# Patient Record
Sex: FEMALE | Race: BLACK OR AFRICAN AMERICAN | NOT HISPANIC OR LATINO | URBAN - METROPOLITAN AREA
[De-identification: names, ages, dates, MRNs, and addresses within clinical notes are randomized per-mention and may not be internally consistent; named-entity substitution may affect disease eponyms.]

---

## 2018-07-10 ENCOUNTER — EMERGENCY (EMERGENCY)
Facility: HOSPITAL | Age: 73
LOS: 1 days | Discharge: HOME | End: 2018-07-10
Attending: EMERGENCY MEDICINE | Admitting: EMERGENCY MEDICINE
Payer: MEDICARE

## 2018-07-10 VITALS
TEMPERATURE: 98 F | OXYGEN SATURATION: 99 % | SYSTOLIC BLOOD PRESSURE: 183 MMHG | RESPIRATION RATE: 18 BRPM | HEART RATE: 61 BPM | DIASTOLIC BLOOD PRESSURE: 101 MMHG

## 2018-07-10 DIAGNOSIS — Z94.0 KIDNEY TRANSPLANT STATUS: Chronic | ICD-10-CM

## 2018-07-10 LAB
ALBUMIN SERPL ELPH-MCNC: 4.2 G/DL — SIGNIFICANT CHANGE UP (ref 3.3–5)
ALP SERPL-CCNC: 81 U/L — SIGNIFICANT CHANGE UP (ref 40–120)
ALT FLD-CCNC: 13 U/L — SIGNIFICANT CHANGE UP (ref 4–33)
APPEARANCE UR: CLEAR — SIGNIFICANT CHANGE UP
APTT BLD: 24.9 SEC — LOW (ref 27.5–37.4)
AST SERPL-CCNC: 26 U/L — SIGNIFICANT CHANGE UP (ref 4–32)
BASOPHILS # BLD AUTO: 0.03 K/UL — SIGNIFICANT CHANGE UP (ref 0–0.2)
BASOPHILS NFR BLD AUTO: 0.5 % — SIGNIFICANT CHANGE UP (ref 0–2)
BILIRUB SERPL-MCNC: 0.5 MG/DL — SIGNIFICANT CHANGE UP (ref 0.2–1.2)
BILIRUB UR-MCNC: NEGATIVE — SIGNIFICANT CHANGE UP
BLOOD UR QL VISUAL: NEGATIVE — SIGNIFICANT CHANGE UP
BUN SERPL-MCNC: 21 MG/DL — SIGNIFICANT CHANGE UP (ref 7–23)
CALCIUM SERPL-MCNC: 9.3 MG/DL — SIGNIFICANT CHANGE UP (ref 8.4–10.5)
CHLORIDE SERPL-SCNC: 100 MMOL/L — SIGNIFICANT CHANGE UP (ref 98–107)
CO2 SERPL-SCNC: 23 MMOL/L — SIGNIFICANT CHANGE UP (ref 22–31)
COLOR SPEC: SIGNIFICANT CHANGE UP
CREAT SERPL-MCNC: 1.42 MG/DL — HIGH (ref 0.5–1.3)
EOSINOPHIL # BLD AUTO: 0.2 K/UL — SIGNIFICANT CHANGE UP (ref 0–0.5)
EOSINOPHIL NFR BLD AUTO: 3.1 % — SIGNIFICANT CHANGE UP (ref 0–6)
GLUCOSE SERPL-MCNC: 99 MG/DL — SIGNIFICANT CHANGE UP (ref 70–99)
GLUCOSE UR-MCNC: NEGATIVE — SIGNIFICANT CHANGE UP
HCT VFR BLD CALC: 38.6 % — SIGNIFICANT CHANGE UP (ref 34.5–45)
HGB BLD-MCNC: 12 G/DL — SIGNIFICANT CHANGE UP (ref 11.5–15.5)
IMM GRANULOCYTES # BLD AUTO: 0.03 # — SIGNIFICANT CHANGE UP
IMM GRANULOCYTES NFR BLD AUTO: 0.5 % — SIGNIFICANT CHANGE UP (ref 0–1.5)
INR BLD: 0.97 — SIGNIFICANT CHANGE UP (ref 0.88–1.17)
KETONES UR-MCNC: NEGATIVE — SIGNIFICANT CHANGE UP
LEUKOCYTE ESTERASE UR-ACNC: NEGATIVE — SIGNIFICANT CHANGE UP
LYMPHOCYTES # BLD AUTO: 1.26 K/UL — SIGNIFICANT CHANGE UP (ref 1–3.3)
LYMPHOCYTES # BLD AUTO: 19.8 % — SIGNIFICANT CHANGE UP (ref 13–44)
MCHC RBC-ENTMCNC: 26.9 PG — LOW (ref 27–34)
MCHC RBC-ENTMCNC: 31.1 % — LOW (ref 32–36)
MCV RBC AUTO: 86.5 FL — SIGNIFICANT CHANGE UP (ref 80–100)
MONOCYTES # BLD AUTO: 0.9 K/UL — SIGNIFICANT CHANGE UP (ref 0–0.9)
MONOCYTES NFR BLD AUTO: 14.1 % — HIGH (ref 2–14)
MUCOUS THREADS # UR AUTO: SIGNIFICANT CHANGE UP
NEUTROPHILS # BLD AUTO: 3.95 K/UL — SIGNIFICANT CHANGE UP (ref 1.8–7.4)
NEUTROPHILS NFR BLD AUTO: 62 % — SIGNIFICANT CHANGE UP (ref 43–77)
NITRITE UR-MCNC: NEGATIVE — SIGNIFICANT CHANGE UP
NRBC # FLD: 0 — SIGNIFICANT CHANGE UP
PH UR: 6.5 — SIGNIFICANT CHANGE UP (ref 4.6–8)
PLATELET # BLD AUTO: 155 K/UL — SIGNIFICANT CHANGE UP (ref 150–400)
PMV BLD: 10.3 FL — SIGNIFICANT CHANGE UP (ref 7–13)
POTASSIUM SERPL-MCNC: 4.1 MMOL/L — SIGNIFICANT CHANGE UP (ref 3.5–5.3)
POTASSIUM SERPL-SCNC: 4.1 MMOL/L — SIGNIFICANT CHANGE UP (ref 3.5–5.3)
PROT SERPL-MCNC: 7.5 G/DL — SIGNIFICANT CHANGE UP (ref 6–8.3)
PROT UR-MCNC: 100 MG/DL — HIGH
PROTHROM AB SERPL-ACNC: 11.2 SEC — SIGNIFICANT CHANGE UP (ref 9.8–13.1)
RBC # BLD: 4.46 M/UL — SIGNIFICANT CHANGE UP (ref 3.8–5.2)
RBC # FLD: 14.8 % — HIGH (ref 10.3–14.5)
RBC CASTS # UR COMP ASSIST: SIGNIFICANT CHANGE UP (ref 0–?)
SODIUM SERPL-SCNC: 139 MMOL/L — SIGNIFICANT CHANGE UP (ref 135–145)
SP GR SPEC: 1.01 — SIGNIFICANT CHANGE UP (ref 1–1.04)
SQUAMOUS # UR AUTO: SIGNIFICANT CHANGE UP
TROPONIN T, HIGH SENSITIVITY: 10 NG/L — SIGNIFICANT CHANGE UP (ref ?–14)
TROPONIN T, HIGH SENSITIVITY: 12 NG/L — SIGNIFICANT CHANGE UP (ref ?–14)
UROBILINOGEN FLD QL: NORMAL MG/DL — SIGNIFICANT CHANGE UP
WBC # BLD: 6.37 K/UL — SIGNIFICANT CHANGE UP (ref 3.8–10.5)
WBC # FLD AUTO: 6.37 K/UL — SIGNIFICANT CHANGE UP (ref 3.8–10.5)
WBC UR QL: SIGNIFICANT CHANGE UP (ref 0–?)

## 2018-07-10 PROCEDURE — 72170 X-RAY EXAM OF PELVIS: CPT | Mod: 26

## 2018-07-10 PROCEDURE — 70450 CT HEAD/BRAIN W/O DYE: CPT | Mod: 26

## 2018-07-10 PROCEDURE — 99220: CPT

## 2018-07-10 PROCEDURE — 72125 CT NECK SPINE W/O DYE: CPT | Mod: 26

## 2018-07-10 PROCEDURE — 72220 X-RAY EXAM SACRUM TAILBONE: CPT | Mod: 26

## 2018-07-10 PROCEDURE — 72100 X-RAY EXAM L-S SPINE 2/3 VWS: CPT | Mod: 26

## 2018-07-10 PROCEDURE — 71046 X-RAY EXAM CHEST 2 VIEWS: CPT | Mod: 26

## 2018-07-10 PROCEDURE — 93010 ELECTROCARDIOGRAM REPORT: CPT | Mod: 59

## 2018-07-10 RX ORDER — FOLIC ACID 0.8 MG
1 TABLET ORAL ONCE
Qty: 0 | Refills: 0 | Status: COMPLETED | OUTPATIENT
Start: 2018-07-10 | End: 2018-07-10

## 2018-07-10 RX ORDER — PANTOPRAZOLE SODIUM 20 MG/1
40 TABLET, DELAYED RELEASE ORAL
Qty: 0 | Refills: 0 | Status: DISCONTINUED | OUTPATIENT
Start: 2018-07-10 | End: 2018-07-14

## 2018-07-10 RX ORDER — MYCOPHENOLATE MOFETIL 250 MG/1
750 CAPSULE ORAL ONCE
Qty: 0 | Refills: 0 | Status: COMPLETED | OUTPATIENT
Start: 2018-07-10 | End: 2018-07-10

## 2018-07-10 RX ORDER — SIMVASTATIN 20 MG/1
20 TABLET, FILM COATED ORAL ONCE
Qty: 0 | Refills: 0 | Status: COMPLETED | OUTPATIENT
Start: 2018-07-10 | End: 2018-07-10

## 2018-07-10 RX ORDER — ACETAMINOPHEN 500 MG
650 TABLET ORAL ONCE
Qty: 0 | Refills: 0 | Status: COMPLETED | OUTPATIENT
Start: 2018-07-10 | End: 2018-07-10

## 2018-07-10 RX ORDER — MAGNESIUM OXIDE 400 MG ORAL TABLET 241.3 MG
400 TABLET ORAL AT BEDTIME
Qty: 0 | Refills: 0 | Status: DISCONTINUED | OUTPATIENT
Start: 2018-07-10 | End: 2018-07-14

## 2018-07-10 RX ORDER — LATANOPROST 0.05 MG/ML
1 SOLUTION/ DROPS OPHTHALMIC; TOPICAL AT BEDTIME
Qty: 0 | Refills: 0 | Status: DISCONTINUED | OUTPATIENT
Start: 2018-07-10 | End: 2018-07-14

## 2018-07-10 RX ORDER — LOSARTAN POTASSIUM 100 MG/1
100 TABLET, FILM COATED ORAL DAILY
Qty: 0 | Refills: 0 | Status: DISCONTINUED | OUTPATIENT
Start: 2018-07-10 | End: 2018-07-14

## 2018-07-10 RX ORDER — MYCOPHENOLATE MOFETIL 250 MG/1
250 CAPSULE ORAL ONCE
Qty: 0 | Refills: 0 | Status: DISCONTINUED | OUTPATIENT
Start: 2018-07-10 | End: 2018-07-10

## 2018-07-10 RX ORDER — PANTOPRAZOLE SODIUM 20 MG/1
40 TABLET, DELAYED RELEASE ORAL
Qty: 0 | Refills: 0 | Status: DISCONTINUED | OUTPATIENT
Start: 2018-07-10 | End: 2018-07-11

## 2018-07-10 RX ORDER — SIMVASTATIN 20 MG/1
20 TABLET, FILM COATED ORAL DAILY
Qty: 0 | Refills: 0 | Status: DISCONTINUED | OUTPATIENT
Start: 2018-07-10 | End: 2018-07-14

## 2018-07-10 RX ORDER — AMLODIPINE BESYLATE 2.5 MG/1
5 TABLET ORAL DAILY
Qty: 0 | Refills: 0 | Status: DISCONTINUED | OUTPATIENT
Start: 2018-07-10 | End: 2018-07-14

## 2018-07-10 RX ORDER — MYCOPHENOLATE MOFETIL 250 MG/1
750 CAPSULE ORAL
Qty: 0 | Refills: 0 | Status: DISCONTINUED | OUTPATIENT
Start: 2018-07-10 | End: 2018-07-14

## 2018-07-10 RX ORDER — LOSARTAN POTASSIUM 100 MG/1
100 TABLET, FILM COATED ORAL ONCE
Qty: 0 | Refills: 0 | Status: COMPLETED | OUTPATIENT
Start: 2018-07-10 | End: 2018-07-10

## 2018-07-10 RX ADMIN — SIMVASTATIN 20 MILLIGRAM(S): 20 TABLET, FILM COATED ORAL at 16:27

## 2018-07-10 RX ADMIN — PANTOPRAZOLE SODIUM 40 MILLIGRAM(S): 20 TABLET, DELAYED RELEASE ORAL at 16:26

## 2018-07-10 RX ADMIN — MYCOPHENOLATE MOFETIL 750 MILLIGRAM(S): 250 CAPSULE ORAL at 17:52

## 2018-07-10 RX ADMIN — Medication 650 MILLIGRAM(S): at 02:00

## 2018-07-10 RX ADMIN — Medication 650 MILLIGRAM(S): at 21:28

## 2018-07-10 RX ADMIN — MAGNESIUM OXIDE 400 MG ORAL TABLET 400 MILLIGRAM(S): 241.3 TABLET ORAL at 22:57

## 2018-07-10 RX ADMIN — Medication 5 MILLIGRAM(S): at 16:26

## 2018-07-10 RX ADMIN — Medication 1 MILLIGRAM(S): at 16:26

## 2018-07-10 RX ADMIN — LATANOPROST 1 DROP(S): 0.05 SOLUTION/ DROPS OPHTHALMIC; TOPICAL at 23:18

## 2018-07-10 RX ADMIN — Medication 650 MILLIGRAM(S): at 16:44

## 2018-07-10 RX ADMIN — Medication 650 MILLIGRAM(S): at 16:26

## 2018-07-10 NOTE — ED PROVIDER NOTE - PROGRESS NOTE DETAILS
RONN Childers: Spoke with Patient PCP [facility] cell: [phone] or pager at [phone]. Discussed patient results and our concern. PMD reports no underlying arrythmia, carotid dopplers and echo cardiograms have been done with normal studies. PCP agrees with obs for tele, neuro checks, echo and to touch base with him tomorrow.

## 2018-07-10 NOTE — ED PROVIDER NOTE - MEDICAL DECISION MAKING DETAILS
Syncope  1) IV access/LABS/CE/EKG/monitor  2) CXR  3) CT Head/CT c-spine; xray lumbar spine/sacrum, chest  4) admit to tele

## 2018-07-10 NOTE — ED CDU PROVIDER INITIAL DAY NOTE - MEDICAL DECISION MAKING DETAILS
Patient s/p syncopal episode vs mechanical fall (patient states it was a mechanical fall daughter states it was a syncopal episode). Patient to cdu for overnight telemetry and an echo, no other changes.

## 2018-07-10 NOTE — ED CDU PROVIDER INITIAL DAY NOTE - OBJECTIVE STATEMENT
ATTG NOTE DR. HUFF 73F presents to the ED after while going downstairs +LOC, fell down 6 stairs, heard by daughter in adjacent room.  As per daughter patient +LOC, and confused after awakening for <1minute +diaphoresis.  No urinary or bowel incontinence, no tongue biting, no preceding symptoms.  Pt was in [facility] prior to episode.  No previous similar episodes.    RONN Childers: 74 yo F with PMHx of PCKD s/p kidney transplant in 2003, HTN, glaucoma, steroid induced DM, OA/ OP, right ICA aneurysm (stable last imaging was 2017) presents to the ED with syncope/ fall this am. Pt reports that she was on the stairs and she fell. She lives at home with daughter whom heard mother coming down the stairs and believes mother fell down 6-7 steps. When daughter got over to her she was diaphoretic and synopsized 2secs and then seemed confused for approx one minutes and returned to baseline. Now c/o dizziness and occipital HA 5/10. Denies pre fall cp ,sob, dizziness, headache, nausea, vomiting, fevers, chills, abdominal pain, weakness, numbness/ tingling    RONN Zayas: Agree with above 74 yo F with PMHx of PCKD s/p kidney transplant in 2003, HTN, glaucoma, steroid induced DM, OA/ OP, right ICA aneurysm (stable last imaging was 2017) presents to the ED with syncope/ fall this am. Pt reports that she was on the stairs and she fell. She lives at home with daughter whom heard mother coming down the stairs and believes mother fell down 6-7 steps. Patient was found to be diaphoretic and was not fully responsive although no seizure like activity was witnessed. Patient's PMD was contacted and joint plan for CDU placement with and an AM echo was jointly planned. Patient is stable and back to baseline, no other acute changes. Patient states she remembers the entire event however daughter states patient had synopsized.

## 2018-07-10 NOTE — ED ADULT NURSE NOTE - OBJECTIVE STATEMENT
Toyin RN: received pt to room 22 for evaluation of headache, neck pain and coccyx pain s/p unwitnessed fall down 6 steps this morning with +loc x 2 minutes per family who states pt was found diaphoretic on the floor. pt endorses not remembering what happened. denies use of anticoagulants. family states pt has had recent falls. pt presents awake a&ox4, denies dizziness, endorses headache and neck pain. denies visual disturbances. skin warm, dry, appropriate for race, intact. respirations even, unlabored. lungs cta. denies cp or sob. abdomen soft nontender nondistended. pt states abdomen size is baseline secondary to renal transplant. denies n/v/d. denies fevers or chills. left av fistula in place, non-functional per family since pt's renal transplant. ivl placed, bloods drawn and sent. vs as noted. safety maintained. family at bedside. report to primary RN Roseann SERRATO

## 2018-07-10 NOTE — ED PROVIDER NOTE - OBJECTIVE STATEMENT
ATTG NOTE DR. HUFF 73F presents to the ED after while going downstairs +LOC, fell down 6 stairs, heard by daughter in adjacent room.  As per daughter patient +LOC, and confused after awakening for <1minute +diaphoresis.  No urinary or bowel incontinence, no tongue biting, no preceding symptoms.  Pt was in [facility] prior to episode.  No previous similar episodes. ATTG NOTE DR. HUFF 73F presents to the ED after while going downstairs +LOC, fell down 6 stairs, heard by daughter in adjacent room.  As per daughter patient +LOC, and confused after awakening for <1minute +diaphoresis.  No urinary or bowel incontinence, no tongue biting, no preceding symptoms.  Pt was in [facility] prior to episode.  No previous similar episodes.    RONN Childers: 74 yo F with PMHx of PCKD s/p kidney transplant in 2003, HTN, glaucoma, steroid induced DM, OA/ OP, right ICA aneurysm (stable last imaging was 2017) presents to the ED with syncope/ fall this am. Pt reports that she was on the stairs and she fell. She lives at home with daughter whom heard mother coming down the stairs and believes mother fell down 6-7 steps. When daughter got over to her she was diaphoretic and synopsized 2secs and then seemed confused for approx one minutes and returned to baseline. Now c/o dizziness and occipital HA 5/10. Denies pre fall cp ,sob, dizziness, headache, nausea, vomiting, fevers, chills, abdominal pain, weakness, numbness/ tingling

## 2018-07-10 NOTE — ED ADULT TRIAGE NOTE - CHIEF COMPLAINT QUOTE
Pt brought in by EMS from home for unwitnessed syncopal episode. As per family they heard a noise and they found the patient on the floor. Pt complaining of lower back and head pain.  by EMS. Pt denies chest pain.

## 2018-07-11 VITALS
RESPIRATION RATE: 16 BRPM | DIASTOLIC BLOOD PRESSURE: 76 MMHG | SYSTOLIC BLOOD PRESSURE: 151 MMHG | TEMPERATURE: 97 F | HEART RATE: 68 BPM | OXYGEN SATURATION: 100 %

## 2018-07-11 PROCEDURE — 99217: CPT

## 2018-07-11 PROCEDURE — 93306 TTE W/DOPPLER COMPLETE: CPT | Mod: 26

## 2018-07-11 RX ORDER — ACETAMINOPHEN 500 MG
650 TABLET ORAL ONCE
Qty: 0 | Refills: 0 | Status: COMPLETED | OUTPATIENT
Start: 2018-07-11 | End: 2018-07-11

## 2018-07-11 RX ADMIN — Medication 650 MILLIGRAM(S): at 08:11

## 2018-07-11 RX ADMIN — MYCOPHENOLATE MOFETIL 750 MILLIGRAM(S): 250 CAPSULE ORAL at 10:00

## 2018-07-11 RX ADMIN — AMLODIPINE BESYLATE 5 MILLIGRAM(S): 2.5 TABLET ORAL at 10:01

## 2018-07-11 RX ADMIN — Medication 650 MILLIGRAM(S): at 06:56

## 2018-07-11 RX ADMIN — LOSARTAN POTASSIUM 100 MILLIGRAM(S): 100 TABLET, FILM COATED ORAL at 10:02

## 2018-07-11 NOTE — ED CDU PROVIDER SUBSEQUENT DAY NOTE - ATTENDING CONTRIBUTION TO CARE
CDU MD ALLEN:  I performed a face to face bedside interview with patient regarding history of present illness, review of symptoms and past medical history. I completed an independent physical exam.  I have discussed patient's plan of care with PA.   I agree with note as stated above, having amended the EMR as needed to reflect my findings. I have discussed the assessment and plan of care.  This includes during the time I functioned as the attending physician for this patient.

## 2018-07-11 NOTE — ED CDU PROVIDER DISPOSITION NOTE - CLINICAL COURSE
72 y/o female h/o pkcd, kidney xplant, htn, dm here after presumed syncopal episode preceding fall down stairs.  Neg head ct in main ED, sent to CDU for echo.  Echo without wma.  Discharged home with pcp and cards f/u.

## 2018-07-11 NOTE — ED CDU PROVIDER SUBSEQUENT DAY NOTE - MEDICAL DECISION MAKING DETAILS
74 yo F with PMHx of PCKD s/p kidney transplant in 2003, HTN, glaucoma, steroid induced DM, OA/ OP, right ICA aneurysm (stable last imaging was 2017) pw unwitnessed fall vs syncope today which was heard by her daughter  Plan: echo

## 2018-07-11 NOTE — ED CDU PROVIDER SUBSEQUENT DAY NOTE - PROGRESS NOTE DETAILS
MD ALLEN:  Pt has no compliants.  Denies cp, sob, lightheadedness.  Mild frontal ha and low back pain.  Improved from yesterday.  Non ttp spinous midline, ctabil.  Pending echo. Stress and echo WNL, patient has been stable, monitor shows NSR in the 70s Stress and echo WNL, patient has been stable, monitor shows NSR in the 70s patient safe for D/C with PMD and cardiologist F/U. Follow up with your primary care doctor and a cardiologist.  Bring copies of your test results. Advance activity as tolerated.  Continue all previously prescribed medications as directed.  Follow up with your primary care physician in 48-72 hours- bring copies of your results.  Return to the ER for worsening or persistent symptoms, and/or ANY NEW OR CONCERNING SYMPTOMS. If you have issues obtaining follow up, please call: [phone] to obtain a doctor or specialist who takes your insurance in your area.  You may call [phone] to make an appointment with the internal medicine clinic.

## 2018-07-11 NOTE — ED CDU PROVIDER SUBSEQUENT DAY NOTE - HISTORY
74 yo F with PMHx of PCKD s/p kidney transplant in 2003, HTN, glaucoma, steroid induced DM, OA/ OP, right ICA aneurysm (stable last imaging was 2017) pw unwitnessed fall vs syncope today which was heard by her daughter. Lives with her daughter who believes she may have fallen down 6-7 steps. Followed by diaphoresis and was confused after incident then returned to baseline. Pt NAD, VSS. Will continue to monitor. Pending echo.

## 2018-07-11 NOTE — ED CDU PROVIDER DISPOSITION NOTE - PLAN OF CARE
Follow up with your primary care doctor and a cardiologist.  Bring copies of your test results. Advance activity as tolerated.  Continue all previously prescribed medications as directed.  Follow up with your primary care physician in 48-72 hours- bring copies of your results.  Return to the ER for worsening or persistent symptoms, and/or ANY NEW OR CONCERNING SYMPTOMS. If you have issues obtaining follow up, please call: [phone] to obtain a doctor or specialist who takes your insurance in your area.  You may call [phone] to make an appointment with the internal medicine clinic.

## 2019-05-04 ENCOUNTER — EMERGENCY (EMERGENCY)
Facility: HOSPITAL | Age: 74
LOS: 1 days | Discharge: HOME | End: 2019-05-04
Attending: EMERGENCY MEDICINE | Admitting: EMERGENCY MEDICINE
Payer: MEDICARE

## 2019-05-04 VITALS
TEMPERATURE: 97 F | SYSTOLIC BLOOD PRESSURE: 164 MMHG | RESPIRATION RATE: 16 BRPM | HEART RATE: 79 BPM | OXYGEN SATURATION: 100 % | DIASTOLIC BLOOD PRESSURE: 81 MMHG

## 2019-05-04 VITALS
DIASTOLIC BLOOD PRESSURE: 76 MMHG | OXYGEN SATURATION: 100 % | SYSTOLIC BLOOD PRESSURE: 154 MMHG | RESPIRATION RATE: 18 BRPM | HEART RATE: 78 BPM

## 2019-05-04 DIAGNOSIS — Z94.0 KIDNEY TRANSPLANT STATUS: Chronic | ICD-10-CM

## 2019-05-04 LAB
ALBUMIN SERPL ELPH-MCNC: 4.2 G/DL — SIGNIFICANT CHANGE UP (ref 3.3–5)
ALP SERPL-CCNC: 66 U/L — SIGNIFICANT CHANGE UP (ref 40–120)
ALT FLD-CCNC: 15 U/L — SIGNIFICANT CHANGE UP (ref 4–33)
ANION GAP SERPL CALC-SCNC: 14 MMO/L — SIGNIFICANT CHANGE UP (ref 7–14)
AST SERPL-CCNC: 28 U/L — SIGNIFICANT CHANGE UP (ref 4–32)
BASE EXCESS BLDV CALC-SCNC: -0.4 MMOL/L — SIGNIFICANT CHANGE UP
BASOPHILS # BLD AUTO: 0.02 K/UL — SIGNIFICANT CHANGE UP (ref 0–0.2)
BASOPHILS NFR BLD AUTO: 0.3 % — SIGNIFICANT CHANGE UP (ref 0–2)
BILIRUB SERPL-MCNC: 0.4 MG/DL — SIGNIFICANT CHANGE UP (ref 0.2–1.2)
BLOOD GAS VENOUS - CREATININE: 1.65 MG/DL — HIGH (ref 0.5–1.3)
BUN SERPL-MCNC: 39 MG/DL — HIGH (ref 7–23)
CALCIUM SERPL-MCNC: 9.2 MG/DL — SIGNIFICANT CHANGE UP (ref 8.4–10.5)
CHLORIDE BLDV-SCNC: 104 MMOL/L — SIGNIFICANT CHANGE UP (ref 96–108)
CHLORIDE SERPL-SCNC: 101 MMOL/L — SIGNIFICANT CHANGE UP (ref 98–107)
CO2 SERPL-SCNC: 21 MMOL/L — LOW (ref 22–31)
CREAT SERPL-MCNC: 1.59 MG/DL — HIGH (ref 0.5–1.3)
EOSINOPHIL # BLD AUTO: 0.06 K/UL — SIGNIFICANT CHANGE UP (ref 0–0.5)
EOSINOPHIL NFR BLD AUTO: 0.9 % — SIGNIFICANT CHANGE UP (ref 0–6)
GAS PNL BLDV: 133 MMOL/L — LOW (ref 136–146)
GLUCOSE BLDV-MCNC: 158 MG/DL — HIGH (ref 70–99)
GLUCOSE SERPL-MCNC: 162 MG/DL — HIGH (ref 70–99)
HCO3 BLDV-SCNC: 23 MMOL/L — SIGNIFICANT CHANGE UP (ref 20–27)
HCT VFR BLD CALC: 35.1 % — SIGNIFICANT CHANGE UP (ref 34.5–45)
HCT VFR BLDV CALC: 34.2 % — LOW (ref 34.5–45)
HGB BLD-MCNC: 10.9 G/DL — LOW (ref 11.5–15.5)
HGB BLDV-MCNC: 11.1 G/DL — LOW (ref 11.5–15.5)
IMM GRANULOCYTES NFR BLD AUTO: 0.3 % — SIGNIFICANT CHANGE UP (ref 0–1.5)
LACTATE BLDV-MCNC: 2.2 MMOL/L — HIGH (ref 0.5–2)
LYMPHOCYTES # BLD AUTO: 0.62 K/UL — LOW (ref 1–3.3)
LYMPHOCYTES # BLD AUTO: 9.2 % — LOW (ref 13–44)
MAGNESIUM SERPL-MCNC: 1.5 MG/DL — LOW (ref 1.6–2.6)
MCHC RBC-ENTMCNC: 27.3 PG — SIGNIFICANT CHANGE UP (ref 27–34)
MCHC RBC-ENTMCNC: 31.1 % — LOW (ref 32–36)
MCV RBC AUTO: 88 FL — SIGNIFICANT CHANGE UP (ref 80–100)
MONOCYTES # BLD AUTO: 0.66 K/UL — SIGNIFICANT CHANGE UP (ref 0–0.9)
MONOCYTES NFR BLD AUTO: 9.8 % — SIGNIFICANT CHANGE UP (ref 2–14)
NEUTROPHILS # BLD AUTO: 5.36 K/UL — SIGNIFICANT CHANGE UP (ref 1.8–7.4)
NEUTROPHILS NFR BLD AUTO: 79.5 % — HIGH (ref 43–77)
NRBC # FLD: 0 K/UL — SIGNIFICANT CHANGE UP (ref 0–0)
PCO2 BLDV: 44 MMHG — SIGNIFICANT CHANGE UP (ref 41–51)
PH BLDV: 7.36 PH — SIGNIFICANT CHANGE UP (ref 7.32–7.43)
PHOSPHATE SERPL-MCNC: 3.2 MG/DL — SIGNIFICANT CHANGE UP (ref 2.5–4.5)
PLATELET # BLD AUTO: 173 K/UL — SIGNIFICANT CHANGE UP (ref 150–400)
PMV BLD: 11.4 FL — SIGNIFICANT CHANGE UP (ref 7–13)
PO2 BLDV: 28 MMHG — LOW (ref 35–40)
POTASSIUM BLDV-SCNC: 3.9 MMOL/L — SIGNIFICANT CHANGE UP (ref 3.4–4.5)
POTASSIUM SERPL-MCNC: 4.8 MMOL/L — SIGNIFICANT CHANGE UP (ref 3.5–5.3)
POTASSIUM SERPL-SCNC: 4.8 MMOL/L — SIGNIFICANT CHANGE UP (ref 3.5–5.3)
PROT SERPL-MCNC: 6.9 G/DL — SIGNIFICANT CHANGE UP (ref 6–8.3)
RBC # BLD: 3.99 M/UL — SIGNIFICANT CHANGE UP (ref 3.8–5.2)
RBC # FLD: 15.3 % — HIGH (ref 10.3–14.5)
SAO2 % BLDV: 44.7 % — LOW (ref 60–85)
SODIUM SERPL-SCNC: 136 MMOL/L — SIGNIFICANT CHANGE UP (ref 135–145)
WBC # BLD: 6.74 K/UL — SIGNIFICANT CHANGE UP (ref 3.8–10.5)
WBC # FLD AUTO: 6.74 K/UL — SIGNIFICANT CHANGE UP (ref 3.8–10.5)

## 2019-05-04 PROCEDURE — 93010 ELECTROCARDIOGRAM REPORT: CPT

## 2019-05-04 PROCEDURE — 99284 EMERGENCY DEPT VISIT MOD MDM: CPT | Mod: 25

## 2019-05-04 RX ORDER — ACETAMINOPHEN 500 MG
650 TABLET ORAL ONCE
Qty: 0 | Refills: 0 | Status: DISCONTINUED | OUTPATIENT
Start: 2019-05-04 | End: 2019-05-04

## 2019-05-04 RX ORDER — MAGNESIUM SULFATE 500 MG/ML
1 VIAL (ML) INJECTION ONCE
Qty: 0 | Refills: 0 | Status: COMPLETED | OUTPATIENT
Start: 2019-05-04 | End: 2019-05-04

## 2019-05-04 RX ORDER — DIAZEPAM 5 MG
2 TABLET ORAL ONCE
Qty: 0 | Refills: 0 | Status: DISCONTINUED | OUTPATIENT
Start: 2019-05-04 | End: 2019-05-04

## 2019-05-04 RX ORDER — SODIUM CHLORIDE 9 MG/ML
1000 INJECTION, SOLUTION INTRAVENOUS ONCE
Qty: 0 | Refills: 0 | Status: COMPLETED | OUTPATIENT
Start: 2019-05-04 | End: 2019-05-04

## 2019-05-04 RX ADMIN — SODIUM CHLORIDE 1000 MILLILITER(S): 9 INJECTION, SOLUTION INTRAVENOUS at 21:09

## 2019-05-04 RX ADMIN — Medication 100 GRAM(S): at 21:09

## 2019-05-04 NOTE — ED PROVIDER NOTE - NSFOLLOWUPINSTRUCTIONS_ED_ALL_ED_FT
You were seen in the ER for leg cramps.  Magnesium was low.  Fluids were given.  Return to ER for life threatening illnesses.  Follow up with your doctor.

## 2019-05-04 NOTE — ED PROVIDER NOTE - PHYSICAL EXAMINATION
Gen: In intermittent distress due to leg cramping  Head: NCAT  HEENT: PERRL, MMM, normal conjunctiva, anicteric, neck supple  Lung: CTAB, no adventitious sounds  CV: RRR, no murmurs, rubs or gallops  Abd: soft, NTND, no rebound or guarding, no CVAT  MSK: No edema, no visible deformities  Neuro: No focal neurologic deficits. CN II-XII grossly intact. 5/5 strength and normal sensation in all extremities.  Skin: Warm and dry, no evidence of rash  Psych: anxious mood and affect

## 2019-05-04 NOTE — ED PROVIDER NOTE - NS ED ROS FT
ROS: denies HA, weakness, dizziness, fevers/chills, nausea/vomiting, chest pain, SOB, diaphoresis, abdominal pain, diarrhea, joint pain, neuro deficits, dysuria/hematuria, rash    +leg cramps

## 2019-05-04 NOTE — ED PROVIDER NOTE - OBJECTIVE STATEMENT
73yo F with hx of PCOS s/p kidney transplant (immunosuppression) presents today with leg cramps for 1 hour. Has had leg cramps in the past, and is treated with hot packs. But this time the cramping is more severe than usual along the thighs and calves bilaterally. Has been not eating or drinking much for the last 2 days preparing for grandson's communion. no f/c. No hx of recent surg or immobilizations.

## 2019-05-04 NOTE — ED PROVIDER NOTE - CLINICAL SUMMARY MEDICAL DECISION MAKING FREE TEXT BOX
leg cramps. DVT unlikely given same pain bilaterally, acute onset 1 hour prior to presentation. Pain resolved without medications at 8pm. Will reassess

## 2019-05-04 NOTE — ED ADULT TRIAGE NOTE - CHIEF COMPLAINT QUOTE
Pt. c/o b/l foot edema and thigh cramping x 3 days Denies cp/sob. Pmxh: HTN, polycystic kidney disease, kidney transplant

## 2019-05-04 NOTE — ED PROVIDER NOTE - ATTENDING CONTRIBUTION TO CARE
DR. BLOCH, ATTENDING MD-  I performed a face to face bedside interview with patient regarding history of present illness, review of symptoms and past medical history. I completed an independent physical exam.  I have discussed patient's plan of care with the resident.  Patient examined, well appearing mild distress, intermittent contraction of LE muscles, mostly anterior tibialis, pulses present no calf tenderness or edema. heart sounds nml lungs clear. abd soft nontender.neuro nonfocal. DR. BLOCH, ATTENDING MD-  I performed a face to face bedside interview with patient regarding history of present illness, review of symptoms and past medical history. I completed an independent physical exam.  I have discussed patient's plan of care with the resident.  Patient examined, well appearing mild distress, intermittent contraction of LE muscles, mostly anterior tibialis, pulses present no calf tenderness or edema. heart sounds nml lungs clear. abd soft nontender.neuro nonfocal

## 2019-05-04 NOTE — ED PROVIDER NOTE - PROGRESS NOTE DETAILS
Plan explained, will give fluids, reassess Pt painfree for the last 1 hour. Mg repleted, fluids given. Will dc with f/u PMD

## 2019-05-04 NOTE — ED ADULT NURSE NOTE - OBJECTIVE STATEMENT
pt alert,oriented x3. reports leg cramping with pain,states not upon climbing stairs .  iv access,labs sent,float rn

## 2019-05-04 NOTE — ED ADULT NURSE REASSESSMENT NOTE - NS ED NURSE REASSESS COMMENT FT1
report received from AMELIA MARRERO, pt A&Ox4, states that cramping in BLE has subsided with rest and hot packs. denies pain at this time. medicated per MD order. comfort measures provided, family members at bedside, will ctm.

## 2019-05-05 PROBLEM — I72.9 ANEURYSM OF UNSPECIFIED SITE: Chronic | Status: ACTIVE | Noted: 2018-07-10

## 2019-05-05 PROBLEM — Q61.3 POLYCYSTIC KIDNEY, UNSPECIFIED: Chronic | Status: ACTIVE | Noted: 2018-07-10

## 2019-05-05 PROBLEM — H40.9 UNSPECIFIED GLAUCOMA: Chronic | Status: ACTIVE | Noted: 2018-07-10

## 2020-09-22 NOTE — ED ADULT NURSE NOTE - BP NONINVASIVE DIASTOLIC (MM HG)
Progress Note    Patient: Damian Garcia MRN: [id number]  SSN: [id number]    YOB: 1963  Age: 49 y.o. Sex: female      Admit Date: 9/21/2020    LOS: 1 day     Subjective:     CC: shortness of breath      56F, h/o COPD with acute on chronic respiratory failure on 2L NC, with shortness of breath s/t AECHFpEF was intubated s/t acute hypoxic respiratory failure s.t flash pulmonary edema s.t AECHFpEF.     Extubated on 9/22 remained on bipap and now weaned to 2L NC  , continue aggressive IV diuresis with IV bumex. On venti mask. Aspiration precautions.   Transfer to floor today, off precedex.      Review of Systems:  Constitutional:  denies weight loss, no fever, no chills, no night sweats  Eye: No recent visual disturbances, no discharge, no double vision  Ear/nose/mouth/throat : No hearing disturbance, no ear pain, no nasal congestion, no sore throat, no trouble swallowing. Respiratory : No trouble breathing, no cough, no shortness of breath, no hemoptysis, no wheezing  Cardiovascular : No chest pain, no palpitation, no racing of heart, no orthopnea, no paroxysmal nocturnal dyspnea, no peripheral edema  Gastrointestinal : No nausea, no vomiting, no diarrhea, constipation, heartburn, abdominal pain  Genitourinary : No dysuria, no hematuria, no increased frequency, incontinence,  Lymphatics : No swollen glands -Neck, axillary, inguinal  Endocrine : No excessive thirst, no polyuria no cold intolerance, no heat intolerance.   Immunologic : No hives, urticaria, no seasonal allergies,   Musculoskeletal : Left upper back pain.  No joint swelling, pain, effusion,  no neck pain,   Integumentary : No rash, no pruritus, no ecchymosis  Hematology : No petechiae, No easy bruising,  No tendency to bleed easy  Neurology : Denies change in mental status, no abnormal balance, no headache, no confusion, numbness, tingling,  Psychiatric : No mood swings, no anxiety, depression    Objective:     Vitals:    09/22/20 1500 09/22/20 1600 09/22/20 1627 09/22/20 1700   BP: 130/74 128/72  125/75   Pulse: 85 84 88 85   Resp: 18 19 14 14   Temp: 98.4 °F (36.9 °C)      SpO2: 97% 97% 97% 99%   Weight:       Height:            Intake and Output:  Current Shift: 09/22 0701 - 09/22 1900  In: -   Out: 45 [Urine:45]  Last three shifts: No intake/output data recorded. Physical Exam:   Physical Exam:   General appearance: A+Ox3, tired, chronically ill-appearing, 5L NC satt'ing 100%  Head: Normocephalic, without obvious abnormality, atraumatic  Eyes: conjunctivae/corneas clear. PERRL, EOM's intact. Throat: Lips, mucosa, and tongue normal. Poor dentition. Neck: supple, symmetrical, trachea midline, no adenopathy, thyroid: not enlarged, symmetric, no tenderness/mass/nodules and no carotid bruit  Lungs: Relatively clear to auscultation with some rhonchi in the bases bilaterally, No accessory muscle use, 5L NC  Heart: regular rate and rhythm, S1, S2 normal, no murmur, click, rub or gallop  Abdomen: soft, non-tender. Bowel sounds normal. No masses,  no organomegaly  Extremities: edema +2 bilaterally, otherwise atraumatic  Skin: Skin color, texture, turgor normal. No rashes or lesions  Lymph nodes: Cervical, supraclavicular, and axillary nodes normal.  Neurologic: Grossly normal, following commands, calm    Lab/Data Review:        Assessment:     1. Acute on chronic respiratory failure: extubated today.  On 2L NC     2.  Congestive heart failure with preserved exacerbation: Patient is allergic to sulfa drugs, on IV bumen Monitor patient's intake and output.     3.  Hypertension: Continue patient's current antihypertensive medications.     4.  Anxiety disorder:  Xanax 0.25 PRN.(seroquel added) Weaned off precedex     5.  Seizure disorder:  on Keppra and carbamazepine.     6.  Iron deficiency anemia: Continue patient on iron supplement     7.  Renal artery stenosis: Cardiology closely following the patient will follow up with them for further recommendations.     DISPO: she is off bipap and precedex, PT for disposition.  She may be discharged tomorrow.        Signed By: Maurice Traore MD     September 22, 2020 75

## 2020-11-21 NOTE — ED CDU PROVIDER SUBSEQUENT DAY NOTE - CROS ED ROS STATEMENT
all other ROS negative except as per HPI Pt p/w food bolus impaction, will try medical therapy and if unsuccessful will consult GI.

## 2021-05-13 ENCOUNTER — APPOINTMENT (OUTPATIENT)
Dept: GASTROENTEROLOGY | Facility: CLINIC | Age: 76
End: 2021-05-13

## 2022-01-01 ENCOUNTER — RX RENEWAL (OUTPATIENT)
Age: 77
End: 2022-01-01

## 2022-01-01 ENCOUNTER — APPOINTMENT (OUTPATIENT)
Dept: GASTROENTEROLOGY | Facility: CLINIC | Age: 77
End: 2022-01-01

## 2022-01-01 ENCOUNTER — APPOINTMENT (OUTPATIENT)
Dept: COLORECTAL SURGERY | Facility: CLINIC | Age: 77
End: 2022-01-01

## 2022-01-01 ENCOUNTER — NON-APPOINTMENT (OUTPATIENT)
Age: 77
End: 2022-01-01

## 2022-01-01 VITALS
TEMPERATURE: 97.7 F | BODY MASS INDEX: 22.98 KG/M2 | HEIGHT: 67 IN | WEIGHT: 146.4 LBS | DIASTOLIC BLOOD PRESSURE: 85 MMHG | OXYGEN SATURATION: 99 % | HEART RATE: 76 BPM | SYSTOLIC BLOOD PRESSURE: 160 MMHG

## 2022-01-01 DIAGNOSIS — K64.8 OTHER HEMORRHOIDS: ICD-10-CM

## 2022-01-01 DIAGNOSIS — Z86.010 PERSONAL HISTORY OF COLONIC POLYPS: ICD-10-CM

## 2022-01-01 DIAGNOSIS — E11.9 TYPE 2 DIABETES MELLITUS W/OUT COMPLICATIONS: ICD-10-CM

## 2022-01-01 DIAGNOSIS — K21.9 GASTRO-ESOPHAGEAL REFLUX DISEASE W/OUT ESOPHAGITIS: ICD-10-CM

## 2022-01-01 DIAGNOSIS — I10 ESSENTIAL (PRIMARY) HYPERTENSION: ICD-10-CM

## 2022-01-01 DIAGNOSIS — R19.4 CHANGE IN BOWEL HABIT: ICD-10-CM

## 2022-01-01 DIAGNOSIS — H40.9 UNSPECIFIED GLAUCOMA: ICD-10-CM

## 2022-01-01 DIAGNOSIS — E03.9 HYPOTHYROIDISM, UNSPECIFIED: ICD-10-CM

## 2022-01-01 PROCEDURE — 99214 OFFICE O/P EST MOD 30 MIN: CPT

## 2022-01-01 RX ORDER — HYDROCORTISONE 2.5% 25 MG/G
2.5 CREAM TOPICAL TWICE DAILY
Qty: 1 | Refills: 5 | Status: ACTIVE | COMMUNITY
Start: 2022-01-01 | End: 1900-01-01

## 2022-01-03 PROBLEM — Z00.00 ENCOUNTER FOR PREVENTIVE HEALTH EXAMINATION: Status: ACTIVE | Noted: 2022-01-03

## 2022-01-04 ENCOUNTER — TRANSCRIPTION ENCOUNTER (OUTPATIENT)
Age: 77
End: 2022-01-04

## 2022-01-04 ENCOUNTER — APPOINTMENT (OUTPATIENT)
Dept: GASTROENTEROLOGY | Facility: CLINIC | Age: 77
End: 2022-01-04
Payer: MEDICARE

## 2022-01-04 VITALS
DIASTOLIC BLOOD PRESSURE: 75 MMHG | WEIGHT: 133 LBS | TEMPERATURE: 96.9 F | SYSTOLIC BLOOD PRESSURE: 130 MMHG | BODY MASS INDEX: 20.88 KG/M2 | HEIGHT: 67 IN

## 2022-01-04 DIAGNOSIS — H25.89 OTHER AGE-RELATED CATARACT: ICD-10-CM

## 2022-01-04 DIAGNOSIS — Z83.79 FAMILY HISTORY OF OTHER DISEASES OF THE DIGESTIVE SYSTEM: ICD-10-CM

## 2022-01-04 DIAGNOSIS — Z86.79 PERSONAL HISTORY OF OTHER DISEASES OF THE CIRCULATORY SYSTEM: ICD-10-CM

## 2022-01-04 DIAGNOSIS — Z78.9 OTHER SPECIFIED HEALTH STATUS: ICD-10-CM

## 2022-01-04 DIAGNOSIS — Z86.718 PERSONAL HISTORY OF OTHER VENOUS THROMBOSIS AND EMBOLISM: ICD-10-CM

## 2022-01-04 DIAGNOSIS — Z87.01 PERSONAL HISTORY OF PNEUMONIA (RECURRENT): ICD-10-CM

## 2022-01-04 DIAGNOSIS — Z82.49 FAMILY HISTORY OF ISCHEMIC HEART DISEASE AND OTHER DISEASES OF THE CIRCULATORY SYSTEM: ICD-10-CM

## 2022-01-04 DIAGNOSIS — Z87.39 PERSONAL HISTORY OF OTHER DISEASES OF THE MUSCULOSKELETAL SYSTEM AND CONNECTIVE TISSUE: ICD-10-CM

## 2022-01-04 PROCEDURE — 99214 OFFICE O/P EST MOD 30 MIN: CPT

## 2022-01-04 RX ORDER — ASPIRIN 81 MG
81 TABLET,CHEWABLE ORAL
Refills: 0 | Status: ACTIVE | COMMUNITY

## 2022-01-04 RX ORDER — CYCLOSPORINE 100 MG/1
CAPSULE ORAL
Refills: 0 | Status: ACTIVE | COMMUNITY

## 2022-01-04 RX ORDER — CLONIDINE 0.2 MG/D
0.2 PATCH TRANSDERMAL
Refills: 0 | Status: ACTIVE | COMMUNITY

## 2022-01-04 RX ORDER — AMLODIPINE BESYLATE 2.5 MG/1
2.5 TABLET ORAL
Refills: 0 | Status: ACTIVE | COMMUNITY

## 2022-01-04 RX ORDER — LOSARTAN POTASSIUM 100 MG/1
100 TABLET, FILM COATED ORAL
Refills: 0 | Status: ACTIVE | COMMUNITY

## 2022-01-04 RX ORDER — CALCIUM CARBONATE/VITAMIN D3 500-10/5ML
400 LIQUID (ML) ORAL
Refills: 0 | Status: ACTIVE | COMMUNITY

## 2022-01-04 RX ORDER — PREDNISONE 5 MG/1
5 TABLET ORAL
Refills: 0 | Status: ACTIVE | COMMUNITY

## 2022-01-04 RX ORDER — MYCOPHENOLATE MOFETIL 250 MG/1
250 CAPSULE ORAL TWICE DAILY
Refills: 0 | Status: ACTIVE | COMMUNITY
Start: 2022-01-04

## 2022-01-04 RX ORDER — ERGOCALCIFEROL (VITAMIN D2) 1250 MCG
50000 CAPSULE ORAL
Refills: 0 | Status: ACTIVE | COMMUNITY

## 2022-01-04 RX ORDER — MYCOPHENOLATE MOFETIL 500 MG/1
500 TABLET, FILM COATED ORAL TWICE DAILY
Refills: 0 | Status: ACTIVE | COMMUNITY
Start: 2022-01-04

## 2022-01-04 RX ORDER — FOLIC ACID 1 MG/1
1 TABLET ORAL
Refills: 0 | Status: ACTIVE | COMMUNITY

## 2022-01-04 RX ORDER — LANSOPRAZOLE 30 MG
SUSPENSION,DELAYED RELEASE,RECONST. ORAL
Refills: 0 | Status: ACTIVE | COMMUNITY

## 2022-01-04 RX ORDER — ATOVAQUONE 750 MG/5ML
750 SUSPENSION ORAL
Refills: 0 | Status: ACTIVE | COMMUNITY

## 2022-01-04 NOTE — REVIEW OF SYSTEMS
[Fever] : fever [Chills] : chills [Feeling Poorly] : feeling poorly [Feeling Tired] : feeling tired [As Noted in HPI] : as noted in HPI [Negative] : Genitourinary

## 2022-01-04 NOTE — ASSESSMENT
[FreeTextEntry1] : Impression: Acute infectious diarrhea possibly C. difficile.  Metronidazole is causing GI side effects including exacerbation of GERD nausea.  Has completed 5 days can discontinue and finish the 10-day C. difficile treatment with vancomycin alone.  No point sending stool studies at this point as she is MCFP through with therapy and improving.  Expect dyspeptic symptoms to improve off of Flagyl.  Can use famotidine 20 mg as needed for the time being (prescription sent)\par \par Patient was given a 5-year colonoscopy follow-up in 2015, but we will defer routine screening for the time being given her current condition and as a precaution giving the current Covid wave

## 2022-01-04 NOTE — HISTORY OF PRESENT ILLNESS
[Vomiting] : denies vomiting [Diarrhea] : improved diarrhea [Constipation] : denies constipation [Yellow Skin Or Eyes (Jaundice)] : denies jaundice [Abdominal Swelling] : denies abdominal swelling [Rectal Pain] : denies rectal pain [Heartburn] : heartburn [Nausea] : nausea [Abdominal Pain] : abdominal pain [GERD] : gastroesophageal reflux disease [Hiatus Hernia] : hiatus hernia [Peptic Ulcer Disease] : no peptic ulcer disease [Pancreatitis] : no pancreatitis [Inflammatory Bowel Disease] : no inflammatory bowel disease [Diverticulitis] : diverticulitis [Malignancy] : no malignancy [Abdominal Surgery] : abdominal surgery [_________] : Performed [unfilled] [Good Compliance] : good compliance with treatment [de-identified] : 76-year-old female status post renal transplant for polycystic kidney disease here for evaluation of diarrhea and epigastric burning pain.  Patient has a history of colon polyps and chronic GERD.  Last colonoscopy in May 2015 was normal except for pan colonic diverticulosis.  EGD in 2006 was normal except for small hiatus hernia.  History of C. difficile in June 2020 which responded to a course of vancomycin.\par \par Patient was hospitalized in May 2021 with PCP.  Was doing well since then but 6 days ago developed severe diarrhea with abdominal cramping and fever.  She had not been on any antibiotics antecedent to that.  She was started on vancomycin and metronidazole by PMD on December 31, and her diarrhea is improving.  However, she has been experiencing severe epigastric burning pain for the past 3 days in spite of taking omeprazole daily.  Mylanta helps temporarily.  Some nausea.  No vomiting.  Stool studies were not sent. [de-identified] : History of diverticulitis in the past. [de-identified] : Numerous liver cysts.  Massively enlarged kidneys with cysts and calcification.  Transplant kidney in the left iliac fossa.  Acute sigmoid diverticulitis

## 2022-01-04 NOTE — PHYSICAL EXAM
[General Appearance - Alert] : alert [General Appearance - In No Acute Distress] : in no acute distress [] : no respiratory distress [Auscultation Breath Sounds / Voice Sounds] : lungs were clear to auscultation bilaterally [Heart Rate And Rhythm] : heart rate was normal and rhythm regular [Heart Sounds] : normal S1 and S2 [Heart Sounds Gallop] : no gallops [Murmurs] : no murmurs [Heart Sounds Pericardial Friction Rub] : no pericardial rub [Bowel Sounds] : normal bowel sounds [Abdomen Soft] : soft [Abdomen Tenderness] : non-tender [FreeTextEntry1] : Palpable renal and liver cysts producing mass-effect

## 2022-02-01 RX ORDER — VANCOMYCIN HYDROCHLORIDE 125 MG/1
125 CAPSULE ORAL
Qty: 40 | Refills: 0 | Status: COMPLETED | COMMUNITY
Start: 2022-02-01 | End: 2022-02-11

## 2022-02-03 ENCOUNTER — APPOINTMENT (OUTPATIENT)
Dept: GASTROENTEROLOGY | Facility: CLINIC | Age: 77
End: 2022-02-03

## 2022-02-07 ENCOUNTER — NON-APPOINTMENT (OUTPATIENT)
Age: 77
End: 2022-02-07

## 2022-02-10 ENCOUNTER — APPOINTMENT (OUTPATIENT)
Dept: GASTROENTEROLOGY | Facility: CLINIC | Age: 77
End: 2022-02-10
Payer: MEDICARE

## 2022-02-10 VITALS
HEIGHT: 67 IN | TEMPERATURE: 97.5 F | BODY MASS INDEX: 20.4 KG/M2 | SYSTOLIC BLOOD PRESSURE: 145 MMHG | DIASTOLIC BLOOD PRESSURE: 72 MMHG | WEIGHT: 130 LBS

## 2022-02-10 DIAGNOSIS — R10.13 EPIGASTRIC PAIN: ICD-10-CM

## 2022-02-10 DIAGNOSIS — R19.7 DIARRHEA, UNSPECIFIED: ICD-10-CM

## 2022-02-10 PROCEDURE — 99214 OFFICE O/P EST MOD 30 MIN: CPT

## 2022-02-10 NOTE — PHYSICAL EXAM

## 2022-02-10 NOTE — ASSESSMENT
[FreeTextEntry1] : Impression: Patient treated for C. difficile with resolution of symptoms.  Subsequently developed an acute infectious gastroenteritis found to be secondary to Campylobacter and enteropathogenic E. coli in the hospital.  Treated appropriately with antibiotics and is now improving.  Mildly constipated.  History of colon polyps due for follow-up colonoscopy.\par \par Plan: Recommend daily fiber supplement.  When fully recovered and stable we will schedule follow-up surveillance colonoscopy

## 2022-02-10 NOTE — HISTORY OF PRESENT ILLNESS
[Heartburn] : denies heartburn [Nausea] : denies nausea [Vomiting] : denies vomiting [Diarrhea] : resolved diarrhea [Constipation] : stable constipation [Yellow Skin Or Eyes (Jaundice)] : denies jaundice [Abdominal Pain] : improved abdominal pain [Abdominal Swelling] : denies abdominal swelling [Rectal Pain] : denies rectal pain [de-identified] : Patient went to emergency room after last office visit based on severity of diarrhea and abdominal discomfort.  Was found to be anemic with hemoglobin less than 7 given 1 unit PRBC.  Was 7.8 at discharge.  No melena or hematochezia reported.  Patient has anemia of chronic disease with a baseline hemoglobin around 9.  CAT scan showed no significant pathology.  Stool culture was positive for Campylobacter and enteropathogenic E. coli.  She was treated with 5 days of azithromycin along with p.o. vancomycin for C. difficile prophylaxis.  Now patient complains of mild constipation.  Past bloody mucus on one occasion since discharge.  Abdominal pain much improved.  On famotidine for GERD no longer on PPI

## 2022-12-01 PROBLEM — H40.9 GLAUCOMA, UNSPECIFIED GLAUCOMA TYPE, UNSPECIFIED LATERALITY: Status: ACTIVE | Noted: 2022-01-04

## 2022-12-01 PROBLEM — Z86.010 PERSONAL HISTORY OF COLONIC POLYPS: Status: ACTIVE | Noted: 2022-01-04

## 2022-12-01 PROBLEM — K21.9 GASTROESOPHAGEAL REFLUX DISEASE WITHOUT ESOPHAGITIS: Status: ACTIVE | Noted: 2022-01-04

## 2022-12-01 PROBLEM — E03.9 HYPOTHYROIDISM: Status: ACTIVE | Noted: 2022-01-01

## 2022-12-01 PROBLEM — E11.9 DIABETES MELLITUS: Status: ACTIVE | Noted: 2022-01-04

## 2022-12-01 PROBLEM — I10 HYPERTENSION, UNSPECIFIED TYPE: Status: ACTIVE | Noted: 2022-01-04

## 2022-12-01 PROBLEM — R19.4 ALTERED BOWEL HABITS: Status: ACTIVE | Noted: 2022-01-01

## 2022-12-01 PROBLEM — K64.8 HEMORRHOIDS, INTERNAL: Status: ACTIVE | Noted: 2022-01-01

## 2022-12-01 NOTE — ASSESSMENT
[FreeTextEntry1] : Change in bowel habits with recent constipation.  Most likely prolapsed internal hemorrhoid.  Rectal exam negative.  No rectal mass.  Patient does not like to take a fiber supplement due to its consistency.  No evidence of fecal impaction.  Patient is overdue for follow-up surveillance colonoscopy; however, given her age and frail state I do not believe it would be in her best interest to perform a routine screening/surveillance colonoscopy.\par \par Plan: We will begin Colace 100 mg daily.  Proctosol HC 2.5% twice daily for internal hemorrhoids for 7 to 14 days, then as needed.

## 2022-12-01 NOTE — HISTORY OF PRESENT ILLNESS
[FreeTextEntry1] : 77-year-old female status post kidney transplant for polycystic kidney disease, status post C. difficile colitis followed by infectious gastroenteritis last year, history of colon polyps last colonoscopy 2015 here with complaint of altered bowel habits for 2 weeks.  2 weeks ago developed abdominal crampy pain and distention, urge to defecate but unable to move bowels.  With straining noted something "popped out" of anus.  Not tender, not bleeding.  Ultimately moved bowels and abdominal pain resolved.  Has not had any further episodes of prolapsing anal rectal lesion.  Now moving bowels daily but small amounts.  Stool is soft/pasty.  Occasionally has incontinence and/or seepage of stool wears a diaper currently.\par \par Recently chronic pedal edema attributed to venous insufficiency had gotten worse.  Patient held her Synthroid for 2 days and the edema improved.  No history of CHF, cardiac evaluation recently unremarkable as per patient.

## 2022-12-01 NOTE — REVIEW OF SYSTEMS
[As Noted in HPI] : as noted in HPI [Joint Stiffness] : joint stiffness [Limb Swelling] : limb swelling [Negative] : Neurological

## 2022-12-01 NOTE — PHYSICAL EXAM
[Alert] : alert [Normal Voice/Communication] : normal voice/communication [Normal] : normal bowel sounds, non-tender, no masses, soft, no no hepato-splenomegaly [Bowel Sounds] : normal bowel sounds [Abdomen Tenderness] : non-tender [Abdomen Soft] : soft [No External Hemorrhoid] : no external hemorrhoids [de-identified] : Elderly frail-appearing female in no distress [de-identified] : Moderate distention secondary to palpable polycystic kidneys [de-identified] : Decreased sphincter tone.  No mass or tenderness.  Scant pasty stool in vault.

## 2023-01-01 ENCOUNTER — INPATIENT (INPATIENT)
Facility: HOSPITAL | Age: 78
LOS: 33 days | Discharge: EXPIRED | End: 2023-04-17
Attending: INTERNAL MEDICINE | Admitting: INTERNAL MEDICINE
Payer: MEDICARE

## 2023-01-01 ENCOUNTER — RX RENEWAL (OUTPATIENT)
Age: 78
End: 2023-01-01

## 2023-01-01 ENCOUNTER — APPOINTMENT (OUTPATIENT)
Dept: GASTROENTEROLOGY | Facility: CLINIC | Age: 78
End: 2023-01-01

## 2023-01-01 VITALS
HEART RATE: 65 BPM | DIASTOLIC BLOOD PRESSURE: 52 MMHG | TEMPERATURE: 97 F | SYSTOLIC BLOOD PRESSURE: 162 MMHG | OXYGEN SATURATION: 96 % | RESPIRATION RATE: 16 BRPM

## 2023-01-01 VITALS
SYSTOLIC BLOOD PRESSURE: 111 MMHG | RESPIRATION RATE: 19 BRPM | HEART RATE: 74 BPM | OXYGEN SATURATION: 96 % | DIASTOLIC BLOOD PRESSURE: 45 MMHG | TEMPERATURE: 97 F

## 2023-01-01 DIAGNOSIS — N17.9 ACUTE KIDNEY FAILURE, UNSPECIFIED: ICD-10-CM

## 2023-01-01 DIAGNOSIS — H72.91 UNSPECIFIED PERFORATION OF TYMPANIC MEMBRANE, RIGHT EAR: ICD-10-CM

## 2023-01-01 DIAGNOSIS — N18.4 CHRONIC KIDNEY DISEASE, STAGE 4 (SEVERE): ICD-10-CM

## 2023-01-01 DIAGNOSIS — R56.9 UNSPECIFIED CONVULSIONS: ICD-10-CM

## 2023-01-01 DIAGNOSIS — Z94.0 KIDNEY TRANSPLANT STATUS: Chronic | ICD-10-CM

## 2023-01-01 DIAGNOSIS — I46.9 CARDIAC ARREST, CAUSE UNSPECIFIED: ICD-10-CM

## 2023-01-01 DIAGNOSIS — Z98.891 HISTORY OF UTERINE SCAR FROM PREVIOUS SURGERY: Chronic | ICD-10-CM

## 2023-01-01 DIAGNOSIS — N18.30 CHRONIC KIDNEY DISEASE, STAGE 3 UNSPECIFIED: ICD-10-CM

## 2023-01-01 DIAGNOSIS — E11.9 TYPE 2 DIABETES MELLITUS WITHOUT COMPLICATIONS: ICD-10-CM

## 2023-01-01 DIAGNOSIS — R77.8 OTHER SPECIFIED ABNORMALITIES OF PLASMA PROTEINS: ICD-10-CM

## 2023-01-01 DIAGNOSIS — Z29.9 ENCOUNTER FOR PROPHYLACTIC MEASURES, UNSPECIFIED: ICD-10-CM

## 2023-01-01 DIAGNOSIS — E87.29 OTHER ACIDOSIS: ICD-10-CM

## 2023-01-01 DIAGNOSIS — Z98.890 OTHER SPECIFIED POSTPROCEDURAL STATES: Chronic | ICD-10-CM

## 2023-01-01 DIAGNOSIS — R53.81 OTHER MALAISE: ICD-10-CM

## 2023-01-01 DIAGNOSIS — R41.82 ALTERED MENTAL STATUS, UNSPECIFIED: ICD-10-CM

## 2023-01-01 DIAGNOSIS — Z90.710 ACQUIRED ABSENCE OF BOTH CERVIX AND UTERUS: Chronic | ICD-10-CM

## 2023-01-01 DIAGNOSIS — Z51.5 ENCOUNTER FOR PALLIATIVE CARE: ICD-10-CM

## 2023-01-01 DIAGNOSIS — Z71.89 OTHER SPECIFIED COUNSELING: ICD-10-CM

## 2023-01-01 DIAGNOSIS — H66.90 OTITIS MEDIA, UNSPECIFIED, UNSPECIFIED EAR: ICD-10-CM

## 2023-01-01 DIAGNOSIS — G00.1 PNEUMOCOCCAL MENINGITIS: ICD-10-CM

## 2023-01-01 DIAGNOSIS — I10 ESSENTIAL (PRIMARY) HYPERTENSION: ICD-10-CM

## 2023-01-01 DIAGNOSIS — E03.9 HYPOTHYROIDISM, UNSPECIFIED: ICD-10-CM

## 2023-01-01 DIAGNOSIS — Z94.0 KIDNEY TRANSPLANT STATUS: ICD-10-CM

## 2023-01-01 DIAGNOSIS — A41.9 SEPSIS, UNSPECIFIED ORGANISM: ICD-10-CM

## 2023-01-01 DIAGNOSIS — G93.40 ENCEPHALOPATHY, UNSPECIFIED: ICD-10-CM

## 2023-01-01 DIAGNOSIS — J96.01 ACUTE RESPIRATORY FAILURE WITH HYPOXIA: ICD-10-CM

## 2023-01-01 LAB
-  AMIKACIN: SIGNIFICANT CHANGE UP
-  AMIKACIN: SIGNIFICANT CHANGE UP
-  AMOXICILLIN/CLAVULANIC ACID: SIGNIFICANT CHANGE UP
-  AMPICILLIN/SULBACTAM: SIGNIFICANT CHANGE UP
-  AMPICILLIN: SIGNIFICANT CHANGE UP
-  AZTREONAM: SIGNIFICANT CHANGE UP
-  AZTREONAM: SIGNIFICANT CHANGE UP
-  CEFAZOLIN: SIGNIFICANT CHANGE UP
-  CEFEPIME: SIGNIFICANT CHANGE UP
-  CEFEPIME: SIGNIFICANT CHANGE UP
-  CEFOXITIN: SIGNIFICANT CHANGE UP
-  CEFTAZIDIME: SIGNIFICANT CHANGE UP
-  CEFTRIAXONE (MENINGITIDIS): SIGNIFICANT CHANGE UP
-  CEFTRIAXONE (NON-MENINGITIDIS): SIGNIFICANT CHANGE UP
-  CEFTRIAXONE: SIGNIFICANT CHANGE UP
-  CIPROFLOXACIN: SIGNIFICANT CHANGE UP
-  CIPROFLOXACIN: SIGNIFICANT CHANGE UP
-  ERTAPENEM: SIGNIFICANT CHANGE UP
-  ERYTHROMYCIN: SIGNIFICANT CHANGE UP
-  GENTAMICIN: SIGNIFICANT CHANGE UP
-  GENTAMICIN: SIGNIFICANT CHANGE UP
-  IMIPENEM: SIGNIFICANT CHANGE UP
-  IMIPENEM: SIGNIFICANT CHANGE UP
-  LEVOFLOXACIN: SIGNIFICANT CHANGE UP
-  MEROPENEM: SIGNIFICANT CHANGE UP
-  MEROPENEM: SIGNIFICANT CHANGE UP
-  PENICILLIN (MENINGITIDIS): SIGNIFICANT CHANGE UP
-  PENICILLIN (NON-MENINGITIDIS): SIGNIFICANT CHANGE UP
-  PENICILLIN (ORAL PENICILLIN V): SIGNIFICANT CHANGE UP
-  PIPERACILLIN/TAZOBACTAM: SIGNIFICANT CHANGE UP
-  PIPERACILLIN/TAZOBACTAM: SIGNIFICANT CHANGE UP
-  TOBRAMYCIN: SIGNIFICANT CHANGE UP
-  TOBRAMYCIN: SIGNIFICANT CHANGE UP
-  TRIMETHOPRIM/SULFAMETHOXAZOLE: SIGNIFICANT CHANGE UP
-  TRIMETHOPRIM/SULFAMETHOXAZOLE: SIGNIFICANT CHANGE UP
-  VANCOMYCIN: SIGNIFICANT CHANGE UP
A1C WITH ESTIMATED AVERAGE GLUCOSE RESULT: 5.6 % — SIGNIFICANT CHANGE UP (ref 4–5.6)
ALBUMIN SERPL ELPH-MCNC: 1.6 G/DL — LOW (ref 3.3–5)
ALBUMIN SERPL ELPH-MCNC: 1.7 G/DL — LOW (ref 3.3–5)
ALBUMIN SERPL ELPH-MCNC: 1.8 G/DL — LOW (ref 3.3–5)
ALBUMIN SERPL ELPH-MCNC: 1.8 G/DL — LOW (ref 3.3–5)
ALBUMIN SERPL ELPH-MCNC: 1.9 G/DL — LOW (ref 3.3–5)
ALBUMIN SERPL ELPH-MCNC: 2 G/DL — LOW (ref 3.3–5)
ALBUMIN SERPL ELPH-MCNC: 2 G/DL — LOW (ref 3.3–5)
ALBUMIN SERPL ELPH-MCNC: 2.1 G/DL — LOW (ref 3.3–5)
ALBUMIN SERPL ELPH-MCNC: 2.2 G/DL — LOW (ref 3.3–5)
ALBUMIN SERPL ELPH-MCNC: 2.2 G/DL — LOW (ref 3.3–5)
ALBUMIN SERPL ELPH-MCNC: 2.3 G/DL — LOW (ref 3.3–5)
ALBUMIN SERPL ELPH-MCNC: 2.4 G/DL — LOW (ref 3.3–5)
ALBUMIN SERPL ELPH-MCNC: 2.5 G/DL — LOW (ref 3.3–5)
ALBUMIN SERPL ELPH-MCNC: 2.6 G/DL — LOW (ref 3.3–5)
ALBUMIN SERPL ELPH-MCNC: 2.7 G/DL — LOW (ref 3.3–5)
ALBUMIN SERPL ELPH-MCNC: 3.3 G/DL — SIGNIFICANT CHANGE UP (ref 3.3–5)
ALBUMIN SERPL ELPH-MCNC: 3.4 G/DL — SIGNIFICANT CHANGE UP (ref 3.3–5)
ALP SERPL-CCNC: 101 U/L — SIGNIFICANT CHANGE UP (ref 40–120)
ALP SERPL-CCNC: 117 U/L — SIGNIFICANT CHANGE UP (ref 40–120)
ALP SERPL-CCNC: 121 U/L — HIGH (ref 40–120)
ALP SERPL-CCNC: 123 U/L — HIGH (ref 40–120)
ALP SERPL-CCNC: 123 U/L — HIGH (ref 40–120)
ALP SERPL-CCNC: 124 U/L — HIGH (ref 40–120)
ALP SERPL-CCNC: 124 U/L — HIGH (ref 40–120)
ALP SERPL-CCNC: 126 U/L — HIGH (ref 40–120)
ALP SERPL-CCNC: 131 U/L — HIGH (ref 40–120)
ALP SERPL-CCNC: 134 U/L — HIGH (ref 40–120)
ALP SERPL-CCNC: 135 U/L — HIGH (ref 40–120)
ALP SERPL-CCNC: 136 U/L — HIGH (ref 40–120)
ALP SERPL-CCNC: 141 U/L — HIGH (ref 40–120)
ALP SERPL-CCNC: 142 U/L — HIGH (ref 40–120)
ALP SERPL-CCNC: 144 U/L — HIGH (ref 40–120)
ALP SERPL-CCNC: 146 U/L — HIGH (ref 40–120)
ALP SERPL-CCNC: 149 U/L — HIGH (ref 40–120)
ALP SERPL-CCNC: 157 U/L — HIGH (ref 40–120)
ALP SERPL-CCNC: 160 U/L — HIGH (ref 40–120)
ALP SERPL-CCNC: 163 U/L — HIGH (ref 40–120)
ALP SERPL-CCNC: 66 U/L — SIGNIFICANT CHANGE UP (ref 40–120)
ALP SERPL-CCNC: 67 U/L — SIGNIFICANT CHANGE UP (ref 40–120)
ALP SERPL-CCNC: 68 U/L — SIGNIFICANT CHANGE UP (ref 40–120)
ALP SERPL-CCNC: 70 U/L — SIGNIFICANT CHANGE UP (ref 40–120)
ALP SERPL-CCNC: 75 U/L — SIGNIFICANT CHANGE UP (ref 40–120)
ALP SERPL-CCNC: 75 U/L — SIGNIFICANT CHANGE UP (ref 40–120)
ALP SERPL-CCNC: 76 U/L — SIGNIFICANT CHANGE UP (ref 40–120)
ALP SERPL-CCNC: 77 U/L — SIGNIFICANT CHANGE UP (ref 40–120)
ALP SERPL-CCNC: 77 U/L — SIGNIFICANT CHANGE UP (ref 40–120)
ALP SERPL-CCNC: 78 U/L — SIGNIFICANT CHANGE UP (ref 40–120)
ALP SERPL-CCNC: 80 U/L — SIGNIFICANT CHANGE UP (ref 40–120)
ALP SERPL-CCNC: 80 U/L — SIGNIFICANT CHANGE UP (ref 40–120)
ALP SERPL-CCNC: 81 U/L — SIGNIFICANT CHANGE UP (ref 40–120)
ALP SERPL-CCNC: 82 U/L — SIGNIFICANT CHANGE UP (ref 40–120)
ALP SERPL-CCNC: 85 U/L — SIGNIFICANT CHANGE UP (ref 40–120)
ALP SERPL-CCNC: 88 U/L — SIGNIFICANT CHANGE UP (ref 40–120)
ALP SERPL-CCNC: 90 U/L — SIGNIFICANT CHANGE UP (ref 40–120)
ALP SERPL-CCNC: 91 U/L — SIGNIFICANT CHANGE UP (ref 40–120)
ALP SERPL-CCNC: 93 U/L — SIGNIFICANT CHANGE UP (ref 40–120)
ALP SERPL-CCNC: 94 U/L — SIGNIFICANT CHANGE UP (ref 40–120)
ALP SERPL-CCNC: 97 U/L — SIGNIFICANT CHANGE UP (ref 40–120)
ALP SERPL-CCNC: 99 U/L — SIGNIFICANT CHANGE UP (ref 40–120)
ALT FLD-CCNC: 14 U/L — SIGNIFICANT CHANGE UP (ref 4–33)
ALT FLD-CCNC: 14 U/L — SIGNIFICANT CHANGE UP (ref 4–33)
ALT FLD-CCNC: 16 U/L — SIGNIFICANT CHANGE UP (ref 4–33)
ALT FLD-CCNC: 17 U/L — SIGNIFICANT CHANGE UP (ref 4–33)
ALT FLD-CCNC: 17 U/L — SIGNIFICANT CHANGE UP (ref 4–33)
ALT FLD-CCNC: 18 U/L — SIGNIFICANT CHANGE UP (ref 4–33)
ALT FLD-CCNC: 19 U/L — SIGNIFICANT CHANGE UP (ref 4–33)
ALT FLD-CCNC: 20 U/L — SIGNIFICANT CHANGE UP (ref 4–33)
ALT FLD-CCNC: 21 U/L — SIGNIFICANT CHANGE UP (ref 4–33)
ALT FLD-CCNC: 22 U/L — SIGNIFICANT CHANGE UP (ref 4–33)
ALT FLD-CCNC: 23 U/L — SIGNIFICANT CHANGE UP (ref 4–33)
ALT FLD-CCNC: 24 U/L — SIGNIFICANT CHANGE UP (ref 4–33)
ALT FLD-CCNC: 26 U/L — SIGNIFICANT CHANGE UP (ref 4–33)
ALT FLD-CCNC: 26 U/L — SIGNIFICANT CHANGE UP (ref 4–33)
ALT FLD-CCNC: 28 U/L — SIGNIFICANT CHANGE UP (ref 4–33)
ALT FLD-CCNC: 28 U/L — SIGNIFICANT CHANGE UP (ref 4–33)
ALT FLD-CCNC: 31 U/L — SIGNIFICANT CHANGE UP (ref 4–33)
ALT FLD-CCNC: 34 U/L — HIGH (ref 4–33)
ALT FLD-CCNC: 36 U/L — HIGH (ref 4–33)
ALT FLD-CCNC: 41 U/L — HIGH (ref 4–33)
ALT FLD-CCNC: 43 U/L — HIGH (ref 4–33)
ALT FLD-CCNC: 49 U/L — HIGH (ref 4–33)
ALT FLD-CCNC: 50 U/L — HIGH (ref 4–33)
ALT FLD-CCNC: 50 U/L — HIGH (ref 4–33)
ALT FLD-CCNC: 51 U/L — HIGH (ref 4–33)
ALT FLD-CCNC: 54 U/L — HIGH (ref 4–33)
ALT FLD-CCNC: 55 U/L — HIGH (ref 4–33)
ALT FLD-CCNC: 57 U/L — HIGH (ref 4–33)
ALT FLD-CCNC: 60 U/L — HIGH (ref 4–33)
ALT FLD-CCNC: 60 U/L — HIGH (ref 4–33)
ALT FLD-CCNC: 64 U/L — HIGH (ref 4–33)
ALT FLD-CCNC: 66 U/L — HIGH (ref 4–33)
ALT FLD-CCNC: 78 U/L — HIGH (ref 4–33)
ALT FLD-CCNC: 79 U/L — HIGH (ref 4–33)
ALT FLD-CCNC: 90 U/L — HIGH (ref 4–33)
ALT FLD-CCNC: <5 U/L — LOW (ref 4–33)
AMMONIA BLD-MCNC: 19 UMOL/L — SIGNIFICANT CHANGE UP (ref 11–55)
AMMONIA BLD-MCNC: 21 UMOL/L — SIGNIFICANT CHANGE UP (ref 11–55)
AMMONIA BLD-MCNC: 28 UMOL/L — SIGNIFICANT CHANGE UP (ref 11–55)
ANION GAP SERPL CALC-SCNC: 10 MMOL/L — SIGNIFICANT CHANGE UP (ref 7–14)
ANION GAP SERPL CALC-SCNC: 11 MMOL/L — SIGNIFICANT CHANGE UP (ref 7–14)
ANION GAP SERPL CALC-SCNC: 12 MMOL/L — SIGNIFICANT CHANGE UP (ref 7–14)
ANION GAP SERPL CALC-SCNC: 13 MMOL/L — SIGNIFICANT CHANGE UP (ref 7–14)
ANION GAP SERPL CALC-SCNC: 13 MMOL/L — SIGNIFICANT CHANGE UP (ref 7–14)
ANION GAP SERPL CALC-SCNC: 14 MMOL/L — SIGNIFICANT CHANGE UP (ref 7–14)
ANION GAP SERPL CALC-SCNC: 14 MMOL/L — SIGNIFICANT CHANGE UP (ref 7–14)
ANION GAP SERPL CALC-SCNC: 15 MMOL/L — HIGH (ref 7–14)
ANION GAP SERPL CALC-SCNC: 16 MMOL/L — HIGH (ref 7–14)
ANION GAP SERPL CALC-SCNC: 17 MMOL/L — HIGH (ref 7–14)
ANION GAP SERPL CALC-SCNC: 18 MMOL/L — HIGH (ref 7–14)
ANION GAP SERPL CALC-SCNC: 19 MMOL/L — HIGH (ref 7–14)
ANION GAP SERPL CALC-SCNC: 20 MMOL/L — HIGH (ref 7–14)
ANION GAP SERPL CALC-SCNC: 21 MMOL/L — HIGH (ref 7–14)
ANION GAP SERPL CALC-SCNC: 22 MMOL/L — HIGH (ref 7–14)
ANION GAP SERPL CALC-SCNC: 23 MMOL/L — HIGH (ref 7–14)
ANISOCYTOSIS BLD QL: SIGNIFICANT CHANGE UP
APPEARANCE CSF: ABNORMAL
APPEARANCE SPUN FLD: ABNORMAL
APPEARANCE UR: ABNORMAL
APPEARANCE UR: CLEAR — SIGNIFICANT CHANGE UP
APPEARANCE UR: CLEAR — SIGNIFICANT CHANGE UP
APTT BLD: 21.2 SEC — LOW (ref 27–36.3)
APTT BLD: 22 SEC — LOW (ref 27–36.3)
APTT BLD: 22.3 SEC — LOW (ref 27–36.3)
APTT BLD: 22.5 SEC — LOW (ref 27–36.3)
APTT BLD: 22.6 SEC — LOW (ref 27–36.3)
APTT BLD: 23.5 SEC — LOW (ref 27–36.3)
APTT BLD: 23.6 SEC — LOW (ref 27–36.3)
APTT BLD: 23.7 SEC — LOW (ref 27–36.3)
APTT BLD: 23.8 SEC — LOW (ref 27–36.3)
APTT BLD: 24 SEC — LOW (ref 27–36.3)
APTT BLD: 24.6 SEC — LOW (ref 27–36.3)
APTT BLD: 24.6 SEC — LOW (ref 27–36.3)
APTT BLD: 25 SEC — LOW (ref 27–36.3)
APTT BLD: 25 SEC — LOW (ref 27–36.3)
APTT BLD: 25.2 SEC — LOW (ref 27–36.3)
APTT BLD: 25.3 SEC — LOW (ref 27–36.3)
APTT BLD: 25.5 SEC — LOW (ref 27–36.3)
APTT BLD: 25.9 SEC — LOW (ref 27–36.3)
APTT BLD: 28.3 SEC — SIGNIFICANT CHANGE UP (ref 27–36.3)
APTT BLD: 45.6 SEC — HIGH (ref 27–36.3)
AST SERPL-CCNC: 102 U/L — HIGH (ref 4–32)
AST SERPL-CCNC: 145 U/L — HIGH (ref 4–32)
AST SERPL-CCNC: 147 U/L — HIGH (ref 4–32)
AST SERPL-CCNC: 20 U/L — SIGNIFICANT CHANGE UP (ref 4–32)
AST SERPL-CCNC: 23 U/L — SIGNIFICANT CHANGE UP (ref 4–32)
AST SERPL-CCNC: 27 U/L — SIGNIFICANT CHANGE UP (ref 4–32)
AST SERPL-CCNC: 27 U/L — SIGNIFICANT CHANGE UP (ref 4–32)
AST SERPL-CCNC: 29 U/L — SIGNIFICANT CHANGE UP (ref 4–32)
AST SERPL-CCNC: 31 U/L — SIGNIFICANT CHANGE UP (ref 4–32)
AST SERPL-CCNC: 32 U/L — SIGNIFICANT CHANGE UP (ref 4–32)
AST SERPL-CCNC: 34 U/L — HIGH (ref 4–32)
AST SERPL-CCNC: 35 U/L — HIGH (ref 4–32)
AST SERPL-CCNC: 36 U/L — HIGH (ref 4–32)
AST SERPL-CCNC: 38 U/L — HIGH (ref 4–32)
AST SERPL-CCNC: 39 U/L — HIGH (ref 4–32)
AST SERPL-CCNC: 39 U/L — HIGH (ref 4–32)
AST SERPL-CCNC: 41 U/L — HIGH (ref 4–32)
AST SERPL-CCNC: 44 U/L — HIGH (ref 4–32)
AST SERPL-CCNC: 45 U/L — HIGH (ref 4–32)
AST SERPL-CCNC: 46 U/L — HIGH (ref 4–32)
AST SERPL-CCNC: 46 U/L — HIGH (ref 4–32)
AST SERPL-CCNC: 51 U/L — HIGH (ref 4–32)
AST SERPL-CCNC: 56 U/L — HIGH (ref 4–32)
AST SERPL-CCNC: 56 U/L — HIGH (ref 4–32)
AST SERPL-CCNC: 57 U/L — HIGH (ref 4–32)
AST SERPL-CCNC: 59 U/L — HIGH (ref 4–32)
AST SERPL-CCNC: 60 U/L — HIGH (ref 4–32)
AST SERPL-CCNC: 64 U/L — HIGH (ref 4–32)
AST SERPL-CCNC: 67 U/L — HIGH (ref 4–32)
AST SERPL-CCNC: 75 U/L — HIGH (ref 4–32)
AST SERPL-CCNC: 78 U/L — HIGH (ref 4–32)
AST SERPL-CCNC: 83 U/L — HIGH (ref 4–32)
AST SERPL-CCNC: 85 U/L — HIGH (ref 4–32)
AST SERPL-CCNC: <5 U/L — LOW (ref 4–32)
AST SERPL-CCNC: SIGNIFICANT CHANGE UP U/L (ref 4–32)
B PERT IGG+IGM PNL SER: ABNORMAL
B-OH-BUTYR SERPL-SCNC: 2.9 MMOL/L — HIGH (ref 0–0.4)
B-OH-BUTYR SERPL-SCNC: 3.7 MMOL/L — HIGH (ref 0–0.4)
BACTERIA # UR AUTO: ABNORMAL
BASE EXCESS BLDV CALC-SCNC: -0.2 MMOL/L — SIGNIFICANT CHANGE UP (ref -2–3)
BASE EXCESS BLDV CALC-SCNC: -0.3 MMOL/L — SIGNIFICANT CHANGE UP (ref -2–3)
BASE EXCESS BLDV CALC-SCNC: -0.4 MMOL/L — SIGNIFICANT CHANGE UP (ref -2–3)
BASE EXCESS BLDV CALC-SCNC: -0.8 MMOL/L — SIGNIFICANT CHANGE UP (ref -2–3)
BASE EXCESS BLDV CALC-SCNC: -1.5 MMOL/L — SIGNIFICANT CHANGE UP (ref -2–3)
BASE EXCESS BLDV CALC-SCNC: -1.8 MMOL/L — SIGNIFICANT CHANGE UP (ref -2–3)
BASE EXCESS BLDV CALC-SCNC: -1.8 MMOL/L — SIGNIFICANT CHANGE UP (ref -2–3)
BASE EXCESS BLDV CALC-SCNC: -3.5 MMOL/L — LOW (ref -2–3)
BASE EXCESS BLDV CALC-SCNC: -3.8 MMOL/L — LOW (ref -2–3)
BASE EXCESS BLDV CALC-SCNC: -5 MMOL/L — LOW (ref -2–3)
BASE EXCESS BLDV CALC-SCNC: 0 MMOL/L — SIGNIFICANT CHANGE UP (ref -2–3)
BASE EXCESS BLDV CALC-SCNC: 0.1 MMOL/L — SIGNIFICANT CHANGE UP (ref -2–3)
BASE EXCESS BLDV CALC-SCNC: 1.2 MMOL/L — SIGNIFICANT CHANGE UP (ref -2–3)
BASE EXCESS BLDV CALC-SCNC: 3.1 MMOL/L — HIGH (ref -2–3)
BASOPHILS # BLD AUTO: 0.02 K/UL — SIGNIFICANT CHANGE UP (ref 0–0.2)
BASOPHILS # BLD AUTO: 0.03 K/UL — SIGNIFICANT CHANGE UP (ref 0–0.2)
BASOPHILS # BLD AUTO: 0.1 K/UL — SIGNIFICANT CHANGE UP (ref 0–0.2)
BASOPHILS NFR BLD AUTO: 0.2 % — SIGNIFICANT CHANGE UP (ref 0–2)
BASOPHILS NFR BLD AUTO: 0.2 % — SIGNIFICANT CHANGE UP (ref 0–2)
BASOPHILS NFR BLD AUTO: 0.5 % — SIGNIFICANT CHANGE UP (ref 0–2)
BILIRUB SERPL-MCNC: 0.2 MG/DL — SIGNIFICANT CHANGE UP (ref 0.2–1.2)
BILIRUB SERPL-MCNC: 0.3 MG/DL — SIGNIFICANT CHANGE UP (ref 0.2–1.2)
BILIRUB SERPL-MCNC: 0.4 MG/DL — SIGNIFICANT CHANGE UP (ref 0.2–1.2)
BILIRUB SERPL-MCNC: 0.5 MG/DL — SIGNIFICANT CHANGE UP (ref 0.2–1.2)
BILIRUB SERPL-MCNC: 0.6 MG/DL — SIGNIFICANT CHANGE UP (ref 0.2–1.2)
BILIRUB SERPL-MCNC: 0.7 MG/DL — SIGNIFICANT CHANGE UP (ref 0.2–1.2)
BILIRUB SERPL-MCNC: 0.8 MG/DL — SIGNIFICANT CHANGE UP (ref 0.2–1.2)
BILIRUB SERPL-MCNC: 1 MG/DL — SIGNIFICANT CHANGE UP (ref 0.2–1.2)
BILIRUB SERPL-MCNC: <0.2 MG/DL — SIGNIFICANT CHANGE UP (ref 0.2–1.2)
BILIRUB UR-MCNC: NEGATIVE — SIGNIFICANT CHANGE UP
BLD GP AB SCN SERPL QL: NEGATIVE — SIGNIFICANT CHANGE UP
BLOOD GAS ARTERIAL - LYTES,HGB,ICA,LACT RESULT: SIGNIFICANT CHANGE UP
BLOOD GAS ARTERIAL - LYTES,HGB,ICA,LACT RESULT: SIGNIFICANT CHANGE UP
BLOOD GAS ARTERIAL COMPREHENSIVE RESULT: SIGNIFICANT CHANGE UP
BLOOD GAS VENOUS COMPREHENSIVE RESULT: SIGNIFICANT CHANGE UP
BUN SERPL-MCNC: 24 MG/DL — HIGH (ref 7–23)
BUN SERPL-MCNC: 25 MG/DL — HIGH (ref 7–23)
BUN SERPL-MCNC: 25 MG/DL — HIGH (ref 7–23)
BUN SERPL-MCNC: 26 MG/DL — HIGH (ref 7–23)
BUN SERPL-MCNC: 27 MG/DL — HIGH (ref 7–23)
BUN SERPL-MCNC: 28 MG/DL — HIGH (ref 7–23)
BUN SERPL-MCNC: 29 MG/DL — HIGH (ref 7–23)
BUN SERPL-MCNC: 31 MG/DL — HIGH (ref 7–23)
BUN SERPL-MCNC: 33 MG/DL — HIGH (ref 7–23)
BUN SERPL-MCNC: 34 MG/DL — HIGH (ref 7–23)
BUN SERPL-MCNC: 38 MG/DL — HIGH (ref 7–23)
BUN SERPL-MCNC: 39 MG/DL — HIGH (ref 7–23)
BUN SERPL-MCNC: 39 MG/DL — HIGH (ref 7–23)
BUN SERPL-MCNC: 40 MG/DL — HIGH (ref 7–23)
BUN SERPL-MCNC: 46 MG/DL — HIGH (ref 7–23)
BUN SERPL-MCNC: 47 MG/DL — HIGH (ref 7–23)
BUN SERPL-MCNC: 48 MG/DL — HIGH (ref 7–23)
BUN SERPL-MCNC: 49 MG/DL — HIGH (ref 7–23)
BUN SERPL-MCNC: 50 MG/DL — HIGH (ref 7–23)
BUN SERPL-MCNC: 54 MG/DL — HIGH (ref 7–23)
BUN SERPL-MCNC: 55 MG/DL — HIGH (ref 7–23)
BUN SERPL-MCNC: 55 MG/DL — HIGH (ref 7–23)
BUN SERPL-MCNC: 58 MG/DL — HIGH (ref 7–23)
BUN SERPL-MCNC: 59 MG/DL — HIGH (ref 7–23)
BUN SERPL-MCNC: 62 MG/DL — HIGH (ref 7–23)
BUN SERPL-MCNC: 63 MG/DL — HIGH (ref 7–23)
BUN SERPL-MCNC: 64 MG/DL — HIGH (ref 7–23)
BUN SERPL-MCNC: 67 MG/DL — HIGH (ref 7–23)
BUN SERPL-MCNC: 69 MG/DL — HIGH (ref 7–23)
BUN SERPL-MCNC: 71 MG/DL — HIGH (ref 7–23)
BUN SERPL-MCNC: 72 MG/DL — HIGH (ref 7–23)
BUN SERPL-MCNC: 72 MG/DL — HIGH (ref 7–23)
BUN SERPL-MCNC: 74 MG/DL — HIGH (ref 7–23)
BUN SERPL-MCNC: 76 MG/DL — HIGH (ref 7–23)
BUN SERPL-MCNC: 78 MG/DL — HIGH (ref 7–23)
BUN SERPL-MCNC: 79 MG/DL — HIGH (ref 7–23)
BUN SERPL-MCNC: 81 MG/DL — HIGH (ref 7–23)
BUN SERPL-MCNC: 81 MG/DL — HIGH (ref 7–23)
BUN SERPL-MCNC: 82 MG/DL — HIGH (ref 7–23)
BUN SERPL-MCNC: 84 MG/DL — HIGH (ref 7–23)
BUN SERPL-MCNC: 85 MG/DL — HIGH (ref 7–23)
BUN SERPL-MCNC: 86 MG/DL — HIGH (ref 7–23)
BUN SERPL-MCNC: 87 MG/DL — HIGH (ref 7–23)
BUN SERPL-MCNC: 90 MG/DL — HIGH (ref 7–23)
BUN SERPL-MCNC: 97 MG/DL — HIGH (ref 7–23)
BURR CELLS BLD QL SMEAR: PRESENT — SIGNIFICANT CHANGE UP
C DIFF BY PCR RESULT: DETECTED
CALCIUM SERPL-MCNC: 7.8 MG/DL — LOW (ref 8.4–10.5)
CALCIUM SERPL-MCNC: 7.9 MG/DL — LOW (ref 8.4–10.5)
CALCIUM SERPL-MCNC: 8.1 MG/DL — LOW (ref 8.4–10.5)
CALCIUM SERPL-MCNC: 8.1 MG/DL — LOW (ref 8.4–10.5)
CALCIUM SERPL-MCNC: 8.2 MG/DL — LOW (ref 8.4–10.5)
CALCIUM SERPL-MCNC: 8.3 MG/DL — LOW (ref 8.4–10.5)
CALCIUM SERPL-MCNC: 8.3 MG/DL — LOW (ref 8.4–10.5)
CALCIUM SERPL-MCNC: 8.4 MG/DL — SIGNIFICANT CHANGE UP (ref 8.4–10.5)
CALCIUM SERPL-MCNC: 8.5 MG/DL — SIGNIFICANT CHANGE UP (ref 8.4–10.5)
CALCIUM SERPL-MCNC: 8.6 MG/DL — SIGNIFICANT CHANGE UP (ref 8.4–10.5)
CALCIUM SERPL-MCNC: 8.7 MG/DL — SIGNIFICANT CHANGE UP (ref 8.4–10.5)
CALCIUM SERPL-MCNC: 8.8 MG/DL — SIGNIFICANT CHANGE UP (ref 8.4–10.5)
CALCIUM SERPL-MCNC: 8.9 MG/DL — SIGNIFICANT CHANGE UP (ref 8.4–10.5)
CALCIUM SERPL-MCNC: 9 MG/DL — SIGNIFICANT CHANGE UP (ref 8.4–10.5)
CALCIUM SERPL-MCNC: 9.4 MG/DL — SIGNIFICANT CHANGE UP (ref 8.4–10.5)
CALCIUM SERPL-MCNC: 9.5 MG/DL — SIGNIFICANT CHANGE UP (ref 8.4–10.5)
CHLORIDE BLDV-SCNC: 100 MMOL/L — SIGNIFICANT CHANGE UP (ref 96–108)
CHLORIDE BLDV-SCNC: 100 MMOL/L — SIGNIFICANT CHANGE UP (ref 96–108)
CHLORIDE BLDV-SCNC: 101 MMOL/L — SIGNIFICANT CHANGE UP (ref 96–108)
CHLORIDE BLDV-SCNC: 102 MMOL/L — SIGNIFICANT CHANGE UP (ref 96–108)
CHLORIDE BLDV-SCNC: 98 MMOL/L — SIGNIFICANT CHANGE UP (ref 96–108)
CHLORIDE BLDV-SCNC: 99 MMOL/L — SIGNIFICANT CHANGE UP (ref 96–108)
CHLORIDE BLDV-SCNC: SIGNIFICANT CHANGE UP MMOL/L (ref 96–108)
CHLORIDE SERPL-SCNC: 100 MMOL/L — SIGNIFICANT CHANGE UP (ref 98–107)
CHLORIDE SERPL-SCNC: 101 MMOL/L — SIGNIFICANT CHANGE UP (ref 98–107)
CHLORIDE SERPL-SCNC: 102 MMOL/L — SIGNIFICANT CHANGE UP (ref 98–107)
CHLORIDE SERPL-SCNC: 103 MMOL/L — SIGNIFICANT CHANGE UP (ref 98–107)
CHLORIDE SERPL-SCNC: 104 MMOL/L — SIGNIFICANT CHANGE UP (ref 98–107)
CHLORIDE SERPL-SCNC: 107 MMOL/L — SIGNIFICANT CHANGE UP (ref 98–107)
CHLORIDE SERPL-SCNC: 93 MMOL/L — LOW (ref 98–107)
CHLORIDE SERPL-SCNC: 93 MMOL/L — LOW (ref 98–107)
CHLORIDE SERPL-SCNC: 95 MMOL/L — LOW (ref 98–107)
CHLORIDE SERPL-SCNC: 96 MMOL/L — LOW (ref 98–107)
CHLORIDE SERPL-SCNC: 97 MMOL/L — LOW (ref 98–107)
CHLORIDE SERPL-SCNC: 98 MMOL/L — SIGNIFICANT CHANGE UP (ref 98–107)
CHLORIDE SERPL-SCNC: 99 MMOL/L — SIGNIFICANT CHANGE UP (ref 98–107)
CHOLEST SERPL-MCNC: 153 MG/DL — SIGNIFICANT CHANGE UP
CK SERPL-CCNC: 1074 U/L — HIGH (ref 25–170)
CO2 BLDV-SCNC: 22.5 MMOL/L — SIGNIFICANT CHANGE UP (ref 22–26)
CO2 BLDV-SCNC: 23.5 MMOL/L — SIGNIFICANT CHANGE UP (ref 22–26)
CO2 BLDV-SCNC: 23.8 MMOL/L — SIGNIFICANT CHANGE UP (ref 22–26)
CO2 BLDV-SCNC: 24.1 MMOL/L — SIGNIFICANT CHANGE UP (ref 22–26)
CO2 BLDV-SCNC: 24.2 MMOL/L — SIGNIFICANT CHANGE UP (ref 22–26)
CO2 BLDV-SCNC: 24.3 MMOL/L — SIGNIFICANT CHANGE UP (ref 22–26)
CO2 BLDV-SCNC: 24.9 MMOL/L — SIGNIFICANT CHANGE UP (ref 22–26)
CO2 BLDV-SCNC: 25.6 MMOL/L — SIGNIFICANT CHANGE UP (ref 22–26)
CO2 BLDV-SCNC: 25.9 MMOL/L — SIGNIFICANT CHANGE UP (ref 22–26)
CO2 BLDV-SCNC: 26.1 MMOL/L — HIGH (ref 22–26)
CO2 BLDV-SCNC: 26.2 MMOL/L — HIGH (ref 22–26)
CO2 BLDV-SCNC: 26.8 MMOL/L — HIGH (ref 22–26)
CO2 BLDV-SCNC: 28 MMOL/L — HIGH (ref 22–26)
CO2 BLDV-SCNC: 29.2 MMOL/L — HIGH (ref 22–26)
CO2 SERPL-SCNC: 12 MMOL/L — LOW (ref 22–31)
CO2 SERPL-SCNC: 12 MMOL/L — LOW (ref 22–31)
CO2 SERPL-SCNC: 15 MMOL/L — LOW (ref 22–31)
CO2 SERPL-SCNC: 16 MMOL/L — LOW (ref 22–31)
CO2 SERPL-SCNC: 16 MMOL/L — LOW (ref 22–31)
CO2 SERPL-SCNC: 17 MMOL/L — LOW (ref 22–31)
CO2 SERPL-SCNC: 19 MMOL/L — LOW (ref 22–31)
CO2 SERPL-SCNC: 20 MMOL/L — LOW (ref 22–31)
CO2 SERPL-SCNC: 21 MMOL/L — LOW (ref 22–31)
CO2 SERPL-SCNC: 22 MMOL/L — SIGNIFICANT CHANGE UP (ref 22–31)
CO2 SERPL-SCNC: 23 MMOL/L — SIGNIFICANT CHANGE UP (ref 22–31)
CO2 SERPL-SCNC: 24 MMOL/L — SIGNIFICANT CHANGE UP (ref 22–31)
CO2 SERPL-SCNC: 25 MMOL/L — SIGNIFICANT CHANGE UP (ref 22–31)
CO2 SERPL-SCNC: 25 MMOL/L — SIGNIFICANT CHANGE UP (ref 22–31)
CO2 SERPL-SCNC: 27 MMOL/L — SIGNIFICANT CHANGE UP (ref 22–31)
COLOR CSF: YELLOW — SIGNIFICANT CHANGE UP
COLOR FLD: ABNORMAL
COLOR SPEC: YELLOW — SIGNIFICANT CHANGE UP
COMMENT - FLUIDS: SIGNIFICANT CHANGE UP
COMMENT - URINE: SIGNIFICANT CHANGE UP
CREAT SERPL-MCNC: 1.16 MG/DL — SIGNIFICANT CHANGE UP (ref 0.5–1.3)
CREAT SERPL-MCNC: 1.24 MG/DL — SIGNIFICANT CHANGE UP (ref 0.5–1.3)
CREAT SERPL-MCNC: 1.26 MG/DL — SIGNIFICANT CHANGE UP (ref 0.5–1.3)
CREAT SERPL-MCNC: 1.31 MG/DL — HIGH (ref 0.5–1.3)
CREAT SERPL-MCNC: 1.31 MG/DL — HIGH (ref 0.5–1.3)
CREAT SERPL-MCNC: 1.38 MG/DL — HIGH (ref 0.5–1.3)
CREAT SERPL-MCNC: 1.45 MG/DL — HIGH (ref 0.5–1.3)
CREAT SERPL-MCNC: 1.49 MG/DL — HIGH (ref 0.5–1.3)
CREAT SERPL-MCNC: 1.61 MG/DL — HIGH (ref 0.5–1.3)
CREAT SERPL-MCNC: 1.72 MG/DL — HIGH (ref 0.5–1.3)
CREAT SERPL-MCNC: 1.83 MG/DL — HIGH (ref 0.5–1.3)
CREAT SERPL-MCNC: 1.95 MG/DL — HIGH (ref 0.5–1.3)
CREAT SERPL-MCNC: 2.04 MG/DL — HIGH (ref 0.5–1.3)
CREAT SERPL-MCNC: 2.1 MG/DL — HIGH (ref 0.5–1.3)
CREAT SERPL-MCNC: 2.28 MG/DL — HIGH (ref 0.5–1.3)
CREAT SERPL-MCNC: 2.35 MG/DL — HIGH (ref 0.5–1.3)
CREAT SERPL-MCNC: 2.36 MG/DL — HIGH (ref 0.5–1.3)
CREAT SERPL-MCNC: 2.39 MG/DL — HIGH (ref 0.5–1.3)
CREAT SERPL-MCNC: 2.41 MG/DL — HIGH (ref 0.5–1.3)
CREAT SERPL-MCNC: 2.42 MG/DL — HIGH (ref 0.5–1.3)
CREAT SERPL-MCNC: 2.6 MG/DL — HIGH (ref 0.5–1.3)
CREAT SERPL-MCNC: 2.73 MG/DL — HIGH (ref 0.5–1.3)
CREAT SERPL-MCNC: 2.9 MG/DL — HIGH (ref 0.5–1.3)
CREAT SERPL-MCNC: 2.9 MG/DL — HIGH (ref 0.5–1.3)
CREAT SERPL-MCNC: 2.94 MG/DL — HIGH (ref 0.5–1.3)
CREAT SERPL-MCNC: 2.95 MG/DL — HIGH (ref 0.5–1.3)
CREAT SERPL-MCNC: 2.98 MG/DL — HIGH (ref 0.5–1.3)
CREAT SERPL-MCNC: 3.03 MG/DL — HIGH (ref 0.5–1.3)
CREAT SERPL-MCNC: 3.1 MG/DL — HIGH (ref 0.5–1.3)
CREAT SERPL-MCNC: 3.17 MG/DL — HIGH (ref 0.5–1.3)
CREAT SERPL-MCNC: 3.19 MG/DL — HIGH (ref 0.5–1.3)
CREAT SERPL-MCNC: 3.31 MG/DL — HIGH (ref 0.5–1.3)
CREAT SERPL-MCNC: 3.38 MG/DL — HIGH (ref 0.5–1.3)
CREAT SERPL-MCNC: 3.62 MG/DL — HIGH (ref 0.5–1.3)
CREAT SERPL-MCNC: 3.66 MG/DL — HIGH (ref 0.5–1.3)
CREAT SERPL-MCNC: 3.73 MG/DL — HIGH (ref 0.5–1.3)
CREAT SERPL-MCNC: 3.74 MG/DL — HIGH (ref 0.5–1.3)
CREAT SERPL-MCNC: 3.74 MG/DL — HIGH (ref 0.5–1.3)
CREAT SERPL-MCNC: 3.83 MG/DL — HIGH (ref 0.5–1.3)
CREAT SERPL-MCNC: 3.86 MG/DL — HIGH (ref 0.5–1.3)
CREAT SERPL-MCNC: 4 MG/DL — HIGH (ref 0.5–1.3)
CREAT SERPL-MCNC: 4.07 MG/DL — HIGH (ref 0.5–1.3)
CREAT SERPL-MCNC: 4.23 MG/DL — HIGH (ref 0.5–1.3)
CREAT SERPL-MCNC: 4.44 MG/DL — HIGH (ref 0.5–1.3)
CREAT SERPL-MCNC: 4.97 MG/DL — HIGH (ref 0.5–1.3)
CRP SERPL-MCNC: 162.2 MG/L — HIGH
CRYPTOC AG CSF-ACNC: NEGATIVE — SIGNIFICANT CHANGE UP
CSF PCR RESULT: DETECTED
CULTURE RESULTS: NO GROWTH — SIGNIFICANT CHANGE UP
CULTURE RESULTS: SIGNIFICANT CHANGE UP
CYCLOSPORINE SER-MCNC: 138 NG/ML — LOW (ref 150–400)
CYCLOSPORINE SER-MCNC: <30 NG/ML — LOW (ref 150–400)
DIALYSIS INSTRUMENT RESULT - HEPATITIS B SURFACE ANTIGEN: NEGATIVE — SIGNIFICANT CHANGE UP
DIFF PNL FLD: ABNORMAL
EBV PCR: SIGNIFICANT CHANGE UP IU/ML
EGFR: 10 ML/MIN/1.73M2 — LOW
EGFR: 10 ML/MIN/1.73M2 — LOW
EGFR: 11 ML/MIN/1.73M2 — LOW
EGFR: 11 ML/MIN/1.73M2 — LOW
EGFR: 12 ML/MIN/1.73M2 — LOW
EGFR: 14 ML/MIN/1.73M2 — LOW
EGFR: 14 ML/MIN/1.73M2 — LOW
EGFR: 15 ML/MIN/1.73M2 — LOW
EGFR: 16 ML/MIN/1.73M2 — LOW
EGFR: 18 ML/MIN/1.73M2 — LOW
EGFR: 19 ML/MIN/1.73M2 — LOW
EGFR: 20 ML/MIN/1.73M2 — LOW
EGFR: 20 ML/MIN/1.73M2 — LOW
EGFR: 21 ML/MIN/1.73M2 — LOW
EGFR: 22 ML/MIN/1.73M2 — LOW
EGFR: 24 ML/MIN/1.73M2 — LOW
EGFR: 25 ML/MIN/1.73M2 — LOW
EGFR: 26 ML/MIN/1.73M2 — LOW
EGFR: 28 ML/MIN/1.73M2 — LOW
EGFR: 30 ML/MIN/1.73M2 — LOW
EGFR: 33 ML/MIN/1.73M2 — LOW
EGFR: 36 ML/MIN/1.73M2 — LOW
EGFR: 37 ML/MIN/1.73M2 — LOW
EGFR: 40 ML/MIN/1.73M2 — LOW
EGFR: 42 ML/MIN/1.73M2 — LOW
EGFR: 42 ML/MIN/1.73M2 — LOW
EGFR: 44 ML/MIN/1.73M2 — LOW
EGFR: 45 ML/MIN/1.73M2 — LOW
EGFR: 49 ML/MIN/1.73M2 — LOW
EGFR: 9 ML/MIN/1.73M2 — LOW
ELLIPTOCYTES BLD QL SMEAR: SLIGHT — SIGNIFICANT CHANGE UP
EOSINOPHIL # BLD AUTO: 0 K/UL — SIGNIFICANT CHANGE UP (ref 0–0.5)
EOSINOPHIL # BLD AUTO: 0.06 K/UL — SIGNIFICANT CHANGE UP (ref 0–0.5)
EOSINOPHIL # BLD AUTO: 0.23 K/UL — SIGNIFICANT CHANGE UP (ref 0–0.5)
EOSINOPHIL # FLD: 0 % — SIGNIFICANT CHANGE UP
EOSINOPHIL NFR BLD AUTO: 0 % — SIGNIFICANT CHANGE UP (ref 0–6)
EOSINOPHIL NFR BLD AUTO: 0.5 % — SIGNIFICANT CHANGE UP (ref 0–6)
EOSINOPHIL NFR BLD AUTO: 1 % — SIGNIFICANT CHANGE UP (ref 0–6)
EPI CELLS # UR: SIGNIFICANT CHANGE UP
ESTIMATED AVERAGE GLUCOSE: 114 — SIGNIFICANT CHANGE UP
FLUAV AG NPH QL: SIGNIFICANT CHANGE UP
FLUBV AG NPH QL: SIGNIFICANT CHANGE UP
FLUID INTAKE SUBSTANCE CLASS: SIGNIFICANT CHANGE UP
FOLATE+VIT B12 SERBLD-IMP: 0 % — SIGNIFICANT CHANGE UP
GAS PNL BLDV: 128 MMOL/L — LOW (ref 136–145)
GAS PNL BLDV: 128 MMOL/L — LOW (ref 136–145)
GAS PNL BLDV: 129 MMOL/L — LOW (ref 136–145)
GAS PNL BLDV: 131 MMOL/L — LOW (ref 136–145)
GAS PNL BLDV: 132 MMOL/L — LOW (ref 136–145)
GAS PNL BLDV: 132 MMOL/L — LOW (ref 136–145)
GAS PNL BLDV: 133 MMOL/L — LOW (ref 136–145)
GAS PNL BLDV: 133 MMOL/L — LOW (ref 136–145)
GAS PNL BLDV: 134 MMOL/L — LOW (ref 136–145)
GAS PNL BLDV: 135 MMOL/L — LOW (ref 136–145)
GAS PNL BLDV: SIGNIFICANT CHANGE UP
GAS PNL BLDV: SIGNIFICANT CHANGE UP
GI PCR PANEL: SIGNIFICANT CHANGE UP
GIANT PLATELETS BLD QL SMEAR: PRESENT — SIGNIFICANT CHANGE UP
GLUCOSE BLDC GLUCOMTR-MCNC: 108 MG/DL — HIGH (ref 70–99)
GLUCOSE BLDC GLUCOMTR-MCNC: 111 MG/DL — HIGH (ref 70–99)
GLUCOSE BLDC GLUCOMTR-MCNC: 112 MG/DL — HIGH (ref 70–99)
GLUCOSE BLDC GLUCOMTR-MCNC: 113 MG/DL — HIGH (ref 70–99)
GLUCOSE BLDC GLUCOMTR-MCNC: 113 MG/DL — HIGH (ref 70–99)
GLUCOSE BLDC GLUCOMTR-MCNC: 114 MG/DL — HIGH (ref 70–99)
GLUCOSE BLDC GLUCOMTR-MCNC: 114 MG/DL — HIGH (ref 70–99)
GLUCOSE BLDC GLUCOMTR-MCNC: 117 MG/DL — HIGH (ref 70–99)
GLUCOSE BLDC GLUCOMTR-MCNC: 117 MG/DL — HIGH (ref 70–99)
GLUCOSE BLDC GLUCOMTR-MCNC: 120 MG/DL — HIGH (ref 70–99)
GLUCOSE BLDC GLUCOMTR-MCNC: 123 MG/DL — HIGH (ref 70–99)
GLUCOSE BLDC GLUCOMTR-MCNC: 123 MG/DL — HIGH (ref 70–99)
GLUCOSE BLDC GLUCOMTR-MCNC: 124 MG/DL — HIGH (ref 70–99)
GLUCOSE BLDC GLUCOMTR-MCNC: 125 MG/DL — HIGH (ref 70–99)
GLUCOSE BLDC GLUCOMTR-MCNC: 126 MG/DL — HIGH (ref 70–99)
GLUCOSE BLDC GLUCOMTR-MCNC: 126 MG/DL — HIGH (ref 70–99)
GLUCOSE BLDC GLUCOMTR-MCNC: 128 MG/DL — HIGH (ref 70–99)
GLUCOSE BLDC GLUCOMTR-MCNC: 128 MG/DL — HIGH (ref 70–99)
GLUCOSE BLDC GLUCOMTR-MCNC: 129 MG/DL — HIGH (ref 70–99)
GLUCOSE BLDC GLUCOMTR-MCNC: 130 MG/DL — HIGH (ref 70–99)
GLUCOSE BLDC GLUCOMTR-MCNC: 131 MG/DL — HIGH (ref 70–99)
GLUCOSE BLDC GLUCOMTR-MCNC: 133 MG/DL — HIGH (ref 70–99)
GLUCOSE BLDC GLUCOMTR-MCNC: 135 MG/DL — HIGH (ref 70–99)
GLUCOSE BLDC GLUCOMTR-MCNC: 136 MG/DL — HIGH (ref 70–99)
GLUCOSE BLDC GLUCOMTR-MCNC: 139 MG/DL — HIGH (ref 70–99)
GLUCOSE BLDC GLUCOMTR-MCNC: 140 MG/DL — HIGH (ref 70–99)
GLUCOSE BLDC GLUCOMTR-MCNC: 140 MG/DL — HIGH (ref 70–99)
GLUCOSE BLDC GLUCOMTR-MCNC: 141 MG/DL — HIGH (ref 70–99)
GLUCOSE BLDC GLUCOMTR-MCNC: 141 MG/DL — HIGH (ref 70–99)
GLUCOSE BLDC GLUCOMTR-MCNC: 142 MG/DL — HIGH (ref 70–99)
GLUCOSE BLDC GLUCOMTR-MCNC: 144 MG/DL — HIGH (ref 70–99)
GLUCOSE BLDC GLUCOMTR-MCNC: 144 MG/DL — HIGH (ref 70–99)
GLUCOSE BLDC GLUCOMTR-MCNC: 146 MG/DL — HIGH (ref 70–99)
GLUCOSE BLDC GLUCOMTR-MCNC: 147 MG/DL — HIGH (ref 70–99)
GLUCOSE BLDC GLUCOMTR-MCNC: 147 MG/DL — HIGH (ref 70–99)
GLUCOSE BLDC GLUCOMTR-MCNC: 148 MG/DL — HIGH (ref 70–99)
GLUCOSE BLDC GLUCOMTR-MCNC: 149 MG/DL — HIGH (ref 70–99)
GLUCOSE BLDC GLUCOMTR-MCNC: 150 MG/DL — HIGH (ref 70–99)
GLUCOSE BLDC GLUCOMTR-MCNC: 151 MG/DL — HIGH (ref 70–99)
GLUCOSE BLDC GLUCOMTR-MCNC: 151 MG/DL — HIGH (ref 70–99)
GLUCOSE BLDC GLUCOMTR-MCNC: 152 MG/DL — HIGH (ref 70–99)
GLUCOSE BLDC GLUCOMTR-MCNC: 153 MG/DL — HIGH (ref 70–99)
GLUCOSE BLDC GLUCOMTR-MCNC: 153 MG/DL — HIGH (ref 70–99)
GLUCOSE BLDC GLUCOMTR-MCNC: 154 MG/DL — HIGH (ref 70–99)
GLUCOSE BLDC GLUCOMTR-MCNC: 155 MG/DL — HIGH (ref 70–99)
GLUCOSE BLDC GLUCOMTR-MCNC: 156 MG/DL — HIGH (ref 70–99)
GLUCOSE BLDC GLUCOMTR-MCNC: 156 MG/DL — HIGH (ref 70–99)
GLUCOSE BLDC GLUCOMTR-MCNC: 157 MG/DL — HIGH (ref 70–99)
GLUCOSE BLDC GLUCOMTR-MCNC: 157 MG/DL — HIGH (ref 70–99)
GLUCOSE BLDC GLUCOMTR-MCNC: 158 MG/DL — HIGH (ref 70–99)
GLUCOSE BLDC GLUCOMTR-MCNC: 158 MG/DL — HIGH (ref 70–99)
GLUCOSE BLDC GLUCOMTR-MCNC: 159 MG/DL — HIGH (ref 70–99)
GLUCOSE BLDC GLUCOMTR-MCNC: 160 MG/DL — HIGH (ref 70–99)
GLUCOSE BLDC GLUCOMTR-MCNC: 161 MG/DL — HIGH (ref 70–99)
GLUCOSE BLDC GLUCOMTR-MCNC: 162 MG/DL — HIGH (ref 70–99)
GLUCOSE BLDC GLUCOMTR-MCNC: 164 MG/DL — HIGH (ref 70–99)
GLUCOSE BLDC GLUCOMTR-MCNC: 165 MG/DL — HIGH (ref 70–99)
GLUCOSE BLDC GLUCOMTR-MCNC: 166 MG/DL — HIGH (ref 70–99)
GLUCOSE BLDC GLUCOMTR-MCNC: 166 MG/DL — HIGH (ref 70–99)
GLUCOSE BLDC GLUCOMTR-MCNC: 167 MG/DL — HIGH (ref 70–99)
GLUCOSE BLDC GLUCOMTR-MCNC: 170 MG/DL — HIGH (ref 70–99)
GLUCOSE BLDC GLUCOMTR-MCNC: 171 MG/DL — HIGH (ref 70–99)
GLUCOSE BLDC GLUCOMTR-MCNC: 171 MG/DL — HIGH (ref 70–99)
GLUCOSE BLDC GLUCOMTR-MCNC: 172 MG/DL — HIGH (ref 70–99)
GLUCOSE BLDC GLUCOMTR-MCNC: 174 MG/DL — HIGH (ref 70–99)
GLUCOSE BLDC GLUCOMTR-MCNC: 175 MG/DL — HIGH (ref 70–99)
GLUCOSE BLDC GLUCOMTR-MCNC: 175 MG/DL — HIGH (ref 70–99)
GLUCOSE BLDC GLUCOMTR-MCNC: 176 MG/DL — HIGH (ref 70–99)
GLUCOSE BLDC GLUCOMTR-MCNC: 176 MG/DL — HIGH (ref 70–99)
GLUCOSE BLDC GLUCOMTR-MCNC: 177 MG/DL — HIGH (ref 70–99)
GLUCOSE BLDC GLUCOMTR-MCNC: 178 MG/DL — HIGH (ref 70–99)
GLUCOSE BLDC GLUCOMTR-MCNC: 179 MG/DL — HIGH (ref 70–99)
GLUCOSE BLDC GLUCOMTR-MCNC: 180 MG/DL — HIGH (ref 70–99)
GLUCOSE BLDC GLUCOMTR-MCNC: 182 MG/DL — HIGH (ref 70–99)
GLUCOSE BLDC GLUCOMTR-MCNC: 182 MG/DL — HIGH (ref 70–99)
GLUCOSE BLDC GLUCOMTR-MCNC: 185 MG/DL — HIGH (ref 70–99)
GLUCOSE BLDC GLUCOMTR-MCNC: 188 MG/DL — HIGH (ref 70–99)
GLUCOSE BLDC GLUCOMTR-MCNC: 188 MG/DL — HIGH (ref 70–99)
GLUCOSE BLDC GLUCOMTR-MCNC: 189 MG/DL — HIGH (ref 70–99)
GLUCOSE BLDC GLUCOMTR-MCNC: 190 MG/DL — HIGH (ref 70–99)
GLUCOSE BLDC GLUCOMTR-MCNC: 190 MG/DL — HIGH (ref 70–99)
GLUCOSE BLDC GLUCOMTR-MCNC: 191 MG/DL — HIGH (ref 70–99)
GLUCOSE BLDC GLUCOMTR-MCNC: 191 MG/DL — HIGH (ref 70–99)
GLUCOSE BLDC GLUCOMTR-MCNC: 193 MG/DL — HIGH (ref 70–99)
GLUCOSE BLDC GLUCOMTR-MCNC: 195 MG/DL — HIGH (ref 70–99)
GLUCOSE BLDC GLUCOMTR-MCNC: 196 MG/DL — HIGH (ref 70–99)
GLUCOSE BLDC GLUCOMTR-MCNC: 198 MG/DL — HIGH (ref 70–99)
GLUCOSE BLDC GLUCOMTR-MCNC: 199 MG/DL — HIGH (ref 70–99)
GLUCOSE BLDC GLUCOMTR-MCNC: 199 MG/DL — HIGH (ref 70–99)
GLUCOSE BLDC GLUCOMTR-MCNC: 202 MG/DL — HIGH (ref 70–99)
GLUCOSE BLDC GLUCOMTR-MCNC: 203 MG/DL — HIGH (ref 70–99)
GLUCOSE BLDC GLUCOMTR-MCNC: 206 MG/DL — HIGH (ref 70–99)
GLUCOSE BLDC GLUCOMTR-MCNC: 206 MG/DL — HIGH (ref 70–99)
GLUCOSE BLDC GLUCOMTR-MCNC: 207 MG/DL — HIGH (ref 70–99)
GLUCOSE BLDC GLUCOMTR-MCNC: 210 MG/DL — HIGH (ref 70–99)
GLUCOSE BLDC GLUCOMTR-MCNC: 210 MG/DL — HIGH (ref 70–99)
GLUCOSE BLDC GLUCOMTR-MCNC: 215 MG/DL — HIGH (ref 70–99)
GLUCOSE BLDC GLUCOMTR-MCNC: 220 MG/DL — HIGH (ref 70–99)
GLUCOSE BLDC GLUCOMTR-MCNC: 262 MG/DL — HIGH (ref 70–99)
GLUCOSE BLDC GLUCOMTR-MCNC: 307 MG/DL — HIGH (ref 70–99)
GLUCOSE BLDC GLUCOMTR-MCNC: 69 MG/DL — LOW (ref 70–99)
GLUCOSE BLDC GLUCOMTR-MCNC: 73 MG/DL — SIGNIFICANT CHANGE UP (ref 70–99)
GLUCOSE BLDC GLUCOMTR-MCNC: 79 MG/DL — SIGNIFICANT CHANGE UP (ref 70–99)
GLUCOSE BLDC GLUCOMTR-MCNC: 79 MG/DL — SIGNIFICANT CHANGE UP (ref 70–99)
GLUCOSE BLDC GLUCOMTR-MCNC: 82 MG/DL — SIGNIFICANT CHANGE UP (ref 70–99)
GLUCOSE BLDC GLUCOMTR-MCNC: 83 MG/DL — SIGNIFICANT CHANGE UP (ref 70–99)
GLUCOSE BLDC GLUCOMTR-MCNC: 86 MG/DL — SIGNIFICANT CHANGE UP (ref 70–99)
GLUCOSE BLDC GLUCOMTR-MCNC: 90 MG/DL — SIGNIFICANT CHANGE UP (ref 70–99)
GLUCOSE BLDC GLUCOMTR-MCNC: 90 MG/DL — SIGNIFICANT CHANGE UP (ref 70–99)
GLUCOSE BLDC GLUCOMTR-MCNC: 91 MG/DL — SIGNIFICANT CHANGE UP (ref 70–99)
GLUCOSE BLDV-MCNC: 109 MG/DL — HIGH (ref 70–99)
GLUCOSE BLDV-MCNC: 121 MG/DL — HIGH (ref 70–99)
GLUCOSE BLDV-MCNC: 127 MG/DL — HIGH (ref 70–99)
GLUCOSE BLDV-MCNC: 134 MG/DL — HIGH (ref 70–99)
GLUCOSE BLDV-MCNC: 140 MG/DL — HIGH (ref 70–99)
GLUCOSE BLDV-MCNC: 143 MG/DL — HIGH (ref 70–99)
GLUCOSE BLDV-MCNC: 144 MG/DL — HIGH (ref 70–99)
GLUCOSE BLDV-MCNC: 160 MG/DL — HIGH (ref 70–99)
GLUCOSE BLDV-MCNC: 161 MG/DL — HIGH (ref 70–99)
GLUCOSE BLDV-MCNC: 165 MG/DL — HIGH (ref 70–99)
GLUCOSE BLDV-MCNC: 165 MG/DL — HIGH (ref 70–99)
GLUCOSE BLDV-MCNC: 167 MG/DL — HIGH (ref 70–99)
GLUCOSE BLDV-MCNC: 178 MG/DL — HIGH (ref 70–99)
GLUCOSE BLDV-MCNC: 73 MG/DL — SIGNIFICANT CHANGE UP (ref 70–99)
GLUCOSE CSF-MCNC: <5 MG/DL — LOW (ref 40–70)
GLUCOSE SERPL-MCNC: 107 MG/DL — HIGH (ref 70–99)
GLUCOSE SERPL-MCNC: 109 MG/DL — HIGH (ref 70–99)
GLUCOSE SERPL-MCNC: 110 MG/DL — HIGH (ref 70–99)
GLUCOSE SERPL-MCNC: 117 MG/DL — HIGH (ref 70–99)
GLUCOSE SERPL-MCNC: 122 MG/DL — HIGH (ref 70–99)
GLUCOSE SERPL-MCNC: 127 MG/DL — HIGH (ref 70–99)
GLUCOSE SERPL-MCNC: 136 MG/DL — HIGH (ref 70–99)
GLUCOSE SERPL-MCNC: 139 MG/DL — HIGH (ref 70–99)
GLUCOSE SERPL-MCNC: 145 MG/DL — HIGH (ref 70–99)
GLUCOSE SERPL-MCNC: 147 MG/DL — HIGH (ref 70–99)
GLUCOSE SERPL-MCNC: 147 MG/DL — HIGH (ref 70–99)
GLUCOSE SERPL-MCNC: 148 MG/DL — HIGH (ref 70–99)
GLUCOSE SERPL-MCNC: 149 MG/DL — HIGH (ref 70–99)
GLUCOSE SERPL-MCNC: 149 MG/DL — HIGH (ref 70–99)
GLUCOSE SERPL-MCNC: 151 MG/DL — HIGH (ref 70–99)
GLUCOSE SERPL-MCNC: 151 MG/DL — HIGH (ref 70–99)
GLUCOSE SERPL-MCNC: 157 MG/DL — HIGH (ref 70–99)
GLUCOSE SERPL-MCNC: 157 MG/DL — HIGH (ref 70–99)
GLUCOSE SERPL-MCNC: 159 MG/DL — HIGH (ref 70–99)
GLUCOSE SERPL-MCNC: 159 MG/DL — HIGH (ref 70–99)
GLUCOSE SERPL-MCNC: 162 MG/DL — HIGH (ref 70–99)
GLUCOSE SERPL-MCNC: 162 MG/DL — HIGH (ref 70–99)
GLUCOSE SERPL-MCNC: 164 MG/DL — HIGH (ref 70–99)
GLUCOSE SERPL-MCNC: 165 MG/DL — HIGH (ref 70–99)
GLUCOSE SERPL-MCNC: 166 MG/DL — HIGH (ref 70–99)
GLUCOSE SERPL-MCNC: 166 MG/DL — HIGH (ref 70–99)
GLUCOSE SERPL-MCNC: 167 MG/DL — HIGH (ref 70–99)
GLUCOSE SERPL-MCNC: 168 MG/DL — HIGH (ref 70–99)
GLUCOSE SERPL-MCNC: 178 MG/DL — HIGH (ref 70–99)
GLUCOSE SERPL-MCNC: 179 MG/DL — HIGH (ref 70–99)
GLUCOSE SERPL-MCNC: 180 MG/DL — HIGH (ref 70–99)
GLUCOSE SERPL-MCNC: 184 MG/DL — HIGH (ref 70–99)
GLUCOSE SERPL-MCNC: 185 MG/DL — HIGH (ref 70–99)
GLUCOSE SERPL-MCNC: 191 MG/DL — HIGH (ref 70–99)
GLUCOSE SERPL-MCNC: 193 MG/DL — HIGH (ref 70–99)
GLUCOSE SERPL-MCNC: 193 MG/DL — HIGH (ref 70–99)
GLUCOSE SERPL-MCNC: 196 MG/DL — HIGH (ref 70–99)
GLUCOSE SERPL-MCNC: 197 MG/DL — HIGH (ref 70–99)
GLUCOSE SERPL-MCNC: 197 MG/DL — HIGH (ref 70–99)
GLUCOSE SERPL-MCNC: 200 MG/DL — HIGH (ref 70–99)
GLUCOSE SERPL-MCNC: 201 MG/DL — HIGH (ref 70–99)
GLUCOSE SERPL-MCNC: 211 MG/DL — HIGH (ref 70–99)
GLUCOSE SERPL-MCNC: 218 MG/DL — HIGH (ref 70–99)
GLUCOSE SERPL-MCNC: 245 MG/DL — HIGH (ref 70–99)
GLUCOSE SERPL-MCNC: 68 MG/DL — LOW (ref 70–99)
GLUCOSE UR QL: ABNORMAL
GRAM STN FLD: SIGNIFICANT CHANGE UP
GRAN CASTS # UR COMP ASSIST: SIGNIFICANT CHANGE UP /LPF
HAPTOGLOB SERPL-MCNC: 283 MG/DL — HIGH (ref 34–200)
HBV SURFACE AB SER-ACNC: 4.3 MIU/ML — LOW
HBV SURFACE AG SER-ACNC: SIGNIFICANT CHANGE UP
HCO3 BLDV-SCNC: 21 MMOL/L — LOW (ref 22–29)
HCO3 BLDV-SCNC: 23 MMOL/L — SIGNIFICANT CHANGE UP (ref 22–29)
HCO3 BLDV-SCNC: 24 MMOL/L — SIGNIFICANT CHANGE UP (ref 22–29)
HCO3 BLDV-SCNC: 25 MMOL/L — SIGNIFICANT CHANGE UP (ref 22–29)
HCO3 BLDV-SCNC: 27 MMOL/L — SIGNIFICANT CHANGE UP (ref 22–29)
HCO3 BLDV-SCNC: 28 MMOL/L — SIGNIFICANT CHANGE UP (ref 22–29)
HCT VFR BLD CALC: 21 % — CRITICAL LOW (ref 34.5–45)
HCT VFR BLD CALC: 21.2 % — LOW (ref 34.5–45)
HCT VFR BLD CALC: 21.6 % — LOW (ref 34.5–45)
HCT VFR BLD CALC: 21.8 % — LOW (ref 34.5–45)
HCT VFR BLD CALC: 21.9 % — LOW (ref 34.5–45)
HCT VFR BLD CALC: 21.9 % — LOW (ref 34.5–45)
HCT VFR BLD CALC: 22.1 % — LOW (ref 34.5–45)
HCT VFR BLD CALC: 22.7 % — LOW (ref 34.5–45)
HCT VFR BLD CALC: 23 % — LOW (ref 34.5–45)
HCT VFR BLD CALC: 23.1 % — LOW (ref 34.5–45)
HCT VFR BLD CALC: 23.1 % — LOW (ref 34.5–45)
HCT VFR BLD CALC: 23.2 % — LOW (ref 34.5–45)
HCT VFR BLD CALC: 23.3 % — LOW (ref 34.5–45)
HCT VFR BLD CALC: 23.6 % — LOW (ref 34.5–45)
HCT VFR BLD CALC: 23.9 % — LOW (ref 34.5–45)
HCT VFR BLD CALC: 24 % — LOW (ref 34.5–45)
HCT VFR BLD CALC: 24.1 % — LOW (ref 34.5–45)
HCT VFR BLD CALC: 24.2 % — LOW (ref 34.5–45)
HCT VFR BLD CALC: 24.2 % — LOW (ref 34.5–45)
HCT VFR BLD CALC: 24.3 % — LOW (ref 34.5–45)
HCT VFR BLD CALC: 24.5 % — LOW (ref 34.5–45)
HCT VFR BLD CALC: 24.5 % — LOW (ref 34.5–45)
HCT VFR BLD CALC: 24.6 % — LOW (ref 34.5–45)
HCT VFR BLD CALC: 24.9 % — LOW (ref 34.5–45)
HCT VFR BLD CALC: 24.9 % — LOW (ref 34.5–45)
HCT VFR BLD CALC: 25 % — LOW (ref 34.5–45)
HCT VFR BLD CALC: 25.1 % — LOW (ref 34.5–45)
HCT VFR BLD CALC: 25.2 % — LOW (ref 34.5–45)
HCT VFR BLD CALC: 25.2 % — LOW (ref 34.5–45)
HCT VFR BLD CALC: 25.6 % — LOW (ref 34.5–45)
HCT VFR BLD CALC: 26.3 % — LOW (ref 34.5–45)
HCT VFR BLD CALC: 26.4 % — LOW (ref 34.5–45)
HCT VFR BLD CALC: 26.5 % — LOW (ref 34.5–45)
HCT VFR BLD CALC: 26.5 % — LOW (ref 34.5–45)
HCT VFR BLD CALC: 26.6 % — LOW (ref 34.5–45)
HCT VFR BLD CALC: 26.7 % — LOW (ref 34.5–45)
HCT VFR BLD CALC: 26.8 % — LOW (ref 34.5–45)
HCT VFR BLD CALC: 27 % — LOW (ref 34.5–45)
HCT VFR BLD CALC: 27.8 % — LOW (ref 34.5–45)
HCT VFR BLD CALC: 27.9 % — LOW (ref 34.5–45)
HCT VFR BLD CALC: 28.9 % — LOW (ref 34.5–45)
HCT VFR BLD CALC: 28.9 % — LOW (ref 34.5–45)
HCT VFR BLD CALC: 29.5 % — LOW (ref 34.5–45)
HCT VFR BLD CALC: 30.4 % — LOW (ref 34.5–45)
HCT VFR BLD CALC: 30.4 % — LOW (ref 34.5–45)
HCT VFR BLD CALC: 32.1 % — LOW (ref 34.5–45)
HCT VFR BLD CALC: 32.6 % — LOW (ref 34.5–45)
HCT VFR BLD CALC: 33.7 % — LOW (ref 34.5–45)
HCT VFR BLD CALC: 34.3 % — LOW (ref 34.5–45)
HCT VFR BLDA CALC: 20 % — CRITICAL LOW (ref 34.5–46.5)
HCT VFR BLDA CALC: 22 % — LOW (ref 34.5–46.5)
HCT VFR BLDA CALC: 23 % — LOW (ref 34.5–46.5)
HCT VFR BLDA CALC: 24 % — LOW (ref 34.5–46.5)
HCT VFR BLDA CALC: 25 % — LOW (ref 34.5–46.5)
HCT VFR BLDA CALC: 26 % — LOW (ref 34.5–46.5)
HCT VFR BLDA CALC: 27 % — LOW (ref 34.5–46.5)
HCV AB S/CO SERPL IA: 0.2 S/CO — SIGNIFICANT CHANGE UP (ref 0–0.99)
HCV AB SERPL-IMP: SIGNIFICANT CHANGE UP
HDLC SERPL-MCNC: 45 MG/DL — LOW
HEPARIN-PF4 AB RESULT: <0.6 U/ML — SIGNIFICANT CHANGE UP (ref 0–0.9)
HGB BLD CALC-MCNC: 6.7 G/DL — CRITICAL LOW (ref 11.7–16.1)
HGB BLD CALC-MCNC: 7.4 G/DL — LOW (ref 11.7–16.1)
HGB BLD CALC-MCNC: 7.5 G/DL — LOW (ref 11.7–16.1)
HGB BLD CALC-MCNC: 7.8 G/DL — LOW (ref 11.7–16.1)
HGB BLD CALC-MCNC: 7.9 G/DL — LOW (ref 11.7–16.1)
HGB BLD CALC-MCNC: 8.3 G/DL — LOW (ref 11.7–16.1)
HGB BLD CALC-MCNC: 8.6 G/DL — LOW (ref 11.7–16.1)
HGB BLD CALC-MCNC: 8.7 G/DL — LOW (ref 11.7–16.1)
HGB BLD CALC-MCNC: 8.8 G/DL — LOW (ref 11.7–16.1)
HGB BLD CALC-MCNC: 8.9 G/DL — LOW (ref 11.7–16.1)
HGB BLD CALC-MCNC: 9 G/DL — LOW (ref 11.7–16.1)
HGB BLD CALC-MCNC: 9 G/DL — LOW (ref 11.7–16.1)
HGB BLD-MCNC: 10.1 G/DL — LOW (ref 11.5–15.5)
HGB BLD-MCNC: 10.1 G/DL — LOW (ref 11.5–15.5)
HGB BLD-MCNC: 10.3 G/DL — LOW (ref 11.5–15.5)
HGB BLD-MCNC: 10.6 G/DL — LOW (ref 11.5–15.5)
HGB BLD-MCNC: 6.6 G/DL — CRITICAL LOW (ref 11.5–15.5)
HGB BLD-MCNC: 6.6 G/DL — CRITICAL LOW (ref 11.5–15.5)
HGB BLD-MCNC: 6.8 G/DL — CRITICAL LOW (ref 11.5–15.5)
HGB BLD-MCNC: 6.8 G/DL — CRITICAL LOW (ref 11.5–15.5)
HGB BLD-MCNC: 6.9 G/DL — CRITICAL LOW (ref 11.5–15.5)
HGB BLD-MCNC: 6.9 G/DL — CRITICAL LOW (ref 11.5–15.5)
HGB BLD-MCNC: 7 G/DL — CRITICAL LOW (ref 11.5–15.5)
HGB BLD-MCNC: 7.1 G/DL — LOW (ref 11.5–15.5)
HGB BLD-MCNC: 7.1 G/DL — LOW (ref 11.5–15.5)
HGB BLD-MCNC: 7.2 G/DL — LOW (ref 11.5–15.5)
HGB BLD-MCNC: 7.3 G/DL — LOW (ref 11.5–15.5)
HGB BLD-MCNC: 7.3 G/DL — LOW (ref 11.5–15.5)
HGB BLD-MCNC: 7.4 G/DL — LOW (ref 11.5–15.5)
HGB BLD-MCNC: 7.5 G/DL — LOW (ref 11.5–15.5)
HGB BLD-MCNC: 7.5 G/DL — LOW (ref 11.5–15.5)
HGB BLD-MCNC: 7.6 G/DL — LOW (ref 11.5–15.5)
HGB BLD-MCNC: 7.6 G/DL — LOW (ref 11.5–15.5)
HGB BLD-MCNC: 7.7 G/DL — LOW (ref 11.5–15.5)
HGB BLD-MCNC: 7.7 G/DL — LOW (ref 11.5–15.5)
HGB BLD-MCNC: 7.8 G/DL — LOW (ref 11.5–15.5)
HGB BLD-MCNC: 7.9 G/DL — LOW (ref 11.5–15.5)
HGB BLD-MCNC: 8 G/DL — LOW (ref 11.5–15.5)
HGB BLD-MCNC: 8 G/DL — LOW (ref 11.5–15.5)
HGB BLD-MCNC: 8.2 G/DL — LOW (ref 11.5–15.5)
HGB BLD-MCNC: 8.2 G/DL — LOW (ref 11.5–15.5)
HGB BLD-MCNC: 8.3 G/DL — LOW (ref 11.5–15.5)
HGB BLD-MCNC: 8.4 G/DL — LOW (ref 11.5–15.5)
HGB BLD-MCNC: 8.5 G/DL — LOW (ref 11.5–15.5)
HGB BLD-MCNC: 8.7 G/DL — LOW (ref 11.5–15.5)
HGB BLD-MCNC: 8.8 G/DL — LOW (ref 11.5–15.5)
HGB BLD-MCNC: 8.8 G/DL — LOW (ref 11.5–15.5)
HGB BLD-MCNC: 8.9 G/DL — LOW (ref 11.5–15.5)
HGB BLD-MCNC: 9 G/DL — LOW (ref 11.5–15.5)
HGB BLD-MCNC: 9 G/DL — LOW (ref 11.5–15.5)
HGB BLD-MCNC: 9.3 G/DL — LOW (ref 11.5–15.5)
HGB BLD-MCNC: 9.6 G/DL — LOW (ref 11.5–15.5)
HGB BLD-MCNC: 9.6 G/DL — LOW (ref 11.5–15.5)
HYALINE CASTS # UR AUTO: SIGNIFICANT CHANGE UP (ref 0–7)
HYPOCHROMIA BLD QL: SLIGHT — SIGNIFICANT CHANGE UP
IANC: 10.12 K/UL — HIGH (ref 1.8–7.4)
IANC: 14.07 K/UL — HIGH (ref 1.8–7.4)
IANC: 19.51 K/UL — HIGH (ref 1.8–7.4)
IMM GRANULOCYTES NFR BLD AUTO: 2.1 % — HIGH (ref 0–0.9)
IMM GRANULOCYTES NFR BLD AUTO: 2.2 % — HIGH (ref 0–0.9)
IMM GRANULOCYTES NFR BLD AUTO: 3 % — HIGH (ref 0–0.9)
INR BLD: 1 RATIO — SIGNIFICANT CHANGE UP (ref 0.88–1.16)
INR BLD: 1.02 RATIO — SIGNIFICANT CHANGE UP (ref 0.88–1.16)
INR BLD: 1.03 RATIO — SIGNIFICANT CHANGE UP (ref 0.88–1.16)
INR BLD: 1.03 RATIO — SIGNIFICANT CHANGE UP (ref 0.88–1.16)
INR BLD: 1.04 RATIO — SIGNIFICANT CHANGE UP (ref 0.88–1.16)
INR BLD: 1.06 RATIO — SIGNIFICANT CHANGE UP (ref 0.88–1.16)
INR BLD: 1.1 RATIO — SIGNIFICANT CHANGE UP (ref 0.88–1.16)
INR BLD: 1.14 RATIO — SIGNIFICANT CHANGE UP (ref 0.88–1.16)
INR BLD: 1.14 RATIO — SIGNIFICANT CHANGE UP (ref 0.88–1.16)
INR BLD: 1.15 RATIO — SIGNIFICANT CHANGE UP (ref 0.88–1.16)
INR BLD: 1.16 RATIO — SIGNIFICANT CHANGE UP (ref 0.88–1.16)
INR BLD: 1.17 RATIO — HIGH (ref 0.88–1.16)
INR BLD: 1.18 RATIO — HIGH (ref 0.88–1.16)
INR BLD: 1.18 RATIO — HIGH (ref 0.88–1.16)
INR BLD: 1.23 RATIO — HIGH (ref 0.88–1.16)
INR BLD: 1.26 RATIO — HIGH (ref 0.88–1.16)
INR BLD: 1.31 RATIO — HIGH (ref 0.88–1.16)
INR BLD: 1.39 RATIO — HIGH (ref 0.88–1.16)
KETONES UR-MCNC: ABNORMAL
KETONES UR-MCNC: ABNORMAL
KETONES UR-MCNC: NEGATIVE — SIGNIFICANT CHANGE UP
LABORATORY COMMENT REPORT: SIGNIFICANT CHANGE UP
LACTATE BLDV-MCNC: 0.9 MMOL/L — SIGNIFICANT CHANGE UP (ref 0.5–2)
LACTATE BLDV-MCNC: 1.1 MMOL/L — SIGNIFICANT CHANGE UP (ref 0.5–2)
LACTATE BLDV-MCNC: 1.2 MMOL/L — SIGNIFICANT CHANGE UP (ref 0.5–2)
LACTATE BLDV-MCNC: 1.2 MMOL/L — SIGNIFICANT CHANGE UP (ref 0.5–2)
LACTATE BLDV-MCNC: 1.3 MMOL/L — SIGNIFICANT CHANGE UP (ref 0.5–2)
LACTATE BLDV-MCNC: 1.4 MMOL/L — SIGNIFICANT CHANGE UP (ref 0.5–2)
LACTATE BLDV-MCNC: 1.6 MMOL/L — SIGNIFICANT CHANGE UP (ref 0.5–2)
LACTATE BLDV-MCNC: 1.6 MMOL/L — SIGNIFICANT CHANGE UP (ref 0.5–2)
LACTATE BLDV-MCNC: 1.7 MMOL/L — SIGNIFICANT CHANGE UP (ref 0.5–2)
LACTATE BLDV-MCNC: 2.3 MMOL/L — HIGH (ref 0.5–2)
LACTATE BLDV-MCNC: 2.5 MMOL/L — HIGH (ref 0.5–2)
LACTATE BLDV-MCNC: 4.9 MMOL/L — CRITICAL HIGH (ref 0.5–2)
LACTATE SERPL-SCNC: 1.4 MMOL/L — SIGNIFICANT CHANGE UP (ref 0.5–2)
LDH CSF L TO P-CCNC: 620 U/L — SIGNIFICANT CHANGE UP
LDH FLD-CCNC: 620 U/L — SIGNIFICANT CHANGE UP
LDH PNL SERPL: 285 IU/L — HIGH (ref 119–226)
LDH PNL SERPL: 294 IU/L — HIGH (ref 119–226)
LDH1 SERPL-CCNC: 24 % — SIGNIFICANT CHANGE UP (ref 17–32)
LDH1 SERPL-CCNC: 26 % — SIGNIFICANT CHANGE UP (ref 17–32)
LDH3 SERPL-CCNC: 20 % — SIGNIFICANT CHANGE UP (ref 17–27)
LDH3 SERPL-CCNC: 22 % — SIGNIFICANT CHANGE UP (ref 17–27)
LDH3 SERPL-CCNC: 33 % — SIGNIFICANT CHANGE UP (ref 25–40)
LDH3 SERPL-CCNC: 33 % — SIGNIFICANT CHANGE UP (ref 25–40)
LDH4 SERPL-CCNC: 11 % — SIGNIFICANT CHANGE UP (ref 5–13)
LDH4 SERPL-CCNC: 9 % — SIGNIFICANT CHANGE UP (ref 5–13)
LDH5 SERPL-CCNC: 10 % — SIGNIFICANT CHANGE UP (ref 4–20)
LDH5 SERPL-CCNC: 12 % — SIGNIFICANT CHANGE UP (ref 4–20)
LEUKOCYTE ESTERASE UR-ACNC: NEGATIVE — SIGNIFICANT CHANGE UP
LEVETIRACETAM SERPL-MCNC: 10.6 UG/ML — SIGNIFICANT CHANGE UP (ref 10–40)
LEVETIRACETAM SERPL-MCNC: 48.4 UG/ML — HIGH (ref 10–40)
LEVETIRACETAM SERPL-MCNC: 51.8 UG/ML — HIGH (ref 10–40)
LEVETIRACETAM SERPL-MCNC: 87.7 UG/ML — HIGH (ref 10–40)
LEVETIRACETAM SERPL-MCNC: 9.2 UG/ML — LOW (ref 10–40)
LIPID PNL WITH DIRECT LDL SERPL: 79 MG/DL — SIGNIFICANT CHANGE UP
LYMPHOCYTES # BLD AUTO: 0.13 K/UL — LOW (ref 1–3.3)
LYMPHOCYTES # BLD AUTO: 0.2 K/UL — LOW (ref 1–3.3)
LYMPHOCYTES # BLD AUTO: 0.41 K/UL — LOW (ref 1–3.3)
LYMPHOCYTES # BLD AUTO: 0.9 % — LOW (ref 13–44)
LYMPHOCYTES # BLD AUTO: 0.9 % — LOW (ref 13–44)
LYMPHOCYTES # BLD AUTO: 3.4 % — LOW (ref 13–44)
LYMPHOCYTES # CSF: 1 % — SIGNIFICANT CHANGE UP
LYMPHOCYTES # FLD: 5 % — SIGNIFICANT CHANGE UP
MACROCYTES BLD QL: SLIGHT — SIGNIFICANT CHANGE UP
MAGNESIUM SERPL-MCNC: 1.2 MG/DL — LOW (ref 1.6–2.6)
MAGNESIUM SERPL-MCNC: 2.1 MG/DL — SIGNIFICANT CHANGE UP (ref 1.6–2.6)
MAGNESIUM SERPL-MCNC: 2.2 MG/DL — SIGNIFICANT CHANGE UP (ref 1.6–2.6)
MAGNESIUM SERPL-MCNC: 2.3 MG/DL — SIGNIFICANT CHANGE UP (ref 1.6–2.6)
MAGNESIUM SERPL-MCNC: 2.4 MG/DL — SIGNIFICANT CHANGE UP (ref 1.6–2.6)
MAGNESIUM SERPL-MCNC: 2.5 MG/DL — SIGNIFICANT CHANGE UP (ref 1.6–2.6)
MAGNESIUM SERPL-MCNC: 2.6 MG/DL — SIGNIFICANT CHANGE UP (ref 1.6–2.6)
MCHC RBC-ENTMCNC: 25.7 PG — LOW (ref 27–34)
MCHC RBC-ENTMCNC: 25.9 PG — LOW (ref 27–34)
MCHC RBC-ENTMCNC: 26 PG — LOW (ref 27–34)
MCHC RBC-ENTMCNC: 26.1 PG — LOW (ref 27–34)
MCHC RBC-ENTMCNC: 26.2 PG — LOW (ref 27–34)
MCHC RBC-ENTMCNC: 26.3 PG — LOW (ref 27–34)
MCHC RBC-ENTMCNC: 26.4 PG — LOW (ref 27–34)
MCHC RBC-ENTMCNC: 26.5 PG — LOW (ref 27–34)
MCHC RBC-ENTMCNC: 26.6 PG — LOW (ref 27–34)
MCHC RBC-ENTMCNC: 26.7 PG — LOW (ref 27–34)
MCHC RBC-ENTMCNC: 26.8 PG — LOW (ref 27–34)
MCHC RBC-ENTMCNC: 26.8 PG — LOW (ref 27–34)
MCHC RBC-ENTMCNC: 27 PG — SIGNIFICANT CHANGE UP (ref 27–34)
MCHC RBC-ENTMCNC: 27.1 PG — SIGNIFICANT CHANGE UP (ref 27–34)
MCHC RBC-ENTMCNC: 27.2 PG — SIGNIFICANT CHANGE UP (ref 27–34)
MCHC RBC-ENTMCNC: 27.2 PG — SIGNIFICANT CHANGE UP (ref 27–34)
MCHC RBC-ENTMCNC: 27.3 PG — SIGNIFICANT CHANGE UP (ref 27–34)
MCHC RBC-ENTMCNC: 27.3 PG — SIGNIFICANT CHANGE UP (ref 27–34)
MCHC RBC-ENTMCNC: 27.4 PG — SIGNIFICANT CHANGE UP (ref 27–34)
MCHC RBC-ENTMCNC: 27.4 PG — SIGNIFICANT CHANGE UP (ref 27–34)
MCHC RBC-ENTMCNC: 27.5 PG — SIGNIFICANT CHANGE UP (ref 27–34)
MCHC RBC-ENTMCNC: 27.6 PG — SIGNIFICANT CHANGE UP (ref 27–34)
MCHC RBC-ENTMCNC: 27.6 PG — SIGNIFICANT CHANGE UP (ref 27–34)
MCHC RBC-ENTMCNC: 27.7 PG — SIGNIFICANT CHANGE UP (ref 27–34)
MCHC RBC-ENTMCNC: 27.9 PG — SIGNIFICANT CHANGE UP (ref 27–34)
MCHC RBC-ENTMCNC: 28 PG — SIGNIFICANT CHANGE UP (ref 27–34)
MCHC RBC-ENTMCNC: 28.1 PG — SIGNIFICANT CHANGE UP (ref 27–34)
MCHC RBC-ENTMCNC: 28.3 PG — SIGNIFICANT CHANGE UP (ref 27–34)
MCHC RBC-ENTMCNC: 28.4 PG — SIGNIFICANT CHANGE UP (ref 27–34)
MCHC RBC-ENTMCNC: 28.9 PG — SIGNIFICANT CHANGE UP (ref 27–34)
MCHC RBC-ENTMCNC: 29.6 PG — SIGNIFICANT CHANGE UP (ref 27–34)
MCHC RBC-ENTMCNC: 30 GM/DL — LOW (ref 32–36)
MCHC RBC-ENTMCNC: 30.3 GM/DL — LOW (ref 32–36)
MCHC RBC-ENTMCNC: 30.3 GM/DL — LOW (ref 32–36)
MCHC RBC-ENTMCNC: 30.4 GM/DL — LOW (ref 32–36)
MCHC RBC-ENTMCNC: 30.4 GM/DL — LOW (ref 32–36)
MCHC RBC-ENTMCNC: 30.5 GM/DL — LOW (ref 32–36)
MCHC RBC-ENTMCNC: 30.6 GM/DL — LOW (ref 32–36)
MCHC RBC-ENTMCNC: 30.6 GM/DL — LOW (ref 32–36)
MCHC RBC-ENTMCNC: 30.7 GM/DL — LOW (ref 32–36)
MCHC RBC-ENTMCNC: 30.8 GM/DL — LOW (ref 32–36)
MCHC RBC-ENTMCNC: 30.8 GM/DL — LOW (ref 32–36)
MCHC RBC-ENTMCNC: 30.9 GM/DL — LOW (ref 32–36)
MCHC RBC-ENTMCNC: 31 GM/DL — LOW (ref 32–36)
MCHC RBC-ENTMCNC: 31 GM/DL — LOW (ref 32–36)
MCHC RBC-ENTMCNC: 31.1 GM/DL — LOW (ref 32–36)
MCHC RBC-ENTMCNC: 31.1 GM/DL — LOW (ref 32–36)
MCHC RBC-ENTMCNC: 31.2 GM/DL — LOW (ref 32–36)
MCHC RBC-ENTMCNC: 31.3 GM/DL — LOW (ref 32–36)
MCHC RBC-ENTMCNC: 31.4 GM/DL — LOW (ref 32–36)
MCHC RBC-ENTMCNC: 31.4 GM/DL — LOW (ref 32–36)
MCHC RBC-ENTMCNC: 31.5 GM/DL — LOW (ref 32–36)
MCHC RBC-ENTMCNC: 31.6 GM/DL — LOW (ref 32–36)
MCHC RBC-ENTMCNC: 31.7 GM/DL — LOW (ref 32–36)
MCHC RBC-ENTMCNC: 31.8 GM/DL — LOW (ref 32–36)
MCHC RBC-ENTMCNC: 31.8 GM/DL — LOW (ref 32–36)
MCHC RBC-ENTMCNC: 31.9 GM/DL — LOW (ref 32–36)
MCHC RBC-ENTMCNC: 32 GM/DL — SIGNIFICANT CHANGE UP (ref 32–36)
MCHC RBC-ENTMCNC: 32.2 GM/DL — SIGNIFICANT CHANGE UP (ref 32–36)
MCHC RBC-ENTMCNC: 32.4 GM/DL — SIGNIFICANT CHANGE UP (ref 32–36)
MCHC RBC-ENTMCNC: 32.6 GM/DL — SIGNIFICANT CHANGE UP (ref 32–36)
MCHC RBC-ENTMCNC: 32.7 GM/DL — SIGNIFICANT CHANGE UP (ref 32–36)
MCHC RBC-ENTMCNC: 33.1 GM/DL — SIGNIFICANT CHANGE UP (ref 32–36)
MCHC RBC-ENTMCNC: 33.6 GM/DL — SIGNIFICANT CHANGE UP (ref 32–36)
MCHC RBC-ENTMCNC: 33.7 GM/DL — SIGNIFICANT CHANGE UP (ref 32–36)
MCHC RBC-ENTMCNC: 33.7 GM/DL — SIGNIFICANT CHANGE UP (ref 32–36)
MCHC RBC-ENTMCNC: 33.8 GM/DL — SIGNIFICANT CHANGE UP (ref 32–36)
MCHC RBC-ENTMCNC: 34.2 GM/DL — SIGNIFICANT CHANGE UP (ref 32–36)
MCV RBC AUTO: 80.5 FL — SIGNIFICANT CHANGE UP (ref 80–100)
MCV RBC AUTO: 80.9 FL — SIGNIFICANT CHANGE UP (ref 80–100)
MCV RBC AUTO: 83 FL — SIGNIFICANT CHANGE UP (ref 80–100)
MCV RBC AUTO: 83.1 FL — SIGNIFICANT CHANGE UP (ref 80–100)
MCV RBC AUTO: 83.4 FL — SIGNIFICANT CHANGE UP (ref 80–100)
MCV RBC AUTO: 83.7 FL — SIGNIFICANT CHANGE UP (ref 80–100)
MCV RBC AUTO: 83.8 FL — SIGNIFICANT CHANGE UP (ref 80–100)
MCV RBC AUTO: 83.8 FL — SIGNIFICANT CHANGE UP (ref 80–100)
MCV RBC AUTO: 84 FL — SIGNIFICANT CHANGE UP (ref 80–100)
MCV RBC AUTO: 84.1 FL — SIGNIFICANT CHANGE UP (ref 80–100)
MCV RBC AUTO: 84.3 FL — SIGNIFICANT CHANGE UP (ref 80–100)
MCV RBC AUTO: 84.4 FL — SIGNIFICANT CHANGE UP (ref 80–100)
MCV RBC AUTO: 84.4 FL — SIGNIFICANT CHANGE UP (ref 80–100)
MCV RBC AUTO: 84.6 FL — SIGNIFICANT CHANGE UP (ref 80–100)
MCV RBC AUTO: 84.7 FL — SIGNIFICANT CHANGE UP (ref 80–100)
MCV RBC AUTO: 85 FL — SIGNIFICANT CHANGE UP (ref 80–100)
MCV RBC AUTO: 85.1 FL — SIGNIFICANT CHANGE UP (ref 80–100)
MCV RBC AUTO: 85.4 FL — SIGNIFICANT CHANGE UP (ref 80–100)
MCV RBC AUTO: 85.5 FL — SIGNIFICANT CHANGE UP (ref 80–100)
MCV RBC AUTO: 85.5 FL — SIGNIFICANT CHANGE UP (ref 80–100)
MCV RBC AUTO: 85.6 FL — SIGNIFICANT CHANGE UP (ref 80–100)
MCV RBC AUTO: 85.7 FL — SIGNIFICANT CHANGE UP (ref 80–100)
MCV RBC AUTO: 85.9 FL — SIGNIFICANT CHANGE UP (ref 80–100)
MCV RBC AUTO: 86 FL — SIGNIFICANT CHANGE UP (ref 80–100)
MCV RBC AUTO: 86.3 FL — SIGNIFICANT CHANGE UP (ref 80–100)
MCV RBC AUTO: 86.5 FL — SIGNIFICANT CHANGE UP (ref 80–100)
MCV RBC AUTO: 86.5 FL — SIGNIFICANT CHANGE UP (ref 80–100)
MCV RBC AUTO: 86.6 FL — SIGNIFICANT CHANGE UP (ref 80–100)
MCV RBC AUTO: 86.8 FL — SIGNIFICANT CHANGE UP (ref 80–100)
MCV RBC AUTO: 86.9 FL — SIGNIFICANT CHANGE UP (ref 80–100)
MCV RBC AUTO: 86.9 FL — SIGNIFICANT CHANGE UP (ref 80–100)
MCV RBC AUTO: 87.1 FL — SIGNIFICANT CHANGE UP (ref 80–100)
MCV RBC AUTO: 87.2 FL — SIGNIFICANT CHANGE UP (ref 80–100)
MCV RBC AUTO: 87.4 FL — SIGNIFICANT CHANGE UP (ref 80–100)
MCV RBC AUTO: 87.6 FL — SIGNIFICANT CHANGE UP (ref 80–100)
MCV RBC AUTO: 87.6 FL — SIGNIFICANT CHANGE UP (ref 80–100)
MCV RBC AUTO: 87.8 FL — SIGNIFICANT CHANGE UP (ref 80–100)
MCV RBC AUTO: 88 FL — SIGNIFICANT CHANGE UP (ref 80–100)
MCV RBC AUTO: 88 FL — SIGNIFICANT CHANGE UP (ref 80–100)
MCV RBC AUTO: 88.2 FL — SIGNIFICANT CHANGE UP (ref 80–100)
MCV RBC AUTO: 88.4 FL — SIGNIFICANT CHANGE UP (ref 80–100)
MCV RBC AUTO: 88.5 FL — SIGNIFICANT CHANGE UP (ref 80–100)
MCV RBC AUTO: 88.6 FL — SIGNIFICANT CHANGE UP (ref 80–100)
MCV RBC AUTO: 89.1 FL — SIGNIFICANT CHANGE UP (ref 80–100)
MCV RBC AUTO: 89.9 FL — SIGNIFICANT CHANGE UP (ref 80–100)
MESOTHL CELL # FLD: 0 % — SIGNIFICANT CHANGE UP
METAMYELOCYTES # FLD: 0.9 % — SIGNIFICANT CHANGE UP (ref 0–1)
METHOD TYPE: SIGNIFICANT CHANGE UP
MICROCYTES BLD QL: SLIGHT — SIGNIFICANT CHANGE UP
MONOCYTES # BLD AUTO: 0.38 K/UL — SIGNIFICANT CHANGE UP (ref 0–0.9)
MONOCYTES # BLD AUTO: 1.28 K/UL — HIGH (ref 0–0.9)
MONOCYTES # BLD AUTO: 1.48 K/UL — HIGH (ref 0–0.9)
MONOCYTES NFR BLD AUTO: 10.5 % — SIGNIFICANT CHANGE UP (ref 2–14)
MONOCYTES NFR BLD AUTO: 2.5 % — SIGNIFICANT CHANGE UP (ref 2–14)
MONOCYTES NFR BLD AUTO: 6.7 % — SIGNIFICANT CHANGE UP (ref 2–14)
MONOS+MACROS # FLD: 6 % — SIGNIFICANT CHANGE UP
MONOS+MACROS NFR CSF: 11 % — SIGNIFICANT CHANGE UP
MRSA PCR RESULT.: SIGNIFICANT CHANGE UP
MYCOPHENOLATE SERPL-MCNC: 0.5 UG/ML — LOW (ref 1–3.5)
MYCOPHENOLIC ACID GLUCURONIDE: 257 UG/ML — HIGH (ref 15–125)
NEUTROPHILS # BLD AUTO: 10.12 K/UL — HIGH (ref 1.8–7.4)
NEUTROPHILS # BLD AUTO: 14.07 K/UL — HIGH (ref 1.8–7.4)
NEUTROPHILS # BLD AUTO: 19.51 K/UL — HIGH (ref 1.8–7.4)
NEUTROPHILS # CSF: 88 % — SIGNIFICANT CHANGE UP
NEUTROPHILS NFR BLD AUTO: 83.3 % — HIGH (ref 43–77)
NEUTROPHILS NFR BLD AUTO: 87.9 % — HIGH (ref 43–77)
NEUTROPHILS NFR BLD AUTO: 94.2 % — HIGH (ref 43–77)
NEUTROPHILS-BODY FLUID: 79 % — SIGNIFICANT CHANGE UP
NIGHT BLUE STAIN TISS: SIGNIFICANT CHANGE UP
NITRITE UR-MCNC: NEGATIVE — SIGNIFICANT CHANGE UP
NON HDL CHOLESTEROL: 108 MG/DL — SIGNIFICANT CHANGE UP
NRBC # BLD: 0 /100 WBCS — SIGNIFICANT CHANGE UP (ref 0–0)
NRBC # BLD: 1 /100 WBCS — HIGH (ref 0–0)
NRBC # BLD: 2 /100 WBCS — HIGH (ref 0–0)
NRBC # BLD: 2 /100 WBCS — HIGH (ref 0–0)
NRBC # FLD: 0 K/UL — SIGNIFICANT CHANGE UP (ref 0–0)
NRBC # FLD: 0.02 K/UL — HIGH (ref 0–0)
NRBC # FLD: 0.03 K/UL — HIGH (ref 0–0)
NRBC # FLD: 0.03 K/UL — HIGH (ref 0–0)
NRBC # FLD: 0.04 K/UL — HIGH (ref 0–0)
NRBC # FLD: 0.06 K/UL — HIGH (ref 0–0)
NRBC # FLD: 0.07 K/UL — HIGH (ref 0–0)
NRBC # FLD: 0.09 K/UL — HIGH (ref 0–0)
NRBC # FLD: 0.1 K/UL — HIGH (ref 0–0)
NRBC # FLD: 0.11 K/UL — HIGH (ref 0–0)
NRBC # FLD: 0.11 K/UL — HIGH (ref 0–0)
NRBC # FLD: 0.17 K/UL — HIGH (ref 0–0)
NRBC # FLD: 0.18 K/UL — HIGH (ref 0–0)
NRBC # FLD: 0.2 K/UL — HIGH (ref 0–0)
NRBC # FLD: SIGNIFICANT CHANGE UP K/UL (ref 0–0)
NRBC NFR CSF: CRITICAL HIGH CELLS/UL (ref 0–5)
NSE FLD-MCNC: 15.9 NG/ML — SIGNIFICANT CHANGE UP (ref 0–17.6)
OB PNL STL: POSITIVE
ORGANISM # SPEC MICROSCOPIC CNT: SIGNIFICANT CHANGE UP
OTHER CELLS FLD MANUAL: 10 % — SIGNIFICANT CHANGE UP
PCO2 BLDV: 29 MMHG — LOW (ref 39–52)
PCO2 BLDV: 34 MMHG — LOW (ref 39–52)
PCO2 BLDV: 35 MMHG — LOW (ref 39–52)
PCO2 BLDV: 37 MMHG — LOW (ref 39–52)
PCO2 BLDV: 38 MMHG — LOW (ref 39–52)
PCO2 BLDV: 39 MMHG — SIGNIFICANT CHANGE UP (ref 39–52)
PCO2 BLDV: 39 MMHG — SIGNIFICANT CHANGE UP (ref 39–52)
PCO2 BLDV: 41 MMHG — SIGNIFICANT CHANGE UP (ref 39–52)
PCO2 BLDV: 43 MMHG — SIGNIFICANT CHANGE UP (ref 39–52)
PCO2 BLDV: 43 MMHG — SIGNIFICANT CHANGE UP (ref 39–52)
PCO2 BLDV: 44 MMHG — SIGNIFICANT CHANGE UP (ref 39–52)
PCO2 BLDV: 45 MMHG — SIGNIFICANT CHANGE UP (ref 39–52)
PCO2 BLDV: 57 MMHG — HIGH (ref 39–52)
PCO2 BLDV: 59 MMHG — HIGH (ref 39–52)
PF4 HEPARIN CMPLX AB SER-ACNC: NEGATIVE — SIGNIFICANT CHANGE UP
PH BLDV: 7.2 — LOW (ref 7.32–7.43)
PH BLDV: 7.23 — LOW (ref 7.32–7.43)
PH BLDV: 7.37 — SIGNIFICANT CHANGE UP (ref 7.32–7.43)
PH BLDV: 7.38 — SIGNIFICANT CHANGE UP (ref 7.32–7.43)
PH BLDV: 7.38 — SIGNIFICANT CHANGE UP (ref 7.32–7.43)
PH BLDV: 7.39 — SIGNIFICANT CHANGE UP (ref 7.32–7.43)
PH BLDV: 7.39 — SIGNIFICANT CHANGE UP (ref 7.32–7.43)
PH BLDV: 7.41 — SIGNIFICANT CHANGE UP (ref 7.32–7.43)
PH BLDV: 7.42 — SIGNIFICANT CHANGE UP (ref 7.32–7.43)
PH BLDV: 7.42 — SIGNIFICANT CHANGE UP (ref 7.32–7.43)
PH BLDV: 7.44 — HIGH (ref 7.32–7.43)
PH BLDV: 7.5 — HIGH (ref 7.32–7.43)
PH UR: 6 — SIGNIFICANT CHANGE UP (ref 5–8)
PH UR: 6 — SIGNIFICANT CHANGE UP (ref 5–8)
PH UR: 6.5 — SIGNIFICANT CHANGE UP (ref 5–8)
PHOSPHATE SERPL-MCNC: 2.8 MG/DL — SIGNIFICANT CHANGE UP (ref 2.5–4.5)
PHOSPHATE SERPL-MCNC: 2.8 MG/DL — SIGNIFICANT CHANGE UP (ref 2.5–4.5)
PHOSPHATE SERPL-MCNC: 2.9 MG/DL — SIGNIFICANT CHANGE UP (ref 2.5–4.5)
PHOSPHATE SERPL-MCNC: 2.9 MG/DL — SIGNIFICANT CHANGE UP (ref 2.5–4.5)
PHOSPHATE SERPL-MCNC: 3 MG/DL — SIGNIFICANT CHANGE UP (ref 2.5–4.5)
PHOSPHATE SERPL-MCNC: 3 MG/DL — SIGNIFICANT CHANGE UP (ref 2.5–4.5)
PHOSPHATE SERPL-MCNC: 3.2 MG/DL — SIGNIFICANT CHANGE UP (ref 2.5–4.5)
PHOSPHATE SERPL-MCNC: 3.3 MG/DL — SIGNIFICANT CHANGE UP (ref 2.5–4.5)
PHOSPHATE SERPL-MCNC: 3.6 MG/DL — SIGNIFICANT CHANGE UP (ref 2.5–4.5)
PHOSPHATE SERPL-MCNC: 3.7 MG/DL — SIGNIFICANT CHANGE UP (ref 2.5–4.5)
PHOSPHATE SERPL-MCNC: 3.7 MG/DL — SIGNIFICANT CHANGE UP (ref 2.5–4.5)
PHOSPHATE SERPL-MCNC: 3.8 MG/DL — SIGNIFICANT CHANGE UP (ref 2.5–4.5)
PHOSPHATE SERPL-MCNC: 3.8 MG/DL — SIGNIFICANT CHANGE UP (ref 2.5–4.5)
PHOSPHATE SERPL-MCNC: 4 MG/DL — SIGNIFICANT CHANGE UP (ref 2.5–4.5)
PHOSPHATE SERPL-MCNC: 4.1 MG/DL — SIGNIFICANT CHANGE UP (ref 2.5–4.5)
PHOSPHATE SERPL-MCNC: 4.1 MG/DL — SIGNIFICANT CHANGE UP (ref 2.5–4.5)
PHOSPHATE SERPL-MCNC: 4.2 MG/DL — SIGNIFICANT CHANGE UP (ref 2.5–4.5)
PHOSPHATE SERPL-MCNC: 4.3 MG/DL — SIGNIFICANT CHANGE UP (ref 2.5–4.5)
PHOSPHATE SERPL-MCNC: 4.5 MG/DL — SIGNIFICANT CHANGE UP (ref 2.5–4.5)
PHOSPHATE SERPL-MCNC: 4.6 MG/DL — HIGH (ref 2.5–4.5)
PHOSPHATE SERPL-MCNC: 4.7 MG/DL — HIGH (ref 2.5–4.5)
PHOSPHATE SERPL-MCNC: 4.8 MG/DL — HIGH (ref 2.5–4.5)
PHOSPHATE SERPL-MCNC: 4.8 MG/DL — HIGH (ref 2.5–4.5)
PHOSPHATE SERPL-MCNC: 4.9 MG/DL — HIGH (ref 2.5–4.5)
PHOSPHATE SERPL-MCNC: 5.2 MG/DL — HIGH (ref 2.5–4.5)
PHOSPHATE SERPL-MCNC: 5.4 MG/DL — HIGH (ref 2.5–4.5)
PHOSPHATE SERPL-MCNC: 5.7 MG/DL — HIGH (ref 2.5–4.5)
PHOSPHATE SERPL-MCNC: 5.7 MG/DL — HIGH (ref 2.5–4.5)
PHOSPHATE SERPL-MCNC: 6.2 MG/DL — HIGH (ref 2.5–4.5)
PHOSPHATE SERPL-MCNC: 6.2 MG/DL — HIGH (ref 2.5–4.5)
PHOSPHATE SERPL-MCNC: 6.3 MG/DL — HIGH (ref 2.5–4.5)
PHOSPHATE SERPL-MCNC: 6.4 MG/DL — HIGH (ref 2.5–4.5)
PHOSPHATE SERPL-MCNC: 6.8 MG/DL — HIGH (ref 2.5–4.5)
PHOSPHATE SERPL-MCNC: 7.6 MG/DL — HIGH (ref 2.5–4.5)
PHOSPHATE SERPL-MCNC: 7.8 MG/DL — HIGH (ref 2.5–4.5)
PHOSPHATE SERPL-MCNC: 8 MG/DL — HIGH (ref 2.5–4.5)
PLAT MORPH BLD: ABNORMAL
PLATELET # BLD AUTO: 103 K/UL — LOW (ref 150–400)
PLATELET # BLD AUTO: 103 K/UL — LOW (ref 150–400)
PLATELET # BLD AUTO: 105 K/UL — LOW (ref 150–400)
PLATELET # BLD AUTO: 116 K/UL — LOW (ref 150–400)
PLATELET # BLD AUTO: 117 K/UL — LOW (ref 150–400)
PLATELET # BLD AUTO: 120 K/UL — LOW (ref 150–400)
PLATELET # BLD AUTO: 124 K/UL — LOW (ref 150–400)
PLATELET # BLD AUTO: 139 K/UL — LOW (ref 150–400)
PLATELET # BLD AUTO: 147 K/UL — LOW (ref 150–400)
PLATELET # BLD AUTO: 149 K/UL — LOW (ref 150–400)
PLATELET # BLD AUTO: 150 K/UL — SIGNIFICANT CHANGE UP (ref 150–400)
PLATELET # BLD AUTO: 150 K/UL — SIGNIFICANT CHANGE UP (ref 150–400)
PLATELET # BLD AUTO: 156 K/UL — SIGNIFICANT CHANGE UP (ref 150–400)
PLATELET # BLD AUTO: 158 K/UL — SIGNIFICANT CHANGE UP (ref 150–400)
PLATELET # BLD AUTO: 159 K/UL — SIGNIFICANT CHANGE UP (ref 150–400)
PLATELET # BLD AUTO: 162 K/UL — SIGNIFICANT CHANGE UP (ref 150–400)
PLATELET # BLD AUTO: 163 K/UL — SIGNIFICANT CHANGE UP (ref 150–400)
PLATELET # BLD AUTO: 173 K/UL — SIGNIFICANT CHANGE UP (ref 150–400)
PLATELET # BLD AUTO: 178 K/UL — SIGNIFICANT CHANGE UP (ref 150–400)
PLATELET # BLD AUTO: 198 K/UL — SIGNIFICANT CHANGE UP (ref 150–400)
PLATELET # BLD AUTO: 200 K/UL — SIGNIFICANT CHANGE UP (ref 150–400)
PLATELET # BLD AUTO: 208 K/UL — SIGNIFICANT CHANGE UP (ref 150–400)
PLATELET # BLD AUTO: 209 K/UL — SIGNIFICANT CHANGE UP (ref 150–400)
PLATELET # BLD AUTO: 226 K/UL — SIGNIFICANT CHANGE UP (ref 150–400)
PLATELET # BLD AUTO: 233 K/UL — SIGNIFICANT CHANGE UP (ref 150–400)
PLATELET # BLD AUTO: 237 K/UL — SIGNIFICANT CHANGE UP (ref 150–400)
PLATELET # BLD AUTO: 238 K/UL — SIGNIFICANT CHANGE UP (ref 150–400)
PLATELET # BLD AUTO: 239 K/UL — SIGNIFICANT CHANGE UP (ref 150–400)
PLATELET # BLD AUTO: 253 K/UL — SIGNIFICANT CHANGE UP (ref 150–400)
PLATELET # BLD AUTO: 271 K/UL — SIGNIFICANT CHANGE UP (ref 150–400)
PLATELET # BLD AUTO: 274 K/UL — SIGNIFICANT CHANGE UP (ref 150–400)
PLATELET # BLD AUTO: 278 K/UL — SIGNIFICANT CHANGE UP (ref 150–400)
PLATELET # BLD AUTO: 280 K/UL — SIGNIFICANT CHANGE UP (ref 150–400)
PLATELET # BLD AUTO: 281 K/UL — SIGNIFICANT CHANGE UP (ref 150–400)
PLATELET # BLD AUTO: 283 K/UL — SIGNIFICANT CHANGE UP (ref 150–400)
PLATELET # BLD AUTO: 289 K/UL — SIGNIFICANT CHANGE UP (ref 150–400)
PLATELET # BLD AUTO: 309 K/UL — SIGNIFICANT CHANGE UP (ref 150–400)
PLATELET # BLD AUTO: 59 K/UL — LOW (ref 150–400)
PLATELET # BLD AUTO: 63 K/UL — LOW (ref 150–400)
PLATELET # BLD AUTO: 66 K/UL — LOW (ref 150–400)
PLATELET # BLD AUTO: 66 K/UL — LOW (ref 150–400)
PLATELET # BLD AUTO: 71 K/UL — LOW (ref 150–400)
PLATELET # BLD AUTO: 72 K/UL — LOW (ref 150–400)
PLATELET # BLD AUTO: 77 K/UL — LOW (ref 150–400)
PLATELET # BLD AUTO: 80 K/UL — LOW (ref 150–400)
PLATELET # BLD AUTO: 84 K/UL — LOW (ref 150–400)
PLATELET # BLD AUTO: 88 K/UL — LOW (ref 150–400)
PLATELET # BLD AUTO: 98 K/UL — LOW (ref 150–400)
PLATELET COUNT - ESTIMATE: NORMAL — SIGNIFICANT CHANGE UP
PO2 BLDV: 173 MMHG — HIGH (ref 25–45)
PO2 BLDV: 33 MMHG — SIGNIFICANT CHANGE UP (ref 25–45)
PO2 BLDV: 39 MMHG — SIGNIFICANT CHANGE UP (ref 25–45)
PO2 BLDV: 48 MMHG — HIGH (ref 25–45)
PO2 BLDV: 48 MMHG — HIGH (ref 25–45)
PO2 BLDV: 50 MMHG — HIGH (ref 25–45)
PO2 BLDV: 51 MMHG — HIGH (ref 25–45)
PO2 BLDV: 51 MMHG — HIGH (ref 25–45)
PO2 BLDV: 53 MMHG — HIGH (ref 25–45)
PO2 BLDV: 54 MMHG — HIGH (ref 25–45)
PO2 BLDV: 59 MMHG — HIGH (ref 25–45)
PO2 BLDV: 66 MMHG — HIGH (ref 25–45)
PO2 BLDV: 76 MMHG — HIGH (ref 25–45)
PO2 BLDV: 99 MMHG — HIGH (ref 25–45)
POIKILOCYTOSIS BLD QL AUTO: SIGNIFICANT CHANGE UP
POLYCHROMASIA BLD QL SMEAR: SIGNIFICANT CHANGE UP
POTASSIUM BLDV-SCNC: 3.3 MMOL/L — LOW (ref 3.5–5.1)
POTASSIUM BLDV-SCNC: 3.4 MMOL/L — LOW (ref 3.5–5.1)
POTASSIUM BLDV-SCNC: 3.4 MMOL/L — LOW (ref 3.5–5.1)
POTASSIUM BLDV-SCNC: 3.5 MMOL/L — SIGNIFICANT CHANGE UP (ref 3.5–5.1)
POTASSIUM BLDV-SCNC: 3.6 MMOL/L — SIGNIFICANT CHANGE UP (ref 3.5–5.1)
POTASSIUM BLDV-SCNC: 3.7 MMOL/L — SIGNIFICANT CHANGE UP (ref 3.5–5.1)
POTASSIUM BLDV-SCNC: 3.9 MMOL/L — SIGNIFICANT CHANGE UP (ref 3.5–5.1)
POTASSIUM BLDV-SCNC: 4 MMOL/L — SIGNIFICANT CHANGE UP (ref 3.5–5.1)
POTASSIUM BLDV-SCNC: 4.2 MMOL/L — SIGNIFICANT CHANGE UP (ref 3.5–5.1)
POTASSIUM BLDV-SCNC: >11 MMOL/L — CRITICAL HIGH (ref 3.5–5.1)
POTASSIUM SERPL-MCNC: 3.4 MMOL/L — LOW (ref 3.5–5.3)
POTASSIUM SERPL-MCNC: 3.5 MMOL/L — SIGNIFICANT CHANGE UP (ref 3.5–5.3)
POTASSIUM SERPL-MCNC: 3.5 MMOL/L — SIGNIFICANT CHANGE UP (ref 3.5–5.3)
POTASSIUM SERPL-MCNC: 3.6 MMOL/L — SIGNIFICANT CHANGE UP (ref 3.5–5.3)
POTASSIUM SERPL-MCNC: 3.7 MMOL/L — SIGNIFICANT CHANGE UP (ref 3.5–5.3)
POTASSIUM SERPL-MCNC: 3.8 MMOL/L — SIGNIFICANT CHANGE UP (ref 3.5–5.3)
POTASSIUM SERPL-MCNC: 3.9 MMOL/L — SIGNIFICANT CHANGE UP (ref 3.5–5.3)
POTASSIUM SERPL-MCNC: 4 MMOL/L — SIGNIFICANT CHANGE UP (ref 3.5–5.3)
POTASSIUM SERPL-MCNC: 4.1 MMOL/L — SIGNIFICANT CHANGE UP (ref 3.5–5.3)
POTASSIUM SERPL-MCNC: 4.2 MMOL/L — SIGNIFICANT CHANGE UP (ref 3.5–5.3)
POTASSIUM SERPL-MCNC: 4.3 MMOL/L — SIGNIFICANT CHANGE UP (ref 3.5–5.3)
POTASSIUM SERPL-MCNC: 4.3 MMOL/L — SIGNIFICANT CHANGE UP (ref 3.5–5.3)
POTASSIUM SERPL-MCNC: 4.4 MMOL/L — SIGNIFICANT CHANGE UP (ref 3.5–5.3)
POTASSIUM SERPL-MCNC: 4.5 MMOL/L — SIGNIFICANT CHANGE UP (ref 3.5–5.3)
POTASSIUM SERPL-MCNC: 4.5 MMOL/L — SIGNIFICANT CHANGE UP (ref 3.5–5.3)
POTASSIUM SERPL-MCNC: 4.6 MMOL/L — SIGNIFICANT CHANGE UP (ref 3.5–5.3)
POTASSIUM SERPL-MCNC: 4.7 MMOL/L — SIGNIFICANT CHANGE UP (ref 3.5–5.3)
POTASSIUM SERPL-MCNC: 4.7 MMOL/L — SIGNIFICANT CHANGE UP (ref 3.5–5.3)
POTASSIUM SERPL-MCNC: 4.8 MMOL/L — SIGNIFICANT CHANGE UP (ref 3.5–5.3)
POTASSIUM SERPL-MCNC: 4.8 MMOL/L — SIGNIFICANT CHANGE UP (ref 3.5–5.3)
POTASSIUM SERPL-MCNC: 4.9 MMOL/L — SIGNIFICANT CHANGE UP (ref 3.5–5.3)
POTASSIUM SERPL-MCNC: 5 MMOL/L — SIGNIFICANT CHANGE UP (ref 3.5–5.3)
POTASSIUM SERPL-MCNC: 5.1 MMOL/L — SIGNIFICANT CHANGE UP (ref 3.5–5.3)
POTASSIUM SERPL-MCNC: 5.3 MMOL/L — SIGNIFICANT CHANGE UP (ref 3.5–5.3)
POTASSIUM SERPL-MCNC: 5.3 MMOL/L — SIGNIFICANT CHANGE UP (ref 3.5–5.3)
POTASSIUM SERPL-MCNC: 5.5 MMOL/L — HIGH (ref 3.5–5.3)
POTASSIUM SERPL-SCNC: 3.4 MMOL/L — LOW (ref 3.5–5.3)
POTASSIUM SERPL-SCNC: 3.5 MMOL/L — SIGNIFICANT CHANGE UP (ref 3.5–5.3)
POTASSIUM SERPL-SCNC: 3.5 MMOL/L — SIGNIFICANT CHANGE UP (ref 3.5–5.3)
POTASSIUM SERPL-SCNC: 3.6 MMOL/L — SIGNIFICANT CHANGE UP (ref 3.5–5.3)
POTASSIUM SERPL-SCNC: 3.7 MMOL/L — SIGNIFICANT CHANGE UP (ref 3.5–5.3)
POTASSIUM SERPL-SCNC: 3.8 MMOL/L — SIGNIFICANT CHANGE UP (ref 3.5–5.3)
POTASSIUM SERPL-SCNC: 3.9 MMOL/L — SIGNIFICANT CHANGE UP (ref 3.5–5.3)
POTASSIUM SERPL-SCNC: 4 MMOL/L — SIGNIFICANT CHANGE UP (ref 3.5–5.3)
POTASSIUM SERPL-SCNC: 4.1 MMOL/L — SIGNIFICANT CHANGE UP (ref 3.5–5.3)
POTASSIUM SERPL-SCNC: 4.2 MMOL/L — SIGNIFICANT CHANGE UP (ref 3.5–5.3)
POTASSIUM SERPL-SCNC: 4.3 MMOL/L — SIGNIFICANT CHANGE UP (ref 3.5–5.3)
POTASSIUM SERPL-SCNC: 4.3 MMOL/L — SIGNIFICANT CHANGE UP (ref 3.5–5.3)
POTASSIUM SERPL-SCNC: 4.4 MMOL/L — SIGNIFICANT CHANGE UP (ref 3.5–5.3)
POTASSIUM SERPL-SCNC: 4.5 MMOL/L — SIGNIFICANT CHANGE UP (ref 3.5–5.3)
POTASSIUM SERPL-SCNC: 4.5 MMOL/L — SIGNIFICANT CHANGE UP (ref 3.5–5.3)
POTASSIUM SERPL-SCNC: 4.6 MMOL/L — SIGNIFICANT CHANGE UP (ref 3.5–5.3)
POTASSIUM SERPL-SCNC: 4.7 MMOL/L — SIGNIFICANT CHANGE UP (ref 3.5–5.3)
POTASSIUM SERPL-SCNC: 4.7 MMOL/L — SIGNIFICANT CHANGE UP (ref 3.5–5.3)
POTASSIUM SERPL-SCNC: 4.8 MMOL/L — SIGNIFICANT CHANGE UP (ref 3.5–5.3)
POTASSIUM SERPL-SCNC: 4.8 MMOL/L — SIGNIFICANT CHANGE UP (ref 3.5–5.3)
POTASSIUM SERPL-SCNC: 4.9 MMOL/L — SIGNIFICANT CHANGE UP (ref 3.5–5.3)
POTASSIUM SERPL-SCNC: 5 MMOL/L — SIGNIFICANT CHANGE UP (ref 3.5–5.3)
POTASSIUM SERPL-SCNC: 5.1 MMOL/L — SIGNIFICANT CHANGE UP (ref 3.5–5.3)
POTASSIUM SERPL-SCNC: 5.3 MMOL/L — SIGNIFICANT CHANGE UP (ref 3.5–5.3)
POTASSIUM SERPL-SCNC: 5.3 MMOL/L — SIGNIFICANT CHANGE UP (ref 3.5–5.3)
POTASSIUM SERPL-SCNC: 5.5 MMOL/L — HIGH (ref 3.5–5.3)
PROCALCITONIN SERPL-MCNC: 3.45 NG/ML — HIGH (ref 0.02–0.1)
PROCALCITONIN SERPL-MCNC: 45.63 NG/ML — HIGH (ref 0.02–0.1)
PROLACTIN SERPL-MCNC: 20 NG/ML — SIGNIFICANT CHANGE UP (ref 3.4–24.1)
PROT CSF-MCNC: 1181 MG/DL — HIGH (ref 15–45)
PROT SERPL-MCNC: 4.2 G/DL — LOW (ref 6–8.3)
PROT SERPL-MCNC: 4.3 G/DL — LOW (ref 6–8.3)
PROT SERPL-MCNC: 4.4 G/DL — LOW (ref 6–8.3)
PROT SERPL-MCNC: 4.5 G/DL — LOW (ref 6–8.3)
PROT SERPL-MCNC: 4.5 G/DL — LOW (ref 6–8.3)
PROT SERPL-MCNC: 4.6 G/DL — LOW (ref 6–8.3)
PROT SERPL-MCNC: 4.7 G/DL — LOW (ref 6–8.3)
PROT SERPL-MCNC: 4.7 G/DL — LOW (ref 6–8.3)
PROT SERPL-MCNC: 4.8 G/DL — LOW (ref 6–8.3)
PROT SERPL-MCNC: 4.9 G/DL — LOW (ref 6–8.3)
PROT SERPL-MCNC: 5 G/DL — LOW (ref 6–8.3)
PROT SERPL-MCNC: 5.1 G/DL — LOW (ref 6–8.3)
PROT SERPL-MCNC: 5.1 G/DL — LOW (ref 6–8.3)
PROT SERPL-MCNC: 5.2 G/DL — LOW (ref 6–8.3)
PROT SERPL-MCNC: 5.2 G/DL — LOW (ref 6–8.3)
PROT SERPL-MCNC: 5.3 G/DL — LOW (ref 6–8.3)
PROT SERPL-MCNC: 5.4 G/DL — LOW (ref 6–8.3)
PROT SERPL-MCNC: 5.6 G/DL — LOW (ref 6–8.3)
PROT SERPL-MCNC: 5.8 G/DL — LOW (ref 6–8.3)
PROT SERPL-MCNC: 6.2 G/DL — SIGNIFICANT CHANGE UP (ref 6–8.3)
PROT SERPL-MCNC: 6.5 G/DL — SIGNIFICANT CHANGE UP (ref 6–8.3)
PROT UR-MCNC: >300 — SIGNIFICANT CHANGE UP
PROT UR-MCNC: ABNORMAL
PROT UR-MCNC: ABNORMAL
PROTHROM AB SERPL-ACNC: 11.6 SEC — SIGNIFICANT CHANGE UP (ref 10.5–13.4)
PROTHROM AB SERPL-ACNC: 11.8 SEC — SIGNIFICANT CHANGE UP (ref 10.5–13.4)
PROTHROM AB SERPL-ACNC: 12 SEC — SIGNIFICANT CHANGE UP (ref 10.5–13.4)
PROTHROM AB SERPL-ACNC: 12 SEC — SIGNIFICANT CHANGE UP (ref 10.5–13.4)
PROTHROM AB SERPL-ACNC: 12.1 SEC — SIGNIFICANT CHANGE UP (ref 10.5–13.4)
PROTHROM AB SERPL-ACNC: 12.3 SEC — SIGNIFICANT CHANGE UP (ref 10.5–13.4)
PROTHROM AB SERPL-ACNC: 12.8 SEC — SIGNIFICANT CHANGE UP (ref 10.5–13.4)
PROTHROM AB SERPL-ACNC: 13.2 SEC — SIGNIFICANT CHANGE UP (ref 10.5–13.4)
PROTHROM AB SERPL-ACNC: 13.3 SEC — SIGNIFICANT CHANGE UP (ref 10.5–13.4)
PROTHROM AB SERPL-ACNC: 13.4 SEC — SIGNIFICANT CHANGE UP (ref 10.5–13.4)
PROTHROM AB SERPL-ACNC: 13.5 SEC — HIGH (ref 10.5–13.4)
PROTHROM AB SERPL-ACNC: 13.6 SEC — HIGH (ref 10.5–13.4)
PROTHROM AB SERPL-ACNC: 13.7 SEC — HIGH (ref 10.5–13.4)
PROTHROM AB SERPL-ACNC: 13.7 SEC — HIGH (ref 10.5–13.4)
PROTHROM AB SERPL-ACNC: 14.3 SEC — HIGH (ref 10.5–13.4)
PROTHROM AB SERPL-ACNC: 14.6 SEC — HIGH (ref 10.5–13.4)
PROTHROM AB SERPL-ACNC: 15.2 SEC — HIGH (ref 10.5–13.4)
PROTHROM AB SERPL-ACNC: 16.2 SEC — HIGH (ref 10.5–13.4)
RBC # BLD: 2.44 M/UL — LOW (ref 3.8–5.2)
RBC # BLD: 2.44 M/UL — LOW (ref 3.8–5.2)
RBC # BLD: 2.5 M/UL — LOW (ref 3.8–5.2)
RBC # BLD: 2.51 M/UL — LOW (ref 3.8–5.2)
RBC # BLD: 2.51 M/UL — LOW (ref 3.8–5.2)
RBC # BLD: 2.55 M/UL — LOW (ref 3.8–5.2)
RBC # BLD: 2.55 M/UL — LOW (ref 3.8–5.2)
RBC # BLD: 2.58 M/UL — LOW (ref 3.8–5.2)
RBC # BLD: 2.62 M/UL — LOW (ref 3.8–5.2)
RBC # BLD: 2.63 M/UL — LOW (ref 3.8–5.2)
RBC # BLD: 2.67 M/UL — LOW (ref 3.8–5.2)
RBC # BLD: 2.67 M/UL — LOW (ref 3.8–5.2)
RBC # BLD: 2.74 M/UL — LOW (ref 3.8–5.2)
RBC # BLD: 2.75 M/UL — LOW (ref 3.8–5.2)
RBC # BLD: 2.75 M/UL — LOW (ref 3.8–5.2)
RBC # BLD: 2.79 M/UL — LOW (ref 3.8–5.2)
RBC # BLD: 2.79 M/UL — LOW (ref 3.8–5.2)
RBC # BLD: 2.82 M/UL — LOW (ref 3.8–5.2)
RBC # BLD: 2.83 M/UL — LOW (ref 3.8–5.2)
RBC # BLD: 2.85 M/UL — LOW (ref 3.8–5.2)
RBC # BLD: 2.86 M/UL — LOW (ref 3.8–5.2)
RBC # BLD: 2.87 M/UL — LOW (ref 3.8–5.2)
RBC # BLD: 2.87 M/UL — LOW (ref 3.8–5.2)
RBC # BLD: 2.88 M/UL — LOW (ref 3.8–5.2)
RBC # BLD: 2.89 M/UL — LOW (ref 3.8–5.2)
RBC # BLD: 2.92 M/UL — LOW (ref 3.8–5.2)
RBC # BLD: 2.94 M/UL — LOW (ref 3.8–5.2)
RBC # BLD: 2.99 M/UL — LOW (ref 3.8–5.2)
RBC # BLD: 2.99 M/UL — LOW (ref 3.8–5.2)
RBC # BLD: 3.02 M/UL — LOW (ref 3.8–5.2)
RBC # BLD: 3.08 M/UL — LOW (ref 3.8–5.2)
RBC # BLD: 3.1 M/UL — LOW (ref 3.8–5.2)
RBC # BLD: 3.13 M/UL — LOW (ref 3.8–5.2)
RBC # BLD: 3.14 M/UL — LOW (ref 3.8–5.2)
RBC # BLD: 3.15 M/UL — LOW (ref 3.8–5.2)
RBC # BLD: 3.19 M/UL — LOW (ref 3.8–5.2)
RBC # BLD: 3.2 M/UL — LOW (ref 3.8–5.2)
RBC # BLD: 3.2 M/UL — LOW (ref 3.8–5.2)
RBC # BLD: 3.31 M/UL — LOW (ref 3.8–5.2)
RBC # BLD: 3.35 M/UL — LOW (ref 3.8–5.2)
RBC # BLD: 3.36 M/UL — LOW (ref 3.8–5.2)
RBC # BLD: 3.45 M/UL — LOW (ref 3.8–5.2)
RBC # BLD: 3.48 M/UL — LOW (ref 3.8–5.2)
RBC # BLD: 3.55 M/UL — LOW (ref 3.8–5.2)
RBC # BLD: 3.63 M/UL — LOW (ref 3.8–5.2)
RBC # BLD: 3.71 M/UL — LOW (ref 3.8–5.2)
RBC # BLD: 3.74 M/UL — LOW (ref 3.8–5.2)
RBC # BLD: 3.87 M/UL — SIGNIFICANT CHANGE UP (ref 3.8–5.2)
RBC # BLD: 3.95 M/UL — SIGNIFICANT CHANGE UP (ref 3.8–5.2)
RBC # CSF: 1267 CELLS/UL — HIGH (ref 0–0)
RBC # FLD: 14.7 % — HIGH (ref 10.3–14.5)
RBC # FLD: 14.7 % — HIGH (ref 10.3–14.5)
RBC # FLD: 14.9 % — HIGH (ref 10.3–14.5)
RBC # FLD: 15.1 % — HIGH (ref 10.3–14.5)
RBC # FLD: 15.2 % — HIGH (ref 10.3–14.5)
RBC # FLD: 15.3 % — HIGH (ref 10.3–14.5)
RBC # FLD: 15.3 % — HIGH (ref 10.3–14.5)
RBC # FLD: 15.4 % — HIGH (ref 10.3–14.5)
RBC # FLD: 15.5 % — HIGH (ref 10.3–14.5)
RBC # FLD: 15.5 % — HIGH (ref 10.3–14.5)
RBC # FLD: 15.6 % — HIGH (ref 10.3–14.5)
RBC # FLD: 15.7 % — HIGH (ref 10.3–14.5)
RBC # FLD: 15.8 % — HIGH (ref 10.3–14.5)
RBC # FLD: 15.9 % — HIGH (ref 10.3–14.5)
RBC # FLD: 16 % — HIGH (ref 10.3–14.5)
RBC # FLD: 16 % — HIGH (ref 10.3–14.5)
RBC # FLD: 16.1 % — HIGH (ref 10.3–14.5)
RBC # FLD: 16.1 % — HIGH (ref 10.3–14.5)
RBC # FLD: 16.2 % — HIGH (ref 10.3–14.5)
RBC # FLD: 16.3 % — HIGH (ref 10.3–14.5)
RBC # FLD: 16.4 % — HIGH (ref 10.3–14.5)
RBC # FLD: 16.5 % — HIGH (ref 10.3–14.5)
RBC # FLD: 16.5 % — HIGH (ref 10.3–14.5)
RBC # FLD: 16.6 % — HIGH (ref 10.3–14.5)
RBC # FLD: 16.6 % — HIGH (ref 10.3–14.5)
RBC # FLD: 16.7 % — HIGH (ref 10.3–14.5)
RBC # FLD: 16.8 % — HIGH (ref 10.3–14.5)
RBC # FLD: 16.8 % — HIGH (ref 10.3–14.5)
RBC # FLD: 16.9 % — HIGH (ref 10.3–14.5)
RBC # FLD: 16.9 % — HIGH (ref 10.3–14.5)
RBC # FLD: 17 % — HIGH (ref 10.3–14.5)
RBC # FLD: 17.3 % — HIGH (ref 10.3–14.5)
RBC # FLD: 17.5 % — HIGH (ref 10.3–14.5)
RBC # FLD: 17.6 % — HIGH (ref 10.3–14.5)
RBC # FLD: 17.8 % — HIGH (ref 10.3–14.5)
RBC # FLD: 17.9 % — HIGH (ref 10.3–14.5)
RBC # FLD: 17.9 % — HIGH (ref 10.3–14.5)
RBC # FLD: 18.4 % — HIGH (ref 10.3–14.5)
RBC BLD AUTO: ABNORMAL
RBC CASTS # UR COMP ASSIST: SIGNIFICANT CHANGE UP /HPF (ref 0–4)
RCV VOL RI: SIGNIFICANT CHANGE UP CELLS/UL (ref 0–5)
RETICS #: 47 K/UL — SIGNIFICANT CHANGE UP (ref 25–125)
RETICS/RBC NFR: 1.6 % — SIGNIFICANT CHANGE UP (ref 0.5–2.5)
RH IG SCN BLD-IMP: POSITIVE — SIGNIFICANT CHANGE UP
RSV RNA NPH QL NAA+NON-PROBE: SIGNIFICANT CHANGE UP
S AUREUS DNA NOSE QL NAA+PROBE: SIGNIFICANT CHANGE UP
S PNEUM DNA BLD POS QL NAA+NON-PROBE: SIGNIFICANT CHANGE UP
S PNEUM DNA SPEC QL NAA+PROBE: DETECTED
SAO2 % BLDV: 58.4 % — LOW (ref 67–88)
SAO2 % BLDV: 60.3 % — LOW (ref 67–88)
SAO2 % BLDV: 78 % — SIGNIFICANT CHANGE UP (ref 67–88)
SAO2 % BLDV: 80.6 % — SIGNIFICANT CHANGE UP (ref 67–88)
SAO2 % BLDV: 83 % — SIGNIFICANT CHANGE UP (ref 67–88)
SAO2 % BLDV: 83.6 % — SIGNIFICANT CHANGE UP (ref 67–88)
SAO2 % BLDV: 84.2 % — SIGNIFICANT CHANGE UP (ref 67–88)
SAO2 % BLDV: 87.4 % — SIGNIFICANT CHANGE UP (ref 67–88)
SAO2 % BLDV: 89.9 % — HIGH (ref 67–88)
SAO2 % BLDV: 90.4 % — HIGH (ref 67–88)
SAO2 % BLDV: 92 % — HIGH (ref 67–88)
SAO2 % BLDV: 98.4 % — HIGH (ref 67–88)
SAO2 % BLDV: 99.2 % — HIGH (ref 67–88)
SAO2 % BLDV: 99.4 % — HIGH (ref 67–88)
SARS-COV-2 RNA SPEC QL NAA+PROBE: SIGNIFICANT CHANGE UP
SCHISTOCYTES BLD QL AUTO: SLIGHT — SIGNIFICANT CHANGE UP
SODIUM SERPL-SCNC: 128 MMOL/L — LOW (ref 135–145)
SODIUM SERPL-SCNC: 130 MMOL/L — LOW (ref 135–145)
SODIUM SERPL-SCNC: 131 MMOL/L — LOW (ref 135–145)
SODIUM SERPL-SCNC: 132 MMOL/L — LOW (ref 135–145)
SODIUM SERPL-SCNC: 133 MMOL/L — LOW (ref 135–145)
SODIUM SERPL-SCNC: 134 MMOL/L — LOW (ref 135–145)
SODIUM SERPL-SCNC: 135 MMOL/L — SIGNIFICANT CHANGE UP (ref 135–145)
SODIUM SERPL-SCNC: 136 MMOL/L — SIGNIFICANT CHANGE UP (ref 135–145)
SODIUM SERPL-SCNC: 137 MMOL/L — SIGNIFICANT CHANGE UP (ref 135–145)
SODIUM SERPL-SCNC: 138 MMOL/L — SIGNIFICANT CHANGE UP (ref 135–145)
SODIUM SERPL-SCNC: 138 MMOL/L — SIGNIFICANT CHANGE UP (ref 135–145)
SODIUM SERPL-SCNC: 139 MMOL/L — SIGNIFICANT CHANGE UP (ref 135–145)
SODIUM SERPL-SCNC: 139 MMOL/L — SIGNIFICANT CHANGE UP (ref 135–145)
SODIUM SERPL-SCNC: 141 MMOL/L — SIGNIFICANT CHANGE UP (ref 135–145)
SOURCE HSV 1/2: SIGNIFICANT CHANGE UP
SP GR SPEC: 1.02 — SIGNIFICANT CHANGE UP (ref 1.01–1.05)
SP GR SPEC: 1.02 — SIGNIFICANT CHANGE UP (ref 1.01–1.05)
SP GR SPEC: >1.03 — SIGNIFICANT CHANGE UP (ref 1.01–1.05)
SPECIMEN SOURCE: SIGNIFICANT CHANGE UP
T3FREE SERPL-MCNC: 1.3 PG/ML — LOW (ref 2–4.4)
T4 FREE SERPL-MCNC: 1.2 NG/DL — SIGNIFICANT CHANGE UP (ref 0.9–1.8)
TOTAL CELLS COUNTED, SPINAL FLUID: 100 CELLS — SIGNIFICANT CHANGE UP
TOTAL NUCLEATED CELL COUNT, BODY FLUID: SIGNIFICANT CHANGE UP CELLS/UL (ref 0–5)
TRIGL SERPL-MCNC: 146 MG/DL — SIGNIFICANT CHANGE UP
TROPONIN T, HIGH SENSITIVITY RESULT: 160 NG/L — CRITICAL HIGH
TROPONIN T, HIGH SENSITIVITY RESULT: 170 NG/L — CRITICAL HIGH
TROPONIN T, HIGH SENSITIVITY RESULT: 182 NG/L — CRITICAL HIGH
TROPONIN T, HIGH SENSITIVITY RESULT: 606 NG/L — CRITICAL HIGH
TROPONIN T, HIGH SENSITIVITY RESULT: 717 NG/L — CRITICAL HIGH
TSH SERPL-MCNC: 10.43 UIU/ML — HIGH (ref 0.27–4.2)
TSH SERPL-MCNC: 4.73 UIU/ML — HIGH (ref 0.27–4.2)
TUBE TYPE: SIGNIFICANT CHANGE UP
TUBE TYPE: SIGNIFICANT CHANGE UP
UNFRACTIONATED HEPARIN INTERPRETATION: SIGNIFICANT CHANGE UP
UNFRACTIONATED HEPARIN RESULT: NEGATIVE — SIGNIFICANT CHANGE UP
UNFRACTIONATED HEPARIN-HIGH DOSE: 0 % — SIGNIFICANT CHANGE UP
UNFRACTIONATED HEPARIN-LOW DOSE: 1 % — SIGNIFICANT CHANGE UP
UROBILINOGEN FLD QL: SIGNIFICANT CHANGE UP
VANCOMYCIN FLD-MCNC: 14.1 UG/ML — SIGNIFICANT CHANGE UP
VANCOMYCIN TROUGH SERPL-MCNC: 6.9 UG/ML — LOW (ref 10–20)
VARIANT LYMPHS # BLD: 2.6 % — SIGNIFICANT CHANGE UP (ref 0–6)
WBC # BLD: 10.06 K/UL — SIGNIFICANT CHANGE UP (ref 3.8–10.5)
WBC # BLD: 10.1 K/UL — SIGNIFICANT CHANGE UP (ref 3.8–10.5)
WBC # BLD: 10.42 K/UL — SIGNIFICANT CHANGE UP (ref 3.8–10.5)
WBC # BLD: 10.74 K/UL — HIGH (ref 3.8–10.5)
WBC # BLD: 10.8 K/UL — HIGH (ref 3.8–10.5)
WBC # BLD: 10.85 K/UL — HIGH (ref 3.8–10.5)
WBC # BLD: 11.13 K/UL — HIGH (ref 3.8–10.5)
WBC # BLD: 11.13 K/UL — HIGH (ref 3.8–10.5)
WBC # BLD: 11.15 K/UL — HIGH (ref 3.8–10.5)
WBC # BLD: 11.51 K/UL — HIGH (ref 3.8–10.5)
WBC # BLD: 11.64 K/UL — HIGH (ref 3.8–10.5)
WBC # BLD: 11.8 K/UL — HIGH (ref 3.8–10.5)
WBC # BLD: 11.89 K/UL — HIGH (ref 3.8–10.5)
WBC # BLD: 12.14 K/UL — HIGH (ref 3.8–10.5)
WBC # BLD: 12.72 K/UL — HIGH (ref 3.8–10.5)
WBC # BLD: 12.79 K/UL — HIGH (ref 3.8–10.5)
WBC # BLD: 12.82 K/UL — HIGH (ref 3.8–10.5)
WBC # BLD: 13 K/UL — HIGH (ref 3.8–10.5)
WBC # BLD: 13.01 K/UL — HIGH (ref 3.8–10.5)
WBC # BLD: 13.11 K/UL — HIGH (ref 3.8–10.5)
WBC # BLD: 13.18 K/UL — HIGH (ref 3.8–10.5)
WBC # BLD: 13.44 K/UL — HIGH (ref 3.8–10.5)
WBC # BLD: 13.46 K/UL — HIGH (ref 3.8–10.5)
WBC # BLD: 13.71 K/UL — HIGH (ref 3.8–10.5)
WBC # BLD: 13.72 K/UL — HIGH (ref 3.8–10.5)
WBC # BLD: 13.88 K/UL — HIGH (ref 3.8–10.5)
WBC # BLD: 14.3 K/UL — HIGH (ref 3.8–10.5)
WBC # BLD: 14.94 K/UL — HIGH (ref 3.8–10.5)
WBC # BLD: 14.94 K/UL — HIGH (ref 3.8–10.5)
WBC # BLD: 15.61 K/UL — HIGH (ref 3.8–10.5)
WBC # BLD: 16.03 K/UL — HIGH (ref 3.8–10.5)
WBC # BLD: 22.19 K/UL — HIGH (ref 3.8–10.5)
WBC # BLD: 6.24 K/UL — SIGNIFICANT CHANGE UP (ref 3.8–10.5)
WBC # BLD: 6.44 K/UL — SIGNIFICANT CHANGE UP (ref 3.8–10.5)
WBC # BLD: 6.5 K/UL — SIGNIFICANT CHANGE UP (ref 3.8–10.5)
WBC # BLD: 6.74 K/UL — SIGNIFICANT CHANGE UP (ref 3.8–10.5)
WBC # BLD: 6.91 K/UL — SIGNIFICANT CHANGE UP (ref 3.8–10.5)
WBC # BLD: 7.03 K/UL — SIGNIFICANT CHANGE UP (ref 3.8–10.5)
WBC # BLD: 7.09 K/UL — SIGNIFICANT CHANGE UP (ref 3.8–10.5)
WBC # BLD: 7.1 K/UL — SIGNIFICANT CHANGE UP (ref 3.8–10.5)
WBC # BLD: 7.45 K/UL — SIGNIFICANT CHANGE UP (ref 3.8–10.5)
WBC # BLD: 7.48 K/UL — SIGNIFICANT CHANGE UP (ref 3.8–10.5)
WBC # BLD: 7.62 K/UL — SIGNIFICANT CHANGE UP (ref 3.8–10.5)
WBC # BLD: 8.79 K/UL — SIGNIFICANT CHANGE UP (ref 3.8–10.5)
WBC # BLD: 9.01 K/UL — SIGNIFICANT CHANGE UP (ref 3.8–10.5)
WBC # BLD: 9.13 K/UL — SIGNIFICANT CHANGE UP (ref 3.8–10.5)
WBC # BLD: 9.28 K/UL — SIGNIFICANT CHANGE UP (ref 3.8–10.5)
WBC # BLD: 9.29 K/UL — SIGNIFICANT CHANGE UP (ref 3.8–10.5)
WBC # BLD: 9.41 K/UL — SIGNIFICANT CHANGE UP (ref 3.8–10.5)
WBC # BLD: 9.44 K/UL — SIGNIFICANT CHANGE UP (ref 3.8–10.5)
WBC # BLD: 9.75 K/UL — SIGNIFICANT CHANGE UP (ref 3.8–10.5)
WBC # FLD AUTO: 10.06 K/UL — SIGNIFICANT CHANGE UP (ref 3.8–10.5)
WBC # FLD AUTO: 10.1 K/UL — SIGNIFICANT CHANGE UP (ref 3.8–10.5)
WBC # FLD AUTO: 10.42 K/UL — SIGNIFICANT CHANGE UP (ref 3.8–10.5)
WBC # FLD AUTO: 10.74 K/UL — HIGH (ref 3.8–10.5)
WBC # FLD AUTO: 10.8 K/UL — HIGH (ref 3.8–10.5)
WBC # FLD AUTO: 10.85 K/UL — HIGH (ref 3.8–10.5)
WBC # FLD AUTO: 11.13 K/UL — HIGH (ref 3.8–10.5)
WBC # FLD AUTO: 11.13 K/UL — HIGH (ref 3.8–10.5)
WBC # FLD AUTO: 11.15 K/UL — HIGH (ref 3.8–10.5)
WBC # FLD AUTO: 11.51 K/UL — HIGH (ref 3.8–10.5)
WBC # FLD AUTO: 11.64 K/UL — HIGH (ref 3.8–10.5)
WBC # FLD AUTO: 11.8 K/UL — HIGH (ref 3.8–10.5)
WBC # FLD AUTO: 11.89 K/UL — HIGH (ref 3.8–10.5)
WBC # FLD AUTO: 12.14 K/UL — HIGH (ref 3.8–10.5)
WBC # FLD AUTO: 12.72 K/UL — HIGH (ref 3.8–10.5)
WBC # FLD AUTO: 12.79 K/UL — HIGH (ref 3.8–10.5)
WBC # FLD AUTO: 12.82 K/UL — HIGH (ref 3.8–10.5)
WBC # FLD AUTO: 13 K/UL — HIGH (ref 3.8–10.5)
WBC # FLD AUTO: 13.01 K/UL — HIGH (ref 3.8–10.5)
WBC # FLD AUTO: 13.11 K/UL — HIGH (ref 3.8–10.5)
WBC # FLD AUTO: 13.18 K/UL — HIGH (ref 3.8–10.5)
WBC # FLD AUTO: 13.44 K/UL — HIGH (ref 3.8–10.5)
WBC # FLD AUTO: 13.46 K/UL — HIGH (ref 3.8–10.5)
WBC # FLD AUTO: 13.71 K/UL — HIGH (ref 3.8–10.5)
WBC # FLD AUTO: 13.72 K/UL — HIGH (ref 3.8–10.5)
WBC # FLD AUTO: 13.88 K/UL — HIGH (ref 3.8–10.5)
WBC # FLD AUTO: 14.3 K/UL — HIGH (ref 3.8–10.5)
WBC # FLD AUTO: 14.94 K/UL — HIGH (ref 3.8–10.5)
WBC # FLD AUTO: 14.94 K/UL — HIGH (ref 3.8–10.5)
WBC # FLD AUTO: 15.61 K/UL — HIGH (ref 3.8–10.5)
WBC # FLD AUTO: 16.03 K/UL — HIGH (ref 3.8–10.5)
WBC # FLD AUTO: 22.19 K/UL — HIGH (ref 3.8–10.5)
WBC # FLD AUTO: 6.24 K/UL — SIGNIFICANT CHANGE UP (ref 3.8–10.5)
WBC # FLD AUTO: 6.44 K/UL — SIGNIFICANT CHANGE UP (ref 3.8–10.5)
WBC # FLD AUTO: 6.5 K/UL — SIGNIFICANT CHANGE UP (ref 3.8–10.5)
WBC # FLD AUTO: 6.74 K/UL — SIGNIFICANT CHANGE UP (ref 3.8–10.5)
WBC # FLD AUTO: 6.91 K/UL — SIGNIFICANT CHANGE UP (ref 3.8–10.5)
WBC # FLD AUTO: 7.03 K/UL — SIGNIFICANT CHANGE UP (ref 3.8–10.5)
WBC # FLD AUTO: 7.09 K/UL — SIGNIFICANT CHANGE UP (ref 3.8–10.5)
WBC # FLD AUTO: 7.1 K/UL — SIGNIFICANT CHANGE UP (ref 3.8–10.5)
WBC # FLD AUTO: 7.45 K/UL — SIGNIFICANT CHANGE UP (ref 3.8–10.5)
WBC # FLD AUTO: 7.48 K/UL — SIGNIFICANT CHANGE UP (ref 3.8–10.5)
WBC # FLD AUTO: 7.62 K/UL — SIGNIFICANT CHANGE UP (ref 3.8–10.5)
WBC # FLD AUTO: 8.79 K/UL — SIGNIFICANT CHANGE UP (ref 3.8–10.5)
WBC # FLD AUTO: 9.01 K/UL — SIGNIFICANT CHANGE UP (ref 3.8–10.5)
WBC # FLD AUTO: 9.13 K/UL — SIGNIFICANT CHANGE UP (ref 3.8–10.5)
WBC # FLD AUTO: 9.28 K/UL — SIGNIFICANT CHANGE UP (ref 3.8–10.5)
WBC # FLD AUTO: 9.29 K/UL — SIGNIFICANT CHANGE UP (ref 3.8–10.5)
WBC # FLD AUTO: 9.41 K/UL — SIGNIFICANT CHANGE UP (ref 3.8–10.5)
WBC # FLD AUTO: 9.44 K/UL — SIGNIFICANT CHANGE UP (ref 3.8–10.5)
WBC # FLD AUTO: 9.75 K/UL — SIGNIFICANT CHANGE UP (ref 3.8–10.5)
WBC UR QL: SIGNIFICANT CHANGE UP /HPF (ref 0–5)

## 2023-01-01 PROCEDURE — 99291 CRITICAL CARE FIRST HOUR: CPT

## 2023-01-01 PROCEDURE — 99291 CRITICAL CARE FIRST HOUR: CPT | Mod: GC

## 2023-01-01 PROCEDURE — 99223 1ST HOSP IP/OBS HIGH 75: CPT

## 2023-01-01 PROCEDURE — 31645 BRNCHSC W/THER ASPIR 1ST: CPT

## 2023-01-01 PROCEDURE — 95720 EEG PHY/QHP EA INCR W/VEEG: CPT

## 2023-01-01 PROCEDURE — 99232 SBSQ HOSP IP/OBS MODERATE 35: CPT

## 2023-01-01 PROCEDURE — 74018 RADEX ABDOMEN 1 VIEW: CPT | Mod: 26,77

## 2023-01-01 PROCEDURE — 62328 DX LMBR SPI PNXR W/FLUOR/CT: CPT

## 2023-01-01 PROCEDURE — 99291 CRITICAL CARE FIRST HOUR: CPT | Mod: GC,25

## 2023-01-01 PROCEDURE — 99233 SBSQ HOSP IP/OBS HIGH 50: CPT

## 2023-01-01 PROCEDURE — 74018 RADEX ABDOMEN 1 VIEW: CPT | Mod: 26

## 2023-01-01 PROCEDURE — 86078 PHYS BLOOD BANK SERV REACTJ: CPT

## 2023-01-01 PROCEDURE — 99233 SBSQ HOSP IP/OBS HIGH 50: CPT | Mod: GC

## 2023-01-01 PROCEDURE — 76770 US EXAM ABDO BACK WALL COMP: CPT | Mod: 26

## 2023-01-01 PROCEDURE — 70498 CT ANGIOGRAPHY NECK: CPT | Mod: 26,MA

## 2023-01-01 PROCEDURE — 31624 DX BRONCHOSCOPE/LAVAGE: CPT

## 2023-01-01 PROCEDURE — 88108 CYTOPATH CONCENTRATE TECH: CPT | Mod: 26

## 2023-01-01 PROCEDURE — 99284 EMERGENCY DEPT VISIT MOD MDM: CPT

## 2023-01-01 PROCEDURE — 71045 X-RAY EXAM CHEST 1 VIEW: CPT | Mod: 26

## 2023-01-01 PROCEDURE — 36800 INSERTION OF CANNULA: CPT

## 2023-01-01 PROCEDURE — 93970 EXTREMITY STUDY: CPT | Mod: 26

## 2023-01-01 PROCEDURE — 71045 X-RAY EXAM CHEST 1 VIEW: CPT | Mod: 26,76

## 2023-01-01 PROCEDURE — 36000 PLACE NEEDLE IN VEIN: CPT | Mod: RT

## 2023-01-01 PROCEDURE — 99222 1ST HOSP IP/OBS MODERATE 55: CPT

## 2023-01-01 PROCEDURE — 93306 TTE W/DOPPLER COMPLETE: CPT | Mod: 26

## 2023-01-01 PROCEDURE — 93308 TTE F-UP OR LMTD: CPT | Mod: 26,GC

## 2023-01-01 PROCEDURE — 99497 ADVNCD CARE PLAN 30 MIN: CPT | Mod: 25

## 2023-01-01 PROCEDURE — 70553 MRI BRAIN STEM W/O & W/DYE: CPT | Mod: 26

## 2023-01-01 PROCEDURE — 62270 DX LMBR SPI PNXR: CPT | Mod: 53

## 2023-01-01 PROCEDURE — 74176 CT ABD & PELVIS W/O CONTRAST: CPT | Mod: 26

## 2023-01-01 PROCEDURE — 43753 TX GASTRO INTUB W/ASP: CPT

## 2023-01-01 PROCEDURE — 99291 CRITICAL CARE FIRST HOUR: CPT | Mod: 25

## 2023-01-01 PROCEDURE — 93010 ELECTROCARDIOGRAM REPORT: CPT

## 2023-01-01 PROCEDURE — 99497 ADVNCD CARE PLAN 30 MIN: CPT

## 2023-01-01 PROCEDURE — 0042T: CPT | Mod: MA

## 2023-01-01 PROCEDURE — 99232 SBSQ HOSP IP/OBS MODERATE 35: CPT | Mod: GC

## 2023-01-01 PROCEDURE — 95718 EEG PHYS/QHP 2-12 HR W/VEEG: CPT

## 2023-01-01 PROCEDURE — 70496 CT ANGIOGRAPHY HEAD: CPT | Mod: 26,MA

## 2023-01-01 PROCEDURE — 71045 X-RAY EXAM CHEST 1 VIEW: CPT | Mod: 26,77,76

## 2023-01-01 PROCEDURE — 71045 X-RAY EXAM CHEST 1 VIEW: CPT | Mod: 26,77

## 2023-01-01 RX ORDER — LINAGLIPTIN 5 MG/1
1 TABLET, FILM COATED ORAL
Qty: 0 | Refills: 0 | DISCHARGE

## 2023-01-01 RX ORDER — LEVOTHYROXINE SODIUM 125 MCG
18 TABLET ORAL AT BEDTIME
Refills: 0 | Status: DISCONTINUED | OUTPATIENT
Start: 2023-01-01 | End: 2023-01-01

## 2023-01-01 RX ORDER — FAMOTIDINE 10 MG/ML
1 INJECTION INTRAVENOUS
Qty: 0 | Refills: 0 | DISCHARGE

## 2023-01-01 RX ORDER — LEVETIRACETAM 250 MG/1
500 TABLET, FILM COATED ORAL
Refills: 0 | Status: DISCONTINUED | OUTPATIENT
Start: 2023-01-01 | End: 2023-01-01

## 2023-01-01 RX ORDER — MIDODRINE HYDROCHLORIDE 2.5 MG/1
10 TABLET ORAL EVERY 8 HOURS
Refills: 0 | Status: DISCONTINUED | OUTPATIENT
Start: 2023-01-01 | End: 2023-01-01

## 2023-01-01 RX ORDER — LEVETIRACETAM 250 MG/1
500 TABLET, FILM COATED ORAL EVERY 12 HOURS
Refills: 0 | Status: DISCONTINUED | OUTPATIENT
Start: 2023-01-01 | End: 2023-01-01

## 2023-01-01 RX ORDER — DEXTROSE 50 % IN WATER 50 %
25 SYRINGE (ML) INTRAVENOUS ONCE
Refills: 0 | Status: DISCONTINUED | OUTPATIENT
Start: 2023-01-01 | End: 2023-01-01

## 2023-01-01 RX ORDER — PREGABALIN 225 MG/1
1 CAPSULE ORAL
Qty: 0 | Refills: 0 | DISCHARGE

## 2023-01-01 RX ORDER — HYDROMORPHONE HYDROCHLORIDE 2 MG/ML
1 INJECTION INTRAMUSCULAR; INTRAVENOUS; SUBCUTANEOUS ONCE
Refills: 0 | Status: DISCONTINUED | OUTPATIENT
Start: 2023-01-01 | End: 2023-01-01

## 2023-01-01 RX ORDER — CYCLOSPORINE 100 MG/1
75 CAPSULE ORAL EVERY 12 HOURS
Refills: 0 | Status: DISCONTINUED | OUTPATIENT
Start: 2023-01-01 | End: 2023-01-01

## 2023-01-01 RX ORDER — LEVETIRACETAM 250 MG/1
500 TABLET, FILM COATED ORAL DAILY
Refills: 0 | Status: DISCONTINUED | OUTPATIENT
Start: 2023-01-01 | End: 2023-01-01

## 2023-01-01 RX ORDER — CIPROFLOXACIN HCL 0.3 %
4 DROPS OPHTHALMIC (EYE)
Refills: 0 | Status: DISCONTINUED | OUTPATIENT
Start: 2023-01-01 | End: 2023-01-01

## 2023-01-01 RX ORDER — CEFTRIAXONE 500 MG/1
2000 INJECTION, POWDER, FOR SOLUTION INTRAMUSCULAR; INTRAVENOUS EVERY 12 HOURS
Refills: 0 | Status: DISCONTINUED | OUTPATIENT
Start: 2023-01-01 | End: 2023-01-01

## 2023-01-01 RX ORDER — LEVOTHYROXINE SODIUM 125 MCG
18.75 TABLET ORAL
Refills: 0 | Status: DISCONTINUED | OUTPATIENT
Start: 2023-01-01 | End: 2023-01-01

## 2023-01-01 RX ORDER — ASPIRIN/CALCIUM CARB/MAGNESIUM 324 MG
1 TABLET ORAL
Qty: 0 | Refills: 0 | DISCHARGE

## 2023-01-01 RX ORDER — FERROUS SULFATE 325(65) MG
1 TABLET ORAL
Qty: 0 | Refills: 0 | DISCHARGE

## 2023-01-01 RX ORDER — LEVETIRACETAM 250 MG/1
750 TABLET, FILM COATED ORAL EVERY 12 HOURS
Refills: 0 | Status: DISCONTINUED | OUTPATIENT
Start: 2023-01-01 | End: 2023-01-01

## 2023-01-01 RX ORDER — ALBUMIN HUMAN 25 %
250 VIAL (ML) INTRAVENOUS ONCE
Refills: 0 | Status: COMPLETED | OUTPATIENT
Start: 2023-01-01 | End: 2023-01-01

## 2023-01-01 RX ORDER — VANCOMYCIN HCL 1 G
1000 VIAL (EA) INTRAVENOUS ONCE
Refills: 0 | Status: COMPLETED | OUTPATIENT
Start: 2023-01-01 | End: 2023-01-01

## 2023-01-01 RX ORDER — DEXTROSE 50 % IN WATER 50 %
12.5 SYRINGE (ML) INTRAVENOUS ONCE
Refills: 0 | Status: DISCONTINUED | OUTPATIENT
Start: 2023-01-01 | End: 2023-01-01

## 2023-01-01 RX ORDER — LACOSAMIDE 50 MG/1
200 TABLET ORAL ONCE
Refills: 0 | Status: DISCONTINUED | OUTPATIENT
Start: 2023-01-01 | End: 2023-01-01

## 2023-01-01 RX ORDER — MYCOPHENOLATE MOFETIL 250 MG/1
750 CAPSULE ORAL
Refills: 0 | Status: DISCONTINUED | OUTPATIENT
Start: 2023-01-01 | End: 2023-01-01

## 2023-01-01 RX ORDER — BUMETANIDE 0.25 MG/ML
2 INJECTION INTRAMUSCULAR; INTRAVENOUS ONCE
Refills: 0 | Status: COMPLETED | OUTPATIENT
Start: 2023-01-01 | End: 2023-01-01

## 2023-01-01 RX ORDER — ACETAMINOPHEN 500 MG
650 TABLET ORAL ONCE
Refills: 0 | Status: COMPLETED | OUTPATIENT
Start: 2023-01-01 | End: 2023-01-01

## 2023-01-01 RX ORDER — FLUDROCORTISONE ACETATE 0.1 MG/1
0.05 TABLET ORAL DAILY
Refills: 0 | Status: DISCONTINUED | OUTPATIENT
Start: 2023-01-01 | End: 2023-01-01

## 2023-01-01 RX ORDER — ATORVASTATIN CALCIUM 80 MG/1
1 TABLET, FILM COATED ORAL
Qty: 0 | Refills: 0 | DISCHARGE

## 2023-01-01 RX ORDER — ONDANSETRON 8 MG/1
4 TABLET, FILM COATED ORAL ONCE
Refills: 0 | Status: DISCONTINUED | OUTPATIENT
Start: 2023-01-01 | End: 2023-01-01

## 2023-01-01 RX ORDER — ATOVAQUONE 750 MG/5ML
1500 SUSPENSION ORAL DAILY
Refills: 0 | Status: DISCONTINUED | OUTPATIENT
Start: 2023-01-01 | End: 2023-01-01

## 2023-01-01 RX ORDER — MEROPENEM 1 G/30ML
1000 INJECTION INTRAVENOUS EVERY 12 HOURS
Refills: 0 | Status: DISCONTINUED | OUTPATIENT
Start: 2023-01-01 | End: 2023-01-01

## 2023-01-01 RX ORDER — AMLODIPINE BESYLATE 2.5 MG/1
1 TABLET ORAL
Qty: 0 | Refills: 0 | DISCHARGE

## 2023-01-01 RX ORDER — LACOSAMIDE 50 MG/1
100 TABLET ORAL EVERY 12 HOURS
Refills: 0 | Status: DISCONTINUED | OUTPATIENT
Start: 2023-01-01 | End: 2023-01-01

## 2023-01-01 RX ORDER — SODIUM CHLORIDE 9 MG/ML
1000 INJECTION INTRAMUSCULAR; INTRAVENOUS; SUBCUTANEOUS
Refills: 0 | Status: DISCONTINUED | OUTPATIENT
Start: 2023-01-01 | End: 2023-01-01

## 2023-01-01 RX ORDER — FENTANYL CITRATE 50 UG/ML
100 INJECTION INTRAVENOUS ONCE
Refills: 0 | Status: DISCONTINUED | OUTPATIENT
Start: 2023-01-01 | End: 2023-01-01

## 2023-01-01 RX ORDER — VANCOMYCIN HCL 1 G
1000 VIAL (EA) INTRAVENOUS ONCE
Refills: 0 | Status: DISCONTINUED | OUTPATIENT
Start: 2023-01-01 | End: 2023-01-01

## 2023-01-01 RX ORDER — METRONIDAZOLE 500 MG
500 TABLET ORAL ONCE
Refills: 0 | Status: COMPLETED | OUTPATIENT
Start: 2023-01-01 | End: 2023-01-01

## 2023-01-01 RX ORDER — HEPARIN SODIUM 5000 [USP'U]/ML
5000 INJECTION INTRAVENOUS; SUBCUTANEOUS EVERY 8 HOURS
Refills: 0 | Status: DISCONTINUED | OUTPATIENT
Start: 2023-01-01 | End: 2023-01-01

## 2023-01-01 RX ORDER — LABETALOL HCL 100 MG
5 TABLET ORAL ONCE
Refills: 0 | Status: COMPLETED | OUTPATIENT
Start: 2023-01-01 | End: 2023-01-01

## 2023-01-01 RX ORDER — MORPHINE SULFATE 50 MG/1
4 CAPSULE, EXTENDED RELEASE ORAL ONCE
Refills: 0 | Status: DISCONTINUED | OUTPATIENT
Start: 2023-01-01 | End: 2023-01-01

## 2023-01-01 RX ORDER — MIDODRINE HYDROCHLORIDE 2.5 MG/1
10 TABLET ORAL ONCE
Refills: 0 | Status: COMPLETED | OUTPATIENT
Start: 2023-01-01 | End: 2023-01-01

## 2023-01-01 RX ORDER — POTASSIUM CHLORIDE 20 MEQ
10 PACKET (EA) ORAL
Refills: 0 | Status: DISCONTINUED | OUTPATIENT
Start: 2023-01-01 | End: 2023-01-01

## 2023-01-01 RX ORDER — SODIUM CHLORIDE 9 MG/ML
500 INJECTION INTRAMUSCULAR; INTRAVENOUS; SUBCUTANEOUS ONCE
Refills: 0 | Status: COMPLETED | OUTPATIENT
Start: 2023-01-01 | End: 2023-01-01

## 2023-01-01 RX ORDER — NOREPINEPHRINE BITARTRATE/D5W 8 MG/250ML
0.05 PLASTIC BAG, INJECTION (ML) INTRAVENOUS
Qty: 8 | Refills: 0 | Status: DISCONTINUED | OUTPATIENT
Start: 2023-01-01 | End: 2023-01-01

## 2023-01-01 RX ORDER — AMLODIPINE BESYLATE 2.5 MG/1
10 TABLET ORAL EVERY 24 HOURS
Refills: 0 | Status: DISCONTINUED | OUTPATIENT
Start: 2023-01-01 | End: 2023-01-01

## 2023-01-01 RX ORDER — ROBINUL 0.2 MG/ML
0.4 INJECTION INTRAMUSCULAR; INTRAVENOUS EVERY 4 HOURS
Refills: 0 | Status: DISCONTINUED | OUTPATIENT
Start: 2023-01-01 | End: 2023-01-01

## 2023-01-01 RX ORDER — INSULIN LISPRO 100/ML
VIAL (ML) SUBCUTANEOUS EVERY 6 HOURS
Refills: 0 | Status: DISCONTINUED | OUTPATIENT
Start: 2023-01-01 | End: 2023-01-01

## 2023-01-01 RX ORDER — SODIUM CHLORIDE 9 MG/ML
1000 INJECTION, SOLUTION INTRAVENOUS
Refills: 0 | Status: DISCONTINUED | OUTPATIENT
Start: 2023-01-01 | End: 2023-01-01

## 2023-01-01 RX ORDER — SODIUM CHLORIDE 9 MG/ML
500 INJECTION, SOLUTION INTRAVENOUS ONCE
Refills: 0 | Status: COMPLETED | OUTPATIENT
Start: 2023-01-01 | End: 2023-01-01

## 2023-01-01 RX ORDER — ASPIRIN/CALCIUM CARB/MAGNESIUM 324 MG
81 TABLET ORAL DAILY
Refills: 0 | Status: DISCONTINUED | OUTPATIENT
Start: 2023-01-01 | End: 2023-01-01

## 2023-01-01 RX ORDER — POTASSIUM CHLORIDE 20 MEQ
20 PACKET (EA) ORAL ONCE
Refills: 0 | Status: DISCONTINUED | OUTPATIENT
Start: 2023-01-01 | End: 2023-01-01

## 2023-01-01 RX ORDER — FUROSEMIDE 40 MG
80 TABLET ORAL ONCE
Refills: 0 | Status: COMPLETED | OUTPATIENT
Start: 2023-01-01 | End: 2023-01-01

## 2023-01-01 RX ORDER — ATROPINE SULFATE 0.1 MG/ML
1 SYRINGE (ML) INJECTION ONCE
Refills: 0 | Status: COMPLETED | OUTPATIENT
Start: 2023-01-01 | End: 2023-01-01

## 2023-01-01 RX ORDER — MYCOPHENOLATE MOFETIL 250 MG/1
750 CAPSULE ORAL
Qty: 0 | Refills: 0 | DISCHARGE

## 2023-01-01 RX ORDER — GLUCAGON INJECTION, SOLUTION 0.5 MG/.1ML
1 INJECTION, SOLUTION SUBCUTANEOUS ONCE
Refills: 0 | Status: DISCONTINUED | OUTPATIENT
Start: 2023-01-01 | End: 2023-01-01

## 2023-01-01 RX ORDER — LACOSAMIDE 50 MG/1
200 TABLET ORAL EVERY 12 HOURS
Refills: 0 | Status: DISCONTINUED | OUTPATIENT
Start: 2023-01-01 | End: 2023-01-01

## 2023-01-01 RX ORDER — LEVETIRACETAM 250 MG/1
250 TABLET, FILM COATED ORAL EVERY 12 HOURS
Refills: 0 | Status: DISCONTINUED | OUTPATIENT
Start: 2023-01-01 | End: 2023-01-01

## 2023-01-01 RX ORDER — MIDAZOLAM HYDROCHLORIDE 1 MG/ML
0.05 INJECTION, SOLUTION INTRAMUSCULAR; INTRAVENOUS
Qty: 100 | Refills: 0 | Status: DISCONTINUED | OUTPATIENT
Start: 2023-01-01 | End: 2023-01-01

## 2023-01-01 RX ORDER — NALOXEGOL OXALATE 12.5 MG/1
12.5 TABLET, FILM COATED ORAL DAILY
Refills: 0 | Status: DISCONTINUED | OUTPATIENT
Start: 2023-01-01 | End: 2023-01-01

## 2023-01-01 RX ORDER — PROPOFOL 10 MG/ML
10 INJECTION, EMULSION INTRAVENOUS
Qty: 1000 | Refills: 0 | Status: DISCONTINUED | OUTPATIENT
Start: 2023-01-01 | End: 2023-01-01

## 2023-01-01 RX ORDER — POLYETHYLENE GLYCOL 3350 17 G/17G
17 POWDER, FOR SOLUTION ORAL
Refills: 0 | Status: DISCONTINUED | OUTPATIENT
Start: 2023-01-01 | End: 2023-01-01

## 2023-01-01 RX ORDER — POTASSIUM PHOSPHATE, MONOBASIC POTASSIUM PHOSPHATE, DIBASIC 236; 224 MG/ML; MG/ML
15 INJECTION, SOLUTION INTRAVENOUS ONCE
Refills: 0 | Status: DISCONTINUED | OUTPATIENT
Start: 2023-01-01 | End: 2023-01-01

## 2023-01-01 RX ORDER — VANCOMYCIN HCL 1 G
125 VIAL (EA) INTRAVENOUS EVERY 6 HOURS
Refills: 0 | Status: DISCONTINUED | OUTPATIENT
Start: 2023-01-01 | End: 2023-01-01

## 2023-01-01 RX ORDER — SODIUM CHLORIDE 9 MG/ML
250 INJECTION, SOLUTION INTRAVENOUS ONCE
Refills: 0 | Status: COMPLETED | OUTPATIENT
Start: 2023-01-01 | End: 2023-01-01

## 2023-01-01 RX ORDER — LEVETIRACETAM 250 MG/1
500 TABLET, FILM COATED ORAL ONCE
Refills: 0 | Status: COMPLETED | OUTPATIENT
Start: 2023-01-01 | End: 2023-01-01

## 2023-01-01 RX ORDER — FAMOTIDINE 20 MG/1
20 TABLET, FILM COATED ORAL
Qty: 30 | Refills: 0 | Status: ACTIVE | COMMUNITY
Start: 2022-01-04 | End: 1900-01-01

## 2023-01-01 RX ORDER — LACOSAMIDE 50 MG/1
200 TABLET ORAL
Refills: 0 | Status: DISCONTINUED | OUTPATIENT
Start: 2023-01-01 | End: 2023-01-01

## 2023-01-01 RX ORDER — FENTANYL CITRATE 50 UG/ML
0.5 INJECTION INTRAVENOUS
Qty: 2500 | Refills: 0 | Status: DISCONTINUED | OUTPATIENT
Start: 2023-01-01 | End: 2023-01-01

## 2023-01-01 RX ORDER — LEVETIRACETAM 250 MG/1
1000 TABLET, FILM COATED ORAL EVERY 12 HOURS
Refills: 0 | Status: DISCONTINUED | OUTPATIENT
Start: 2023-01-01 | End: 2023-01-01

## 2023-01-01 RX ORDER — AMLODIPINE BESYLATE 2.5 MG/1
5 TABLET ORAL EVERY 24 HOURS
Refills: 0 | Status: DISCONTINUED | OUTPATIENT
Start: 2023-01-01 | End: 2023-01-01

## 2023-01-01 RX ORDER — POLYETHYLENE GLYCOL 3350 17 G/17G
17 POWDER, FOR SOLUTION ORAL DAILY
Refills: 0 | Status: DISCONTINUED | OUTPATIENT
Start: 2023-01-01 | End: 2023-01-01

## 2023-01-01 RX ORDER — BUMETANIDE 0.25 MG/ML
4 INJECTION INTRAMUSCULAR; INTRAVENOUS ONCE
Refills: 0 | Status: COMPLETED | OUTPATIENT
Start: 2023-01-01 | End: 2023-01-01

## 2023-01-01 RX ORDER — MEROPENEM 1 G/30ML
500 INJECTION INTRAVENOUS EVERY 12 HOURS
Refills: 0 | Status: DISCONTINUED | OUTPATIENT
Start: 2023-01-01 | End: 2023-01-01

## 2023-01-01 RX ORDER — CEFTRIAXONE 500 MG/1
1000 INJECTION, POWDER, FOR SOLUTION INTRAMUSCULAR; INTRAVENOUS EVERY 24 HOURS
Refills: 0 | Status: DISCONTINUED | OUTPATIENT
Start: 2023-01-01 | End: 2023-01-01

## 2023-01-01 RX ORDER — MAGNESIUM OXIDE 400 MG ORAL TABLET 241.3 MG
1 TABLET ORAL
Qty: 0 | Refills: 0 | DISCHARGE

## 2023-01-01 RX ORDER — FOLIC ACID 0.8 MG
1 TABLET ORAL
Qty: 0 | Refills: 0 | DISCHARGE

## 2023-01-01 RX ORDER — SODIUM CHLORIDE 9 MG/ML
1000 INJECTION, SOLUTION INTRAVENOUS ONCE
Refills: 0 | Status: COMPLETED | OUTPATIENT
Start: 2023-01-01 | End: 2023-01-01

## 2023-01-01 RX ORDER — LEVOTHYROXINE SODIUM 125 MCG
25 TABLET ORAL DAILY
Refills: 0 | Status: DISCONTINUED | OUTPATIENT
Start: 2023-01-01 | End: 2023-01-01

## 2023-01-01 RX ORDER — SODIUM BICARBONATE 1 MEQ/ML
50 SYRINGE (ML) INTRAVENOUS
Refills: 0 | Status: COMPLETED | OUTPATIENT
Start: 2023-01-01 | End: 2023-01-01

## 2023-01-01 RX ORDER — SODIUM CHLORIDE 9 MG/ML
100 INJECTION INTRAMUSCULAR; INTRAVENOUS; SUBCUTANEOUS
Refills: 0 | Status: DISCONTINUED | OUTPATIENT
Start: 2023-01-01 | End: 2023-01-01

## 2023-01-01 RX ORDER — METRONIDAZOLE 500 MG
TABLET ORAL
Refills: 0 | Status: DISCONTINUED | OUTPATIENT
Start: 2023-01-01 | End: 2023-01-01

## 2023-01-01 RX ORDER — MIDAZOLAM HYDROCHLORIDE 1 MG/ML
2 INJECTION, SOLUTION INTRAMUSCULAR; INTRAVENOUS ONCE
Refills: 0 | Status: DISCONTINUED | OUTPATIENT
Start: 2023-01-01 | End: 2023-01-01

## 2023-01-01 RX ORDER — ERYTHROPOIETIN 10000 [IU]/ML
10000 INJECTION, SOLUTION INTRAVENOUS; SUBCUTANEOUS
Refills: 0 | Status: DISCONTINUED | OUTPATIENT
Start: 2023-01-01 | End: 2023-01-01

## 2023-01-01 RX ORDER — DEXTROSE 50 % IN WATER 50 %
15 SYRINGE (ML) INTRAVENOUS ONCE
Refills: 0 | Status: DISCONTINUED | OUTPATIENT
Start: 2023-01-01 | End: 2023-01-01

## 2023-01-01 RX ORDER — LACOSAMIDE 50 MG/1
300 TABLET ORAL ONCE
Refills: 0 | Status: DISCONTINUED | OUTPATIENT
Start: 2023-01-01 | End: 2023-01-01

## 2023-01-01 RX ORDER — LABETALOL HCL 100 MG
10 TABLET ORAL ONCE
Refills: 0 | Status: DISCONTINUED | OUTPATIENT
Start: 2023-01-01 | End: 2023-01-01

## 2023-01-01 RX ORDER — DEXAMETHASONE 0.5 MG/5ML
6 ELIXIR ORAL EVERY 6 HOURS
Refills: 0 | Status: DISCONTINUED | OUTPATIENT
Start: 2023-01-01 | End: 2023-01-01

## 2023-01-01 RX ORDER — ALTEPLASE 100 MG
2 KIT INTRAVENOUS ONCE
Refills: 0 | Status: COMPLETED | OUTPATIENT
Start: 2023-01-01 | End: 2023-01-01

## 2023-01-01 RX ORDER — ERGOCALCIFEROL 1.25 MG/1
1 CAPSULE ORAL
Qty: 0 | Refills: 0 | DISCHARGE

## 2023-01-01 RX ORDER — DIPHENHYDRAMINE HCL 50 MG
50 CAPSULE ORAL ONCE
Refills: 0 | Status: COMPLETED | OUTPATIENT
Start: 2023-01-01 | End: 2023-01-01

## 2023-01-01 RX ORDER — ACETAMINOPHEN 500 MG
650 TABLET ORAL EVERY 6 HOURS
Refills: 0 | Status: DISCONTINUED | OUTPATIENT
Start: 2023-01-01 | End: 2023-01-01

## 2023-01-01 RX ORDER — LEVETIRACETAM 250 MG/1
750 TABLET, FILM COATED ORAL
Refills: 0 | Status: DISCONTINUED | OUTPATIENT
Start: 2023-01-01 | End: 2023-01-01

## 2023-01-01 RX ORDER — POLYETHYLENE GLYCOL 3350 17 G/17G
1 POWDER, FOR SOLUTION ORAL
Qty: 0 | Refills: 0 | DISCHARGE

## 2023-01-01 RX ORDER — LIDOCAINE HCL 20 MG/ML
10 VIAL (ML) INJECTION ONCE
Refills: 0 | Status: COMPLETED | OUTPATIENT
Start: 2023-01-01 | End: 2023-01-01

## 2023-01-01 RX ORDER — ALBUTEROL 90 UG/1
2.5 AEROSOL, METERED ORAL ONCE
Refills: 0 | Status: DISCONTINUED | OUTPATIENT
Start: 2023-01-01 | End: 2023-01-01

## 2023-01-01 RX ORDER — NOREPINEPHRINE BITARTRATE/D5W 8 MG/250ML
0.05 PLASTIC BAG, INJECTION (ML) INTRAVENOUS
Qty: 16 | Refills: 0 | Status: DISCONTINUED | OUTPATIENT
Start: 2023-01-01 | End: 2023-01-01

## 2023-01-01 RX ORDER — LOSARTAN POTASSIUM 100 MG/1
1 TABLET, FILM COATED ORAL
Qty: 0 | Refills: 0 | DISCHARGE

## 2023-01-01 RX ORDER — LACTOBACILLUS ACIDOPHILUS 100MM CELL
1 CAPSULE ORAL
Qty: 0 | Refills: 0 | DISCHARGE

## 2023-01-01 RX ORDER — MIDODRINE HYDROCHLORIDE 2.5 MG/1
20 TABLET ORAL ONCE
Refills: 0 | Status: DISCONTINUED | OUTPATIENT
Start: 2023-01-01 | End: 2023-01-01

## 2023-01-01 RX ORDER — SENNA PLUS 8.6 MG/1
2 TABLET ORAL AT BEDTIME
Refills: 0 | Status: DISCONTINUED | OUTPATIENT
Start: 2023-01-01 | End: 2023-01-01

## 2023-01-01 RX ORDER — LACTOBACILLUS ACIDOPHILUS 100MM CELL
1 CAPSULE ORAL DAILY
Refills: 0 | Status: DISCONTINUED | OUTPATIENT
Start: 2023-01-01 | End: 2023-01-01

## 2023-01-01 RX ORDER — CYCLOSPORINE 100 MG/1
3 CAPSULE ORAL
Qty: 0 | Refills: 0 | DISCHARGE

## 2023-01-01 RX ORDER — VANCOMYCIN HCL 1 G
125 VIAL (EA) INTRAVENOUS EVERY 6 HOURS
Refills: 0 | Status: COMPLETED | OUTPATIENT
Start: 2023-01-01 | End: 2023-01-01

## 2023-01-01 RX ORDER — METRONIDAZOLE 500 MG
TABLET ORAL
Refills: 0 | Status: COMPLETED | OUTPATIENT
Start: 2023-01-01 | End: 2023-01-01

## 2023-01-01 RX ORDER — SENNA PLUS 8.6 MG/1
2 TABLET ORAL
Refills: 0 | Status: DISCONTINUED | OUTPATIENT
Start: 2023-01-01 | End: 2023-01-01

## 2023-01-01 RX ORDER — FLUDROCORTISONE ACETATE 0.1 MG/1
0.1 TABLET ORAL DAILY
Refills: 0 | Status: DISCONTINUED | OUTPATIENT
Start: 2023-01-01 | End: 2023-01-01

## 2023-01-01 RX ORDER — BUMETANIDE 0.25 MG/ML
2 INJECTION INTRAMUSCULAR; INTRAVENOUS ONCE
Refills: 0 | Status: DISCONTINUED | OUTPATIENT
Start: 2023-01-01 | End: 2023-01-01

## 2023-01-01 RX ORDER — CEFTRIAXONE 500 MG/1
2000 INJECTION, POWDER, FOR SOLUTION INTRAMUSCULAR; INTRAVENOUS EVERY 12 HOURS
Refills: 0 | Status: COMPLETED | OUTPATIENT
Start: 2023-01-01 | End: 2023-01-01

## 2023-01-01 RX ORDER — SENNA PLUS 8.6 MG/1
10 TABLET ORAL
Refills: 0 | Status: DISCONTINUED | OUTPATIENT
Start: 2023-01-01 | End: 2023-01-01

## 2023-01-01 RX ORDER — OXYMETAZOLINE HYDROCHLORIDE 0.5 MG/ML
2 SPRAY NASAL ONCE
Refills: 0 | Status: COMPLETED | OUTPATIENT
Start: 2023-01-01 | End: 2023-01-01

## 2023-01-01 RX ORDER — AMLODIPINE BESYLATE 2.5 MG/1
5 TABLET ORAL DAILY
Refills: 0 | Status: DISCONTINUED | OUTPATIENT
Start: 2023-01-01 | End: 2023-01-01

## 2023-01-01 RX ORDER — ATORVASTATIN CALCIUM 80 MG/1
20 TABLET, FILM COATED ORAL AT BEDTIME
Refills: 0 | Status: DISCONTINUED | OUTPATIENT
Start: 2023-01-01 | End: 2023-01-01

## 2023-01-01 RX ORDER — VANCOMYCIN HCL 1 G
750 VIAL (EA) INTRAVENOUS ONCE
Refills: 0 | Status: COMPLETED | OUTPATIENT
Start: 2023-01-01 | End: 2023-01-01

## 2023-01-01 RX ORDER — POTASSIUM CHLORIDE 20 MEQ
20 PACKET (EA) ORAL ONCE
Refills: 0 | Status: COMPLETED | OUTPATIENT
Start: 2023-01-01 | End: 2023-01-01

## 2023-01-01 RX ORDER — ACETAMINOPHEN 500 MG
1000 TABLET ORAL ONCE
Refills: 0 | Status: COMPLETED | OUTPATIENT
Start: 2023-01-01 | End: 2023-01-01

## 2023-01-01 RX ORDER — CHLORHEXIDINE GLUCONATE 213 G/1000ML
15 SOLUTION TOPICAL
Refills: 0 | Status: DISCONTINUED | OUTPATIENT
Start: 2023-01-01 | End: 2023-01-01

## 2023-01-01 RX ORDER — ATOVAQUONE 750 MG/5ML
10 SUSPENSION ORAL
Qty: 0 | Refills: 0 | DISCHARGE

## 2023-01-01 RX ORDER — HYDROMORPHONE HYDROCHLORIDE 2 MG/ML
1 INJECTION INTRAMUSCULAR; INTRAVENOUS; SUBCUTANEOUS
Refills: 0 | Status: DISCONTINUED | OUTPATIENT
Start: 2023-01-01 | End: 2023-01-01

## 2023-01-01 RX ORDER — CHLORHEXIDINE GLUCONATE 213 G/1000ML
1 SOLUTION TOPICAL
Refills: 0 | Status: DISCONTINUED | OUTPATIENT
Start: 2023-01-01 | End: 2023-01-01

## 2023-01-01 RX ORDER — METRONIDAZOLE 500 MG
500 TABLET ORAL EVERY 8 HOURS
Refills: 0 | Status: COMPLETED | OUTPATIENT
Start: 2023-01-01 | End: 2023-01-01

## 2023-01-01 RX ADMIN — Medication 10 MILLIGRAM(S): at 01:53

## 2023-01-01 RX ADMIN — ATOVAQUONE 1500 MILLIGRAM(S): 750 SUSPENSION ORAL at 11:21

## 2023-01-01 RX ADMIN — Medication 4 DROP(S): at 06:33

## 2023-01-01 RX ADMIN — LACOSAMIDE 200 MILLIGRAM(S): 50 TABLET ORAL at 05:29

## 2023-01-01 RX ADMIN — HEPARIN SODIUM 5000 UNIT(S): 5000 INJECTION INTRAVENOUS; SUBCUTANEOUS at 21:53

## 2023-01-01 RX ADMIN — LEVETIRACETAM 400 MILLIGRAM(S): 250 TABLET, FILM COATED ORAL at 18:27

## 2023-01-01 RX ADMIN — Medication 650 MILLIGRAM(S): at 21:06

## 2023-01-01 RX ADMIN — LACOSAMIDE 200 MILLIGRAM(S): 50 TABLET ORAL at 17:30

## 2023-01-01 RX ADMIN — Medication 1 APPLICATION(S): at 06:10

## 2023-01-01 RX ADMIN — CHLORHEXIDINE GLUCONATE 15 MILLILITER(S): 213 SOLUTION TOPICAL at 17:20

## 2023-01-01 RX ADMIN — Medication 2: at 11:41

## 2023-01-01 RX ADMIN — Medication 1 APPLICATION(S): at 17:50

## 2023-01-01 RX ADMIN — Medication 100 MILLIGRAM(S): at 06:49

## 2023-01-01 RX ADMIN — HEPARIN SODIUM 5000 UNIT(S): 5000 INJECTION INTRAVENOUS; SUBCUTANEOUS at 13:44

## 2023-01-01 RX ADMIN — LACOSAMIDE 140 MILLIGRAM(S): 50 TABLET ORAL at 05:44

## 2023-01-01 RX ADMIN — Medication 125 MILLIGRAM(S): at 11:37

## 2023-01-01 RX ADMIN — Medication 1 APPLICATION(S): at 06:33

## 2023-01-01 RX ADMIN — HEPARIN SODIUM 5000 UNIT(S): 5000 INJECTION INTRAVENOUS; SUBCUTANEOUS at 14:39

## 2023-01-01 RX ADMIN — LEVETIRACETAM 400 MILLIGRAM(S): 250 TABLET, FILM COATED ORAL at 05:07

## 2023-01-01 RX ADMIN — Medication 1 APPLICATION(S): at 05:34

## 2023-01-01 RX ADMIN — Medication 1 APPLICATION(S): at 05:15

## 2023-01-01 RX ADMIN — Medication 2: at 13:00

## 2023-01-01 RX ADMIN — LACOSAMIDE 200 MILLIGRAM(S): 50 TABLET ORAL at 17:34

## 2023-01-01 RX ADMIN — Medication 1 APPLICATION(S): at 17:36

## 2023-01-01 RX ADMIN — Medication 1 APPLICATION(S): at 05:31

## 2023-01-01 RX ADMIN — Medication 2: at 18:26

## 2023-01-01 RX ADMIN — Medication 650 MILLIGRAM(S): at 20:15

## 2023-01-01 RX ADMIN — Medication 650 MILLIGRAM(S): at 20:51

## 2023-01-01 RX ADMIN — HYDROMORPHONE HYDROCHLORIDE 1 MILLIGRAM(S): 2 INJECTION INTRAMUSCULAR; INTRAVENOUS; SUBCUTANEOUS at 06:35

## 2023-01-01 RX ADMIN — LACOSAMIDE 140 MILLIGRAM(S): 50 TABLET ORAL at 06:00

## 2023-01-01 RX ADMIN — CHLORHEXIDINE GLUCONATE 1 APPLICATION(S): 213 SOLUTION TOPICAL at 05:29

## 2023-01-01 RX ADMIN — Medication 18 MICROGRAM(S): at 23:08

## 2023-01-01 RX ADMIN — LEVETIRACETAM 400 MILLIGRAM(S): 250 TABLET, FILM COATED ORAL at 18:13

## 2023-01-01 RX ADMIN — HEPARIN SODIUM 5000 UNIT(S): 5000 INJECTION INTRAVENOUS; SUBCUTANEOUS at 05:13

## 2023-01-01 RX ADMIN — CHLORHEXIDINE GLUCONATE 15 MILLILITER(S): 213 SOLUTION TOPICAL at 17:13

## 2023-01-01 RX ADMIN — LACOSAMIDE 140 MILLIGRAM(S): 50 TABLET ORAL at 19:40

## 2023-01-01 RX ADMIN — HEPARIN SODIUM 5000 UNIT(S): 5000 INJECTION INTRAVENOUS; SUBCUTANEOUS at 06:42

## 2023-01-01 RX ADMIN — POLYETHYLENE GLYCOL 3350 17 GRAM(S): 17 POWDER, FOR SOLUTION ORAL at 17:31

## 2023-01-01 RX ADMIN — Medication 125 MILLIGRAM(S): at 12:00

## 2023-01-01 RX ADMIN — LACOSAMIDE 140 MILLIGRAM(S): 50 TABLET ORAL at 05:14

## 2023-01-01 RX ADMIN — Medication 18 MICROGRAM(S): at 23:23

## 2023-01-01 RX ADMIN — CEFTRIAXONE 100 MILLIGRAM(S): 500 INJECTION, POWDER, FOR SOLUTION INTRAMUSCULAR; INTRAVENOUS at 05:54

## 2023-01-01 RX ADMIN — Medication 2: at 06:30

## 2023-01-01 RX ADMIN — CHLORHEXIDINE GLUCONATE 1 APPLICATION(S): 213 SOLUTION TOPICAL at 05:05

## 2023-01-01 RX ADMIN — CHLORHEXIDINE GLUCONATE 1 APPLICATION(S): 213 SOLUTION TOPICAL at 05:43

## 2023-01-01 RX ADMIN — Medication 1 APPLICATION(S): at 17:45

## 2023-01-01 RX ADMIN — Medication 650 MILLIGRAM(S): at 06:22

## 2023-01-01 RX ADMIN — Medication 18 MICROGRAM(S): at 21:40

## 2023-01-01 RX ADMIN — Medication 125 MILLIGRAM(S): at 05:28

## 2023-01-01 RX ADMIN — CEFTRIAXONE 100 MILLIGRAM(S): 500 INJECTION, POWDER, FOR SOLUTION INTRAMUSCULAR; INTRAVENOUS at 17:43

## 2023-01-01 RX ADMIN — Medication 2: at 17:32

## 2023-01-01 RX ADMIN — Medication 25 MICROGRAM(S): at 05:19

## 2023-01-01 RX ADMIN — SENNA PLUS 2 TABLET(S): 8.6 TABLET ORAL at 23:05

## 2023-01-01 RX ADMIN — Medication 260 MILLIGRAM(S): at 06:38

## 2023-01-01 RX ADMIN — Medication 2: at 17:17

## 2023-01-01 RX ADMIN — CEFTRIAXONE 100 MILLIGRAM(S): 500 INJECTION, POWDER, FOR SOLUTION INTRAMUSCULAR; INTRAVENOUS at 17:29

## 2023-01-01 RX ADMIN — ERYTHROPOIETIN 10000 UNIT(S): 10000 INJECTION, SOLUTION INTRAVENOUS; SUBCUTANEOUS at 12:56

## 2023-01-01 RX ADMIN — LACOSAMIDE 200 MILLIGRAM(S): 50 TABLET ORAL at 18:44

## 2023-01-01 RX ADMIN — CHLORHEXIDINE GLUCONATE 15 MILLILITER(S): 213 SOLUTION TOPICAL at 05:57

## 2023-01-01 RX ADMIN — MIDODRINE HYDROCHLORIDE 10 MILLIGRAM(S): 2.5 TABLET ORAL at 13:59

## 2023-01-01 RX ADMIN — Medication 125 MILLIGRAM(S): at 17:52

## 2023-01-01 RX ADMIN — Medication 4: at 06:17

## 2023-01-01 RX ADMIN — Medication 2.91 MICROGRAM(S)/KG/MIN: at 19:15

## 2023-01-01 RX ADMIN — POLYETHYLENE GLYCOL 3350 17 GRAM(S): 17 POWDER, FOR SOLUTION ORAL at 05:12

## 2023-01-01 RX ADMIN — Medication 2: at 17:12

## 2023-01-01 RX ADMIN — ERYTHROPOIETIN 10000 UNIT(S): 10000 INJECTION, SOLUTION INTRAVENOUS; SUBCUTANEOUS at 12:20

## 2023-01-01 RX ADMIN — AMLODIPINE BESYLATE 10 MILLIGRAM(S): 2.5 TABLET ORAL at 05:07

## 2023-01-01 RX ADMIN — LEVETIRACETAM 500 MILLIGRAM(S): 250 TABLET, FILM COATED ORAL at 17:18

## 2023-01-01 RX ADMIN — Medication 4 DROP(S): at 05:45

## 2023-01-01 RX ADMIN — SENNA PLUS 10 MILLILITER(S): 8.6 TABLET ORAL at 06:10

## 2023-01-01 RX ADMIN — HEPARIN SODIUM 5000 UNIT(S): 5000 INJECTION INTRAVENOUS; SUBCUTANEOUS at 05:49

## 2023-01-01 RX ADMIN — CEFTRIAXONE 100 MILLIGRAM(S): 500 INJECTION, POWDER, FOR SOLUTION INTRAMUSCULAR; INTRAVENOUS at 05:33

## 2023-01-01 RX ADMIN — LACOSAMIDE 200 MILLIGRAM(S): 50 TABLET ORAL at 06:01

## 2023-01-01 RX ADMIN — Medication 100 MILLIGRAM(S): at 23:06

## 2023-01-01 RX ADMIN — Medication 650 MILLIGRAM(S): at 07:04

## 2023-01-01 RX ADMIN — CEFTRIAXONE 100 MILLIGRAM(S): 500 INJECTION, POWDER, FOR SOLUTION INTRAMUSCULAR; INTRAVENOUS at 17:23

## 2023-01-01 RX ADMIN — Medication 4 DROP(S): at 17:52

## 2023-01-01 RX ADMIN — CHLORHEXIDINE GLUCONATE 15 MILLILITER(S): 213 SOLUTION TOPICAL at 05:41

## 2023-01-01 RX ADMIN — Medication 5 MILLIGRAM(S): at 05:42

## 2023-01-01 RX ADMIN — MORPHINE SULFATE 4 MILLIGRAM(S): 50 CAPSULE, EXTENDED RELEASE ORAL at 19:26

## 2023-01-01 RX ADMIN — FLUDROCORTISONE ACETATE 0.05 MILLIGRAM(S): 0.1 TABLET ORAL at 05:13

## 2023-01-01 RX ADMIN — LACOSAMIDE 140 MILLIGRAM(S): 50 TABLET ORAL at 18:14

## 2023-01-01 RX ADMIN — CHLORHEXIDINE GLUCONATE 15 MILLILITER(S): 213 SOLUTION TOPICAL at 17:36

## 2023-01-01 RX ADMIN — ATOVAQUONE 1500 MILLIGRAM(S): 750 SUSPENSION ORAL at 12:05

## 2023-01-01 RX ADMIN — HYDROMORPHONE HYDROCHLORIDE 1 MILLIGRAM(S): 2 INJECTION INTRAMUSCULAR; INTRAVENOUS; SUBCUTANEOUS at 06:37

## 2023-01-01 RX ADMIN — Medication 1 APPLICATION(S): at 17:21

## 2023-01-01 RX ADMIN — HEPARIN SODIUM 5000 UNIT(S): 5000 INJECTION INTRAVENOUS; SUBCUTANEOUS at 15:31

## 2023-01-01 RX ADMIN — CEFTRIAXONE 100 MILLIGRAM(S): 500 INJECTION, POWDER, FOR SOLUTION INTRAMUSCULAR; INTRAVENOUS at 01:24

## 2023-01-01 RX ADMIN — Medication 10 MILLIGRAM(S): at 07:55

## 2023-01-01 RX ADMIN — MIDODRINE HYDROCHLORIDE 10 MILLIGRAM(S): 2.5 TABLET ORAL at 14:59

## 2023-01-01 RX ADMIN — Medication 250 MILLIGRAM(S): at 09:58

## 2023-01-01 RX ADMIN — CEFTRIAXONE 100 MILLIGRAM(S): 500 INJECTION, POWDER, FOR SOLUTION INTRAMUSCULAR; INTRAVENOUS at 23:14

## 2023-01-01 RX ADMIN — Medication 1 APPLICATION(S): at 06:51

## 2023-01-01 RX ADMIN — Medication 4 DROP(S): at 05:52

## 2023-01-01 RX ADMIN — Medication 2: at 11:20

## 2023-01-01 RX ADMIN — MIDODRINE HYDROCHLORIDE 10 MILLIGRAM(S): 2.5 TABLET ORAL at 06:01

## 2023-01-01 RX ADMIN — Medication 25 MICROGRAM(S): at 06:00

## 2023-01-01 RX ADMIN — HEPARIN SODIUM 5000 UNIT(S): 5000 INJECTION INTRAVENOUS; SUBCUTANEOUS at 14:06

## 2023-01-01 RX ADMIN — LACOSAMIDE 200 MILLIGRAM(S): 50 TABLET ORAL at 06:50

## 2023-01-01 RX ADMIN — Medication 80 MILLIGRAM(S): at 13:55

## 2023-01-01 RX ADMIN — Medication 2.91 MICROGRAM(S)/KG/MIN: at 20:42

## 2023-01-01 RX ADMIN — Medication 1 APPLICATION(S): at 05:11

## 2023-01-01 RX ADMIN — Medication 650 MILLIGRAM(S): at 06:55

## 2023-01-01 RX ADMIN — Medication 125 MILLIGRAM(S): at 17:08

## 2023-01-01 RX ADMIN — HEPARIN SODIUM 5000 UNIT(S): 5000 INJECTION INTRAVENOUS; SUBCUTANEOUS at 01:11

## 2023-01-01 RX ADMIN — LACOSAMIDE 140 MILLIGRAM(S): 50 TABLET ORAL at 05:06

## 2023-01-01 RX ADMIN — ATOVAQUONE 1500 MILLIGRAM(S): 750 SUSPENSION ORAL at 11:08

## 2023-01-01 RX ADMIN — LEVETIRACETAM 400 MILLIGRAM(S): 250 TABLET, FILM COATED ORAL at 17:45

## 2023-01-01 RX ADMIN — Medication 81 MILLIGRAM(S): at 11:21

## 2023-01-01 RX ADMIN — ATOVAQUONE 1500 MILLIGRAM(S): 750 SUSPENSION ORAL at 15:32

## 2023-01-01 RX ADMIN — NALOXEGOL OXALATE 12.5 MILLIGRAM(S): 12.5 TABLET, FILM COATED ORAL at 11:44

## 2023-01-01 RX ADMIN — PROPOFOL 2.27 MICROGRAM(S)/KG/MIN: 10 INJECTION, EMULSION INTRAVENOUS at 19:14

## 2023-01-01 RX ADMIN — CHLORHEXIDINE GLUCONATE 15 MILLILITER(S): 213 SOLUTION TOPICAL at 06:04

## 2023-01-01 RX ADMIN — HEPARIN SODIUM 5000 UNIT(S): 5000 INJECTION INTRAVENOUS; SUBCUTANEOUS at 21:06

## 2023-01-01 RX ADMIN — Medication 100 MILLIGRAM(S): at 06:32

## 2023-01-01 RX ADMIN — Medication 4 DROP(S): at 01:31

## 2023-01-01 RX ADMIN — Medication 650 MILLIGRAM(S): at 01:51

## 2023-01-01 RX ADMIN — LEVETIRACETAM 500 MILLIGRAM(S): 250 TABLET, FILM COATED ORAL at 17:30

## 2023-01-01 RX ADMIN — CHLORHEXIDINE GLUCONATE 15 MILLILITER(S): 213 SOLUTION TOPICAL at 17:56

## 2023-01-01 RX ADMIN — Medication 650 MILLIGRAM(S): at 21:08

## 2023-01-01 RX ADMIN — LACOSAMIDE 200 MILLIGRAM(S): 50 TABLET ORAL at 17:23

## 2023-01-01 RX ADMIN — ATOVAQUONE 1500 MILLIGRAM(S): 750 SUSPENSION ORAL at 12:22

## 2023-01-01 RX ADMIN — HEPARIN SODIUM 5000 UNIT(S): 5000 INJECTION INTRAVENOUS; SUBCUTANEOUS at 22:37

## 2023-01-01 RX ADMIN — HYDROMORPHONE HYDROCHLORIDE 1 MILLIGRAM(S): 2 INJECTION INTRAMUSCULAR; INTRAVENOUS; SUBCUTANEOUS at 23:30

## 2023-01-01 RX ADMIN — MIDODRINE HYDROCHLORIDE 10 MILLIGRAM(S): 2.5 TABLET ORAL at 14:34

## 2023-01-01 RX ADMIN — Medication 2: at 12:48

## 2023-01-01 RX ADMIN — ALTEPLASE 2 MILLIGRAM(S): KIT at 12:03

## 2023-01-01 RX ADMIN — Medication 10 MILLIGRAM(S): at 15:38

## 2023-01-01 RX ADMIN — Medication 6 MILLIGRAM(S): at 12:07

## 2023-01-01 RX ADMIN — HEPARIN SODIUM 5000 UNIT(S): 5000 INJECTION INTRAVENOUS; SUBCUTANEOUS at 13:55

## 2023-01-01 RX ADMIN — ERYTHROPOIETIN 10000 UNIT(S): 10000 INJECTION, SOLUTION INTRAVENOUS; SUBCUTANEOUS at 16:37

## 2023-01-01 RX ADMIN — HEPARIN SODIUM 5000 UNIT(S): 5000 INJECTION INTRAVENOUS; SUBCUTANEOUS at 21:55

## 2023-01-01 RX ADMIN — POLYETHYLENE GLYCOL 3350 17 GRAM(S): 17 POWDER, FOR SOLUTION ORAL at 18:35

## 2023-01-01 RX ADMIN — LEVETIRACETAM 500 MILLIGRAM(S): 250 TABLET, FILM COATED ORAL at 18:12

## 2023-01-01 RX ADMIN — HEPARIN SODIUM 5000 UNIT(S): 5000 INJECTION INTRAVENOUS; SUBCUTANEOUS at 05:51

## 2023-01-01 RX ADMIN — HYDROMORPHONE HYDROCHLORIDE 1 MILLIGRAM(S): 2 INJECTION INTRAMUSCULAR; INTRAVENOUS; SUBCUTANEOUS at 15:26

## 2023-01-01 RX ADMIN — Medication 1 APPLICATION(S): at 17:29

## 2023-01-01 RX ADMIN — Medication 5 MILLIGRAM(S): at 05:26

## 2023-01-01 RX ADMIN — LEVETIRACETAM 500 MILLIGRAM(S): 250 TABLET, FILM COATED ORAL at 06:50

## 2023-01-01 RX ADMIN — Medication 125 MILLIGRAM(S): at 05:20

## 2023-01-01 RX ADMIN — CHLORHEXIDINE GLUCONATE 15 MILLILITER(S): 213 SOLUTION TOPICAL at 05:10

## 2023-01-01 RX ADMIN — Medication 2: at 12:41

## 2023-01-01 RX ADMIN — Medication 2: at 06:17

## 2023-01-01 RX ADMIN — CHLORHEXIDINE GLUCONATE 1 APPLICATION(S): 213 SOLUTION TOPICAL at 06:33

## 2023-01-01 RX ADMIN — Medication 50 MILLIEQUIVALENT(S): at 10:07

## 2023-01-01 RX ADMIN — CEFTRIAXONE 100 MILLIGRAM(S): 500 INJECTION, POWDER, FOR SOLUTION INTRAMUSCULAR; INTRAVENOUS at 23:41

## 2023-01-01 RX ADMIN — ATOVAQUONE 1500 MILLIGRAM(S): 750 SUSPENSION ORAL at 11:58

## 2023-01-01 RX ADMIN — Medication 125 MILLIGRAM(S): at 06:02

## 2023-01-01 RX ADMIN — Medication 2: at 13:59

## 2023-01-01 RX ADMIN — Medication 1 APPLICATION(S): at 17:18

## 2023-01-01 RX ADMIN — Medication 1 APPLICATION(S): at 18:35

## 2023-01-01 RX ADMIN — Medication 18.75 MICROGRAM(S): at 05:48

## 2023-01-01 RX ADMIN — Medication 1 APPLICATION(S): at 23:27

## 2023-01-01 RX ADMIN — Medication 2.91 MICROGRAM(S)/KG/MIN: at 08:18

## 2023-01-01 RX ADMIN — CHLORHEXIDINE GLUCONATE 1 APPLICATION(S): 213 SOLUTION TOPICAL at 23:10

## 2023-01-01 RX ADMIN — Medication 1 APPLICATION(S): at 17:52

## 2023-01-01 RX ADMIN — Medication 650 MILLIGRAM(S): at 21:00

## 2023-01-01 RX ADMIN — CHLORHEXIDINE GLUCONATE 15 MILLILITER(S): 213 SOLUTION TOPICAL at 18:43

## 2023-01-01 RX ADMIN — NALOXEGOL OXALATE 12.5 MILLIGRAM(S): 12.5 TABLET, FILM COATED ORAL at 12:00

## 2023-01-01 RX ADMIN — LEVETIRACETAM 500 MILLIGRAM(S): 250 TABLET, FILM COATED ORAL at 17:35

## 2023-01-01 RX ADMIN — LEVETIRACETAM 500 MILLIGRAM(S): 250 TABLET, FILM COATED ORAL at 06:32

## 2023-01-01 RX ADMIN — FLUDROCORTISONE ACETATE 0.05 MILLIGRAM(S): 0.1 TABLET ORAL at 05:10

## 2023-01-01 RX ADMIN — HEPARIN SODIUM 5000 UNIT(S): 5000 INJECTION INTRAVENOUS; SUBCUTANEOUS at 22:14

## 2023-01-01 RX ADMIN — FENTANYL CITRATE 1.89 MICROGRAM(S)/KG/HR: 50 INJECTION INTRAVENOUS at 11:23

## 2023-01-01 RX ADMIN — Medication 1 APPLICATION(S): at 18:39

## 2023-01-01 RX ADMIN — HEPARIN SODIUM 5000 UNIT(S): 5000 INJECTION INTRAVENOUS; SUBCUTANEOUS at 14:23

## 2023-01-01 RX ADMIN — Medication 18 MICROGRAM(S): at 21:30

## 2023-01-01 RX ADMIN — Medication 125 MILLIGRAM(S): at 11:22

## 2023-01-01 RX ADMIN — ATOVAQUONE 1500 MILLIGRAM(S): 750 SUSPENSION ORAL at 14:58

## 2023-01-01 RX ADMIN — LEVETIRACETAM 400 MILLIGRAM(S): 250 TABLET, FILM COATED ORAL at 12:57

## 2023-01-01 RX ADMIN — HEPARIN SODIUM 5000 UNIT(S): 5000 INJECTION INTRAVENOUS; SUBCUTANEOUS at 14:31

## 2023-01-01 RX ADMIN — CEFTRIAXONE 100 MILLIGRAM(S): 500 INJECTION, POWDER, FOR SOLUTION INTRAMUSCULAR; INTRAVENOUS at 23:00

## 2023-01-01 RX ADMIN — Medication 125 MILLIGRAM(S): at 17:47

## 2023-01-01 RX ADMIN — Medication 2: at 12:35

## 2023-01-01 RX ADMIN — Medication 4 DROP(S): at 17:47

## 2023-01-01 RX ADMIN — ATOVAQUONE 1500 MILLIGRAM(S): 750 SUSPENSION ORAL at 11:55

## 2023-01-01 RX ADMIN — CHLORHEXIDINE GLUCONATE 1 APPLICATION(S): 213 SOLUTION TOPICAL at 05:42

## 2023-01-01 RX ADMIN — SENNA PLUS 2 TABLET(S): 8.6 TABLET ORAL at 05:13

## 2023-01-01 RX ADMIN — Medication 2: at 23:40

## 2023-01-01 RX ADMIN — AMLODIPINE BESYLATE 10 MILLIGRAM(S): 2.5 TABLET ORAL at 17:49

## 2023-01-01 RX ADMIN — Medication 650 MILLIGRAM(S): at 10:15

## 2023-01-01 RX ADMIN — Medication 100 MILLIGRAM(S): at 14:23

## 2023-01-01 RX ADMIN — ATOVAQUONE 1500 MILLIGRAM(S): 750 SUSPENSION ORAL at 13:18

## 2023-01-01 RX ADMIN — HYDROMORPHONE HYDROCHLORIDE 1 MILLIGRAM(S): 2 INJECTION INTRAMUSCULAR; INTRAVENOUS; SUBCUTANEOUS at 21:53

## 2023-01-01 RX ADMIN — Medication 2: at 11:35

## 2023-01-01 RX ADMIN — LEVETIRACETAM 400 MILLIGRAM(S): 250 TABLET, FILM COATED ORAL at 05:31

## 2023-01-01 RX ADMIN — Medication 25 MICROGRAM(S): at 06:01

## 2023-01-01 RX ADMIN — Medication 1 APPLICATION(S): at 17:58

## 2023-01-01 RX ADMIN — Medication 25 MICROGRAM(S): at 06:58

## 2023-01-01 RX ADMIN — HEPARIN SODIUM 5000 UNIT(S): 5000 INJECTION INTRAVENOUS; SUBCUTANEOUS at 22:52

## 2023-01-01 RX ADMIN — Medication 4 DROP(S): at 17:31

## 2023-01-01 RX ADMIN — Medication 1 APPLICATION(S): at 05:41

## 2023-01-01 RX ADMIN — LEVETIRACETAM 500 MILLIGRAM(S): 250 TABLET, FILM COATED ORAL at 05:34

## 2023-01-01 RX ADMIN — LACOSAMIDE 200 MILLIGRAM(S): 50 TABLET ORAL at 05:58

## 2023-01-01 RX ADMIN — Medication 2: at 06:02

## 2023-01-01 RX ADMIN — LEVETIRACETAM 500 MILLIGRAM(S): 250 TABLET, FILM COATED ORAL at 05:23

## 2023-01-01 RX ADMIN — LACOSAMIDE 140 MILLIGRAM(S): 50 TABLET ORAL at 06:03

## 2023-01-01 RX ADMIN — LEVETIRACETAM 500 MILLIGRAM(S): 250 TABLET, FILM COATED ORAL at 05:19

## 2023-01-01 RX ADMIN — Medication 2: at 18:18

## 2023-01-01 RX ADMIN — POLYETHYLENE GLYCOL 3350 17 GRAM(S): 17 POWDER, FOR SOLUTION ORAL at 06:54

## 2023-01-01 RX ADMIN — CEFTRIAXONE 100 MILLIGRAM(S): 500 INJECTION, POWDER, FOR SOLUTION INTRAMUSCULAR; INTRAVENOUS at 05:39

## 2023-01-01 RX ADMIN — Medication 125 MILLIGRAM(S): at 12:15

## 2023-01-01 RX ADMIN — CEFTRIAXONE 100 MILLIGRAM(S): 500 INJECTION, POWDER, FOR SOLUTION INTRAMUSCULAR; INTRAVENOUS at 12:07

## 2023-01-01 RX ADMIN — Medication 650 MILLIGRAM(S): at 22:47

## 2023-01-01 RX ADMIN — LACOSAMIDE 140 MILLIGRAM(S): 50 TABLET ORAL at 12:49

## 2023-01-01 RX ADMIN — Medication 125 MILLIGRAM(S): at 17:23

## 2023-01-01 RX ADMIN — CEFTRIAXONE 100 MILLIGRAM(S): 500 INJECTION, POWDER, FOR SOLUTION INTRAMUSCULAR; INTRAVENOUS at 05:10

## 2023-01-01 RX ADMIN — Medication 4 DROP(S): at 20:43

## 2023-01-01 RX ADMIN — LACOSAMIDE 140 MILLIGRAM(S): 50 TABLET ORAL at 17:58

## 2023-01-01 RX ADMIN — HEPARIN SODIUM 5000 UNIT(S): 5000 INJECTION INTRAVENOUS; SUBCUTANEOUS at 05:05

## 2023-01-01 RX ADMIN — Medication 25 MICROGRAM(S): at 05:41

## 2023-01-01 RX ADMIN — Medication 5 MILLIGRAM(S): at 06:00

## 2023-01-01 RX ADMIN — Medication 18 MICROGRAM(S): at 23:36

## 2023-01-01 RX ADMIN — ATOVAQUONE 1500 MILLIGRAM(S): 750 SUSPENSION ORAL at 13:04

## 2023-01-01 RX ADMIN — LEVETIRACETAM 750 MILLIGRAM(S): 250 TABLET, FILM COATED ORAL at 18:34

## 2023-01-01 RX ADMIN — Medication 650 MILLIGRAM(S): at 17:41

## 2023-01-01 RX ADMIN — Medication 2: at 12:23

## 2023-01-01 RX ADMIN — LACOSAMIDE 200 MILLIGRAM(S): 50 TABLET ORAL at 05:41

## 2023-01-01 RX ADMIN — Medication 5.82 MICROGRAM(S)/KG/MIN: at 13:03

## 2023-01-01 RX ADMIN — Medication 10 MILLIGRAM(S): at 13:24

## 2023-01-01 RX ADMIN — LEVETIRACETAM 500 MILLIGRAM(S): 250 TABLET, FILM COATED ORAL at 05:41

## 2023-01-01 RX ADMIN — Medication 125 MILLIGRAM(S): at 11:58

## 2023-01-01 RX ADMIN — Medication 1 PATCH: at 22:42

## 2023-01-01 RX ADMIN — Medication 0: at 23:57

## 2023-01-01 RX ADMIN — HEPARIN SODIUM 5000 UNIT(S): 5000 INJECTION INTRAVENOUS; SUBCUTANEOUS at 22:13

## 2023-01-01 RX ADMIN — BUMETANIDE 132 MILLIGRAM(S): 0.25 INJECTION INTRAMUSCULAR; INTRAVENOUS at 09:58

## 2023-01-01 RX ADMIN — ATOVAQUONE 1500 MILLIGRAM(S): 750 SUSPENSION ORAL at 11:19

## 2023-01-01 RX ADMIN — Medication 0.5 MILLIGRAM(S): at 15:25

## 2023-01-01 RX ADMIN — Medication 650 MILLIGRAM(S): at 01:00

## 2023-01-01 RX ADMIN — MIDAZOLAM HYDROCHLORIDE 3.11 MG/KG/HR: 1 INJECTION, SOLUTION INTRAMUSCULAR; INTRAVENOUS at 07:33

## 2023-01-01 RX ADMIN — Medication 4: at 18:51

## 2023-01-01 RX ADMIN — AMLODIPINE BESYLATE 10 MILLIGRAM(S): 2.5 TABLET ORAL at 06:00

## 2023-01-01 RX ADMIN — CEFTRIAXONE 100 MILLIGRAM(S): 500 INJECTION, POWDER, FOR SOLUTION INTRAMUSCULAR; INTRAVENOUS at 12:12

## 2023-01-01 RX ADMIN — Medication 125 MILLIGRAM(S): at 12:28

## 2023-01-01 RX ADMIN — CHLORHEXIDINE GLUCONATE 15 MILLILITER(S): 213 SOLUTION TOPICAL at 05:50

## 2023-01-01 RX ADMIN — Medication 5 MILLIGRAM(S): at 05:07

## 2023-01-01 RX ADMIN — CEFTRIAXONE 100 MILLIGRAM(S): 500 INJECTION, POWDER, FOR SOLUTION INTRAMUSCULAR; INTRAVENOUS at 03:48

## 2023-01-01 RX ADMIN — CHLORHEXIDINE GLUCONATE 15 MILLILITER(S): 213 SOLUTION TOPICAL at 06:51

## 2023-01-01 RX ADMIN — Medication 1 APPLICATION(S): at 18:15

## 2023-01-01 RX ADMIN — CHLORHEXIDINE GLUCONATE 1 APPLICATION(S): 213 SOLUTION TOPICAL at 05:56

## 2023-01-01 RX ADMIN — CHLORHEXIDINE GLUCONATE 1 APPLICATION(S): 213 SOLUTION TOPICAL at 05:57

## 2023-01-01 RX ADMIN — HEPARIN SODIUM 5000 UNIT(S): 5000 INJECTION INTRAVENOUS; SUBCUTANEOUS at 14:33

## 2023-01-01 RX ADMIN — Medication 100 MILLIEQUIVALENT(S): at 05:27

## 2023-01-01 RX ADMIN — ATOVAQUONE 1500 MILLIGRAM(S): 750 SUSPENSION ORAL at 12:15

## 2023-01-01 RX ADMIN — Medication 2: at 05:30

## 2023-01-01 RX ADMIN — Medication 4 DROP(S): at 17:28

## 2023-01-01 RX ADMIN — HEPARIN SODIUM 5000 UNIT(S): 5000 INJECTION INTRAVENOUS; SUBCUTANEOUS at 14:38

## 2023-01-01 RX ADMIN — POLYETHYLENE GLYCOL 3350 17 GRAM(S): 17 POWDER, FOR SOLUTION ORAL at 18:40

## 2023-01-01 RX ADMIN — MIDODRINE HYDROCHLORIDE 10 MILLIGRAM(S): 2.5 TABLET ORAL at 21:42

## 2023-01-01 RX ADMIN — Medication 4 DROP(S): at 05:27

## 2023-01-01 RX ADMIN — Medication 4 DROP(S): at 18:23

## 2023-01-01 RX ADMIN — CHLORHEXIDINE GLUCONATE 15 MILLILITER(S): 213 SOLUTION TOPICAL at 05:49

## 2023-01-01 RX ADMIN — CEFTRIAXONE 100 MILLIGRAM(S): 500 INJECTION, POWDER, FOR SOLUTION INTRAMUSCULAR; INTRAVENOUS at 18:11

## 2023-01-01 RX ADMIN — Medication 2: at 01:04

## 2023-01-01 RX ADMIN — Medication 4: at 11:38

## 2023-01-01 RX ADMIN — Medication 125 MILLIGRAM(S): at 23:47

## 2023-01-01 RX ADMIN — SENNA PLUS 10 MILLILITER(S): 8.6 TABLET ORAL at 05:26

## 2023-01-01 RX ADMIN — HEPARIN SODIUM 5000 UNIT(S): 5000 INJECTION INTRAVENOUS; SUBCUTANEOUS at 05:20

## 2023-01-01 RX ADMIN — SENNA PLUS 10 MILLILITER(S): 8.6 TABLET ORAL at 05:54

## 2023-01-01 RX ADMIN — Medication 5 MILLIGRAM(S): at 06:01

## 2023-01-01 RX ADMIN — MIDAZOLAM HYDROCHLORIDE 3.11 MG/KG/HR: 1 INJECTION, SOLUTION INTRAMUSCULAR; INTRAVENOUS at 17:48

## 2023-01-01 RX ADMIN — LEVETIRACETAM 500 MILLIGRAM(S): 250 TABLET, FILM COATED ORAL at 05:58

## 2023-01-01 RX ADMIN — AMLODIPINE BESYLATE 10 MILLIGRAM(S): 2.5 TABLET ORAL at 06:11

## 2023-01-01 RX ADMIN — Medication 18 MICROGRAM(S): at 22:12

## 2023-01-01 RX ADMIN — LACOSAMIDE 160 MILLIGRAM(S): 50 TABLET ORAL at 13:36

## 2023-01-01 RX ADMIN — Medication 650 MILLIGRAM(S): at 22:33

## 2023-01-01 RX ADMIN — Medication 2: at 05:28

## 2023-01-01 RX ADMIN — LACOSAMIDE 140 MILLIGRAM(S): 50 TABLET ORAL at 17:48

## 2023-01-01 RX ADMIN — CEFTRIAXONE 100 MILLIGRAM(S): 500 INJECTION, POWDER, FOR SOLUTION INTRAMUSCULAR; INTRAVENOUS at 23:40

## 2023-01-01 RX ADMIN — CHLORHEXIDINE GLUCONATE 1 APPLICATION(S): 213 SOLUTION TOPICAL at 06:52

## 2023-01-01 RX ADMIN — Medication 2: at 06:53

## 2023-01-01 RX ADMIN — LEVETIRACETAM 400 MILLIGRAM(S): 250 TABLET, FILM COATED ORAL at 17:49

## 2023-01-01 RX ADMIN — CEFTRIAXONE 100 MILLIGRAM(S): 500 INJECTION, POWDER, FOR SOLUTION INTRAMUSCULAR; INTRAVENOUS at 11:53

## 2023-01-01 RX ADMIN — CHLORHEXIDINE GLUCONATE 15 MILLILITER(S): 213 SOLUTION TOPICAL at 05:13

## 2023-01-01 RX ADMIN — SENNA PLUS 10 MILLILITER(S): 8.6 TABLET ORAL at 17:20

## 2023-01-01 RX ADMIN — CHLORHEXIDINE GLUCONATE 1 APPLICATION(S): 213 SOLUTION TOPICAL at 05:31

## 2023-01-01 RX ADMIN — MIDODRINE HYDROCHLORIDE 10 MILLIGRAM(S): 2.5 TABLET ORAL at 23:12

## 2023-01-01 RX ADMIN — Medication 18 MICROGRAM(S): at 23:18

## 2023-01-01 RX ADMIN — Medication 4 DROP(S): at 05:30

## 2023-01-01 RX ADMIN — MIDODRINE HYDROCHLORIDE 10 MILLIGRAM(S): 2.5 TABLET ORAL at 05:33

## 2023-01-01 RX ADMIN — Medication 650 MILLIGRAM(S): at 16:25

## 2023-01-01 RX ADMIN — LEVETIRACETAM 500 MILLIGRAM(S): 250 TABLET, FILM COATED ORAL at 18:06

## 2023-01-01 RX ADMIN — Medication 4 DROP(S): at 17:59

## 2023-01-01 RX ADMIN — LEVETIRACETAM 400 MILLIGRAM(S): 250 TABLET, FILM COATED ORAL at 20:35

## 2023-01-01 RX ADMIN — Medication 4: at 12:15

## 2023-01-01 RX ADMIN — Medication 4: at 00:27

## 2023-01-01 RX ADMIN — LEVETIRACETAM 500 MILLIGRAM(S): 250 TABLET, FILM COATED ORAL at 05:51

## 2023-01-01 RX ADMIN — CEFTRIAXONE 100 MILLIGRAM(S): 500 INJECTION, POWDER, FOR SOLUTION INTRAMUSCULAR; INTRAVENOUS at 23:18

## 2023-01-01 RX ADMIN — Medication 1 APPLICATION(S): at 05:54

## 2023-01-01 RX ADMIN — LACOSAMIDE 200 MILLIGRAM(S): 50 TABLET ORAL at 18:06

## 2023-01-01 RX ADMIN — Medication 650 MILLIGRAM(S): at 21:54

## 2023-01-01 RX ADMIN — CEFTRIAXONE 100 MILLIGRAM(S): 500 INJECTION, POWDER, FOR SOLUTION INTRAMUSCULAR; INTRAVENOUS at 11:56

## 2023-01-01 RX ADMIN — MIDODRINE HYDROCHLORIDE 10 MILLIGRAM(S): 2.5 TABLET ORAL at 23:10

## 2023-01-01 RX ADMIN — MIDODRINE HYDROCHLORIDE 10 MILLIGRAM(S): 2.5 TABLET ORAL at 05:58

## 2023-01-01 RX ADMIN — CEFTRIAXONE 100 MILLIGRAM(S): 500 INJECTION, POWDER, FOR SOLUTION INTRAMUSCULAR; INTRAVENOUS at 12:28

## 2023-01-01 RX ADMIN — SODIUM CHLORIDE 50 MILLILITER(S): 9 INJECTION INTRAMUSCULAR; INTRAVENOUS; SUBCUTANEOUS at 03:50

## 2023-01-01 RX ADMIN — Medication 125 MILLIGRAM(S): at 12:05

## 2023-01-01 RX ADMIN — HEPARIN SODIUM 5000 UNIT(S): 5000 INJECTION INTRAVENOUS; SUBCUTANEOUS at 06:01

## 2023-01-01 RX ADMIN — Medication 2 MILLIGRAM(S): at 18:40

## 2023-01-01 RX ADMIN — CHLORHEXIDINE GLUCONATE 15 MILLILITER(S): 213 SOLUTION TOPICAL at 06:10

## 2023-01-01 RX ADMIN — Medication 2: at 17:33

## 2023-01-01 RX ADMIN — Medication 100 MILLIGRAM(S): at 05:10

## 2023-01-01 RX ADMIN — CHLORHEXIDINE GLUCONATE 15 MILLILITER(S): 213 SOLUTION TOPICAL at 17:16

## 2023-01-01 RX ADMIN — MIDAZOLAM HYDROCHLORIDE 3.11 MG/KG/HR: 1 INJECTION, SOLUTION INTRAMUSCULAR; INTRAVENOUS at 12:47

## 2023-01-01 RX ADMIN — Medication 8: at 23:13

## 2023-01-01 RX ADMIN — NALOXEGOL OXALATE 12.5 MILLIGRAM(S): 12.5 TABLET, FILM COATED ORAL at 11:41

## 2023-01-01 RX ADMIN — HEPARIN SODIUM 5000 UNIT(S): 5000 INJECTION INTRAVENOUS; SUBCUTANEOUS at 23:31

## 2023-01-01 RX ADMIN — Medication 1 PATCH: at 09:49

## 2023-01-01 RX ADMIN — CEFTRIAXONE 100 MILLIGRAM(S): 500 INJECTION, POWDER, FOR SOLUTION INTRAMUSCULAR; INTRAVENOUS at 11:22

## 2023-01-01 RX ADMIN — Medication 650 MILLIGRAM(S): at 06:38

## 2023-01-01 RX ADMIN — CEFTRIAXONE 100 MILLIGRAM(S): 500 INJECTION, POWDER, FOR SOLUTION INTRAMUSCULAR; INTRAVENOUS at 00:45

## 2023-01-01 RX ADMIN — HEPARIN SODIUM 5000 UNIT(S): 5000 INJECTION INTRAVENOUS; SUBCUTANEOUS at 05:28

## 2023-01-01 RX ADMIN — Medication 250 MILLILITER(S): at 23:39

## 2023-01-01 RX ADMIN — HYDROMORPHONE HYDROCHLORIDE 1 MILLIGRAM(S): 2 INJECTION INTRAMUSCULAR; INTRAVENOUS; SUBCUTANEOUS at 11:23

## 2023-01-01 RX ADMIN — Medication 4 DROP(S): at 17:36

## 2023-01-01 RX ADMIN — Medication 4 DROP(S): at 05:20

## 2023-01-01 RX ADMIN — CHLORHEXIDINE GLUCONATE 15 MILLILITER(S): 213 SOLUTION TOPICAL at 05:31

## 2023-01-01 RX ADMIN — CHLORHEXIDINE GLUCONATE 15 MILLILITER(S): 213 SOLUTION TOPICAL at 05:20

## 2023-01-01 RX ADMIN — ATOVAQUONE 1500 MILLIGRAM(S): 750 SUSPENSION ORAL at 12:28

## 2023-01-01 RX ADMIN — Medication 650 MILLIGRAM(S): at 06:16

## 2023-01-01 RX ADMIN — Medication 4 DROP(S): at 18:07

## 2023-01-01 RX ADMIN — CHLORHEXIDINE GLUCONATE 15 MILLILITER(S): 213 SOLUTION TOPICAL at 05:11

## 2023-01-01 RX ADMIN — CHLORHEXIDINE GLUCONATE 15 MILLILITER(S): 213 SOLUTION TOPICAL at 17:52

## 2023-01-01 RX ADMIN — Medication 5 MILLIGRAM(S): at 05:49

## 2023-01-01 RX ADMIN — Medication 1 APPLICATION(S): at 05:51

## 2023-01-01 RX ADMIN — Medication 2: at 01:00

## 2023-01-01 RX ADMIN — CHLORHEXIDINE GLUCONATE 15 MILLILITER(S): 213 SOLUTION TOPICAL at 05:28

## 2023-01-01 RX ADMIN — Medication 18 MICROGRAM(S): at 22:33

## 2023-01-01 RX ADMIN — Medication 1 APPLICATION(S): at 17:11

## 2023-01-01 RX ADMIN — Medication 100 MILLIGRAM(S): at 21:53

## 2023-01-01 RX ADMIN — HEPARIN SODIUM 5000 UNIT(S): 5000 INJECTION INTRAVENOUS; SUBCUTANEOUS at 22:02

## 2023-01-01 RX ADMIN — Medication 4 DROP(S): at 17:17

## 2023-01-01 RX ADMIN — Medication 2 MILLIGRAM(S): at 19:30

## 2023-01-01 RX ADMIN — LACOSAMIDE 200 MILLIGRAM(S): 50 TABLET ORAL at 05:21

## 2023-01-01 RX ADMIN — HEPARIN SODIUM 5000 UNIT(S): 5000 INJECTION INTRAVENOUS; SUBCUTANEOUS at 21:30

## 2023-01-01 RX ADMIN — MIDODRINE HYDROCHLORIDE 10 MILLIGRAM(S): 2.5 TABLET ORAL at 22:11

## 2023-01-01 RX ADMIN — SENNA PLUS 10 MILLILITER(S): 8.6 TABLET ORAL at 18:35

## 2023-01-01 RX ADMIN — AMLODIPINE BESYLATE 10 MILLIGRAM(S): 2.5 TABLET ORAL at 05:27

## 2023-01-01 RX ADMIN — CEFTRIAXONE 100 MILLIGRAM(S): 500 INJECTION, POWDER, FOR SOLUTION INTRAMUSCULAR; INTRAVENOUS at 17:11

## 2023-01-01 RX ADMIN — Medication 5 MILLIGRAM(S): at 05:13

## 2023-01-01 RX ADMIN — LEVETIRACETAM 400 MILLIGRAM(S): 250 TABLET, FILM COATED ORAL at 05:10

## 2023-01-01 RX ADMIN — CEFTRIAXONE 100 MILLIGRAM(S): 500 INJECTION, POWDER, FOR SOLUTION INTRAMUSCULAR; INTRAVENOUS at 23:25

## 2023-01-01 RX ADMIN — Medication 10 MILLIGRAM(S): at 08:54

## 2023-01-01 RX ADMIN — Medication 2: at 11:56

## 2023-01-01 RX ADMIN — HEPARIN SODIUM 5000 UNIT(S): 5000 INJECTION INTRAVENOUS; SUBCUTANEOUS at 23:12

## 2023-01-01 RX ADMIN — Medication 18 MICROGRAM(S): at 22:01

## 2023-01-01 RX ADMIN — CEFTRIAXONE 100 MILLIGRAM(S): 500 INJECTION, POWDER, FOR SOLUTION INTRAMUSCULAR; INTRAVENOUS at 11:40

## 2023-01-01 RX ADMIN — Medication 2: at 02:14

## 2023-01-01 RX ADMIN — LEVETIRACETAM 500 MILLIGRAM(S): 250 TABLET, FILM COATED ORAL at 06:00

## 2023-01-01 RX ADMIN — CEFTRIAXONE 100 MILLIGRAM(S): 500 INJECTION, POWDER, FOR SOLUTION INTRAMUSCULAR; INTRAVENOUS at 23:38

## 2023-01-01 RX ADMIN — ATOVAQUONE 1500 MILLIGRAM(S): 750 SUSPENSION ORAL at 02:06

## 2023-01-01 RX ADMIN — Medication 125 MILLIGRAM(S): at 00:42

## 2023-01-01 RX ADMIN — HEPARIN SODIUM 5000 UNIT(S): 5000 INJECTION INTRAVENOUS; SUBCUTANEOUS at 22:33

## 2023-01-01 RX ADMIN — Medication 2: at 17:45

## 2023-01-01 RX ADMIN — LEVETIRACETAM 400 MILLIGRAM(S): 250 TABLET, FILM COATED ORAL at 05:11

## 2023-01-01 RX ADMIN — HEPARIN SODIUM 5000 UNIT(S): 5000 INJECTION INTRAVENOUS; SUBCUTANEOUS at 23:28

## 2023-01-01 RX ADMIN — ATOVAQUONE 1500 MILLIGRAM(S): 750 SUSPENSION ORAL at 12:00

## 2023-01-01 RX ADMIN — Medication 2: at 17:23

## 2023-01-01 RX ADMIN — Medication 2: at 05:33

## 2023-01-01 RX ADMIN — Medication 650 MILLIGRAM(S): at 20:07

## 2023-01-01 RX ADMIN — HYDROMORPHONE HYDROCHLORIDE 1 MILLIGRAM(S): 2 INJECTION INTRAMUSCULAR; INTRAVENOUS; SUBCUTANEOUS at 13:28

## 2023-01-01 RX ADMIN — CHLORHEXIDINE GLUCONATE 1 APPLICATION(S): 213 SOLUTION TOPICAL at 07:09

## 2023-01-01 RX ADMIN — Medication 5 MILLIGRAM(S): at 06:11

## 2023-01-01 RX ADMIN — Medication 1 APPLICATION(S): at 17:31

## 2023-01-01 RX ADMIN — MIDODRINE HYDROCHLORIDE 10 MILLIGRAM(S): 2.5 TABLET ORAL at 14:01

## 2023-01-01 RX ADMIN — Medication 100 MILLIGRAM(S): at 22:37

## 2023-01-01 RX ADMIN — Medication 125 MILLIGRAM(S): at 06:32

## 2023-01-01 RX ADMIN — Medication 50 MILLIGRAM(S): at 01:51

## 2023-01-01 RX ADMIN — CEFTRIAXONE 100 MILLIGRAM(S): 500 INJECTION, POWDER, FOR SOLUTION INTRAMUSCULAR; INTRAVENOUS at 12:21

## 2023-01-01 RX ADMIN — LEVETIRACETAM 400 MILLIGRAM(S): 250 TABLET, FILM COATED ORAL at 06:00

## 2023-01-01 RX ADMIN — Medication 650 MILLIGRAM(S): at 21:10

## 2023-01-01 RX ADMIN — Medication 250 MILLIGRAM(S): at 06:12

## 2023-01-01 RX ADMIN — Medication 125 MILLIGRAM(S): at 17:45

## 2023-01-01 RX ADMIN — MEROPENEM 100 MILLIGRAM(S): 1 INJECTION INTRAVENOUS at 19:09

## 2023-01-01 RX ADMIN — SENNA PLUS 10 MILLILITER(S): 8.6 TABLET ORAL at 06:55

## 2023-01-01 RX ADMIN — CHLORHEXIDINE GLUCONATE 1 APPLICATION(S): 213 SOLUTION TOPICAL at 06:04

## 2023-01-01 RX ADMIN — HEPARIN SODIUM 5000 UNIT(S): 5000 INJECTION INTRAVENOUS; SUBCUTANEOUS at 13:54

## 2023-01-01 RX ADMIN — CEFTRIAXONE 100 MILLIGRAM(S): 500 INJECTION, POWDER, FOR SOLUTION INTRAMUSCULAR; INTRAVENOUS at 11:23

## 2023-01-01 RX ADMIN — CEFTRIAXONE 100 MILLIGRAM(S): 500 INJECTION, POWDER, FOR SOLUTION INTRAMUSCULAR; INTRAVENOUS at 17:18

## 2023-01-01 RX ADMIN — Medication 2: at 00:47

## 2023-01-01 RX ADMIN — HEPARIN SODIUM 5000 UNIT(S): 5000 INJECTION INTRAVENOUS; SUBCUTANEOUS at 06:52

## 2023-01-01 RX ADMIN — Medication 2: at 05:51

## 2023-01-01 RX ADMIN — Medication 2: at 05:56

## 2023-01-01 RX ADMIN — Medication 10 MILLIGRAM(S): at 01:12

## 2023-01-01 RX ADMIN — Medication 10 MILLIGRAM(S): at 20:16

## 2023-01-01 RX ADMIN — CHLORHEXIDINE GLUCONATE 1 APPLICATION(S): 213 SOLUTION TOPICAL at 06:09

## 2023-01-01 RX ADMIN — HEPARIN SODIUM 5000 UNIT(S): 5000 INJECTION INTRAVENOUS; SUBCUTANEOUS at 23:10

## 2023-01-01 RX ADMIN — LEVETIRACETAM 500 MILLIGRAM(S): 250 TABLET, FILM COATED ORAL at 05:40

## 2023-01-01 RX ADMIN — HYDROMORPHONE HYDROCHLORIDE 1 MILLIGRAM(S): 2 INJECTION INTRAMUSCULAR; INTRAVENOUS; SUBCUTANEOUS at 09:19

## 2023-01-01 RX ADMIN — Medication 5 MILLIGRAM(S): at 06:32

## 2023-01-01 RX ADMIN — HEPARIN SODIUM 5000 UNIT(S): 5000 INJECTION INTRAVENOUS; SUBCUTANEOUS at 21:42

## 2023-01-01 RX ADMIN — Medication 4 DROP(S): at 17:09

## 2023-01-01 RX ADMIN — CEFTRIAXONE 100 MILLIGRAM(S): 500 INJECTION, POWDER, FOR SOLUTION INTRAMUSCULAR; INTRAVENOUS at 23:06

## 2023-01-01 RX ADMIN — Medication 2: at 13:17

## 2023-01-01 RX ADMIN — Medication 2: at 00:00

## 2023-01-01 RX ADMIN — Medication 1 APPLICATION(S): at 17:35

## 2023-01-01 RX ADMIN — MIDODRINE HYDROCHLORIDE 10 MILLIGRAM(S): 2.5 TABLET ORAL at 05:19

## 2023-01-01 RX ADMIN — Medication 4 DROP(S): at 11:09

## 2023-01-01 RX ADMIN — Medication 650 MILLIGRAM(S): at 05:19

## 2023-01-01 RX ADMIN — Medication 4: at 23:58

## 2023-01-01 RX ADMIN — HEPARIN SODIUM 5000 UNIT(S): 5000 INJECTION INTRAVENOUS; SUBCUTANEOUS at 06:33

## 2023-01-01 RX ADMIN — Medication 1 APPLICATION(S): at 05:14

## 2023-01-01 RX ADMIN — Medication 650 MILLIGRAM(S): at 08:31

## 2023-01-01 RX ADMIN — ATOVAQUONE 1500 MILLIGRAM(S): 750 SUSPENSION ORAL at 11:45

## 2023-01-01 RX ADMIN — Medication 18 MICROGRAM(S): at 21:12

## 2023-01-01 RX ADMIN — LACOSAMIDE 200 MILLIGRAM(S): 50 TABLET ORAL at 17:52

## 2023-01-01 RX ADMIN — Medication 125 MILLIGRAM(S): at 18:13

## 2023-01-01 RX ADMIN — Medication 650 MILLIGRAM(S): at 06:03

## 2023-01-01 RX ADMIN — Medication 10 MILLIGRAM(S): at 20:06

## 2023-01-01 RX ADMIN — Medication 650 MILLIGRAM(S): at 22:45

## 2023-01-01 RX ADMIN — PROPOFOL 2.27 MICROGRAM(S)/KG/MIN: 10 INJECTION, EMULSION INTRAVENOUS at 13:37

## 2023-01-01 RX ADMIN — LACOSAMIDE 200 MILLIGRAM(S): 50 TABLET ORAL at 18:11

## 2023-01-01 RX ADMIN — CEFTRIAXONE 100 MILLIGRAM(S): 500 INJECTION, POWDER, FOR SOLUTION INTRAMUSCULAR; INTRAVENOUS at 23:24

## 2023-01-01 RX ADMIN — HEPARIN SODIUM 5000 UNIT(S): 5000 INJECTION INTRAVENOUS; SUBCUTANEOUS at 05:27

## 2023-01-01 RX ADMIN — Medication 2: at 06:56

## 2023-01-01 RX ADMIN — Medication 2.91 MICROGRAM(S)/KG/MIN: at 11:23

## 2023-01-01 RX ADMIN — SENNA PLUS 10 MILLILITER(S): 8.6 TABLET ORAL at 18:34

## 2023-01-01 RX ADMIN — CHLORHEXIDINE GLUCONATE 1 APPLICATION(S): 213 SOLUTION TOPICAL at 05:11

## 2023-01-01 RX ADMIN — HEPARIN SODIUM 5000 UNIT(S): 5000 INJECTION INTRAVENOUS; SUBCUTANEOUS at 15:30

## 2023-01-01 RX ADMIN — Medication 650 MILLIGRAM(S): at 06:19

## 2023-01-01 RX ADMIN — FENTANYL CITRATE 1.89 MICROGRAM(S)/KG/HR: 50 INJECTION INTRAVENOUS at 19:15

## 2023-01-01 RX ADMIN — CHLORHEXIDINE GLUCONATE 15 MILLILITER(S): 213 SOLUTION TOPICAL at 06:58

## 2023-01-01 RX ADMIN — Medication 2: at 00:38

## 2023-01-01 RX ADMIN — CHLORHEXIDINE GLUCONATE 15 MILLILITER(S): 213 SOLUTION TOPICAL at 17:28

## 2023-01-01 RX ADMIN — ATOVAQUONE 1500 MILLIGRAM(S): 750 SUSPENSION ORAL at 18:40

## 2023-01-01 RX ADMIN — SODIUM CHLORIDE 500 MILLILITER(S): 9 INJECTION INTRAMUSCULAR; INTRAVENOUS; SUBCUTANEOUS at 23:26

## 2023-01-01 RX ADMIN — HEPARIN SODIUM 5000 UNIT(S): 5000 INJECTION INTRAVENOUS; SUBCUTANEOUS at 05:33

## 2023-01-01 RX ADMIN — Medication 2: at 11:44

## 2023-01-01 RX ADMIN — CEFTRIAXONE 100 MILLIGRAM(S): 500 INJECTION, POWDER, FOR SOLUTION INTRAMUSCULAR; INTRAVENOUS at 11:44

## 2023-01-01 RX ADMIN — Medication 4 DROP(S): at 05:42

## 2023-01-01 RX ADMIN — LACOSAMIDE 200 MILLIGRAM(S): 50 TABLET ORAL at 05:20

## 2023-01-01 RX ADMIN — Medication 5 MILLIGRAM(S): at 05:34

## 2023-01-01 RX ADMIN — HYDROMORPHONE HYDROCHLORIDE 1 MILLIGRAM(S): 2 INJECTION INTRAMUSCULAR; INTRAVENOUS; SUBCUTANEOUS at 12:49

## 2023-01-01 RX ADMIN — ATOVAQUONE 1500 MILLIGRAM(S): 750 SUSPENSION ORAL at 11:44

## 2023-01-01 RX ADMIN — Medication 25 MICROGRAM(S): at 05:38

## 2023-01-01 RX ADMIN — HEPARIN SODIUM 5000 UNIT(S): 5000 INJECTION INTRAVENOUS; SUBCUTANEOUS at 23:23

## 2023-01-01 RX ADMIN — POLYETHYLENE GLYCOL 3350 17 GRAM(S): 17 POWDER, FOR SOLUTION ORAL at 17:20

## 2023-01-01 RX ADMIN — Medication 25 MICROGRAM(S): at 05:58

## 2023-01-01 RX ADMIN — HYDROMORPHONE HYDROCHLORIDE 1 MILLIGRAM(S): 2 INJECTION INTRAMUSCULAR; INTRAVENOUS; SUBCUTANEOUS at 06:52

## 2023-01-01 RX ADMIN — Medication 4 DROP(S): at 17:38

## 2023-01-01 RX ADMIN — CEFTRIAXONE 100 MILLIGRAM(S): 500 INJECTION, POWDER, FOR SOLUTION INTRAMUSCULAR; INTRAVENOUS at 12:04

## 2023-01-01 RX ADMIN — Medication 125 MILLIGRAM(S): at 06:01

## 2023-01-01 RX ADMIN — Medication 4 DROP(S): at 17:54

## 2023-01-01 RX ADMIN — Medication 6: at 17:12

## 2023-01-01 RX ADMIN — Medication 1 APPLICATION(S): at 06:55

## 2023-01-01 RX ADMIN — Medication 1 APPLICATION(S): at 19:24

## 2023-01-01 RX ADMIN — Medication 125 MILLIGRAM(S): at 05:23

## 2023-01-01 RX ADMIN — MIDODRINE HYDROCHLORIDE 10 MILLIGRAM(S): 2.5 TABLET ORAL at 19:28

## 2023-01-01 RX ADMIN — Medication 1 APPLICATION(S): at 18:17

## 2023-01-01 RX ADMIN — HEPARIN SODIUM 5000 UNIT(S): 5000 INJECTION INTRAVENOUS; SUBCUTANEOUS at 22:11

## 2023-01-01 RX ADMIN — SODIUM CHLORIDE 500 MILLILITER(S): 9 INJECTION, SOLUTION INTRAVENOUS at 19:28

## 2023-01-01 RX ADMIN — NALOXEGOL OXALATE 12.5 MILLIGRAM(S): 12.5 TABLET, FILM COATED ORAL at 12:14

## 2023-01-01 RX ADMIN — HEPARIN SODIUM 5000 UNIT(S): 5000 INJECTION INTRAVENOUS; SUBCUTANEOUS at 13:02

## 2023-01-01 RX ADMIN — CHLORHEXIDINE GLUCONATE 15 MILLILITER(S): 213 SOLUTION TOPICAL at 17:37

## 2023-01-01 RX ADMIN — BUMETANIDE 132 MILLIGRAM(S): 0.25 INJECTION INTRAMUSCULAR; INTRAVENOUS at 12:12

## 2023-01-01 RX ADMIN — HYDROMORPHONE HYDROCHLORIDE 1 MILLIGRAM(S): 2 INJECTION INTRAMUSCULAR; INTRAVENOUS; SUBCUTANEOUS at 14:16

## 2023-01-01 RX ADMIN — HYDROMORPHONE HYDROCHLORIDE 1 MILLIGRAM(S): 2 INJECTION INTRAMUSCULAR; INTRAVENOUS; SUBCUTANEOUS at 22:07

## 2023-01-01 RX ADMIN — Medication 650 MILLIGRAM(S): at 17:30

## 2023-01-01 RX ADMIN — CEFTRIAXONE 100 MILLIGRAM(S): 500 INJECTION, POWDER, FOR SOLUTION INTRAMUSCULAR; INTRAVENOUS at 11:41

## 2023-01-01 RX ADMIN — SODIUM CHLORIDE 250 MILLILITER(S): 9 INJECTION, SOLUTION INTRAVENOUS at 03:30

## 2023-01-01 RX ADMIN — MIDAZOLAM HYDROCHLORIDE 3.11 MG/KG/HR: 1 INJECTION, SOLUTION INTRAMUSCULAR; INTRAVENOUS at 19:22

## 2023-01-01 RX ADMIN — Medication 4 DROP(S): at 05:05

## 2023-01-01 RX ADMIN — Medication 25 MICROGRAM(S): at 06:32

## 2023-01-01 RX ADMIN — PROPOFOL 2.27 MICROGRAM(S)/KG/MIN: 10 INJECTION, EMULSION INTRAVENOUS at 11:53

## 2023-01-01 RX ADMIN — CHLORHEXIDINE GLUCONATE 15 MILLILITER(S): 213 SOLUTION TOPICAL at 17:43

## 2023-01-01 RX ADMIN — CHLORHEXIDINE GLUCONATE 15 MILLILITER(S): 213 SOLUTION TOPICAL at 17:34

## 2023-01-01 RX ADMIN — Medication 4: at 11:59

## 2023-01-01 RX ADMIN — MIDODRINE HYDROCHLORIDE 10 MILLIGRAM(S): 2.5 TABLET ORAL at 22:07

## 2023-01-01 RX ADMIN — LEVETIRACETAM 500 MILLIGRAM(S): 250 TABLET, FILM COATED ORAL at 18:15

## 2023-01-01 RX ADMIN — Medication 4 DROP(S): at 19:36

## 2023-01-01 RX ADMIN — Medication 10 MILLIGRAM(S): at 08:32

## 2023-01-01 RX ADMIN — Medication 4: at 11:26

## 2023-01-01 RX ADMIN — CHLORHEXIDINE GLUCONATE 15 MILLILITER(S): 213 SOLUTION TOPICAL at 06:01

## 2023-01-01 RX ADMIN — HEPARIN SODIUM 5000 UNIT(S): 5000 INJECTION INTRAVENOUS; SUBCUTANEOUS at 06:00

## 2023-01-01 RX ADMIN — CEFTRIAXONE 100 MILLIGRAM(S): 500 INJECTION, POWDER, FOR SOLUTION INTRAMUSCULAR; INTRAVENOUS at 11:54

## 2023-01-01 RX ADMIN — CHLORHEXIDINE GLUCONATE 15 MILLILITER(S): 213 SOLUTION TOPICAL at 17:29

## 2023-01-01 RX ADMIN — Medication 1 APPLICATION(S): at 05:27

## 2023-01-01 RX ADMIN — Medication 4 DROP(S): at 05:29

## 2023-01-01 RX ADMIN — Medication 2 MILLIGRAM(S): at 18:51

## 2023-01-01 RX ADMIN — HEPARIN SODIUM 5000 UNIT(S): 5000 INJECTION INTRAVENOUS; SUBCUTANEOUS at 13:57

## 2023-01-01 RX ADMIN — Medication 650 MILLIGRAM(S): at 01:02

## 2023-01-01 RX ADMIN — LACOSAMIDE 140 MILLIGRAM(S): 50 TABLET ORAL at 05:21

## 2023-01-01 RX ADMIN — POLYETHYLENE GLYCOL 3350 17 GRAM(S): 17 POWDER, FOR SOLUTION ORAL at 05:44

## 2023-01-01 RX ADMIN — LACOSAMIDE 140 MILLIGRAM(S): 50 TABLET ORAL at 17:39

## 2023-01-01 RX ADMIN — CHLORHEXIDINE GLUCONATE 15 MILLILITER(S): 213 SOLUTION TOPICAL at 17:38

## 2023-01-01 RX ADMIN — CHLORHEXIDINE GLUCONATE 15 MILLILITER(S): 213 SOLUTION TOPICAL at 19:41

## 2023-01-01 RX ADMIN — LEVETIRACETAM 500 MILLIGRAM(S): 250 TABLET, FILM COATED ORAL at 17:21

## 2023-01-01 RX ADMIN — CEFTRIAXONE 100 MILLIGRAM(S): 500 INJECTION, POWDER, FOR SOLUTION INTRAMUSCULAR; INTRAVENOUS at 00:26

## 2023-01-01 RX ADMIN — Medication 2: at 00:15

## 2023-01-01 RX ADMIN — ATOVAQUONE 1500 MILLIGRAM(S): 750 SUSPENSION ORAL at 11:37

## 2023-01-01 RX ADMIN — LACOSAMIDE 200 MILLIGRAM(S): 50 TABLET ORAL at 06:44

## 2023-01-01 RX ADMIN — HYDROMORPHONE HYDROCHLORIDE 1 MILLIGRAM(S): 2 INJECTION INTRAMUSCULAR; INTRAVENOUS; SUBCUTANEOUS at 23:45

## 2023-01-01 RX ADMIN — ATOVAQUONE 1500 MILLIGRAM(S): 750 SUSPENSION ORAL at 12:32

## 2023-01-01 RX ADMIN — LEVETIRACETAM 400 MILLIGRAM(S): 250 TABLET, FILM COATED ORAL at 18:57

## 2023-01-01 RX ADMIN — Medication 650 MILLIGRAM(S): at 21:47

## 2023-01-01 RX ADMIN — AMLODIPINE BESYLATE 10 MILLIGRAM(S): 2.5 TABLET ORAL at 05:29

## 2023-01-01 RX ADMIN — CHLORHEXIDINE GLUCONATE 15 MILLILITER(S): 213 SOLUTION TOPICAL at 17:05

## 2023-01-01 RX ADMIN — LEVETIRACETAM 500 MILLIGRAM(S): 250 TABLET, FILM COATED ORAL at 17:49

## 2023-01-01 RX ADMIN — Medication 2: at 18:10

## 2023-01-01 RX ADMIN — CEFTRIAXONE 100 MILLIGRAM(S): 500 INJECTION, POWDER, FOR SOLUTION INTRAMUSCULAR; INTRAVENOUS at 23:31

## 2023-01-01 RX ADMIN — LEVETIRACETAM 400 MILLIGRAM(S): 250 TABLET, FILM COATED ORAL at 18:10

## 2023-01-01 RX ADMIN — LACOSAMIDE 200 MILLIGRAM(S): 50 TABLET ORAL at 06:32

## 2023-01-01 RX ADMIN — LEVETIRACETAM 500 MILLIGRAM(S): 250 TABLET, FILM COATED ORAL at 06:42

## 2023-01-01 RX ADMIN — Medication 1 APPLICATION(S): at 18:12

## 2023-01-01 RX ADMIN — Medication 100 MILLIGRAM(S): at 14:40

## 2023-01-01 RX ADMIN — Medication 1 APPLICATION(S): at 05:08

## 2023-01-01 RX ADMIN — HEPARIN SODIUM 5000 UNIT(S): 5000 INJECTION INTRAVENOUS; SUBCUTANEOUS at 05:44

## 2023-01-01 RX ADMIN — HEPARIN SODIUM 5000 UNIT(S): 5000 INJECTION INTRAVENOUS; SUBCUTANEOUS at 22:09

## 2023-01-01 RX ADMIN — CEFTRIAXONE 100 MILLIGRAM(S): 500 INJECTION, POWDER, FOR SOLUTION INTRAMUSCULAR; INTRAVENOUS at 23:47

## 2023-01-01 RX ADMIN — Medication 2: at 05:27

## 2023-01-01 RX ADMIN — AMLODIPINE BESYLATE 10 MILLIGRAM(S): 2.5 TABLET ORAL at 05:49

## 2023-01-01 RX ADMIN — POLYETHYLENE GLYCOL 3350 17 GRAM(S): 17 POWDER, FOR SOLUTION ORAL at 05:26

## 2023-01-01 RX ADMIN — Medication 18 MICROGRAM(S): at 21:37

## 2023-01-01 RX ADMIN — LEVETIRACETAM 500 MILLIGRAM(S): 250 TABLET, FILM COATED ORAL at 18:11

## 2023-01-01 RX ADMIN — Medication 2.91 MICROGRAM(S)/KG/MIN: at 07:34

## 2023-01-01 RX ADMIN — LACOSAMIDE 140 MILLIGRAM(S): 50 TABLET ORAL at 05:55

## 2023-01-01 RX ADMIN — HYDROMORPHONE HYDROCHLORIDE 1 MILLIGRAM(S): 2 INJECTION INTRAMUSCULAR; INTRAVENOUS; SUBCUTANEOUS at 23:10

## 2023-01-01 RX ADMIN — Medication 2: at 12:32

## 2023-01-01 RX ADMIN — Medication 650 MILLIGRAM(S): at 08:56

## 2023-01-01 RX ADMIN — ATOVAQUONE 1500 MILLIGRAM(S): 750 SUSPENSION ORAL at 11:40

## 2023-01-01 RX ADMIN — Medication 125 MILLIGRAM(S): at 05:41

## 2023-01-01 RX ADMIN — CHLORHEXIDINE GLUCONATE 15 MILLILITER(S): 213 SOLUTION TOPICAL at 07:30

## 2023-01-01 RX ADMIN — CHLORHEXIDINE GLUCONATE 15 MILLILITER(S): 213 SOLUTION TOPICAL at 18:06

## 2023-01-01 RX ADMIN — Medication 4 DROP(S): at 05:13

## 2023-01-01 RX ADMIN — LACOSAMIDE 200 MILLIGRAM(S): 50 TABLET ORAL at 05:59

## 2023-01-01 RX ADMIN — CHLORHEXIDINE GLUCONATE 15 MILLILITER(S): 213 SOLUTION TOPICAL at 05:12

## 2023-01-01 RX ADMIN — Medication 5 MILLIGRAM(S): at 06:52

## 2023-01-01 RX ADMIN — MIDAZOLAM HYDROCHLORIDE 3.11 MG/KG/HR: 1 INJECTION, SOLUTION INTRAMUSCULAR; INTRAVENOUS at 20:41

## 2023-01-01 RX ADMIN — FENTANYL CITRATE 1.89 MICROGRAM(S)/KG/HR: 50 INJECTION INTRAVENOUS at 20:05

## 2023-01-01 RX ADMIN — Medication 5 MILLIGRAM(S): at 06:02

## 2023-01-01 RX ADMIN — CEFTRIAXONE 100 MILLIGRAM(S): 500 INJECTION, POWDER, FOR SOLUTION INTRAMUSCULAR; INTRAVENOUS at 00:41

## 2023-01-01 RX ADMIN — POLYETHYLENE GLYCOL 3350 17 GRAM(S): 17 POWDER, FOR SOLUTION ORAL at 11:21

## 2023-01-01 RX ADMIN — HEPARIN SODIUM 5000 UNIT(S): 5000 INJECTION INTRAVENOUS; SUBCUTANEOUS at 14:00

## 2023-01-01 RX ADMIN — Medication 100 MILLIGRAM(S): at 13:45

## 2023-01-01 RX ADMIN — Medication 18 MICROGRAM(S): at 22:14

## 2023-01-01 RX ADMIN — CEFTRIAXONE 100 MILLIGRAM(S): 500 INJECTION, POWDER, FOR SOLUTION INTRAMUSCULAR; INTRAVENOUS at 11:18

## 2023-01-01 RX ADMIN — Medication 1 APPLICATION(S): at 05:39

## 2023-01-01 RX ADMIN — CHLORHEXIDINE GLUCONATE 15 MILLILITER(S): 213 SOLUTION TOPICAL at 17:32

## 2023-01-01 RX ADMIN — LEVETIRACETAM 500 MILLIGRAM(S): 250 TABLET, FILM COATED ORAL at 17:24

## 2023-01-01 RX ADMIN — Medication 1 APPLICATION(S): at 05:28

## 2023-01-01 RX ADMIN — Medication 5 MILLIGRAM(S): at 05:19

## 2023-01-01 RX ADMIN — Medication 4 DROP(S): at 17:32

## 2023-01-01 RX ADMIN — POLYETHYLENE GLYCOL 3350 17 GRAM(S): 17 POWDER, FOR SOLUTION ORAL at 22:59

## 2023-01-01 RX ADMIN — HEPARIN SODIUM 5000 UNIT(S): 5000 INJECTION INTRAVENOUS; SUBCUTANEOUS at 13:58

## 2023-01-01 RX ADMIN — NALOXEGOL OXALATE 12.5 MILLIGRAM(S): 12.5 TABLET, FILM COATED ORAL at 11:07

## 2023-01-01 RX ADMIN — LACOSAMIDE 200 MILLIGRAM(S): 50 TABLET ORAL at 05:35

## 2023-01-01 RX ADMIN — NALOXEGOL OXALATE 12.5 MILLIGRAM(S): 12.5 TABLET, FILM COATED ORAL at 11:39

## 2023-01-01 RX ADMIN — CHLORHEXIDINE GLUCONATE 1 APPLICATION(S): 213 SOLUTION TOPICAL at 06:57

## 2023-01-01 RX ADMIN — SENNA PLUS 2 TABLET(S): 8.6 TABLET ORAL at 18:51

## 2023-01-01 RX ADMIN — CYCLOSPORINE 75 MILLIGRAM(S): 100 CAPSULE ORAL at 01:56

## 2023-01-01 RX ADMIN — CHLORHEXIDINE GLUCONATE 1 APPLICATION(S): 213 SOLUTION TOPICAL at 05:13

## 2023-01-01 RX ADMIN — Medication 650 MILLIGRAM(S): at 20:40

## 2023-01-01 RX ADMIN — CHLORHEXIDINE GLUCONATE 1 APPLICATION(S): 213 SOLUTION TOPICAL at 05:14

## 2023-01-01 RX ADMIN — Medication 2: at 00:26

## 2023-01-01 RX ADMIN — LACOSAMIDE 200 MILLIGRAM(S): 50 TABLET ORAL at 18:35

## 2023-01-01 RX ADMIN — FENTANYL CITRATE 1.89 MICROGRAM(S)/KG/HR: 50 INJECTION INTRAVENOUS at 20:42

## 2023-01-01 RX ADMIN — Medication 5 MILLIGRAM(S): at 05:44

## 2023-01-01 RX ADMIN — LACOSAMIDE 140 MILLIGRAM(S): 50 TABLET ORAL at 18:43

## 2023-01-01 RX ADMIN — LEVETIRACETAM 500 MILLIGRAM(S): 250 TABLET, FILM COATED ORAL at 17:43

## 2023-01-01 RX ADMIN — Medication 10 MILLILITER(S): at 06:15

## 2023-01-01 RX ADMIN — Medication 100 MILLIGRAM(S): at 14:38

## 2023-01-01 RX ADMIN — Medication 4 DROP(S): at 18:49

## 2023-01-01 RX ADMIN — CHLORHEXIDINE GLUCONATE 1 APPLICATION(S): 213 SOLUTION TOPICAL at 05:28

## 2023-01-01 RX ADMIN — HEPARIN SODIUM 5000 UNIT(S): 5000 INJECTION INTRAVENOUS; SUBCUTANEOUS at 15:38

## 2023-01-01 RX ADMIN — HYDROMORPHONE HYDROCHLORIDE 1 MILLIGRAM(S): 2 INJECTION INTRAMUSCULAR; INTRAVENOUS; SUBCUTANEOUS at 09:34

## 2023-01-01 RX ADMIN — CHLORHEXIDINE GLUCONATE 15 MILLILITER(S): 213 SOLUTION TOPICAL at 17:30

## 2023-01-01 RX ADMIN — Medication 25 MICROGRAM(S): at 06:44

## 2023-01-01 RX ADMIN — SENNA PLUS 10 MILLILITER(S): 8.6 TABLET ORAL at 05:49

## 2023-01-01 RX ADMIN — Medication 250 MILLIGRAM(S): at 11:53

## 2023-01-01 RX ADMIN — SODIUM CHLORIDE 500 MILLILITER(S): 9 INJECTION, SOLUTION INTRAVENOUS at 11:25

## 2023-01-01 RX ADMIN — Medication 650 MILLIGRAM(S): at 10:40

## 2023-01-01 RX ADMIN — LEVETIRACETAM 400 MILLIGRAM(S): 250 TABLET, FILM COATED ORAL at 17:58

## 2023-01-01 RX ADMIN — LACOSAMIDE 140 MILLIGRAM(S): 50 TABLET ORAL at 17:20

## 2023-01-01 RX ADMIN — HEPARIN SODIUM 5000 UNIT(S): 5000 INJECTION INTRAVENOUS; SUBCUTANEOUS at 13:42

## 2023-01-01 RX ADMIN — ROBINUL 0.4 MILLIGRAM(S): 0.2 INJECTION INTRAMUSCULAR; INTRAVENOUS at 15:25

## 2023-01-01 RX ADMIN — Medication 1 APPLICATION(S): at 06:00

## 2023-01-01 RX ADMIN — HEPARIN SODIUM 5000 UNIT(S): 5000 INJECTION INTRAVENOUS; SUBCUTANEOUS at 05:12

## 2023-01-01 RX ADMIN — Medication 4 DROP(S): at 05:56

## 2023-01-01 RX ADMIN — CEFTRIAXONE 100 MILLIGRAM(S): 500 INJECTION, POWDER, FOR SOLUTION INTRAMUSCULAR; INTRAVENOUS at 17:05

## 2023-01-01 RX ADMIN — HEPARIN SODIUM 5000 UNIT(S): 5000 INJECTION INTRAVENOUS; SUBCUTANEOUS at 05:58

## 2023-01-01 RX ADMIN — Medication 1 APPLICATION(S): at 17:06

## 2023-01-01 RX ADMIN — CEFTRIAXONE 100 MILLIGRAM(S): 500 INJECTION, POWDER, FOR SOLUTION INTRAMUSCULAR; INTRAVENOUS at 12:59

## 2023-01-01 RX ADMIN — Medication 2.91 MICROGRAM(S)/KG/MIN: at 20:05

## 2023-01-01 RX ADMIN — CHLORHEXIDINE GLUCONATE 15 MILLILITER(S): 213 SOLUTION TOPICAL at 17:24

## 2023-01-01 RX ADMIN — CEFTRIAXONE 100 MILLIGRAM(S): 500 INJECTION, POWDER, FOR SOLUTION INTRAMUSCULAR; INTRAVENOUS at 23:10

## 2023-01-01 RX ADMIN — HEPARIN SODIUM 5000 UNIT(S): 5000 INJECTION INTRAVENOUS; SUBCUTANEOUS at 05:31

## 2023-01-01 RX ADMIN — MEROPENEM 100 MILLIGRAM(S): 1 INJECTION INTRAVENOUS at 06:12

## 2023-01-01 RX ADMIN — Medication 2: at 23:13

## 2023-01-01 RX ADMIN — CHLORHEXIDINE GLUCONATE 15 MILLILITER(S): 213 SOLUTION TOPICAL at 17:54

## 2023-01-01 RX ADMIN — HEPARIN SODIUM 5000 UNIT(S): 5000 INJECTION INTRAVENOUS; SUBCUTANEOUS at 06:02

## 2023-01-01 RX ADMIN — Medication 4 DROP(S): at 05:57

## 2023-01-01 RX ADMIN — HYDROMORPHONE HYDROCHLORIDE 1 MILLIGRAM(S): 2 INJECTION INTRAMUSCULAR; INTRAVENOUS; SUBCUTANEOUS at 01:15

## 2023-01-01 RX ADMIN — Medication 2: at 17:35

## 2023-01-01 RX ADMIN — HEPARIN SODIUM 5000 UNIT(S): 5000 INJECTION INTRAVENOUS; SUBCUTANEOUS at 15:11

## 2023-01-01 RX ADMIN — Medication 5 MILLIGRAM(S): at 05:58

## 2023-01-01 RX ADMIN — Medication 1: at 05:56

## 2023-01-01 RX ADMIN — ATOVAQUONE 1500 MILLIGRAM(S): 750 SUSPENSION ORAL at 12:25

## 2023-01-01 RX ADMIN — Medication 1 APPLICATION(S): at 15:33

## 2023-01-01 RX ADMIN — Medication 5 MILLIGRAM(S): at 05:55

## 2023-01-01 RX ADMIN — HEPARIN SODIUM 5000 UNIT(S): 5000 INJECTION INTRAVENOUS; SUBCUTANEOUS at 21:33

## 2023-01-01 RX ADMIN — LACOSAMIDE 140 MILLIGRAM(S): 50 TABLET ORAL at 17:52

## 2023-01-01 RX ADMIN — Medication 2.91 MICROGRAM(S)/KG/MIN: at 17:29

## 2023-01-01 RX ADMIN — Medication 2: at 13:29

## 2023-01-01 RX ADMIN — POLYETHYLENE GLYCOL 3350 17 GRAM(S): 17 POWDER, FOR SOLUTION ORAL at 05:08

## 2023-01-01 RX ADMIN — LACOSAMIDE 140 MILLIGRAM(S): 50 TABLET ORAL at 05:10

## 2023-01-01 RX ADMIN — LEVETIRACETAM 400 MILLIGRAM(S): 250 TABLET, FILM COATED ORAL at 17:12

## 2023-01-01 RX ADMIN — LACOSAMIDE 140 MILLIGRAM(S): 50 TABLET ORAL at 17:34

## 2023-01-01 RX ADMIN — AMLODIPINE BESYLATE 10 MILLIGRAM(S): 2.5 TABLET ORAL at 05:51

## 2023-01-01 RX ADMIN — Medication 4 DROP(S): at 05:50

## 2023-01-01 RX ADMIN — Medication 2: at 06:01

## 2023-01-01 RX ADMIN — CEFTRIAXONE 100 MILLIGRAM(S): 500 INJECTION, POWDER, FOR SOLUTION INTRAMUSCULAR; INTRAVENOUS at 11:38

## 2023-01-01 RX ADMIN — LEVETIRACETAM 750 MILLIGRAM(S): 250 TABLET, FILM COATED ORAL at 05:29

## 2023-01-01 RX ADMIN — Medication 650 MILLIGRAM(S): at 02:00

## 2023-01-01 RX ADMIN — PROPOFOL 2.27 MICROGRAM(S)/KG/MIN: 10 INJECTION, EMULSION INTRAVENOUS at 17:29

## 2023-01-01 RX ADMIN — Medication 1 APPLICATION(S): at 17:19

## 2023-01-01 RX ADMIN — SODIUM CHLORIDE 75 MILLILITER(S): 9 INJECTION, SOLUTION INTRAVENOUS at 17:34

## 2023-01-01 RX ADMIN — SENNA PLUS 2 TABLET(S): 8.6 TABLET ORAL at 05:09

## 2023-01-01 RX ADMIN — Medication 125 MILLIGRAM(S): at 13:04

## 2023-01-01 RX ADMIN — MIDODRINE HYDROCHLORIDE 10 MILLIGRAM(S): 2.5 TABLET ORAL at 06:42

## 2023-01-01 RX ADMIN — CHLORHEXIDINE GLUCONATE 1 APPLICATION(S): 213 SOLUTION TOPICAL at 05:51

## 2023-01-01 RX ADMIN — CHLORHEXIDINE GLUCONATE 15 MILLILITER(S): 213 SOLUTION TOPICAL at 05:26

## 2023-01-01 RX ADMIN — CHLORHEXIDINE GLUCONATE 1 APPLICATION(S): 213 SOLUTION TOPICAL at 05:46

## 2023-01-01 RX ADMIN — LACOSAMIDE 140 MILLIGRAM(S): 50 TABLET ORAL at 17:29

## 2023-01-01 RX ADMIN — HYDROMORPHONE HYDROCHLORIDE 1 MILLIGRAM(S): 2 INJECTION INTRAMUSCULAR; INTRAVENOUS; SUBCUTANEOUS at 01:59

## 2023-01-01 RX ADMIN — Medication 63.75 MILLIMOLE(S): at 12:21

## 2023-01-01 RX ADMIN — HEPARIN SODIUM 5000 UNIT(S): 5000 INJECTION INTRAVENOUS; SUBCUTANEOUS at 05:38

## 2023-01-01 RX ADMIN — SODIUM CHLORIDE 1000 MILLILITER(S): 9 INJECTION, SOLUTION INTRAVENOUS at 09:39

## 2023-01-01 RX ADMIN — Medication 5 MILLIGRAM(S): at 05:29

## 2023-01-01 RX ADMIN — OXYMETAZOLINE HYDROCHLORIDE 2 SPRAY(S): 0.5 SPRAY NASAL at 12:08

## 2023-01-01 RX ADMIN — LEVETIRACETAM 500 MILLIGRAM(S): 250 TABLET, FILM COATED ORAL at 17:53

## 2023-01-01 RX ADMIN — HEPARIN SODIUM 5000 UNIT(S): 5000 INJECTION INTRAVENOUS; SUBCUTANEOUS at 22:00

## 2023-01-01 RX ADMIN — PROPOFOL 2.27 MICROGRAM(S)/KG/MIN: 10 INJECTION, EMULSION INTRAVENOUS at 11:22

## 2023-01-01 RX ADMIN — Medication 5 MILLIGRAM(S): at 17:41

## 2023-01-01 RX ADMIN — LEVETIRACETAM 400 MILLIGRAM(S): 250 TABLET, FILM COATED ORAL at 06:53

## 2023-01-01 RX ADMIN — CHLORHEXIDINE GLUCONATE 1 APPLICATION(S): 213 SOLUTION TOPICAL at 06:02

## 2023-01-01 RX ADMIN — LEVETIRACETAM 400 MILLIGRAM(S): 250 TABLET, FILM COATED ORAL at 05:12

## 2023-01-01 RX ADMIN — Medication 650 MILLIGRAM(S): at 08:30

## 2023-01-01 RX ADMIN — Medication 1 APPLICATION(S): at 17:39

## 2023-01-01 RX ADMIN — LACOSAMIDE 200 MILLIGRAM(S): 50 TABLET ORAL at 05:38

## 2023-01-01 RX ADMIN — HEPARIN SODIUM 5000 UNIT(S): 5000 INJECTION INTRAVENOUS; SUBCUTANEOUS at 22:18

## 2023-01-01 RX ADMIN — LEVETIRACETAM 400 MILLIGRAM(S): 250 TABLET, FILM COATED ORAL at 12:31

## 2023-01-01 RX ADMIN — CHLORHEXIDINE GLUCONATE 1 APPLICATION(S): 213 SOLUTION TOPICAL at 05:53

## 2023-01-01 RX ADMIN — LACOSAMIDE 200 MILLIGRAM(S): 50 TABLET ORAL at 17:47

## 2023-01-01 RX ADMIN — SENNA PLUS 2 TABLET(S): 8.6 TABLET ORAL at 22:59

## 2023-01-01 RX ADMIN — Medication 100 MILLIGRAM(S): at 05:34

## 2023-01-01 RX ADMIN — LEVETIRACETAM 400 MILLIGRAM(S): 250 TABLET, FILM COATED ORAL at 06:47

## 2023-01-01 RX ADMIN — POLYETHYLENE GLYCOL 3350 17 GRAM(S): 17 POWDER, FOR SOLUTION ORAL at 06:10

## 2023-01-01 RX ADMIN — LEVETIRACETAM 400 MILLIGRAM(S): 250 TABLET, FILM COATED ORAL at 05:05

## 2023-01-01 RX ADMIN — Medication 125 MILLIGRAM(S): at 01:18

## 2023-01-01 RX ADMIN — Medication 1 APPLICATION(S): at 06:03

## 2023-01-01 RX ADMIN — Medication 2: at 05:42

## 2023-01-01 RX ADMIN — CHLORHEXIDINE GLUCONATE 1 APPLICATION(S): 213 SOLUTION TOPICAL at 05:49

## 2023-01-01 RX ADMIN — FENTANYL CITRATE 1.89 MICROGRAM(S)/KG/HR: 50 INJECTION INTRAVENOUS at 08:18

## 2023-01-01 RX ADMIN — MIDODRINE HYDROCHLORIDE 10 MILLIGRAM(S): 2.5 TABLET ORAL at 22:52

## 2023-01-01 RX ADMIN — Medication 650 MILLIGRAM(S): at 00:28

## 2023-01-01 RX ADMIN — Medication 25 MICROGRAM(S): at 05:22

## 2023-01-01 RX ADMIN — HEPARIN SODIUM 5000 UNIT(S): 5000 INJECTION INTRAVENOUS; SUBCUTANEOUS at 13:59

## 2023-01-01 RX ADMIN — Medication 50 MILLIEQUIVALENT(S): at 10:06

## 2023-01-01 RX ADMIN — HYDROMORPHONE HYDROCHLORIDE 1 MILLIGRAM(S): 2 INJECTION INTRAMUSCULAR; INTRAVENOUS; SUBCUTANEOUS at 22:30

## 2023-01-01 RX ADMIN — Medication 10 MILLIGRAM(S): at 13:37

## 2023-01-01 RX ADMIN — SODIUM CHLORIDE 100 MILLILITER(S): 9 INJECTION, SOLUTION INTRAVENOUS at 13:39

## 2023-01-01 RX ADMIN — HYDROMORPHONE HYDROCHLORIDE 1 MILLIGRAM(S): 2 INJECTION INTRAMUSCULAR; INTRAVENOUS; SUBCUTANEOUS at 01:00

## 2023-01-01 RX ADMIN — LACOSAMIDE 200 MILLIGRAM(S): 50 TABLET ORAL at 17:43

## 2023-01-01 RX ADMIN — Medication 2: at 23:23

## 2023-01-01 RX ADMIN — LACOSAMIDE 200 MILLIGRAM(S): 50 TABLET ORAL at 06:43

## 2023-01-01 RX ADMIN — LACOSAMIDE 200 MILLIGRAM(S): 50 TABLET ORAL at 17:09

## 2023-01-01 RX ADMIN — Medication 25 MICROGRAM(S): at 06:42

## 2023-01-01 RX ADMIN — MIDAZOLAM HYDROCHLORIDE 3.11 MG/KG/HR: 1 INJECTION, SOLUTION INTRAMUSCULAR; INTRAVENOUS at 08:21

## 2023-01-01 RX ADMIN — CHLORHEXIDINE GLUCONATE 15 MILLILITER(S): 213 SOLUTION TOPICAL at 05:04

## 2023-01-01 RX ADMIN — LACOSAMIDE 200 MILLIGRAM(S): 50 TABLET ORAL at 06:02

## 2023-01-01 RX ADMIN — Medication 100 MILLIGRAM(S): at 13:03

## 2023-01-01 RX ADMIN — Medication 2: at 23:16

## 2023-01-01 RX ADMIN — Medication 650 MILLIGRAM(S): at 21:37

## 2023-01-01 RX ADMIN — HEPARIN SODIUM 5000 UNIT(S): 5000 INJECTION INTRAVENOUS; SUBCUTANEOUS at 13:18

## 2023-01-01 RX ADMIN — FENTANYL CITRATE 100 MICROGRAM(S): 50 INJECTION INTRAVENOUS at 21:30

## 2023-01-01 RX ADMIN — Medication 650 MILLIGRAM(S): at 23:48

## 2023-01-01 RX ADMIN — NALOXEGOL OXALATE 12.5 MILLIGRAM(S): 12.5 TABLET, FILM COATED ORAL at 12:22

## 2023-01-01 RX ADMIN — HEPARIN SODIUM 5000 UNIT(S): 5000 INJECTION INTRAVENOUS; SUBCUTANEOUS at 21:40

## 2023-01-01 RX ADMIN — Medication 4 DROP(S): at 06:01

## 2023-01-01 RX ADMIN — HEPARIN SODIUM 5000 UNIT(S): 5000 INJECTION INTRAVENOUS; SUBCUTANEOUS at 05:11

## 2023-01-01 RX ADMIN — LEVETIRACETAM 400 MILLIGRAM(S): 250 TABLET, FILM COATED ORAL at 17:18

## 2023-01-01 RX ADMIN — LEVETIRACETAM 500 MILLIGRAM(S): 250 TABLET, FILM COATED ORAL at 18:10

## 2023-01-01 RX ADMIN — HYDROMORPHONE HYDROCHLORIDE 1 MILLIGRAM(S): 2 INJECTION INTRAMUSCULAR; INTRAVENOUS; SUBCUTANEOUS at 02:14

## 2023-01-01 RX ADMIN — Medication 125 MILLIGRAM(S): at 23:12

## 2023-01-01 RX ADMIN — Medication 2: at 06:05

## 2023-01-01 RX ADMIN — MIDODRINE HYDROCHLORIDE 10 MILLIGRAM(S): 2.5 TABLET ORAL at 22:14

## 2023-01-01 RX ADMIN — Medication 2: at 05:25

## 2023-01-01 RX ADMIN — ATOVAQUONE 1500 MILLIGRAM(S): 750 SUSPENSION ORAL at 11:22

## 2023-01-01 RX ADMIN — Medication 650 MILLIGRAM(S): at 17:24

## 2023-01-01 RX ADMIN — ATOVAQUONE 1500 MILLIGRAM(S): 750 SUSPENSION ORAL at 12:47

## 2023-01-01 RX ADMIN — CHLORHEXIDINE GLUCONATE 1 APPLICATION(S): 213 SOLUTION TOPICAL at 05:20

## 2023-01-01 RX ADMIN — MIDODRINE HYDROCHLORIDE 10 MILLIGRAM(S): 2.5 TABLET ORAL at 05:41

## 2023-01-01 RX ADMIN — POLYETHYLENE GLYCOL 3350 17 GRAM(S): 17 POWDER, FOR SOLUTION ORAL at 06:00

## 2023-01-01 RX ADMIN — FLUDROCORTISONE ACETATE 0.05 MILLIGRAM(S): 0.1 TABLET ORAL at 10:06

## 2023-01-01 RX ADMIN — Medication 2: at 17:50

## 2023-01-01 RX ADMIN — Medication 1 APPLICATION(S): at 17:48

## 2023-01-01 RX ADMIN — Medication 2: at 05:57

## 2023-01-01 RX ADMIN — Medication 125 MILLIGRAM(S): at 17:30

## 2023-01-01 RX ADMIN — Medication 4 DROP(S): at 05:08

## 2023-01-01 RX ADMIN — Medication 18 MICROGRAM(S): at 22:13

## 2023-01-01 RX ADMIN — CEFTRIAXONE 100 MILLIGRAM(S): 500 INJECTION, POWDER, FOR SOLUTION INTRAMUSCULAR; INTRAVENOUS at 12:14

## 2023-01-01 RX ADMIN — Medication 10 MILLIGRAM(S): at 01:04

## 2023-01-01 RX ADMIN — Medication 1 APPLICATION(S): at 18:08

## 2023-01-01 RX ADMIN — Medication 1000 MILLIGRAM(S): at 02:25

## 2023-01-01 RX ADMIN — CEFTRIAXONE 100 MILLIGRAM(S): 500 INJECTION, POWDER, FOR SOLUTION INTRAMUSCULAR; INTRAVENOUS at 06:41

## 2023-01-01 RX ADMIN — Medication 4 DROP(S): at 06:03

## 2023-01-01 RX ADMIN — CEFTRIAXONE 100 MILLIGRAM(S): 500 INJECTION, POWDER, FOR SOLUTION INTRAMUSCULAR; INTRAVENOUS at 12:31

## 2023-01-01 RX ADMIN — LEVETIRACETAM 500 MILLIGRAM(S): 250 TABLET, FILM COATED ORAL at 17:08

## 2023-01-01 RX ADMIN — Medication 2: at 06:10

## 2023-01-01 RX ADMIN — SENNA PLUS 2 TABLET(S): 8.6 TABLET ORAL at 17:49

## 2023-01-01 RX ADMIN — ATOVAQUONE 1500 MILLIGRAM(S): 750 SUSPENSION ORAL at 12:27

## 2023-01-01 RX ADMIN — Medication 2: at 23:07

## 2023-01-01 RX ADMIN — LEVETIRACETAM 400 MILLIGRAM(S): 250 TABLET, FILM COATED ORAL at 06:09

## 2023-01-01 RX ADMIN — MIDAZOLAM HYDROCHLORIDE 2 MILLIGRAM(S): 1 INJECTION, SOLUTION INTRAMUSCULAR; INTRAVENOUS at 05:41

## 2023-01-01 RX ADMIN — SENNA PLUS 10 MILLILITER(S): 8.6 TABLET ORAL at 18:40

## 2023-01-01 RX ADMIN — SENNA PLUS 2 TABLET(S): 8.6 TABLET ORAL at 17:31

## 2023-01-01 RX ADMIN — Medication 4 DROP(S): at 21:11

## 2023-01-01 RX ADMIN — Medication 25 MICROGRAM(S): at 06:02

## 2023-01-01 RX ADMIN — HEPARIN SODIUM 5000 UNIT(S): 5000 INJECTION INTRAVENOUS; SUBCUTANEOUS at 13:45

## 2023-01-01 RX ADMIN — Medication 1 APPLICATION(S): at 17:24

## 2023-01-01 RX ADMIN — CEFTRIAXONE 100 MILLIGRAM(S): 500 INJECTION, POWDER, FOR SOLUTION INTRAMUSCULAR; INTRAVENOUS at 13:15

## 2023-01-01 RX ADMIN — HYDROMORPHONE HYDROCHLORIDE 1 MILLIGRAM(S): 2 INJECTION INTRAMUSCULAR; INTRAVENOUS; SUBCUTANEOUS at 06:20

## 2023-01-01 RX ADMIN — Medication 25 MICROGRAM(S): at 06:34

## 2023-01-01 RX ADMIN — CEFTRIAXONE 100 MILLIGRAM(S): 500 INJECTION, POWDER, FOR SOLUTION INTRAMUSCULAR; INTRAVENOUS at 00:20

## 2023-01-01 RX ADMIN — LACOSAMIDE 200 MILLIGRAM(S): 50 TABLET ORAL at 05:33

## 2023-01-01 RX ADMIN — Medication 5 MILLIGRAM(S): at 05:22

## 2023-01-01 RX ADMIN — HEPARIN SODIUM 5000 UNIT(S): 5000 INJECTION INTRAVENOUS; SUBCUTANEOUS at 13:05

## 2023-01-01 RX ADMIN — Medication 650 MILLIGRAM(S): at 09:36

## 2023-01-01 RX ADMIN — Medication 4 DROP(S): at 06:09

## 2023-01-01 RX ADMIN — FENTANYL CITRATE 1.89 MICROGRAM(S)/KG/HR: 50 INJECTION INTRAVENOUS at 07:33

## 2023-01-01 RX ADMIN — Medication 4 DROP(S): at 05:28

## 2023-01-01 RX ADMIN — Medication 18 MICROGRAM(S): at 21:55

## 2023-01-01 RX ADMIN — Medication 125 MILLIGRAM(S): at 00:26

## 2023-01-01 RX ADMIN — Medication 4 DROP(S): at 17:39

## 2023-01-01 RX ADMIN — AMLODIPINE BESYLATE 10 MILLIGRAM(S): 2.5 TABLET ORAL at 06:51

## 2023-01-01 RX ADMIN — Medication 125 MILLIGRAM(S): at 01:23

## 2023-01-01 RX ADMIN — CEFTRIAXONE 100 MILLIGRAM(S): 500 INJECTION, POWDER, FOR SOLUTION INTRAMUSCULAR; INTRAVENOUS at 16:27

## 2023-01-01 RX ADMIN — HEPARIN SODIUM 5000 UNIT(S): 5000 INJECTION INTRAVENOUS; SUBCUTANEOUS at 22:07

## 2023-01-01 RX ADMIN — Medication 125 MILLIGRAM(S): at 05:59

## 2023-01-01 RX ADMIN — POLYETHYLENE GLYCOL 3350 17 GRAM(S): 17 POWDER, FOR SOLUTION ORAL at 17:48

## 2023-01-01 RX ADMIN — LACOSAMIDE 120 MILLIGRAM(S): 50 TABLET ORAL at 01:53

## 2023-01-01 RX ADMIN — CHLORHEXIDINE GLUCONATE 15 MILLILITER(S): 213 SOLUTION TOPICAL at 17:41

## 2023-01-01 RX ADMIN — HEPARIN SODIUM 5000 UNIT(S): 5000 INJECTION INTRAVENOUS; SUBCUTANEOUS at 06:40

## 2023-01-01 RX ADMIN — Medication 100 MILLIGRAM(S): at 22:45

## 2023-01-01 RX ADMIN — Medication 0.5 MILLIGRAM(S): at 02:03

## 2023-01-01 RX ADMIN — ATOVAQUONE 1500 MILLIGRAM(S): 750 SUSPENSION ORAL at 17:31

## 2023-01-01 RX ADMIN — Medication 18 MICROGRAM(S): at 22:59

## 2023-01-01 RX ADMIN — CEFTRIAXONE 100 MILLIGRAM(S): 500 INJECTION, POWDER, FOR SOLUTION INTRAMUSCULAR; INTRAVENOUS at 17:58

## 2023-01-01 RX ADMIN — ATOVAQUONE 1500 MILLIGRAM(S): 750 SUSPENSION ORAL at 13:14

## 2023-01-01 RX ADMIN — Medication 1 APPLICATION(S): at 05:50

## 2023-01-01 RX ADMIN — Medication 2: at 06:19

## 2023-01-01 RX ADMIN — CHLORHEXIDINE GLUCONATE 15 MILLILITER(S): 213 SOLUTION TOPICAL at 18:14

## 2023-01-01 RX ADMIN — Medication 2: at 12:05

## 2023-01-01 RX ADMIN — LACOSAMIDE 200 MILLIGRAM(S): 50 TABLET ORAL at 18:16

## 2023-01-01 RX ADMIN — Medication 10 MILLIGRAM(S): at 13:53

## 2023-01-01 RX ADMIN — LACOSAMIDE 140 MILLIGRAM(S): 50 TABLET ORAL at 06:53

## 2023-01-01 RX ADMIN — CEFTRIAXONE 100 MILLIGRAM(S): 500 INJECTION, POWDER, FOR SOLUTION INTRAMUSCULAR; INTRAVENOUS at 11:35

## 2023-01-01 RX ADMIN — LEVETIRACETAM 500 MILLIGRAM(S): 250 TABLET, FILM COATED ORAL at 11:56

## 2023-01-01 RX ADMIN — HEPARIN SODIUM 5000 UNIT(S): 5000 INJECTION INTRAVENOUS; SUBCUTANEOUS at 23:06

## 2023-01-01 RX ADMIN — Medication 4 DROP(S): at 05:51

## 2023-01-01 RX ADMIN — LEVETIRACETAM 400 MILLIGRAM(S): 250 TABLET, FILM COATED ORAL at 17:20

## 2023-01-01 RX ADMIN — POLYETHYLENE GLYCOL 3350 17 GRAM(S): 17 POWDER, FOR SOLUTION ORAL at 18:51

## 2023-01-01 RX ADMIN — MIDODRINE HYDROCHLORIDE 10 MILLIGRAM(S): 2.5 TABLET ORAL at 13:43

## 2023-01-01 RX ADMIN — HEPARIN SODIUM 5000 UNIT(S): 5000 INJECTION INTRAVENOUS; SUBCUTANEOUS at 05:34

## 2023-01-01 RX ADMIN — CEFTRIAXONE 100 MILLIGRAM(S): 500 INJECTION, POWDER, FOR SOLUTION INTRAMUSCULAR; INTRAVENOUS at 23:58

## 2023-01-01 RX ADMIN — Medication 650 MILLIGRAM(S): at 05:57

## 2023-01-01 RX ADMIN — Medication 1 APPLICATION(S): at 05:26

## 2023-01-01 RX ADMIN — CEFTRIAXONE 100 MILLIGRAM(S): 500 INJECTION, POWDER, FOR SOLUTION INTRAMUSCULAR; INTRAVENOUS at 00:00

## 2023-01-01 RX ADMIN — Medication 125 MILLIGRAM(S): at 23:21

## 2023-01-01 RX ADMIN — AMLODIPINE BESYLATE 5 MILLIGRAM(S): 2.5 TABLET ORAL at 21:39

## 2023-01-01 RX ADMIN — LACOSAMIDE 200 MILLIGRAM(S): 50 TABLET ORAL at 17:18

## 2023-01-01 RX ADMIN — Medication 20 MILLIEQUIVALENT(S): at 05:56

## 2023-01-01 RX ADMIN — Medication 5 MILLIGRAM(S): at 05:56

## 2023-01-01 RX ADMIN — HEPARIN SODIUM 5000 UNIT(S): 5000 INJECTION INTRAVENOUS; SUBCUTANEOUS at 05:41

## 2023-01-01 RX ADMIN — Medication 18 MICROGRAM(S): at 22:08

## 2023-01-01 RX ADMIN — Medication 4 DROP(S): at 05:15

## 2023-01-01 RX ADMIN — CEFTRIAXONE 100 MILLIGRAM(S): 500 INJECTION, POWDER, FOR SOLUTION INTRAMUSCULAR; INTRAVENOUS at 06:32

## 2023-01-01 RX ADMIN — HYDROMORPHONE HYDROCHLORIDE 1 MILLIGRAM(S): 2 INJECTION INTRAMUSCULAR; INTRAVENOUS; SUBCUTANEOUS at 14:27

## 2023-01-01 RX ADMIN — LEVETIRACETAM 500 MILLIGRAM(S): 250 TABLET, FILM COATED ORAL at 06:02

## 2023-01-01 RX ADMIN — Medication 100 MILLIEQUIVALENT(S): at 06:27

## 2023-01-01 RX ADMIN — LEVETIRACETAM 400 MILLIGRAM(S): 250 TABLET, FILM COATED ORAL at 00:05

## 2023-01-01 RX ADMIN — POLYETHYLENE GLYCOL 3350 17 GRAM(S): 17 POWDER, FOR SOLUTION ORAL at 17:34

## 2023-01-01 RX ADMIN — Medication 125 MILLIGRAM(S): at 23:08

## 2023-01-01 RX ADMIN — Medication 4 DROP(S): at 17:44

## 2023-01-01 RX ADMIN — Medication 5 MILLIGRAM(S): at 06:54

## 2023-01-01 RX ADMIN — LACOSAMIDE 140 MILLIGRAM(S): 50 TABLET ORAL at 19:04

## 2023-01-01 RX ADMIN — Medication 1 APPLICATION(S): at 18:00

## 2023-01-01 RX ADMIN — ATOVAQUONE 1500 MILLIGRAM(S): 750 SUSPENSION ORAL at 12:59

## 2023-01-01 RX ADMIN — Medication 1 APPLICATION(S): at 06:43

## 2023-01-01 RX ADMIN — LEVETIRACETAM 400 MILLIGRAM(S): 250 TABLET, FILM COATED ORAL at 17:29

## 2023-01-01 RX ADMIN — CHLORHEXIDINE GLUCONATE 15 MILLILITER(S): 213 SOLUTION TOPICAL at 06:31

## 2023-01-01 RX ADMIN — MIDODRINE HYDROCHLORIDE 10 MILLIGRAM(S): 2.5 TABLET ORAL at 06:36

## 2023-01-01 RX ADMIN — CHLORHEXIDINE GLUCONATE 15 MILLILITER(S): 213 SOLUTION TOPICAL at 19:31

## 2023-01-01 RX ADMIN — Medication 1 APPLICATION(S): at 05:59

## 2023-01-01 RX ADMIN — Medication 4 DROP(S): at 17:35

## 2023-01-01 RX ADMIN — Medication 4 DROP(S): at 18:35

## 2023-01-01 RX ADMIN — Medication 650 MILLIGRAM(S): at 09:51

## 2023-01-01 RX ADMIN — Medication 4: at 05:13

## 2023-01-01 RX ADMIN — HYDROMORPHONE HYDROCHLORIDE 1 MILLIGRAM(S): 2 INJECTION INTRAMUSCULAR; INTRAVENOUS; SUBCUTANEOUS at 21:38

## 2023-01-01 RX ADMIN — ATOVAQUONE 1500 MILLIGRAM(S): 750 SUSPENSION ORAL at 12:52

## 2023-01-01 RX ADMIN — Medication 125 MILLIGRAM(S): at 06:50

## 2023-01-01 RX ADMIN — Medication 125 MILLIGRAM(S): at 23:57

## 2023-01-01 RX ADMIN — Medication 2: at 11:38

## 2023-01-01 RX ADMIN — LEVETIRACETAM 500 MILLIGRAM(S): 250 TABLET, FILM COATED ORAL at 06:35

## 2023-01-01 RX ADMIN — Medication 125 MILLIGRAM(S): at 17:20

## 2023-01-01 RX ADMIN — Medication 1 APPLICATION(S): at 18:24

## 2023-01-01 RX ADMIN — Medication 125 MILLIGRAM(S): at 23:22

## 2023-01-01 RX ADMIN — Medication 2: at 11:47

## 2023-01-01 RX ADMIN — LEVETIRACETAM 500 MILLIGRAM(S): 250 TABLET, FILM COATED ORAL at 05:38

## 2023-01-01 RX ADMIN — LEVETIRACETAM 400 MILLIGRAM(S): 250 TABLET, FILM COATED ORAL at 05:21

## 2023-01-01 RX ADMIN — Medication 10 MILLIGRAM(S): at 20:05

## 2023-01-01 RX ADMIN — POLYETHYLENE GLYCOL 3350 17 GRAM(S): 17 POWDER, FOR SOLUTION ORAL at 05:50

## 2023-01-01 RX ADMIN — Medication 400 MILLIGRAM(S): at 02:10

## 2023-01-01 RX ADMIN — Medication 650 MILLIGRAM(S): at 08:23

## 2023-01-01 RX ADMIN — LACOSAMIDE 140 MILLIGRAM(S): 50 TABLET ORAL at 05:56

## 2023-01-01 RX ADMIN — Medication 650 MILLIGRAM(S): at 12:58

## 2023-01-01 RX ADMIN — Medication 125 MILLIGRAM(S): at 06:16

## 2023-01-01 RX ADMIN — HEPARIN SODIUM 5000 UNIT(S): 5000 INJECTION INTRAVENOUS; SUBCUTANEOUS at 13:41

## 2023-01-01 RX ADMIN — Medication 125 MILLIGRAM(S): at 17:34

## 2023-01-01 RX ADMIN — CHLORHEXIDINE GLUCONATE 15 MILLILITER(S): 213 SOLUTION TOPICAL at 05:45

## 2023-01-01 RX ADMIN — HEPARIN SODIUM 5000 UNIT(S): 5000 INJECTION INTRAVENOUS; SUBCUTANEOUS at 06:50

## 2023-01-01 RX ADMIN — CHLORHEXIDINE GLUCONATE 1 APPLICATION(S): 213 SOLUTION TOPICAL at 05:10

## 2023-01-01 RX ADMIN — Medication 2.91 MICROGRAM(S)/KG/MIN: at 11:53

## 2023-01-01 RX ADMIN — Medication 1 APPLICATION(S): at 05:12

## 2023-01-01 RX ADMIN — LEVETIRACETAM 400 MILLIGRAM(S): 250 TABLET, FILM COATED ORAL at 05:19

## 2023-01-01 RX ADMIN — SENNA PLUS 10 MILLILITER(S): 8.6 TABLET ORAL at 06:00

## 2023-01-01 RX ADMIN — Medication 1 APPLICATION(S): at 05:05

## 2023-01-01 RX ADMIN — Medication 650 MILLIGRAM(S): at 05:46

## 2023-01-01 RX ADMIN — HEPARIN SODIUM 5000 UNIT(S): 5000 INJECTION INTRAVENOUS; SUBCUTANEOUS at 21:47

## 2023-01-01 RX ADMIN — CHLORHEXIDINE GLUCONATE 1 APPLICATION(S): 213 SOLUTION TOPICAL at 06:00

## 2023-01-01 RX ADMIN — FENTANYL CITRATE 100 MICROGRAM(S): 50 INJECTION INTRAVENOUS at 22:13

## 2023-01-01 RX ADMIN — Medication 5 MILLIGRAM(S): at 05:27

## 2023-01-01 RX ADMIN — Medication 2.91 MICROGRAM(S)/KG/MIN: at 13:38

## 2023-01-01 RX ADMIN — LACOSAMIDE 140 MILLIGRAM(S): 50 TABLET ORAL at 05:12

## 2023-01-01 RX ADMIN — MIDODRINE HYDROCHLORIDE 10 MILLIGRAM(S): 2.5 TABLET ORAL at 13:57

## 2023-01-01 RX ADMIN — BUMETANIDE 2 MILLIGRAM(S): 0.25 INJECTION INTRAMUSCULAR; INTRAVENOUS at 05:05

## 2023-01-01 RX ADMIN — PROPOFOL 2.27 MICROGRAM(S)/KG/MIN: 10 INJECTION, EMULSION INTRAVENOUS at 19:59

## 2023-01-01 RX ADMIN — CHLORHEXIDINE GLUCONATE 1 APPLICATION(S): 213 SOLUTION TOPICAL at 05:23

## 2023-01-01 RX ADMIN — HEPARIN SODIUM 5000 UNIT(S): 5000 INJECTION INTRAVENOUS; SUBCUTANEOUS at 13:43

## 2023-01-01 RX ADMIN — ATOVAQUONE 1500 MILLIGRAM(S): 750 SUSPENSION ORAL at 11:38

## 2023-01-01 RX ADMIN — Medication 25 MICROGRAM(S): at 07:49

## 2023-01-01 RX ADMIN — Medication 4 DROP(S): at 17:23

## 2023-01-01 RX ADMIN — HEPARIN SODIUM 5000 UNIT(S): 5000 INJECTION INTRAVENOUS; SUBCUTANEOUS at 06:10

## 2023-01-01 RX ADMIN — MIDODRINE HYDROCHLORIDE 10 MILLIGRAM(S): 2.5 TABLET ORAL at 06:44

## 2023-01-01 RX ADMIN — CEFTRIAXONE 100 MILLIGRAM(S): 500 INJECTION, POWDER, FOR SOLUTION INTRAMUSCULAR; INTRAVENOUS at 06:43

## 2023-01-01 RX ADMIN — MIDODRINE HYDROCHLORIDE 10 MILLIGRAM(S): 2.5 TABLET ORAL at 13:36

## 2023-01-01 RX ADMIN — Medication 650 MILLIGRAM(S): at 06:34

## 2023-01-01 RX ADMIN — CEFTRIAXONE 100 MILLIGRAM(S): 500 INJECTION, POWDER, FOR SOLUTION INTRAMUSCULAR; INTRAVENOUS at 06:49

## 2023-01-01 RX ADMIN — HEPARIN SODIUM 5000 UNIT(S): 5000 INJECTION INTRAVENOUS; SUBCUTANEOUS at 22:45

## 2023-01-01 RX ADMIN — Medication 2: at 05:13

## 2023-01-01 RX ADMIN — MIDODRINE HYDROCHLORIDE 10 MILLIGRAM(S): 2.5 TABLET ORAL at 14:00

## 2023-01-01 RX ADMIN — Medication 125 MILLIGRAM(S): at 13:15

## 2023-01-01 RX ADMIN — Medication 4 DROP(S): at 18:15

## 2023-01-01 RX ADMIN — LEVETIRACETAM 400 MILLIGRAM(S): 250 TABLET, FILM COATED ORAL at 18:49

## 2023-01-01 RX ADMIN — MIDODRINE HYDROCHLORIDE 10 MILLIGRAM(S): 2.5 TABLET ORAL at 21:38

## 2023-01-01 RX ADMIN — Medication 2: at 11:53

## 2023-01-01 RX ADMIN — CHLORHEXIDINE GLUCONATE 15 MILLILITER(S): 213 SOLUTION TOPICAL at 11:27

## 2023-01-01 RX ADMIN — Medication 10 MILLIGRAM(S): at 09:58

## 2023-01-01 RX ADMIN — Medication 5 MILLIGRAM(S): at 11:09

## 2023-01-01 RX ADMIN — Medication 18 MICROGRAM(S): at 23:05

## 2023-01-01 RX ADMIN — Medication 650 MILLIGRAM(S): at 11:58

## 2023-01-01 RX ADMIN — FENTANYL CITRATE 1.89 MICROGRAM(S)/KG/HR: 50 INJECTION INTRAVENOUS at 13:39

## 2023-01-01 RX ADMIN — CHLORHEXIDINE GLUCONATE 15 MILLILITER(S): 213 SOLUTION TOPICAL at 17:49

## 2023-01-01 RX ADMIN — CEFTRIAXONE 100 MILLIGRAM(S): 500 INJECTION, POWDER, FOR SOLUTION INTRAMUSCULAR; INTRAVENOUS at 05:18

## 2023-01-01 RX ADMIN — Medication 2: at 23:38

## 2023-01-01 RX ADMIN — HEPARIN SODIUM 5000 UNIT(S): 5000 INJECTION INTRAVENOUS; SUBCUTANEOUS at 21:12

## 2023-01-01 RX ADMIN — AMLODIPINE BESYLATE 10 MILLIGRAM(S): 2.5 TABLET ORAL at 05:33

## 2023-01-01 RX ADMIN — Medication 4 DROP(S): at 06:52

## 2023-01-01 RX ADMIN — HEPARIN SODIUM 5000 UNIT(S): 5000 INJECTION INTRAVENOUS; SUBCUTANEOUS at 13:52

## 2023-01-01 RX ADMIN — Medication 2: at 05:09

## 2023-01-01 RX ADMIN — Medication 1 APPLICATION(S): at 05:52

## 2023-01-01 RX ADMIN — Medication 4 DROP(S): at 17:21

## 2023-01-01 RX ADMIN — LEVETIRACETAM 400 MILLIGRAM(S): 250 TABLET, FILM COATED ORAL at 05:08

## 2023-01-01 RX ADMIN — MIDAZOLAM HYDROCHLORIDE 3.11 MG/KG/HR: 1 INJECTION, SOLUTION INTRAMUSCULAR; INTRAVENOUS at 08:57

## 2023-01-01 RX ADMIN — Medication 4 DROP(S): at 06:00

## 2023-01-01 RX ADMIN — CHLORHEXIDINE GLUCONATE 15 MILLILITER(S): 213 SOLUTION TOPICAL at 06:02

## 2023-01-01 RX ADMIN — HEPARIN SODIUM 5000 UNIT(S): 5000 INJECTION INTRAVENOUS; SUBCUTANEOUS at 06:31

## 2023-01-01 RX ADMIN — CHLORHEXIDINE GLUCONATE 15 MILLILITER(S): 213 SOLUTION TOPICAL at 05:07

## 2023-01-01 RX ADMIN — LACOSAMIDE 200 MILLIGRAM(S): 50 TABLET ORAL at 17:21

## 2023-01-01 RX ADMIN — CHLORHEXIDINE GLUCONATE 15 MILLILITER(S): 213 SOLUTION TOPICAL at 17:10

## 2023-01-01 RX ADMIN — Medication 4 DROP(S): at 06:51

## 2023-01-01 RX ADMIN — Medication 10 MILLIGRAM(S): at 21:12

## 2023-01-01 RX ADMIN — LACOSAMIDE 200 MILLIGRAM(S): 50 TABLET ORAL at 05:51

## 2023-01-01 RX ADMIN — ATOVAQUONE 1500 MILLIGRAM(S): 750 SUSPENSION ORAL at 12:14

## 2023-01-01 RX ADMIN — Medication 2: at 05:52

## 2023-01-01 RX ADMIN — Medication 2: at 07:43

## 2023-01-01 RX ADMIN — CEFTRIAXONE 100 MILLIGRAM(S): 500 INJECTION, POWDER, FOR SOLUTION INTRAMUSCULAR; INTRAVENOUS at 23:21

## 2023-01-01 RX ADMIN — Medication 2: at 17:46

## 2023-01-01 RX ADMIN — Medication 1 APPLICATION(S): at 17:33

## 2023-01-01 RX ADMIN — FENTANYL CITRATE 1.89 MICROGRAM(S)/KG/HR: 50 INJECTION INTRAVENOUS at 17:29

## 2023-01-01 RX ADMIN — LEVETIRACETAM 500 MILLIGRAM(S): 250 TABLET, FILM COATED ORAL at 06:44

## 2023-01-01 RX ADMIN — Medication 81 MILLIGRAM(S): at 12:31

## 2023-03-14 NOTE — ED ADULT TRIAGE NOTE - BEFAST EYES
Instructions: This plan will send the code FBSE to the PM system.  DO NOT or CHANGE the price. Detail Level: Simple No Price (Do Not Change): 0.00

## 2023-03-14 NOTE — STROKE CODE NOTE - CT READING
Multiple CT attempts made due to patient agitation, movement in scanner, also delay due to IV attempts

## 2023-03-14 NOTE — ED ADULT NURSE NOTE - CHIEF COMPLAINT QUOTE
Pt accompanied by daughter c/o headache and altered mental status beginning today around 545pm. Pt unable to answer questions. Hx: DM, aneurysm, fingerstick: 182 Per MD Spekarono- code stroke called. Hx: kidney transplant, aneurysm

## 2023-03-14 NOTE — ED PROVIDER NOTE - PROGRESS NOTE DETAILS
Viraj Jeffers, DO PGY-2: CT/CTA negative, troponins elevated likely secondary to CKD, other labs nonactionable.  Neurology recommending admission for MRI and further work-up.  Will admit to medicine on telemetry. Jenny THACKER: discussed case w pt pmd, rec admission to dr. riley, discussed w dr riley will admit to his service.

## 2023-03-14 NOTE — ED ADULT NURSE NOTE - NS ED NURSE PATIENT LEFT UNIT TIME
Neurology Consult Note      HISTORY PROVIDED BY: patient    Chief Complaint:   Chief Complaint   Patient presents with    New Patient     referred by hospatalist for pain in both feet and legs - wakes her up during the night - constant in the left leg       Subjective:    Abraham Johnson is a 68 y.o. right handed female who presents in consultation for numbness and tingling sensation in the feet worse in the left foot. This is a 68-year-old right-handed female history of right breast cancer, diabetes mellitus, fibromyalgia, GERD, AG-Alendronate, hyponatremia, hypothyroidism, peptic ulcer disease, CVA, seizures, who was referred to the clinic to evaluate for numbness and tingling sensation of the legs. According to patient, symptoms have been going on for years, however, recently is getting worse. Patient says it is worse at night, and disturb her up from sleep. Patient says sometimes it wakes her up, also, her legs tend to be sensitive, bed covers touching the legs tend to be sensitive. She says it is also affecting her walking, patient noted that walking on barefoot it appears if she is walking on uri. He has not had any fall, she has had near falls. Patient says there is some pain in both legs which is sharp in nature, intermittent, burning sensation going down to the feet making both legs weak. Worse in the left leg. Patient denies loss of consciousness, dysphagia, odynophagia.   Review of Systems - General ROS: positive for  - fatigue, night sweats and sleep disturbance  Psychological ROS: positive for - anxiety and sleep disturbances  Ophthalmic ROS: positive for - blurry vision and decreased vision  ENT ROS: positive for - visual changes  Allergy and Immunology ROS: negative  Hematological and Lymphatic ROS: negative  Endocrine ROS: negative  Respiratory ROS: no cough, shortness of breath, or wheezing  Cardiovascular ROS: no chest pain or dyspnea on exertion  Gastrointestinal ROS: no abdominal pain, change in bowel habits, or black or bloody stools  Genito-Urinary ROS: no dysuria, trouble voiding, or hematuria  Musculoskeletal ROS: positive for - gait disturbance, joint stiffness, muscular weakness and pain in leg - both  Neurological ROS: positive for - dizziness, gait disturbance, impaired coordination/balance, numbness/tingling, visual changes and weakness  Dermatological ROS: negative    Past Medical History:   Diagnosis Date    Breast cancer ([facility] [address] 1999    Right    Diabetes ([facility] [address]     As of 1/28/2020, pt states \"borderline\"    Fibromyalgia     GERD (gastroesophageal reflux disease)     Hiatal hernia     Hyponatremia 05/2019    As of 1/28/2020 pt had a seizure as a result to this.  Hypothyroid     Ill-defined condition     As of 1/28/2020, pt states her cardiologist says her HR drops at night but nothing to be concerned with.      Pre-diabetes     PUD (peptic ulcer disease)     PVC (premature ventricular contraction)     Too much caffeine    Seizures ([facility] [address] 05/2019    Stroke ([facility] [address] 2003    Took vision from right eye      Past Surgical History:   Procedure Laterality Date    HX BACK SURGERY  05/16/2019    [facility]    HX COLONOSCOPY N/A     HX ENDOSCOPY      HX MASTECTOMY Right 1999    HX ORTHOPAEDIC  2000    L knee has pins and plates after a fall (fracture)    HX PARTIAL HYSTERECTOMY N/A       Social History     Socioeconomic History    Marital status:      Spouse name: Not on file    Number of children: Not on file    Years of education: Not on file    Highest education level: Not on file   Occupational History    Not on file   Tobacco Use    Smoking status: Never Smoker    Smokeless tobacco: Never Used   Vaping Use    Vaping Use: Never used   Substance and Sexual Activity    Alcohol use: Not Currently    Drug use: Never     Comment: CBD cream    Sexual activity: Yes     Partners: Male   Other Topics Concern    Not on file   Social History Narrative    Not on file Social Determinants of Health     Financial Resource Strain:     Difficulty of Paying Living Expenses: Not on file   Food Insecurity:     Worried About Running Out of Food in the Last Year: Not on file    Reyes of Food in the Last Year: Not on file   Transportation Needs:     Lack of Transportation (Medical): Not on file    Lack of Transportation (Non-Medical): Not on file   Physical Activity:     Days of Exercise per Week: Not on file    Minutes of Exercise per Session: Not on file   Stress:     Feeling of Stress : Not on file   Social Connections:     Frequency of Communication with Friends and Family: Not on file    Frequency of Social Gatherings with Friends and Family: Not on file    Attends Buddhism Services: Not on file    Active Member of [address] [place] [address] [employer] or Organizations: Not on file    Attends Club or Organization Meetings: Not on file    Marital Status: Not on file   Intimate Partner Violence:     Fear of Current or Ex-Partner: Not on file    Emotionally Abused: Not on file    Physically Abused: Not on file    Sexually Abused: Not on file   Housing Stability:     Unable to Pay for Housing in the Last Year: Not on file    Number of [place] in the Last Year: Not on file    Unstable Housing in the Last Year: Not on file     Family History   Problem Relation Age of Onset    Cancer Mother         breast cancer    Heart Disease Father     Diabetes Brother     Colon Cancer Brother     Diabetes Brother     Heart Disease Brother     Other Sister 55        Gastric bypass into staph infection    No Known Problems Sister          Objective:   ROS  As per HPI  Allergies   Allergen Reactions    Other Food Anaphylaxis     Steak, cheese, grass, smoke    Penicillins Rash, Swelling and Itching     Mouth  Other reaction(s): ITCHING AND SWELLING  Other reaction(s): Adverse reaction to substance ([id number]);  Severity: Mild      Tramadol Other (comments)     Hyponatremia, seizure    Beef Containing Products Other (comments)     Itching and can cause mouth to swell   Other reaction(s): SWELLING AND HIVES AND ITCHING    Cheese Itching and Swelling    Codeine Rash and Swelling     Mouth    Gabapentin Other (comments)     As of 1/28/2020, pt requests not to have this. It \"completely wipes me out. \"  Other reaction(s): WEAKNESS    Other Plant, Animal, Environmental Hives     Rubber        Meds:  Outpatient Medications Prior to Visit   Medication Sig Dispense Refill    levothyroxine (SYNTHROID) 75 mcg tablet Take 1 Tablet by mouth every Monday and Friday.  pantoprazole (PROTONIX) 40 mg tablet Take 40 mg by mouth two (2) times a day.  multivitamin (ONE A DAY) tablet Take 1 Tablet by mouth daily. CENTRUM SILVER FOR WOMEN      simvastatin (ZOCOR) 40 mg tablet TAKE 1 TABLET BY MOUTH ONCE DAILY AT NIGHT 90 Tablet 3    levothyroxine (SYNTHROID) 50 mcg tablet Take 1 Tablet by mouth Daily (before breakfast). Take 50 mcg every day except 75 mcg q Monday and Friday 105 Tablet 3    OTHER CBD cream      acetaminophen (TylenoL) 325 mg tablet Take  by mouth every four (4) hours as needed for Pain. Takes 6 daily      magnesium oxide 250 mg magnesium tablet Take 250 mg by mouth daily.  lidocaine (LIDODERM) 5 % 1 Patch by TransDERmal route every twenty-four (24) hours. Apply patch to the affected area for 12 hours a day and remove for 12 hours a day. 30 Each 5    aspirin 81 mg chewable tablet Take 1 Tab by mouth daily. 30 Tab 1    denosumab (PROLIA) 60 mg/mL injection 60 mg by SubCUTAneous route every 6 months.  metFORMIN (GLUCOPHAGE) 500 mg tablet Take 500 mg by mouth every other day.  cetirizine (ZYRTEC) 10 mg tablet Take 10 mg by mouth daily.  cyanocobalamin (VITAMIN B12) 100 mcg tablet Take 100 mcg by mouth daily.       azithromycin (ZITHROMAX) 250 mg tablet       oxaprozin (DAYPRO) 600 mg tablet TAKE 1 TABLET BY MOUTH EVERY DAY AS NEEDED (Patient not taking: Reported on 7/6/2022) 90 Tablet 0    omeprazole (PRILOSEC) 40 mg capsule Take 40 mg by mouth daily. (Patient not taking: Reported on 7/6/2022)      pregabalin (LYRICA) 25 mg capsule Take 25 mg by mouth two (2) times a day. (Patient not taking: Reported on 7/6/2022)      bisacodyl (DULCOLAX) 10 mg suppository Insert 10 mg into rectum daily. (Patient not taking: Reported on 2/16/2022) 28 Each 0     No facility-administered medications prior to visit. Imaging:  MRI Results (most recent):  Results from Hospital Encounter encounter on 01/19/22    MRI BRAIN WO CONT    Narrative  EXAM: MRI BRAIN WO CONT    INDICATION: Generalized weakness and dizziness. COMPARISON: CT head and CT angiography head earlier today. CONTRAST: None. TECHNIQUE:  Multiplanar multisequence acquisition without contrast of the brain. FINDINGS:  The ventricles are normal in size and position. There is no acute infarct,  hemorrhage, extra-axial fluid collection, or mass effect. Minimal chronic  microvascular ischemic disease in the frontal periventricular white matter. Expected arterial flow-voids are present. Midline structures are within normal limits. Medial temporal lobes are within  normal limits. Orbits are symmetric. Impression  Minimal chronic microvascular ischemic disease. No acute infarct. CT Results (most recent):       Reviewed records in [employer] and [employer] tab today    Lab Review   Results for orders placed or performed in visit on 07/06/22   RHEUMATOID FACTOR, QL   Result Value Ref Range    Rheumatoid factor <10.0 <14.0 IU/mL        Exam:  Visit Vitals  /66 (BP 1 Location: Left arm, BP Patient Position: Sitting, BP Cuff Size: Large adult)   Pulse 66   Temp 98.2 °F (36.8 °C) (Oral)   Resp 18   Ht 5' 4\" (1.626 m)   Wt 182 lb (82.6 kg)   SpO2 98%   BMI 31.24 kg/m²     General:  Alert, cooperative, no distress. Head:  Normocephalic, without obvious abnormality, atraumatic.    Respiratory:  Heart:   Non labored breathing  Regular rate and rhythm, no murmurs   Neck:   2+ carotids, no bruits   Extremities: Warm, no cyanosis or edema. Pulses: 2+ radial pulses. Neurologic:  MS: Alert and oriented x 4, speech intact. Language intact, able to name, repeat, and follow all commands. Attention and fund of knowledge appropriate. Recent and remote memory intact. Cranial Nerves:  II: visual fields Full to confrontation   II: pupils Equal, round, reactive to light   II: optic disc No papilledema   III,VII: ptosis none   III,IV,VI: extraocular muscles  EOMI, no nystagmus or diplopia   V: facial light touch sensation  normal   VII: facial muscle function   symmetric   VIII: hearing intact   IX: soft palate elevation  normal   XI: trapezius strength  5/5   XI: sternocleidomastoid strength 5/5   XII: tongue  Midline     Motor: normal bulk and tone, no tremor              Strength: 5-/5 bilateral upper extremity, 4+/5 left lower extremity 5-/5 right lower extremity , no PD  Sensory: Decreased sensation to LT, PP, temperature vibration bilateral lower extremity up to the knee  Coordination: FTN intact and HTS abnormal, VIKI intact,Romberg positive  Gait: Unsteady gait , unable to heel, toe, and tandem walk  Reflexes: 1+ symmetric  Plantar: Withdrawn           Assessment/Plan       ICD-10-CM ICD-9-CM    1. Neuropathy  G62.9 355.9 EMG NCV MOTOR WITH F/WAVE PER NERVE      pregabalin (Lyrica) 25 mg capsule      RHEUMATOID FACTOR, QL      VITAMIN B12      SED RATE (ESR)      KATARINA, DIRECT, W/REFLEX      CK      TSH 3RD GENERATION      FERRITIN      LIPID PANEL      METABOLIC PANEL, COMPREHENSIVE      RHEUMATOID FACTOR, QL   2. Myofascial muscle pain  M79.18 729.1 EMG NCV MOTOR WITH F/WAVE PER NERVE      ALDOLASE      RHEUMATOID FACTOR, QL      VITAMIN B12      SED RATE (ESR)      KATARINA, DIRECT, W/REFLEX      CK      TSH 3RD GENERATION      FERRITIN      RHEUMATOID FACTOR, QL   3.  Hypokalemia  E87.6 276.8 TSH 3RD GENERATION      FERRITIN LIPID PANEL      METABOLIC PANEL, COMPREHENSIVE   4. Hypovitaminosis  E56.9 269.2 VITAMIN B12      CK   5. Other vitamin B12 deficiency anemias   D51.8 281.1 VITAMIN B12      FERRITIN   6. Diabetes mellitus due to underlying condition with diabetic autonomic neuropathy, without long-term current use of insulin ([facility])   E08.43 249.60 LIPID PANEL     337.1      Plan:  Lyrica 25 mg p.o. twice daily  Cymbalta 20 mg p.o. daily  EMG/nerve conduction study bilateral lower extremity  Blood for autoimmune work-up, vitamin B12, ESR, vitamin D, CK, aldolase, CMP, lipid panel, ferritin, TSH    Follow-up and Dispositions    · Return in about 3 months (around 10/6/2022). Thank you very much for this consultation.  .    Vikram Prado MD  7/6/2022 05:45 11:24

## 2023-03-14 NOTE — ED ADULT TRIAGE NOTE - CHIEF COMPLAINT QUOTE
Pt accompanied by family c/o headache and altered mental status beginning today around 545pm. Pt unable to answer questions. Hx: DM, aneurysm, fingerstick: 182 Per MD Alvarado- no code stroke called. Hx: kidney transplant, aneurysm Pt accompanied by daughter c/o headache and altered mental status beginning today around 545pm. Pt unable to answer questions. Hx: DM, aneurysm, fingerstick: 182 Per MD Alvarado- no code stroke called. Hx: kidney transplant, aneurysm Pt accompanied by daughter c/o headache and altered mental status beginning today around 545pm. Pt unable to answer questions. Hx: DM, aneurysm, fingerstick: 182 Per MD Spekarono- code stroke called. Hx: kidney transplant, aneurysm

## 2023-03-14 NOTE — CONSULT NOTE ADULT - ASSESSMENT
Patient VAMSI is a 77yo F PMHx PCKD s/p renal transplant, brain cyst, DM, HLD, HTN, hypothyroidism, glaucoma, cataracts, DVT in leg, on asa who presents to ED for severe headache, neck pain, markedly decreased responsiveness to external stimuli. Vs 97.2F, HR65, /52. RR16, 96%RA. Exam notable for patient in significant distress, not following most simple commands (showing two fingers, looking to R or L, smiling) in two different languages, gazing to R side, generalized extremity weakness potentially with R side worse? CT head w/o con without evidence of hemorrhage. CT contrast imaging pursued given exam above. CTA without flow limiting stenosis/ occlusion, aneurysm, dissection.     LKW: 2PM 3/14/23  NIHSS: 16   Baseline MRS: 1  Not a TNKase candidate due to outside window  Not a thrombectomy candidate due to no large vessel occlusion     Impression: Worsening mental status, headache, neck pain of unclear etiology. Evaluate for intracranial pathology including ischemic stroke, dissection, hemorrhage. Also evaluate for ear pathology in setting of recent procedure although exam and patient appearance was out of proportion to that expected from ear pathology.      Recommendations:  [ ] No current neurovascular contraindication for continuing aspirin 81mg daily for now   [ ] Atorvastatin 40-80 mg daily titrated per LDL < 70  [ ] HgbA1C, fasting lipid panel, CBC, CMP, coag panel, troponin  [ ] MRI brain w/o con,    [ ] TTE w/ bubble study if found with stroke  [ ] telemetry to check for arrhythmia, EKG,   [ ] 24 hour video EEG   [ ] monitor off antiseizure medications unless seizure like event is witnessed (gaze and other symptoms were improving after CT imaging)  [ ] ENT evaluation to assess ear  [ ] Renal/ transplant consult  [ ] evaluation and treatment of DVT in leg per primary team    - Tight glucose control (long-term goal HgbA1c < 6%)   - Neuro-checks and VS q4h at least  - Can allow permissive HTN up to 220/120 for 24-48h from symptom onset or unless medically contraindicated from other acute issues and if okay per nephrology/ transplant  - Dysphagia screen. If fails, speech/swallow eval  - aspiration, fall precautions  - STAT CT head non-contrast for change in neuro exam.   - PT/ OT / DVT ppx per primary team     Discussed with stroke ACP Shakila Larson and telestroke attending Dr Ryan Gomez regarding decision against candidacy for TNKase/ thrombectomy and above initial recommendations. Will be formally staffed on morning rounds with attending. Recommendations will be complete once signed by attending. Patient VAMSI is a 77yo F PMHx PCKD s/p renal transplant, brain cyst, DM, HLD, HTN, hypothyroidism, glaucoma, cataracts, DVT in leg, on asa who presents to ED for severe headache, neck pain, markedly decreased responsiveness to external stimuli. Vs 97.2F, HR65, /52. RR16, 96%RA. Exam notable for patient in significant distress, not following most simple commands (showing two fingers, looking to R or L, smiling) in two different languages, gazing to R side, generalized extremity weakness potentially with R side worse? CT head w/o con without evidence of hemorrhage. CT contrast imaging pursued given exam above. CTA without flow limiting stenosis/ occlusion, aneurysm, dissection.     LKW: 2PM 3/14/23  NIHSS: 17  Baseline MRS: 1  Not a TNKase candidate due to outside window  Not a thrombectomy candidate due to no large vessel occlusion     Impression: Worsening mental status, headache, neck pain of unclear etiology. Evaluate for intracranial pathology including ischemic stroke, dissection, hemorrhage. Differential also includes focal seizure with impaired awareness and without tonic/clonic activity. Also evaluate for ear pathology in setting of recent procedure although exam and patient appearance was out of proportion to that expected from ear pathology.      Recommendations:  [ ] No current neurovascular contraindication for continuing aspirin 81mg daily for now   [ ] Atorvastatin 40-80 mg daily titrated per LDL < 70  [ ] HgbA1C, fasting lipid panel, CBC, CMP, coag panel, trend troponin, CK, ammonia,   [ ] MRI brain w/o con  [ ] TTE w/ bubble study if found with stroke  [ ] telemetry to check for arrhythmia, EKG,   [ ] 24 hour video EEG   [ ] monitor off antiseizure medications unless seizure like event is witnessed while here (gaze and other symptoms were improving after CT imaging)  [ ] ENT evaluation to assess R ear  [ ] Renal/ transplant consult  [ ] Evaluation and treatment of DVT in leg per primary team    - Tight glucose control (long-term goal HgbA1c < 6%)   - Neuro-checks and VS q4h at least   - Can allow permissive HTN up to 220/120 for 24-48h from symptom onset or unless medically contraindicated from other acute issues and if okay per nephrology/ transplant  - Dysphagia screen. If fails, speech/swallow eval  - aspiration, fall precautions, seizure precautions  - STAT CT head non-contrast for change in neuro exam.   - PT/ OT / DVT ppx per primary team     Discussed with stroke ACP Shakila Larson and telestroke attending Dr Ryan Gomez regarding decision against candidacy for TNKase/ thrombectomy and above initial recommendations at time of evaluation. Will be formally seen on morning rounds with attending. Recommendations will be complete once signed by attending. Patient VAMSI is a 79yo F PMHx PCKD s/p renal transplant, brain cyst, DM, HLD, HTN, hypothyroidism, glaucoma, cataracts, DVT in leg, on asa who presents to ED for severe headache, neck pain, markedly decreased responsiveness to external stimuli. Vs 97.2F, HR65, /52. RR16, 96%RA. Exam notable for patient in significant distress, not following most simple commands (showing two fingers, looking to R or L, smiling) in two different languages, gazing to R side, generalized extremity weakness potentially with R side worse? CT head w/o con without evidence of hemorrhage. CT contrast imaging pursued given exam above. CTA without flow limiting stenosis/ occlusion, aneurysm, dissection.     LKW: 2PM 3/14/23  NIHSS: 17  Baseline MRS: 1  Not a TNKase candidate due to outside window  Not a thrombectomy candidate due to no large vessel occlusion     Impression: Worsening mental status, headache, neck pain of unclear etiology. Evaluate for intracranial pathology including ischemic stroke, dissection, hemorrhage. Differential also includes focal seizure with impaired awareness and without tonic/clonic activity. Also evaluate for ear pathology in setting of recent procedure although exam and patient appearance was out of proportion to that expected from ear pathology.      Recommendations:  [ ] No current neurovascular contraindication for continuing aspirin 81mg daily for now   [ ] Atorvastatin 40-80 mg daily titrated per LDL < 70  [ ] HgbA1C, fasting lipid panel, CBC, CMP, coag panel, trend troponin, CK, ammonia,   [ ] MRI brain w/o con  [ ] TTE w/ bubble study if found with stroke  [ ] telemetry to check for arrhythmia, EKG,   [ ] 24 hour video EEG   [ ] monitor off antiseizure medications unless seizure like event is witnessed while here (gaze and other symptoms were improving after CT imaging)  [ ] ENT evaluation to assess R ear  [ ] Renal/ transplant consult  [ ] Evaluation and treatment of DVT in leg per primary team    - Tight glucose control (long-term goal HgbA1c < 6%)   - Neuro-checks and VS q4h at least   - Can allow permissive HTN up to 220/120 for 24-48h from symptom onset or unless medically contraindicated from other acute issues and if okay per nephrology/ transplant  - Dysphagia screen. If fails, speech/swallow eval  - aspiration, fall precautions, seizure precautions  - STAT CT head non-contrast for change in neuro exam.   - PT/ OT / DVT ppx per primary team     Discussed with stroke ACP Shakila Larson and telestroke attending Dr Ryan Gomez regarding decision against candidacy for TNKase/ thrombectomy and above initial recommendations at time of evaluation. Will be formally seen on morning rounds with attending. Delay in note entry due to patient care. Recommendations will be complete once signed by attending. Patient VAMSI is a 79yo F PMHx PCKD s/p renal transplant, brain cyst, DM, HLD, HTN, hypothyroidism, glaucoma, cataracts, DVT in leg, on asa who presents to ED for severe headache, neck pain, markedly decreased responsiveness to external stimuli. Vs 97.2F, HR65, /52. RR16, 96%RA. Exam notable for patient in significant distress, not following most simple commands (showing two fingers, looking to R or L, smiling) in two different languages, gazing to R side, generalized extremity weakness potentially with R side worse? CT head w/o con without evidence of hemorrhage. CT contrast imaging pursued given exam above. CTA without flow limiting stenosis/ occlusion, aneurysm, dissection.     LKW: 2PM 3/14/23  NIHSS: 17  Baseline MRS: 1  Not a TNKase candidate due to outside window  Not a thrombectomy candidate due to no large vessel occlusion     Impression: Worsening mental status, headache, neck pain of unclear etiology. Evaluate for intracranial pathology including ischemic stroke, dissection, hemorrhage. Differential also includes focal seizure with impaired awareness and without tonic/clonic activity. No current signs of systemic infection, elevated WBC count, fever, meningismus to suspect acute CNS infection. Also evaluate for ear pathology in setting of recent procedure although exam and patient appearance was out of proportion to that expected from ear pathology.      Recommendations:  [ ] No current neurovascular contraindication for continuing aspirin 81mg daily for now   [ ] Atorvastatin 40-80 mg daily titrated per LDL < 70  [ ] HgbA1C, fasting lipid panel, CBC, CMP, coag panel, trend troponin, CK, ammonia,   [ ] MRI brain w/o con  [ ] TTE w/ bubble study if found with stroke  [ ] telemetry to check for arrhythmia, EKG,   [ ] 24 hour video EEG   [ ] monitor off antiseizure medications unless seizure like event is witnessed while here (gaze and other symptoms were improving after CT imaging)  [ ] ENT evaluation to assess R ear  [ ] Renal/ transplant consult  [ ] Evaluation and treatment of DVT in leg per primary team    - Tight glucose control (long-term goal HgbA1c < 6%)   - Neuro-checks and VS q4h at least   - Can allow permissive HTN up to 220/120 for 24-48h from symptom onset or unless medically contraindicated from other acute issues and if okay per nephrology/ transplant  - Dysphagia screen. If fails, speech/swallow eval  - aspiration, fall precautions, seizure precautions  - STAT CT head non-contrast for change in neuro exam.   - PT/ OT / DVT ppx per primary team     Discussed with stroke ACP Shakila Larson and telestroke attending Dr Ryan Gomez regarding decision against candidacy for TNKase/ thrombectomy and above initial recommendations at time of evaluation. Will be formally seen on morning rounds with attending. Delay in note entry due to patient care. Recommendations will be complete once signed by attending. Patient VAMSI is a 79yo F PMHx PCKD s/p renal transplant, brain cyst, DM, HLD, HTN, hypothyroidism, glaucoma, cataracts, DVT in leg, on asa who presents to ED for severe headache, neck pain, markedly decreased responsiveness to external stimuli. Vs 97.2F, HR65, /52. RR16, 96%RA. Exam notable for patient in significant distress, not following most simple commands (showing two fingers, looking to R or L, smiling) in two different languages, gazing to R side, generalized extremity weakness potentially with R side worse? CT head w/o con without evidence of hemorrhage. CT contrast imaging pursued given exam above. CTA without flow limiting stenosis/ occlusion, aneurysm, dissection.     LKW: 2PM 3/14/23  NIHSS: 17  Baseline MRS: 1  Not a TNKase candidate due to outside window  Not a thrombectomy candidate due to no large vessel occlusion     Impression: Worsening mental status, headache, neck pain of unclear etiology. Initial presentation/ evaluation for intracranial pathology including ischemic stroke, dissection, hemorrhage. Differential at presentation also included focal seizure with impaired awareness and without tonic/clonic activity. However, after bloodwork, suspect infectious process causing patient's symptoms. Labs notable for leukocytosis with neutrophilic predominance, elevated procalcitonin 45.63, elevated troponin, and CK, lactate 2.0 and CT imaging concerning for acute otomastoiditis.       Recommendations:  [ ] No current neurovascular contraindication for continuing aspirin 81mg daily for now   [ ] Atorvastatin 40-80 mg daily titrated per LDL < 70  [ ] HgbA1C, fasting lipid panel, CBC, CMP, coag panel, trend troponin, CK, ammonia,   [ ] MRI brain w/o con, agree with ENT MR IAC as well  [ ] TTE w/ bubble study if found with stroke  [ ] telemetry to check for arrhythmia, EKG,   [ ] 24 hour video EEG   [ ] monitor off antiseizure medications unless seizure like event is witnessed while here (gaze and other symptoms were improving after CT imaging)  [ ] ENT evaluation to assess R ear  [ ] Renal/ transplant consult  [ ] Evaluation and treatment of DVT in leg per primary team    - Tight glucose control (long-term goal HgbA1c < 6%)   - Neuro-checks and VS q4h at least   - Can allow permissive HTN up to 220/120 for 24-48h from symptom onset or unless medically contraindicated from other acute issues, if okay per nephrology/ transplant, and if no other etiology is found that would explain patient's symptoms.  - Dysphagia screen. If fails, speech/swallow eval  - aspiration, fall precautions, seizure precautions  - STAT CT head non-contrast for change in neuro exam.   - PT/ OT / DVT ppx per primary team     Discussed with stroke ACP Shakila Larson and telestroke attending Dr Ryan Gomez regarding decision against candidacy for TNKase/ thrombectomy and above initial recommendations at time of evaluation. Will be formally seen on morning rounds with attending. Delay in note entry due to patient care. Recommendations will be complete once signed by attending. Patient VAMSI is a 77yo F PMHx PCKD s/p renal transplant, brain cyst, DM, HLD, HTN, hypothyroidism, glaucoma, cataracts, DVT in leg, on asa who presents to ED for severe headache, neck pain, markedly decreased responsiveness to external stimuli. Vs 97.2F, HR65, /52. RR16, 96%RA. Exam notable for patient in significant distress, not following most simple commands (showing two fingers, looking to R or L, smiling) in two different languages, gazing to R side, generalized extremity weakness potentially with R side worse? CT head w/o con without evidence of hemorrhage. CT contrast imaging pursued given exam above. CTA without flow limiting stenosis/ occlusion, aneurysm, dissection. Update: Bloodwork reveals elevated neutrophilic leukocytosis, procalcitonin returned 45.63, lactate 2.2. On re-eval patient had some rigors that was not present on initial evaluation. More notable neck stiffness. Mounting more of a temp at 4:27 (99.6 from 97.2 previously). Can have concern for systemic vs extension of infection from region of acute otomastoiditis.    LKW: 2PM 3/14/23  NIHSS: 17  Baseline MRS: 1  Not a TNKase candidate due to outside window  Not a thrombectomy candidate due to no large vessel occlusion     Impression: Worsening mental status, headache, neck pain of unclear etiology. Initial presentation/ evaluation for intracranial pathology including ischemic stroke, dissection, hemorrhage. Differential at presentation also included focal seizure with impaired awareness and without tonic/clonic activity. However, after bloodwork, suspect infectious process causing patient's symptoms. Labs notable for leukocytosis with neutrophilic predominance, elevated procalcitonin 45.63, elevated troponin, and CK, lactate 2.0 and CT imaging concerning for acute otomastoiditis. Evaluated patient with neck stiffness, no clear signs of kernig/brudinski, no melissa photophobia during fundoscopic exam but would recommend considering empiric treatment for meningitis.    Recommendations: *Updated with new recommendations after bloodwork during the middle of the night.  [ ] No current neurovascular contraindication for continuing aspirin 81mg daily for now   [ ] consider empiric meningitis coverage for now, accounting for renal function   [ ] ID evaluation  [ ] Consider MICU evaluation  [ ] Consider obtaining path results from ENT  [ ] If mentation does not improve with therapy, consider opthalmology consultation for evaluation of papilledema / elevated intracranial pressure.  [ ] Atorvastatin 40-80 mg daily titrated per LDL < 70  [ ] HgbA1C, fasting lipid panel, CBC, CMP, coag panel, trend troponin, trend CK, ammonia, infectious workup (blood cultures, UA, CXR, etc)  [ ] Discussed with neuro attending Dr Bentley, recommending LP: protein, glucose, cell count, albumin, AFB, CSF gram strain with culture, CSF PCR, HSV CSF, fungal cultures, toxoplasmosis, cryptococcal, CMV/EBV PCR, IgG panel. *If LP is pursued, please discuss with neurology prior to it being performed to ensure labs to send.  [ ] MRI brain w/ and w/o con, MR venogram w/ con (can also obtain MRV without con), agree with ENT MR IAC as well, if able   [ ] TTE w/ bubble study if found with stroke  [ ] telemetry to check for arrhythmia, EKG,   [ ] 24 hour video EEG   [ ] monitor off antiseizure medications unless seizure like event is witnessed while here (gaze and other symptoms were improving after CT imaging)  [ ] ENT evaluation to assess R ear  [ ] Renal/ transplant consult  [ ] Evaluation and treatment of DVT in leg per primary team        - Tight glucose control (long-term goal HgbA1c < 6%)   - Neuro-checks and VS q4h at least   - Initially allowed permissive HTN up to 220/120 for 24hr from symptom onset, but can lower to less than 180 SBP at this time, or unless medically contraindicated from other acute issues, if okay per nephrology/ transplant.  - Dysphagia screen. If fails, speech/swallow eval  - aspiration, fall precautions, seizure precautions  - STAT CT head non-contrast for change in neuro exam.   - PT/ OT / DVT ppx per primary team     Discussed with stroke ACP Shakila Larson and telestroke attending Dr Ryan Gomez regarding decision against candidacy for TNKase/ thrombectomy and above initial recommendations at time of evaluation. Discussed with Dr Bentley during re-evaluation of the patient for additional/ updated recommendations. Will be formally seen on morning rounds with attending. Delay in note entry due to patient care. Patient was re-evaluated once new results came and therefore recommendations were updated and medicine team was notified. Recommendations will be complete once signed by attending. Patient VAMSI is a 79yo F PMHx PCKD s/p renal transplant, brain cyst, DM, HLD, HTN, hypothyroidism, glaucoma, cataracts, DVT in leg, on asa who presents to ED for severe headache, neck pain, markedly decreased responsiveness to external stimuli. Vs 97.2F, HR65, /52. RR16, 96%RA. Exam notable for patient in significant distress, not following most simple commands (showing two fingers, looking to R or L, smiling) in two different languages, gaze preference to R side, generalized extremity weakness potentially with R side worse? Given potentially lateralizing symptoms, CT imaging obtained. CT head w/o con without evidence of hemorrhage. CT contrast imaging pursued given exam above. CTA without flow limiting stenosis/ occlusion, aneurysm, dissection. After imaging and once blood work resulted, there was elevated neutrophilic leukocytosis, procalcitonin 45.63 (2:30AM), lactate 2.2. On re-eval patient had some rigors that was not present on initial evaluation. More notable neck stiffness. Mounting more of a temp at 4:27 AM (99.6 from 97.2 previously). Therefore, there was more evidence to suggest systemic vs CNS vs extension of infection from region of acute otomastoiditis.    LKW: 2PM 3/14/23  NIHSS: 17  Baseline MRS: 1  Not a TNKase candidate due to outside window  Not a thrombectomy candidate due to no large vessel occlusion     Impression: Worsening mental status, headache, neck pain of unclear etiology. Initial presentation/ evaluation for intracranial pathology including ischemic stroke, dissection, hemorrhage. Differential at presentation also included focal seizure with impaired awareness and without tonic/clonic activity. However, after bloodwork, suspect infectious process causing patient's symptoms. Labs notable for leukocytosis with neutrophilic predominance, elevated procalcitonin 45.63, elevated troponin, and CK, lactate 2.0 and CT imaging concerning for acute otomastoiditis. Evaluated patient with neck stiffness, no clear signs of kernig/brudinski, no melissa photophobia during fundoscopic exam but would recommend considering empiric treatment for meningitis.    Recommendations: *Updated with new recommendations after bloodwork resulted during the middle of the night.  [ ] No current neurovascular contraindication for continuing aspirin 81mg daily for now   [ ] consider empiric meningitis coverage for now, accounting for renal function, penicillin allergy   [ ] ID evaluation  [ ] Consider MICU evaluation  [ ] Consider obtaining path results from ENT outpatient to determine culture if possible  [ ] If mentation does not improve with therapy, consider opthalmology consultation for evaluation of papilledema / elevated intracranial pressure.  [ ] Atorvastatin 40-80 mg daily titrated per LDL < 70 if otherwise not contraindicated   [ ] HgbA1C, fasting lipid panel, CBC, CMP, coag panel, trend troponin, trend CK, ammonia, infectious workup (blood cultures, UA, CXR, etc)  [ ] Discussed with neuro attending Dr Bentley overnight, recommending LP: protein, glucose, cell count, albumin, AFB, CSF gram strain with culture, CSF PCR, HSV CSF, fungal cultures, toxoplasmosis, cryptococcal, CMV/EBV PCR, IgG panel. *If LP is pursued, please discuss with neurology prior to it being performed to ensure labs to send.  [ ] MRI brain w/ and w/o con, MR venogram w/ con (can also obtain MRV without con), agree with ENT MR IAC as well, if able   [ ] TTE w/ bubble study if found with stroke  [ ] telemetry to check for arrhythmia, EKG,   [ ] 24 hour video EEG   [ ] monitor off antiseizure medications unless seizure like event is witnessed while here (gaze and other symptoms were improving after CT imaging)  [ ] ENT evaluation to assess R ear  [ ] Renal/ transplant consult  [ ] Evaluation and treatment of DVT in leg per primary team        - Tight glucose control (long-term goal HgbA1c < 6%)   - Neuro-checks and VS q4h at least, given concern, may need more frequent neuro checks.   - Initially allowed permissive HTN up to 220/120 for 24hr from symptom onset given stroke concern, but can lower to less than 180 SBP at this time, or unless medically contraindicated from other acute issues, if okay per nephrology/ transplant.  - Dysphagia screen. If fails, speech/swallow eval  - aspiration, fall precautions, seizure precautions  - STAT CT head non-contrast for change in neuro exam.   - PT/ OT / DVT ppx per primary team     Discussed with stroke ACP Shakila Larson and telestroke attending Dr Ryan Gomez regarding decision against candidacy for TNKase/ thrombectomy and above initial recommendations at time of evaluation. Discussed with Dr Bentley during re-evaluation once new results came and therefore recommendations were updated and medicine attending was notified of new recommendations. Will be formally seen on morning rounds with attending. Delay in note entry due to patient care. Recommendations will be complete once signed by attending.  Patient VAMSI is a 77yo F PMHx PCKD s/p renal transplant, brain cyst, DM, HLD, HTN, hypothyroidism, glaucoma, cataracts, DVT in leg, on asa who presents to ED for severe headache, neck pain, markedly decreased responsiveness to external stimuli. Vs 97.2F, HR65, /52. RR16, 96%RA. Exam notable for patient in significant distress, not following most simple commands (showing two fingers, looking to R or L, smiling) in two different languages, gaze preference to R side, generalized extremity weakness potentially with R side worse? Given potentially lateralizing symptoms, CT imaging obtained. CT head w/o con without evidence of hemorrhage. CT contrast imaging pursued given exam above. CTA without flow limiting stenosis/ occlusion, aneurysm, dissection. After imaging and once blood work resulted, there was elevated neutrophilic leukocytosis, procalcitonin 45.63 (2:30AM), lactate 2.2. On re-eval patient had some rigors that was not present on initial evaluation. More notable neck stiffness. Mounting more of a temp at 4:27 AM (99.6 from 97.2 previously). Therefore, there was more evidence to suggest systemic vs CNS vs extension of infection from region of acute otomastoiditis.    LKW: 2PM 3/14/23  NIHSS: 17  Baseline MRS: 1  Not a TNKase candidate due to outside window  Not a thrombectomy candidate due to no large vessel occlusion     Impression: Worsening mental status, headache, neck pain of unclear etiology. Initial presentation/ evaluation for intracranial pathology including ischemic stroke, dissection, hemorrhage. Differential at presentation also included focal seizure with impaired awareness and without tonic/clonic activity. However, after bloodwork, suspect infectious process causing patient's symptoms. Labs notable for leukocytosis with neutrophilic predominance, elevated procalcitonin 45.63, elevated troponin, and CK, lactate 2.0 and CT imaging concerning for acute otomastoiditis. Evaluated patient with neck stiffness, no clear signs of kernig/brudinski, no melissa photophobia during fundoscopic exam but would recommend considering empiric treatment for meningitis.    Recommendations: *Updated with new recommendations after bloodwork resulted during the middle of the night.  [ ] No current neurovascular contraindication for continuing aspirin 81mg daily for now   [ ] consider empiric meningitis coverage for now, accounting for renal function, penicillin allergy   [ ] ID evaluation  [ ] MICU evaluation  [ ] Obtain path results from ENT outpatient to determine culture if possible  [ ] If mentation does not improve with therapy, consider opthalmology consultation for evaluation of papilledema / elevated intracranial pressure.  [ ] Atorvastatin 40-80 mg daily titrated per LDL < 70 if otherwise not contraindicated   [ ] HgbA1C, fasting lipid panel, CBC, CMP, coag panel, trend troponin, trend CK, ammonia, infectious workup (blood cultures, UA, CXR, etc)  [ ] Discussed with neuro attending Dr Bentley overnight, recommending LP: protein, glucose, cell count, albumin, AFB, CSF gram strain with culture, CSF PCR, HSV CSF, fungal cultures, toxoplasmosis, cryptococcal, CMV/EBV PCR, IgG panel. *If LP is pursued, please discuss with neurology prior to it being performed to ensure labs to send.  [ ] MRI brain w/ and w/o con, MR venogram w/ con (can also obtain MRV without con), agree with ENT MR IAC as well, if able   [ ] TTE w/ bubble study if found with stroke  [ ] telemetry to check for arrhythmia, EKG,   [ ] 24 hour video EEG   [ ] monitor off antiseizure medications unless seizure like event is witnessed while here (gaze and other symptoms were improving after CT imaging)  [ ] ENT evaluation to assess R ear  [ ] Renal/ transplant consult  [ ] Evaluation and treatment of DVT in leg per primary team        - Tight glucose control (long-term goal HgbA1c < 6%)   - Neuro-checks and VS q4h at least, given concern, may need more frequent neuro checks.   - Initially allowed permissive HTN up to 220/120 for 24hr from symptom onset given stroke concern, but can lower to less than 180 SBP at this time, or unless medically contraindicated from other acute issues, if okay per nephrology/ transplant.  - Dysphagia screen. If fails, speech/swallow eval  - aspiration, fall precautions, seizure precautions  - STAT CT head non-contrast for change in neuro exam.   - PT/ OT / DVT ppx per primary team     Discussed with stroke ACP Shakila Larson and telestroke attending Dr Ryan Gomez regarding decision against candidacy for TNKase/ thrombectomy and above initial recommendations at time of evaluation. Discussed with Dr Bentley during re-evaluation once new results came and therefore recommendations were updated and medicine attending was notified of new recommendations. Will be formally seen on morning rounds with attending. Delay in note entry due to patient care. Recommendations will be complete once signed by attending.

## 2023-03-14 NOTE — ED PROVIDER NOTE - CLINICAL SUMMARY MEDICAL DECISION MAKING FREE TEXT BOX
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  Had a cotton placed, after returning home patient complained of sudden headache, and since then she is not acting like her normal self is intermittently following commands, told her she had to come to the ED for evaluation.  No fever according to daughter,. Exam vital stable nontoxic physical exam as above, DDx concern for possible subarachnoid hemorrhage versus acute bleed, have less concern for ischemic CVA, patient's episode may also be secondary to severe vertigo after procedure today from ENT, code stroke was called, will check labs get ED code stroke bundle set, neuro to see patient, will give meds as needed and reassess and dispo accordingly pending studies.

## 2023-03-14 NOTE — CONSULT NOTE ADULT - ATTENDING COMMENTS
Exam:  Per resident - R gaze preference did not persist.  Received sedation for CT.   Severe nuchal rigidity.   Eyes open intermittently.  No verbal output.  No follow commands.  Moaning.   +BTT, B.  Pupils 3-->2, B.  EOMI.  Face -grimace symmetric.     CTA head d/w neuroradiologist - venous sinuses are visualized well and are normal with no evidence of venous sinus thrombosis.     A/P  Ms. Negron is a 77 yo woman with h/o renal transplant with ana-mastoiditis with clinical signs of meningitis which is most likely due to direct extension.   Abx per primary team and recommend ID consult - cover for bacterial meningitis.   LP - failed attempt at bedside - recommend IR guided LP.   MRI brain, IAC w/wo if ok from renal standpoint.   ENT consult appreciated - please follow their recommendations.   MICU consult for close neurological monitoring - Q1H neuro checks for now.   Obtain microbiology results from outpatient ENT - per ENT note  "purulent fluid in right ear canal and anterior TM perf"  I discussed the plan with daughter and nephew (who is a physician).   Neurology resident discussed plan with primary team.   Thank you    There is a high probability of sudden, clinically significant, or life threatening deterioration in the patient’s condition which requires the highest level of physician preparedness to intervene urgently.  Critical care time:  60 minutes. Exam:  Per resident - R gaze preference did not persist.  Received sedation for CT.   Severe nuchal rigidity.   Eyes open intermittently.  No verbal output.  No follow commands.  Moaning.   +BTT, B.  Pupils 3-->2, B.  EOMI.  Face -grimace symmetric.     CTA head d/w neuroradiologist - venous sinuses are visualized well and are normal with no evidence of venous sinus thrombosis.     A/P  Ms. Negron is a 79 yo woman with h/o renal transplant with ana-mastoiditis with clinical signs of meningitis which is most likely due to direct extension.   Abx per primary team and recommend ID consult - cover for bacterial meningitis.   LP - failed attempt at bedside - recommend IR guided LP.   MRI brain, IAC w/wo if ok from renal standpoint.   ENT consult appreciated - please follow their recommendations.   MICU consult for close neurological monitoring - Q1H neuro checks for now.   Obtain microbiology results from outpatient ENT - per ENT note  "purulent fluid in right ear canal and anterior TM perf"  24 hour EEG monitoring.   I discussed the plan with daughter and nephew (who is a physician).   Neurology resident discussed plan with primary team.   Thank you    There is a high probability of sudden, clinically significant, or life threatening deterioration in the patient’s condition which requires the highest level of physician preparedness to intervene urgently.  Critical care time:  60 minutes.    Addendum - D/W MICU fellow (08:30 AM)  MICU consult appreciated.

## 2023-03-14 NOTE — ED PROVIDER NOTE - NSICDXPASTMEDICALHX_GEN_ALL_CORE_FT
PAST MEDICAL HISTORY:  Aneurysm     Glaucoma     HTN (hypertension)     Polycystic kidney disease

## 2023-03-14 NOTE — ED PROVIDER NOTE - ATTENDING CONTRIBUTION TO CARE
Addended by: NARESH LESTER on: 5/31/2019 04:37 PM     Modules accepted: Orders     I have personally performed a face to face medical and diagnostic evaluation of the patient. I have discussed with and reviewed the Resident's note and agree with the History, ROS, Physical Exam and MDM unless otherwise indicated. A brief summary of my personal evaluation and impression can be found below.    Jenny THACKER: Please see the HPI, ROS, PE and MDM as authored by me.

## 2023-03-14 NOTE — ED PROVIDER NOTE - PHYSICAL EXAMINATION
Jenny THACKER:  VITALS: Initial triage and subsequent vitals have been reviewed by me.  GEN APPEARANCE: WDWN, alert, non-toxic appearing, In obvious distress,  HEAD: Atraumatic, normocephalic   EYES: PERRLa, EOMI, vision grossly intact.   EARS: Gross hearing intact.   NOSE: No nasal discharge, no external evidence of epistaxis.   NECK: Supple  CV: RRR, S1S2, no c/r/m/g. No cyanosis. Extremities warm, well perfused. Cap refill <2 seconds. No bruits.   LUNGS: CTAB. No wheezing. No rales. No rhonchi. No diminished breath sounds.   ABDOMEN: Soft, NTND. No guarding or rebound. No masses.   MSK/EXT: Spine appears normal, no spine point tenderness. No CVA ttp. Normal muscular development. Pelvis stable. No obvious joint or bony deformity, no peripheral edema.   NEURO: Alert, Neuro assessment is limited, patient is intermittently able to follow commands, intermittently lifts her hands and arms above her head, intermittently smiles on command, is unable/unwilling to track eyes with gaze,  SKIN: Normal color for race, warm, dry and intact. No evidence of rash.  PSYCH: Normal mood and affect.

## 2023-03-14 NOTE — ED ADULT NURSE NOTE - OBJECTIVE STATEMENT
Code stroke activated. Pt received to room 17 from CT scan. Pt A&Ox0, unable to follow commands at this time, pt agitated. Medicated as per EMR orders. Unable to assess neurological status at this time. PERRLA. Pt not answering questions. Pt does not appear in any acute distress. 20G IV placed to LAC. Labs draw and sent. Safety maintained. Will reassess patient when pt calm and cooperative. Pt daughter at bedside states that her mom was dx with an ear infection today, given ear drops. Pt went home from doctor and started experiencing extreme headache, diarrhea and nausea. Pt daughter states pt is not currently at her baseline mental status.

## 2023-03-14 NOTE — CONSULT NOTE ADULT - SUBJECTIVE AND OBJECTIVE BOX
Neurology Consultation     HPI: Patient VAMSI is a 77yo F PMHx PMHx HTN renal transplant, glaucoma, cataracts, DVT in leg, on asa who presents to ED for headache and neck pain. Accompanied by ludy Allison who states patient      NIHSS:   preMRS:   ICH:   ABCD2:    PAST MEDICAL & SURGICAL HISTORY:  Polycystic kidney disease    HTN (hypertension)    Glaucoma    Aneurysm    H/O kidney transplant    FAMILY HISTORY:  No pertinent family history in first degree relatives    SOCIAL HISTORY:      MEDICATIONS (HOME):  Home Medications:    MEDICATIONS  (STANDING):    MEDICATIONS  (PRN):    ALLERGIES/INTOLERANCES:  Allergies  Benadryl (Unknown)    Intolerances    VITALS & EXAMINATION:  Vital Signs Last 24 Hrs  T(C): 36.2 (14 Mar 2023 18:37), Max: 36.2 (14 Mar 2023 18:37)  T(F): 97.2 (14 Mar 2023 18:37), Max: 97.2 (14 Mar 2023 18:37)  HR: 65 (14 Mar 2023 18:37) (65 - 65)  BP: 162/52 (14 Mar 2023 18:37) (162/52 - 162/52)  BP(mean): --  RR: 16 (14 Mar 2023 18:37) (16 - 16)  SpO2: 96% (14 Mar 2023 18:37) (96% - 96%)    Parameters below as of 14 Mar 2023 18:37  Patient On (Oxygen Delivery Method): room air       LABORATORY:  CBC   Chem       LFTs   Coagulopathy   Lipid Panel   A1c   Cardiac enzymes     U/A   CSF  Other    STUDIES & IMAGING: (EEG, CT, MR, U/S, TTE/SORAYA): Neurology Consultation     HPI: Patient VAMSI is a 77yo F PMHx PCKD s/p renal transplant, brain cyst, DM, HLD, HTN, hypothyroidism, glaucoma, cataracts, DVT in leg, on asa who presents to ED for headache and neck pain. Accompanied by daughter Estefany who states patient yesterday woke up with ear discomfort (had prior ear difficulty) and muffled sensation and went to ENT today and was found to have R ear TM rupture (unclear chronicity) and otitis externa for which her ear was cleaned and prescribed antibiotics. She went home and developed headache shortly after 2PM. She took a nap, woke up at 5PM with significant headache and neck pain. By the arrival in ED, she was in significant pain, not following commands in english nor [place] Creole per daughter translation bedside and there was concern for potential intracranial pathology (bleed, vertebrobasilar stroke) given risk factors. After CT head w/o con showed no evidence of bleeding, discussed risks of potential renal failure/ need for HD versus benefits of obtaining CTA w/ contrast imaging including assessing for vertebrobasilar syndrome given neuro exam documented, in setting of her transplant with ED attending, patient's daughter Estefany and with telestroke ACP Shakila Larson and all in agreement to obtain contrast imaging. Patient was moving during CT head w/o con several attempts for which ED provided medication to treat headache and reduce motion for obtaining imaging. After CTA, patient evaluated in more detail with telestroke ACP and telestroke attending Dr Gomez on teams video as video cart was not functioning/ connecting.     NIHSS: 16  preMRS: 1      PAST MEDICAL & SURGICAL HISTORY:  Polycystic kidney disease    HTN (hypertension)    Glaucoma    Aneurysm    H/O kidney transplant    FAMILY HISTORY:  No pertinent family history in first degree relatives    SOCIAL HISTORY:      MEDICATIONS (HOME):  Home Medications:    MEDICATIONS  (STANDING):    MEDICATIONS  (PRN):    ALLERGIES/INTOLERANCES:  Allergies  Benadryl (Unknown)    Intolerances    VITALS & EXAMINATION:  Vital Signs Last 24 Hrs  T(C): 36.2 (14 Mar 2023 18:37), Max: 36.2 (14 Mar 2023 18:37)  T(F): 97.2 (14 Mar 2023 18:37), Max: 97.2 (14 Mar 2023 18:37)  HR: 65 (14 Mar 2023 18:37) (65 - 65)  BP: 162/52 (14 Mar 2023 18:37) (162/52 - 162/52)  BP(mean): --  RR: 16 (14 Mar 2023 18:37) (16 - 16)  SpO2: 96% (14 Mar 2023 18:37) (96% - 96%)    Parameters below as of 14 Mar 2023 18:37  Patient On (Oxygen Delivery Method): room air    Exam prior to obtaining CT head w/o con and prior to any medications administered:    General:  Constitutional: Female, in significant distress and pain,  Eyes: clear sclera;   Extremities: No cyanosis; Skin: edematous LLE > RLE  Resp: breathing regularly,    Neurological (>12):  MS: Awake, opens eyes, not tracking. Only followed two commands both to raise b/l UE antigravity. Otherwise would not verbalize to me, not follow gaze, not track me when talking.    Language: no verbal output to myself, briefly said few words to daughter   CNs: pupils round, difficult to assess reactivity (R 2mm, L 2mm). No BTT bilaterally. R gaze preference, able to cross with OCV. No melissa facial asymmetry b/l.  No ear drainage noted.   Motor - Normal bulk. RUE and LUE antigravity but with drifts. RUE drifts slightly faster than RUE.  str 4+/5 bilaterally. B/l lower extremities able to flex knees and not completely lifting legs off bed. Able to flex hips, knees in plane of bed.    Sensation: Minimal withdrawal to noxious stimuli x4 extremities     Toes: mute bilaterally  no ankle clonus  Coordination: unable to assess due to mental status  Gait: unable to assess due to mental status    LABORATORY:  cbc  03-14    139  |  102  |  86<H>  ----------------------------<  193<H>  4.8   |  16<L>  |  2.98<H>    Ca    9.4      14 Mar 2023 19:41    TPro  6.2  /  Alb  3.4  /  TBili  0.7  /  DBili  x   /  AST  85<H>  /  ALT  60<H>  /  AlkPhos  80  03-14    LFTs   Coagulopathy   Lipid Panel   A1c   Cardiac enzymes     U/A   CSF  Other    STUDIES & IMAGING: (EEG, CT, MR, U/S, TTE/SORAYA):    CT brain stroke protocol 3/14/23   No hydrocephalus, acute intracranial hemorrhage, mass effect, or brain edema. Right mastoid air cell and tympanic cavity effusion, correlate for the presence of acute otomastoiditis.    CT PERFUSION:  Limited by late injection of the contrast bolus.  Cerebral blood flow less than 30% = 0 mL. No core infarct is predicted.  Tmax greater than 6 seconds = 0 mL. No brain parenchyma predicted to be at continued ischemicrisk in the presence of neurologic symptoms.    CTA BRAIN:  No flow-limiting stenosis or vascular aneurysm. No AVM.    CTA NECK:  Calcified plaque at the origin of the left internal carotid artery results in approximately 40% short segment stenosis.  Otherwise no flow-limiting stenosis. No evidence for arterial dissection.     Neurology Consultation     HPI: Patient VAMSI is a 79yo F PMHx PCKD s/p renal transplant, brain cyst, DM, HLD, HTN, hypothyroidism, glaucoma, cataracts, DVT in leg, on asa who presents to ED for severe headache and neck pain. Accompanied by daughter Estefany who states patient yesterday woke up with ear discomfort (had prior ear difficulty) and muffled sensation and went to ENT today and was found to have R ear TM rupture (unclear chronicity) and otitis externa for which her ear was cleaned and prescribed antibiotics. She went home and developed headache shortly after 2PM. She took a nap, woke up at 5PM with significant headache and neck pain. By the arrival in ED, she was in significant pain, not following commands in english nor [place] Creole per daughter translation bedside and there was concern for potential intracranial pathology (bleed, vertebrobasilar stroke) given risk factors. After CT head w/o con showed no evidence of bleeding, discussed risks of potential renal failure/ need for HD versus benefits of obtaining CTA w/ contrast imaging including assessing for vertebrobasilar syndrome/ dissection given neuro exam documented, in setting of her transplant with ED attending, patient's daughter Estefany and with telestroke ACP Shakila Larson and in agreement to obtain contrast imaging. Patient was moving during CT head w/o con several attempts for which ED provided medication to treat headache and reduce motion for obtaining imaging. After CTA, patient evaluated in more detail with telestroke ACP and telestroke attending Dr Gomez on teams video as video cart was not functioning/ connecting.    Of note, given renal function daughter states she frequently fluctuates and can have high Cr and BUN that often resolves.      NIHSS: 16  preMRS: 1      PAST MEDICAL & SURGICAL HISTORY:  Polycystic kidney disease    HTN (hypertension)    Glaucoma    Aneurysm    H/O kidney transplant    FAMILY HISTORY:  No pertinent family history in first degree relatives    SOCIAL HISTORY:      MEDICATIONS (HOME):  Home Medications:    MEDICATIONS  (STANDING):    MEDICATIONS  (PRN):    ALLERGIES/INTOLERANCES:  Allergies  Benadryl (Unknown)    Intolerances    VITALS & EXAMINATION:  Vital Signs Last 24 Hrs  T(C): 36.2 (14 Mar 2023 18:37), Max: 36.2 (14 Mar 2023 18:37)  T(F): 97.2 (14 Mar 2023 18:37), Max: 97.2 (14 Mar 2023 18:37)  HR: 65 (14 Mar 2023 18:37) (65 - 65)  BP: 162/52 (14 Mar 2023 18:37) (162/52 - 162/52)  BP(mean): --  RR: 16 (14 Mar 2023 18:37) (16 - 16)  SpO2: 96% (14 Mar 2023 18:37) (96% - 96%)    Parameters below as of 14 Mar 2023 18:37  Patient On (Oxygen Delivery Method): room air    Exam prior to obtaining CT head w/o con and prior to any medications administered:    General:  Constitutional: Female, in significant distress and pain,  Eyes: clear sclera;   Extremities: No cyanosis; Skin: edematous LLE > RLE  Resp: breathing regularly,    Neurological (>12):  MS: Awake, opens eyes, not tracking. Only followed two commands both to raise b/l UE antigravity. Otherwise would not verbalize to me, not follow gaze, not track me when talking.    Language: no verbal output to myself, briefly said few words to daughter   CNs: pupils round, difficult to assess reactivity (R 2mm, L 2mm). No BTT bilaterally. R gaze preference, able to cross with OCV. No melissa facial asymmetry b/l.  No ear drainage noted.   Motor - Normal bulk. RUE and LUE antigravity but with drifts. RUE drifts slightly faster than RUE.  str 4+/5 bilaterally. B/l lower extremities able to flex knees and not completely lifting legs off bed. Able to flex hips, knees in plane of bed.    Sensation: Minimal withdrawal to noxious stimuli x4 extremities     Toes: mute bilaterally  no ankle clonus  Coordination: unable to assess due to mental status  Gait: unable to assess due to mental status    LABORATORY:  cbc  03-14    139  |  102  |  86<H>  ----------------------------<  193<H>  4.8   |  16<L>  |  2.98<H>    Ca    9.4      14 Mar 2023 19:41    TPro  6.2  /  Alb  3.4  /  TBili  0.7  /  DBili  x   /  AST  85<H>  /  ALT  60<H>  /  AlkPhos  80  03-14    LFTs   Coagulopathy   Lipid Panel   A1c   Cardiac enzymes     U/A   CSF  Other    STUDIES & IMAGING: (EEG, CT, MR, U/S, TTE/SORAYA):    CT brain stroke protocol 3/14/23   No hydrocephalus, acute intracranial hemorrhage, mass effect, or brain edema. Right mastoid air cell and tympanic cavity effusion, correlate for the presence of acute otomastoiditis.    CT PERFUSION:  Limited by late injection of the contrast bolus.  Cerebral blood flow less than 30% = 0 mL. No core infarct is predicted.  Tmax greater than 6 seconds = 0 mL. No brain parenchyma predicted to be at continued ischemicrisk in the presence of neurologic symptoms.    CTA BRAIN:  No flow-limiting stenosis or vascular aneurysm. No AVM.    CTA NECK:  Calcified plaque at the origin of the left internal carotid artery results in approximately 40% short segment stenosis.  Otherwise no flow-limiting stenosis. No evidence for arterial dissection.     Neurology Consultation     HPI: Patient VAMSI is a 77yo F PMHx PCKD s/p renal transplant, brain cyst, DM, HLD, HTN, hypothyroidism, glaucoma, cataracts, DVT in leg, on asa who presents to ED for severe headache and neck pain. Accompanied by daughter Estefany who states patient yesterday woke up with ear discomfort (had prior ear difficulty) and muffled sensation and went to ENT today and was found to have R ear TM rupture (unclear chronicity) and otitis externa for which her ear was cleaned and prescribed antibiotics. She went home and developed headache shortly after 2PM. She took a nap, woke up at 5PM with significant headache and neck pain. By the arrival in ED, she was in significant pain, not following commands in english nor [place] Creole per daughter translation bedside and there was concern for potential intracranial pathology (bleed, vertebrobasilar stroke) given risk factors. After CT head w/o con showed no evidence of bleeding, discussed risks of potential renal failure/ need for HD versus benefits of obtaining CTA w/ contrast imaging including assessing for vertebrobasilar syndrome/ dissection given neuro exam documented, in setting of her transplant with ED attending, patient's daughter Estefany and with telestroke ACP Shakila Larson and in agreement to obtain contrast imaging. Patient was moving during CT head w/o con several attempts for which ED provided medication to treat headache and reduce motion for obtaining imaging. After CTA, patient evaluated in more detail with telestroke ACP and telestroke attending Dr Gomez on teams video as video cart was not functioning/ connecting.    Of note, given renal function daughter states she frequently fluctuates and can have high Cr and BUN that often resolves.      NIHSS: 17  preMRS: 1      PAST MEDICAL & SURGICAL HISTORY:  Polycystic kidney disease    HTN (hypertension)    Glaucoma    Aneurysm    H/O kidney transplant    FAMILY HISTORY:  No pertinent family history in first degree relatives    SOCIAL HISTORY:      MEDICATIONS (HOME):  Home Medications:    MEDICATIONS  (STANDING):    MEDICATIONS  (PRN):    ALLERGIES/INTOLERANCES:  Allergies  Benadryl (Unknown)    Intolerances    VITALS & EXAMINATION:  Vital Signs Last 24 Hrs  T(C): 36.2 (14 Mar 2023 18:37), Max: 36.2 (14 Mar 2023 18:37)  T(F): 97.2 (14 Mar 2023 18:37), Max: 97.2 (14 Mar 2023 18:37)  HR: 65 (14 Mar 2023 18:37) (65 - 65)  BP: 162/52 (14 Mar 2023 18:37) (162/52 - 162/52)  BP(mean): --  RR: 16 (14 Mar 2023 18:37) (16 - 16)  SpO2: 96% (14 Mar 2023 18:37) (96% - 96%)    Parameters below as of 14 Mar 2023 18:37  Patient On (Oxygen Delivery Method): room air    Exam prior to obtaining CT head w/o con and prior to any medications administered:    General:  Constitutional: Female, in significant distress and pain,  Eyes: clear sclera;   Extremities: No cyanosis; Skin: edematous LLE > RLE  Resp: breathing regularly,    Neurological (>12):  MS: Awake, opens eyes, not tracking. Only followed two commands both to raise b/l UE antigravity. Otherwise would not verbalize to me, not follow gaze, not track me when talking.    Language: no verbal output to myself, briefly said few words to daughter   CNs: pupils round, difficult to assess reactivity (R 2mm, L 2mm). No BTT bilaterally. R gaze preference, able to cross with OCV. No melissa facial asymmetry b/l.  No ear drainage noted.   Motor - Normal bulk. RUE and LUE antigravity but with drifts. RUE drifts slightly faster than RUE.  str 4+/5 bilaterally. B/l lower extremities able to flex knees and not completely lifting legs off bed. Able to flex hips, knees in plane of bed.    Sensation: Minimal withdrawal to noxious stimuli x4 extremities     Toes: mute bilaterally  no ankle clonus  Coordination: unable to assess due to mental status  Gait: unable to assess due to mental status    LABORATORY:  cbc  03-14    139  |  102  |  86<H>  ----------------------------<  193<H>  4.8   |  16<L>  |  2.98<H>    Ca    9.4      14 Mar 2023 19:41    TPro  6.2  /  Alb  3.4  /  TBili  0.7  /  DBili  x   /  AST  85<H>  /  ALT  60<H>  /  AlkPhos  80  03-14    LFTs   Coagulopathy   Lipid Panel   A1c   Cardiac enzymes     U/A   CSF  Other    STUDIES & IMAGING: (EEG, CT, MR, U/S, TTE/SORAYA):    CT brain stroke protocol 3/14/23   No hydrocephalus, acute intracranial hemorrhage, mass effect, or brain edema. Right mastoid air cell and tympanic cavity effusion, correlate for the presence of acute otomastoiditis.    CT PERFUSION:  Limited by late injection of the contrast bolus.  Cerebral blood flow less than 30% = 0 mL. No core infarct is predicted.  Tmax greater than 6 seconds = 0 mL. No brain parenchyma predicted to be at continued ischemicrisk in the presence of neurologic symptoms.    CTA BRAIN:  No flow-limiting stenosis or vascular aneurysm. No AVM.    CTA NECK:  Calcified plaque at the origin of the left internal carotid artery results in approximately 40% short segment stenosis.  Otherwise no flow-limiting stenosis. No evidence for arterial dissection.     Neurology Consultation     HPI: Patient VAMSI is a 79yo F PMHx PCKD s/p renal transplant, brain cyst, DM, HLD, HTN, hypothyroidism, glaucoma, cataracts, DVT in leg, on asa who presents to ED for severe headache and neck pain. Accompanied by daughter Estefany who states patient yesterday woke up with ear discomfort (had prior ear difficulty) and muffled sensation and went to ENT today and was found to have R ear TM rupture (unclear chronicity) and otitis externa for which her ear was cleaned and prescribed antibiotics. She went home and developed headache shortly after 2PM. She took a nap, woke up at 5PM with significant headache and neck pain. By the arrival in ED, she was in significant pain, not following commands in english nor [place] Creole per daughter translation bedside and there was concern for potential intracranial pathology (bleed, vertebrobasilar stroke) given risk factors. After CT head w/o con showed no evidence of bleeding, discussed risks of potential renal failure/ need for HD versus benefits of obtaining CTA w/ contrast imaging including assessing for vertebrobasilar syndrome/ dissection given neuro exam documented, in setting of her transplant with ED attending, patient's daughter Estefany and with telestroke ACP Shakila Larson and in agreement to obtain contrast imaging. Patient was moving during CT head w/o con several attempts for which ED provided medication to treat headache and reduce motion for obtaining imaging. After CTA, patient evaluated in more detail with telestroke ACP and telestroke attending Dr Gomez on teams video as video cart was not functioning/ connecting.    Of note, given renal function daughter states she frequently fluctuates and can have high Cr and BUN that often resolves.      NIHSS: 17  preMRS: 1      PAST MEDICAL & SURGICAL HISTORY:  Polycystic kidney disease    HTN (hypertension)    Glaucoma    Aneurysm    H/O kidney transplant    FAMILY HISTORY:  No pertinent family history in first degree relatives    SOCIAL HISTORY:      MEDICATIONS (HOME):  Home Medications:    MEDICATIONS  (STANDING):    MEDICATIONS  (PRN):    ALLERGIES/INTOLERANCES:  Allergies  Benadryl (Unknown)    Intolerances    VITALS & EXAMINATION:  Vital Signs Last 24 Hrs  T(C): 36.2 (14 Mar 2023 18:37), Max: 36.2 (14 Mar 2023 18:37)  T(F): 97.2 (14 Mar 2023 18:37), Max: 97.2 (14 Mar 2023 18:37)  HR: 65 (14 Mar 2023 18:37) (65 - 65)  BP: 162/52 (14 Mar 2023 18:37) (162/52 - 162/52)  BP(mean): --  RR: 16 (14 Mar 2023 18:37) (16 - 16)  SpO2: 96% (14 Mar 2023 18:37) (96% - 96%)    Parameters below as of 14 Mar 2023 18:37  Patient On (Oxygen Delivery Method): room air    Exam prior to obtaining CT head w/o con and prior to any medications administered:    General:  Constitutional: Female, in significant distress and pain,  Eyes: clear sclera;   Extremities: No cyanosis; Skin: edematous LLE > RLE  Resp: breathing regularly,    Neurological (>12):  MS: Awake, opens eyes, not tracking. Only followed two commands both to raise b/l UE antigravity. Otherwise would not verbalize to me, not follow gaze, not track me when talking.    Language: no verbal output to myself, briefly said few words to daughter   CNs: pupils round, difficult to assess reactivity (R 2mm, L 2mm). No BTT bilaterally. R gaze preference, able to cross with OCV. No melissa facial asymmetry b/l.  No ear drainage noted.   Motor - Normal bulk. RUE and LUE antigravity but with drifts. RUE drifts slightly faster than RUE.  str 4+/5 bilaterally. B/l lower extremities able to flex knees and not completely lifting legs off bed. Able to flex hips, knees in plane of bed.    Sensation: Minimal withdrawal to noxious stimuli x4 extremities     Toes: mute bilaterally  no ankle clonus  Coordination: unable to assess due to mental status  Gait: unable to assess due to mental status    LABORATORY:                        10.1   14.94 )-----------( 150      ( 14 Mar 2023 21:38 )             33.7     03-14    139  |  102  |  86<H>  ----------------------------<  193<H>  4.8   |  16<L>  |  2.98<H>    Ca    9.4      14 Mar 2023 19:41    TPro  6.2  /  Alb  3.4  /  TBili  0.7  /  DBili  x   /  AST  85<H>  /  ALT  60<H>  /  AlkPhos  80  03-14    LFTs   Coagulopathy   Lipid Panel   A1c   Cardiac enzymes     U/A   CSF  Other    STUDIES & IMAGING: (EEG, CT, MR, U/S, TTE/SORAYA):    CT brain stroke protocol 3/14/23   No hydrocephalus, acute intracranial hemorrhage, mass effect, or brain edema. Right mastoid air cell and tympanic cavity effusion, correlate for the presence of acute otomastoiditis.    CT PERFUSION:  Limited by late injection of the contrast bolus.  Cerebral blood flow less than 30% = 0 mL. No core infarct is predicted.  Tmax greater than 6 seconds = 0 mL. No brain parenchyma predicted to be at continued ischemicrisk in the presence of neurologic symptoms.    CTA BRAIN:  No flow-limiting stenosis or vascular aneurysm. No AVM.    CTA NECK:  Calcified plaque at the origin of the left internal carotid artery results in approximately 40% short segment stenosis.  Otherwise no flow-limiting stenosis. No evidence for arterial dissection.     Neurology Consultation     HPI: Patient VAMSI is a 79yo F PMHx PCKD s/p renal transplant, brain cyst, DM, HLD, HTN, hypothyroidism, glaucoma, cataracts, DVT in leg, on asa who presents to ED for severe headache and neck pain. Accompanied by daughter Estefany who states patient yesterday woke up with ear discomfort (had prior ear difficulty) and muffled sensation and went to ENT today and was found to have R ear TM rupture (unclear chronicity) and otitis externa for which her ear was cleaned and prescribed antibiotics. She went home and developed headache shortly after 2PM. She took a nap, woke up at 5PM with significant headache and neck pain. By the arrival in ED, she was in significant pain, not following commands in english nor [place] Creole per daughter translation bedside and there was concern for potential intracranial pathology (bleed, vertebrobasilar stroke) given risk factors. After CT head w/o con showed no evidence of bleeding, discussed risks of potential renal failure/ need for HD versus benefits of obtaining CTA w/ contrast imaging including assessing for vertebrobasilar syndrome/ dissection given neuro exam documented, in setting of her transplant with ED attending, patient's daughter Estefany and with telestroke ACP Shakila Larson and in agreement to obtain contrast imaging. Patient was moving during CT head w/o con several attempts for which ED provided medication to treat headache and reduce motion for obtaining imaging. After CTA, patient evaluated in more detail with telestroke ACP and telestroke attending Dr Gomez on teams video as video cart was not functioning/ connecting.    Of note, given renal function daughter states she frequently fluctuates and can have high Cr and BUN that often resolves.      NIHSS: 17  preMRS: 1      PAST MEDICAL & SURGICAL HISTORY:  Polycystic kidney disease    HTN (hypertension)    Glaucoma    Aneurysm    H/O kidney transplant    FAMILY HISTORY:  No pertinent family history in first degree relatives    SOCIAL HISTORY:      MEDICATIONS (HOME):  Home Medications:    MEDICATIONS  (STANDING):    MEDICATIONS  (PRN):    ALLERGIES/INTOLERANCES:  Allergies  Benadryl (Unknown)    Intolerances    VITALS & EXAMINATION:  Vital Signs Last 24 Hrs  T(C): 36.2 (14 Mar 2023 18:37), Max: 36.2 (14 Mar 2023 18:37)  T(F): 97.2 (14 Mar 2023 18:37), Max: 97.2 (14 Mar 2023 18:37)  HR: 65 (14 Mar 2023 18:37) (65 - 65)  BP: 162/52 (14 Mar 2023 18:37) (162/52 - 162/52)  BP(mean): --  RR: 16 (14 Mar 2023 18:37) (16 - 16)  SpO2: 96% (14 Mar 2023 18:37) (96% - 96%)    Parameters below as of 14 Mar 2023 18:37  Patient On (Oxygen Delivery Method): room air    Exam prior to obtaining CT head w/o con and prior to any medications administered:    General:  Constitutional: Female, in significant distress and pain,  Eyes: clear sclera;   Extremities: No cyanosis; Skin: edematous LLE > RLE  Resp: breathing regularly,  Neck: during time of code stroke less rigid than re-evaluation at 4AM with more rigidity     Neurological (>12):  MS: Awake, opens eyes, not tracking. Only followed two commands both to raise b/l UE antigravity. Otherwise would not verbalize to me, not follow gaze, not track me when talking.    Language: no verbal output to myself, briefly said few words to daughter   CNs: pupils round, difficult to assess reactivity (R 2mm, L 2mm). No BTT bilaterally. R gaze preference, able to cross with OCV. No melissa facial asymmetry b/l.  No ear drainage noted.   Motor - Normal bulk. RUE and LUE antigravity but with drifts. RUE drifts slightly faster than RUE.  str 4+/5 bilaterally. B/l lower extremities able to flex knees and not completely lifting legs off bed. Able to flex hips, knees in plane of bed.    Sensation: Minimal withdrawal to noxious stimuli x4 extremities     Toes: mute bilaterally  no ankle clonus  Coordination: unable to assess due to mental status  Gait: unable to assess due to mental status    On re-evaluation:  Fundoscopic exam: unable to visualize due to reflection, pupil size bilaterally    LABORATORY:                        10.1   14.94 )-----------( 150      ( 14 Mar 2023 21:38 )             33.7     03-14    139  |  102  |  86<H>  ----------------------------<  193<H>  4.8   |  16<L>  |  2.98<H>    Ca    9.4      14 Mar 2023 19:41    TPro  6.2  /  Alb  3.4  /  TBili  0.7  /  DBili  x   /  AST  85<H>  /  ALT  60<H>  /  AlkPhos  80  03-14    LFTs   Coagulopathy   Lipid Panel   A1c   Cardiac enzymes     U/A   CSF  Other    STUDIES & IMAGING: (EEG, CT, MR, U/S, TTE/SORAYA):    CT brain stroke protocol 3/14/23   No hydrocephalus, acute intracranial hemorrhage, mass effect, or brain edema. Right mastoid air cell and tympanic cavity effusion, correlate for the presence of acute otomastoiditis.    CT PERFUSION:  Limited by late injection of the contrast bolus.  Cerebral blood flow less than 30% = 0 mL. No core infarct is predicted.  Tmax greater than 6 seconds = 0 mL. No brain parenchyma predicted to be at continued ischemicrisk in the presence of neurologic symptoms.    CTA BRAIN:  No flow-limiting stenosis or vascular aneurysm. No AVM.    CTA NECK:  Calcified plaque at the origin of the left internal carotid artery results in approximately 40% short segment stenosis.  Otherwise no flow-limiting stenosis. No evidence for arterial dissection.     Neurology Consultation     HPI: Patient VAMSI is a 77yo F PMHx PCKD s/p renal transplant, brain cyst, DM, HLD, HTN, hypothyroidism, glaucoma, cataracts, DVT in leg, on asa who presents to ED for severe headache and neck pain. Accompanied by daughter Estefany who states patient yesterday woke up with ear discomfort (had prior ear difficulty) and muffled sensation and went to ENT today and was found to have R ear TM rupture (unclear chronicity) and otitis externa for which her ear was cleaned and prescribed antibiotics. She went home and developed headache shortly after 2PM. She took a nap, woke up at 5PM with significant headache and neck pain. By the arrival in ED, she was in significant pain, not following commands in english nor Salv[place] Creole per daughter translation bedside and there was concern for potential intracranial pathology (bleed, vertebrobasilar stroke) given risk factors. After CT head w/o con showed no evidence of bleeding, discussed risks of potential renal failure/ need for HD versus benefits of obtaining CTA w/ contrast imaging including assessing for MCA or vertebrobasilar syndrome/ dissection given neuro exam documented, in setting of her transplant with ED attending, patient's daughter Estefany and with telestroke ACP Shakila Larson and in agreement to obtain contrast imaging. Patient was moving during CT head w/o con several attempts for which ED provided medication to treat headache and reduce motion for obtaining imaging. After CTA, patient evaluated in more detail with telestroke ACP and telestroke attending Dr Gomez on teams video as video cart was not functioning/ connecting. Of note, given renal function daughter states she frequently fluctuates and can have high Cr and BUN that often resolves.      NIHSS: 17  preMRS: 1      PAST MEDICAL & SURGICAL HISTORY:  Polycystic kidney disease    HTN (hypertension)    Glaucoma    Aneurysm    H/O kidney transplant    FAMILY HISTORY:  No pertinent family history in first degree relatives    SOCIAL HISTORY:      MEDICATIONS (HOME):  Home Medications:    MEDICATIONS  (STANDING):    MEDICATIONS  (PRN):    ALLERGIES/INTOLERANCES:  Allergies  Benadryl (Unknown)    Intolerances    VITALS & EXAMINATION:  Vital Signs Last 24 Hrs  T(C): 36.2 (14 Mar 2023 18:37), Max: 36.2 (14 Mar 2023 18:37)  T(F): 97.2 (14 Mar 2023 18:37), Max: 97.2 (14 Mar 2023 18:37)  HR: 65 (14 Mar 2023 18:37) (65 - 65)  BP: 162/52 (14 Mar 2023 18:37) (162/52 - 162/52)  BP(mean): --  RR: 16 (14 Mar 2023 18:37) (16 - 16)  SpO2: 96% (14 Mar 2023 18:37) (96% - 96%)    Parameters below as of 14 Mar 2023 18:37  Patient On (Oxygen Delivery Method): room air    Exam prior to obtaining CT head w/o con and prior to any medications administered:    General:  Constitutional: Female, in significant distress and pain,  Eyes: clear sclera;   Extremities: No cyanosis; Skin: edematous LLE > RLE  Resp: breathing regularly,  Neck: during time of code stroke less rigid     Neurological (>12):  MS: Awake, opens eyes, not tracking. Only followed two commands both to raise b/l UE antigravity. Otherwise would not verbalize to me, not follow gaze, not track me when talking.    Language: no verbal output to myself, briefly said few words to daughter   CNs: pupils round, difficult to assess reactivity (R 2mm, L 2mm). No BTT bilaterally. R gaze preference, able to cross with OCV. No melissa facial asymmetry b/l.  No ear drainage noted.   Motor - Normal bulk. RUE and LUE antigravity but with drifts. RUE drifts slightly faster than LUE.  str 4+/5 bilaterally. B/l lower extremities able to flex knees and not completely lifting legs off bed. Able to flex hips, knees in plane of bed.    Sensation: Minimal withdrawal to noxious stimuli x4 extremities     Toes: mute bilaterally  no ankle clonus  Coordination: unable to assess due to mental status  Gait: unable to assess due to mental status    On re-evaluation later:  Fundoscopic exam: unable to visualize due to reflection, pupil size bilaterally  Neck exam: more rigid during re-evaluation at 4AM than initial neuro evaluation    LABORATORY:                        10.1   14.94 )-----------( 150      ( 14 Mar 2023 21:38 )             33.7     03-14    139  |  102  |  86<H>  ----------------------------<  193<H>  4.8   |  16<L>  |  2.98<H>    Ca    9.4      14 Mar 2023 19:41    TPro  6.2  /  Alb  3.4  /  TBili  0.7  /  DBili  x   /  AST  85<H>  /  ALT  60<H>  /  AlkPhos  80  03-14    LFTs   Coagulopathy   Lipid Panel   A1c   Cardiac enzymes     U/A   CSF  Other    STUDIES & IMAGING: (EEG, CT, MR, U/S, TTE/SORAYA):    CT brain stroke protocol 3/14/23   No hydrocephalus, acute intracranial hemorrhage, mass effect, or brain edema. Right mastoid air cell and tympanic cavity effusion, correlate for the presence of acute otomastoiditis.    CT PERFUSION:  Limited by late injection of the contrast bolus.  Cerebral blood flow less than 30% = 0 mL. No core infarct is predicted.  Tmax greater than 6 seconds = 0 mL. No brain parenchyma predicted to be at continued ischemicrisk in the presence of neurologic symptoms.    CTA BRAIN:  No flow-limiting stenosis or vascular aneurysm. No AVM.    CTA NECK:  Calcified plaque at the origin of the left internal carotid artery results in approximately 40% short segment stenosis.  Otherwise no flow-limiting stenosis. No evidence for arterial dissection.     Neurology Consultation     HPI: Patient VAMSI is a 77yo F PMHx PCKD s/p renal transplant, brain cyst, DM, HLD, HTN, hypothyroidism, glaucoma, cataracts, DVT in leg, on asa who presents to ED for severe headache and neck pain. Accompanied by daughter Estefany who states patient yesterday woke up with ear discomfort (had prior ear difficulty) and muffled sensation and went to ENT today and was found to have R ear TM rupture (unclear chronicity) and otitis externa for which her ear was cleaned and prescribed antibiotics. She went home and developed headache shortly after 2PM. She took a nap, woke up at 5PM with significant headache and neck pain. By the arrival in ED, she was in significant pain, not following commands in english nor [place] Creole per daughter translation bedside and there was concern for potential intracranial pathology (bleed, vertebrobasilar stroke) given risk factors. After CT head w/o con showed no evidence of bleeding, discussed risks of potential renal failure/ need for HD versus benefits of obtaining CTA w/ contrast imaging including assessing for MCA or vertebrobasilar syndrome/ dissection given neuro exam documented, in setting of her transplant with ED attending, patient's daughter Estefany and with telestroke ACP Shakila Larson and in agreement to obtain contrast imaging. Patient was moving during CT head w/o con several attempts for which ED provided medication to treat headache and reduce motion for obtaining imaging. After CTA, patient evaluated in more detail with telestroke ACP and telestroke attending Dr Gomez on teams video as video cart was not functioning/ connecting. Of note, given renal function daughter states she frequently fluctuates and can have high Cr and BUN that often resolves.      NIHSS: 17  preMRS: 1      PAST MEDICAL & SURGICAL HISTORY:  Polycystic kidney disease    HTN (hypertension)    Glaucoma    Aneurysm    H/O kidney transplant    FAMILY HISTORY:  No pertinent family history in first degree relatives    SOCIAL HISTORY:      MEDICATIONS (HOME):  Home Medications:    MEDICATIONS  (STANDING):    MEDICATIONS  (PRN):    ALLERGIES/INTOLERANCES:  Allergies  Benadryl (Unknown)    Intolerances    VITALS & EXAMINATION:  Vital Signs Last 24 Hrs  T(C): 36.2 (14 Mar 2023 18:37), Max: 36.2 (14 Mar 2023 18:37)  T(F): 97.2 (14 Mar 2023 18:37), Max: 97.2 (14 Mar 2023 18:37)  HR: 65 (14 Mar 2023 18:37) (65 - 65)  BP: 162/52 (14 Mar 2023 18:37) (162/52 - 162/52)  BP(mean): --  RR: 16 (14 Mar 2023 18:37) (16 - 16)  SpO2: 96% (14 Mar 2023 18:37) (96% - 96%)    Parameters below as of 14 Mar 2023 18:37  Patient On (Oxygen Delivery Method): room air    Exam prior to obtaining CT head w/o con and prior to any medications administered:    General:  Constitutional: Female, in significant distress and pain,  Eyes: clear sclera;   Extremities: No cyanosis; Skin: edematous LLE > RLE  Resp: breathing regularly,  Neck: during time of code stroke less rigid     Neurological (>12):  MS: Awake, opens eyes, not tracking. Only followed two commands both to raise b/l UE antigravity. Otherwise would not verbalize to me, not follow gaze, not track me when talking.    Language: no verbal output to myself, briefly said few words to daughter   CNs: pupils round, difficult to assess reactivity (R 2mm, L 2mm). No BTT bilaterally. R gaze preference, able to cross with OCV. No melissa facial asymmetry b/l.  No ear drainage noted.  Motor - Normal bulk. RUE and LUE antigravity but with drifts. RUE drifts slightly faster than LUE.  str 4+/5 bilaterally. B/l lower extremities able to flex knees and not completely lifting legs off bed. Able to flex hips, knees in plane of bed.    Sensation: Minimal withdrawal to noxious stimuli x4 extremities     Toes: mute bilaterally  no ankle clonus  Coordination: unable to assess due to mental status  Gait: unable to assess due to mental status    On re-evaluation later:  Fundoscopic exam: unable to visualize due to reflection, pupil size bilaterally  Neck exam: more rigid during re-evaluation at 4AM than initial neuro evaluation    LABORATORY:                        10.1   14.94 )-----------( 150      ( 14 Mar 2023 21:38 )             33.7     03-14    139  |  102  |  86<H>  ----------------------------<  193<H>  4.8   |  16<L>  |  2.98<H>    Ca    9.4      14 Mar 2023 19:41    TPro  6.2  /  Alb  3.4  /  TBili  0.7  /  DBili  x   /  AST  85<H>  /  ALT  60<H>  /  AlkPhos  80  03-14    LFTs   Coagulopathy   Lipid Panel   A1c   Cardiac enzymes     U/A   CSF  Other    STUDIES & IMAGING: (EEG, CT, MR, U/S, TTE/SORAYA):    CT brain stroke protocol 3/14/23   No hydrocephalus, acute intracranial hemorrhage, mass effect, or brain edema. Right mastoid air cell and tympanic cavity effusion, correlate for the presence of acute otomastoiditis.    CT PERFUSION:  Limited by late injection of the contrast bolus.  Cerebral blood flow less than 30% = 0 mL. No core infarct is predicted.  Tmax greater than 6 seconds = 0 mL. No brain parenchyma predicted to be at continued ischemicrisk in the presence of neurologic symptoms.    CTA BRAIN:  No flow-limiting stenosis or vascular aneurysm. No AVM.    CTA NECK:  Calcified plaque at the origin of the left internal carotid artery results in approximately 40% short segment stenosis.  Otherwise no flow-limiting stenosis. No evidence for arterial dissection.

## 2023-03-14 NOTE — ED ADULT NURSE REASSESSMENT NOTE - NS ED NURSE REASSESS COMMENT FT1
Report received from mid shift RN. Pt resting in stretcher. Pt medicated by previous RN. Pt A&Ox0. Respirations even and unlabored. A-fib on monitor. Pt tremulus in arms. Repeat labs drawn. 20G placed in RAC. Family member at bedside.

## 2023-03-14 NOTE — ED PROVIDER NOTE - OBJECTIVE STATEMENT
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  Had a cotton placed, after returning home patient complained of sudden headache, and since then she is not acting like her normal self is intermittently following commands, told her she had to come to the ED for evaluation.  No fever according to daughter,.    History is very limited as patient is primarily Creole speaking intermittently follow coming commands.  Code stroke called in triage.

## 2023-03-15 NOTE — PROGRESS NOTE ADULT - SUBJECTIVE AND OBJECTIVE BOX
[facility]  Albert David  Office: [phone]  Cell: [phone]  Can also be reached on [employer] Teams       INTERVAL HPI/OVERNIGHT EVENTS: Seen and examined with DaughterArely at bedside. Patient still with altered mental status, not responding or follopwing instructions. Starting to open her eyes to verbal stimuli however now following commands.       REVIEW OF SYSTEMS:  Unable to assess    Vital Signs Last 24 Hrs  T(C): 36.8 (15 Mar 2023 09:06), Max: 38.1 (15 Mar 2023 06:19)  T(F): 98.3 (15 Mar 2023 09:06), Max: 100.5 (15 Mar 2023 06:19)  HR: 99 (15 Mar 2023 09:06) (65 - 100)  BP: 193/91 (15 Mar 2023 09:06) (162/52 - 215/132)  BP(mean): --  RR: 20 (15 Mar 2023 09:06) (16 - 24)  SpO2: 97% (15 Mar 2023 09:06) (96% - 99%)    Parameters below as of 15 Mar 2023 09:06  Patient On (Oxygen Delivery Method): room air        PHYSICAL EXAMINATION:  GENERAL: Drowsy, moving extremities spontaneously aaox?  HEAD:  Atraumatic, Normocephalic, discharge from right ear  EYES:  conjunctiva and sclera clear  NECK: Supple, No JVD, Normal thyroid  CHEST/LUNG: Clear to auscultation. Clear to percussion bilaterally; No rales, rhonchi, wheezing, or rubs. Tachypneic, no labored breathing  HEART: Regular rate and rhythm; + murmur  ABDOMEN: soft distended, no guarding, no tenderness  NERVOUS SYSTEM:  Alert & Oriented X0, responds to verbal stimuli not following commapnds  EXTREMITIES:  left lower extremity with 1+ pitting edema. Left upper extremity with palpable thrill, prominent vessels- hx of fistula placed 1998                        10.6   22.19 )-----------( 163      ( 15 Mar 2023 05:45 )             34.3     03-15    138  |  101  |  81<H>  ----------------------------<  159<H>  4.4   |  19<L>  |  3.03<H>    Ca    9.5      15 Mar 2023 05:45  Phos  4.5     03-15  Mg     2.40     03-15    TPro  6.5  /  Alb  3.3  /  TBili  0.6  /  DBili  x   /  AST  83<H>  /  ALT  64<H>  /  AlkPhos  78  03-15    LIVER FUNCTIONS - ( 15 Mar 2023 05:45 )  Alb: 3.3 g/dL / Pro: 6.5 g/dL / ALK PHOS: 78 U/L / ALT: 64 U/L / AST: 83 U/L / GGT: x           CARDIAC MARKERS ( 15 Mar 2023 02:30 )  x     / x     / 1074 U/L / x     / x          PT/INR - ( 14 Mar 2023 21:38 )   PT: 16.2 sec;   INR: 1.39 ratio         PTT - ( 14 Mar 2023 21:38 )  PTT:25.2 sec    CAPILLARY BLOOD GLUCOSE      RADIOLOGY & ADDITIONAL TESTS:

## 2023-03-15 NOTE — H&P ADULT - NSHPREVIEWOFSYSTEMS_GEN_ALL_CORE
Per daughter:  No fevers/chills  No LH/dizziness  (+)HA, no vision changes  (+)R ear pain, purulent discharge, (+)rhinorrhea, has had minimal epistaxis with blowing nose, no history of dysphagia  No nausea/vomiting/diarrhea   No urinary symptoms  Ambulates with cane, no falls

## 2023-03-15 NOTE — CHART NOTE - NSCHARTNOTEFT_GEN_A_CORE
OVERNIGHT MEDICINE ACP COVERAGE    Pt seen for follow up on floor with daughter HCP and goddaughter at bedside (both RNs). Per family, pt is more awake and responsive than earlier in day.   NGT placed to 66cm, pt tolerated well. Placement auscultated. CXR ordered to confirm. RN aware to wait for CXR prior to giving bedtime meds.   Meds reviewed with daughter and reordered as stat. Pt also to receive albuterol neb x1. OVERNIGHT MEDICINE ACP COVERAGE    Pt seen for follow up on floor with daughter HCP and goddaughter at bedside (both RNs). Per family, pt is more awake and responsive than earlier in day.   NGT placed to 66cm, pt tolerated well. Placement auscultated. CXR ordered to confirm. RN aware to wait for CXR prior to giving bedtime meds.   Meds reviewed with daughter and reordered as stat. Pt also to receive albuterol neb x1 for slight increased WOB however no crackles/wheezing/resp distress appreciated on exam.     Will sign out to 5N floor provider for continuation of care.    DANNA Urban PA-C  [id number]

## 2023-03-15 NOTE — H&P ADULT - PROBLEM SELECTOR PLAN 8
HSQ for DVT ppx  recent LE dopplers 12/2022 negative for DVT    #elevated trop likely secondary to CKD  -trend 3rd set  -monitor on tele

## 2023-03-15 NOTE — H&P ADULT - HISTORY OF PRESENT ILLNESS
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 Patient reportedly woke up at 0130 AM on Monday with a R-sided headache. ENT noted R otitis externa, purulent discharge from R TM thought to be secondary to perforation, given ear gtt and prescribed cipro/dexamethasone gtt (she took 1 dose thus far). Daughter called EMS as later in the day patient complained of severe HA as well as neck pain, stated "I'm dying" and "my head is killing me." Patient reportedly without prior history of ear infections, no history of stroke or seizures. She reported to ENT earlier in the day a muffled sensation in the R ear [id number].     Daughter notes patient has had no recent fevers/chills. She had a dental cleaning on Thursday (no abx prior to cleaning).    On arrival, code stroke called.    In the ED VS:  97.2  65-99  162-186/52-91  16-20  96-99%RA, received NS 500cc IVF, lorazepam 2mg IV x1 and morphine 4mg IV x1

## 2023-03-15 NOTE — CHART NOTE - NSCHARTNOTEFT_GEN_A_CORE
PRE-INTERVENTIONAL RADIOLOGY PROCEDURE NOTE    Patient Age: 78   Patient Gender: female   Procedure (including site / side if known): Lumbar puncture   Diagnosis / Indication: here for meningitis evaluation   Interventional Radiology Attending Physician: Dr Mcintyre   Ordering Attending Physician: Dr David   Pertinent medical history:   Aneurysm   Arthritis   C. difficile colitis   Essential hypertension   Glaucoma   History of deep venous thrombosis (DVT) of distal vein of left lower extremity   Hypothyroid   Osteoporosis   PCP (pneumocystis carinii pneumonia)   Polycystic kidney disease   Thrombocytopenia   Type 2 diabetes mellitus, without long-term current use of insulin.     Pertinent labs:                           10.6   22.19 )-----------( 163      ( 15 Mar 2023 05:45 )             34.3     03-15    138  |  101  |  81<H>  ----------------------------<  159<H>  4.4   |  19<L>  |  3.03<H>    Ca    9.5      15 Mar 2023 05:45  Phos  4.5     03-15  Mg     2.40     03-15    TPro  6.5  /  Alb  3.3  /  TBili  0.6  /  DBili  x   /  AST  83<H>  /  ALT  64<H>  /  AlkPhos  78  03-15    PT/INR - ( 14 Mar 2023 21:38 )   PT: 16.2 sec;   INR: 1.39 ratio         PTT - ( 14 Mar 2023 21:38 )  PTT:25.2 sec        Patient and Family aware? No      Attending / Resident / NP / PA   Print Sign: Case Emanuel NP   Contact #: pager [phone]

## 2023-03-15 NOTE — H&P ADULT - PROBLEM SELECTOR PLAN 2
HR>90, WBC>12, ruptured TM w/noted purulent discharge from outpt ENT  CT showing right mastoid air cell and tympanic cavity effusion, possible acute otomastoiditis  ENT consulted, f/u recs  start on ceftriaxone for now w/lactobacillus for GI ppx given history of C. diff  check VBG, IVF overnight while NPO (failed dysphagia screen) HR>90, WBC>12, ruptured TM w/noted purulent discharge from outpt ENT  CT showing right mastoid air cell and tympanic cavity effusion, possible acute otomastoiditis  ENT consulted, f/u recs  start on ceftriaxone (tolerated in past per outpt records) for now w/lactobacillus for GI ppx given history of C. diff  check VBG, IVF overnight while NPO (failed dysphagia screen)  check BCx, UA, UCx  given history of PCP, check CXR

## 2023-03-15 NOTE — H&P ADULT - TIME BILLING
Preparing to see the patient including review of tests and other providers' notes, confirming history with daughter, performing medical examination and evaluation, counseling and educating the daughter, ordering medications, tests and procedures, communicating with other health care professionals, documenting clinical information in the EMR, independently interpreting results and communicating results to the daughter, care coordination

## 2023-03-15 NOTE — CHART NOTE - NSCHARTNOTEFT_GEN_A_CORE
Neurology team spoke with covering Medicine provider, on-call Radiology and techs regarding expediting MR imaging overnight c/f otomastoiditis with extension.  Primary Medicine attending would like to defer gadolinium due to worsening renal function therefore will pursue MRI brain + IAC w/o contrast, although evaluation for fluid collection will be limited.   Further management, i.e. surgical intervention to be considered after reviewing images with ENT/Neurosurgery. Neurology team spoke with covering Medicine provider, on-call Radiology and techs regarding expediting MR imaging overnight c/f otomastoiditis with extension.  Primary Medicine attending would like to defer gadolinium due to worsening renal function therefore will pursue MRI brain + IAC w/o contrast, although evaluation for fluid collection +/- associated bone destruction will be limited.   Further management, i.e. surgical intervention to be considered after reviewing images with ENT/Neurosurgery. Neurology team spoke with covering Medicine provider ([facility] [id number]), on-call Radiology and techs regarding expediting MR imaging overnight c/f otomastoiditis with extension.  Primary Medicine attending would like to defer gadolinium due to worsening renal function therefore will pursue MRI brain + IAC w/o contrast, although evaluation for fluid collection +/- associated bone destruction will be limited.   Further management, i.e. surgical intervention to be considered after reviewing images with ENT/Neurosurgery.    Plan of care discussed with neurology attending, Dr. Bentley.

## 2023-03-15 NOTE — H&P ADULT - ASSESSMENT
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

## 2023-03-15 NOTE — H&P ADULT - PROBLEM SELECTOR PLAN 5
plan as above Baseline Cr ~2  check UA  avoid nephrotoxins   renally dose meds   renal consult in AM

## 2023-03-15 NOTE — CONSULT NOTE ADULT - SUBJECTIVE AND OBJECTIVE BOX
[facility] - DANNA Cosby / DANNA Rivers / LENCHO Ontiveros/ DANNA Aldana/ DANNA Hussein/ QUYEN Gambino / LUCIAN Mukherjee / GISELE Stapleton  -------------------------------------------------------------------------------------------------------  The patient seen and examined today.  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 Patient reportedly woke up at 0130 AM on Monday with a R-sided headache. ENT noted R otitis externa, purulent discharge from R TM thought to be secondary to perforation, given ear gtt and prescribed cipro/dexamethasone gtt (she took 1 dose thus far). Daughter called EMS as later in the day patient complained of severe HA as well as neck pain, stated "I'm dying" and "my head is killing me." Patient reportedly without prior history of ear infections, no history of stroke or seizures. She reported to ENT earlier in the day a muffled sensation in the R ear [id number].     Daughter notes patient has had no recent fevers/chills. She had a dental cleaning on Thursday (no abx prior to cleaning).    On arrival, code stroke called.    In the ED VS:  97.2  65-99  162-186/52-91  16-20  96-99%RA, received NS 500cc IVF, lorazepam 2mg IV x1 and morphine 4mg IV x1 (15 Mar 2023 01:17)      PAST MEDICAL & SURGICAL HISTORY:  Polycystic kidney disease      Glaucoma      Aneurysm      History of deep venous thrombosis (DVT) of distal vein of left lower extremity      Thrombocytopenia      Hypothyroid      Osteoporosis      Arthritis      PCP (pneumocystis carinii pneumonia)      C. difficile colitis      Type 2 diabetes mellitus, without long-term current use of insulin      Essential hypertension      H/O kidney transplant      H/O:       H/O eye surgery      S/P hysterectomy      H/O umbilical hernia repair        Allergies :- apple (Unknown)  Benadryl (Unknown)  penicillin (Hives)  watermelon (Unknown)    Home Medications Reviewed  Hospital Medications:   MEDICATIONS  (STANDING):  ciprofloxacin  0.3% Ophthalmic Solution for Otic Use 4 Drop(s) Right Ear two times a day  cycloSPORINE  , modified (GENGRAF) Solution 75 milliGRAM(s) Oral every 12 hours  dextrose 5%. 1000 milliLiter(s) (100 mL/Hr) IV Continuous <Continuous>  dextrose 5%. 1000 milliLiter(s) (50 mL/Hr) IV Continuous <Continuous>  dextrose 50% Injectable 25 Gram(s) IV Push once  dextrose 50% Injectable 12.5 Gram(s) IV Push once  dextrose 50% Injectable 25 Gram(s) IV Push once  glucagon  Injectable 1 milliGRAM(s) IntraMuscular once  insulin lispro (ADMELOG) corrective regimen sliding scale   SubCutaneous every 6 hours  meropenem  IVPB 1000 milliGRAM(s) IV Intermittent every 12 hours  predniSONE   Tablet 5 milliGRAM(s) Oral daily  sodium chloride 0.9%. 1000 milliLiter(s) (50 mL/Hr) IV Continuous <Continuous>    SOCIAL HISTORY:  Denies ETOh,Smoking,   FAMILY HISTORY:  FH: diabetes mellitus (Sibling)        REVIEW OF SYSTEMS:  CONSTITUTIONAL: Fever +  EYES/ENT: right ear drainage +  NECK: No pain or stiffness  RESPIRATORY: No cough, wheezing, hemoptysis; No shortness of breath  CARDIOVASCULAR: No chest pain or palpitations.  GASTROINTESTINAL: No abdominal or epigastric pain. No nausea, vomiting, or hematemesis; No diarrhea or constipation. No melena or hematochezia.  GENITOURINARY: No dysuria, frequency, foamy urine, urinary urgency, incontinence or hematuria  NEUROLOGICAL: headache +  SKIN: No itching, burning, rashes, or lesions   VASCULAR: No bilateral lower extremity edema.   All other review of systems is negative unless indicated above.    VITALS:  T(F): 98.3 (03-15-23 @ 09:06), Max: 100.5 (03-15-23 @ 06:19)  HR: 99 (03-15-23 @ 09:06)  BP: 193/91 (03-15-23 @ 09:06)  RR: 20 (03-15-23 @ 09:06)  SpO2: 97% (03-15-23 @ 09:06)  Wt(kg): --        PHYSICAL EXAM:  Constitutional: NAD  HEENT: drainage + from right ear  Neck: supple.   Respiratory: Bilateral equal breath sounds , no wheezes, no crackles  Cardiovascular: S1, S2, Regular, Murmur present.  Gastrointestinal: Bowel Sound present, soft, NT/ND  Extremities: No cyanosis or clubbing. No peripheral edema  Neurological: arousable, but sleepy, not responding verbally or following commands  Psychiatric: not following commands  : No CVA tenderness. No snider.   Skin: No rashes    Data:  03-15    138  |  101  |  81<H>  ----------------------------<  159<H>  4.4   |  19<L>  |  3.03<H>    Ca    9.5      15 Mar 2023 05:45  Phos  4.5     03-15  Mg     2.40     03-15    TPro  6.5  /  Alb  3.3  /  TBili  0.6  /  DBili      /  AST  83<H>  /  ALT  64<H>  /  AlkPhos  78  03-15    Creatinine Trend: 3.03 <--, 2.98 <--                        10.6   22.19 )-----------( 163      ( 15 Mar 2023 05:45 )             34.3     Urine Studies:  Urinalysis Basic - ( 15 Mar 2023 03:43 )    Color: Yellow / Appearance: Clear / S.019 / pH:   Gluc:  / Ketone: Negative  / Bili: Negative / Urobili: <2 mg/dL   Blood:  / Protein: 300 mg/dL / Nitrite: Negative   Leuk Esterase: Negative / RBC: 8 /HPF / WBC 2 /HPF   Sq Epi:  / Non Sq Epi: 1 /HPF / Bacteria: Negative

## 2023-03-15 NOTE — SWALLOW BEDSIDE ASSESSMENT ADULT - SWALLOW EVAL: DIAGNOSIS
Patient unable to maintain adequate alertness for assessment. patient given ice chips at which time patient made minimal attempt to lick/suck/retrieve with patient noted to fall back asleep/close eyes (RN aware). Oral/pharyngeal swallow could not be adequately assessed given patient unable to maintain adequate alertness for assessment.

## 2023-03-15 NOTE — H&P ADULT - NSHPPHYSICALEXAM_GEN_ALL_CORE
Vital Signs Last 24 Hrs  T(C): 36.2 (15 Mar 2023 00:28), Max: 36.4 (15 Mar 2023 00:04)  T(F): 97.1 (15 Mar 2023 00:28), Max: 97.6 (15 Mar 2023 00:04)  HR: 85 (15 Mar 2023 00:16) (65 - 99)  BP: 193/72 (15 Mar 2023 00:16) (162/52 - 215/132)  RR: 22 (15 Mar 2023 00:16) (16 - 22)  SpO2: 97% (15 Mar 2023 00:16) (96% - 99%)    Parameters below as of 15 Mar 2023 00:16  Patient On (Oxygen Delivery Method): room air    PHYSICAL EXAM:  GENERAL: NAD, well-developed, well-nourished  HEAD:  Atraumatic, Normocephalic  EYES: PERRL, conjunctiva and sclera clear, clear yellow discharge from R ear  NECK: Supple, no JVD  CHEST/LUNG: Clear to auscultation bilaterally; No wheezes, rales or rhonchi; normal work of breathing, speaking in full sentences  HEART: Regular rate and rhythm; (+) WILBERTO III/VI; No rubs or gallops, (+)S1, S2; LUE AVF negative thrill/bruit   ABDOMEN: Soft, Nontender, (+) distended (reportedly unchanged per dtr, likely secondary to PCKD); Normal Bowel sounds   EXTREMITIES:  1+ Peripheral Pulses, No clubbing, cyanosis; (+)LLE>RLE edema  PSYCH: unable to assess, non-verbal on my exam  NEUROLOGY: opens eyes, non-verbal on my exam, not following commands, SHAY x4  SKIN: No rashes or lesions appreciated Vital Signs Last 24 Hrs  T(C): 36.2 (15 Mar 2023 00:28), Max: 36.4 (15 Mar 2023 00:04)  T(F): 97.1 (15 Mar 2023 00:28), Max: 97.6 (15 Mar 2023 00:04)  HR: 85 (15 Mar 2023 00:16) (65 - 99)  BP: 193/72 (15 Mar 2023 00:16) (162/52 - 215/132)  RR: 22 (15 Mar 2023 00:16) (16 - 22)  SpO2: 97% (15 Mar 2023 00:16) (96% - 99%)    Parameters below as of 15 Mar 2023 00:16  Patient On (Oxygen Delivery Method): room air    PHYSICAL EXAM:  GENERAL: NAD, well-developed, well-nourished  HEAD:  Atraumatic, Normocephalic  EYES: PERRL, conjunctiva and sclera clear, clear yellow discharge from R ear  NECK: Supple, no JVD  CHEST/LUNG: Clear to auscultation bilaterally; No wheezes, rales or rhonchi; normal work of breathing, speaking in full sentences  HEART: Regular rate and rhythm; (+) WILBERTO III/VI; No rubs or gallops, (+)S1, S2; LUE AVF negative thrill/bruit   ABDOMEN: Soft, Nontender, (+) distended (reportedly unchanged per dtr, likely secondary to PCKD); Normal Bowel sounds   EXTREMITIES:  1+ Peripheral Pulses, No clubbing, cyanosis; (+)LLE>RLE edema  PSYCH: unable to assess, non-verbal on my exam  NEUROLOGY: opens eyes, non-verbal on my exam, not following commands, SHAY x4; negative Kernig/Brudzinski   SKIN: No rashes or lesions appreciated

## 2023-03-15 NOTE — H&P ADULT - NSHPLABSRESULTS_GEN_ALL_CORE
10.1   14.94 )-----------( 150      ( 14 Mar 2023 21:38 )             33.7     03-14    139  |  102  |  86<H>  ----------------------------<  193<H>  4.8   |  16<L>  |  2.98<H>    Ca    9.4      14 Mar 2023 19:41    TPro  6.2  /  Alb  3.4  /  TBili  0.7  /  DBili  x   /  AST  85<H>  /  ALT  60<H>  /  AlkPhos  80  03-14    PT/INR - ( 14 Mar 2023 21:38 )   PT: 16.2 sec;   INR: 1.39 ratio    PTT - ( 14 Mar 2023 21:38 )  PTT:25.2 sec    Troponin T, High Sensitivity Result: 160 ng/L (03.14.23 @ 21:38)  Troponin T, High Sensitivity Result: 170 ng/L (03.14.23 @ 19:41)    < from: CT Brain Stroke Protocol (03.14.23 @ 19:29) >  No hydrocephalus, mass effect, midline shift, acute intracranial hemorrhage, or brain edema. Moderate to severe white matter microvascular ischemic disease. Visualized paranasal sinuses are clear. Right mastoid air cell and tympanic cavity effusion.  IMPRESSION: No hydrocephalus, acute intracranial hemorrhage, mass effect, or brain edema. Right mastoid air cell and tympanic cavity effusion, correlate for the presence of acute otomastoiditis.  < end of copied text >    < from: CT Angio Brain Stroke Protocol  w/ IV Cont (03.14.23 @ 19:46) >/< from: CT Angio Neck Stroke Protocol w/ IV Cont (03.14.23 @ 19:46) >/< from: CT Brain Perfusion Maps Stroke (03.14.23 @ 19:46) >  IMPRESSION:  CT PERFUSION: Limited by late injection of the contrast bolus. Cerebral blood flow less than 30% = 0 mL. No core infarct is predicted. Tmax greater than 6 seconds = 0 mL. No brain parenchyma predicted to be at continued ischemic risk in the presence of neurologic symptoms.  CTA BRAIN: No flow-limiting stenosis or vascular aneurysm. No AVM.  CTA NECK: Calcified plaque at the origin of the left internal carotid artery results in approximately 40% short segment stenosis. Otherwise no flow-limiting stenosis. No evidence for arterial dissection.  < end of copied text >    EKG personally reviewed and interpreted - NS w/sinus arrhythmia/PACs, 78bpm, QTc 412ms

## 2023-03-15 NOTE — ED ADULT NURSE REASSESSMENT NOTE - NS ED NURSE REASSESS COMMENT FT1
Break Coverage RN: Pt sleeping at this time, offers no complaints. Pt remains hypertensive. Admitting team aware. No acute distress noted. Safety maintained. Break Coverage RN: Pt sleeping at this time, offers no complaints. Pt remains hypertensive. Admitting team aware. No acute distress noted. Awaiting inpatient bed assignment. Safety maintained.

## 2023-03-15 NOTE — SWALLOW BEDSIDE ASSESSMENT ADULT - ASR SWALLOW RECOMMEND DIAG
Objective testing NOT warranted given clinical presentation/patient in lethargic state during bedside swallow assessment.

## 2023-03-15 NOTE — CONSULT NOTE ADULT - ASSESSMENT
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    KTx 2003  B/L creat around 2.8 since Dec 2022  now with worsening renal function  Patient on Cyclosporine (Gengraf) 75mg PO q12hrs,  BID and Prednisone 5 QD    plan:  Agreed with holding MMF in light of present infection  would continue with Gengraf and Pred to prevent infection  if sepsis worsens despite Abx therapy, would hold Gengraf  please draw Cyclosporine level daily (draw 1hr before AM dose)  check urine tests  check renal transplant sonogram  consider lowering Meropenem to 500mg IV q12 in light of worsening renal function    Metabolic acidosis  lactate +  manage underlying condition (sepsis)    OM and OE  Abx per Infectious disease specialist with dose adjustment per GFR      Thank you for allowing me to participate in the care of your patient    For any question, call:  Cell # [phone]  Pager # [phone]  Callback # [phone]

## 2023-03-15 NOTE — PROVIDER CONTACT NOTE (CRITICAL VALUE NOTIFICATION) - SITUATION
Positive CSF culture. Positive CSF culture.  Gram stain - few polymorphic nuclear leukocytes per low power field.  Moderate gram positive cocci in pairs per low power field.

## 2023-03-15 NOTE — H&P ADULT - PROBLEM SELECTOR PLAN 4
c/w mycophenolate, cyclosporine   c/w atovaquone for PCP ppx (hx PCP in 2021)  renally dose meds, avoid nephrotoxins  trend Cr  given recent contrast study, c/w IVF overnight  renal consult in AM

## 2023-03-15 NOTE — H&P ADULT - NSHPADDITIONALINFOADULT_GEN_ALL_CORE
Addendum - > patient's daughter refusing NGT at this time, wants to re-eval in AM, if less altered to attempt dysphagia screen Addendum - > patient's daughter refusing NGT at this time, discussed need for immunosuppressants, wants to re-eval in AM, if less altered to attempt dysphagia screen    Addendum 0540, spoke with Neuro -  wants to treat for meningitis/encephalitis, procedure team consult in AM for LP, changed from CTX to vanc and klever Addendum - > patient's daughter refusing NGT at this time, discussed need for immunosuppressants, wants to re-eval in AM, if less altered to attempt dysphagia screen    Addendum 0540, spoke with Neuro -  wants to treat for meningitis/encephalitis at this time, elevated procalcitonin and continued poor mental status, mentals status unlikely to be s/e of morphine/lorazepam at this time given received at 1930, will change from CTX to vanc and klever, procedure team consult in AM for LP, updated daughter at bedside; awaiting weight for CrCl calculation as requesting MR imaging w/con if able; continue to monitor closely. ID consult in AM given immunosuppressed, history of C. diff and history of PCP, also ?recent CMV infection (on recent lab work per daughter).

## 2023-03-15 NOTE — CHART NOTE - NSCHARTNOTEFT_GEN_A_CORE
Neurology    Please refer to assessment/ recommendations from prior neuro note. Patient was evaluated in AM by Dr Bentley as he attempted LP at bedside but unsuccessful. Recommend LP with neuroradiology. Discussed with Dr Bentley. Neurology    Please refer to assessment/ recommendations from prior neuro note. Patient was evaluated in AM by Dr Bentley as he attempted LP at bedside but unsuccessful. Recommend LP with neuroradiology. Discussed with Dr Bentley. Discussed with medicine ACP at 7:59AM. Neurology    Please refer to assessment/ recommendations from prior neuro note. Patient was evaluated in AM by Dr Bentley as he attempted LP at bedside but unsuccessful. Recommend LP with neuroradiology. Discussed with Dr Bentley. Discussed with medicine ACP at 7:59AM.  Thank you

## 2023-03-15 NOTE — H&P ADULT - NSHPOUTPATIENTPROVIDERS_GEN_ALL_CORE
PMD - Nery Mcintyre  ENT - Dr. Jamison Dupont - Dr. Yamila Bianchi   NSx - Maicol Jose  Cards - Dr. Mccabe  Renal - Dr. Hernandez   GI - Dr. Alfredo Carrillo

## 2023-03-15 NOTE — ED ADULT NURSE REASSESSMENT NOTE - NS ED NURSE REASSESS COMMENT FT1
Pt /132, pt sleeping. Offers no complaints. MAR and MD Powell paged. Pt /132, pt sleeping. Respirations even and unlabored. A-fib on monitor. MAR and MD Powell paged.

## 2023-03-15 NOTE — CHART NOTE - NSCHARTNOTEFT_GEN_A_CORE
Received a call from neurology as they are concerned about neurologic decompensation and requesting ICU evaluation.   [facility] consult called, they will speak with neurology.       Case Emanuel NP   pager [phone]

## 2023-03-15 NOTE — GOALS OF CARE CONVERSATION - ADVANCED CARE PLANNING - CONVERSATION DETAILS
Discussed with patient regarding code status, DNR or DNI. Patient had very good functionality prior to coming to the hospital. Although she lived with her daughter, Arely patient was still very independent on all ADLs and iADLs. After her DVT in her left leg had some increasing trouble with ambulation, however still mentally intact and very sharp. Patient may benefit from DNR.DNI in the unfortunate event that her heart may stop. Patient is to be FULL CODE.

## 2023-03-15 NOTE — SWALLOW BEDSIDE ASSESSMENT ADULT - ADDITIONAL RECOMMENDATIONS
1. This service to follow up as schedule permits to reassess for candidacy of oral diet. 2. Medical team advised to reconsult this service as patient becomes medically optimized.

## 2023-03-15 NOTE — CONSULT NOTE ADULT - SUBJECTIVE AND OBJECTIVE BOX
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 Patient reportedly woke up at 0130 AM on Monday with a R-sided headache. ENT noted R otitis externa, purulent discharge from R TM thought to be secondary to perforation, given ear gtt and prescribed cipro/dexamethasone gtt (she took 1 dose thus far). Daughter called EMS as later in the day patient complained of severe HA as well as neck pain, stated "I'm dying" and "my head is killing me." Patient reportedly without prior history of ear infections, no history of stroke or seizures. She reported to ENT earlier in the day a muffled sensation in the R ear [id number].     Daughter notes patient has had no recent fevers/chills. She had a dental cleaning on Thursday (no abx prior to cleaning).    On arrival, code stroke called.    In the ED VS:  97.2  65-99  162-186/52-91  16-20  96-99%RA, received NS 500cc IVF, lorazepam 2mg IV x1 and morphine 4mg IV x1.               PAST MEDICAL & SURGICAL HISTORY:  Polycystic kidney disease      Glaucoma      Aneurysm      History of deep venous thrombosis (DVT) of distal vein of left lower extremity      Thrombocytopenia      Hypothyroid      Osteoporosis      Arthritis      PCP (pneumocystis carinii pneumonia)      C. difficile colitis      Type 2 diabetes mellitus, without long-term current use of insulin      Essential hypertension      H/O kidney transplant      H/O:       H/O eye surgery      S/P hysterectomy      H/O umbilical hernia repair          FAMILY HISTORY:  FH: diabetes mellitus (Sibling)        SOCIAL HISTORY:      Allergies    apple (Unknown)  Benadryl (Unknown)  penicillin (Hives)  watermelon (Unknown)    Intolerances        HOME MEDICATIONS:    REVIEW OF SYSTEMS:  General: Denies dizziness, fatigue  Eyes: Denies blurry vision  ENMT: Denies rhinorrhea  Respiratory: Denies cough, SOB  Cardiovascular: Denies palpitations, CP  Gastrointestinal: Denies abd pain, N/V/D/C, hematochezia, melena  : Denies dysuria, increased freq  Musculoskeletal: Denies edema, joint pain  Endocrine: Denies increased thirst, increased frequency  Allergic/Immunologic: Denies rashes or hives  Neuro: Denies weakness, numbness  Psych: Denies anxiety, depression  All ROS negative unless indicated above     OBJECTIVE:  ICU Vital Signs Last 24 Hrs  T(C): 36.4 (15 Mar 2023 13:00), Max: 38.1 (15 Mar 2023 06:19)  T(F): 97.5 (15 Mar 2023 13:00), Max: 100.5 (15 Mar 2023 06:19)  HR: 74 (15 Mar 2023 13:00) (65 - 100)  BP: 176/85 (15 Mar 2023 13:00) (162/52 - 215/132)  BP(mean): --  ABP: --  ABP(mean): --  RR: 28 (15 Mar 2023 13:00) (16 - 30)  SpO2: 98% (15 Mar 2023 13:00) (96% - 99%)    O2 Parameters below as of 15 Mar 2023 13:00  Patient On (Oxygen Delivery Method): room air              CAPILLARY BLOOD GLUCOSE  182 (14 Mar 2023 19:34)      POCT Blood Glucose.: 79 mg/dL (15 Mar 2023 13:55)      PHYSICAL EXAM:  CONSTITUTIONAL: NAD; well-developed  HEENT: PERRL, clear conjunctiva  RESPIRATORY: Normal respiratory effort; lungs are clear to auscultation bilaterally; No Crackles/Rhonchi/Wheezing  CARDIOVASCULAR: Regular rate and rhythm, normal S1 and S2, no murmur/rub/gallop; No lower extremity edema; Peripheral pulses are 2+ bilaterally  ABDOMEN: Nontender to palpation, normoactive bowel sounds, no rebound/guarding; No hepatosplenomegaly  MUSCULOSKELETAL: no clubbing or cyanosis of digits; no joint swelling or tenderness to palpation  EXTREMITY: Lower extremities Non-tender to palpation; non-erythematous B/L  NEURO: A&Ox3; no focal deficits   PSYCH: normal mood; Affect appropirate    HOSPITAL MEDICATIONS:  MEDICATIONS  (STANDING):  atovaquone  Suspension 1500 milliGRAM(s) Oral daily  ciprofloxacin  0.3% Ophthalmic Solution for Otic Use 4 Drop(s) Right Ear two times a day  cycloSPORINE  , modified (GENGRAF) Solution 75 milliGRAM(s) Oral every 12 hours  dextrose 5%. 1000 milliLiter(s) (100 mL/Hr) IV Continuous <Continuous>  dextrose 5%. 1000 milliLiter(s) (50 mL/Hr) IV Continuous <Continuous>  dextrose 50% Injectable 25 Gram(s) IV Push once  dextrose 50% Injectable 12.5 Gram(s) IV Push once  dextrose 50% Injectable 25 Gram(s) IV Push once  glucagon  Injectable 1 milliGRAM(s) IntraMuscular once  insulin lispro (ADMELOG) corrective regimen sliding scale   SubCutaneous every 6 hours  meropenem  IVPB 500 milliGRAM(s) IV Intermittent every 12 hours  predniSONE   Tablet 5 milliGRAM(s) Oral daily  sodium chloride 0.9%. 1000 milliLiter(s) (50 mL/Hr) IV Continuous <Continuous>    MEDICATIONS  (PRN):  dextrose Oral Gel 15 Gram(s) Oral once PRN Blood Glucose LESS THAN 70 milliGRAM(s)/deciliter      LABS:                        10.6   22.19 )-----------( 163      ( 15 Mar 2023 05:45 )             34.3     03-15    138  |  101  |  81<H>  ----------------------------<  159<H>  4.4   |  19<L>  |  3.03<H>    Ca    9.5      15 Mar 2023 05:45  Phos  4.5     03-15  Mg     2.40     03-15    TPro  6.5  /  Alb  3.3  /  TBili  0.6  /  DBili  x   /  AST  83<H>  /  ALT  64<H>  /  AlkPhos  78  03-15    PT/INR - ( 14 Mar 2023 21:38 )   PT: 16.2 sec;   INR: 1.39 ratio         PTT - ( 14 Mar 2023 21:38 )  PTT:25.2 sec  Urinalysis Basic - ( 15 Mar 2023 03:43 )    Color: Yellow / Appearance: Clear / S.019 / pH: x  Gluc: x / Ketone: Negative  / Bili: Negative / Urobili: <2 mg/dL   Blood: x / Protein: 300 mg/dL / Nitrite: Negative   Leuk Esterase: Negative / RBC: 8 /HPF / WBC 2 /HPF   Sq Epi: x / Non Sq Epi: 1 /HPF / Bacteria: Negative        Venous Blood Gas:  03-15 @ 02:30  7.35/36/80/20/96.5  VBG Lactate: 2.2      MICROBIOLOGY:     RADIOLOGY:  [ ] Reviewed and interpreted by me    EKG: Reviewed    CHIEF COMPLAINT: AMS/R ear infxn    HPI:  78-year-old female with PCKD previously on HD via LUE AVF now s/p renal transplant, LLE DVT (resolved, nml dopplers 2022) on ASA, HTN, HLD, DM2, 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 Patient reportedly woke up at 0130 AM on Monday with a R-sided headache. ENT noted R otitis externa, purulent discharge from R TM thought to be secondary to perforation, given ear gtt and prescribed cipro/dexamethasone gtt (she took 1 dose thus far). Daughter called EMS as later in the day patient complained of severe HA as well as neck pain, stated "I'm dying" and "my head is killing me." Patient reportedly without prior history of ear infections, no history of stroke or seizures. She reported to ENT earlier in the day a muffled sensation in the R ear [id number].     Daughter notes patient has had no recent fevers/chills. She had a dental cleaning on Thursday (no abx prior to cleaning).    On arrival, code stroke called.    In the ED VS:  97.2  65-99  162-186/52-91  16-20  96-99%RA, received NS 500cc IVF, lorazepam 2mg IV x1 and morphine 4mg IV x1. Neurology was unsuccessful in obtaining a lumbar puncture. MICU was consulted for further evaluation.       PAST MEDICAL & SURGICAL HISTORY:  Polycystic kidney disease      Glaucoma      Aneurysm      History of deep venous thrombosis (DVT) of distal vein of left lower extremity      Thrombocytopenia      Hypothyroid      Osteoporosis      Arthritis      PCP (pneumocystis carinii pneumonia)      C. difficile colitis      Type 2 diabetes mellitus, without long-term current use of insulin      Essential hypertension      H/O kidney transplant      H/O:       H/O eye surgery      S/P hysterectomy      H/O umbilical hernia repair          FAMILY HISTORY:  FH: diabetes mellitus (Sibling)        SOCIAL HISTORY:      Allergies    apple (Unknown)  Benadryl (Unknown)  penicillin (Hives)  watermelon (Unknown)    Intolerances        HOME MEDICATIONS:    REVIEW OF SYSTEMS:  General: Denies dizziness, fatigue  Eyes: Denies blurry vision  ENMT: Denies rhinorrhea  Respiratory: Denies cough, SOB  Cardiovascular: Denies palpitations, CP  Gastrointestinal: Denies abd pain, N/V/D/C, hematochezia, melena  : Denies dysuria, increased freq  Musculoskeletal: Denies edema, joint pain  Endocrine: Denies increased thirst, increased frequency  Allergic/Immunologic: Denies rashes or hives  Neuro: Denies weakness, numbness  Psych: Denies anxiety, depression  All ROS negative unless indicated above     OBJECTIVE:  ICU Vital Signs Last 24 Hrs  T(C): 36.4 (15 Mar 2023 13:00), Max: 38.1 (15 Mar 2023 06:19)  T(F): 97.5 (15 Mar 2023 13:00), Max: 100.5 (15 Mar 2023 06:19)  HR: 74 (15 Mar 2023 13:00) (65 - 100)  BP: 176/85 (15 Mar 2023 13:00) (162/52 - 215/132)  BP(mean): --  ABP: --  ABP(mean): --  RR: 28 (15 Mar 2023 13:00) (16 - 30)  SpO2: 98% (15 Mar 2023 13:00) (96% - 99%)    O2 Parameters below as of 15 Mar 2023 13:00  Patient On (Oxygen Delivery Method): room air              CAPILLARY BLOOD GLUCOSE  182 (14 Mar 2023 19:34)      POCT Blood Glucose.: 79 mg/dL (15 Mar 2023 13:55)      PHYSICAL EXAM:  CONSTITUTIONAL: NAD; well-developed  HEENT: PERRL, clear conjunctiva  RESPIRATORY: Normal respiratory effort; lungs are clear to auscultation bilaterally; No Crackles/Rhonchi/Wheezing  CARDIOVASCULAR: Regular rate and rhythm, normal S1 and S2, no murmur/rub/gallop; No lower extremity edema; Peripheral pulses are 2+ bilaterally  ABDOMEN: Nontender to palpation, normoactive bowel sounds, no rebound/guarding; No hepatosplenomegaly  MUSCULOSKELETAL: no clubbing or cyanosis of digits; no joint swelling or tenderness to palpation  EXTREMITY: Lower extremities Non-tender to palpation; non-erythematous B/L  NEURO: A&Ox3; no focal deficits   PSYCH: normal mood; Affect appropirate    HOSPITAL MEDICATIONS:  MEDICATIONS  (STANDING):  atovaquone  Suspension 1500 milliGRAM(s) Oral daily  ciprofloxacin  0.3% Ophthalmic Solution for Otic Use 4 Drop(s) Right Ear two times a day  cycloSPORINE  , modified (GENGRAF) Solution 75 milliGRAM(s) Oral every 12 hours  dextrose 5%. 1000 milliLiter(s) (100 mL/Hr) IV Continuous <Continuous>  dextrose 5%. 1000 milliLiter(s) (50 mL/Hr) IV Continuous <Continuous>  dextrose 50% Injectable 25 Gram(s) IV Push once  dextrose 50% Injectable 12.5 Gram(s) IV Push once  dextrose 50% Injectable 25 Gram(s) IV Push once  glucagon  Injectable 1 milliGRAM(s) IntraMuscular once  insulin lispro (ADMELOG) corrective regimen sliding scale   SubCutaneous every 6 hours  meropenem  IVPB 500 milliGRAM(s) IV Intermittent every 12 hours  predniSONE   Tablet 5 milliGRAM(s) Oral daily  sodium chloride 0.9%. 1000 milliLiter(s) (50 mL/Hr) IV Continuous <Continuous>    MEDICATIONS  (PRN):  dextrose Oral Gel 15 Gram(s) Oral once PRN Blood Glucose LESS THAN 70 milliGRAM(s)/deciliter      LABS:                        10.6   22.19 )-----------( 163      ( 15 Mar 2023 05:45 )             34.3     03-15    138  |  101  |  81<H>  ----------------------------<  159<H>  4.4   |  19<L>  |  3.03<H>    Ca    9.5      15 Mar 2023 05:45  Phos  4.5     03-15  Mg     2.40     03-15    TPro  6.5  /  Alb  3.3  /  TBili  0.6  /  DBili  x   /  AST  83<H>  /  ALT  64<H>  /  AlkPhos  78  03-15    PT/INR - ( 14 Mar 2023 21:38 )   PT: 16.2 sec;   INR: 1.39 ratio         PTT - ( 14 Mar 2023 21:38 )  PTT:25.2 sec  Urinalysis Basic - ( 15 Mar 2023 03:43 )    Color: Yellow / Appearance: Clear / S.019 / pH: x  Gluc: x / Ketone: Negative  / Bili: Negative / Urobili: <2 mg/dL   Blood: x / Protein: 300 mg/dL / Nitrite: Negative   Leuk Esterase: Negative / RBC: 8 /HPF / WBC 2 /HPF   Sq Epi: x / Non Sq Epi: 1 /HPF / Bacteria: Negative        Venous Blood Gas:  03-15 @ 02:30  7.35/36/80/20/96.5  VBG Lactate: 2.2      MICROBIOLOGY:     RADIOLOGY:  [ ] Reviewed and interpreted by me    EKG: Reviewed    CHIEF COMPLAINT: AMS/R ear infxn    HPI:  78-year-old female with PCKD previously on HD via LUE AVF now s/p renal transplant, LLE DVT (resolved, nml dopplers 2022) on ASA, HTN, HLD, DM2, 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 Patient reportedly woke up at 0130 AM on Monday with a R-sided headache. ENT noted R otitis externa, purulent discharge from R TM thought to be secondary to perforation, given ear gtt and prescribed cipro/dexamethasone gtt (she took 1 dose thus far). Daughter called EMS as later in the day patient complained of severe HA as well as neck pain, stated "I'm dying" and "my head is killing me." Patient reportedly without prior history of ear infections, no history of stroke or seizures. She reported to ENT earlier in the day a muffled sensation in the R ear [id number].     Daughter notes patient has had no recent fevers/chills. She had a dental cleaning on Thursday (no abx prior to cleaning).    On arrival, code stroke called.    In the ED VS:  97.2  65-99  162-186/52-91  16-20  96-99%RA, received NS 500cc IVF, lorazepam 2mg IV x1 and morphine 4mg IV x1. Neurology was unsuccessful in obtaining a lumbar puncture. MICU was consulted for further evaluation.       PAST MEDICAL & SURGICAL HISTORY:  Polycystic kidney disease      Glaucoma      Aneurysm      History of deep venous thrombosis (DVT) of distal vein of left lower extremity      Thrombocytopenia      Hypothyroid      Osteoporosis      Arthritis      PCP (pneumocystis carinii pneumonia)      C. difficile colitis      Type 2 diabetes mellitus, without long-term current use of insulin      Essential hypertension      H/O kidney transplant      H/O:       H/O eye surgery      S/P hysterectomy      H/O umbilical hernia repair          FAMILY HISTORY:  FH: diabetes mellitus (Sibling)        SOCIAL HISTORY:      Allergies    apple (Unknown)  Benadryl (Unknown)  penicillin (Hives)  watermelon (Unknown)    Intolerances        HOME MEDICATIONS:    REVIEW OF SYSTEMS:  General: Denies dizziness, fatigue  Eyes: Denies blurry vision  ENMT: Denies rhinorrhea  Respiratory: Denies cough, SOB  Cardiovascular: Denies palpitations, CP  Gastrointestinal: Denies abd pain, N/V/D/C, hematochezia, melena  : Denies dysuria, increased freq  Musculoskeletal: Denies edema, joint pain  Endocrine: Denies increased thirst, increased frequency  Allergic/Immunologic: Denies rashes or hives  Neuro: Denies weakness, numbness  Psych: Denies anxiety, depression  All ROS negative unless indicated above     OBJECTIVE:  ICU Vital Signs Last 24 Hrs  T(C): 36.4 (15 Mar 2023 13:00), Max: 38.1 (15 Mar 2023 06:19)  T(F): 97.5 (15 Mar 2023 13:00), Max: 100.5 (15 Mar 2023 06:19)  HR: 74 (15 Mar 2023 13:00) (65 - 100)  BP: 176/85 (15 Mar 2023 13:00) (162/52 - 215/132)  BP(mean): --  ABP: --  ABP(mean): --  RR: 28 (15 Mar 2023 13:00) (16 - 30)  SpO2: 98% (15 Mar 2023 13:00) (96% - 99%)    O2 Parameters below as of 15 Mar 2023 13:00  Patient On (Oxygen Delivery Method): room air              CAPILLARY BLOOD GLUCOSE  182 (14 Mar 2023 19:34)      POCT Blood Glucose.: 79 mg/dL (15 Mar 2023 13:55)      PHYSICAL EXAM:  CONSTITUTIONAL:  Older woman in NAD; lethargic  HEENT: PERRL  RESPIRATORY: Normal respiratory effort; lungs are clear to auscultation bilaterally  CARDIOVASCULAR: Regular rate and rhythm, normal S1 and S2, no murmur/rub/gallop; No lower extremity edema; Peripheral pulses are 2+ bilaterally  ABDOMEN: Nontender to palpation, normoactive bowel sounds, no rebound/guarding; No hepatosplenomegaly  MUSCULOSKELETAL: no clubbing or cyanosis of digits; no joint swelling or tenderness to palpation  EXTREMITY: Lower extremities Non-tender to palpation; non-erythematous B/L  NEURO: A&Ox0; no focal deficits       HOSPITAL MEDICATIONS:  MEDICATIONS  (STANDING):  atovaquone  Suspension 1500 milliGRAM(s) Oral daily  ciprofloxacin  0.3% Ophthalmic Solution for Otic Use 4 Drop(s) Right Ear two times a day  cycloSPORINE  , modified (GENGRAF) Solution 75 milliGRAM(s) Oral every 12 hours  dextrose 5%. 1000 milliLiter(s) (100 mL/Hr) IV Continuous <Continuous>  dextrose 5%. 1000 milliLiter(s) (50 mL/Hr) IV Continuous <Continuous>  dextrose 50% Injectable 25 Gram(s) IV Push once  dextrose 50% Injectable 12.5 Gram(s) IV Push once  dextrose 50% Injectable 25 Gram(s) IV Push once  glucagon  Injectable 1 milliGRAM(s) IntraMuscular once  insulin lispro (ADMELOG) corrective regimen sliding scale   SubCutaneous every 6 hours  meropenem  IVPB 500 milliGRAM(s) IV Intermittent every 12 hours  predniSONE   Tablet 5 milliGRAM(s) Oral daily  sodium chloride 0.9%. 1000 milliLiter(s) (50 mL/Hr) IV Continuous <Continuous>    MEDICATIONS  (PRN):  dextrose Oral Gel 15 Gram(s) Oral once PRN Blood Glucose LESS THAN 70 milliGRAM(s)/deciliter      LABS:                        10.6   22.19 )-----------( 163      ( 15 Mar 2023 05:45 )             34.3     03-15    138  |  101  |  81<H>  ----------------------------<  159<H>  4.4   |  19<L>  |  3.03<H>    Ca    9.5      15 Mar 2023 05:45  Phos  4.5     03-15  Mg     2.40     03-15    TPro  6.5  /  Alb  3.3  /  TBili  0.6  /  DBili  x   /  AST  83<H>  /  ALT  64<H>  /  AlkPhos  78  03-15    PT/INR - ( 14 Mar 2023 21:38 )   PT: 16.2 sec;   INR: 1.39 ratio         PTT - ( 14 Mar 2023 21:38 )  PTT:25.2 sec  Urinalysis Basic - ( 15 Mar 2023 03:43 )    Color: Yellow / Appearance: Clear / S.019 / pH: x  Gluc: x / Ketone: Negative  / Bili: Negative / Urobili: <2 mg/dL   Blood: x / Protein: 300 mg/dL / Nitrite: Negative   Leuk Esterase: Negative / RBC: 8 /HPF / WBC 2 /HPF   Sq Epi: x / Non Sq Epi: 1 /HPF / Bacteria: Negative        Venous Blood Gas:  03-15 @ 02:30  7.35/36/80/20/96.5  VBG Lactate: 2.2      MICROBIOLOGY:     RADIOLOGY:  [ ] Reviewed and interpreted by me    EKG: Reviewed

## 2023-03-15 NOTE — CONSULT NOTE ADULT - ASSESSMENT
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 Found to have acute otomastoiditis on CTH. Patient is lethargic s/p ativan.    AMS 2/2 infection     Recommendations   - hemodynamically stable, and protecting their airway   - IR to attempt LP  -Antibiotics as per ID   -Imaging as per ENT   -rest of care per primary team    Not a MICU Candidate at this time. Please Call MICU back with any questions/Concerns.    D/w Dr. Colmenares

## 2023-03-15 NOTE — PATIENT PROFILE ADULT - FALL HARM RISK - HARM RISK INTERVENTIONS

## 2023-03-15 NOTE — ED ADULT NURSE REASSESSMENT NOTE - NS ED NURSE REASSESS COMMENT FT1
Received pt at change of shift, PT is resting in stretcher, responsive to tactile stimuli.  A+Ox1, as per family pt is lethargic ever since receiving medication last night to go to CT. Respirations even and unlabored, normal work of breathing, no accessory muscle use, speaking in full clear uninterrupted sentences. ABD is soft, non tender, non distended. awaiting for IR, will continue to monitor.

## 2023-03-15 NOTE — H&P ADULT - NSICDXPASTMEDICALHX_GEN_ALL_CORE_FT
PAST MEDICAL HISTORY:  Aneurysm     Arthritis     C. difficile colitis     Essential hypertension     Glaucoma     History of deep venous thrombosis (DVT) of distal vein of left lower extremity     Hypothyroid     Osteoporosis     PCP (pneumocystis carinii pneumonia)     Polycystic kidney disease     Thrombocytopenia     Type 2 diabetes mellitus, without long-term current use of insulin

## 2023-03-15 NOTE — ED ADULT NURSE REASSESSMENT NOTE - NS ED NURSE REASSESS COMMENT FT1
Pt resting in stretcher. A-fib on monitor. Respirations even and unlabored. Report given to VASQUEZU 2 RN.

## 2023-03-15 NOTE — CONSULT NOTE ADULT - SUBJECTIVE AND OBJECTIVE BOX
HPI:  Patient is a 78y Female with PMH PCKD s/p renal transplant, HTN, DM, LLE DVT on ASA, HLD, small supraclinoid aneurysm, glaucoma, hypothyroidism, and recurrent ENT visits for cerumen debridement who presents today with R-sided headache, neck pain, and ear pain. She was seen by her outpatient ENT who noted purulent fluid in right ear canal and anterior TM perf but otherwise normal EAC, recommended ciprodex drops and considered augmentin but did not place patient on it due to concern for C. diff. Patient then developed worsening headache and neck pain which is why daughter brought her to the ED. She was alert prior to the CT but daughter says that she has seemed more tired and unresponsive since they gave her ativan for the CT. No recent emesis, vision changes, hearing changes, fevers/chills at home (though patient with tactile fevers since ED admission). CTH shows R mastoid effusion.       Physical Exam  T(C): 36.1 (03-15-23 @ 02:17), Max: 36.4 (03-15-23 @ 00:04)  HR: 100 (03-15-23 @ 02:17) (65 - 100)  BP: 199/97 (03-15-23 @ 02:17) (162/52 - 215/132)  RR: 22 (03-15-23 @ 02:17) (16 - 22)  SpO2: 96% (03-15-23 @ 02:17) (96% - 99%)  General: lying in bed, favoring the left, responsive only to noxious stimuli  No respiratory distress, stridor, or stertor on RA  Face:  Symmetric without masses or lesions  OU: PERRL, cannot assess EOMs due to patient mental status  AD: Pinna wnl, EAC with lateral hematoma-like bulges, minimal serous fluid, unable to fully visualize TM due to abrasion, no mastoid erythema/tenderness  AS: Pinna wnl, EAC with minimal cerumen, TM intact, no effusion, no mastoid erythema/tenderness  Nose: nasal cavity clear bilaterally, inferior turbinates normal, mucosa normal without crusting or bleeding  OC/OP: unable to fully assess OP due to patient fighting down  Neck: soft/flat, no LAD    Assessment and Plan:   78y Female with PMH PCKD s/p renal transplant, HTN, DM, LLE DVT on ASA, HLD, small supraclinoid aneurysm, glaucoma, hypothyroidism, and recurrent ENT visits for cerumen debridement who presents today with R-sided headache, neck pain, and ear pain. CTH suggesting R mastoid effusion but difficult to see on CT. No purulent fluid visible in R EAC at this time. Concern for meningitis given neck pain, WBC 14, and AMS.  - CT IAC vs MRI IAC  - c/w IV antibiotics per ID: currently on CTX  - c/w ciprodex drops to right ear for now  - will discuss with team

## 2023-03-15 NOTE — CONSULT NOTE ADULT - ASSESSMENT
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 Went to ENT and was diagnosed with R otitis externa secondary to perforated TM. Prescribed cipro/dexamethasone.  Worsening headache and EMS called. Admitted 3/14.  CT head negative.  NAMAN.  Started on vanc/meropenem.  LP attempted but unsuccessful.  Awaiting IR to obtain LP    Immunocompromised patient with altered mental status and headache.  CT no space occupying lesions  - LP for cell count, chemistries, PCR, crypt antigen, cultures, fungal and AFB cx, opening pressure  - empiric vancomycin by level given NAMAN and meropenem (covers most bacterial and also for listeria)  - f/u all cultures  - hx Cdiff, monitor for diarrhea  - continue mepron given hx PJP    NAMAN  - Dec cr 2.88  - monitor renal function  - to adjust antimicrobials as it changes    Leukocytosis  - continue to trend wbc  - f/u all cultures    Guarded prognosis    I have discussed plan of care as detailed above with hospitalist    I will be away, returning 3/17/2023.  My associates to cover.  Please call ID as needed [phone].

## 2023-03-15 NOTE — ED ADULT NURSE REASSESSMENT NOTE - NS ED NURSE REASSESS COMMENT FT1
Pt resting in stretcher. Pt remains lethargic and tremulous. Respirations even and unlabored. A-fib on monitor. Awaiting bed assignment.

## 2023-03-15 NOTE — CHART NOTE - NSCHARTNOTEFT_GEN_A_CORE
CSF results noted.   Discussed with ID Dr Mccartney.   Continue meropenem 1000 mg q12 (higher dose recommended) due to bacterial meningitis.  Dose vanco by level, give dose if less than 15.      Case Emanuel NP   pager [phone]

## 2023-03-15 NOTE — ED ADULT NURSE REASSESSMENT NOTE - NS ED NURSE REASSESS COMMENT FT1
Pt remains lethargic, not responding to assessment questions. Unable to perform neuro check at this time. Pt straight cath for urine, voided 600cc of clear yellow urine. Cleaned, turned, repositioned for comfort.

## 2023-03-15 NOTE — CONSULT NOTE ADULT - ATTENDING COMMENTS
Agree with plan above.  Patient here with AMS and c/f meningitis  Agree with LP  SHe is awake and alert following commands  Hemodynamically stable and saturating well on NC  Not a MICU candidate at this time, please reconsult if condition changes

## 2023-03-15 NOTE — SWALLOW BEDSIDE ASSESSMENT ADULT - COMMENTS
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 Patient reportedly woke up at 0130 AM on Monday with a R-sided headache. ENT noted R otitis externa, purulent discharge from R TM thought to be secondary to perforation, given ear gtt and prescribed cipro/dexamethasone gtt (she took 1 dose thus far). Daughter called EMS as later in the day patient complained of severe HA as well as neck pain, stated "I'm dying" and "my head is killing me." Patient reportedly without prior history of ear infections, no history of stroke or seizures. She reported to ENT earlier in the day a muffled sensation in the R ear [id number]."    ENT "PMH PCKD s/p renal transplant, HTN, DM, LLE DVT on ASA, HLD, small supraclinoid aneurysm, glaucoma, hypothyroidism, and recurrent ENT visits for cerumen debridement who presents today with R-sided headache, neck pain, and ear pain. CTH suggesting R mastoid effusion but difficult to see on CT. No purulent fluid visible in R EAC at this time. Concern for meningitis given neck pain,"    Patient seen at bedside in the [facility] this AM for an initial assessment of the swallow function. Patient's daughter present at bedside and reporting patient has been lethargic since she was given ativan yesterday. RN report is consistent with daughter. Daughter further reports patient "may have bit her tongue". Patient did not follow directives and/or attempt to verbalize wants/needs.

## 2023-03-15 NOTE — H&P ADULT - NSICDXPASTSURGICALHX_GEN_ALL_CORE_FT
PAST SURGICAL HISTORY:  H/O eye surgery     H/O kidney transplant     H/O umbilical hernia repair     H/O:      S/P hysterectomy

## 2023-03-16 NOTE — PROGRESS NOTE ADULT - SUBJECTIVE AND OBJECTIVE BOX
POST ANESTHESIA EVALUATION    78y Female POSTOP DAY 1 S/P     MENTAL STATUS: Patient participation [  ] Awake     [  ] Arousable     [ x ] Sedated    AIRWAY PATENCY: [  ] Satisfactory  [x  ] Other: intubates    Vital Signs Last 24 Hrs  T(C): 33.4 (16 Mar 2023 10:00), Max: 36.8 (15 Mar 2023 11:15)  T(F): 92.2 (16 Mar 2023 10:00), Max: 98.2 (15 Mar 2023 11:15)  HR: 73 (16 Mar 2023 10:00) (70 - 104)  BP: 109/64 (16 Mar 2023 07:00) (102/65 - 190/107)  BP(mean): 75 (16 Mar 2023 07:00) (72 - 102)  RR: 16 (16 Mar 2023 10:00) (16 - 30)  SpO2: 93% (16 Mar 2023 10:00) (91% - 99%)    Parameters below as of 16 Mar 2023 08:00  Patient On (Oxygen Delivery Method): ventilator    O2 Concentration (%): 100  I&O's Summary    15 Mar 2023 07:01  -  16 Mar 2023 07:00  --------------------------------------------------------  IN: 23.5 mL / OUT: 1100 mL / NET: -1076.5 mL          NAUSEA/ VOMITTING:  [x  ] NONE  [  ] CONTROLLED [  ] OTHER     PAIN: [ x ] CONTROLLED WITH CURRENT REGIMEN  [  ] OTHER    [x  ] NO APPARENT ANESTHESIA COMPLICATIONS      Comments:

## 2023-03-16 NOTE — PROCEDURE NOTE - NSBRONCHPROCDETAILS_GEN_A_CORE_FT
Findings:  Bronchoscope inserted through ETT. ETT noted to be in good position. Airway evaluation revealed diffuse blood likely from upper airway. Suctioning of secretions was performed. BAL of RUL was done which obtained 30cc of bright red fluid.  Bronchoscope then withdrawn from ETT. Oyxgen saturations monitored throughout the procedure.        Findings:  Bronchoscope inserted through ETT. ETT noted to be in good position. Airway evaluation revealed diffuse blood likely from upper airway. Therapeutic aspiration of secretions was performed. BAL of RUL was done which obtained 30cc of bright red fluid.  Bronchoscope then withdrawn from ETT. Oyxgen saturations monitored throughout the procedure.

## 2023-03-16 NOTE — AIRWAY PLACEMENT NOTE ADULT - AIRWAY COMMENTS:
Called to Code Blue. ACLS in progress, bag mask ventilation. Large amount of blood suctioned from mouth. Jacqueline attempted once unable to visualize on Glidescope due to large amount of blood. Suction continued, bag mask ventilation continued. Dr. Diaz DL with Mac 3, unable to visualize glottis due to large amount of blood. Suction and bag mask ventilation continued. Glidescope S3 and 7.0 ETT.

## 2023-03-16 NOTE — PROVIDER CONTACT NOTE (CRITICAL VALUE NOTIFICATION) - ACTION/TREATMENT ORDERED:
No further orders at this time.
Awaiting further orders.
Continue with current treatment regimen
No further orders at this time.
No further orders at this time. Continue IV antibiotics.

## 2023-03-16 NOTE — PROGRESS NOTE ADULT - SUBJECTIVE AND OBJECTIVE BOX
[facility]  [place]  Office: [phone]  Cell: [phone]  Can also be reached on [employer] Teams       INTERVAL HPI/OVERNIGHT EVENTS: Arely was called yesterday evening around 7:30 PM. She had discussed with her Outpt nephrologist, doses of prednisone and cyclosporine could not be missed. Patient was NPO at that time, decision was made with daughter to proceed with NGT as patient could not cooperate with swallow evaluation. Resulting CXR from NGT placement showed clear lungs and proper NGT placement. Patient with cardiac arrest early this AM, was informed that rhythm was PEA, unclear etiology at this time. Resulting cxr with right lung opacification.         REVIEW OF SYSTEMS:  Unable to assess    Vital Signs Last 24 Hrs  T(C): 35.7 (16 Mar 2023 14:20), Max: 36.3 (16 Mar 2023 02:40)  T(F): 96.3 (16 Mar 2023 14:20), Max: 97.4 (16 Mar 2023 02:40)  HR: 71 (16 Mar 2023 15:15) (65 - 104)  BP: 109/64 (16 Mar 2023 07:00) (102/65 - 190/107)  BP(mean): 75 (16 Mar 2023 07:00) (72 - 102)  RR: 16 (16 Mar 2023 15:15) (16 - 66)  SpO2: 93% (16 Mar 2023 15:15) (91% - 100%)    Parameters below as of 16 Mar 2023 13:00  Patient On (Oxygen Delivery Method): ventilator    O2 Concentration (%): 100    PHYSICAL EXAMINATION:  GENERAL: NAD, well developed, sedated, intubated completely, does not respond to verbal or physical stimuli  HEAD:  Atraumatic, Normocephalic  EYES:  conjunctiva and sclera clear  CHEST/LUNG: COarse rhonchi b/l worse on right  HEART: Regular rate and rhythm, on levophed 0.53rate  ABDOMEN: Soft Distended, enlarged  NERVOUS SYSTEM:  Alert & Oriented X0   EXTREMITIES:  No pitting edema b/l                          7.9    9.29  )-----------( 150      ( 16 Mar 2023 09:45 )             25.6     03-16    141  |  107  |  79<H>  ----------------------------<  109<H>  4.6   |  17<L>  |  3.10<H>    Ca    8.1<L>      16 Mar 2023 09:45  Phos  6.2     03-16  Mg     2.10     03-16    TPro  4.2<L>  /  Alb  2.1<L>  /  TBili  1.0  /  DBili  x   /  AST  145<H>  /  ALT  79<H>  /  AlkPhos  91  03-16    LIVER FUNCTIONS - ( 16 Mar 2023 09:45 )  Alb: 2.1 g/dL / Pro: 4.2 g/dL / ALK PHOS: 91 U/L / ALT: 79 U/L / AST: 145 U/L / GGT: x           CARDIAC MARKERS ( 15 Mar 2023 02:30 )  x     / x     / 1074 U/L / x     / x          PT/INR - ( 14 Mar 2023 21:38 )   PT: 16.2 sec;   INR: 1.39 ratio         PTT - ( 14 Mar 2023 21:38 )  PTT:25.2 sec    CAPILLARY BLOOD GLUCOSE      RADIOLOGY & ADDITIONAL TESTS:

## 2023-03-16 NOTE — PROGRESS NOTE ADULT - SUBJECTIVE AND OBJECTIVE BOX
Interval events: Code blue called overnight. ROSC obtained. Patient remains intubated on ventilator and was transferred to MICU.       Vital Signs Last 24 Hrs  T(C): 34.1 (16 Mar 2023 05:27), Max: 36.8 (15 Mar 2023 11:15)  T(F): 93.3 (16 Mar 2023 05:27), Max: 98.2 (15 Mar 2023 11:15)  HR: 72 (16 Mar 2023 08:00) (72 - 104)  BP: 109/64 (16 Mar 2023 07:00) (102/65 - 190/107)  BP(mean): 75 (16 Mar 2023 07:00) (72 - 102)  RR: 16 (16 Mar 2023 08:00) (16 - 30)  SpO2: 96% (16 Mar 2023 08:00) (91% - 98%)    Parameters below as of 16 Mar 2023 08:00  Patient On (Oxygen Delivery Method): ventilator    O2 Concentration (%): 100    Respiratory: intubated on ventilator  Face:  Symmetric without masses or lesions, blood visible around mouth  OU: cannot assess EOMs due to patient mental status  AD: Pinna wnl, EAC with lateral hematoma-like bulges, minimal serous fluid, unable to fully visualize TM due to abrasion, no mastoid erythema/tenderness  AS: Pinna wnl, EAC with minimal cerumen, TM intact, no effusion, no mastoid erythema/tenderness    Assessment and Plan:   78y Female with PMH PCKD s/p renal transplant, HTN, DM, LLE DVT on ASA, HLD, small supraclinoid aneurysm, glaucoma, hypothyroidism, and recurrent ENT visits for cerumen debridement who presents today with R-sided headache, neck pain, and ear pain. CTH suggesting R mastoid effusion but difficult to see on CT. No purulent fluid visible in R EAC at this time. Concern for meningitis given neck pain, WBC 14, and AMS. Now course c/b code blue with ROSC obtained, now patient remains intubated.   - MRI IAC w/ contrast  - c/w IV antibiotics per ID: currently on CTX  - c/w ciprodex drops to right ear for now  - will discuss with team   Interval events: NGT placed yesterday evening. Code blue called this morning. ROSC obtained. Patient remains intubated on ventilator and was transferred to MICU.       Vital Signs Last 24 Hrs  T(C): 34.1 (16 Mar 2023 05:27), Max: 36.8 (15 Mar 2023 11:15)  T(F): 93.3 (16 Mar 2023 05:27), Max: 98.2 (15 Mar 2023 11:15)  HR: 72 (16 Mar 2023 08:00) (72 - 104)  BP: 109/64 (16 Mar 2023 07:00) (102/65 - 190/107)  BP(mean): 75 (16 Mar 2023 07:00) (72 - 102)  RR: 16 (16 Mar 2023 08:00) (16 - 30)  SpO2: 96% (16 Mar 2023 08:00) (91% - 98%)    Parameters below as of 16 Mar 2023 08:00  Patient On (Oxygen Delivery Method): ventilator    O2 Concentration (%): 100    Respiratory: intubated on ventilator  Face:  Symmetric without masses or lesions, blood visible around mouth, swelling over L tear trough/supra alar crease  OU: cannot assess EOMs due to patient mental status  AD: Pinna wnl, EAC with lateral hematoma-like bulges, minimal serous fluid, unable to fully visualize TM due to abrasion, no mastoid erythema/tenderness  AS: Pinna wnl, EAC with minimal cerumen, TM intact, no effusion, no mastoid erythema/tenderness  Nose: No active bleeding in anterior nasal cavity   OC: Patient biting down on tongue, blot clot visualized on posterior tongue but no active bleeding     Laryngoscope - clot visualized in left middle meatus, no active bleeding visualized in left or right nasal cavity, significant clot suctioned from nasopharynx    Assessment and Plan:   78y Female with PMH PCKD s/p renal transplant, HTN, DM, LLE DVT on ASA, HLD, small supraclinoid aneurysm, glaucoma, hypothyroidism, and recurrent ENT visits for cerumen debridement who presents today with R-sided headache, neck pain, and ear pain. CTH suggesting R mastoid effusion but difficult to see on CT. No purulent fluid visible in R EAC at this time. Concern for meningitis given neck pain, WBC 14, and AMS. Now course c/b code blue with ROSC obtained and patient remains intubated. Team also reports significant pooling of blood visualized on bronchoscopy this morning. Significant clot present but no signs of active bleeding from nasal cavity, nasopharynx or oral cavity, however, patient biting on tongue, which could be one source of bleeding.   - CT IAC w/ contrast  - c/w IV antibiotics per ID: currently on CTX  - bite block placed   - will discuss with team

## 2023-03-16 NOTE — RAPID RESPONSE TEAM SUMMARY - NSADDTLFINDINGSRRT_GEN_ALL_CORE
On arrival patient receiving compressions and BVM and had received 1 epi. On first pulse check patient had PEA and same on 2nd pulse check so 2nd epi given. Patient started having blood in oropharynx likely 2/2 compressions. Patient given an amp of bicarb. On 3rd pulse check patient had pulseless VT and 200J shock given. On 4th pulse check patient had asystole and on 5th had ROSC. Initial pulse rhtym appeared to be sinus tach with very high HUMA on monitor but HUMA settled down over time. IO was placed and levo was started and titrated to 2mcg/kg/min to maintain MAP > 65. Due to blood in mouth took several attempts with DL for ETT to be placed but ultimately confirmed with capnometry and bilateral breath sounds. Patient transferred to MICU. On arrival patient receiving compressions and BVM and had received 1 epi. On first pulse check patient had PEA and same on 2nd pulse check so 2nd epi given. Patient started having blood in oropharynx likely 2/2 compressions. Patient given an amp of bicarb. On 3rd pulse check patient had pulseless VT and 200J shock given. On 4th pulse check patient had asystole and on 5th had ROSC. Initial pulse rhtym appeared to be sinus tach with very high HUMA on monitor but HUMA settled down over time. IO was placed and levo was started and titrated to 2mcg/kg/min to maintain MAP > 65. Due to blood in mouth took several attempts with DL for ETT to be placed but ultimately confirmed with capnometry and bilateral breath sounds. No sedation was used. Patient transferred to MICU.

## 2023-03-16 NOTE — PROGRESS NOTE ADULT - SUBJECTIVE AND OBJECTIVE BOX
SUBJECTIVE:  ***    INTERVAL HISTORY:  ***        MEDICATIONS:  MEDICATIONS  (STANDING):  albuterol    0.083%. 2.5 milliGRAM(s) Nebulizer once  aspirin  chewable 81 milliGRAM(s) Enteral Tube daily  atovaquone  Suspension 1500 milliGRAM(s) Oral daily  ciprofloxacin  0.3% Ophthalmic Solution for Otic Use 4 Drop(s) Right Ear two times a day  cloNIDine Patch 0.2 mG/24Hr(s) 1 patch Transdermal every 7 days  cycloSPORINE  , modified (GENGRAF) Solution 75 milliGRAM(s) Oral every 12 hours  dextrose 5%. 1000 milliLiter(s) (75 mL/Hr) IV Continuous <Continuous>  dextrose 5%. 1000 milliLiter(s) (100 mL/Hr) IV Continuous <Continuous>  dextrose 5%. 1000 milliLiter(s) (50 mL/Hr) IV Continuous <Continuous>  dextrose 50% Injectable 25 Gram(s) IV Push once  dextrose 50% Injectable 12.5 Gram(s) IV Push once  dextrose 50% Injectable 25 Gram(s) IV Push once  fentaNYL    Injectable 100 MICROGram(s) IV Push once  fentaNYL   Infusion. 0.5 MICROgram(s)/kG/Hr (1.89 mL/Hr) IV Continuous <Continuous>  glucagon  Injectable 1 milliGRAM(s) IntraMuscular once  heparin   Injectable 5000 Unit(s) SubCutaneous every 8 hours  insulin lispro (ADMELOG) corrective regimen sliding scale   SubCutaneous every 6 hours  levothyroxine 25 MICROGram(s) Oral daily  meropenem  IVPB 1000 milliGRAM(s) IV Intermittent every 12 hours  predniSONE   Tablet 5 milliGRAM(s) Oral daily  propofol Infusion 10 MICROgram(s)/kG/Min (2.27 mL/Hr) IV Continuous <Continuous>  sodium chloride 0.9%. 1000 milliLiter(s) (50 mL/Hr) IV Continuous <Continuous>    MEDICATIONS  (PRN):  acetaminophen   Oral Liquid .. 650 milliGRAM(s) Enteral Tube every 6 hours PRN Temp greater or equal to 38C (100.4F), Mild Pain (1 - 3), Moderate Pain (4 - 6)  dextrose Oral Gel 15 Gram(s) Oral once PRN Blood Glucose LESS THAN 70 milliGRAM(s)/deciliter      VITALS & EXAMINATION:  Vital Signs Last 24 Hrs  T(C): 34.1 (16 Mar 2023 05:27), Max: 36.8 (15 Mar 2023 09:06)  T(F): 93.3 (16 Mar 2023 05:27), Max: 98.3 (15 Mar 2023 09:06)  HR: 85 (16 Mar 2023 06:00) (74 - 104)  BP: 102/65 (16 Mar 2023 06:00) (102/65 - 193/91)  BP(mean): 72 (16 Mar 2023 06:00) (72 - 102)  RR: 26 (16 Mar 2023 06:00) (16 - 30)  SpO2: 91% (16 Mar 2023 06:00) (91% - 98%)    Parameters below as of 16 Mar 2023 06:00  Patient On (Oxygen Delivery Method): ventilator    O2 Concentration (%): 100    ***    LABS:  CBC                       10.6   22.19 )-----------( 163      ( 15 Mar 2023 05:45 )             34.3     Chem 03-15    138  |  101  |  81<H>  ----------------------------<  159<H>  4.4   |  19<L>  |  3.03<H>    Ca    9.5      15 Mar 2023 05:45  Phos  4.5     03-15  Mg     2.40     03-15    TPro  6.5  /  Alb  3.3  /  TBili  0.6  /  DBili  x   /  AST  83<H>  /  ALT  64<H>  /  AlkPhos  78  03-15    LFTs LIVER FUNCTIONS - ( 15 Mar 2023 05:45 )  Alb: 3.3 g/dL / Pro: 6.5 g/dL / ALK PHOS: 78 U/L / ALT: 64 U/L / AST: 83 U/L / GGT: x           Coagulopathy PT/INR - ( 14 Mar 2023 21:38 )   PT: 16.2 sec;   INR: 1.39 ratio         PTT - ( 14 Mar 2023 21:38 )  PTT:25.2 sec  Lipid Panel 03-15 Chol 153 LDL -- HDL 45<L> Trig 146  A1c   Cardiac enzymes CARDIAC MARKERS ( 15 Mar 2023 02:30 )  x     / x     / 1074 U/L / x     / x          U/A Urinalysis Basic - ( 15 Mar 2023 03:43 )  Color: Yellow / Appearance: Clear / S.019 / pH: x  Gluc: x / Ketone: Negative  / Bili: Negative / Urobili: <2 mg/dL   Blood: x / Protein: 300 mg/dL / Nitrite: Negative   Leuk Esterase: Negative / RBC: 8 /HPF / WBC 2 /HPF   Sq Epi: x / Non Sq Epi: 1 /HPF / Bacteria: Negative      CSF CSF:  Total Nucleated Cell Count, CSF: 04878 cells/uL (03-15-23 @ 15:00)  RBC Count - Spinal Fluid: 1267 cells/uL (03-15-23 @ 15:00)  Protein, CSF: 1181 mg/dL (03-15-23 @ 15:00)        STUDIES & IMAGING:    Studies (EKG, EEG, EMG, etc):       Radiology (XR, CT, MR, U/S, TTE/SORAYA):     SUBJECTIVE: patient seen and examined at bedside. She is intubated and sedated on propofol and fentanyl.     INTERVAL HISTORY:  79yo F PMHx PCKD s/p renal transplant, brain cyst, DM, HLD, HTN, hypothyroidism, glaucoma, cataracts, DVT in leg, on asa who presents to ED for severe headache, right ear pain, neck pain, markedly decreased responsiveness to external stimuli. LP significant for decreased glucose, elevated WBC with neutrophil predominance, elevated protein, PCR (+) for strep. pneumoniae. Appropriate antibiotics were started. Course complicated by PEA arrest, ROSC achieved after about 10 mins. Patient is now intubated and sedated on propofol and fentanyl.     MEDICATIONS:  MEDICATIONS  (STANDING):  albuterol    0.083%. 2.5 milliGRAM(s) Nebulizer once  aspirin  chewable 81 milliGRAM(s) Enteral Tube daily  atovaquone  Suspension 1500 milliGRAM(s) Oral daily  ciprofloxacin  0.3% Ophthalmic Solution for Otic Use 4 Drop(s) Right Ear two times a day  cloNIDine Patch 0.2 mG/24Hr(s) 1 patch Transdermal every 7 days  cycloSPORINE  , modified (GENGRAF) Solution 75 milliGRAM(s) Oral every 12 hours  dextrose 5%. 1000 milliLiter(s) (75 mL/Hr) IV Continuous <Continuous>  dextrose 5%. 1000 milliLiter(s) (100 mL/Hr) IV Continuous <Continuous>  dextrose 5%. 1000 milliLiter(s) (50 mL/Hr) IV Continuous <Continuous>  dextrose 50% Injectable 25 Gram(s) IV Push once  dextrose 50% Injectable 12.5 Gram(s) IV Push once  dextrose 50% Injectable 25 Gram(s) IV Push once  fentaNYL    Injectable 100 MICROGram(s) IV Push once  fentaNYL   Infusion. 0.5 MICROgram(s)/kG/Hr (1.89 mL/Hr) IV Continuous <Continuous>  glucagon  Injectable 1 milliGRAM(s) IntraMuscular once  heparin   Injectable 5000 Unit(s) SubCutaneous every 8 hours  insulin lispro (ADMELOG) corrective regimen sliding scale   SubCutaneous every 6 hours  levothyroxine 25 MICROGram(s) Oral daily  meropenem  IVPB 1000 milliGRAM(s) IV Intermittent every 12 hours  predniSONE   Tablet 5 milliGRAM(s) Oral daily  propofol Infusion 10 MICROgram(s)/kG/Min (2.27 mL/Hr) IV Continuous <Continuous>  sodium chloride 0.9%. 1000 milliLiter(s) (50 mL/Hr) IV Continuous <Continuous>    MEDICATIONS  (PRN):  acetaminophen   Oral Liquid .. 650 milliGRAM(s) Enteral Tube every 6 hours PRN Temp greater or equal to 38C (100.4F), Mild Pain (1 - 3), Moderate Pain (4 - 6)  dextrose Oral Gel 15 Gram(s) Oral once PRN Blood Glucose LESS THAN 70 milliGRAM(s)/deciliter      VITALS & EXAMINATION:  Vital Signs Last 24 Hrs  T(C): 34.1 (16 Mar 2023 05:27), Max: 36.8 (15 Mar 2023 09:06)  T(F): 93.3 (16 Mar 2023 05:27), Max: 98.3 (15 Mar 2023 09:06)  HR: 85 (16 Mar 2023 06:00) (74 - 104)  BP: 102/65 (16 Mar 2023 06:00) (102/65 - 193/91)  BP(mean): 72 (16 Mar 2023 06:00) (72 - 102)  RR: 26 (16 Mar 2023 06:00) (16 - 30)  SpO2: 91% (16 Mar 2023 06:00) (91% - 98%)    Parameters below as of 16 Mar 2023 06:00  Patient On (Oxygen Delivery Method): ventilator  O2 Concentration (%): 100    Exam: limited due to intubated and sedation  Mental status: eyes closed, does not arose to verbal, tactile, or noxious stimuli, no verbal output   CN: pinpoint pupils (on prop) but equal and do subtly react to light b/l, corneal reflex intact. oculocephalic reflex absent, did not assess gag reflex due to likely traumatic injury after intubation (bleeding from mouth), could not assess visual fields  Muscle  tone: flaccid throughout all extrem  strength: no withdrawal from noxious stimuli in b/l UE, extremely minor contraction in b/l LE to noxious stimuli   coordination: unable to assess  gait: unable to assess       LABS: 3/15  CBC                       10.6   22.19 )-----------( 163                 34.3   Chem     138  |  101  |  81<H>  ----------------------------<  159<H>  4.4   |  19<L>  |  3.03<H>    Ca    9.5      15 Mar 2023 05:45  Phos  4.5     03-15  Mg     2.40     03-15    TPro  6.5  /  Alb  3.3  /  TBili  0.6  /  DBili  x   /  AST  83<H>  /  ALT  64<H>  /  AlkPhos  78  03-15    LFTs LIVER FUNCTIONS - ( 15 Mar 2023 05:45 )  Alb: 3.3 g/dL / Pro: 6.5 g/dL / ALK PHOS: 78 U/L / ALT: 64 U/L / AST: 83 U/L / GGT: x           Coagulopathy PT/INR - ( 14 Mar 2023 21:38 )   PT: 16.2 sec;   INR: 1.39 ratio      PTT - ( 14 Mar 2023 21:38 )  PTT:25.2 sec  Lipid Panel 03-15 Chol 153 LDL -- HDL 45<L> Trig 146  A1c   Cardiac enzymes CARDIAC MARKERS ( 15 Mar 2023 02:30 )  x     / x     / 1074 U/L / x     / x        U/A Urinalysis Basic - ( 15 Mar 2023 03:43 )  Color: Yellow / Appearance: Clear / S.019 / pH: x  Gluc: x / Ketone: Negative  / Bili: Negative / Urobili: <2 mg/dL   Blood: x / Protein: 300 mg/dL / Nitrite: Negative   Leuk Esterase: Negative / RBC: 8 /HPF / WBC 2 /HPF   Sq Epi: x / Non Sq Epi: 1 /HPF / Bacteria: Negative    CSF:  Total Nucleated Cell Count, CSF: 96819 cells/uL (03-15-23 @ 15:00)  RBC Count - Spinal Fluid: 1267 cells/uL (03-15-23 @ 15:00)  Protein, CSF: 1181 mg/dL (03-15-23 @ 15:00)    STUDIES & IMAGING  Radiology (XR, CT, MR, U/S, TTE/SORAYA):    CT brain stroke protocol 3/14/23   No hydrocephalus, acute intracranial hemorrhage, mass effect, or brain edema. Right mastoid air cell and tympanic cavity effusion, correlate for the presence of acute otomastoiditis.    CT PERFUSION:  Limited by late injection of the contrast bolus.  Cerebral blood flow less than 30% = 0 mL. No core infarct is predicted.  Tmax greater than 6 seconds = 0 mL. No brain parenchyma predicted to be at continued ischemic risk in the presence of neurologic symptoms.    CTA BRAIN:  No flow-limiting stenosis or vascular aneurysm. No AVM.    CTA NECK:  Calcified plaque at the origin of the left internal carotid artery results in approximately 40% short segment stenosis.  Otherwise no flow-limiting stenosis. No evidence for arterial dissection.

## 2023-03-16 NOTE — PROGRESS NOTE ADULT - ASSESSMENT
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  Cardiac arrest on 3/16/23    KTx 2003  B/L creat around 2.8 since Dec 2022  now with worsening renal function  Patient receives Cyclosporine (Gengraf) 75mg PO q12hrs,  BID and Prednisone 5 QD for transplant  This morning the patient had cardiac arrest    plan:  In light of severe sepsis and cardiac arrest: hold MMF and Cyclosporine  On Dexamethasone  Once DCd would restart PRednisone 5 QD  Risk of ESRD is high S/P cardiac arrest with underlying CKD  Consent for HD obtained, signed by daughter  No urgent indication for HD today  [place] and continue to follow UOP  Not opposed for CT head and neck with IV contrast per ENT  Would renally dose ceftriaxone after 1 day  BMP QD    Metabolic acidosis  lactate +  manage underlying condition (sepsis)    OM and OE  Abx per Infectious disease specialist with dose adjustment per GFR      Will reach out to [facility] Nephrologist Dr. Hugo Hernandez [phone]  For any question, call:  Cell # [phone]  Pager # [phone]  Callback # [phone]

## 2023-03-16 NOTE — PROGRESS NOTE ADULT - ASSESSMENT
*** Assessment: 77yo F PMHx PCKD s/p renal transplant, brain cyst, DM, HLD, HTN, hypothyroidism, glaucoma, cataracts, DVT in leg, on asa who presents to ED for severe headache, right ear pain, neck pain, markedly decreased responsiveness to external stimuli. LP significant for decreased glucose, elevated WBC with neutrophil predominance, elevated protein, PCR (+) for strep. pneumoniae. antibiotics were started. Course complicated by PEA arrest, ROSC achieved after about 10 mins. Patient is now intubated and sedated on propofol and fentanyl.       Impression: Otomastoiditis & bacterial meningitis likely due to direct extension complicated by anoxic ischemic encephalopathy     Plan  [] MRI brain without due to renal injury  [] MR IAC as per ENT  [] 24 hour video EEG   [x] UA (-), Bcx (+GPC in pairs - S. pneumo)  [x] SERUM: A1C 5.6, LDL 79, procal 45.63, CK 1074  [/] S/p LP 3/15: protein (>1181), gluc (<5), TNC (34,389, 88% neut), RBC (1267), AFB (-), CSF gram strain & culture (GPC in pairs), CSF PCR (+S. pneumoniae),   --> PENDING: fungal cultures, toxoplasmosis, cryptococcal, CMV/EBV PCR, IgG panel.  [x] Consults: ENT, ID, MICU, Renal/transplant     Case discussed and seen with Neurology Attending, Dr. Bentley

## 2023-03-16 NOTE — PROGRESS NOTE ADULT - SUBJECTIVE AND OBJECTIVE BOX
INTERVAL HPI/OVERNIGHT EVENTS:    SUBJECTIVE: Patient seen and examined at bedside. Intubated, sedated, ROS cannot be obtained.       VITAL SIGNS:  ICU Vital Signs Last 24 Hrs  T(C): 34.1 (16 Mar 2023 05:27), Max: 36.8 (15 Mar 2023 09:06)  T(F): 93.3 (16 Mar 2023 05:27), Max: 98.3 (15 Mar 2023 09:06)  HR: 85 (16 Mar 2023 06:00) (74 - 104)  BP: 102/65 (16 Mar 2023 06:00) (102/65 - 193/91)  BP(mean): 72 (16 Mar 2023 06:00) (72 - 102)  ABP: --  ABP(mean): --  RR: 26 (16 Mar 2023 06:00) (16 - 30)  SpO2: 91% (16 Mar 2023 06:00) (91% - 98%)    O2 Parameters below as of 16 Mar 2023 06:00  Patient On (Oxygen Delivery Method): ventilator    O2 Concentration (%): 100      Mode: AC/ CMV (Assist Control/ Continuous Mandatory Ventilation), RR (machine): 16, TV (machine): 350, FiO2: 100, PEEP: 5, MAP: 11, PIP: 32  Plateau pressure:   P/F ratio:     03-15 @ 07:01  -  03-16 @ 07:00  --------------------------------------------------------  IN: 0 mL / OUT: 500 mL / NET: -500 mL      CAPILLARY BLOOD GLUCOSE  182 (14 Mar 2023 19:34)      POCT Blood Glucose.: 83 mg/dL (16 Mar 2023 05:40)    ECG:    PHYSICAL EXAM:    General:   HEENT:   Neck:   Respiratory:   Cardiovascular:   Abdomen:   Extremities:  Neurological:    MEDICATIONS:  MEDICATIONS  (STANDING):  albuterol    0.083%. 2.5 milliGRAM(s) Nebulizer once  aspirin  chewable 81 milliGRAM(s) Enteral Tube daily  atovaquone  Suspension 1500 milliGRAM(s) Oral daily  ciprofloxacin  0.3% Ophthalmic Solution for Otic Use 4 Drop(s) Right Ear two times a day  cloNIDine Patch 0.2 mG/24Hr(s) 1 patch Transdermal every 7 days  cycloSPORINE  , modified (GENGRAF) Solution 75 milliGRAM(s) Oral every 12 hours  dextrose 5%. 1000 milliLiter(s) (75 mL/Hr) IV Continuous <Continuous>  dextrose 5%. 1000 milliLiter(s) (100 mL/Hr) IV Continuous <Continuous>  dextrose 5%. 1000 milliLiter(s) (50 mL/Hr) IV Continuous <Continuous>  dextrose 50% Injectable 25 Gram(s) IV Push once  dextrose 50% Injectable 12.5 Gram(s) IV Push once  dextrose 50% Injectable 25 Gram(s) IV Push once  fentaNYL    Injectable 100 MICROGram(s) IV Push once  fentaNYL   Infusion. 0.5 MICROgram(s)/kG/Hr (1.89 mL/Hr) IV Continuous <Continuous>  glucagon  Injectable 1 milliGRAM(s) IntraMuscular once  heparin   Injectable 5000 Unit(s) SubCutaneous every 8 hours  insulin lispro (ADMELOG) corrective regimen sliding scale   SubCutaneous every 6 hours  levothyroxine 25 MICROGram(s) Oral daily  meropenem  IVPB 1000 milliGRAM(s) IV Intermittent every 12 hours  predniSONE   Tablet 5 milliGRAM(s) Oral daily  propofol Infusion 10 MICROgram(s)/kG/Min (2.27 mL/Hr) IV Continuous <Continuous>  sodium chloride 0.9%. 1000 milliLiter(s) (50 mL/Hr) IV Continuous <Continuous>    MEDICATIONS  (PRN):  acetaminophen   Oral Liquid .. 650 milliGRAM(s) Enteral Tube every 6 hours PRN Temp greater or equal to 38C (100.4F), Mild Pain (1 - 3), Moderate Pain (4 - 6)  dextrose Oral Gel 15 Gram(s) Oral once PRN Blood Glucose LESS THAN 70 milliGRAM(s)/deciliter      ALLERGIES:  Allergies    apple (Unknown)  Benadryl (Unknown)  penicillin (Hives)  watermelon (Unknown)    Intolerances        LABS:                        10.6   22.19 )-----------( 163      ( 15 Mar 2023 05:45 )             34.3     03-15    138  |  101  |  81<H>  ----------------------------<  159<H>  4.4   |  19<L>  |  3.03<H>    Ca    9.5      15 Mar 2023 05:45  Phos  4.5     03-15  Mg     2.40     03-15    TPro  6.5  /  Alb  3.3  /  TBili  0.6  /  DBili  x   /  AST  83<H>  /  ALT  64<H>  /  AlkPhos  78  03-15    PT/INR - ( 14 Mar 2023 21:38 )   PT: 16.2 sec;   INR: 1.39 ratio         PTT - ( 14 Mar 2023 21:38 )  PTT:25.2 sec  Urinalysis Basic - ( 15 Mar 2023 03:43 )    Color: Yellow / Appearance: Clear / S.019 / pH: x  Gluc: x / Ketone: Negative  / Bili: Negative / Urobili: <2 mg/dL   Blood: x / Protein: 300 mg/dL / Nitrite: Negative   Leuk Esterase: Negative / RBC: 8 /HPF / WBC 2 /HPF   Sq Epi: x / Non Sq Epi: 1 /HPF / Bacteria: Negative        RADIOLOGY & ADDITIONAL TESTS: Reviewed.   INTERVAL HPI/OVERNIGHT EVENTS:    SUBJECTIVE: Patient seen and examined at bedside. Intubated, sedated, ROS cannot be obtained.       VITAL SIGNS:  ICU Vital Signs Last 24 Hrs  T(C): 34.1 (16 Mar 2023 05:27), Max: 36.8 (15 Mar 2023 09:06)  T(F): 93.3 (16 Mar 2023 05:27), Max: 98.3 (15 Mar 2023 09:06)  HR: 85 (16 Mar 2023 06:00) (74 - 104)  BP: 102/65 (16 Mar 2023 06:00) (102/65 - 193/91)  BP(mean): 72 (16 Mar 2023 06:00) (72 - 102)  ABP: --  ABP(mean): --  RR: 26 (16 Mar 2023 06:00) (16 - 30)  SpO2: 91% (16 Mar 2023 06:00) (91% - 98%)    O2 Parameters below as of 16 Mar 2023 06:00  Patient On (Oxygen Delivery Method): ventilator    O2 Concentration (%): 100      Mode: AC/ CMV (Assist Control/ Continuous Mandatory Ventilation), RR (machine): 16, TV (machine): 350, FiO2: 100, PEEP: 5, MAP: 11, PIP: 32  Plateau pressure:   P/F ratio:     03-15 @ 07:01  -  03-16 @ 07:00  --------------------------------------------------------  IN: 0 mL / OUT: 500 mL / NET: -500 mL      CAPILLARY BLOOD GLUCOSE  182 (14 Mar 2023 19:34)      POCT Blood Glucose.: 83 mg/dL (16 Mar 2023 05:40)    ECG:    PHYSICAL EXAM:  CONSTITUTIONAL: intubated/sedated.   EYES: No conjunctival or scleral injection   RESP: mildly increased wob, CTA b/l without wheezes, crackles or rhonchi  CV: RRR, +S1S2, no MRG; no JVD; 1+ peripheral edema on LLE, no edema on RLE   GI: +distended, soft, no rebound, no guarding; fullness on LLQ abdomen.   hepatosplenomegaly; no hernia palpated  MSK: examination of the (head/neck/spine/ribs/pelvis, RUE, LUE, RLE, LLE) without misalignment  SKIN: No rashes or ulcers noted   NEURO: normal tone. unable to assess given sedation     MEDICATIONS:  MEDICATIONS  (STANDING):  albuterol    0.083%. 2.5 milliGRAM(s) Nebulizer once  aspirin  chewable 81 milliGRAM(s) Enteral Tube daily  atovaquone  Suspension 1500 milliGRAM(s) Oral daily  ciprofloxacin  0.3% Ophthalmic Solution for Otic Use 4 Drop(s) Right Ear two times a day  cloNIDine Patch 0.2 mG/24Hr(s) 1 patch Transdermal every 7 days  cycloSPORINE  , modified (GENGRAF) Solution 75 milliGRAM(s) Oral every 12 hours  dextrose 5%. 1000 milliLiter(s) (75 mL/Hr) IV Continuous <Continuous>  dextrose 5%. 1000 milliLiter(s) (100 mL/Hr) IV Continuous <Continuous>  dextrose 5%. 1000 milliLiter(s) (50 mL/Hr) IV Continuous <Continuous>  dextrose 50% Injectable 25 Gram(s) IV Push once  dextrose 50% Injectable 12.5 Gram(s) IV Push once  dextrose 50% Injectable 25 Gram(s) IV Push once  fentaNYL    Injectable 100 MICROGram(s) IV Push once  fentaNYL   Infusion. 0.5 MICROgram(s)/kG/Hr (1.89 mL/Hr) IV Continuous <Continuous>  glucagon  Injectable 1 milliGRAM(s) IntraMuscular once  heparin   Injectable 5000 Unit(s) SubCutaneous every 8 hours  insulin lispro (ADMELOG) corrective regimen sliding scale   SubCutaneous every 6 hours  levothyroxine 25 MICROGram(s) Oral daily  meropenem  IVPB 1000 milliGRAM(s) IV Intermittent every 12 hours  predniSONE   Tablet 5 milliGRAM(s) Oral daily  propofol Infusion 10 MICROgram(s)/kG/Min (2.27 mL/Hr) IV Continuous <Continuous>  sodium chloride 0.9%. 1000 milliLiter(s) (50 mL/Hr) IV Continuous <Continuous>    MEDICATIONS  (PRN):  acetaminophen   Oral Liquid .. 650 milliGRAM(s) Enteral Tube every 6 hours PRN Temp greater or equal to 38C (100.4F), Mild Pain (1 - 3), Moderate Pain (4 - 6)  dextrose Oral Gel 15 Gram(s) Oral once PRN Blood Glucose LESS THAN 70 milliGRAM(s)/deciliter      ALLERGIES:  Allergies    apple (Unknown)  Benadryl (Unknown)  penicillin (Hives)  watermelon (Unknown)    Intolerances        LABS:                        10.6   22.19 )-----------( 163      ( 15 Mar 2023 05:45 )             34.3     03-15    138  |  101  |  81<H>  ----------------------------<  159<H>  4.4   |  19<L>  |  3.03<H>    Ca    9.5      15 Mar 2023 05:45  Phos  4.5     03-15  Mg     2.40     03-15    TPro  6.5  /  Alb  3.3  /  TBili  0.6  /  DBili  x   /  AST  83<H>  /  ALT  64<H>  /  AlkPhos  78  03-15    PT/INR - ( 14 Mar 2023 21:38 )   PT: 16.2 sec;   INR: 1.39 ratio         PTT - ( 14 Mar 2023 21:38 )  PTT:25.2 sec  Urinalysis Basic - ( 15 Mar 2023 03:43 )    Color: Yellow / Appearance: Clear / S.019 / pH: x  Gluc: x / Ketone: Negative  / Bili: Negative / Urobili: <2 mg/dL   Blood: x / Protein: 300 mg/dL / Nitrite: Negative   Leuk Esterase: Negative / RBC: 8 /HPF / WBC 2 /HPF   Sq Epi: x / Non Sq Epi: 1 /HPF / Bacteria: Negative        RADIOLOGY & ADDITIONAL TESTS: Reviewed.

## 2023-03-16 NOTE — CHART NOTE - NSCHARTNOTEFT_GEN_A_CORE
While on unit, tele tech noted patient to have a HR in 30s RRT called. Upon entering room, RN checked pulse and pt was pulseless. RRT upgraded to code blue. CPR was started. Patient's daughter, Arely, contacted by primary team, and confirmed FULL CODE. Patient's niece (who's a RN, was at bedside when RRT called). ROSC achieved, pt intubated, started on levo & propofol ggts and transferred to MICU. Attending of record, Dr. David, notified via TEAMS.

## 2023-03-16 NOTE — PROGRESS NOTE ADULT - ASSESSMENT
77 y/o F with h/o HTN, HLD, DM2, PCKD previously on HD via LUE AVF then s/p renal transplant, LLE DVT (resolved, nml dopplers 12/2022) on ASA, very small supraclinoid aneurysm (reportedly unchanged on MRA 10/2020), hypothyroidism, PCP 2021 on atovaquone ppx, reportedly hospitalized in 12/2022 for rectal prolapse, had a blood transfusion at that time, also + CMV 1/25/23 who presented with R-sided HA, R ear pain and neck stiffness after visit to ENT earlier in the day, altered on presentation, CT head unremarkable for stroke, however LP with IR and BCx showing +strep pneumoniae. given Vancomycin and continued on meropenem per ID. Cardiac arrest 3/16 with ROSC, transferred to MICU for further management.       Plan:   Neurological  #Currently Intubated, sedated   - continue propofol and fentanyl    #Strep pneumo meningitis #Acute encephalopathy  severe HA, neck stiffness, fever, leukocytosis concerning for meningitis/encephalitis  CT head negative for CVA.   LP and BCx 3/15 both showing gram positive cocci in pairs, and + strep pneumoniae.   likely source of entry from R ear where pt c/o discomfort for several months.  patient is on cyclosporine, mycophenolate mofetil and prednisone due to kidney transplant and is immunocompromised   dental procedure 3/14 but less likely source   ID consulted, recs for continuing meropenem. s/p vancomycin 3/15.   - c/w meropenem   - f/u BCx and LP sensitivities  - f/u MRI  - q4hr neuro checks      Cardiovascular  #Cardiac Arrest  bradycardia to 30, pulseless, code blue was called 3/16   2 rounds of epi, and PEA arrest. on 3rd pulse check pulseless VT, 200J shock given  4th pulse check asystole, 5th ROSC, sinus tachy with HUMA. IO placed and levo started. Due to blood in mouth took several attempts with DL for ETT to be placed but ultimately confirmed with capnometry and bilateral breath sounds. No sedation was used.        #Elevated troponin  uptrending troponin to 182 3/15 AM, was likely iso ESRD  s/p cardiac arrest, elevated ST segment   - trend troponin      #HTN  on amlodipine 5mg, losartan 100mg, torsemide 20mg, and clonidine 0.2 patch weekly  - hold for now given sepsis      Respiratory/ENT  #Intubated  continue sedation    # Tympanic membrane rupture  discharge from R ear, evaluated by ENT given dexamethasone and cipro 3/14  - c/w broad spectrum abx         Gastrointestinal  #hematemesis  patient with melissa bright red blood from mouth on arrival to MICU  could be related to CPR, no pericardial effusion on bedside US  - PPI  - keep NPO for now    #aspiration precaution  previously failed dysphagia screen and was pending SLP eval  blood in mouth on arrival to MICU and increased wob concerning for aspiration  NGT in place, in stomach on CXR 3/15  - f/u CXR  - keep NPO for now          Renal  #ESRD #s/p Kidney Transplant #PCKD  previously HD through LLE AVF but no HD since transplant  Cr 3.03, unclear baseline  - avoid nephrotoxic agent  - I/O         Hematological  # h/o LLE DVT  resolved, no DVT on doppler 12/22.  on ASA at home, has been receiving heparin subQ  - hold for now given bleeding in oropharynx.       Infectious Disease  #Strep pneumoniae meningitis #R ear mastoiditis  treatment as above with IV meropenem.   - f/u ID recs     #h/o PCP  - atorvaquone at home, continue      Endocrinological  #Hypothyroidism  last TSH 12/2022 >10  TSH 3/15 4.73  - c/w home levothyroxine     #Diabetes mellitus Type II      Ethics  FULL, GOC ongoing. 79 y/o F with h/o HTN, HLD, DM2, PCKD previously on HD via LUE AVF then s/p renal transplant, LLE DVT (resolved, nml dopplers 12/2022) on ASA, very small supraclinoid aneurysm (reportedly unchanged on MRA 10/2020), hypothyroidism, PCP 2021 on atovaquone ppx, reportedly hospitalized in 12/2022 for rectal prolapse, had a blood transfusion at that time, also + CMV 1/25/23 who presented with R-sided HA, R ear pain and neck stiffness after visit to ENT earlier in the day, altered on presentation, CT head unremarkable for stroke, however LP with IR and BCx showing +strep pneumoniae. given Vancomycin and continued on meropenem per ID. Cardiac arrest 3/16 with ROSC, transferred to MICU for further management.       Plan:   Neurological  #Currently Intubated, sedated   - continue propofol and fentanyl    #Strep pneumo meningitis #Acute encephalopathy  severe HA, neck stiffness, fever, leukocytosis concerning for meningitis/encephalitis  CT head negative for CVA.   LP and BCx 3/15 both showing gram positive cocci in pairs, and + strep pneumoniae.   likely source of entry from R ear where pt c/o discomfort for several months.  patient is on cyclosporine, mycophenolate mofetil and prednisone due to kidney transplant and is immunocompromised   dental procedure 3/14 but less likely source   ID consulted, recs for continuing meropenem. s/p vancomycin 3/15.   - c/w meropenem   - f/u BCx and LP sensitivities  - f/u MRI  - q4hr neuro checks  - f/u 24hr EEG      Cardiovascular  #Cardiac Arrest  bradycardia to 30, pulseless, code blue was called 3/16   2 rounds of epi, and PEA arrest. on 3rd pulse check pulseless VT, 200J shock given  4th pulse check asystole, 5th ROSC, sinus tachy with HUMA. IO placed and levo started. Due to blood in mouth took several attempts with DL for ETT to be placed but ultimately confirmed with capnometry and bilateral breath sounds. No sedation was used.        #Elevated troponin  uptrending troponin to 182 3/15 AM, was likely iso ESRD  s/p cardiac arrest, elevated ST segment   - trend troponin  - f/u TTE      #HTN  on amlodipine 5mg, losartan 100mg, torsemide 20mg, and clonidine 0.2 patch weekly  - hold for now given sepsis      Respiratory/ENT  #Intubated  continue sedation    # Tympanic membrane rupture  discharge from R ear, evaluated by ENT given dexamethasone and cipro 3/14  - c/w broad spectrum abx   - f/u ENT recs      Gastrointestinal  #hematemesis  patient with melissa bright red blood from mouth on arrival to MICU  could be related to CPR, no pericardial effusion on bedside US  - PPI  - keep NPO for now    #aspiration precaution  previously failed dysphagia screen and was pending SLP eval  blood in mouth on arrival to MICU and increased wob concerning for aspiration  NGT in place, in stomach on CXR 3/15  - f/u CXR  - keep NPO for now          Renal  #ESRD #s/p Kidney Transplant #PCKD  previously HD through LLE AVF but no HD since transplant  Cr 3.03, unclear baseline  - avoid nephrotoxic agent  - stopping cyclosporine  - changed prednisone to dexamethasone  - consult transplant nephro  - I/O       Hematological  # h/o LLE DVT  resolved, no DVT on doppler 12/22.  on ASA at home, has been receiving heparin subQ  - hold for now given bleeding in oropharynx.   - SCDs      Infectious Disease  #Strep pneumoniae meningitis #R ear mastoiditis  treatment with IV CTX and vancomycin   - dexamethasone  - f/u ID recs   - repeat blood cx at 48 hr    #h/o PCP  - atorvaquone at home, continue      Endocrinological  #Hypothyroidism  last TSH 12/2022 >10  TSH 3/15 4.73  - c/w home levothyroxine     #Diabetes mellitus Type II      Ethics  FULL, GOC ongoing. 77 y/o F with h/o HTN, HLD, DM2, PCKD previously on HD via LUE AVF then s/p renal transplant, LLE DVT (resolved, nml dopplers 12/2022) on ASA, very small supraclinoid aneurysm (reportedly unchanged on MRA 10/2020), hypothyroidism, PCP 2021 on atovaquone ppx, reportedly hospitalized in 12/2022 for rectal prolapse, had a blood transfusion at that time, also + CMV 1/25/23 who presented with R-sided HA, R ear pain and neck stiffness after visit to ENT earlier in the day, altered on presentation, CT head unremarkable for stroke, however LP with IR and BCx showing +strep pneumoniae. given Vancomycin and continued on meropenem per ID. Cardiac arrest 3/16 with ROSC, transferred to MICU for further management.       Plan:   Neurological  #Currently Intubated, sedated   - continue propofol and fentanyl    #Strep pneumo meningitis #Acute encephalopathy  severe HA, neck stiffness, fever, leukocytosis concerning for meningitis/encephalitis  CT head negative for CVA.   LP and BCx 3/15 both showing gram positive cocci in pairs, and + strep pneumoniae.   likely source of entry from R ear where pt c/o discomfort for several months.  patient is on cyclosporine, mycophenolate mofetil and prednisone due to kidney transplant and is immunocompromised   dental procedure 3/14 but less likely source   ID consulted, recs for continuing meropenem. s/p vancomycin 3/15.   - c/w meropenem   - f/u BCx and LP sensitivities  - f/u MRI  - q4hr neuro checks  - f/u 24hr EEG      Cardiovascular  #Cardiac Arrest  bradycardia to 30, pulseless, code blue was called 3/16   2 rounds of epi, and PEA arrest. on 3rd pulse check pulseless VT, 200J shock given  4th pulse check asystole, 5th ROSC, sinus tachy with HUMA. IO placed and levo started. Due to blood in mouth took several attempts with DL for ETT to be placed but ultimately confirmed with capnometry and bilateral breath sounds. No sedation was used.        #Elevated troponin  uptrending troponin to 182 3/15 AM, was likely iso ESRD  s/p cardiac arrest, elevated ST segment   - trend troponin  - f/u TTE      #HTN  on amlodipine 5mg, losartan 100mg, torsemide 20mg, and clonidine 0.2 patch weekly  - hold for now given sepsis      Respiratory/ENT  #Intubated  continue sedation    # Tympanic membrane rupture  discharge from R ear, evaluated by ENT given dexamethasone and cipro 3/14  - c/w broad spectrum abx   - f/u ENT recs      Gastrointestinal  #hematemesis  patient with melissa bright red blood from mouth on arrival to MICU  could be related to CPR, no pericardial effusion on bedside US  - PPI  - keep NPO for now    #aspiration precaution  previously failed dysphagia screen and was pending SLP eval  blood in mouth on arrival to MICU and increased wob concerning for aspiration  NGT in place, in stomach on CXR 3/15  - f/u CXR  - keep NPO for now          Renal  #ESRD #s/p Kidney Transplant in 2003 at [place] #PCKD  previously HD through LLE AVF but no HD since transplant  Cr 3.03, unclear baseline  - avoid nephrotoxic agent  - stopping cyclosporine  - changed prednisone to dexamethasone  - consult transplant nephro  - I/O       Hematological  # h/o LLE DVT  resolved, no DVT on doppler 12/22.  on ASA at home, has been receiving heparin subQ  - hold for now given bleeding in oropharynx.   - SCDs      Infectious Disease  #Strep pneumoniae meningitis #R ear mastoiditis  treatment with IV CTX and vancomycin   - dexamethasone  - f/u ID recs   - repeat blood cx at 48 hr    #h/o PCP  - atorvaquone at home, continue      Endocrinological  #Hypothyroidism  last TSH 12/2022 >10  TSH 3/15 4.73  - c/w home levothyroxine     #Diabetes mellitus Type II      Ethics  FULL, GOC ongoing. 79 y/o F with h/o HTN, HLD, DM2, PCKD previously on HD via LUE AVF then s/p renal transplant, LLE DVT (resolved, nml dopplers 12/2022) on ASA, very small supraclinoid aneurysm (reportedly unchanged on MRA 10/2020), hypothyroidism, PCP 2021 on atovaquone ppx, reportedly hospitalized in 12/2022 for rectal prolapse, had a blood transfusion at that time, also + CMV 1/25/23 who presented with R-sided HA, R ear pain and neck stiffness after visit to ENT earlier in the day, altered on presentation, CT head unremarkable for stroke, however LP with IR and BCx showing +strep pneumoniae. given Vancomycin and continued on meropenem per ID. Cardiac arrest 3/16 with ROSC, transferred to MICU for further management.       Plan:   Neurological  #Currently Intubated, sedated   - continue propofol and fentanyl    #Strep pneumo meningitis #Acute encephalopathy  severe HA, neck stiffness, fever, leukocytosis concerning for meningitis/encephalitis  CT head negative for CVA.   LP and BCx 3/15 both showing gram positive cocci in pairs, and + strep pneumoniae.   likely source of entry from R ear where pt c/o discomfort for several months.  patient is on cyclosporine, mycophenolate mofetil and prednisone due to kidney transplant and is immunocompromised   dental procedure 3/14 but less likely source   ID consulted, recs for continuing meropenem. s/p vancomycin 3/15.   - c/w meropenem   - f/u BCx and LP sensitivities  - f/u MRI  - q4hr neuro checks  - f/u 24hr EEG      Cardiovascular  #Cardiac Arrest  bradycardia to 30, pulseless, code blue was called 3/16   2 rounds of epi, and PEA arrest. on 3rd pulse check pulseless VT, 200J shock given  4th pulse check asystole, 5th ROSC, sinus tachy with HUMA. IO placed and levo started. Due to blood in mouth took several attempts with DL for ETT to be placed but ultimately confirmed with capnometry and bilateral breath sounds. No sedation was used.        #Elevated troponin  uptrending troponin to 182 3/15 AM, was likely iso ESRD  s/p cardiac arrest, elevated ST segment   - trend troponin  - f/u TTE      #HTN  on amlodipine 5mg, losartan 100mg, torsemide 20mg, and clonidine 0.2 patch weekly  - hold for now given sepsis      Respiratory/ENT  #Intubated  continue sedation    # Tympanic membrane rupture  discharge from R ear, evaluated by ENT given dexamethasone and cipro 3/14  - c/w broad spectrum abx   - f/u ENT recs      Gastrointestinal  #hematemesis  patient with melissa bright red blood from mouth on arrival to MICU  could be related to CPR, no pericardial effusion on bedside US  - PPI  - keep NPO for now    #aspiration precaution  previously failed dysphagia screen and was pending SLP eval  blood in mouth on arrival to MICU and increased wob concerning for aspiration  NGT in place, in stomach on CXR 3/15  - f/u CXR  - keep NPO for now        Renal  #ESRD #s/p Kidney Transplant in 2003 at [place] #PCKD  previously HD through LLE AVF but no HD since transplant  Cr 3.03, unclear baseline  - avoid nephrotoxic agent  - stopping cyclosporine  - changed prednisone to dexamethasone  - consult transplant nephro  - I/O       Hematological  # h/o LLE DVT  resolved, no DVT on doppler 12/22.  on ASA at home, has been receiving heparin subQ  - hold for now given bleeding in oropharynx.   - SCDs      Infectious Disease  #Strep pneumoniae meningitis #R ear mastoiditis  treatment with IV CTX and vancomycin (dose by level)  - dexamethasone  - c/w ciprodex drops for R ear  - f/u ID recs   - repeat blood cx at 48 hr (3/17 AM)    #h/o PCP  - atovaquone at home, continue      Endocrinological  #Hypothyroidism  last TSH 12/2022 >10  TSH 3/15 4.73  - c/w levothyroxine     #Diabetes mellitus Type II  - ISS      Ethics  FULL, GOC ongoing. 79 y/o F with h/o HTN, HLD, DM2, PCKD previously on HD via LUE AVF then s/p renal transplant, LLE DVT (resolved, nml dopplers 12/2022) on ASA, very small supraclinoid aneurysm (reportedly unchanged on MRA 10/2020), hypothyroidism, PCP 2021 on atovaquone ppx, reportedly hospitalized in 12/2022 for rectal prolapse, had a blood transfusion at that time, also + CMV 1/25/23 who presented with R-sided HA, R ear pain and neck stiffness after visit to ENT earlier in the day, altered on presentation, CT head unremarkable for stroke, however LP with IR and BCx showing +strep pneumoniae. currently on vanc and CTX per ID. Cardiac arrest 3/16 with ROSC, transferred to MICU for further management.       Plan:   Neurological  #Currently Intubated, sedated   - continue propofol and fentanyl    #Strep pneumo meningitis #Acute encephalopathy  severe HA, neck stiffness, fever, leukocytosis concerning for meningitis/encephalitis  CT head negative for CVA.   LP and BCx 3/15 both showing gram positive cocci in pairs, and + strep pneumoniae.   likely source of entry from R ear where pt c/o discomfort for several months.  patient is on cyclosporine, mycophenolate mofetil and prednisone due to kidney transplant and is immunocompromised   dental procedure 3/14 but less likely source   ID consulted, recs for continuing meropenem. s/p vancomycin 3/15.   - c/w meropenem   - f/u BCx and LP sensitivities  - f/u MRI  - q4hr neuro checks  - f/u 24hr EEG      Cardiovascular  #Cardiac Arrest  bradycardia to 30, pulseless, code blue was called 3/16   2 rounds of epi, and PEA arrest. on 3rd pulse check pulseless VT, 200J shock given  4th pulse check asystole, 5th ROSC, sinus tachy with HUMA. IO placed and levo started. Due to blood in mouth took several attempts with DL for ETT to be placed but ultimately confirmed with capnometry and bilateral breath sounds. No sedation was used.        #Elevated troponin  uptrending troponin to 182 3/15 AM, was likely iso ESRD  s/p cardiac arrest, elevated ST segment   - trend troponin  - f/u TTE      #HTN  on amlodipine 5mg, losartan 100mg, torsemide 20mg, and clonidine 0.2 patch weekly  - hold for now given sepsis      Respiratory/ENT  #Intubated  continue sedation    # Tympanic membrane rupture  discharge from R ear, evaluated by ENT given dexamethasone and cipro 3/14  - c/w broad spectrum abx   - f/u ENT recs      Gastrointestinal  #hematemesis  patient with melissa bright red blood from mouth on arrival to MICU  could be related to CPR, no pericardial effusion on bedside US  - PPI  - keep NPO for now    #aspiration precaution  previously failed dysphagia screen and was pending SLP eval  blood in mouth on arrival to MICU and increased wob concerning for aspiration  NGT in place, in stomach on CXR 3/15  - f/u CXR  - keep NPO for now        Renal  #ESRD #s/p Kidney Transplant in 2003 at [place] #PCKD  previously HD through LLE AVF but no HD since transplant  Cr 3.03, unclear baseline  - avoid nephrotoxic agent  - stopping cyclosporine  - changed prednisone to dexamethasone  - consult transplant nephro  - I/O       Hematological  # h/o LLE DVT  resolved, no DVT on doppler 12/22.  on ASA at home, has been receiving heparin subQ  - hold for now given bleeding in oropharynx.   - SCDs      Infectious Disease  #Strep pneumoniae meningitis #R ear mastoiditis  treatment with IV CTX and vancomycin (dose by level)  - dexamethasone  - c/w ciprodex drops for R ear  - f/u ID recs   - repeat blood cx at 48 hr (3/17 AM)    #h/o PCP  - atovaquone at home, continue      Endocrinological  #Hypothyroidism  last TSH 12/2022 >10  TSH 3/15 4.73  - c/w levothyroxine     #Diabetes mellitus Type II  - ISS      Ethics  FULL, GOC ongoing. 77 y/o F with h/o HTN, HLD, DM2, PCKD previously on HD via LUE AVF then s/p renal transplant, LLE DVT (resolved, nml dopplers 12/2022) on ASA, very small supraclinoid aneurysm (reportedly unchanged on MRA 10/2020), hypothyroidism, PCP 2021 on atovaquone ppx, reportedly hospitalized in 12/2022 for rectal prolapse, had a blood transfusion at that time, also + CMV 1/25/23 who presented with R-sided HA, R ear pain and neck stiffness after visit to ENT earlier in the day, altered on presentation, CT head unremarkable for stroke, however LP with IR and BCx showing +strep pneumoniae. currently on vanc and CTX per ID. Cardiac arrest 3/16 with ROSC, transferred to MICU for further management.       Plan:   Neurological  #Currently Intubated, sedated   - continue propofol and fentanyl    #Strep pneumo meningitis #Acute encephalopathy  severe HA, neck stiffness, fever, leukocytosis concerning for meningitis/encephalitis  CT head negative for CVA.   LP and BCx 3/15 both showing gram positive cocci in pairs, and + strep pneumoniae.   likely source of entry from R ear where pt c/o discomfort for several months.  patient is on cyclosporine, mycophenolate mofetil and prednisone due to kidney transplant and is immunocompromised   dental procedure 3/14 but less likely source   ID consulted, recs for continuing meropenem. s/p vancomycin 3/15.   - c/w meropenem   - f/u BCx and LP sensitivities  - f/u MRI  - q4hr neuro checks  - f/u 24hr EEG      Cardiovascular  #Cardiac Arrest  bradycardia to 30, pulseless, code blue was called 3/16   2 rounds of epi, and PEA arrest. on 3rd pulse check pulseless VT, 200J shock given  4th pulse check asystole, 5th ROSC, sinus tachy with HUMA. IO placed and levo started. Due to blood in mouth took several attempts with DL for ETT to be placed but ultimately confirmed with capnometry and bilateral breath sounds. No sedation was used.        #Elevated troponin  uptrending troponin to 182 3/15 AM, was likely iso ESRD  s/p cardiac arrest, elevated ST segment   - trend troponin  - f/u TTE      #HTN  on amlodipine 5mg, losartan 100mg, torsemide 20mg, and clonidine 0.2 patch weekly  - hold for now given sepsis      Respiratory/ENT  #Intubated  continue sedation    # Tympanic membrane rupture  discharge from R ear, evaluated by ENT given dexamethasone and cipro 3/14  - c/w broad spectrum abx   - f/u ENT recs      Gastrointestinal  #hematemesis  patient with melissa bright red blood from mouth on arrival to MICU  could be related to CPR, no pericardial effusion on bedside US  - PPI  - keep NPO for now    #aspiration precaution  previously failed dysphagia screen and was pending SLP eval  blood in mouth on arrival to MICU and increased wob concerning for aspiration  NGT in place, in stomach on CXR 3/15  - f/u CXR  - keep NPO for now        Renal  #ESRD #s/p Kidney Transplant in 2003 at [place] #PCKD  previously HD through LLE AVF but no HD since transplant  Cr 3.03, unclear baseline  - avoid nephrotoxic agent  - stopping cyclosporine  - changed prednisone to dexamethasone  - consult transplant nephro  - I/O       Hematological  # h/o LLE DVT  resolved, no DVT on doppler 12/22.  on ASA at home, has been receiving heparin subQ  - hold for now given bleeding in oropharynx.   - SCDs      Infectious Disease  #Strep pneumoniae meningitis #R ear mastoiditis  treatment with IV CTX and vancomycin (dose by level)  - dexamethasone  - c/w ciprodex drops for R ear  - daily vanc trough and q24hr 1000mg IV vanc if trough <15  - f/u ID recs   - repeat blood cx at 48 hr (3/17 AM)    #h/o PCP  - atovaquone at home, continue      Endocrinological  #Hypothyroidism  last TSH 12/2022 >10  TSH 3/15 4.73  - c/w levothyroxine     #Diabetes mellitus Type II  - ISS      Ethics  FULL, GOC ongoing. 79 y/o F with h/o HTN, HLD, DM2, PCKD previously on HD via LUE AVF then s/p renal transplant, LLE DVT (resolved, nml dopplers 12/2022) on ASA, very small supraclinoid aneurysm (reportedly unchanged on MRA 10/2020), hypothyroidism, PCP 2021 on atovaquone ppx, reportedly hospitalized in 12/2022 for rectal prolapse, had a blood transfusion at that time, also + CMV 1/25/23 who presented with R-sided HA, R ear pain and neck stiffness after visit to ENT earlier in the day, altered on presentation, CT head unremarkable for stroke, however LP with IR and BCx showing +strep pneumoniae. currently on vanc and CTX per ID. Cardiac arrest 3/16 with ROSC, transferred to MICU for further management.       Plan:   Neurological  #Currently Intubated, sedated   - continue propofol and fentanyl    #Strep pneumo meningitis #Acute encephalopathy  severe HA, neck stiffness, fever, leukocytosis concerning for meningitis/encephalitis  CT head negative for CVA.   LP and BCx 3/15 both showing gram positive cocci in pairs, and + strep pneumoniae.   likely source of entry from R ear where pt c/o discomfort for several months.  patient is on cyclosporine, mycophenolate mofetil and prednisone due to kidney transplant and is immunocompromised   dental procedure 3/14 but less likely source   ID consulted, recs for continuing meropenem. s/p vancomycin 3/15.   - c/w meropenem   - f/u BCx and LP sensitivities  - f/u MRI  - q4hr neuro checks  - f/u 24hr EEG      Cardiovascular  #Cardiac Arrest  bradycardia to 30, pulseless, code blue was called 3/16   2 rounds of epi, and PEA arrest. on 3rd pulse check pulseless VT, 200J shock given  4th pulse check asystole, 5th ROSC, sinus tachy with HUMA. IO placed and levo started. Due to blood in mouth took several attempts with DL for ETT to be placed but ultimately confirmed with capnometry and bilateral breath sounds. No sedation was used.        #Elevated troponin  uptrending troponin to 182 3/15 AM, was likely iso ESRD  s/p cardiac arrest, elevated ST segment   - trend troponin  - f/u TTE      #HTN  on amlodipine 5mg, losartan 100mg, torsemide 20mg, and clonidine 0.2 patch weekly  - hold for now given sepsis      Respiratory/ENT  #Intubated  continue sedation    # Tympanic membrane rupture  discharge from R ear, evaluated by ENT given dexamethasone and cipro 3/14  - c/w broad spectrum abx   - f/u ENT recs      Gastrointestinal  #hematemesis  patient with melissa bright red blood from mouth on arrival to MICU  could be related to CPR, no pericardial effusion on bedside US  - PPI  - keep NPO for now    #aspiration precaution  previously failed dysphagia screen and was pending SLP eval  blood in mouth on arrival to MICU and increased wob concerning for aspiration  NGT in place, in stomach on CXR 3/15  - f/u CXR  - keep NPO for now        Renal  #ESRD #s/p Kidney Transplant in 2003 at [place] #PCKD  previously HD through LLE AVF but no HD since transplant  Cr 3.03, unclear baseline  - avoid nephrotoxic agent  - stopping cyclosporine  - changed prednisone to dexamethasone  - consult transplant nephro  - strict I/O and replete electrolytes       Hematological  # h/o LLE DVT  resolved, no DVT on doppler 12/22.  on ASA at home, has been receiving heparin subQ  - hold for now given bleeding in oropharynx.   - SCDs    #Anemia  - s/p 1 unit pRBC on 3/16 for Hb drop from 10 to 7.9  - Evaluated by ENT for bleeding and found no signs of active bleeding from nasal cavity, nasopharynx or oral cavity, however, patient biting on tongue, which could be one source of bleeding. Tongue was edematous and lax        Infectious Disease  #Strep pneumoniae meningitis #R ear mastoiditis  treatment with IV CTX and vancomycin (dose by level)  - dexamethasone  - c/w ciprodex drops for R ear  - daily vanc trough and q24hr 1000mg IV vanc if trough <15  - f/u ID recs   - repeat blood cx at 48 hr (3/17 AM)    #h/o PCP  - atovaquone at home, continue      Endocrinological  #Hypothyroidism  last TSH 12/2022 >10  TSH 3/15 4.73  - c/w levothyroxine     #Diabetes mellitus Type II  - ISS      Ethics  FULL, GOC ongoing. 77 y/o F with h/o HTN, HLD, DM2, PCKD previously on HD via LUE AVF then s/p renal transplant, LLE DVT (resolved, nml dopplers 12/2022) on ASA, very small supraclinoid aneurysm (reportedly unchanged on MRA 10/2020), hypothyroidism, PCP 2021 on atovaquone ppx, reportedly hospitalized in 12/2022 for rectal prolapse, had a blood transfusion at that time, also + CMV 1/25/23 who presented with R-sided HA, R ear pain and neck stiffness after visit to ENT earlier in the day, altered on presentation, CT head unremarkable for stroke, however LP with IR and BCx showing +strep pneumoniae. currently on vanc and CTX per ID. Cardiac arrest 3/16 with ROSC, transferred to MICU for further management.       Plan:   Neurological  #Currently Intubated, sedated   - continue propofol and fentanyl    #Strep pneumo meningitis #Acute encephalopathy  severe HA, neck stiffness, fever, leukocytosis concerning for meningitis/encephalitis  CT head negative for CVA.   LP and BCx 3/15 both showing gram positive cocci in pairs, and + strep pneumoniae.   likely source of entry from R ear where pt c/o discomfort for several months.  patient is on cyclosporine, mycophenolate mofetil and prednisone due to kidney transplant and is immunocompromised   dental procedure 3/14 but less likely source   ID consulted, recs for continuing meropenem. s/p vancomycin 3/15.   - c/w meropenem   - f/u BCx and LP sensitivities  - f/u MRI  - q4hr neuro checks  - f/u 24hr EEG      Cardiovascular  #Cardiac Arrest  bradycardia to 30, pulseless, code blue was called 3/16   2 rounds of epi, and PEA arrest. on 3rd pulse check pulseless VT, 200J shock given  4th pulse check asystole, 5th ROSC, sinus tachy with HUMA. IO placed and levo started. Due to blood in mouth took several attempts with DL for ETT to be placed but ultimately confirmed with capnometry and bilateral breath sounds. No sedation was used.        #Elevated troponin  uptrending troponin to 182 3/15 AM, was likely iso ESRD  s/p cardiac arrest, elevated ST segment   - trend troponin  - f/u TTE      #HTN  on amlodipine 5mg, losartan 100mg, torsemide 20mg, and clonidine 0.2 patch weekly  - hold for now given sepsis      Respiratory/ENT  #Intubated  continue sedation    # Tympanic membrane rupture  discharge from R ear, evaluated by ENT given dexamethasone and cipro 3/14  - c/w broad spectrum abx   - f/u ENT recs      Gastrointestinal  #hematemesis  patient with melissa bright red blood from mouth on arrival to MICU  could be related to CPR, no pericardial effusion on bedside US  - PPI  - keep NPO for now    #aspiration precaution  previously failed dysphagia screen and was pending SLP eval  blood in mouth on arrival to MICU and increased wob concerning for aspiration  NGT in place, in stomach on CXR 3/15  - f/u CXR  - keep NPO for now        Renal  #ESRD #s/p Kidney Transplant in 2003 at [place] #PCKD  previously HD through LLE AVF but no HD since transplant  Cr 3.03, unclear baseline  - avoid nephrotoxic agent  - holding cyclosporine and mycophenolate   - changed prednisone to dexamethasone  - appreciate nephro recs  - strict I/O and replete electrolytes       Hematological  # h/o LLE DVT  resolved, no DVT on doppler 12/22.  on ASA at home, has been receiving heparin subQ  - hold for now given bleeding in oropharynx.   - SCDs    #Anemia  - s/p 1 unit pRBC on 3/16 for Hb drop from 10 to 7.9  - Evaluated by ENT for bleeding and found no signs of active bleeding from nasal cavity, nasopharynx or oral cavity, however, patient biting on tongue, which could be one source of bleeding. Tongue was edematous and lax        Infectious Disease  #Strep pneumoniae meningitis #R ear mastoiditis  treatment with IV CTX and vancomycin (dose by level)  - dexamethasone  - c/w ciprodex drops for R ear  - daily vanc trough and q24hr 1000mg IV vanc if trough <15  - f/u ID recs   - repeat blood cx at 48 hr (3/17 AM)    #h/o PCP  - atovaquone at home, continue      Endocrinological  #Hypothyroidism  last TSH 12/2022 >10  TSH 3/15 4.73  - c/w levothyroxine     #Diabetes mellitus Type II  - ISS      Ethics  FULL, GOC ongoing.

## 2023-03-16 NOTE — RAPID RESPONSE TEAM SUMMARY - NSSITUATIONBACKGROUNDRRT_GEN_ALL_CORE
78-year-old female with PCKD previously on HD via LUE AVF now s/p renal transplant, LLE DVT (resolved, nml dopplers 12/2022). Patient admitted for AMS in the setting of ruptured TM concerning for possible infection/sepsis.     Patient found unresponsive and pulseless so Code Blue was called 78-year-old female with PCKD previously on HD via LUE AVF now s/p renal transplant, LLE DVT (resolved, nml dopplers 12/2022). Patient admitted for AMS in the setting of ruptured TM concerning for possible infection/sepsis.     On tele patient with padmini to 30s and patient found unresponsive and pulseless so CODE BLUE was called

## 2023-03-16 NOTE — CHART NOTE - NSCHARTNOTEFT_GEN_A_CORE
I examined the patient today @ 10:30 AM, pending results of acute panel.  Offered HD/CVVHDF to family, they have agreed in case the patient requires that intervention. Consent in chart.  Full note to follow once labs available. I examined the patient today @ 10:30 AM, pending results of acute panel.  Offered HD/CVVHDF to family, they have agreed in case the patient requires that intervention. Consent in chart.  Full note from Nephrology service to follow once labs available.

## 2023-03-16 NOTE — PROGRESS NOTE ADULT - SUBJECTIVE AND OBJECTIVE BOX
[facility] - DANNA Cosby / DANNA Rivers / LENCHO Ontiveros/ DANNA Aldana/ DANNA Hussein/ QUYEN Gambino / LUCIAN Mukherjee / GISELE Stapleton  ---------------------------------------------------------------------------------------------------------------  seen and examined today for KTx  Interval : S/P cardiac arrest this morning  VITALS:  T(F): 93.3 (03-16-23 @ 12:00), Max: 97.4 (03-16-23 @ 02:40)  HR: 65 (03-16-23 @ 14:00)  BP: 109/64 (03-16-23 @ 07:00)  RR: 16 (03-16-23 @ 14:00)  SpO2: 95% (03-16-23 @ 14:00)  Wt(kg): --    03-15 @ 07:01  -  03-16 @ 07:00  --------------------------------------------------------  IN: 23.5 mL / OUT: 1100 mL / NET: -1076.5 mL    03-16 @ 07:01  -  03-16 @ 14:13  --------------------------------------------------------  IN: 497.7 mL / OUT: 0 mL / NET: 497.7 mL      Physical Exam :-  Constitutional: sedated/intubated  Neck: Supple. crepitations on palpation  Respiratory: Bilateral equal breath sounds,  Cardiovascular: S1, S2 normal,  Gastrointestinal: Bowel Sounds present, mass +  Extremities: No edema  Neurological: sedated/intubated  Psychiatric: Normal mood, normal affect  Data:-  Allergies :   apple (Unknown)  Benadryl (Unknown)  penicillin (Hives)  watermelon (Unknown)    Hospital Medications:   MEDICATIONS  (STANDING):  albuterol    0.083%. 2.5 milliGRAM(s) Nebulizer once  aspirin  chewable 81 milliGRAM(s) Enteral Tube daily  atovaquone  Suspension 1500 milliGRAM(s) Oral daily  cefTRIAXone   IVPB 2000 milliGRAM(s) IV Intermittent every 12 hours  chlorhexidine 0.12% Liquid 15 milliLiter(s) Oral Mucosa two times a day  chlorhexidine 2% Cloths 1 Application(s) Topical <User Schedule>  ciprofloxacin  0.3% Ophthalmic Solution for Otic Use 4 Drop(s) Right Ear two times a day  dexAMETHasone  Injectable 6 milliGRAM(s) IV Push every 6 hours  dextrose 5%. 1000 milliLiter(s) (75 mL/Hr) IV Continuous <Continuous>  dextrose 5%. 1000 milliLiter(s) (100 mL/Hr) IV Continuous <Continuous>  dextrose 5%. 1000 milliLiter(s) (50 mL/Hr) IV Continuous <Continuous>  dextrose 50% Injectable 25 Gram(s) IV Push once  dextrose 50% Injectable 12.5 Gram(s) IV Push once  dextrose 50% Injectable 25 Gram(s) IV Push once  fentaNYL   Infusion. 0.5 MICROgram(s)/kG/Hr (1.89 mL/Hr) IV Continuous <Continuous>  glucagon  Injectable 1 milliGRAM(s) IntraMuscular once  insulin lispro (ADMELOG) corrective regimen sliding scale   SubCutaneous every 6 hours  levothyroxine Injectable 18 MICROGram(s) IV Push at bedtime  norepinephrine Infusion 0.05 MICROgram(s)/kG/Min (2.91 mL/Hr) IV Continuous <Continuous>  propofol Infusion 10 MICROgram(s)/kG/Min (2.27 mL/Hr) IV Continuous <Continuous>  sodium chloride 0.9%. 1000 milliLiter(s) (50 mL/Hr) IV Continuous <Continuous>    03-16    141  |  107  |  79<H>  ----------------------------<  109<H>  4.6   |  17<L>  |  3.10<H>    Ca    8.1<L>      16 Mar 2023 09:45  Phos  6.2     03-16  Mg     2.10     03-16    TPro  4.2<L>  /  Alb  2.1<L>  /  TBili  1.0  /  DBili      /  AST  145<H>  /  ALT  79<H>  /  AlkPhos  91  03-16    Creatinine Trend: 3.10 <--, 3.03 <--, 2.98 <--                        7.9    9.29  )-----------( 150      ( 16 Mar 2023 09:45 )             25.6

## 2023-03-16 NOTE — PROVIDER CONTACT NOTE (CRITICAL VALUE NOTIFICATION) - TEST AND RESULT REPORTED:
Bands 32.2%
positive CSF culture
WBC 34,389
positive blood cultures
CSF and strep pneumoniae detected

## 2023-03-16 NOTE — CHART NOTE - NSCHARTNOTEFT_GEN_A_CORE
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 Patient reportedly woke up at 0130 AM on Monday with a R-sided headache. ENT noted R otitis externa, purulent discharge from R TM thought to be secondary to perforation, given ear gtt and prescribed cipro/dexamethasone gtt (she took 1 dose thus far). Daughter called EMS as later in the day patient complained of severe HA as well as neck pain, stated "I'm dying" and "my head is killing me." Patient reportedly without prior history of ear infections, no history of stroke or seizures. She reported to ENT earlier in the day a muffled sensation in the R ear [id number].     In the ED VS:  97.2  65-99  162-186/52-91  16-20  96-99%RA, received NS 500cc IVF, lorazepam 2mg IV x1 and morphine 4mg IV x1. Neurology was unsuccessful in obtaining a lumbar puncture. MICU initially was consulted for AMS, but was protecting airway and HDS. Pt underwent IR guided LP. CSF studies concerning for bacterial meningitis. Blood Cx and CSF Cx with gram+ cocci in pairs. ID following and pt continued on meropenen and vanconycin.     PAST MEDICAL & SURGICAL HISTORY:  Polycystic kidney disease      Glaucoma      Aneurysm      History of deep venous thrombosis (DVT) of distal vein of left lower extremity      Thrombocytopenia      Hypothyroid      Osteoporosis      Arthritis      PCP (pneumocystis carinii pneumonia)      C. difficile colitis      Type 2 diabetes mellitus, without long-term current use of insulin      Essential hypertension      H/O kidney transplant      H/O:       H/O eye surgery      S/P hysterectomy      H/O umbilical hernia repair          FAMILY HISTORY:  FH: diabetes mellitus (Sibling)        SOCIAL HISTORY:  Smoking: [  ] Never Smoked  [  ] Former Smoker (# packs x # years)  [  ] Current Smoker (# packs x # years)  Substance Use:   EtOH Use:   Marital Status: [  ] Single  [  ]   [  ]   [  ]   Sexual History:   Occupation:  Recent Travel:  Country of Birth:   Advance Directives:     HOME MEDICATIONS:      Allergies    apple (Unknown)  Benadryl (Unknown)  penicillin (Hives)  watermelon (Unknown)    Intolerances          REVIEW OF SYSTEMS:  Constitutional: No fevers, chills, weight loss, weight gain  HEENT: No vision problems, eye pain, nasal congestion, rhinorrhea, sore throat, dysphagia  CV: No chest pain, orthopnea, palpitations  Resp: No cough, dyspnea, wheezing, hemoptysis  GI: No nausea, vomiting, diarrhea, constipation, abdominal pain  : [ ] dysuria [ ] nocturia [ ] hematuria [ ] increased urinary frequency  Musculoskeletal: [ ] back pain [ ] myalgias [ ] arthralgias [ ] fracture  Skin: [ ] rash [ ] itch  Neurological: [ ] headache [ ] dizziness [ ] syncope [ ] weakness [ ] numbness  Psychiatric: [ ] anxiety [ ] depression  Endocrine: [ ] diabetes [ ] thyroid problem  Hematologic/Lymphatic: [ ] anemia [ ] bleeding problem  Allergic/Immunologic: [ ] itchy eyes [ ] nasal discharge [ ] hives [ ] angioedema  [ ] All other systems negative  [ ] Unable to assess ROS because ________    OBJECTIVE:  ICU Vital Signs Last 24 Hrs  T(C): 36.3 (16 Mar 2023 02:40), Max: 38.1 (15 Mar 2023 06:19)  T(F): 97.4 (16 Mar 2023 02:40), Max: 100.5 (15 Mar 2023 06:19)  HR: 97 (16 Mar 2023 02:40) (74 - 99)  BP: 167/60 (16 Mar 2023 02:40) (167/60 - 193/91)  BP(mean): --  ABP: --  ABP(mean): --  RR: 18 (16 Mar 2023 02:40) (18 - 30)  SpO2: 98% (16 Mar 2023 02:40) (97% - 98%)    O2 Parameters below as of 16 Mar 2023 02:40  Patient On (Oxygen Delivery Method): room air              03-15 @ 07:01  -  -16 @ 05:25  --------------------------------------------------------  IN: 0 mL / OUT: 500 mL / NET: -500 mL      CAPILLARY BLOOD GLUCOSE  182 (14 Mar 2023 19:34)      POCT Blood Glucose.: 112 mg/dL (16 Mar 2023 04:40)      PHYSICAL EXAM:  General:   HEENT:   Neck:   Chest/Lungs:  Heart:  Abdomen:   Extremities:   Skin:   Neuro:   Psych:     LINES:     HOSPITAL MEDICATIONS:  MEDICATIONS  (STANDING):  albuterol    0.083%. 2.5 milliGRAM(s) Nebulizer once  aspirin  chewable 81 milliGRAM(s) Enteral Tube daily  atovaquone  Suspension 1500 milliGRAM(s) Oral daily  ciprofloxacin  0.3% Ophthalmic Solution for Otic Use 4 Drop(s) Right Ear two times a day  cloNIDine Patch 0.2 mG/24Hr(s) 1 patch Transdermal every 7 days  cycloSPORINE  , modified (GENGRAF) Solution 75 milliGRAM(s) Oral every 12 hours  dextrose 5%. 1000 milliLiter(s) (75 mL/Hr) IV Continuous <Continuous>  dextrose 5%. 1000 milliLiter(s) (100 mL/Hr) IV Continuous <Continuous>  dextrose 5%. 1000 milliLiter(s) (50 mL/Hr) IV Continuous <Continuous>  dextrose 50% Injectable 25 Gram(s) IV Push once  dextrose 50% Injectable 12.5 Gram(s) IV Push once  dextrose 50% Injectable 25 Gram(s) IV Push once  glucagon  Injectable 1 milliGRAM(s) IntraMuscular once  heparin   Injectable 5000 Unit(s) SubCutaneous every 8 hours  insulin lispro (ADMELOG) corrective regimen sliding scale   SubCutaneous every 6 hours  levothyroxine 25 MICROGram(s) Oral daily  meropenem  IVPB 1000 milliGRAM(s) IV Intermittent every 12 hours  predniSONE   Tablet 5 milliGRAM(s) Oral daily  sodium chloride 0.9%. 1000 milliLiter(s) (50 mL/Hr) IV Continuous <Continuous>    MEDICATIONS  (PRN):  acetaminophen   Oral Liquid .. 650 milliGRAM(s) Enteral Tube every 6 hours PRN Temp greater or equal to 38C (100.4F), Mild Pain (1 - 3), Moderate Pain (4 - 6)  dextrose Oral Gel 15 Gram(s) Oral once PRN Blood Glucose LESS THAN 70 milliGRAM(s)/deciliter      LABS:                        10.6   22.19 )-----------( 163      ( 15 Mar 2023 05:45 )             34.3     Hgb Trend: 10.6<--, 10.1<--  03-15    138  |  101  |  81<H>  ----------------------------<  159<H>  4.4   |  19<L>  |  3.03<H>    Ca    9.5      15 Mar 2023 05:45  Phos  4.5     15  Mg     2.40     15    TPro  6.5  /  Alb  3.3  /  TBili  0.6  /  DBili  x   /  AST  83<H>  /  ALT  64<H>  /  AlkPhos  78  15    Creatinine Trend: 3.03<--, 2.98<--  PT/INR - ( 14 Mar 2023 21:38 )   PT: 16.2 sec;   INR: 1.39 ratio         PTT - ( 14 Mar 2023 21:38 )  PTT:25.2 sec  Urinalysis Basic - ( 15 Mar 2023 03:43 )    Color: Yellow / Appearance: Clear / S.019 / pH: x  Gluc: x / Ketone: Negative  / Bili: Negative / Urobili: <2 mg/dL   Blood: x / Protein: 300 mg/dL / Nitrite: Negative   Leuk Esterase: Negative / RBC: 8 /HPF / WBC 2 /HPF   Sq Epi: x / Non Sq Epi: 1 /HPF / Bacteria: Negative        Venous Blood Gas:  03-15 @ 02:30  7.35/36/80/20/96.5  VBG Lactate: 2.2      MICROBIOLOGY:     RADIOLOGY & ADDITIONAL TESTS: MICU Accept Note    CHIEF COMPLAINT:     HPI / INTERVAL HISTORY:    78-year-old female with PCKD previously on HD via LUE AVF now s/p renal transplant, LLE DVT (resolved, nml dopplers 2022) on ASA, HTN, HLD, DM2, 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 Patient reportedly woke up at 0130 AM on Monday with a R-sided headache. ENT noted R otitis externa, purulent discharge from R TM thought to be secondary to perforation, given ear gtt and prescribed cipro/dexamethasone gtt (she took 1 dose thus far). Daughter called EMS as later in the day patient complained of severe HA as well as neck pain, stated "I'm dying" and "my head is killing me." Patient reportedly without prior history of ear infections, no history of stroke or seizures. She reported to ENT earlier in the day a muffled sensation in the R ear [id number].     In the ED VS:  97.2  65-99  162-186/52-91  16-20  96-99%RA, received NS 500cc IVF, lorazepam 2mg IV x1 and morphine 4mg IV x1. Neurology was unsuccessful in obtaining a lumbar puncture. MICU initially was consulted for AMS, but was protecting airway and HDS. Pt underwent IR guided LP. CSF studies concerning for bacterial meningitis. Blood Cx and CSF Cx with gram+ cocci in pairs, strep pneumoniae. ID following and pt given 1 dose of Vancomycin and continued on meropenem.    On 3/16 early morning, Code Blue called for cardiac arrest. ROSC achieved and patient was transferred to MICU on levophed and propofol. Noted to have large amount of blood from mouth. Patient found to be with mildly increased wob with abdominal muscle involvement. However, pt maintained saturations in 90s on 100% FiO2 on ventilator and hemodynamically stable. Bedside POCUS showing b lines and lung sliding. grossly normal LV systolic function but with hypertrophy of the ventricles concerning for diastolic dysfunction. Continued sedation with propofol and fentanyl given pain a/w meningitis and CPR.         PAST MEDICAL & SURGICAL HISTORY:  Polycystic kidney disease    Glaucoma    Aneurysm    History of deep venous thrombosis (DVT) of distal vein of left lower extremity    Thrombocytopenia    Hypothyroid    Osteoporosis    Arthritis    PCP (pneumocystis carinii pneumonia)    C. difficile colitis    Type 2 diabetes mellitus, without long-term current use of insulin    Essential hypertension    H/O kidney transplant    H/O:     H/O eye surgery    S/P hysterectomy    H/O umbilical hernia repair      FAMILY HISTORY:  FH: diabetes mellitus (Sibling)        SOCIAL HISTORY:  unable to obtain given sedation       HOME MEDICATIONS:  refer to H&P    Allergies    apple (Unknown)  Benadryl (Unknown)  penicillin (Hives)  watermelon (Unknown)    Intolerances          REVIEW OF SYSTEMS:    [x ] Unable to assess ROS because of sedation     OBJECTIVE:  ICU Vital Signs Last 24 Hrs  T(C): 36.3 (16 Mar 2023 02:40), Max: 38.1 (15 Mar 2023 06:19)  T(F): 97.4 (16 Mar 2023 02:40), Max: 100.5 (15 Mar 2023 06:19)  HR: 97 (16 Mar 2023 02:40) (74 - 99)  BP: 167/60 (16 Mar 2023 02:40) (167/60 - 193/91)  BP(mean): --  ABP: --  ABP(mean): --  RR: 18 (16 Mar 2023 02:40) (18 - 30)  SpO2: 98% (16 Mar 2023 02:40) (97% - 98%)    O2 Parameters below as of 16 Mar 2023 02:40  Patient On (Oxygen Delivery Method): room air              -15 @ 07:01  -  03-16 @ 05:25  --------------------------------------------------------  IN: 0 mL / OUT: 500 mL / NET: -500 mL      CAPILLARY BLOOD GLUCOSE  182 (14 Mar 2023 19:34)      POCT Blood Glucose.: 112 mg/dL (16 Mar 2023 04:40)      CONSTITUTIONAL: intubated/sedated.   EYES: No conjunctival or scleral injection   RESP: mildly increased wob, CTA b/l without wheezes, crackles or rhonchi  CV: RRR, +S1S2, no MRG; no JVD; 1+ peripheral edema on LLE, no edema on RLE   GI: +distended, soft, NT, ND, no rebound, no guarding; no palpable masses; no hepatosplenomegaly; no hernia palpated  MSK: examination of the (head/neck/spine/ribs/pelvis, RUE, LUE, RLE, LLE) without misalignment  SKIN: No rashes or ulcers noted   NEURO: normal tone. unable to assess given sedation         HOSPITAL MEDICATIONS:  MEDICATIONS  (STANDING):  albuterol    0.083%. 2.5 milliGRAM(s) Nebulizer once  aspirin  chewable 81 milliGRAM(s) Enteral Tube daily  atovaquone  Suspension 1500 milliGRAM(s) Oral daily  ciprofloxacin  0.3% Ophthalmic Solution for Otic Use 4 Drop(s) Right Ear two times a day  cloNIDine Patch 0.2 mG/24Hr(s) 1 patch Transdermal every 7 days  cycloSPORINE  , modified (GENGRAF) Solution 75 milliGRAM(s) Oral every 12 hours  dextrose 5%. 1000 milliLiter(s) (75 mL/Hr) IV Continuous <Continuous>  dextrose 5%. 1000 milliLiter(s) (100 mL/Hr) IV Continuous <Continuous>  dextrose 5%. 1000 milliLiter(s) (50 mL/Hr) IV Continuous <Continuous>  dextrose 50% Injectable 25 Gram(s) IV Push once  dextrose 50% Injectable 12.5 Gram(s) IV Push once  dextrose 50% Injectable 25 Gram(s) IV Push once  glucagon  Injectable 1 milliGRAM(s) IntraMuscular once  heparin   Injectable 5000 Unit(s) SubCutaneous every 8 hours  insulin lispro (ADMELOG) corrective regimen sliding scale   SubCutaneous every 6 hours  levothyroxine 25 MICROGram(s) Oral daily  meropenem  IVPB 1000 milliGRAM(s) IV Intermittent every 12 hours  predniSONE   Tablet 5 milliGRAM(s) Oral daily  sodium chloride 0.9%. 1000 milliLiter(s) (50 mL/Hr) IV Continuous <Continuous>    MEDICATIONS  (PRN):  acetaminophen   Oral Liquid .. 650 milliGRAM(s) Enteral Tube every 6 hours PRN Temp greater or equal to 38C (100.4F), Mild Pain (1 - 3), Moderate Pain (4 - 6)  dextrose Oral Gel 15 Gram(s) Oral once PRN Blood Glucose LESS THAN 70 milliGRAM(s)/deciliter      LABS:                        10.6   22.19 )-----------( 163      ( 15 Mar 2023 05:45 )             34.3     Hgb Trend: 10.6<--, 10.1<--  03-15    138  |  101  |  81<H>  ----------------------------<  159<H>  4.4   |  19<L>  |  3.03<H>    Ca    9.5      15 Mar 2023 05:45  Phos  4.5     15  Mg     2.40     03-15    TPro  6.5  /  Alb  3.3  /  TBili  0.6  /  DBili  x   /  AST  83<H>  /  ALT  64<H>  /  AlkPhos  78  15    Creatinine Trend: 3.03<--, 2.98<--  PT/INR - ( 14 Mar 2023 21:38 )   PT: 16.2 sec;   INR: 1.39 ratio         PTT - ( 14 Mar 2023 21:38 )  PTT:25.2 sec  Urinalysis Basic - ( 15 Mar 2023 03:43 )    Color: Yellow / Appearance: Clear / S.019 / pH: x  Gluc: x / Ketone: Negative  / Bili: Negative / Urobili: <2 mg/dL   Blood: x / Protein: 300 mg/dL / Nitrite: Negative   Leuk Esterase: Negative / RBC: 8 /HPF / WBC 2 /HPF   Sq Epi: x / Non Sq Epi: 1 /HPF / Bacteria: Negative        Venous Blood Gas:  03-15 @ 02:30  7.35/36/80/20/96.5  VBG Lactate: 2.2      MICROBIOLOGY:     Culture - CSF with Gram Stain (collected 15 Mar 2023 15:00)  Source: .CSF CSF  Gram Stain (15 Mar 2023 18:44):    Few polymorphonuclear leukocytes per low power field    Moderate Gram positive cocci in pairs per oil power field    Culture - Acid Fast - CSF (collected 15 Mar 2023 15:00)  Source: .CSF CSF    Culture - Blood (collected 15 Mar 2023 03:00)  Source: .Blood Blood-Venous  Gram Stain (15 Mar 2023 18:11):    Growth in aerobic and anaerobic bottles: Gram positive cocci in pairs  Preliminary Report (15 Mar 2023 18:11):    Growth in aerobic and anaerobic bottles: Gram positive cocci in pairs    Culture - Blood (collected 15 Mar 2023 02:30)  Source: .Blood Blood-Peripheral  Gram Stain (15 Mar 2023 19:26):    Growth in aerobic bottle: Gram positive cocci in pairs    Growth in anaerobic bottle: Gram positive cocci in pairs  Preliminary Report (15 Mar 2023 19:27):    Growth in aerobic bottle: Gram positive cocci in pairs    ***Blood Panel PCR results on this specimen are available    approximately 3 hours after the Gram stain result.***    Gram stain, PCR, and/or culture results may not always    correspond due to difference in methodologies.    ************************************************************    This PCR assay was performed by multiplex PCR. This    Assay tests for 66 bacterial and resistance gene targets.    Please refer to the [facility] test directory    at [email or web address] for details.    Growth in anaerobic bottle: Gram positive cocci in pairs  Organism: Blood Culture PCR (15 Mar 2023 20:17)  Organism: Blood Culture PCR (15 Mar 2023 20:17)        RADIOLOGY & ADDITIONAL TESTS:  3/15 US kidney: Mild fullness of the left lower quadrant transplant kidney collecting system. Increased renal cortical echogenicity, possibly renal parenchymal disease.    3/15 CXR: Frontal expiratory view of the chest shows the heart to be normal in size. The lungs show right perihilar infiltrate and there is no evidence of pneumothorax nor pleural effusion.    3/14   CT PERFUSION:  Limited by late injection of the contrast bolus. Cerebral blood flow less than 30% = 0 mL. No core infarct is predicted.  Tmax greater than 6 seconds = 0 mL. No brain parenchyma predicted to be at continued ischemic risk in the presence of neurologic symptoms.    CTA BRAIN:  No flow-limiting stenosis or vascular aneurysm. No AVM.    CTA NECK:  Calcified plaque at the origin of the left internal carotid artery results in approximately 40% short segment stenosis.  Otherwise no flow-limiting stenosis. No evidence for arterial dissection.        ======================================================================  Assessment: 79 y/o F with h/o HTN, HLD, DM2, PCKD previously on HD via LUE AVF then s/p renal transplant, LLE DVT (resolved, nml dopplers 2022) on ASA, very small supraclinoid aneurysm (reportedly unchanged on MRA 10/2020), hypothyroidism, PCP  on atovaquone ppx, reportedly hospitalized in 2022 for rectal prolapse, had a blood transfusion at that time, also + CMV 23 who presented with R-sided HA, R ear pain and neck stiffness after visit to ENT earlier in the day, altered on presentation, CT head unremarkable for stroke, however LP with IR and BCx showing +strep pneumoniae. given Vancomycin and continued on meropenem per ID. Cardiac arrest 3/16 with ROSC, transferred to MICU for further management.     Neurological  #Currently Intubated, sedated   - continue propofol and fentanyl    #Strep pneumo meningitis #Acute encephalopathy  severe HA, neck stiffness, fever, leukocytosis concerning for meningitis/encephalitis  CT head negative for CVA.   LP and BCx 3/15 both showing gram positive cocci in pairs, and + strep pneumoniae.   likely source of entry from R ear where pt c/o discomfort for several months.  patient is on cyclosporine, mycophenolate mofetil and prednisone due to kidney transplant and is immunocompromised   dental procedure 3/14 but less likely source   ID consulted, recs for continuing meropenem. s/p vancomycin 3/15.     - c/w meropenem   - f/u BCx and LP sensitivities  - f/u MRI  - q4hr neuro checks        -   ENT eval pending for re-eval of R ear  neuro checks/VS Q4H  failed dysphagia screen, S&S eval ordered, aspiration precautions  PT/OT consult, fall precautions when mental status better.      Cardiovascular  #Cardiac Arrest      Respiratory  #Intubated      Gastrointestinal      Hematological      Infectious Disease  #Strep pneumoniae meningitis  treatment as above with IV meropenem.     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 Patient reportedly woke up at 0130 AM on Monday with a R-sided headache. ENT noted R otitis externa, purulent discharge from R TM thought to be secondary to perforation, given ear gtt and prescribed cipro/dexamethasone gtt (she took 1 dose thus far). Daughter called EMS as later in the day patient complained of severe HA as well as neck pain, stated "I'm dying" and "my head is killing me." Patient reportedly without prior history of ear infections, no history of stroke or seizures. She reported to ENT earlier in the day a muffled sensation in the R ear [id number].     In the ED VS:  97.2  65-99  162-186/52-91  16-20  96-99%RA, received NS 500cc IVF, lorazepam 2mg IV x1 and morphine 4mg IV x1. Neurology was unsuccessful in obtaining a lumbar puncture. MICU initially was consulted for AMS, but was protecting airway and HDS. Pt underwent IR guided LP. CSF studies concerning for bacterial meningitis. Blood Cx and CSF Cx with gram+ cocci in pairs, strep pneumoniae. ID following and pt given 1 dose of Vancomycin and continued on meropenem.    On 3/16 early morning, Code Blue called for cardiac arrest. ROSC achieved and patient was transferred to MICU on levophed and propofol. Noted to have large amount of blood from mouth. Patient found to be with mildly increased wob with abdominal muscle involvement. However, pt maintained saturations in 90s on 100% FiO2 on ventilator and hemodynamically stable. Bedside POCUS showing b lines and lung sliding. grossly normal LV systolic function but with hypertrophy of the ventricles concerning for diastolic dysfunction. Continued sedation with propofol and fentanyl given pain a/w meningitis and CPR.         PAST MEDICAL & SURGICAL HISTORY:  Polycystic kidney disease    Glaucoma    Aneurysm    History of deep venous thrombosis (DVT) of distal vein of left lower extremity    Thrombocytopenia    Hypothyroid    Osteoporosis    Arthritis    PCP (pneumocystis carinii pneumonia)    C. difficile colitis    Type 2 diabetes mellitus, without long-term current use of insulin    Essential hypertension    H/O kidney transplant    H/O:     H/O eye surgery    S/P hysterectomy    H/O umbilical hernia repair      FAMILY HISTORY:  FH: diabetes mellitus (Sibling)        SOCIAL HISTORY:  unable to obtain given sedation       HOME MEDICATIONS:  refer to H&P    Allergies    apple (Unknown)  Benadryl (Unknown)  penicillin (Hives)  watermelon (Unknown)    Intolerances          REVIEW OF SYSTEMS:    [x ] Unable to assess ROS because of sedation     OBJECTIVE:  ICU Vital Signs Last 24 Hrs  T(C): 36.3 (16 Mar 2023 02:40), Max: 38.1 (15 Mar 2023 06:19)  T(F): 97.4 (16 Mar 2023 02:40), Max: 100.5 (15 Mar 2023 06:19)  HR: 97 (16 Mar 2023 02:40) (74 - 99)  BP: 167/60 (16 Mar 2023 02:40) (167/60 - 193/91)  BP(mean): --  ABP: --  ABP(mean): --  RR: 18 (16 Mar 2023 02:40) (18 - 30)  SpO2: 98% (16 Mar 2023 02:40) (97% - 98%)    O2 Parameters below as of 16 Mar 2023 02:40  Patient On (Oxygen Delivery Method): room air              -15 @ 07:01  -  03-16 @ 05:25  --------------------------------------------------------  IN: 0 mL / OUT: 500 mL / NET: -500 mL      CAPILLARY BLOOD GLUCOSE  182 (14 Mar 2023 19:34)      POCT Blood Glucose.: 112 mg/dL (16 Mar 2023 04:40)      CONSTITUTIONAL: intubated/sedated.   EYES: No conjunctival or scleral injection   RESP: mildly increased wob, CTA b/l without wheezes, crackles or rhonchi  CV: RRR, +S1S2, no MRG; no JVD; 1+ peripheral edema on LLE, no edema on RLE   GI: +distended, soft, NT, ND, no rebound, no guarding; no palpable masses; no hepatosplenomegaly; no hernia palpated  MSK: examination of the (head/neck/spine/ribs/pelvis, RUE, LUE, RLE, LLE) without misalignment  SKIN: No rashes or ulcers noted   NEURO: normal tone. unable to assess given sedation         HOSPITAL MEDICATIONS:  MEDICATIONS  (STANDING):  albuterol    0.083%. 2.5 milliGRAM(s) Nebulizer once  aspirin  chewable 81 milliGRAM(s) Enteral Tube daily  atovaquone  Suspension 1500 milliGRAM(s) Oral daily  ciprofloxacin  0.3% Ophthalmic Solution for Otic Use 4 Drop(s) Right Ear two times a day  cloNIDine Patch 0.2 mG/24Hr(s) 1 patch Transdermal every 7 days  cycloSPORINE  , modified (GENGRAF) Solution 75 milliGRAM(s) Oral every 12 hours  dextrose 5%. 1000 milliLiter(s) (75 mL/Hr) IV Continuous <Continuous>  dextrose 5%. 1000 milliLiter(s) (100 mL/Hr) IV Continuous <Continuous>  dextrose 5%. 1000 milliLiter(s) (50 mL/Hr) IV Continuous <Continuous>  dextrose 50% Injectable 25 Gram(s) IV Push once  dextrose 50% Injectable 12.5 Gram(s) IV Push once  dextrose 50% Injectable 25 Gram(s) IV Push once  glucagon  Injectable 1 milliGRAM(s) IntraMuscular once  heparin   Injectable 5000 Unit(s) SubCutaneous every 8 hours  insulin lispro (ADMELOG) corrective regimen sliding scale   SubCutaneous every 6 hours  levothyroxine 25 MICROGram(s) Oral daily  meropenem  IVPB 1000 milliGRAM(s) IV Intermittent every 12 hours  predniSONE   Tablet 5 milliGRAM(s) Oral daily  sodium chloride 0.9%. 1000 milliLiter(s) (50 mL/Hr) IV Continuous <Continuous>    MEDICATIONS  (PRN):  acetaminophen   Oral Liquid .. 650 milliGRAM(s) Enteral Tube every 6 hours PRN Temp greater or equal to 38C (100.4F), Mild Pain (1 - 3), Moderate Pain (4 - 6)  dextrose Oral Gel 15 Gram(s) Oral once PRN Blood Glucose LESS THAN 70 milliGRAM(s)/deciliter      LABS:                        10.6   22.19 )-----------( 163      ( 15 Mar 2023 05:45 )             34.3     Hgb Trend: 10.6<--, 10.1<--  03-15    138  |  101  |  81<H>  ----------------------------<  159<H>  4.4   |  19<L>  |  3.03<H>    Ca    9.5      15 Mar 2023 05:45  Phos  4.5     15  Mg     2.40     03-15    TPro  6.5  /  Alb  3.3  /  TBili  0.6  /  DBili  x   /  AST  83<H>  /  ALT  64<H>  /  AlkPhos  78  15    Creatinine Trend: 3.03<--, 2.98<--  PT/INR - ( 14 Mar 2023 21:38 )   PT: 16.2 sec;   INR: 1.39 ratio         PTT - ( 14 Mar 2023 21:38 )  PTT:25.2 sec  Urinalysis Basic - ( 15 Mar 2023 03:43 )    Color: Yellow / Appearance: Clear / S.019 / pH: x  Gluc: x / Ketone: Negative  / Bili: Negative / Urobili: <2 mg/dL   Blood: x / Protein: 300 mg/dL / Nitrite: Negative   Leuk Esterase: Negative / RBC: 8 /HPF / WBC 2 /HPF   Sq Epi: x / Non Sq Epi: 1 /HPF / Bacteria: Negative        Venous Blood Gas:  03-15 @ 02:30  7.35/36/80/20/96.5  VBG Lactate: 2.2      MICROBIOLOGY:     Culture - CSF with Gram Stain (collected 15 Mar 2023 15:00)  Source: .CSF CSF  Gram Stain (15 Mar 2023 18:44):    Few polymorphonuclear leukocytes per low power field    Moderate Gram positive cocci in pairs per oil power field    Culture - Acid Fast - CSF (collected 15 Mar 2023 15:00)  Source: .CSF CSF    Culture - Blood (collected 15 Mar 2023 03:00)  Source: .Blood Blood-Venous  Gram Stain (15 Mar 2023 18:11):    Growth in aerobic and anaerobic bottles: Gram positive cocci in pairs  Preliminary Report (15 Mar 2023 18:11):    Growth in aerobic and anaerobic bottles: Gram positive cocci in pairs    Culture - Blood (collected 15 Mar 2023 02:30)  Source: .Blood Blood-Peripheral  Gram Stain (15 Mar 2023 19:26):    Growth in aerobic bottle: Gram positive cocci in pairs    Growth in anaerobic bottle: Gram positive cocci in pairs  Preliminary Report (15 Mar 2023 19:27):    Growth in aerobic bottle: Gram positive cocci in pairs    ***Blood Panel PCR results on this specimen are available    approximately 3 hours after the Gram stain result.***    Gram stain, PCR, and/or culture results may not always    correspond due to difference in methodologies.    ************************************************************    This PCR assay was performed by multiplex PCR. This    Assay tests for 66 bacterial and resistance gene targets.    Please refer to the [facility] test directory    at [email or web address] for details.    Growth in anaerobic bottle: Gram positive cocci in pairs  Organism: Blood Culture PCR (15 Mar 2023 20:17)  Organism: Blood Culture PCR (15 Mar 2023 20:17)        RADIOLOGY & ADDITIONAL TESTS:  3/15 US kidney: Mild fullness of the left lower quadrant transplant kidney collecting system. Increased renal cortical echogenicity, possibly renal parenchymal disease.    3/15 CXR: Frontal expiratory view of the chest shows the heart to be normal in size. The lungs show right perihilar infiltrate and there is no evidence of pneumothorax nor pleural effusion.    3/14   CT PERFUSION:  Limited by late injection of the contrast bolus. Cerebral blood flow less than 30% = 0 mL. No core infarct is predicted.  Tmax greater than 6 seconds = 0 mL. No brain parenchyma predicted to be at continued ischemic risk in the presence of neurologic symptoms.    CTA BRAIN:  No flow-limiting stenosis or vascular aneurysm. No AVM.    CTA NECK:  Calcified plaque at the origin of the left internal carotid artery results in approximately 40% short segment stenosis.  Otherwise no flow-limiting stenosis. No evidence for arterial dissection.        ======================================================================  Assessment: 79 y/o F with h/o HTN, HLD, DM2, PCKD previously on HD via LUE AVF then s/p renal transplant, LLE DVT (resolved, nml dopplers 2022) on ASA, very small supraclinoid aneurysm (reportedly unchanged on MRA 10/2020), hypothyroidism, PCP  on atovaquone ppx, reportedly hospitalized in 2022 for rectal prolapse, had a blood transfusion at that time, also + CMV 23 who presented with R-sided HA, R ear pain and neck stiffness after visit to ENT earlier in the day, altered on presentation, CT head unremarkable for stroke, however LP with IR and BCx showing +strep pneumoniae. given Vancomycin and continued on meropenem per ID. Cardiac arrest 3/16 with ROSC, transferred to MICU for further management.     Neurological  #Currently Intubated, sedated   - continue propofol and fentanyl    #Strep pneumo meningitis #Acute encephalopathy  severe HA, neck stiffness, fever, leukocytosis concerning for meningitis/encephalitis  CT head negative for CVA.   LP and BCx 3/15 both showing gram positive cocci in pairs, and + strep pneumoniae.   likely source of entry from R ear where pt c/o discomfort for several months.  patient is on cyclosporine, mycophenolate mofetil and prednisone due to kidney transplant and is immunocompromised   dental procedure 3/14 but less likely source   ID consulted, recs for continuing meropenem. s/p vancomycin 3/15.     - c/w meropenem   - f/u BCx and LP sensitivities  - f/u MRI  - q4hr neuro checks      ENT eval pending for re-eval of R ear  failed dysphagia screen, S&S eval ordered, aspiration precautions  PT/OT consult, fall precautions when mental status better.      Cardiovascular  #Cardiac Arrest      Respiratory  #Intubated      Gastrointestinal  #hematemesis  patient with melissa bright red blood from mouth on arrival to MICU  could be related to CPR, no pericardial effusion on bedside US  - PPI  - keep NPO for now    #aspiration precaution  previously failed dysphagia screen and was pending SLP eval  blood in mouth on arrival to MICU and increased wob concerning for aspiration  NGT in place, in stomach on CXR 3/15  - f/u CXR  - keep NPO for now      Renal  #ESRD #s/p Kidney Transplant   previously HD through LLE AVF but no HD since transplant  Cr 3.03, unclear baseline  - avoid nephrotoxic agent  - I/O       Hematological  # h/o LLE DVT  resolved, no DVT on doppler .      Infectious Disease  #Strep pneumoniae meningitis  treatment as above with IV meropenem.     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 Patient reportedly woke up at 0130 AM on Monday with a R-sided headache. ENT noted R otitis externa, purulent discharge from R TM thought to be secondary to perforation, given ear gtt and prescribed cipro/dexamethasone gtt (she took 1 dose thus far). Daughter called EMS as later in the day patient complained of severe HA as well as neck pain, stated "I'm dying" and "my head is killing me." Patient reportedly without prior history of ear infections, no history of stroke or seizures. She reported to ENT earlier in the day a muffled sensation in the R ear [id number].     In the ED VS:  97.2  65-99  162-186/52-91  16-20  96-99%RA, received NS 500cc IVF, lorazepam 2mg IV x1 and morphine 4mg IV x1. Neurology was unsuccessful in obtaining a lumbar puncture. MICU initially was consulted for AMS, but was protecting airway and HDS. Pt underwent IR guided LP. CSF studies concerning for bacterial meningitis. Blood Cx and CSF Cx with gram+ cocci in pairs, strep pneumoniae. ID following and pt given 1 dose of Vancomycin and continued on meropenem.    On 3/16 early morning, Code Blue called for cardiac arrest. ROSC achieved and patient was transferred to MICU on levophed and propofol. Noted to have large amount of blood from mouth. Patient found to be with mildly increased wob with abdominal muscle involvement. However, pt maintained saturations in 90s on 100% FiO2 on ventilator and hemodynamically stable. Bedside POCUS showing b lines and lung sliding. grossly normal LV systolic function but with hypertrophy of the ventricles concerning for diastolic dysfunction. Continued sedation with propofol and fentanyl given pain a/w meningitis and CPR.         PAST MEDICAL & SURGICAL HISTORY:  Polycystic kidney disease    Glaucoma    Aneurysm    History of deep venous thrombosis (DVT) of distal vein of left lower extremity    Thrombocytopenia    Hypothyroid    Osteoporosis    Arthritis    PCP (pneumocystis carinii pneumonia)    C. difficile colitis    Type 2 diabetes mellitus, without long-term current use of insulin    Essential hypertension    H/O kidney transplant    H/O:     H/O eye surgery    S/P hysterectomy    H/O umbilical hernia repair      FAMILY HISTORY:  FH: diabetes mellitus (Sibling)        SOCIAL HISTORY:  unable to obtain given sedation       HOME MEDICATIONS:  refer to H&P    Allergies    apple (Unknown)  Benadryl (Unknown)  penicillin (Hives)  watermelon (Unknown)    Intolerances          REVIEW OF SYSTEMS:    [x ] Unable to assess ROS because of sedation     OBJECTIVE:  ICU Vital Signs Last 24 Hrs  T(C): 36.3 (16 Mar 2023 02:40), Max: 38.1 (15 Mar 2023 06:19)  T(F): 97.4 (16 Mar 2023 02:40), Max: 100.5 (15 Mar 2023 06:19)  HR: 97 (16 Mar 2023 02:40) (74 - 99)  BP: 167/60 (16 Mar 2023 02:40) (167/60 - 193/91)  BP(mean): --  ABP: --  ABP(mean): --  RR: 18 (16 Mar 2023 02:40) (18 - 30)  SpO2: 98% (16 Mar 2023 02:40) (97% - 98%)    O2 Parameters below as of 16 Mar 2023 02:40  Patient On (Oxygen Delivery Method): room air        -15 @ 07:01  -  03-16 @ 05:25  --------------------------------------------------------  IN: 0 mL / OUT: 500 mL / NET: -500 mL      CAPILLARY BLOOD GLUCOSE  182 (14 Mar 2023 19:34)      POCT Blood Glucose.: 112 mg/dL (16 Mar 2023 04:40)      CONSTITUTIONAL: intubated/sedated.   EYES: No conjunctival or scleral injection   RESP: mildly increased wob, CTA b/l without wheezes, crackles or rhonchi  CV: RRR, +S1S2, no MRG; no JVD; 1+ peripheral edema on LLE, no edema on RLE   GI: +distended, soft, NT, ND, no rebound, no guarding; no palpable masses; no hepatosplenomegaly; no hernia palpated  MSK: examination of the (head/neck/spine/ribs/pelvis, RUE, LUE, RLE, LLE) without misalignment  SKIN: No rashes or ulcers noted   NEURO: normal tone. unable to assess given sedation         HOSPITAL MEDICATIONS:  MEDICATIONS  (STANDING):  albuterol    0.083%. 2.5 milliGRAM(s) Nebulizer once  aspirin  chewable 81 milliGRAM(s) Enteral Tube daily  atovaquone  Suspension 1500 milliGRAM(s) Oral daily  ciprofloxacin  0.3% Ophthalmic Solution for Otic Use 4 Drop(s) Right Ear two times a day  cloNIDine Patch 0.2 mG/24Hr(s) 1 patch Transdermal every 7 days  cycloSPORINE  , modified (GENGRAF) Solution 75 milliGRAM(s) Oral every 12 hours  dextrose 5%. 1000 milliLiter(s) (75 mL/Hr) IV Continuous <Continuous>  dextrose 5%. 1000 milliLiter(s) (100 mL/Hr) IV Continuous <Continuous>  dextrose 5%. 1000 milliLiter(s) (50 mL/Hr) IV Continuous <Continuous>  dextrose 50% Injectable 25 Gram(s) IV Push once  dextrose 50% Injectable 12.5 Gram(s) IV Push once  dextrose 50% Injectable 25 Gram(s) IV Push once  glucagon  Injectable 1 milliGRAM(s) IntraMuscular once  heparin   Injectable 5000 Unit(s) SubCutaneous every 8 hours  insulin lispro (ADMELOG) corrective regimen sliding scale   SubCutaneous every 6 hours  levothyroxine 25 MICROGram(s) Oral daily  meropenem  IVPB 1000 milliGRAM(s) IV Intermittent every 12 hours  predniSONE   Tablet 5 milliGRAM(s) Oral daily  sodium chloride 0.9%. 1000 milliLiter(s) (50 mL/Hr) IV Continuous <Continuous>    MEDICATIONS  (PRN):  acetaminophen   Oral Liquid .. 650 milliGRAM(s) Enteral Tube every 6 hours PRN Temp greater or equal to 38C (100.4F), Mild Pain (1 - 3), Moderate Pain (4 - 6)  dextrose Oral Gel 15 Gram(s) Oral once PRN Blood Glucose LESS THAN 70 milliGRAM(s)/deciliter      LABS:                        10.6   22.19 )-----------( 163      ( 15 Mar 2023 05:45 )             34.3     Hgb Trend: 10.6<--, 10.1<--  03-15    138  |  101  |  81<H>  ----------------------------<  159<H>  4.4   |  19<L>  |  3.03<H>    Ca    9.5      15 Mar 2023 05:45  Phos  4.5     15  Mg     2.40     03-15    TPro  6.5  /  Alb  3.3  /  TBili  0.6  /  DBili  x   /  AST  83<H>  /  ALT  64<H>  /  AlkPhos  78  15    Creatinine Trend: 3.03<--, 2.98<--  PT/INR - ( 14 Mar 2023 21:38 )   PT: 16.2 sec;   INR: 1.39 ratio         PTT - ( 14 Mar 2023 21:38 )  PTT:25.2 sec  Urinalysis Basic - ( 15 Mar 2023 03:43 )    Color: Yellow / Appearance: Clear / S.019 / pH: x  Gluc: x / Ketone: Negative  / Bili: Negative / Urobili: <2 mg/dL   Blood: x / Protein: 300 mg/dL / Nitrite: Negative   Leuk Esterase: Negative / RBC: 8 /HPF / WBC 2 /HPF   Sq Epi: x / Non Sq Epi: 1 /HPF / Bacteria: Negative        Venous Blood Gas:  03-15 @ 02:30  7.35/36/80/20/96.5  VBG Lactate: 2.2      MICROBIOLOGY:     Culture - CSF with Gram Stain (collected 15 Mar 2023 15:00)  Source: .CSF CSF  Gram Stain (15 Mar 2023 18:44):    Few polymorphonuclear leukocytes per low power field    Moderate Gram positive cocci in pairs per oil power field    Culture - Acid Fast - CSF (collected 15 Mar 2023 15:00)  Source: .CSF CSF    Culture - Blood (collected 15 Mar 2023 03:00)  Source: .Blood Blood-Venous  Gram Stain (15 Mar 2023 18:11):    Growth in aerobic and anaerobic bottles: Gram positive cocci in pairs  Preliminary Report (15 Mar 2023 18:11):    Growth in aerobic and anaerobic bottles: Gram positive cocci in pairs    Culture - Blood (collected 15 Mar 2023 02:30)  Source: .Blood Blood-Peripheral  Gram Stain (15 Mar 2023 19:26):    Growth in aerobic bottle: Gram positive cocci in pairs    Growth in anaerobic bottle: Gram positive cocci in pairs  Preliminary Report (15 Mar 2023 19:27):    Growth in aerobic bottle: Gram positive cocci in pairs    ***Blood Panel PCR results on this specimen are available    approximately 3 hours after the Gram stain result.***    Gram stain, PCR, and/or culture results may not always    correspond due to difference in methodologies.    ************************************************************    This PCR assay was performed by multiplex PCR. This    Assay tests for 66 bacterial and resistance gene targets.    Please refer to the [facility] test directory    at [email or web address] for details.    Growth in anaerobic bottle: Gram positive cocci in pairs  Organism: Blood Culture PCR (15 Mar 2023 20:17)  Organism: Blood Culture PCR (15 Mar 2023 20:17)        RADIOLOGY & ADDITIONAL TESTS:  3/15 US kidney: Mild fullness of the left lower quadrant transplant kidney collecting system. Increased renal cortical echogenicity, possibly renal parenchymal disease.    3/15 CXR: Frontal expiratory view of the chest shows the heart to be normal in size. The lungs show right perihilar infiltrate and there is no evidence of pneumothorax nor pleural effusion.    3/14   CT PERFUSION:  Limited by late injection of the contrast bolus. Cerebral blood flow less than 30% = 0 mL. No core infarct is predicted.  Tmax greater than 6 seconds = 0 mL. No brain parenchyma predicted to be at continued ischemic risk in the presence of neurologic symptoms.    CTA BRAIN:  No flow-limiting stenosis or vascular aneurysm. No AVM.    CTA NECK:  Calcified plaque at the origin of the left internal carotid artery results in approximately 40% short segment stenosis.  Otherwise no flow-limiting stenosis. No evidence for arterial dissection.        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 given Vancomycin and continued on meropenem per ID. Cardiac arrest 3/16 with ROSC, transferred to MICU for further management.       Plan:   Neurological  #Currently Intubated, sedated   - continue propofol and fentanyl    #Strep pneumo meningitis #Acute encephalopathy  severe HA, neck stiffness, fever, leukocytosis concerning for meningitis/encephalitis  CT head negative for CVA.   LP and BCx 3/15 both showing gram positive cocci in pairs, and + strep pneumoniae.   likely source of entry from R ear where pt c/o discomfort for several months.  patient is on cyclosporine, mycophenolate mofetil and prednisone due to kidney transplant and is immunocompromised   dental procedure 3/14 but less likely source   ID consulted, recs for continuing meropenem. s/p vancomycin 3/15.   - c/w meropenem   - f/u BCx and LP sensitivities  - f/u MRI  - q4hr neuro checks      Cardiovascular  #Cardiac Arrest  bradycardia to 30, pulseless, code blue was called 3/16   2 rounds of epi, and PEA arrest. on 3rd pulse check pulseless VT, 200J shock given  4th pulse check asystole, 5th ROSC, sinus tachy with HUMA. IO placed and levo started. Due to blood in mouth took several attempts with DL for ETT to be placed but ultimately confirmed with capnometry and bilateral breath sounds. No sedation was used.        Respiratory/ENT  #Intubated  continue sedation    # Tympanic membrane rupture  discharge from R ear, evaluated by ENT given dexamethasone and cipro 3/14  - c/w broad spectrum abx     Gastrointestinal  #hematemesis  patient with melissa bright red blood from mouth on arrival to MICU  could be related to CPR, no pericardial effusion on bedside US  - PPI  - keep NPO for now    #aspiration precaution  previously failed dysphagia screen and was pending SLP eval  blood in mouth on arrival to MICU and increased wob concerning for aspiration  NGT in place, in stomach on CXR 3/15  - f/u CXR  - keep NPO for now      Renal  #ESRD #s/p Kidney Transplant   previously HD through LLE AVF but no HD since transplant  Cr 3.03, unclear baseline  - avoid nephrotoxic agent  - I/O       Hematological  # h/o LLE DVT  resolved, no DVT on doppler .  on ASA at home, has been receiving heparin subQ  - hold for now given bleeding in oropharynx.       Infectious Disease  #Strep pneumoniae meningitis #R ear mastoiditis  treatment as above with IV meropenem.   - f/u ID recs       Endocrinological  #Hypothyroidism  last TSH 2022 >10  TSH 3/15 4.73  - c/w home levothyroxine     Ethics  FULL, GOC ongoing MICU Accept Note    CHIEF COMPLAINT:     HPI / INTERVAL HISTORY:    78-year-old female with PCKD previously on HD via LUE AVF now s/p renal transplant, LLE DVT (resolved, nml dopplers 2022) on ASA, HTN, HLD, DM2, 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 Patient reportedly woke up at 0130 AM on Monday with a R-sided headache. ENT noted R otitis externa, purulent discharge from R TM thought to be secondary to perforation, given ear gtt and prescribed cipro/dexamethasone gtt (she took 1 dose thus far). Daughter called EMS as later in the day patient complained of severe HA as well as neck pain, stated "I'm dying" and "my head is killing me." Patient reportedly without prior history of ear infections, no history of stroke or seizures. She reported to ENT earlier in the day a muffled sensation in the R ear [id number].     In the ED VS:  97.2  65-99  162-186/52-91  16-20  96-99%RA, received NS 500cc IVF, lorazepam 2mg IV x1 and morphine 4mg IV x1. Neurology was unsuccessful in obtaining a lumbar puncture. MICU initially was consulted for AMS, but was protecting airway and HDS. Pt underwent IR guided LP. CSF studies concerning for bacterial meningitis. Blood Cx and CSF Cx with gram+ cocci in pairs, strep pneumoniae. ID following and pt given 1 dose of Vancomycin and continued on meropenem.    On 3/16 early morning, Code Blue called for cardiac arrest. ROSC achieved and patient was transferred to MICU on levophed and propofol. Noted to have large amount of blood from mouth. Patient found to be with mildly increased wob with abdominal muscle involvement. However, pt maintained saturations in 90s on 100% FiO2 on ventilator and hemodynamically stable. Bedside POCUS showing b lines and lung sliding. grossly normal LV systolic function but with hypertrophy of the ventricles concerning for diastolic dysfunction. Continued sedation with propofol and fentanyl given pain a/w meningitis and CPR.         PAST MEDICAL & SURGICAL HISTORY:  Polycystic kidney disease    Glaucoma    Aneurysm    History of deep venous thrombosis (DVT) of distal vein of left lower extremity    Thrombocytopenia    Hypothyroid    Osteoporosis    Arthritis    PCP (pneumocystis carinii pneumonia)    C. difficile colitis    Type 2 diabetes mellitus, without long-term current use of insulin    Essential hypertension    H/O kidney transplant    H/O:     H/O eye surgery    S/P hysterectomy    H/O umbilical hernia repair      FAMILY HISTORY:  FH: diabetes mellitus (Sibling)        SOCIAL HISTORY:  unable to obtain given sedation       HOME MEDICATIONS:  refer to H&P    Allergies    apple (Unknown)  Benadryl (Unknown)  penicillin (Hives)  watermelon (Unknown)    Intolerances          REVIEW OF SYSTEMS:    [x ] Unable to assess ROS because of sedation     OBJECTIVE:  ICU Vital Signs Last 24 Hrs  T(C): 36.3 (16 Mar 2023 02:40), Max: 38.1 (15 Mar 2023 06:19)  T(F): 97.4 (16 Mar 2023 02:40), Max: 100.5 (15 Mar 2023 06:19)  HR: 97 (16 Mar 2023 02:40) (74 - 99)  BP: 167/60 (16 Mar 2023 02:40) (167/60 - 193/91)  BP(mean): --  ABP: --  ABP(mean): --  RR: 18 (16 Mar 2023 02:40) (18 - 30)  SpO2: 98% (16 Mar 2023 02:40) (97% - 98%)    O2 Parameters below as of 16 Mar 2023 02:40  Patient On (Oxygen Delivery Method): room air        -15 @ 07:01  -  03-16 @ 05:25  --------------------------------------------------------  IN: 0 mL / OUT: 500 mL / NET: -500 mL      CAPILLARY BLOOD GLUCOSE  182 (14 Mar 2023 19:34)      POCT Blood Glucose.: 112 mg/dL (16 Mar 2023 04:40)      CONSTITUTIONAL: intubated/sedated.   EYES: No conjunctival or scleral injection   RESP: mildly increased wob, CTA b/l without wheezes, crackles or rhonchi  CV: RRR, +S1S2, no MRG; no JVD; 1+ peripheral edema on LLE, no edema on RLE   GI: +distended, soft, NT, ND, no rebound, no guarding; no palpable masses; no hepatosplenomegaly; no hernia palpated  MSK: examination of the (head/neck/spine/ribs/pelvis, RUE, LUE, RLE, LLE) without misalignment  SKIN: No rashes or ulcers noted   NEURO: normal tone. unable to assess given sedation         HOSPITAL MEDICATIONS:  MEDICATIONS  (STANDING):  albuterol    0.083%. 2.5 milliGRAM(s) Nebulizer once  aspirin  chewable 81 milliGRAM(s) Enteral Tube daily  atovaquone  Suspension 1500 milliGRAM(s) Oral daily  ciprofloxacin  0.3% Ophthalmic Solution for Otic Use 4 Drop(s) Right Ear two times a day  cloNIDine Patch 0.2 mG/24Hr(s) 1 patch Transdermal every 7 days  cycloSPORINE  , modified (GENGRAF) Solution 75 milliGRAM(s) Oral every 12 hours  dextrose 5%. 1000 milliLiter(s) (75 mL/Hr) IV Continuous <Continuous>  dextrose 5%. 1000 milliLiter(s) (100 mL/Hr) IV Continuous <Continuous>  dextrose 5%. 1000 milliLiter(s) (50 mL/Hr) IV Continuous <Continuous>  dextrose 50% Injectable 25 Gram(s) IV Push once  dextrose 50% Injectable 12.5 Gram(s) IV Push once  dextrose 50% Injectable 25 Gram(s) IV Push once  glucagon  Injectable 1 milliGRAM(s) IntraMuscular once  heparin   Injectable 5000 Unit(s) SubCutaneous every 8 hours  insulin lispro (ADMELOG) corrective regimen sliding scale   SubCutaneous every 6 hours  levothyroxine 25 MICROGram(s) Oral daily  meropenem  IVPB 1000 milliGRAM(s) IV Intermittent every 12 hours  predniSONE   Tablet 5 milliGRAM(s) Oral daily  sodium chloride 0.9%. 1000 milliLiter(s) (50 mL/Hr) IV Continuous <Continuous>    MEDICATIONS  (PRN):  acetaminophen   Oral Liquid .. 650 milliGRAM(s) Enteral Tube every 6 hours PRN Temp greater or equal to 38C (100.4F), Mild Pain (1 - 3), Moderate Pain (4 - 6)  dextrose Oral Gel 15 Gram(s) Oral once PRN Blood Glucose LESS THAN 70 milliGRAM(s)/deciliter      LABS:                        10.6   22.19 )-----------( 163      ( 15 Mar 2023 05:45 )             34.3     Hgb Trend: 10.6<--, 10.1<--  03-15    138  |  101  |  81<H>  ----------------------------<  159<H>  4.4   |  19<L>  |  3.03<H>    Ca    9.5      15 Mar 2023 05:45  Phos  4.5     15  Mg     2.40     03-15    TPro  6.5  /  Alb  3.3  /  TBili  0.6  /  DBili  x   /  AST  83<H>  /  ALT  64<H>  /  AlkPhos  78  15    Creatinine Trend: 3.03<--, 2.98<--  PT/INR - ( 14 Mar 2023 21:38 )   PT: 16.2 sec;   INR: 1.39 ratio         PTT - ( 14 Mar 2023 21:38 )  PTT:25.2 sec  Urinalysis Basic - ( 15 Mar 2023 03:43 )    Color: Yellow / Appearance: Clear / S.019 / pH: x  Gluc: x / Ketone: Negative  / Bili: Negative / Urobili: <2 mg/dL   Blood: x / Protein: 300 mg/dL / Nitrite: Negative   Leuk Esterase: Negative / RBC: 8 /HPF / WBC 2 /HPF   Sq Epi: x / Non Sq Epi: 1 /HPF / Bacteria: Negative        Venous Blood Gas:  03-15 @ 02:30  7.35/36/80/20/96.5  VBG Lactate: 2.2      MICROBIOLOGY:     Culture - CSF with Gram Stain (collected 15 Mar 2023 15:00)  Source: .CSF CSF  Gram Stain (15 Mar 2023 18:44):    Few polymorphonuclear leukocytes per low power field    Moderate Gram positive cocci in pairs per oil power field    Culture - Acid Fast - CSF (collected 15 Mar 2023 15:00)  Source: .CSF CSF    Culture - Blood (collected 15 Mar 2023 03:00)  Source: .Blood Blood-Venous  Gram Stain (15 Mar 2023 18:11):    Growth in aerobic and anaerobic bottles: Gram positive cocci in pairs  Preliminary Report (15 Mar 2023 18:11):    Growth in aerobic and anaerobic bottles: Gram positive cocci in pairs    Culture - Blood (collected 15 Mar 2023 02:30)  Source: .Blood Blood-Peripheral  Gram Stain (15 Mar 2023 19:26):    Growth in aerobic bottle: Gram positive cocci in pairs    Growth in anaerobic bottle: Gram positive cocci in pairs  Preliminary Report (15 Mar 2023 19:27):    Growth in aerobic bottle: Gram positive cocci in pairs    ***Blood Panel PCR results on this specimen are available    approximately 3 hours after the Gram stain result.***    Gram stain, PCR, and/or culture results may not always    correspond due to difference in methodologies.    ************************************************************    This PCR assay was performed by multiplex PCR. This    Assay tests for 66 bacterial and resistance gene targets.    Please refer to the [facility] test directory    at [email or web address] for details.    Growth in anaerobic bottle: Gram positive cocci in pairs  Organism: Blood Culture PCR (15 Mar 2023 20:17)  Organism: Blood Culture PCR (15 Mar 2023 20:17)        RADIOLOGY & ADDITIONAL TESTS:  3/15 US kidney: Mild fullness of the left lower quadrant transplant kidney collecting system. Increased renal cortical echogenicity, possibly renal parenchymal disease.    3/15 CXR: Frontal expiratory view of the chest shows the heart to be normal in size. The lungs show right perihilar infiltrate and there is no evidence of pneumothorax nor pleural effusion.    3/14   CT PERFUSION:  Limited by late injection of the contrast bolus. Cerebral blood flow less than 30% = 0 mL. No core infarct is predicted.  Tmax greater than 6 seconds = 0 mL. No brain parenchyma predicted to be at continued ischemic risk in the presence of neurologic symptoms.    CTA BRAIN:  No flow-limiting stenosis or vascular aneurysm. No AVM.    CTA NECK:  Calcified plaque at the origin of the left internal carotid artery results in approximately 40% short segment stenosis.  Otherwise no flow-limiting stenosis. No evidence for arterial dissection.        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 given Vancomycin and continued on meropenem per ID. Cardiac arrest 3/16 with ROSC, transferred to MICU for further management.       Plan:   Neurological  #Currently Intubated, sedated   - continue propofol and fentanyl    #Strep pneumo meningitis #Acute encephalopathy  severe HA, neck stiffness, fever, leukocytosis concerning for meningitis/encephalitis  CT head negative for CVA.   LP and BCx 3/15 both showing gram positive cocci in pairs, and + strep pneumoniae.   likely source of entry from R ear where pt c/o discomfort for several months.  patient is on cyclosporine, mycophenolate mofetil and prednisone due to kidney transplant and is immunocompromised   dental procedure 3/14 but less likely source   ID consulted, recs for continuing meropenem. s/p vancomycin 3/15.   - c/w meropenem   - f/u BCx and LP sensitivities  - f/u MRI  - q4hr neuro checks      Cardiovascular  #Cardiac Arrest  bradycardia to 30, pulseless, code blue was called 3/16   2 rounds of epi, and PEA arrest. on 3rd pulse check pulseless VT, 200J shock given  4th pulse check asystole, 5th ROSC, sinus tachy with HUMA. IO placed and levo started. Due to blood in mouth took several attempts with DL for ETT to be placed but ultimately confirmed with capnometry and bilateral breath sounds. No sedation was used.      primarily likely from sep    #Elevated troponin  uptrending troponin to 182 3/15 AM, was likely iso ESRD  s/p cardiac arrest  - trend troponin    #HTN  on amlodipine 5mg, losartan 100mg, torsemide 20mg, and clonidine 0.2 patch weekly  - hold for now given sepsis      Respiratory/ENT  #Intubated  continue sedation    # Tympanic membrane rupture  discharge from R ear, evaluated by ENT given dexamethasone and cipro 3/14  - c/w broad spectrum abx         Gastrointestinal  #hematemesis  patient with melissa bright red blood from mouth on arrival to MICU  could be related to CPR, no pericardial effusion on bedside US  - PPI  - keep NPO for now    #aspiration precaution  previously failed dysphagia screen and was pending SLP eval  blood in mouth on arrival to MICU and increased wob concerning for aspiration  NGT in place, in stomach on CXR 3/15  - f/u CXR  - keep NPO for now          Renal  #ESRD #s/p Kidney Transplant #PCKD  previously HD through LLE AVF but no HD since transplant  Cr 3.03, unclear baseline  - avoid nephrotoxic agent  - I/O         Hematological  # h/o LLE DVT  resolved, no DVT on doppler .  on ASA at home, has been receiving heparin subQ  - hold for now given bleeding in oropharynx.         Infectious Disease  #Strep pneumoniae meningitis #R ear mastoiditis  treatment as above with IV meropenem.   - f/u ID recs       Endocrinological  #Hypothyroidism  last TSH 2022 >10  TSH 3/15 4.73  - c/w home levothyroxine     #Diabetes mellitus Type II      Ethics  FULL, GOC ongoing MICU Accept Note    CHIEF COMPLAINT:     HPI / INTERVAL HISTORY:    78-year-old female with PCKD previously on HD via LUE AVF now s/p renal transplant, LLE DVT (resolved, nml dopplers 2022) on ASA, HTN, HLD, DM2, 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 Patient reportedly woke up at 0130 AM on Monday with a R-sided headache. ENT noted R otitis externa, purulent discharge from R TM thought to be secondary to perforation, given ear gtt and prescribed cipro/dexamethasone gtt (she took 1 dose thus far). Daughter called EMS as later in the day patient complained of severe HA as well as neck pain, stated "I'm dying" and "my head is killing me." Patient reportedly without prior history of ear infections, no history of stroke or seizures. She reported to ENT earlier in the day a muffled sensation in the R ear [id number].     In the ED VS:  97.2  65-99  162-186/52-91  16-20  96-99%RA, received NS 500cc IVF, lorazepam 2mg IV x1 and morphine 4mg IV x1. Neurology was unsuccessful in obtaining a lumbar puncture. MICU initially was consulted for AMS, but was protecting airway and HDS. Pt underwent IR guided LP. CSF studies concerning for bacterial meningitis. Blood Cx and CSF Cx with gram+ cocci in pairs, strep pneumoniae. ID following and pt given 1 dose of Vancomycin and continued on meropenem.    On 3/16 early morning, Code Blue called for cardiac arrest. ROSC achieved and patient was transferred to MICU on levophed and propofol. Noted to have large amount of blood from mouth. Patient found to be with mildly increased wob with abdominal muscle involvement. However, pt maintained saturations in 90s on 100% FiO2 on ventilator and hemodynamically stable. Bedside POCUS showing b lines and lung sliding. grossly normal LV systolic function but with hypertrophy of the ventricles concerning for diastolic dysfunction. Continued sedation with propofol and fentanyl given pain a/w meningitis and CPR.         PAST MEDICAL & SURGICAL HISTORY:  Polycystic kidney disease    Glaucoma    Aneurysm    History of deep venous thrombosis (DVT) of distal vein of left lower extremity    Thrombocytopenia    Hypothyroid    Osteoporosis    Arthritis    PCP (pneumocystis carinii pneumonia)    C. difficile colitis    Type 2 diabetes mellitus, without long-term current use of insulin    Essential hypertension    H/O kidney transplant    H/O:     H/O eye surgery    S/P hysterectomy    H/O umbilical hernia repair      FAMILY HISTORY:  FH: diabetes mellitus (Sibling)        SOCIAL HISTORY:  unable to obtain given sedation       HOME MEDICATIONS:  refer to H&P    Allergies    apple (Unknown)  Benadryl (Unknown)  penicillin (Hives)  watermelon (Unknown)    Intolerances          REVIEW OF SYSTEMS:    [x ] Unable to assess ROS because of sedation     OBJECTIVE:  ICU Vital Signs Last 24 Hrs  T(C): 36.3 (16 Mar 2023 02:40), Max: 38.1 (15 Mar 2023 06:19)  T(F): 97.4 (16 Mar 2023 02:40), Max: 100.5 (15 Mar 2023 06:19)  HR: 97 (16 Mar 2023 02:40) (74 - 99)  BP: 167/60 (16 Mar 2023 02:40) (167/60 - 193/91)  BP(mean): --  ABP: --  ABP(mean): --  RR: 18 (16 Mar 2023 02:40) (18 - 30)  SpO2: 98% (16 Mar 2023 02:40) (97% - 98%)    O2 Parameters below as of 16 Mar 2023 02:40  Patient On (Oxygen Delivery Method): room air        -15 @ 07:01  -  03-16 @ 05:25  --------------------------------------------------------  IN: 0 mL / OUT: 500 mL / NET: -500 mL      CAPILLARY BLOOD GLUCOSE  182 (14 Mar 2023 19:34)      POCT Blood Glucose.: 112 mg/dL (16 Mar 2023 04:40)      CONSTITUTIONAL: intubated/sedated.   EYES: No conjunctival or scleral injection   RESP: mildly increased wob, CTA b/l without wheezes, crackles or rhonchi  CV: RRR, +S1S2, no MRG; no JVD; 1+ peripheral edema on LLE, no edema on RLE   GI: +distended, soft, no rebound, no guarding; fullness on LLQ abdomen.   hepatosplenomegaly; no hernia palpated  MSK: examination of the (head/neck/spine/ribs/pelvis, RUE, LUE, RLE, LLE) without misalignment  SKIN: No rashes or ulcers noted   NEURO: normal tone. unable to assess given sedation         HOSPITAL MEDICATIONS:  MEDICATIONS  (STANDING):  albuterol    0.083%. 2.5 milliGRAM(s) Nebulizer once  aspirin  chewable 81 milliGRAM(s) Enteral Tube daily  atovaquone  Suspension 1500 milliGRAM(s) Oral daily  ciprofloxacin  0.3% Ophthalmic Solution for Otic Use 4 Drop(s) Right Ear two times a day  cloNIDine Patch 0.2 mG/24Hr(s) 1 patch Transdermal every 7 days  cycloSPORINE  , modified (GENGRAF) Solution 75 milliGRAM(s) Oral every 12 hours  dextrose 5%. 1000 milliLiter(s) (75 mL/Hr) IV Continuous <Continuous>  dextrose 5%. 1000 milliLiter(s) (100 mL/Hr) IV Continuous <Continuous>  dextrose 5%. 1000 milliLiter(s) (50 mL/Hr) IV Continuous <Continuous>  dextrose 50% Injectable 25 Gram(s) IV Push once  dextrose 50% Injectable 12.5 Gram(s) IV Push once  dextrose 50% Injectable 25 Gram(s) IV Push once  glucagon  Injectable 1 milliGRAM(s) IntraMuscular once  heparin   Injectable 5000 Unit(s) SubCutaneous every 8 hours  insulin lispro (ADMELOG) corrective regimen sliding scale   SubCutaneous every 6 hours  levothyroxine 25 MICROGram(s) Oral daily  meropenem  IVPB 1000 milliGRAM(s) IV Intermittent every 12 hours  predniSONE   Tablet 5 milliGRAM(s) Oral daily  sodium chloride 0.9%. 1000 milliLiter(s) (50 mL/Hr) IV Continuous <Continuous>    MEDICATIONS  (PRN):  acetaminophen   Oral Liquid .. 650 milliGRAM(s) Enteral Tube every 6 hours PRN Temp greater or equal to 38C (100.4F), Mild Pain (1 - 3), Moderate Pain (4 - 6)  dextrose Oral Gel 15 Gram(s) Oral once PRN Blood Glucose LESS THAN 70 milliGRAM(s)/deciliter      LABS:                        10.6   22.19 )-----------( 163      ( 15 Mar 2023 05:45 )             34.3     Hgb Trend: 10.6<--, 10.1<--  03-15    138  |  101  |  81<H>  ----------------------------<  159<H>  4.4   |  19<L>  |  3.03<H>    Ca    9.5      15 Mar 2023 05:45  Phos  4.5     0315  Mg     2.40     15    TPro  6.5  /  Alb  3.3  /  TBili  0.6  /  DBili  x   /  AST  83<H>  /  ALT  64<H>  /  AlkPhos  78  15    Creatinine Trend: 3.03<--, 2.98<--  PT/INR - ( 14 Mar 2023 21:38 )   PT: 16.2 sec;   INR: 1.39 ratio         PTT - ( 14 Mar 2023 21:38 )  PTT:25.2 sec  Urinalysis Basic - ( 15 Mar 2023 03:43 )    Color: Yellow / Appearance: Clear / S.019 / pH: x  Gluc: x / Ketone: Negative  / Bili: Negative / Urobili: <2 mg/dL   Blood: x / Protein: 300 mg/dL / Nitrite: Negative   Leuk Esterase: Negative / RBC: 8 /HPF / WBC 2 /HPF   Sq Epi: x / Non Sq Epi: 1 /HPF / Bacteria: Negative        Venous Blood Gas:  03-15 @ 02:30  7.35/36/80/20/96.5  VBG Lactate: 2.2      MICROBIOLOGY:     Culture - CSF with Gram Stain (collected 15 Mar 2023 15:00)  Source: .CSF CSF  Gram Stain (15 Mar 2023 18:44):    Few polymorphonuclear leukocytes per low power field    Moderate Gram positive cocci in pairs per oil power field    Culture - Acid Fast - CSF (collected 15 Mar 2023 15:00)  Source: .CSF CSF    Culture - Blood (collected 15 Mar 2023 03:00)  Source: .Blood Blood-Venous  Gram Stain (15 Mar 2023 18:11):    Growth in aerobic and anaerobic bottles: Gram positive cocci in pairs  Preliminary Report (15 Mar 2023 18:11):    Growth in aerobic and anaerobic bottles: Gram positive cocci in pairs    Culture - Blood (collected 15 Mar 2023 02:30)  Source: .Blood Blood-Peripheral  Gram Stain (15 Mar 2023 19:26):    Growth in aerobic bottle: Gram positive cocci in pairs    Growth in anaerobic bottle: Gram positive cocci in pairs  Preliminary Report (15 Mar 2023 19:27):    Growth in aerobic bottle: Gram positive cocci in pairs    ***Blood Panel PCR results on this specimen are available    approximately 3 hours after the Gram stain result.***    Gram stain, PCR, and/or culture results may not always    correspond due to difference in methodologies.    ************************************************************    This PCR assay was performed by multiplex PCR. This    Assay tests for 66 bacterial and resistance gene targets.    Please refer to the [facility] test directory    at [email or web address] for details.    Growth in anaerobic bottle: Gram positive cocci in pairs  Organism: Blood Culture PCR (15 Mar 2023 20:17)  Organism: Blood Culture PCR (15 Mar 2023 20:17)        RADIOLOGY & ADDITIONAL TESTS:  3/15 US kidney: Mild fullness of the left lower quadrant transplant kidney collecting system. Increased renal cortical echogenicity, possibly renal parenchymal disease.    3/15 CXR: Frontal expiratory view of the chest shows the heart to be normal in size. The lungs show right perihilar infiltrate and there is no evidence of pneumothorax nor pleural effusion.    3/14   CT PERFUSION:  Limited by late injection of the contrast bolus. Cerebral blood flow less than 30% = 0 mL. No core infarct is predicted.  Tmax greater than 6 seconds = 0 mL. No brain parenchyma predicted to be at continued ischemic risk in the presence of neurologic symptoms.    CTA BRAIN:  No flow-limiting stenosis or vascular aneurysm. No AVM.    CTA NECK:  Calcified plaque at the origin of the left internal carotid artery results in approximately 40% short segment stenosis.  Otherwise no flow-limiting stenosis. No evidence for arterial dissection.        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 given Vancomycin and continued on meropenem per ID. Cardiac arrest 3/16 with ROSC, transferred to MICU for further management.       Plan:   Neurological  #Currently Intubated, sedated   - continue propofol and fentanyl    #Strep pneumo meningitis #Acute encephalopathy  severe HA, neck stiffness, fever, leukocytosis concerning for meningitis/encephalitis  CT head negative for CVA.   LP and BCx 3/15 both showing gram positive cocci in pairs, and + strep pneumoniae.   likely source of entry from R ear where pt c/o discomfort for several months.  patient is on cyclosporine, mycophenolate mofetil and prednisone due to kidney transplant and is immunocompromised   dental procedure 3/14 but less likely source   ID consulted, recs for continuing meropenem. s/p vancomycin 3/15.   - c/w meropenem   - f/u BCx and LP sensitivities  - f/u MRI  - q4hr neuro checks      Cardiovascular  #Cardiac Arrest  bradycardia to 30, pulseless, code blue was called 3/16   2 rounds of epi, and PEA arrest. on 3rd pulse check pulseless VT, 200J shock given  4th pulse check asystole, 5th ROSC, sinus tachy with HUMA. IO placed and levo started. Due to blood in mouth took several attempts with DL for ETT to be placed but ultimately confirmed with capnometry and bilateral breath sounds. No sedation was used.        #Elevated troponin  uptrending troponin to 182 3/15 AM, was likely iso ESRD  s/p cardiac arrest, elevated ST segment   - trend troponin      #HTN  on amlodipine 5mg, losartan 100mg, torsemide 20mg, and clonidine 0.2 patch weekly  - hold for now given sepsis      Respiratory/ENT  #Intubated  continue sedation    # Tympanic membrane rupture  discharge from R ear, evaluated by ENT given dexamethasone and cipro 3/14  - c/w broad spectrum abx         Gastrointestinal  #hematemesis  patient with melissa bright red blood from mouth on arrival to MICU  could be related to CPR, no pericardial effusion on bedside US  - PPI  - keep NPO for now    #aspiration precaution  previously failed dysphagia screen and was pending SLP eval  blood in mouth on arrival to MICU and increased wob concerning for aspiration  NGT in place, in stomach on CXR 3/15  - f/u CXR  - keep NPO for now          Renal  #ESRD #s/p Kidney Transplant #PCKD  previously HD through LLE AVF but no HD since transplant  Cr 3.03, unclear baseline  - avoid nephrotoxic agent  - I/O         Hematological  # h/o LLE DVT  resolved, no DVT on doppler .  on ASA at home, has been receiving heparin subQ  - hold for now given bleeding in oropharynx.       Infectious Disease  #Strep pneumoniae meningitis #R ear mastoiditis  treatment as above with IV meropenem.   - f/u ID recs     #h/o PCP  - atorvaquone at home, continue      Endocrinological  #Hypothyroidism  last TSH 2022 >10  TSH 3/15 4.73  - c/w home levothyroxine     #Diabetes mellitus Type II      Ethics  FULL, GOC ongoing MICU Accept Note    CHIEF COMPLAINT:     HPI / INTERVAL HISTORY:    78-year-old female with PCKD previously on HD via LUE AVF now s/p renal transplant, LLE DVT (resolved, nml dopplers 2022) on ASA, HTN, HLD, DM2, 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 Patient reportedly woke up at 0130 AM on Monday with a R-sided headache. ENT noted R otitis externa, purulent discharge from R TM thought to be secondary to perforation, given ear gtt and prescribed cipro/dexamethasone gtt (she took 1 dose thus far). Daughter called EMS as later in the day patient complained of severe HA as well as neck pain, stated "I'm dying" and "my head is killing me." Patient reportedly without prior history of ear infections, no history of stroke or seizures. She reported to ENT earlier in the day a muffled sensation in the R ear [id number].     In the ED VS:  97.2  65-99  162-186/52-91  16-20  96-99%RA, received NS 500cc IVF, lorazepam 2mg IV x1 and morphine 4mg IV x1. Neurology was unsuccessful in obtaining a lumbar puncture. MICU initially was consulted for AMS, but was protecting airway and HDS. Pt underwent IR guided LP. CSF studies concerning for bacterial meningitis. Blood Cx and CSF Cx with gram+ cocci in pairs, strep pneumoniae. ID following and pt given 1 dose of Vancomycin and continued on meropenem.    On 3/16 early morning, Code Blue called for cardiac arrest. ROSC achieved and patient was transferred to MICU on levophed and propofol. Noted to have large amount of blood from mouth. Patient found to be with mildly increased wob with abdominal muscle involvement. However, pt maintained saturations in 90s on 100% FiO2 on ventilator and hemodynamically stable. Bedside POCUS showing b lines and lung sliding. grossly normal LV systolic function but with hypertrophy of the ventricles concerning for diastolic dysfunction. Continued sedation with propofol and fentanyl given pain a/w meningitis and CPR.         PAST MEDICAL & SURGICAL HISTORY:  Polycystic kidney disease    Glaucoma    Aneurysm    History of deep venous thrombosis (DVT) of distal vein of left lower extremity    Thrombocytopenia    Hypothyroid    Osteoporosis    Arthritis    PCP (pneumocystis carinii pneumonia)    C. difficile colitis    Type 2 diabetes mellitus, without long-term current use of insulin    Essential hypertension    H/O kidney transplant    H/O:     H/O eye surgery    S/P hysterectomy    H/O umbilical hernia repair      FAMILY HISTORY:  FH: diabetes mellitus (Sibling)        SOCIAL HISTORY:  unable to obtain given sedation       HOME MEDICATIONS:  refer to H&P    Allergies    apple (Unknown)  Benadryl (Unknown)  penicillin (Hives)  watermelon (Unknown)    Intolerances          REVIEW OF SYSTEMS:    [x ] Unable to assess ROS because of sedation     OBJECTIVE:  ICU Vital Signs Last 24 Hrs  T(C): 36.3 (16 Mar 2023 02:40), Max: 38.1 (15 Mar 2023 06:19)  T(F): 97.4 (16 Mar 2023 02:40), Max: 100.5 (15 Mar 2023 06:19)  HR: 97 (16 Mar 2023 02:40) (74 - 99)  BP: 167/60 (16 Mar 2023 02:40) (167/60 - 193/91)  BP(mean): --  ABP: --  ABP(mean): --  RR: 18 (16 Mar 2023 02:40) (18 - 30)  SpO2: 98% (16 Mar 2023 02:40) (97% - 98%)    O2 Parameters below as of 16 Mar 2023 02:40  Patient On (Oxygen Delivery Method): room air        -15 @ 07:01  -  03-16 @ 05:25  --------------------------------------------------------  IN: 0 mL / OUT: 500 mL / NET: -500 mL      CAPILLARY BLOOD GLUCOSE  182 (14 Mar 2023 19:34)      POCT Blood Glucose.: 112 mg/dL (16 Mar 2023 04:40)      CONSTITUTIONAL: intubated/sedated.   EYES: No conjunctival or scleral injection   RESP: mildly increased wob, CTA b/l without wheezes, crackles or rhonchi  CV: RRR, +S1S2, no MRG; no JVD; 1+ peripheral edema on LLE, no edema on RLE   GI: +distended, soft, no rebound, no guarding; fullness on LLQ abdomen.   hepatosplenomegaly; no hernia palpated  MSK: examination of the (head/neck/spine/ribs/pelvis, RUE, LUE, RLE, LLE) without misalignment  SKIN: No rashes or ulcers noted   NEURO: normal tone. unable to assess given sedation         HOSPITAL MEDICATIONS:  MEDICATIONS  (STANDING):  albuterol    0.083%. 2.5 milliGRAM(s) Nebulizer once  aspirin  chewable 81 milliGRAM(s) Enteral Tube daily  atovaquone  Suspension 1500 milliGRAM(s) Oral daily  ciprofloxacin  0.3% Ophthalmic Solution for Otic Use 4 Drop(s) Right Ear two times a day  cloNIDine Patch 0.2 mG/24Hr(s) 1 patch Transdermal every 7 days  cycloSPORINE  , modified (GENGRAF) Solution 75 milliGRAM(s) Oral every 12 hours  dextrose 5%. 1000 milliLiter(s) (75 mL/Hr) IV Continuous <Continuous>  dextrose 5%. 1000 milliLiter(s) (100 mL/Hr) IV Continuous <Continuous>  dextrose 5%. 1000 milliLiter(s) (50 mL/Hr) IV Continuous <Continuous>  dextrose 50% Injectable 25 Gram(s) IV Push once  dextrose 50% Injectable 12.5 Gram(s) IV Push once  dextrose 50% Injectable 25 Gram(s) IV Push once  glucagon  Injectable 1 milliGRAM(s) IntraMuscular once  heparin   Injectable 5000 Unit(s) SubCutaneous every 8 hours  insulin lispro (ADMELOG) corrective regimen sliding scale   SubCutaneous every 6 hours  levothyroxine 25 MICROGram(s) Oral daily  meropenem  IVPB 1000 milliGRAM(s) IV Intermittent every 12 hours  predniSONE   Tablet 5 milliGRAM(s) Oral daily  sodium chloride 0.9%. 1000 milliLiter(s) (50 mL/Hr) IV Continuous <Continuous>    MEDICATIONS  (PRN):  acetaminophen   Oral Liquid .. 650 milliGRAM(s) Enteral Tube every 6 hours PRN Temp greater or equal to 38C (100.4F), Mild Pain (1 - 3), Moderate Pain (4 - 6)  dextrose Oral Gel 15 Gram(s) Oral once PRN Blood Glucose LESS THAN 70 milliGRAM(s)/deciliter      LABS:                        10.6   22.19 )-----------( 163      ( 15 Mar 2023 05:45 )             34.3     Hgb Trend: 10.6<--, 10.1<--  03-15    138  |  101  |  81<H>  ----------------------------<  159<H>  4.4   |  19<L>  |  3.03<H>    Ca    9.5      15 Mar 2023 05:45  Phos  4.5     0315  Mg     2.40     15    TPro  6.5  /  Alb  3.3  /  TBili  0.6  /  DBili  x   /  AST  83<H>  /  ALT  64<H>  /  AlkPhos  78  15    Creatinine Trend: 3.03<--, 2.98<--  PT/INR - ( 14 Mar 2023 21:38 )   PT: 16.2 sec;   INR: 1.39 ratio         PTT - ( 14 Mar 2023 21:38 )  PTT:25.2 sec  Urinalysis Basic - ( 15 Mar 2023 03:43 )    Color: Yellow / Appearance: Clear / S.019 / pH: x  Gluc: x / Ketone: Negative  / Bili: Negative / Urobili: <2 mg/dL   Blood: x / Protein: 300 mg/dL / Nitrite: Negative   Leuk Esterase: Negative / RBC: 8 /HPF / WBC 2 /HPF   Sq Epi: x / Non Sq Epi: 1 /HPF / Bacteria: Negative        Venous Blood Gas:  03-15 @ 02:30  7.35/36/80/20/96.5  VBG Lactate: 2.2      MICROBIOLOGY:     Culture - CSF with Gram Stain (collected 15 Mar 2023 15:00)  Source: .CSF CSF  Gram Stain (15 Mar 2023 18:44):    Few polymorphonuclear leukocytes per low power field    Moderate Gram positive cocci in pairs per oil power field    Culture - Acid Fast - CSF (collected 15 Mar 2023 15:00)  Source: .CSF CSF    Culture - Blood (collected 15 Mar 2023 03:00)  Source: .Blood Blood-Venous  Gram Stain (15 Mar 2023 18:11):    Growth in aerobic and anaerobic bottles: Gram positive cocci in pairs  Preliminary Report (15 Mar 2023 18:11):    Growth in aerobic and anaerobic bottles: Gram positive cocci in pairs    Culture - Blood (collected 15 Mar 2023 02:30)  Source: .Blood Blood-Peripheral  Gram Stain (15 Mar 2023 19:26):    Growth in aerobic bottle: Gram positive cocci in pairs    Growth in anaerobic bottle: Gram positive cocci in pairs  Preliminary Report (15 Mar 2023 19:27):    Growth in aerobic bottle: Gram positive cocci in pairs    ***Blood Panel PCR results on this specimen are available    approximately 3 hours after the Gram stain result.***    Gram stain, PCR, and/or culture results may not always    correspond due to difference in methodologies.    ************************************************************    This PCR assay was performed by multiplex PCR. This    Assay tests for 66 bacterial and resistance gene targets.    Please refer to the [facility] test directory    at [email or web address] for details.    Growth in anaerobic bottle: Gram positive cocci in pairs  Organism: Blood Culture PCR (15 Mar 2023 20:17)  Organism: Blood Culture PCR (15 Mar 2023 20:17)        RADIOLOGY & ADDITIONAL TESTS:  3/15 US kidney: Mild fullness of the left lower quadrant transplant kidney collecting system. Increased renal cortical echogenicity, possibly renal parenchymal disease.    3/15 CXR: Frontal expiratory view of the chest shows the heart to be normal in size. The lungs show right perihilar infiltrate and there is no evidence of pneumothorax nor pleural effusion.    3/14   CT PERFUSION:  Limited by late injection of the contrast bolus. Cerebral blood flow less than 30% = 0 mL. No core infarct is predicted.  Tmax greater than 6 seconds = 0 mL. No brain parenchyma predicted to be at continued ischemic risk in the presence of neurologic symptoms.    CTA BRAIN:  No flow-limiting stenosis or vascular aneurysm. No AVM.    CTA NECK:  Calcified plaque at the origin of the left internal carotid artery results in approximately 40% short segment stenosis.  Otherwise no flow-limiting stenosis. No evidence for arterial dissection.        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 given Vancomycin and continued on meropenem per ID. Cardiac arrest 3/16 with ROSC, transferred to MICU for further management.       Plan:   Neurological  #Currently Intubated, sedated   - continue propofol and fentanyl      #Strep pneumo meningitis #Acute encephalopathy  severe HA, neck stiffness, fever, leukocytosis concerning for meningitis/encephalitis  CT head negative for CVA.   LP and BCx 3/15 both showing gram positive cocci in pairs, and + strep pneumoniae.   likely source of entry from R ear where pt c/o discomfort for several months.  patient is on cyclosporine, mycophenolate mofetil and prednisone due to kidney transplant and is immunocompromised   dental procedure 3/14 but less likely source   ID consulted, recs for continuing meropenem. s/p vancomycin 3/15.   Neurology consulted; recs for 24 hour EEG to rule out nonconvulsive seizure and continued Abx treatment   - c/w meropenem   - f/u BCx and LP sensitivities  - 24 hr EEG   - f/u MRI      Cardiovascular  #Cardiac Arrest  bradycardia to 30, pulseless, code blue was called 3/16   PEA arrest, unknown etiology. could have been from hypovolemia iso septic shock vs. cardiac thrombosis iso elevated troponin before the event (ESRD also contributing to it). elevated ST segment after ROSC  2 rounds of epi, pulseless VT, 200J shock given  ROSC at 5th pulse check, sinus tachy with HUMA. levo started  Blood in mouth likely from CPR efforts  - close cardiac monitoring      #Shock  likely iso sepsis given positive LP and BCx for strep pneumoniae  cardiogenic component given cardiac arrest  - c/w abx tx and levophed  - wean pressors as tolerated      #HTN  on amlodipine 5mg, losartan 100mg, torsemide 20mg, and clonidine 0.2 patch weekly  - hold for now given shock      Respiratory  #Intubated    - monitor blood gas       ENT  # Tympanic membrane rupture    discharge from R ear, evaluated by ENT given dexamethasone and cipro 3/14  - c/w broad spectrum abx       Gastrointestinal  keep NPO for now  failed dysphagia screen, was pending SLP eval  NGT in place, in stomach on CXR 3/15, can start feed once clinically more stable  - f/u CXR  - keep NPO for now      Renal  #PCKD #ESRD #s/p Kidney Transplant   previously HD through LLE AVF but no HD since transplant  Cr 3.03, unclear baseline  - avoid nephrotoxic agent  - monitor urine output       Hematological  # h/o LLE DVT  resolved, no DVT on doppler .  on ASA at home, has been receiving heparin subQ  - hold for now given bleeding in oropharynx.       Infectious Disease  #Strep pneumoniae meningitis #R ear mastoiditis  -treatment as above with IV meropenem.   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 Patient reportedly woke up at 0130 AM on Monday with a R-sided headache. ENT noted R otitis externa, purulent discharge from R TM thought to be secondary to perforation, given ear gtt and prescribed cipro/dexamethasone gtt (she took 1 dose thus far). Daughter called EMS as later in the day patient complained of severe HA as well as neck pain, stated "I'm dying" and "my head is killing me." Patient reportedly without prior history of ear infections, no history of stroke or seizures. She reported to ENT earlier in the day a muffled sensation in the R ear [id number].     In the ED VS:  97.2  65-99  162-186/52-91  16-20  96-99%RA, received NS 500cc IVF, lorazepam 2mg IV x1 and morphine 4mg IV x1. Neurology was unsuccessful in obtaining a lumbar puncture. MICU initially was consulted for AMS, but was protecting airway and HDS. Pt underwent IR guided LP. CSF studies concerning for bacterial meningitis. Blood Cx and CSF Cx with gram+ cocci in pairs, strep pneumoniae. ID following and pt given 1 dose of Vancomycin and continued on meropenem.    On 3/16 early morning, Code Blue called for cardiac arrest. ROSC achieved and patient was transferred to MICU on levophed and propofol. Noted to have large amount of blood from mouth. Patient found to be with mildly increased wob with abdominal muscle involvement. However, pt maintained saturations in 90s on 100% FiO2 on ventilator and hemodynamically stable. Bedside POCUS showing b lines and lung sliding. grossly normal LV systolic function but with hypertrophy of the ventricles concerning for diastolic dysfunction. Continued sedation with propofol and fentanyl given pain a/w meningitis and CPR.         PAST MEDICAL & SURGICAL HISTORY:  Polycystic kidney disease    Glaucoma    Aneurysm    History of deep venous thrombosis (DVT) of distal vein of left lower extremity    Thrombocytopenia    Hypothyroid    Osteoporosis    Arthritis    PCP (pneumocystis carinii pneumonia)    C. difficile colitis    Type 2 diabetes mellitus, without long-term current use of insulin    Essential hypertension    H/O kidney transplant    H/O:     H/O eye surgery    S/P hysterectomy    H/O umbilical hernia repair      FAMILY HISTORY:  FH: diabetes mellitus (Sibling)        SOCIAL HISTORY:  unable to obtain given sedation       HOME MEDICATIONS:  refer to H&P    Allergies    apple (Unknown)  Benadryl (Unknown)  penicillin (Hives)  watermelon (Unknown)    Intolerances          REVIEW OF SYSTEMS:    [x ] Unable to assess ROS because of sedation     OBJECTIVE:  ICU Vital Signs Last 24 Hrs  T(C): 36.3 (16 Mar 2023 02:40), Max: 38.1 (15 Mar 2023 06:19)  T(F): 97.4 (16 Mar 2023 02:40), Max: 100.5 (15 Mar 2023 06:19)  HR: 97 (16 Mar 2023 02:40) (74 - 99)  BP: 167/60 (16 Mar 2023 02:40) (167/60 - 193/91)  BP(mean): --  ABP: --  ABP(mean): --  RR: 18 (16 Mar 2023 02:40) (18 - 30)  SpO2: 98% (16 Mar 2023 02:40) (97% - 98%)    O2 Parameters below as of 16 Mar 2023 02:40  Patient On (Oxygen Delivery Method): room air        -15 @ 07:01  -  03-16 @ 05:25  --------------------------------------------------------  IN: 0 mL / OUT: 500 mL / NET: -500 mL      CAPILLARY BLOOD GLUCOSE  182 (14 Mar 2023 19:34)      POCT Blood Glucose.: 112 mg/dL (16 Mar 2023 04:40)      CONSTITUTIONAL: intubated/sedated.   EYES: No conjunctival or scleral injection   RESP: mildly increased wob, CTA b/l without wheezes, crackles or rhonchi  CV: RRR, +S1S2, no MRG; no JVD; 1+ peripheral edema on LLE, no edema on RLE   GI: +distended, soft, no rebound, no guarding; fullness on LLQ abdomen.   hepatosplenomegaly; no hernia palpated  MSK: examination of the (head/neck/spine/ribs/pelvis, RUE, LUE, RLE, LLE) without misalignment  SKIN: No rashes or ulcers noted   NEURO: normal tone. unable to assess given sedation         HOSPITAL MEDICATIONS:  MEDICATIONS  (STANDING):  albuterol    0.083%. 2.5 milliGRAM(s) Nebulizer once  aspirin  chewable 81 milliGRAM(s) Enteral Tube daily  atovaquone  Suspension 1500 milliGRAM(s) Oral daily  ciprofloxacin  0.3% Ophthalmic Solution for Otic Use 4 Drop(s) Right Ear two times a day  cloNIDine Patch 0.2 mG/24Hr(s) 1 patch Transdermal every 7 days  cycloSPORINE  , modified (GENGRAF) Solution 75 milliGRAM(s) Oral every 12 hours  dextrose 5%. 1000 milliLiter(s) (75 mL/Hr) IV Continuous <Continuous>  dextrose 5%. 1000 milliLiter(s) (100 mL/Hr) IV Continuous <Continuous>  dextrose 5%. 1000 milliLiter(s) (50 mL/Hr) IV Continuous <Continuous>  dextrose 50% Injectable 25 Gram(s) IV Push once  dextrose 50% Injectable 12.5 Gram(s) IV Push once  dextrose 50% Injectable 25 Gram(s) IV Push once  glucagon  Injectable 1 milliGRAM(s) IntraMuscular once  heparin   Injectable 5000 Unit(s) SubCutaneous every 8 hours  insulin lispro (ADMELOG) corrective regimen sliding scale   SubCutaneous every 6 hours  levothyroxine 25 MICROGram(s) Oral daily  meropenem  IVPB 1000 milliGRAM(s) IV Intermittent every 12 hours  predniSONE   Tablet 5 milliGRAM(s) Oral daily  sodium chloride 0.9%. 1000 milliLiter(s) (50 mL/Hr) IV Continuous <Continuous>    MEDICATIONS  (PRN):  acetaminophen   Oral Liquid .. 650 milliGRAM(s) Enteral Tube every 6 hours PRN Temp greater or equal to 38C (100.4F), Mild Pain (1 - 3), Moderate Pain (4 - 6)  dextrose Oral Gel 15 Gram(s) Oral once PRN Blood Glucose LESS THAN 70 milliGRAM(s)/deciliter      LABS:                        10.6   22.19 )-----------( 163      ( 15 Mar 2023 05:45 )             34.3     Hgb Trend: 10.6<--, 10.1<--  03-15    138  |  101  |  81<H>  ----------------------------<  159<H>  4.4   |  19<L>  |  3.03<H>    Ca    9.5      15 Mar 2023 05:45  Phos  4.5     0315  Mg     2.40     15    TPro  6.5  /  Alb  3.3  /  TBili  0.6  /  DBili  x   /  AST  83<H>  /  ALT  64<H>  /  AlkPhos  78  15    Creatinine Trend: 3.03<--, 2.98<--  PT/INR - ( 14 Mar 2023 21:38 )   PT: 16.2 sec;   INR: 1.39 ratio         PTT - ( 14 Mar 2023 21:38 )  PTT:25.2 sec  Urinalysis Basic - ( 15 Mar 2023 03:43 )    Color: Yellow / Appearance: Clear / S.019 / pH: x  Gluc: x / Ketone: Negative  / Bili: Negative / Urobili: <2 mg/dL   Blood: x / Protein: 300 mg/dL / Nitrite: Negative   Leuk Esterase: Negative / RBC: 8 /HPF / WBC 2 /HPF   Sq Epi: x / Non Sq Epi: 1 /HPF / Bacteria: Negative        Venous Blood Gas:  03-15 @ 02:30  7.35/36/80/20/96.5  VBG Lactate: 2.2      MICROBIOLOGY:     Culture - CSF with Gram Stain (collected 15 Mar 2023 15:00)  Source: .CSF CSF  Gram Stain (15 Mar 2023 18:44):    Few polymorphonuclear leukocytes per low power field    Moderate Gram positive cocci in pairs per oil power field    Culture - Acid Fast - CSF (collected 15 Mar 2023 15:00)  Source: .CSF CSF    Culture - Blood (collected 15 Mar 2023 03:00)  Source: .Blood Blood-Venous  Gram Stain (15 Mar 2023 18:11):    Growth in aerobic and anaerobic bottles: Gram positive cocci in pairs  Preliminary Report (15 Mar 2023 18:11):    Growth in aerobic and anaerobic bottles: Gram positive cocci in pairs    Culture - Blood (collected 15 Mar 2023 02:30)  Source: .Blood Blood-Peripheral  Gram Stain (15 Mar 2023 19:26):    Growth in aerobic bottle: Gram positive cocci in pairs    Growth in anaerobic bottle: Gram positive cocci in pairs  Preliminary Report (15 Mar 2023 19:27):    Growth in aerobic bottle: Gram positive cocci in pairs    ***Blood Panel PCR results on this specimen are available    approximately 3 hours after the Gram stain result.***    Gram stain, PCR, and/or culture results may not always    correspond due to difference in methodologies.    ************************************************************    This PCR assay was performed by multiplex PCR. This    Assay tests for 66 bacterial and resistance gene targets.    Please refer to the [facility] test directory    at [email or web address] for details.    Growth in anaerobic bottle: Gram positive cocci in pairs  Organism: Blood Culture PCR (15 Mar 2023 20:17)  Organism: Blood Culture PCR (15 Mar 2023 20:17)        RADIOLOGY & ADDITIONAL TESTS:  3/15 US kidney: Mild fullness of the left lower quadrant transplant kidney collecting system. Increased renal cortical echogenicity, possibly renal parenchymal disease.    3/15 CXR: Frontal expiratory view of the chest shows the heart to be normal in size. The lungs show right perihilar infiltrate and there is no evidence of pneumothorax nor pleural effusion.    3/14   CT PERFUSION:  Limited by late injection of the contrast bolus. Cerebral blood flow less than 30% = 0 mL. No core infarct is predicted.  Tmax greater than 6 seconds = 0 mL. No brain parenchyma predicted to be at continued ischemic risk in the presence of neurologic symptoms.    CTA BRAIN:  No flow-limiting stenosis or vascular aneurysm. No AVM.    CTA NECK:  Calcified plaque at the origin of the left internal carotid artery results in approximately 40% short segment stenosis.  Otherwise no flow-limiting stenosis. No evidence for arterial dissection.        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 given Vancomycin and continued on meropenem per ID. Cardiac arrest 3/16 with ROSC, transferred to MICU for further management.       Plan:   Neurological  #Currently Intubated, sedated   - continue propofol and fentanyl      #Strep pneumo meningitis #Acute encephalopathy  severe HA, neck stiffness, fever, leukocytosis concerning for meningitis/encephalitis  CT head negative for CVA.   LP and BCx 3/15 both showing gram positive cocci in pairs, and + strep pneumoniae.   likely source of entry from R ear where pt c/o discomfort for several months.  patient is on cyclosporine, mycophenolate mofetil and prednisone due to kidney transplant and is immunocompromised   dental procedure 3/14 but less likely source   ID consulted, recs for continuing meropenem. s/p vancomycin 3/15.   Neurology consulted; recs for 24 hour EEG to rule out nonconvulsive seizure and continued Abx treatment   - c/w meropenem   - f/u BCx and LP sensitivities  - 24 hr EEG   - f/u MRI      Cardiovascular  #Cardiac Arrest  bradycardia to 30, pulseless, code blue was called 3/16   PEA arrest, unknown etiology. could have been from hypovolemia iso septic shock vs. cardiac thrombosis iso elevated troponin before the event (ESRD also contributing to it). elevated ST segment after ROSC  2 rounds of epi, pulseless VT, 200J shock given  ROSC at 5th pulse check, sinus tachy with HUMA. levo started  Blood in mouth likely from CPR efforts  - close cardiac monitoring      #Shock  likely iso sepsis given positive LP and BCx for strep pneumoniae  cardiogenic component given cardiac arrest  - c/w abx tx and levophed  - wean pressors as tolerated      #HTN  on amlodipine 5mg, losartan 100mg, torsemide 20mg, and clonidine 0.2 patch weekly  - hold for now given shock      Respiratory  Pt intubated during cardiac arrest, currently on ventilator  - f/u ABG and modify vent as appropriate  - Wean off of sedation as tolerated      ENT  # Tympanic membrane rupture    discharge from R ear, evaluated by ENT given dexamethasone and cipro 3/14  - c/w broad spectrum abx       Gastrointestinal  keep NPO for now  failed dysphagia screen, was pending SLP eval  NGT in place, in stomach on CXR 3/15, can start feed once clinically more stable  - f/u CXR  - keep NPO for now      Renal  #PCKD #ESRD #s/p Kidney Transplant   previously HD through LLE AVF but no HD since transplant  Cr 3.03, unclear baseline  - avoid nephrotoxic agent  - monitor urine output       Hematological  # h/o LLE DVT  resolved, no DVT on doppler .  on ASA at home, has been receiving heparin subQ  - hold for now given bleeding in oropharynx.       Infectious Disease  #Strep pneumoniae meningitis #R ear mastoiditis  -treatment as above with IV meropenem.   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 Patient reportedly woke up at 0130 AM on Monday with a R-sided headache. ENT noted R otitis externa, purulent discharge from R TM thought to be secondary to perforation, given ear gtt and prescribed cipro/dexamethasone gtt (she took 1 dose thus far). Daughter called EMS as later in the day patient complained of severe HA as well as neck pain, stated "I'm dying" and "my head is killing me." Patient reportedly without prior history of ear infections, no history of stroke or seizures. She reported to ENT earlier in the day a muffled sensation in the R ear [id number].     In the ED VS:  97.2  65-99  162-186/52-91  16-20  96-99%RA, received NS 500cc IVF, lorazepam 2mg IV x1 and morphine 4mg IV x1. Neurology was unsuccessful in obtaining a lumbar puncture. MICU initially was consulted for AMS, but was protecting airway and HDS. Pt underwent IR guided LP. CSF studies concerning for bacterial meningitis. Blood Cx and CSF Cx with gram+ cocci in pairs, strep pneumoniae. ID following and pt given 1 dose of Vancomycin and continued on meropenem.    On 3/16 early morning, Code Blue called for cardiac arrest. ROSC achieved and patient was transferred to MICU on levophed and propofol. Noted to have large amount of blood from mouth. Patient found to be with mildly increased wob with abdominal muscle involvement. However, pt maintained saturations in 90s on 100% FiO2 on ventilator and hemodynamically stable. Bedside POCUS showing b lines and lung sliding. grossly normal LV systolic function but with hypertrophy of the ventricles concerning for diastolic dysfunction. Continued sedation with propofol and fentanyl given pain a/w meningitis and CPR.         PAST MEDICAL & SURGICAL HISTORY:  Polycystic kidney disease    Glaucoma    Aneurysm    History of deep venous thrombosis (DVT) of distal vein of left lower extremity    Thrombocytopenia    Hypothyroid    Osteoporosis    Arthritis    PCP (pneumocystis carinii pneumonia)    C. difficile colitis    Type 2 diabetes mellitus, without long-term current use of insulin    Essential hypertension    H/O kidney transplant    H/O:     H/O eye surgery    S/P hysterectomy    H/O umbilical hernia repair      FAMILY HISTORY:  FH: diabetes mellitus (Sibling)        SOCIAL HISTORY:  unable to obtain given sedation       HOME MEDICATIONS:  refer to H&P    Allergies    apple (Unknown)  Benadryl (Unknown)  penicillin (Hives)  watermelon (Unknown)    Intolerances          REVIEW OF SYSTEMS:    [x ] Unable to assess ROS because of sedation     OBJECTIVE:  ICU Vital Signs Last 24 Hrs  T(C): 36.3 (16 Mar 2023 02:40), Max: 38.1 (15 Mar 2023 06:19)  T(F): 97.4 (16 Mar 2023 02:40), Max: 100.5 (15 Mar 2023 06:19)  HR: 97 (16 Mar 2023 02:40) (74 - 99)  BP: 167/60 (16 Mar 2023 02:40) (167/60 - 193/91)  BP(mean): --  ABP: --  ABP(mean): --  RR: 18 (16 Mar 2023 02:40) (18 - 30)  SpO2: 98% (16 Mar 2023 02:40) (97% - 98%)    O2 Parameters below as of 16 Mar 2023 02:40  Patient On (Oxygen Delivery Method): room air        -15 @ 07:01  -  03-16 @ 05:25  --------------------------------------------------------  IN: 0 mL / OUT: 500 mL / NET: -500 mL      CAPILLARY BLOOD GLUCOSE  182 (14 Mar 2023 19:34)      POCT Blood Glucose.: 112 mg/dL (16 Mar 2023 04:40)      CONSTITUTIONAL: intubated/sedated.   EYES: No conjunctival or scleral injection   RESP: mildly increased wob, CTA b/l without wheezes, crackles or rhonchi  CV: RRR, +S1S2, no MRG; no JVD; 1+ peripheral edema on LLE, no edema on RLE   GI: +distended, soft, no rebound, no guarding; fullness on LLQ abdomen.   hepatosplenomegaly; no hernia palpated  MSK: examination of the (head/neck/spine/ribs/pelvis, RUE, LUE, RLE, LLE) without misalignment  SKIN: No rashes or ulcers noted   NEURO: normal tone. unable to assess given sedation         HOSPITAL MEDICATIONS:  MEDICATIONS  (STANDING):  albuterol    0.083%. 2.5 milliGRAM(s) Nebulizer once  aspirin  chewable 81 milliGRAM(s) Enteral Tube daily  atovaquone  Suspension 1500 milliGRAM(s) Oral daily  ciprofloxacin  0.3% Ophthalmic Solution for Otic Use 4 Drop(s) Right Ear two times a day  cloNIDine Patch 0.2 mG/24Hr(s) 1 patch Transdermal every 7 days  cycloSPORINE  , modified (GENGRAF) Solution 75 milliGRAM(s) Oral every 12 hours  dextrose 5%. 1000 milliLiter(s) (75 mL/Hr) IV Continuous <Continuous>  dextrose 5%. 1000 milliLiter(s) (100 mL/Hr) IV Continuous <Continuous>  dextrose 5%. 1000 milliLiter(s) (50 mL/Hr) IV Continuous <Continuous>  dextrose 50% Injectable 25 Gram(s) IV Push once  dextrose 50% Injectable 12.5 Gram(s) IV Push once  dextrose 50% Injectable 25 Gram(s) IV Push once  glucagon  Injectable 1 milliGRAM(s) IntraMuscular once  heparin   Injectable 5000 Unit(s) SubCutaneous every 8 hours  insulin lispro (ADMELOG) corrective regimen sliding scale   SubCutaneous every 6 hours  levothyroxine 25 MICROGram(s) Oral daily  meropenem  IVPB 1000 milliGRAM(s) IV Intermittent every 12 hours  predniSONE   Tablet 5 milliGRAM(s) Oral daily  sodium chloride 0.9%. 1000 milliLiter(s) (50 mL/Hr) IV Continuous <Continuous>    MEDICATIONS  (PRN):  acetaminophen   Oral Liquid .. 650 milliGRAM(s) Enteral Tube every 6 hours PRN Temp greater or equal to 38C (100.4F), Mild Pain (1 - 3), Moderate Pain (4 - 6)  dextrose Oral Gel 15 Gram(s) Oral once PRN Blood Glucose LESS THAN 70 milliGRAM(s)/deciliter      LABS:                        10.6   22.19 )-----------( 163      ( 15 Mar 2023 05:45 )             34.3     Hgb Trend: 10.6<--, 10.1<--  03-15    138  |  101  |  81<H>  ----------------------------<  159<H>  4.4   |  19<L>  |  3.03<H>    Ca    9.5      15 Mar 2023 05:45  Phos  4.5     0315  Mg     2.40     15    TPro  6.5  /  Alb  3.3  /  TBili  0.6  /  DBili  x   /  AST  83<H>  /  ALT  64<H>  /  AlkPhos  78  15    Creatinine Trend: 3.03<--, 2.98<--  PT/INR - ( 14 Mar 2023 21:38 )   PT: 16.2 sec;   INR: 1.39 ratio         PTT - ( 14 Mar 2023 21:38 )  PTT:25.2 sec  Urinalysis Basic - ( 15 Mar 2023 03:43 )    Color: Yellow / Appearance: Clear / S.019 / pH: x  Gluc: x / Ketone: Negative  / Bili: Negative / Urobili: <2 mg/dL   Blood: x / Protein: 300 mg/dL / Nitrite: Negative   Leuk Esterase: Negative / RBC: 8 /HPF / WBC 2 /HPF   Sq Epi: x / Non Sq Epi: 1 /HPF / Bacteria: Negative        Venous Blood Gas:  03-15 @ 02:30  7.35/36/80/20/96.5  VBG Lactate: 2.2      MICROBIOLOGY:     Culture - CSF with Gram Stain (collected 15 Mar 2023 15:00)  Source: .CSF CSF  Gram Stain (15 Mar 2023 18:44):    Few polymorphonuclear leukocytes per low power field    Moderate Gram positive cocci in pairs per oil power field    Culture - Acid Fast - CSF (collected 15 Mar 2023 15:00)  Source: .CSF CSF    Culture - Blood (collected 15 Mar 2023 03:00)  Source: .Blood Blood-Venous  Gram Stain (15 Mar 2023 18:11):    Growth in aerobic and anaerobic bottles: Gram positive cocci in pairs  Preliminary Report (15 Mar 2023 18:11):    Growth in aerobic and anaerobic bottles: Gram positive cocci in pairs    Culture - Blood (collected 15 Mar 2023 02:30)  Source: .Blood Blood-Peripheral  Gram Stain (15 Mar 2023 19:26):    Growth in aerobic bottle: Gram positive cocci in pairs    Growth in anaerobic bottle: Gram positive cocci in pairs  Preliminary Report (15 Mar 2023 19:27):    Growth in aerobic bottle: Gram positive cocci in pairs    ***Blood Panel PCR results on this specimen are available    approximately 3 hours after the Gram stain result.***    Gram stain, PCR, and/or culture results may not always    correspond due to difference in methodologies.    ************************************************************    This PCR assay was performed by multiplex PCR. This    Assay tests for 66 bacterial and resistance gene targets.    Please refer to the [facility] test directory    at [email or web address] for details.    Growth in anaerobic bottle: Gram positive cocci in pairs  Organism: Blood Culture PCR (15 Mar 2023 20:17)  Organism: Blood Culture PCR (15 Mar 2023 20:17)        RADIOLOGY & ADDITIONAL TESTS:  3/15 US kidney: Mild fullness of the left lower quadrant transplant kidney collecting system. Increased renal cortical echogenicity, possibly renal parenchymal disease.    3/15 CXR: Frontal expiratory view of the chest shows the heart to be normal in size. The lungs show right perihilar infiltrate and there is no evidence of pneumothorax nor pleural effusion.    3/14   CT PERFUSION:  Limited by late injection of the contrast bolus. Cerebral blood flow less than 30% = 0 mL. No core infarct is predicted.  Tmax greater than 6 seconds = 0 mL. No brain parenchyma predicted to be at continued ischemic risk in the presence of neurologic symptoms.    CTA BRAIN:  No flow-limiting stenosis or vascular aneurysm. No AVM.    CTA NECK:  Calcified plaque at the origin of the left internal carotid artery results in approximately 40% short segment stenosis.  Otherwise no flow-limiting stenosis. No evidence for arterial dissection.        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 given Vancomycin and continued on meropenem per ID. Cardiac arrest 3/16 with ROSC, transferred to MICU for further management.       Plan:   Neurological  #Currently Intubated, sedated   - continue propofol and fentanyl      #Strep pneumo meningitis #Acute encephalopathy  severe HA, neck stiffness, fever, leukocytosis concerning for meningitis/encephalitis  CT head negative for CVA.   LP and BCx 3/15 both showing gram positive cocci in pairs, and + strep pneumoniae.   likely source of entry from R ear where pt c/o discomfort for several months.  patient is on cyclosporine, mycophenolate mofetil and prednisone due to kidney transplant and is immunocompromised   dental procedure 3/14 but less likely source   ID consulted, recs for continuing meropenem. s/p vancomycin 3/15.   Neurology consulted; recs for 24 hour EEG to rule out nonconvulsive seizure and continued Abx treatment   - c/w meropenem   - f/u BCx and LP sensitivities  - 24 hr EEG   - f/u MRI      Cardiovascular  #Cardiac Arrest  bradycardia to 30, pulseless, code blue was called 3/16   PEA arrest, unknown etiology. could have been from hypovolemia iso septic shock vs. cardiac thrombosis iso elevated troponin before the event (ESRD also contributing to it). elevated ST segment after ROSC  2 rounds of epi, pulseless VT, 200J shock given  ROSC at 5th pulse check, sinus tachy with HUMA. levo started  Blood in mouth likely from CPR efforts  - close cardiac monitoring      #Shock  likely iso sepsis given positive LP and BCx for strep pneumoniae  cardiogenic component given cardiac arrest  - c/w abx tx and levophed  - wean pressors as tolerated      #HTN  on amlodipine 5mg, losartan 100mg, torsemide 20mg, and clonidine 0.2 patch weekly  - hold for now given shock      Respiratory  Pt intubated during cardiac arrest, currently on ventilator  - f/u ABG and modify vent as appropriate  - Wean off of sedation as tolerated      ENT  # Tympanic membrane rupture    discharge from R ear, evaluated by ENT given dexamethasone and cipro 3/14  - c/w broad spectrum abx       Gastrointestinal  keep NPO for now  failed dysphagia screen, was pending SLP eval  NGT in place, in stomach on CXR 3/15, can start feed once clinically more stable  - f/u CXR  - keep NPO for now      Renal  #PCKD #ESRD #s/p Kidney Transplant   previously HD through LLE AVF but no HD since transplant  Cr 3.03, unclear baseline  - avoid nephrotoxic agent  - monitor urine output       Hematological  # h/o LLE DVT  resolved, no DVT on doppler .  on ASA at home, has been receiving heparin subQ  - hold for now given bleeding in oropharynx.       Infectious Disease  #Strep pneumoniae meningitis #R ear mastoiditis  -treatment as above with IV meropenem.   -f/u culture sensitivities   - f/u ID recs     #h/o PCP  - atorvaquone at home, continue      Endocrinological  #Hypothyroidism  last TSH 2022 >10  TSH 3/15 4.73  - c/w home levothyroxine     #Diabetes mellitus Type II  hold po med  - ISS    Ethics  FULL, GOC ongoing    Attending Attestation  Pt seen and examined with MICU team.  Agree with above except as noted below  79 yo with h/o as above, now with disseminated Strep Pneumo infection, bacteremia and meningitis.  Had worsening MS and had code following brief bradycardic episode.  Rhythm was PEA/Asystole with 1 shock delivered for pulseless VT per code team.  She is intubated and on vasopressor.  Does require sedation for vent dyssynchrony.  Noted to have blood from oropharynx uncertain etiology first noted during code.  POCUS bilat lung sliding and B lines scattered.  Cor with grossly nl  LVsF and signif LVH.  Pneumococcal bacteremia and meningitis: continue Meropenem; hold steroid as abx already given  Shock state appears likely distributive and poss cardiovascular with signif diastolic dysfunction assumed   ESRD s/p renal tpl: will have to monitor for UOP, snider, check creatinine (last cr 3.0 earlier)  Continue lung protective vent settings, f/u ABGs and make necessary vent adjustment; check CXR for ETT  Holding A/C dvt ppx iso oropharyngeal bleed.  heparin once cleared; h/o DVT cleared   GOC: full code; ongoing GOC discussions with family as evaluation proceeds  Pt critically ill requiring frequent bedside visits and therapy changes.  60 min cc time spent excluding time spent on separate procedures

## 2023-03-16 NOTE — CHART NOTE - NSCHARTNOTEFT_GEN_A_CORE
EEG preliminary read (not final) on the initial recording to 21:00    No seizures recorded.  Interictal repetitive central max spike discharges noted.  Anticonvulsant prophylaxis may be considered.  Initially attenuated background then burst suppression  Final report to follow tomorrow morning after completion of study.    [facility] EEG Reading Room Ph#: [phone]  Epilepsy Answering Service after 5PM and before 8:30AM: Ph#: [phone] EEG preliminary read (not final) on the initial recording to 21:00    No seizures recorded.  Initially attenuated background then burst suppression  Interictal repetitive central max spike discharges noted.  Anticonvulsant prophylaxis may be considered.    Final report to follow tomorrow morning after completion of study.    [facility] EEG Reading Room Ph#: [phone]  Epilepsy Answering Service after 5PM and before 8:30AM: Ph#: [phone]

## 2023-03-16 NOTE — PROGRESS NOTE ADULT - ATTENDING COMMENTS
77 y/o F with a PMH as above here with altered mental status, noted to have a disseminated strep infection.  Overnight, the patient had a cardiac arrest, requiring intubation and mechanical ventilation after ROSC was achieved.  Follow up ABG and CXR post intubation.  Cont ARDSnet ventilation strategy.  Serial ABGs.  Septic shock secondary to strep bacteremia.  Cont abx and follow up surveillance cultures.  Strep meningitis, cont abx and will add steroids.  Follow up ID.  CHeck an ECHO.  NAMAN, likely hemodynamically mediated, attempt fluid challenge and will likely need CRRT.  Follow up ENT. Patient examined and case reviewed in detail on bedside rounds. Frequent bedside visits with therapy change today.  Prognosis guarded.

## 2023-03-16 NOTE — PROGRESS NOTE ADULT - ASSESSMENT
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 Went to ENT and was diagnosed with R otitis externa secondary to perforated TM. Prescribed cipro/dexamethasone.  Worsening headache and EMS called. Admitted 3/14.  CT head negative.  NAMAN.  Started on vanc/meropenem.  LP attempted but unsuccessful.  Awaiting IR to obtain LP    Immunocompromised patient with altered mental status and headache.  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 Went to ENT and was diagnosed with R otitis externa secondary to perforated TM. Prescribed cipro/dexamethasone.  Worsening headache and EMS called. Admitted 3/14.  CT head negative.  NAMAN.  Started on vanc/meropenem.  LP attempted but unsuccessful.  Awaiting IR to obtain LP    Immunocompromised patient with altered mental status and headache.  CT no space occupying lesions  Pneumococcal Meningits    - Agree with change to ceftriaxone 2 grma iv q 12  - Continue vancomycin pending sensitivities to pneumococcus from CSF  -If dexamethasone was not started prior to or at the time of starting antibiotics, it is unliekly to be of any benefit    NAMAN  - Dec cr 2.88  - monitor renal function  - to adjust antimicrobials as it changes    Leukocytosis  - continue to trend wbc  - f/u all cultures    Mepron prophylaxis    ENT following

## 2023-03-16 NOTE — PROGRESS NOTE ADULT - SUBJECTIVE AND OBJECTIVE BOX
Follow Up:      Inverval History/ROS:Patient is a 78y old  Female who presents with a chief complaint of AMS/R ear infxn (16 Mar 2023 14:12)    On pressors  No fever    Allergies    apple (Unknown)  Benadryl (Unknown)  penicillin (Hives)  watermelon (Unknown)    Intolerances        ANTIMICROBIALS:  atovaquone  Suspension 1500 daily  cefTRIAXone   IVPB 2000 every 12 hours      OTHER MEDS:  acetaminophen   Oral Liquid .. 650 milliGRAM(s) Enteral Tube every 6 hours PRN  albuterol    0.083%. 2.5 milliGRAM(s) Nebulizer once  aspirin  chewable 81 milliGRAM(s) Enteral Tube daily  chlorhexidine 0.12% Liquid 15 milliLiter(s) Oral Mucosa two times a day  chlorhexidine 2% Cloths 1 Application(s) Topical <User Schedule>  ciprofloxacin  0.3% Ophthalmic Solution for Otic Use 4 Drop(s) Right Ear two times a day  dexAMETHasone  Injectable 6 milliGRAM(s) IV Push every 6 hours  dextrose 5%. 1000 milliLiter(s) IV Continuous <Continuous>  dextrose 5%. 1000 milliLiter(s) IV Continuous <Continuous>  dextrose 5%. 1000 milliLiter(s) IV Continuous <Continuous>  dextrose 50% Injectable 25 Gram(s) IV Push once  dextrose 50% Injectable 12.5 Gram(s) IV Push once  dextrose 50% Injectable 25 Gram(s) IV Push once  dextrose Oral Gel 15 Gram(s) Oral once PRN  fentaNYL   Infusion. 0.5 MICROgram(s)/kG/Hr IV Continuous <Continuous>  glucagon  Injectable 1 milliGRAM(s) IntraMuscular once  insulin lispro (ADMELOG) corrective regimen sliding scale   SubCutaneous every 6 hours  levothyroxine Injectable 18 MICROGram(s) IV Push at bedtime  norepinephrine Infusion 0.05 MICROgram(s)/kG/Min IV Continuous <Continuous>  propofol Infusion 10 MICROgram(s)/kG/Min IV Continuous <Continuous>  sodium chloride 0.9%. 1000 milliLiter(s) IV Continuous <Continuous>      Vital Signs Last 24 Hrs  T(C): 35.3 (16 Mar 2023 13:30), Max: 36.3 (16 Mar 2023 02:40)  T(F): 95.5 (16 Mar 2023 13:30), Max: 97.4 (16 Mar 2023 02:40)  HR: 66 (16 Mar 2023 14:38) (65 - 104)  BP: 109/64 (16 Mar 2023 07:00) (102/65 - 190/107)  BP(mean): 75 (16 Mar 2023 07:00) (72 - 102)  RR: 16 (16 Mar 2023 14:38) (16 - 66)  SpO2: 92% (16 Mar 2023 14:38) (91% - 100%)    Parameters below as of 16 Mar 2023 13:00  Patient On (Oxygen Delivery Method): ventilator    O2 Concentration (%): 100    PHYSICAL EXAM:  General: [ ] non-toxic  HEAD/EYES: [ ] PERRL [ ] white sclera [ ] icterus  ENT:  [ ] normal [ ] supple [ ] thrush [ ] pharyngeal exudate  Cardiovascular:   [ ] murmur [ ] normal [ ] PPM/AICD  Respiratory:  [ ] clear to ausculation bilaterally  GI:  [ ] soft, non-tender, normal bowel sounds  :  [ ] snider [ ] no CVA tenderness   Musculoskeletal:  [ ] no synovitis  Neurologic:  [ ] non-focal exam   Skin:  [ ] no rash  Lymph: [ ] no lymphadenopathy  Psychiatric:  [ ] appropriate affect [ ] alert & oriented  Lines:  [ ] no phlebitis [ ] central line                                7.9    9.29  )-----------( 150      ( 16 Mar 2023 09:45 )             25.6       03-16    141  |  107  |  79<H>  ----------------------------<  109<H>  4.6   |  17<L>  |  3.10<H>    Ca    8.1<L>      16 Mar 2023 09:45  Phos  6.2     03-16  Mg     2.10     03-16    TPro  4.2<L>  /  Alb  2.1<L>  /  TBili  1.0  /  DBili  x   /  AST  145<H>  /  ALT  79<H>  /  AlkPhos  91  03-16      Urinalysis Basic - ( 15 Mar 2023 03:43 )    Color: Yellow / Appearance: Clear / S.019 / pH: x  Gluc: x / Ketone: Negative  / Bili: Negative / Urobili: <2 mg/dL   Blood: x / Protein: 300 mg/dL / Nitrite: Negative   Leuk Esterase: Negative / RBC: 8 /HPF / WBC 2 /HPF   Sq Epi: x / Non Sq Epi: 1 /HPF / Bacteria: Negative        MICROBIOLOGY:Culture Results:   No growth (03-15-23 @ 15:00)  Culture Results:   Testing in progress (03-15-23 @ 15:00)  Culture Results:   No growth (03-15-23 @ 03:43)  Culture Results:   Growth in aerobic and anaerobic bottles: Streptococcus pneumoniae  See previous culture [id number] (03-15-23 @ 03:00)  Culture Results:   Growth in aerobic and anaerobic bottles: Streptococcus pneumoniae  ***Blood Panel PCR results on this specimen are available  approximately 3 hours after the Gram stain result.***  Gram stain, PCR, and/or culture results may not always  correspond due to difference in methodologies.  ************************************************************  This PCR assay was performed by multiplex PCR. This  Assay tests for 66 bacterial and resistance gene targets.  Please refer to the [facility] test directory  at [email or web address] for details. (03-15-23 @ 02:30)      RADIOLOGY:

## 2023-03-16 NOTE — PROVIDER CONTACT NOTE (CRITICAL VALUE NOTIFICATION) - ASSESSMENT
Patient currently in IR.
Patient currently in IR recovery.
Pt on IV antibiotics, afebrile.
Patient currently in IR.
No acute distress noted.

## 2023-03-16 NOTE — PROGRESS NOTE ADULT - ATTENDING COMMENTS
Per MICU team and neurology resident overnight - ROSC achieved after 13 minutes.  Initially, she was bradycardic on monitor and arrest was witnessed.      A/P  Ms. Negron is a 79 yo woman with h/o renal transplant on immunosuppressive therapy with bacterial ana-mastoiditis and meningitis and anoxic ischemic encephalopathy.   Abx per primary team and ID.  Imaging when stable enough per ENT recommendations.   24 hour EEG monitoring to exclude focal nonmotor with impaired awareness seizures.  Too early to assess prognosis with regard to the anoxic ischemic encephalopathy and she is on sedation also now.   D/W MICU team and family.   Thank you    There is a high probability of sudden, clinically significant, or life threatening deterioration in the patient’s condition which requires the highest level of physician preparedness to intervene urgently.  Critical care time:  55 minutes. Per MICU team and neurology resident overnight - ROSC achieved after 13 minutes.  Initially, she was bradycardic on monitor and arrest was witnessed.      A/P  Ms. 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   Abx per primary team and ID.  Imaging when stable enough per ENT recommendations.   24 hour EEG monitoring to exclude focal nonmotor with impaired awareness seizures.  Too early to assess prognosis with regard to the anoxic ischemic encephalopathy and she is on sedation also now.   D/W MICU team and family.   Thank you    There is a high probability of sudden, clinically significant, or life threatening deterioration in the patient’s condition which requires the highest level of physician preparedness to intervene urgently.  Critical care time:  55 minutes.

## 2023-03-17 NOTE — DIETITIAN INITIAL EVALUATION ADULT - ENTERAL
--- Decrease enteral goal with Nepro to 35mL/hr continuosly  provides:  840mL total volume  1512 kcals  68g protein  611mL free water

## 2023-03-17 NOTE — PROGRESS NOTE ADULT - SUBJECTIVE AND OBJECTIVE BOX
ENT ISSUE/POD:AMS / R ear infx    HPI: Pt seen and examined at bedside. PT intubated and sedated. unable to illicit history.          PAST MEDICAL & SURGICAL HISTORY:  Polycystic kidney disease      Glaucoma      Aneurysm      History of deep venous thrombosis (DVT) of distal vein of left lower extremity      Thrombocytopenia      Hypothyroid      Osteoporosis      Arthritis      PCP (pneumocystis carinii pneumonia)      C. difficile colitis      Type 2 diabetes mellitus, without long-term current use of insulin      Essential hypertension      H/O kidney transplant      H/O:       H/O eye surgery      S/P hysterectomy      H/O umbilical hernia repair        Allergies    apple (Unknown)  Benadryl (Unknown)  penicillin (Hives)  watermelon (Unknown)    Intolerances      MEDICATIONS  (STANDING):  albuterol    0.083%. 2.5 milliGRAM(s) Nebulizer once  aspirin  chewable 81 milliGRAM(s) Oral daily  atovaquone  Suspension 1500 milliGRAM(s) Oral daily  atropine Injectable 1 milliGRAM(s) IV Push once  cefTRIAXone   IVPB 2000 milliGRAM(s) IV Intermittent every 12 hours  chlorhexidine 0.12% Liquid 15 milliLiter(s) Oral Mucosa two times a day  chlorhexidine 2% Cloths 1 Application(s) Topical <User Schedule>  ciprofloxacin  0.3% Ophthalmic Solution for Otic Use 4 Drop(s) Right Ear two times a day  CRRT Treatment    <Continuous>  dextrose 5% 1000 milliLiter(s) (100 mL/Hr) IV Continuous <Continuous>  dextrose 5%. 1000 milliLiter(s) (100 mL/Hr) IV Continuous <Continuous>  dextrose 5%. 1000 milliLiter(s) (50 mL/Hr) IV Continuous <Continuous>  dextrose 50% Injectable 25 Gram(s) IV Push once  dextrose 50% Injectable 12.5 Gram(s) IV Push once  dextrose 50% Injectable 25 Gram(s) IV Push once  fentaNYL   Infusion. 0.5 MICROgram(s)/kG/Hr (1.89 mL/Hr) IV Continuous <Continuous>  fludroCORTISONE 0.05 milliGRAM(s) Oral daily  glucagon  Injectable 1 milliGRAM(s) IntraMuscular once  insulin lispro (ADMELOG) corrective regimen sliding scale   SubCutaneous every 6 hours  levETIRAcetam  IVPB 250 milliGRAM(s) IV Intermittent every 12 hours  levothyroxine Injectable 18 MICROGram(s) IV Push at bedtime  methylPREDNISolone sodium succinate Injectable 10 milliGRAM(s) IV Push every 6 hours  norepinephrine Infusion 0.05 MICROgram(s)/kG/Min (2.91 mL/Hr) IV Continuous <Continuous>  petrolatum Ophthalmic Ointment 1 Application(s) Both EYES every 12 hours  PrismaSATE Dialysate BGK 4 / 2.5 5000 milliLiter(s) (1000 mL/Hr) CRRT <Continuous>  PrismaSOL Filtration BGK 4 / 2.5 5000 milliLiter(s) (1000 mL/Hr) CRRT <Continuous>  PrismaSOL Filtration BGK 4 / 2.5 5000 milliLiter(s) (800 mL/Hr) CRRT <Continuous>  propofol Infusion 10 MICROgram(s)/kG/Min (2.27 mL/Hr) IV Continuous <Continuous>    MEDICATIONS  (PRN):  acetaminophen   Oral Liquid .. 650 milliGRAM(s) Enteral Tube every 6 hours PRN Temp greater or equal to 38C (100.4F), Mild Pain (1 - 3), Moderate Pain (4 - 6)  dextrose Oral Gel 15 Gram(s) Oral once PRN Blood Glucose LESS THAN 70 milliGRAM(s)/deciliter         ROS:   unable to asses     Vital Signs Last 24 Hrs  T(C): 35.1 (17 Mar 2023 16:00), Max: 35.7 (16 Mar 2023 20:00)  T(F): 95.1 (17 Mar 2023 16:00), Max: 96.3 (16 Mar 2023 20:00)  HR: 43 (17 Mar 2023 18:00) (42 - 92)  RR: 22 (17 Mar 2023 18:00) (16 - 90)  SpO2: 100% (17 Mar 2023 18:00) (97% - 100%)    Parameters below as of 17 Mar 2023 17:00  Patient On (Oxygen Delivery Method): ventilator    O2 Concentration (%): 40                          8.5    11.89 )-----------( 139      ( 17 Mar 2023 11:28 )             27.8    03-17    139  |  100  |  90<H>  ----------------------------<  245<H>  5.1   |  16<L>  |  3.86<H>    Ca    8.1<L>      17 Mar 2023 11:28  Phos  7.8     03-17  Mg     2.20         TPro  4.6<L>  /  Alb  2.3<L>  /  TBili  0.5  /  DBili  x   /  AST  75<H>  /  ALT  66<H>  /  AlkPhos  68         PHYSICAL EXAM:  Gen: NAD  Skin: No rashes, bruises, or lesions  Head: Normocephalic, Atraumatic  Face: no edema, erythema, or fluctuance. Parotid glands soft without mass  Eyes: no scleral injection  Ears    Left -  + ear canal hematoma noted, that popped on otoscope insertion + blood EAC, + hemotympanum with perforation  Nose: Nares bilaterally patent, no discharge  Mouth: intubated   Lymphatic: No lymphadenopathy            ENT ISSUE/POD:AMS / R ear infx    HPI: Pt seen and examined at bedside. PT intubated and sedated. unable to obtain history.          PAST MEDICAL & SURGICAL HISTORY:  Polycystic kidney disease      Glaucoma      Aneurysm      History of deep venous thrombosis (DVT) of distal vein of left lower extremity      Thrombocytopenia      Hypothyroid      Osteoporosis      Arthritis      PCP (pneumocystis carinii pneumonia)      C. difficile colitis      Type 2 diabetes mellitus, without long-term current use of insulin      Essential hypertension      H/O kidney transplant      H/O:       H/O eye surgery      S/P hysterectomy      H/O umbilical hernia repair        Allergies    apple (Unknown)  Benadryl (Unknown)  penicillin (Hives)  watermelon (Unknown)    Intolerances      MEDICATIONS  (STANDING):  albuterol    0.083%. 2.5 milliGRAM(s) Nebulizer once  aspirin  chewable 81 milliGRAM(s) Oral daily  atovaquone  Suspension 1500 milliGRAM(s) Oral daily  atropine Injectable 1 milliGRAM(s) IV Push once  cefTRIAXone   IVPB 2000 milliGRAM(s) IV Intermittent every 12 hours  chlorhexidine 0.12% Liquid 15 milliLiter(s) Oral Mucosa two times a day  chlorhexidine 2% Cloths 1 Application(s) Topical <User Schedule>  ciprofloxacin  0.3% Ophthalmic Solution for Otic Use 4 Drop(s) Right Ear two times a day  CRRT Treatment    <Continuous>  dextrose 5% 1000 milliLiter(s) (100 mL/Hr) IV Continuous <Continuous>  dextrose 5%. 1000 milliLiter(s) (100 mL/Hr) IV Continuous <Continuous>  dextrose 5%. 1000 milliLiter(s) (50 mL/Hr) IV Continuous <Continuous>  dextrose 50% Injectable 25 Gram(s) IV Push once  dextrose 50% Injectable 12.5 Gram(s) IV Push once  dextrose 50% Injectable 25 Gram(s) IV Push once  fentaNYL   Infusion. 0.5 MICROgram(s)/kG/Hr (1.89 mL/Hr) IV Continuous <Continuous>  fludroCORTISONE 0.05 milliGRAM(s) Oral daily  glucagon  Injectable 1 milliGRAM(s) IntraMuscular once  insulin lispro (ADMELOG) corrective regimen sliding scale   SubCutaneous every 6 hours  levETIRAcetam  IVPB 250 milliGRAM(s) IV Intermittent every 12 hours  levothyroxine Injectable 18 MICROGram(s) IV Push at bedtime  methylPREDNISolone sodium succinate Injectable 10 milliGRAM(s) IV Push every 6 hours  norepinephrine Infusion 0.05 MICROgram(s)/kG/Min (2.91 mL/Hr) IV Continuous <Continuous>  petrolatum Ophthalmic Ointment 1 Application(s) Both EYES every 12 hours  PrismaSATE Dialysate BGK 4 / 2.5 5000 milliLiter(s) (1000 mL/Hr) CRRT <Continuous>  PrismaSOL Filtration BGK 4 / 2.5 5000 milliLiter(s) (1000 mL/Hr) CRRT <Continuous>  PrismaSOL Filtration BGK 4 / 2.5 5000 milliLiter(s) (800 mL/Hr) CRRT <Continuous>  propofol Infusion 10 MICROgram(s)/kG/Min (2.27 mL/Hr) IV Continuous <Continuous>    MEDICATIONS  (PRN):  acetaminophen   Oral Liquid .. 650 milliGRAM(s) Enteral Tube every 6 hours PRN Temp greater or equal to 38C (100.4F), Mild Pain (1 - 3), Moderate Pain (4 - 6)  dextrose Oral Gel 15 Gram(s) Oral once PRN Blood Glucose LESS THAN 70 milliGRAM(s)/deciliter         ROS:   unable to asses     Vital Signs Last 24 Hrs  T(C): 35.1 (17 Mar 2023 16:00), Max: 35.7 (16 Mar 2023 20:00)  T(F): 95.1 (17 Mar 2023 16:00), Max: 96.3 (16 Mar 2023 20:00)  HR: 43 (17 Mar 2023 18:00) (42 - 92)  RR: 22 (17 Mar 2023 18:00) (16 - 90)  SpO2: 100% (17 Mar 2023 18:00) (97% - 100%)    Parameters below as of 17 Mar 2023 17:00  Patient On (Oxygen Delivery Method): ventilator    O2 Concentration (%): 40                          8.5    11.89 )-----------( 139      ( 17 Mar 2023 11:28 )             27.8    03-17    139  |  100  |  90<H>  ----------------------------<  245<H>  5.1   |  16<L>  |  3.86<H>    Ca    8.1<L>      17 Mar 2023 11:28  Phos  7.8     03-17  Mg     2.20         TPro  4.6<L>  /  Alb  2.3<L>  /  TBili  0.5  /  DBili  x   /  AST  75<H>  /  ALT  66<H>  /  AlkPhos  68         PHYSICAL EXAM:  Gen: intubated and sedated   Skin: No rashes, bruises, or lesions  Head: Normocephalic, Atraumatic  Face: no edema, erythema, or fluctuance. Parotid glands soft without mass  Eyes: no scleral injection  Ears    Left -  + ear canal hematoma noted, that popped on otoscope insertion + blood EAC, + hemotympanum with perforation  Nose: Nares bilaterally patent, no discharge  Mouth: intubated   Lymphatic: No lymphadenopathy

## 2023-03-17 NOTE — EEG REPORT - NS EEG TEXT BOX
YARA SEGUNDO [facility]-[id number]     Study Date: 		03-16-23 12:06 to 03-17-23 08:00  Duration x Hours: 19:31  --------------------------------------------------------------------------------------------------  History:  CC/ HPI Patient is a 78y old  Female who presents with a chief complaint of AMS/R ear infxn (17 Mar 2023 08:53)    MEDICATIONS  (STANDING):  albuterol    0.083%. 2.5 milliGRAM(s) Nebulizer once  aspirin  chewable 81 milliGRAM(s) Oral daily  atovaquone  Suspension 1500 milliGRAM(s) Oral daily  cefTRIAXone   IVPB 2000 milliGRAM(s) IV Intermittent every 12 hours  chlorhexidine 0.12% Liquid 15 milliLiter(s) Oral Mucosa two times a day  chlorhexidine 2% Cloths 1 Application(s) Topical <User Schedule>  ciprofloxacin  0.3% Ophthalmic Solution for Otic Use 4 Drop(s) Right Ear two times a day  dextrose 5% 1000 milliLiter(s) (100 mL/Hr) IV Continuous <Continuous>  dextrose 5%. 1000 milliLiter(s) (100 mL/Hr) IV Continuous <Continuous>  dextrose 5%. 1000 milliLiter(s) (50 mL/Hr) IV Continuous <Continuous>  dextrose 50% Injectable 25 Gram(s) IV Push once  dextrose 50% Injectable 12.5 Gram(s) IV Push once  dextrose 50% Injectable 25 Gram(s) IV Push once  fentaNYL   Infusion. 0.5 MICROgram(s)/kG/Hr (1.89 mL/Hr) IV Continuous <Continuous>  fludroCORTISONE 0.05 milliGRAM(s) Oral daily  glucagon  Injectable 1 milliGRAM(s) IntraMuscular once  insulin lispro (ADMELOG) corrective regimen sliding scale   SubCutaneous every 6 hours  levothyroxine Injectable 18 MICROGram(s) IV Push at bedtime  methylPREDNISolone sodium succinate Injectable 10 milliGRAM(s) IV Push every 6 hours  norepinephrine Infusion 0.05 MICROgram(s)/kG/Min (2.91 mL/Hr) IV Continuous <Continuous>  petrolatum Ophthalmic Ointment 1 Application(s) Both EYES every 12 hours  propofol Infusion 10 MICROgram(s)/kG/Min (2.27 mL/Hr) IV Continuous <Continuous>    --------------------------------------------------------------------------------------------------  Study Interpretation:    [[[Abbreviation Key:  PDR=alpha rhythm/posterior dominant rhythm. A-P=anterior posterior.  Amplitude: ‘very low’:<20; ‘low’:20-49; ‘medium’:; ‘high’:>150uV.  Persistence for periodic/rhythmic patterns (% of epoch) ‘rare’:<1%; ‘occasional’:1-10%; ‘frequent’:10-50%; ‘abundant’:50-90%; ‘continuous’:>90%.  Persistence for sporadic discharges: ‘rare’:<1/hr; ‘occasional’:1/min-1/hr; ‘frequent’:>1/min; ‘abundant’:>1/10 sec.  RPP=rhythmic and periodic patterns; GRDA=generalized rhythmic delta activity; FIRDA=frontal intermittent GRDA; LRDA=lateralized rhythmic delta activity; TIRDA=temporal intermittent rhythmic delta activity;  LPD=PLED=lateralized periodic discharges; GPD=generalized periodic discharges; BIPDs =bilateral independent periodic discharges; Mf=multifocal; SIRPDs=stimulus induced rhythmic, periodic, or ictal appearing discharges; BIRDs=brief potentially ictal rhythmic discharges >4 Hz, lasting .5-10s; PFA (paroxysmal bursts >13 Hz or =8 Hz <10s).  Modifiers: +F=with fast component; +S=with spike component; +R=with rhythmic component.  S-B=burst suppression pattern.  Max=maximal. N1-drowsy; N2-stage II sleep; N3-slow wave sleep. SSS/BETS=small sharp spikes/benign epileptiform transients of sleep. HV=hyperventilation; PS=photic stimulation]]]    Daily EEG Visual Analysis    FINDINGS:      Background:  Burst suppressed background characterized 1-3 sec periods of attenuation intermixed with bursts of theta/delta activity. No PDR or AP gradient.     Background Slowing:  Generalized slowing: Continuous diffuse delta and theta  Focal slowing: none was present.    State Changes:   -N2 sleep transients were not recorded.    Rhythmic and Periodic Patterns (RPPs):  Abundant central (max Cz/Fz) LPDs 1-4hz in frequency, at times occurring in runs of up to 3-4 seconds duration, possibly representing BIRDs. No clear clinical correlate on video.     Electrographic and Electroclinical seizures:  None    Other Clinical Events:  None    Activation Procedures:   -Hyperventilation was not performed.    -Photic stimulation was not performed.    Artifacts:  Intermittent myogenic and movement artifacts were noted.    ECG:  The heart rate on single channel ECG was predominantly between xx BPM.    EEG Classification / Summary:  Abnormal  EEG in the awake / drowsy / asleep state(s).    Abundant central (max Cz/Fz) LPDs 1-4hz in frequency, at times occurring in runs of up to 3-4 seconds duration, possibly representing BIRDs.   Background slowing, generalized, severe due to burst suppression background    Clinical Impression:    Evidence for increased risk for seizures from the central head region.   Severe diffuse/multifocal cerebral dysfunction, not specific as to etiology  There were no epileptiform abnormalities recorded.      This is a prelim report only, pending review with attending prior to finalization.    -------------------------------------------------------------------------------------------------------  [facility] EEG Reading Room Ph#: [phone]  Epilepsy Answering Service after 5PM and before 8:30AM: Ph#: [phone]    Gael Miles M.D.   Epilepsy fellow YARA SEGUNDO [id number]     Study Date: 		03-16-23 12:06 to 03-17-23 08:00  Duration x Hours: 19:31  --------------------------------------------------------------------------------------------------  History:  CC/ HPI Patient is a 78y old  Female who presents with a chief complaint of AMS/R ear infxn (17 Mar 2023 08:53)    MEDICATIONS  (STANDING):  albuterol    0.083%. 2.5 milliGRAM(s) Nebulizer once  aspirin  chewable 81 milliGRAM(s) Oral daily  atovaquone  Suspension 1500 milliGRAM(s) Oral daily  cefTRIAXone   IVPB 2000 milliGRAM(s) IV Intermittent every 12 hours  chlorhexidine 0.12% Liquid 15 milliLiter(s) Oral Mucosa two times a day  chlorhexidine 2% Cloths 1 Application(s) Topical <User Schedule>  ciprofloxacin  0.3% Ophthalmic Solution for Otic Use 4 Drop(s) Right Ear two times a day  fentaNYL   Infusion. 0.5 MICROgram(s)/kG/Hr (1.89 mL/Hr) IV Continuous <Continuous>  fludroCORTISONE 0.05 milliGRAM(s) Oral daily  glucagon  Injectable 1 milliGRAM(s) IntraMuscular once  insulin lispro (ADMELOG) corrective regimen sliding scale   SubCutaneous every 6 hours  levothyroxine Injectable 18 MICROGram(s) IV Push at bedtime  methylPREDNISolone sodium succinate Injectable 10 milliGRAM(s) IV Push every 6 hours  norepinephrine Infusion 0.05 MICROgram(s)/kG/Min (2.91 mL/Hr) IV Continuous <Continuous>  petrolatum Ophthalmic Ointment 1 Application(s) Both EYES every 12 hours  propofol Infusion 10 MICROgram(s)/kG/Min (2.27 mL/Hr) IV Continuous <Continuous>    --------------------------------------------------------------------------------------------------  Study Interpretation:    [[[Abbreviation Key:  PDR=alpha rhythm/posterior dominant rhythm. A-P=anterior posterior.  Amplitude: ‘very low’:<20; ‘low’:20-49; ‘medium’:; ‘high’:>150uV.  Persistence for periodic/rhythmic patterns (% of epoch) ‘rare’:<1%; ‘occasional’:1-10%; ‘frequent’:10-50%; ‘abundant’:50-90%; ‘continuous’:>90%.  Persistence for sporadic discharges: ‘rare’:<1/hr; ‘occasional’:1/min-1/hr; ‘frequent’:>1/min; ‘abundant’:>1/10 sec.  RPP=rhythmic and periodic patterns; GRDA=generalized rhythmic delta activity; FIRDA=frontal intermittent GRDA; LRDA=lateralized rhythmic delta activity; TIRDA=temporal intermittent rhythmic delta activity;  LPD=PLED=lateralized periodic discharges; GPD=generalized periodic discharges; BIPDs =bilateral independent periodic discharges; Mf=multifocal; SIRPDs=stimulus induced rhythmic, periodic, or ictal appearing discharges; BIRDs=brief potentially ictal rhythmic discharges >4 Hz, lasting .5-10s; PFA (paroxysmal bursts >13 Hz or =8 Hz <10s).  Modifiers: +F=with fast component; +S=with spike component; +R=with rhythmic component.  S-B=burst suppression pattern.  Max=maximal. N1-drowsy; N2-stage II sleep; N3-slow wave sleep. SSS/BETS=small sharp spikes/benign epileptiform transients of sleep. HV=hyperventilation; PS=photic stimulation]]]    Daily EEG Visual Analysis    FINDINGS:      Background:  Burst suppressed background characterized 1-3 sec periods of attenuation intermixed with bursts of theta/delta activity. No PDR or AP gradient.     Background Slowing:  Generalized slowing: Continuous diffuse delta and theta  Focal slowing: none was present.    State Changes:   -N2 sleep transients were not recorded.    Rhythmic and Periodic Patterns (RPPs):  Abundant central (max Cz/Fz) LPDs 1-4hz in frequency, at times occurring in runs of up to 3-4 seconds duration, possibly representing BIRDs. No clear clinical correlate on video.     Electrographic and Electroclinical seizures:  None    Other Clinical Events:  None    Activation Procedures:   -Hyperventilation was not performed.    -Photic stimulation was not performed.    Artifacts:  Intermittent myogenic and movement artifacts were noted.    ECG:  The heart rate on single channel ECG was predominantly between xx BPM.    EEG Classification / Summary:  Abnormal  EEG in the awake / drowsy / asleep state(s).    Abundant central (max Cz/Fz) LPDs 1-4hz in frequency, at times occurring in runs of up to 3-4 seconds duration, possibly representing BIRDs.   Background slowing, generalized, severe due to burst suppression background    Clinical Impression:    Evidence for increased risk for seizures from the central head region.   Severe diffuse/multifocal cerebral dysfunction, not specific as to etiology  There were no epileptiform abnormalities recorded.        -------------------------------------------------------------------------------------------------------  [facility] EEG Reading Room Ph#: [phone]  Epilepsy Answering Service after 5PM and before 8:30AM: Ph#: [phone]    Gael Miles M.D.   Epilepsy fellow YARA SEGUNDO [id number]     Study Date: 		03-16-23 12:06 to 03-17-23 08:00  Duration x Hours: 19:31  --------------------------------------------------------------------------------------------------  History:  CC/ HPI Patient is a 78y old  Female who presents with a chief complaint of AMS/R ear infxn (17 Mar 2023 08:53)    MEDICATIONS  (STANDING):  albuterol    0.083%. 2.5 milliGRAM(s) Nebulizer once  aspirin  chewable 81 milliGRAM(s) Oral daily  atovaquone  Suspension 1500 milliGRAM(s) Oral daily  cefTRIAXone   IVPB 2000 milliGRAM(s) IV Intermittent every 12 hours  chlorhexidine 0.12% Liquid 15 milliLiter(s) Oral Mucosa two times a day  chlorhexidine 2% Cloths 1 Application(s) Topical <User Schedule>  ciprofloxacin  0.3% Ophthalmic Solution for Otic Use 4 Drop(s) Right Ear two times a day  fentaNYL   Infusion. 0.5 MICROgram(s)/kG/Hr (1.89 mL/Hr) IV Continuous <Continuous>  fludroCORTISONE 0.05 milliGRAM(s) Oral daily  glucagon  Injectable 1 milliGRAM(s) IntraMuscular once  insulin lispro (ADMELOG) corrective regimen sliding scale   SubCutaneous every 6 hours  levothyroxine Injectable 18 MICROGram(s) IV Push at bedtime  methylPREDNISolone sodium succinate Injectable 10 milliGRAM(s) IV Push every 6 hours  norepinephrine Infusion 0.05 MICROgram(s)/kG/Min (2.91 mL/Hr) IV Continuous <Continuous>  petrolatum Ophthalmic Ointment 1 Application(s) Both EYES every 12 hours  propofol Infusion 10 MICROgram(s)/kG/Min (2.27 mL/Hr) IV Continuous <Continuous>    --------------------------------------------------------------------------------------------------  Study Interpretation:    [[[Abbreviation Key:  PDR=alpha rhythm/posterior dominant rhythm. A-P=anterior posterior.  Amplitude: ‘very low’:<20; ‘low’:20-49; ‘medium’:; ‘high’:>150uV.  Persistence for periodic/rhythmic patterns (% of epoch) ‘rare’:<1%; ‘occasional’:1-10%; ‘frequent’:10-50%; ‘abundant’:50-90%; ‘continuous’:>90%.  Persistence for sporadic discharges: ‘rare’:<1/hr; ‘occasional’:1/min-1/hr; ‘frequent’:>1/min; ‘abundant’:>1/10 sec.  RPP=rhythmic and periodic patterns; GRDA=generalized rhythmic delta activity; FIRDA=frontal intermittent GRDA; LRDA=lateralized rhythmic delta activity; TIRDA=temporal intermittent rhythmic delta activity;  LPD=PLED=lateralized periodic discharges; GPD=generalized periodic discharges; BIPDs =bilateral independent periodic discharges; Mf=multifocal; SIRPDs=stimulus induced rhythmic, periodic, or ictal appearing discharges; BIRDs=brief potentially ictal rhythmic discharges >4 Hz, lasting .5-10s; PFA (paroxysmal bursts >13 Hz or =8 Hz <10s).  Modifiers: +F=with fast component; +S=with spike component; +R=with rhythmic component.  S-B=burst suppression pattern.  Max=maximal. N1-drowsy; N2-stage II sleep; N3-slow wave sleep. SSS/BETS=small sharp spikes/benign epileptiform transients of sleep. HV=hyperventilation; PS=photic stimulation]]]    Daily EEG Visual Analysis    FINDINGS:      Background:  Burst suppressed background characterized 1-3 sec periods of attenuation intermixed with bursts of theta/delta activity. No PDR or AP gradient.     Background Slowing:  Generalized slowing: Continuous diffuse delta and theta  Focal slowing: none was present.    State Changes:   -N2 sleep transients were not recorded.    Rhythmic and Periodic Patterns (RPPs):  Abundant central (max Cz/Fz) LPDs 1-4hz in frequency, at times occurring in runs of up to 3-4 seconds duration, possibly representing BIRDs. No clear clinical correlate on video.     Electrographic and Electroclinical seizures:  None    Other Clinical Events:  None    Activation Procedures:   -Hyperventilation was not performed.    -Photic stimulation was not performed.    Artifacts:  Intermittent myogenic and movement artifacts were noted.    ECG:  The heart rate on single channel ECG was predominantly between xx BPM.    EEG Classification / Summary:  Abnormal  EEG in the awake / drowsy / asleep state(s).    Abundant central (max Cz/Fz) LPDs 1-4hz in frequency, at times occurring in runs of up to 3-4 seconds duration, possibly representing BIRDs.   Background slowing, generalized, severe due to burst suppression background    Clinical Impression:    Evidence for increased risk for seizures from the central head region.   Severe diffuse/multifocal cerebral dysfunction, not specific as to etiology  There were seizures recorded.  (report corrected - STH)      -------------------------------------------------------------------------------------------------------  [facility] EEG Reading Room Ph#: [phone]  Epilepsy Answering Service after 5PM and before 8:30AM: Ph#: [phone]    Gael Miles M.D.   Epilepsy fellow YARA SEGUNDO [id number]     Study Date: 		03-16-23 12:06 to 03-17-23 08:00  Duration x Hours: 19:31  --------------------------------------------------------------------------------------------------  History:  CC/ HPI Patient is a 78y old  Female who presents with a chief complaint of AMS/R ear infxn (17 Mar 2023 08:53)    MEDICATIONS  (STANDING):  albuterol    0.083%. 2.5 milliGRAM(s) Nebulizer once  aspirin  chewable 81 milliGRAM(s) Oral daily  atovaquone  Suspension 1500 milliGRAM(s) Oral daily  cefTRIAXone   IVPB 2000 milliGRAM(s) IV Intermittent every 12 hours  chlorhexidine 0.12% Liquid 15 milliLiter(s) Oral Mucosa two times a day  chlorhexidine 2% Cloths 1 Application(s) Topical <User Schedule>  ciprofloxacin  0.3% Ophthalmic Solution for Otic Use 4 Drop(s) Right Ear two times a day  fentaNYL   Infusion. 0.5 MICROgram(s)/kG/Hr (1.89 mL/Hr) IV Continuous <Continuous>  fludroCORTISONE 0.05 milliGRAM(s) Oral daily  glucagon  Injectable 1 milliGRAM(s) IntraMuscular once  insulin lispro (ADMELOG) corrective regimen sliding scale   SubCutaneous every 6 hours  levothyroxine Injectable 18 MICROGram(s) IV Push at bedtime  methylPREDNISolone sodium succinate Injectable 10 milliGRAM(s) IV Push every 6 hours  norepinephrine Infusion 0.05 MICROgram(s)/kG/Min (2.91 mL/Hr) IV Continuous <Continuous>  petrolatum Ophthalmic Ointment 1 Application(s) Both EYES every 12 hours  propofol Infusion 10 MICROgram(s)/kG/Min (2.27 mL/Hr) IV Continuous <Continuous>    --------------------------------------------------------------------------------------------------  Study Interpretation:    [[[Abbreviation Key:  PDR=alpha rhythm/posterior dominant rhythm. A-P=anterior posterior.  Amplitude: ‘very low’:<20; ‘low’:20-49; ‘medium’:; ‘high’:>150uV.  Persistence for periodic/rhythmic patterns (% of epoch) ‘rare’:<1%; ‘occasional’:1-10%; ‘frequent’:10-50%; ‘abundant’:50-90%; ‘continuous’:>90%.  Persistence for sporadic discharges: ‘rare’:<1/hr; ‘occasional’:1/min-1/hr; ‘frequent’:>1/min; ‘abundant’:>1/10 sec.  RPP=rhythmic and periodic patterns; GRDA=generalized rhythmic delta activity; FIRDA=frontal intermittent GRDA; LRDA=lateralized rhythmic delta activity; TIRDA=temporal intermittent rhythmic delta activity;  LPD=PLED=lateralized periodic discharges; GPD=generalized periodic discharges; BIPDs =bilateral independent periodic discharges; Mf=multifocal; SIRPDs=stimulus induced rhythmic, periodic, or ictal appearing discharges; BIRDs=brief potentially ictal rhythmic discharges >4 Hz, lasting .5-10s; PFA (paroxysmal bursts >13 Hz or =8 Hz <10s).  Modifiers: +F=with fast component; +S=with spike component; +R=with rhythmic component.  S-B=burst suppression pattern.  Max=maximal. N1-drowsy; N2-stage II sleep; N3-slow wave sleep. SSS/BETS=small sharp spikes/benign epileptiform transients of sleep. HV=hyperventilation; PS=photic stimulation]]]    Daily EEG Visual Analysis    FINDINGS:      Background:  Burst suppressed background characterized 1-3 sec periods of attenuation intermixed with bursts of theta/delta activity. No PDR or AP gradient.     Background Slowing:  Generalized slowing: Continuous diffuse delta and theta  Focal slowing: none was present.    State Changes:   -N2 sleep transients were not recorded.    Rhythmic and Periodic Patterns (RPPs):  Abundant central (max Cz/Fz) LPDs 1-4hz in frequency, at times occurring in runs of up to 3-4 seconds duration, possibly representing BIRDs. No clear clinical correlate on video.     Electrographic and Electroclinical seizures:  None    Other Clinical Events:  None    Activation Procedures:   -Hyperventilation was not performed.    -Photic stimulation was not performed.    Artifacts:  Intermittent myogenic and movement artifacts were noted.    ECG:  The heart rate on single channel ECG was predominantly between xx BPM.    EEG Classification / Summary:  Abnormal  EEG in the awake / drowsy / asleep state(s).    Abundant central (max Cz/Fz) LPDs 1-4hz in frequency, at times occurring in runs of up to 3-4 seconds duration, possibly representing BIRDs.   Background slowing, generalized, severe due to burst suppression background    Clinical Impression:    Evidence for increased risk for seizures from the central head region.   Severe diffuse/multifocal cerebral dysfunction, not specific as to etiology  There were NO seizures recorded on initial day of recording.  (report corrected - STH)      -------------------------------------------------------------------------------------------------------  [facility] EEG Reading Room Ph#: [phone]  Epilepsy Answering Service after 5PM and before 8:30AM: Ph#: [phone]    Gael Miles M.D.   Epilepsy fellow YARA SEGUNDO [id number]     Study Date: 		03-16-23 12:06 to 03-17-23 08:00  Duration x Hours: 19:31  --------------------------------------------------------------------------------------------------  History:  CC/ HPI Patient is a 78y old  Female who presents with a chief complaint of AMS/R ear infxn (17 Mar 2023 08:53)    MEDICATIONS  (STANDING):  albuterol    0.083%. 2.5 milliGRAM(s) Nebulizer once  aspirin  chewable 81 milliGRAM(s) Oral daily  atovaquone  Suspension 1500 milliGRAM(s) Oral daily  cefTRIAXone   IVPB 2000 milliGRAM(s) IV Intermittent every 12 hours  chlorhexidine 0.12% Liquid 15 milliLiter(s) Oral Mucosa two times a day  chlorhexidine 2% Cloths 1 Application(s) Topical <User Schedule>  ciprofloxacin  0.3% Ophthalmic Solution for Otic Use 4 Drop(s) Right Ear two times a day  fentaNYL   Infusion. 0.5 MICROgram(s)/kG/Hr (1.89 mL/Hr) IV Continuous <Continuous>  fludroCORTISONE 0.05 milliGRAM(s) Oral daily  glucagon  Injectable 1 milliGRAM(s) IntraMuscular once  insulin lispro (ADMELOG) corrective regimen sliding scale   SubCutaneous every 6 hours  levothyroxine Injectable 18 MICROGram(s) IV Push at bedtime  methylPREDNISolone sodium succinate Injectable 10 milliGRAM(s) IV Push every 6 hours  norepinephrine Infusion 0.05 MICROgram(s)/kG/Min (2.91 mL/Hr) IV Continuous <Continuous>  petrolatum Ophthalmic Ointment 1 Application(s) Both EYES every 12 hours  propofol Infusion 10 MICROgram(s)/kG/Min (2.27 mL/Hr) IV Continuous <Continuous>    --------------------------------------------------------------------------------------------------  Study Interpretation:    [[[Abbreviation Key:  PDR=alpha rhythm/posterior dominant rhythm. A-P=anterior posterior.  Amplitude: ‘very low’:<20; ‘low’:20-49; ‘medium’:; ‘high’:>150uV.  Persistence for periodic/rhythmic patterns (% of epoch) ‘rare’:<1%; ‘occasional’:1-10%; ‘frequent’:10-50%; ‘abundant’:50-90%; ‘continuous’:>90%.  Persistence for sporadic discharges: ‘rare’:<1/hr; ‘occasional’:1/min-1/hr; ‘frequent’:>1/min; ‘abundant’:>1/10 sec.  RPP=rhythmic and periodic patterns; GRDA=generalized rhythmic delta activity; FIRDA=frontal intermittent GRDA; LRDA=lateralized rhythmic delta activity; TIRDA=temporal intermittent rhythmic delta activity;  LPD=PLED=lateralized periodic discharges; GPD=generalized periodic discharges; BIPDs =bilateral independent periodic discharges; Mf=multifocal; SIRPDs=stimulus induced rhythmic, periodic, or ictal appearing discharges; BIRDs=brief potentially ictal rhythmic discharges >4 Hz, lasting .5-10s; PFA (paroxysmal bursts >13 Hz or =8 Hz <10s).  Modifiers: +F=with fast component; +S=with spike component; +R=with rhythmic component.  S-B=burst suppression pattern.  Max=maximal. N1-drowsy; N2-stage II sleep; N3-slow wave sleep. SSS/BETS=small sharp spikes/benign epileptiform transients of sleep. HV=hyperventilation; PS=photic stimulation]]]    Daily EEG Visual Analysis    FINDINGS:      Background:  Burst suppressed background characterized 1-3 sec periods of attenuation intermixed with bursts of theta/delta activity. No PDR or AP gradient.     Background Slowing:  Generalized slowing: Continuous diffuse delta and theta  Focal slowing: none was present.    State Changes:   -N2 sleep transients were not recorded.    Rhythmic and Periodic Patterns (RPPs):  Abundant central (max Cz/Fz) LPDs 1-4hz in frequency, at times occurring in runs of up to 3-4 seconds duration, possibly representing BIRDs. No clear clinical correlate on video.     Electrographic and Electroclinical seizures:  None    Other Clinical Events:  None    Activation Procedures:   -Hyperventilation was not performed.    -Photic stimulation was not performed.    Artifacts:  Intermittent myogenic and movement artifacts were noted.    ECG:  The heart rate on single channel ECG was predominantly between 54-60 BPM.    EEG Classification / Summary:  Abnormal  EEG in the awake / drowsy / asleep state(s).    Abundant central (max Cz/Fz) LPDs 1-4hz in frequency, at times occurring in runs of up to 3-4 seconds duration, possibly representing BIRDs.   Background slowing, generalized, severe due to burst suppression background    Clinical Impression:    Evidence for increased risk for seizures from the central head region.   Severe diffuse/multifocal cerebral dysfunction, not specific as to etiology  There were NO seizures recorded on initial day of recording.  (report corrected - ST)      -------------------------------------------------------------------------------------------------------  [facility] EEG Reading Room Ph#: [phone]  Epilepsy Answering Service after 5PM and before 8:30AM: Ph#: [phone]    Gael Miles M.D.   Epilepsy fellow

## 2023-03-17 NOTE — PROGRESS NOTE ADULT - ASSESSMENT
Pt has had Tdap vaccine, will treat only if symptoms appear   77 y/o F with h/o HTN, HLD, DM2, PCKD previously on HD via LUE AVF then s/p renal transplant, LLE DVT (resolved, nml dopplers 12/2022) on ASA, very small supraclinoid aneurysm (reportedly unchanged on MRA 10/2020), hypothyroidism, PCP 2021 on atovaquone ppx, reportedly hospitalized in 12/2022 for rectal prolapse, had a blood transfusion at that time, also + CMV 1/25/23 who presented with R-sided HA, R ear pain and neck stiffness after visit to ENT earlier in the day, altered on presentation, CT head unremarkable for stroke, however LP with IR and BCx showing +strep pneumoniae. currently on vanc and CTX per ID. Cardiac arrest 3/16 with ROSC, transferred to MICU for further management.       Plan:   Neurological  #Currently Intubated, sedated   - continue propofol and fentanyl    #Strep pneumo meningitis #Acute encephalopathy  severe HA, neck stiffness, fever, leukocytosis concerning for meningitis/encephalitis  CT head negative for CVA.   LP and BCx 3/15 both showing gram positive cocci in pairs, and + strep pneumoniae.   likely source of entry from R ear where pt c/o discomfort for several months.  patient is on cyclosporine, mycophenolate mofetil and prednisone due to kidney transplant and is immunocompromised   dental procedure 3/14 but less likely source   ID consulted, recs for continuing meropenem. s/p vancomycin 3/15.   - c/w meropenem   - f/u BCx and LP sensitivities  - f/u MRI  - q4hr neuro checks  - f/u 24hr EEG: no seizures however anticonvulsant prophylaxis may be considered.      Cardiovascular  #Cardiac Arrest  bradycardia to 30, pulseless, code blue was called 3/16   2 rounds of epi, and PEA arrest. on 3rd pulse check pulseless VT, 200J shock given  4th pulse check asystole, 5th ROSC, sinus tachy with HUMA. IO placed and levo started. Due to blood in mouth took several attempts with DL for ETT to be placed but ultimately confirmed with capnometry and bilateral breath sounds. No sedation was used.        #Elevated troponin  uptrending troponin to 182 3/15 AM, was likely iso ESRD  s/p cardiac arrest, elevated ST segment   - trend troponin  - f/u TTE      #HTN  on amlodipine 5mg, losartan 100mg, torsemide 20mg, and clonidine 0.2 patch weekly  - hold for now given sepsis      Respiratory/ENT  #Intubated  continue sedation    # Tympanic membrane rupture  discharge from R ear, evaluated by ENT given dexamethasone and cipro 3/14  - c/w broad spectrum abx   - f/u ENT recs      Gastrointestinal  #hematemesis  patient with melissa bright red blood from mouth on arrival to MICU  could be related to CPR, no pericardial effusion on bedside US  - PPI  - tube feeds    #aspiration precaution  previously failed dysphagia screen and was pending SLP eval  blood in mouth on arrival to MICU and increased wob concerning for aspiration  OGT in place, in stomach on CXR         Renal  #ESRD #s/p Kidney Transplant in 2003 at [place] #PCKD  previously HD through LLE AVF but no HD since transplant  Cr 3.8, unclear baseline  - avoid nephrotoxic agent  - holding cyclosporine and mycophenolate   - changed prednisone to dexamethasone  - appreciate nephro recs  - strict I/O and replete electrolytes       Hematological  # h/o LLE DVT  resolved, no DVT on doppler 12/22.  on ASA at home, has been receiving heparin subQ  - hold for now given bleeding in oropharynx.   - SCDs    #Anemia  - s/p 1 unit pRBC on 3/16 for Hb drop from 10 to 7.9 and responded appropriately  - Evaluated by ENT for bleeding and found no signs of active bleeding from nasal cavity, nasopharynx or oral cavity, however, patient biting on tongue, which could be one source of bleeding. Tongue was edematous and lax      Infectious Disease  #Strep pneumoniae meningitis #R ear mastoiditis  treatment with IV CTX and vancomycin (dose by level)  - dexamethasone  - c/w ciprodex drops for R ear  - daily vanc trough and q24hr 1000mg IV vanc if trough <15  - f/u ID recs   - repeat blood cx at 48 hr (3/17 AM)    #h/o PCP  - atovaquone at home, continue      Endocrinological  #Hypothyroidism  last TSH 12/2022 >10  TSH 3/15 4.73  - c/w levothyroxine     #Diabetes mellitus Type II  - BHB elevated to 2.9, low suspicion for euglycemic DKA, CTM  - ISS      Ethics  FULL, GOC ongoing. 79 y/o F with h/o HTN, HLD, DM2, PCKD previously on HD via LUE AVF then s/p renal transplant, LLE DVT (resolved, nml dopplers 12/2022) on ASA, very small supraclinoid aneurysm (reportedly unchanged on MRA 10/2020), hypothyroidism, PCP 2021 on atovaquone ppx, reportedly hospitalized in 12/2022 for rectal prolapse, had a blood transfusion at that time, also + CMV 1/25/23 who presented with R-sided HA, R ear pain and neck stiffness after visit to ENT earlier in the day, altered on presentation, CT head unremarkable for stroke, however LP with IR and BCx showing +strep pneumoniae. currently on vanc and CTX per ID. Cardiac arrest 3/16 with ROSC, transferred to MICU for further management.       Plan:   Neurological  #Currently Intubated, sedated   - wean propofol and fentanyl    #Strep pneumo meningitis #Acute encephalopathy  severe HA, neck stiffness, fever, leukocytosis concerning for meningitis/encephalitis  CT head negative for CVA.   LP and BCx 3/15 both showing gram positive cocci in pairs, and + strep pneumoniae.   likely source of entry from R ear where pt c/o discomfort for several months.  patient is on cyclosporine, mycophenolate mofetil and prednisone due to kidney transplant and is immunocompromised   dental procedure 3/14 but less likely source   ID consulted, recs for continuing meropenem. s/p vancomycin 3/15.   - f/u BCx and LP sensitivities  - f/u MRI of the IAC w/ contrast at a later time when renal function improves  - f/u 24hr EEG: prelim read shows no seizures however anticonvulsant prophylaxis may be considered.      Cardiovascular  #Cardiac Arrest  bradycardia to 30, pulseless, code blue was called 3/16   2 rounds of epi, and PEA arrest. on 3rd pulse check pulseless VT, 200J shock given  4th pulse check asystole, 5th ROSC, sinus tachy with HUMA. IO placed and levo started. Due to blood in mouth took several attempts with DL for ETT to be placed but ultimately confirmed with capnometry and bilateral breath sounds.       #Elevated troponin  uptrending troponin to 182 3/15 AM, was likely iso ESRD  s/p cardiac arrest, elevated ST segment   - trend troponin  - f/u TTE      #HTN  on amlodipine 5mg, losartan 100mg, torsemide 20mg, and clonidine 0.2 patch weekly  - hold for now given sepsis      Respiratory/ENT  #Intubated  continue sedation    # Tympanic membrane rupture  discharge from R ear, evaluated by ENT given dexamethasone and cipro 3/14  - c/w broad spectrum abx   - f/u ENT recs      Gastrointestinal  #hematemesis  patient with melissa bright red blood from mouth on arrival to MICU  could be related to CPR, no pericardial effusion on bedside US  - PPI  - tube feeds    #aspiration precaution  previously failed dysphagia screen and was pending SLP eval  blood in mouth on arrival to MICU and increased wob concerning for aspiration  OGT in place, in stomach on CXR         Renal  #ESRD #s/p Kidney Transplant in 2003 at [place] #PCKD  previously HD through LLE AVF but no HD since transplant  Cr 3.8, unclear baseline  - avoid nephrotoxic agent  - holding cyclosporine and mycophenolate   - changed prednisone to dexamethasone  - appreciate nephro recs  - strict I/O and replete electrolytes       Hematological  # h/o LLE DVT  resolved, no DVT on doppler 12/22.  on ASA at home, has been receiving heparin subQ  - hold for now given bleeding in oropharynx.   - SCDs    #Anemia  - s/p 1 unit pRBC on 3/16 for Hb drop from 10 to 7.9 and responded appropriately  - Evaluated by ENT for bleeding and found no signs of active bleeding from nasal cavity, nasopharynx or oral cavity, however, patient biting on tongue, which could be one source of bleeding. Tongue was edematous and lax      Infectious Disease  #Strep pneumoniae meningitis #R ear mastoiditis  treatment with IV CTX and vancomycin (dose by level)  - dexamethasone  - c/w ciprodex drops for R ear  - daily vanc trough and q24hr 1000mg IV vanc if trough <15  - f/u ID recs   - repeat blood cx at 48 hr (3/17 AM)    #h/o PCP  - atovaquone at home, continue      Endocrinological  #Hypothyroidism  last TSH 12/2022 >10  TSH 3/15 4.73  - c/w levothyroxine     #Diabetes mellitus Type II  - BHB elevated to 2.9, low suspicion for euglycemic DKA, CTM  - ISS      Ethics  FULL, GOC ongoing. 79 y/o F with h/o HTN, HLD, DM2, PCKD previously on HD via LUE AVF then s/p renal transplant, LLE DVT (resolved, nml dopplers 12/2022) on ASA, very small supraclinoid aneurysm (reportedly unchanged on MRA 10/2020), hypothyroidism, PCP 2021 on atovaquone ppx, reportedly hospitalized in 12/2022 for rectal prolapse, had a blood transfusion at that time, also + CMV 1/25/23 who presented with R-sided HA, R ear pain and neck stiffness after visit to ENT earlier in the day, altered on presentation, CT head unremarkable for stroke, however LP with IR and BCx showing +strep pneumoniae. currently on vanc and CTX per ID. Cardiac arrest 3/16 with ROSC, transferred to MICU for further management.       Plan:   Neurological  #Currently Intubated, sedated   - wean propofol and fentanyl    #Strep pneumo meningitis #Acute encephalopathy  severe HA, neck stiffness, fever, leukocytosis concerning for meningitis/encephalitis  CT head negative for CVA.   LP and BCx 3/15 both showing gram positive cocci in pairs, and + strep pneumoniae.   likely source of entry from R ear where pt c/o discomfort for several months.  patient is on cyclosporine, mycophenolate mofetil and prednisone due to kidney transplant and is immunocompromised   dental procedure 3/14 but less likely source   ID consulted, recs for continuing meropenem. s/p vancomycin 3/15.   - f/u BCx and LP sensitivities  - f/u MRI of the IAC w/ contrast at a later time when renal function improves  - f/u 24hr EEG: prelim read shows no seizures however anticonvulsant prophylaxis may be considered.      Cardiovascular  #Cardiac Arrest  bradycardia to 30, pulseless, code blue was called 3/16   2 rounds of epi, and PEA arrest. on 3rd pulse check pulseless VT, 200J shock given  4th pulse check asystole, 5th ROSC, sinus tachy with HUMA. IO placed and levo started. Due to blood in mouth took several attempts with DL for ETT to be placed but ultimately confirmed with capnometry and bilateral breath sounds.       #Elevated troponin  uptrending troponin to 182 3/15 AM, was likely iso ESRD  s/p cardiac arrest, elevated ST segment   - trend troponin  - f/u TTE      #HTN  on amlodipine 5mg, losartan 100mg, torsemide 20mg, and clonidine 0.2 patch weekly  - hold for now given sepsis      Respiratory/ENT  #Intubated  continue sedation    # Tympanic membrane rupture  discharge from R ear, evaluated by ENT given dexamethasone and cipro 3/14  - c/w broad spectrum abx   - f/u ENT recs      Gastrointestinal  #hematemesis  patient with melissa bright red blood from mouth on arrival to MICU  could be related to CPR, no pericardial effusion on bedside US  - PPI  - tube feeds    #aspiration precaution  previously failed dysphagia screen and was pending SLP eval  blood in mouth on arrival to MICU and increased wob concerning for aspiration  OGT in place, in stomach on CXR         Renal  #ESRD #s/p Kidney Transplant in 2003 at [place] #PCKD  previously HD through LLE AVF but no HD since transplant  Cr 3.8, unclear baseline  - avoid nephrotoxic agent  - holding cyclosporine and mycophenolate   - changed prednisone to medrol 10mg IVP q6h  - appreciate nephro recs  - strict I/O and replete electrolytes  - 4mg IV bumex and see and pt responds  - on bicarb drip       Hematological  # h/o LLE DVT  resolved, no DVT on doppler 12/22.  on ASA at home, has been receiving heparin subQ  - hold for now given bleeding in oropharynx.   - SCDs  - can restart heparin tomorrow if no bleeding    #Anemia  - s/p 1 unit pRBC on 3/16 for Hb drop from 10 to 7.9 and responded appropriately  - Evaluated by ENT for bleeding and found no signs of active bleeding from nasal cavity, nasopharynx or oral cavity, however, patient biting on tongue, which could be one source of bleeding. Tongue was edematous and lax      Infectious Disease  #Strep pneumoniae meningitis #R ear mastoiditis  treatment with IV CTX and vancomycin (dose by level)  - dexamethasone  - c/w ciprodex drops for R ear  - daily vanc trough and q24hr 1000mg IV vanc if trough <15  - f/u ID recs   - repeat blood cx at 48 hr (3/17 AM)    #h/o PCP  - atovaquone at home, continue      Endocrinological  #Hypothyroidism  last TSH 12/2022 >10  TSH 3/15 4.73  - c/w levothyroxine     #Diabetes mellitus Type II  - BHB elevated to 2.9, low suspicion for euglycemic DKA, CTM  - recheck BHB  - ISS      Ethics  FULL, GOC ongoing. 77 y/o F with h/o HTN, HLD, DM2, PCKD previously on HD via LUE AVF then s/p renal transplant, LLE DVT (resolved, nml dopplers 12/2022) on ASA, very small supraclinoid aneurysm (reportedly unchanged on MRA 10/2020), hypothyroidism, PCP 2021 on atovaquone ppx, reportedly hospitalized in 12/2022 for rectal prolapse, had a blood transfusion at that time, also + CMV 1/25/23 who presented with R-sided HA, R ear pain and neck stiffness after visit to ENT earlier in the day, altered on presentation, CT head unremarkable for stroke, however LP with IR and BCx showing +strep pneumoniae. currently on vanc and CTX per ID. Cardiac arrest 3/16 with ROSC, transferred to MICU for further management.       Plan:   Neurological  #Currently Intubated, sedated   - on propofol and fentanyl    #Strep pneumo meningitis #Acute encephalopathy  severe HA, neck stiffness, fever, leukocytosis concerning for meningitis/encephalitis  CT head negative for CVA.   LP and BCx 3/15 both showing gram positive cocci in pairs, and + strep pneumoniae.   likely source of entry from R ear where pt c/o discomfort for several months.  patient is on cyclosporine, mycophenolate mofetil and prednisone due to kidney transplant and is immunocompromised   dental procedure 3/14 but less likely source   ID consulted, recs for continuing meropenem. s/p vancomycin 3/15.   - f/u BCx and LP sensitivities  - f/u MRI of the IAC w/ contrast at a later time when renal function improves  - f/u 24hr EEG: prelim read shows no seizures however anticonvulsant prophylaxis may be considered.      Cardiovascular  #Cardiac Arrest  bradycardia to 30, pulseless, code blue was called 3/16   2 rounds of epi, and PEA arrest. on 3rd pulse check pulseless VT, 200J shock given  4th pulse check asystole, 5th ROSC, sinus tachy with HUMA. IO placed and levo started. Due to blood in mouth took several attempts with DL for ETT to be placed but ultimately confirmed with capnometry and bilateral breath sounds.       #Elevated troponin  uptrending troponin to 182 3/15 AM, was likely iso ESRD  s/p cardiac arrest, elevated ST segment   - trend troponin  - f/u TTE      #HTN  on amlodipine 5mg, losartan 100mg, torsemide 20mg, and clonidine 0.2 patch weekly  - hold for now given sepsis      Respiratory/ENT  #Intubated  continue sedation    # Tympanic membrane rupture  discharge from R ear, evaluated by ENT given dexamethasone and cipro 3/14  - c/w broad spectrum abx   - f/u ENT recs      Gastrointestinal  #hematemesis  patient with melissa bright red blood from mouth on arrival to MICU  could be related to CPR, no pericardial effusion on bedside US  - PPI  - tube feeds    #aspiration precaution  previously failed dysphagia screen and was pending SLP eval  blood in mouth on arrival to MICU and increased wob concerning for aspiration  OGT in place, in stomach on CXR         Renal  #ESRD #s/p Kidney Transplant in 2003 at [place] #PCKD  previously HD through LLE AVF but no HD since transplant  Cr 3.8, unclear baseline  - avoid nephrotoxic agent  - holding cyclosporine and mycophenolate   - changed prednisone to medrol 10mg IVP q6h  - appreciate nephro recs  - strict I/O and replete electrolytes  - 4mg IV bumex and see and pt responds  - on bicarb drip       Hematological  # h/o LLE DVT  resolved, no DVT on doppler 12/22.  on ASA at home, has been receiving heparin subQ  - hold for now given bleeding in oropharynx.   - SCDs  - can restart heparin tomorrow if no bleeding    #Anemia  - s/p 1 unit pRBC on 3/16 for Hb drop from 10 to 7.9 and responded appropriately  - Evaluated by ENT for bleeding and found no signs of active bleeding from nasal cavity, nasopharynx or oral cavity, however, patient biting on tongue, which could be one source of bleeding. Tongue was edematous and lax      Infectious Disease  #Strep pneumoniae meningitis #R ear mastoiditis  treatment with IV CTX and vancomycin (dose by level)  - dexamethasone  - c/w ciprodex drops for R ear  - daily vanc trough and q24hr 1000mg IV vanc if trough <15  - f/u ID recs   - repeat blood cx at 48 hr (3/17 AM)    #h/o PCP  - atovaquone at home, continue      Endocrinological  #Hypothyroidism  last TSH 12/2022 >10  TSH 3/15 4.73  - c/w levothyroxine     #Diabetes mellitus Type II  - BHB elevated to 2.9, low suspicion for euglycemic DKA, CTM  - recheck BHB  - ISS      Ethics  FULL, GOC ongoing. 77 y/o F with h/o HTN, HLD, DM2, PCKD previously on HD via LUE AVF then s/p renal transplant, LLE DVT (resolved, nml dopplers 12/2022) on ASA, very small supraclinoid aneurysm (reportedly unchanged on MRA 10/2020), hypothyroidism, PCP 2021 on atovaquone ppx, reportedly hospitalized in 12/2022 for rectal prolapse, had a blood transfusion at that time, also + CMV 1/25/23 who presented with R-sided HA, R ear pain and neck stiffness after visit to ENT earlier in the day, altered on presentation, CT head unremarkable for stroke, however LP with IR and BCx showing +strep pneumoniae. currently on vanc and CTX per ID. Cardiac arrest 3/16 with ROSC, transferred to MICU for further management.       Plan:   Neurological  #Currently Intubated, sedated   - on propofol and fentanyl    #Strep pneumo meningitis #Acute encephalopathy  severe HA, neck stiffness, fever, leukocytosis concerning for meningitis/encephalitis  CT head negative for CVA.   LP and BCx 3/15 both showing gram positive cocci in pairs, and + strep pneumoniae.   likely source of entry from R ear where pt c/o discomfort for several months.  patient is on cyclosporine, mycophenolate mofetil and prednisone due to kidney transplant and is immunocompromised   dental procedure 3/14 but less likely source   ID consulted, recs for continuing meropenem. s/p vancomycin 3/15.   - f/u BCx and LP sensitivities  - f/u MRI of the IAC w/ contrast at a later time when renal function improves  - f/u 24hr EEG: prelim read shows no seizures however anticonvulsant prophylaxis may be considered.    #Seizures  - keppra loaded 500mg IVP and started on 250mg IV q12h    Cardiovascular  #Cardiac Arrest  bradycardia to 30, pulseless, code blue was called 3/16   2 rounds of epi, and PEA arrest. on 3rd pulse check pulseless VT, 200J shock given  4th pulse check asystole, 5th ROSC, sinus tachy with HUMA. IO placed and levo started. Due to blood in mouth took several attempts with DL for ETT to be placed but ultimately confirmed with capnometry and bilateral breath sounds.       #Elevated troponin  uptrending troponin to 182 3/15 AM, was likely iso ESRD  s/p cardiac arrest, elevated ST segment   - trend troponin  - f/u TTE      #HTN  on amlodipine 5mg, losartan 100mg, torsemide 20mg, and clonidine 0.2 patch weekly  - hold for now given sepsis      Respiratory/ENT  #Intubated  continue sedation    # Tympanic membrane rupture  discharge from R ear, evaluated by ENT given dexamethasone and cipro 3/14  - c/w broad spectrum abx   - f/u ENT recs      Gastrointestinal  #hematemesis  patient with melissa bright red blood from mouth on arrival to MICU  could be related to CPR, no pericardial effusion on bedside US  - PPI  - tube feeds    #aspiration precaution  previously failed dysphagia screen and was pending SLP eval  blood in mouth on arrival to MICU and increased wob concerning for aspiration  OGT in place, in stomach on CXR         Renal  #ESRD #s/p Kidney Transplant in 2003 at [place] #PCKD  previously HD through LLE AVF but no HD since transplant  Cr 3.8, unclear baseline  - avoid nephrotoxic agent  - holding cyclosporine and mycophenolate   - changed prednisone to medrol 10mg IVP q6h  - appreciate nephro recs  - strict I/O and replete electrolytes  - 4mg IV bumex and see and pt responds  - on bicarb drip       Hematological  # h/o LLE DVT  resolved, no DVT on doppler 12/22.  on ASA at home, has been receiving heparin subQ  - hold for now given bleeding in oropharynx.   - SCDs  - can restart heparin tomorrow if no bleeding    #Anemia  - s/p 1 unit pRBC on 3/16 for Hb drop from 10 to 7.9 and responded appropriately  - Evaluated by ENT for bleeding and found no signs of active bleeding from nasal cavity, nasopharynx or oral cavity, however, patient biting on tongue, which could be one source of bleeding. Tongue was edematous and lax      Infectious Disease  #Strep pneumoniae meningitis #R ear mastoiditis  treatment with IV CTX and vancomycin (dose by level)  - dexamethasone  - c/w ciprodex drops for R ear  - daily vanc trough and q24hr 1000mg IV vanc if trough <15  - f/u ID recs   - repeat blood cx at 48 hr (3/17 AM)    #h/o PCP  - atovaquone at home, continue      Endocrinological  #Hypothyroidism  last TSH 12/2022 >10  TSH 3/15 4.73  - c/w levothyroxine     #Diabetes mellitus Type II  - BHB elevated to 2.9, low suspicion for euglycemic DKA, CTM  - recheck BHB  - ISS      Ethics  FULL, GOC ongoing.

## 2023-03-17 NOTE — DIETITIAN INITIAL EVALUATION ADULT - PERTINENT LABORATORY DATA
03-17    134<L>  |  100  |  87<H>  ----------------------------<  162<H>  5.3   |  12<L>  |  3.83<H>    Ca    8.5      17 Mar 2023 03:34  Phos  8.0     03-17  *phosphorus trending up  Mg     2.40     03-17    TPro  5.1<L>  /  Alb  2.3<L>  /  TBili  0.6  /  DBili  x   /  AST  102<H>  /  ALT  78<H>  /  AlkPhos  75  03-17    CAPILLARY BLOOD GLUCOSE    POCT Blood Glucose.: 193 mg/dL (17 Mar 2023 05:54)  POCT Blood Glucose.: 128 mg/dL (16 Mar 2023 23:04)  POCT Blood Glucose.: 124 mg/dL (16 Mar 2023 18:38)  POCT Blood Glucose.: 126 mg/dL (16 Mar 2023 12:19)    A1C with Estimated Average Glucose Result: 5.6 % (03-15-23 @ 05:45)

## 2023-03-17 NOTE — PROGRESS NOTE ADULT - ATTENDING COMMENTS
79 y/o F with a PMH as above here with altered mental status, noted to have a disseminated strep infection.  Overnight, the patient had a cardiac arrest, requiring intubation and mechanical ventilation after ROSC was achieved. Wean sedation as tolerated.  Follow up EEG, neuro recs appreciated.  Attempt SBT. Septic shock secondary to strep bacteremia.  Cont abx and follow up surveillance cultures.  Strep meningitis, cont abx, no role for steroids at this time.  Follow up ID.  Follow up ECHO.  NAMAN, likely hemodynamically mediated, attempt fluid challenge and will likely need CRRT.  Follow up ENT. Patient examined and case reviewed in detail on bedside rounds. Frequent bedside visits with therapy change today.  Prognosis guarded.

## 2023-03-17 NOTE — PROGRESS NOTE ADULT - ASSESSMENT
8y Female with PMH PCKD s/p renal transplant, HTN, DM, LLE DVT on ASA, HLD, small supraclinoid aneurysm, glaucoma, hypothyroidism, and recurrent ENT visits for cerumen debridement who presents today with R-sided headache, neck pain, and ear pain. CTH suggesting R mastoid effusion but difficult to see on CT. Physical exam: right ear + hematoma EAC that popped so small amount of blood in EAC, TM noted with hemotympanum and small perforation.    Concern for meningitis given neck pain, WBC 14, and AMS. Now course c/b cardiac arrest  ROSC obtained and patient remains intubated and sedated

## 2023-03-17 NOTE — DIETITIAN INITIAL EVALUATION ADULT - ENERGY INTAKE
~394 non-nutritive lipid calories provided by Propofol over past 24hrs.  Has received ~378 kcals from TF (Nepro).  Currently at rate of 30mL/hr, per observation.   NPO

## 2023-03-17 NOTE — DIETITIAN INITIAL EVALUATION ADULT - PERTINENT MEDS FT
MEDICATIONS  (STANDING):  albuterol    0.083%. 2.5 milliGRAM(s) Nebulizer once  aspirin  chewable 81 milliGRAM(s) Enteral Tube daily  atovaquone  Suspension 1500 milliGRAM(s) Oral daily  cefTRIAXone   IVPB 2000 milliGRAM(s) IV Intermittent every 12 hours  chlorhexidine 0.12% Liquid 15 milliLiter(s) Oral Mucosa two times a day  chlorhexidine 2% Cloths 1 Application(s) Topical <User Schedule>  ciprofloxacin  0.3% Ophthalmic Solution for Otic Use 4 Drop(s) Right Ear two times a day  dextrose 5% 1000 milliLiter(s) (100 mL/Hr) IV Continuous <Continuous>  dextrose 5%. 1000 milliLiter(s) (100 mL/Hr) IV Continuous <Continuous>  dextrose 5%. 1000 milliLiter(s) (50 mL/Hr) IV Continuous <Continuous>  dextrose 50% Injectable 25 Gram(s) IV Push once  dextrose 50% Injectable 12.5 Gram(s) IV Push once  dextrose 50% Injectable 25 Gram(s) IV Push once  fentaNYL   Infusion. 0.5 MICROgram(s)/kG/Hr (1.89 mL/Hr) IV Continuous <Continuous>  glucagon  Injectable 1 milliGRAM(s) IntraMuscular once  insulin lispro (ADMELOG) corrective regimen sliding scale   SubCutaneous every 6 hours  levothyroxine Injectable 18 MICROGram(s) IV Push at bedtime  norepinephrine Infusion 0.05 MICROgram(s)/kG/Min (2.91 mL/Hr) IV Continuous <Continuous>  predniSONE   Tablet 5 milliGRAM(s) Oral daily  propofol Infusion 10 MICROgram(s)/kG/Min (2.27 mL/Hr) IV Continuous <Continuous>  vancomycin  IVPB 750 milliGRAM(s) IV Intermittent once    MEDICATIONS  (PRN):  acetaminophen   Oral Liquid .. 650 milliGRAM(s) Enteral Tube every 6 hours PRN Temp greater or equal to 38C (100.4F), Mild Pain (1 - 3), Moderate Pain (4 - 6)  dextrose Oral Gel 15 Gram(s) Oral once PRN Blood Glucose LESS THAN 70 milliGRAM(s)/deciliter

## 2023-03-17 NOTE — PROGRESS NOTE ADULT - SUBJECTIVE AND OBJECTIVE BOX
INTERVAL HPI/OVERNIGHT EVENTS: Dexamethasone and fluticasone were d/c and prednisone was restarted. BHB was elevated to 2.9.      SUBJECTIVE: Patient seen and examined at bedside. Intubated, sedated, ROS cannot be obtained.       VITAL SIGNS:  ICU Vital Signs Last 24 Hrs  T(C): 35.6 (17 Mar 2023 04:00), Max: 36.8 (16 Mar 2023 16:00)  T(F): 96 (17 Mar 2023 04:00), Max: 98.3 (16 Mar 2023 16:00)  HR: 51 (17 Mar 2023 06:00) (51 - 92)  BP: --  BP(mean): --  ABP: 131/42 (17 Mar 2023 06:00) (95/57 - 183/70)  ABP(mean): 70 (17 Mar 2023 06:00) (66 - 106)  RR: 16 (17 Mar 2023 06:00) (16 - 90)  SpO2: 99% (17 Mar 2023 06:00) (92% - 100%)    O2 Parameters below as of 17 Mar 2023 06:00  Patient On (Oxygen Delivery Method): ventilator    O2 Concentration (%): 50      Mode: AC/ CMV (Assist Control/ Continuous Mandatory Ventilation), RR (machine): 16, TV (machine): 350, FiO2: 50, PEEP: 8, ITime: 0.7, MAP: 11, PIP: 22  Plateau pressure:   P/F ratio:     03-16 @ 07:01  -  03-17 @ 07:00  --------------------------------------------------------  IN: 2297.1 mL / OUT: 20 mL / NET: 2277.1 mL      CAPILLARY BLOOD GLUCOSE      POCT Blood Glucose.: 193 mg/dL (17 Mar 2023 05:54)    ECG:    PHYSICAL EXAM:    General:   HEENT:   Neck:   Respiratory:   Cardiovascular:   Abdomen:   Extremities:  Neurological:    MEDICATIONS:  MEDICATIONS  (STANDING):  albuterol    0.083%. 2.5 milliGRAM(s) Nebulizer once  aspirin  chewable 81 milliGRAM(s) Enteral Tube daily  atovaquone  Suspension 1500 milliGRAM(s) Oral daily  cefTRIAXone   IVPB 2000 milliGRAM(s) IV Intermittent every 12 hours  chlorhexidine 0.12% Liquid 15 milliLiter(s) Oral Mucosa two times a day  chlorhexidine 2% Cloths 1 Application(s) Topical <User Schedule>  ciprofloxacin  0.3% Ophthalmic Solution for Otic Use 4 Drop(s) Right Ear two times a day  dextrose 5% 1000 milliLiter(s) (100 mL/Hr) IV Continuous <Continuous>  dextrose 5%. 1000 milliLiter(s) (100 mL/Hr) IV Continuous <Continuous>  dextrose 5%. 1000 milliLiter(s) (50 mL/Hr) IV Continuous <Continuous>  dextrose 50% Injectable 25 Gram(s) IV Push once  dextrose 50% Injectable 12.5 Gram(s) IV Push once  dextrose 50% Injectable 25 Gram(s) IV Push once  fentaNYL   Infusion. 0.5 MICROgram(s)/kG/Hr (1.89 mL/Hr) IV Continuous <Continuous>  glucagon  Injectable 1 milliGRAM(s) IntraMuscular once  insulin lispro (ADMELOG) corrective regimen sliding scale   SubCutaneous every 6 hours  levothyroxine Injectable 18 MICROGram(s) IV Push at bedtime  norepinephrine Infusion 0.05 MICROgram(s)/kG/Min (2.91 mL/Hr) IV Continuous <Continuous>  predniSONE   Tablet 5 milliGRAM(s) Oral daily  propofol Infusion 10 MICROgram(s)/kG/Min (2.27 mL/Hr) IV Continuous <Continuous>  vancomycin  IVPB 1000 milliGRAM(s) IV Intermittent once    MEDICATIONS  (PRN):  acetaminophen   Oral Liquid .. 650 milliGRAM(s) Enteral Tube every 6 hours PRN Temp greater or equal to 38C (100.4F), Mild Pain (1 - 3), Moderate Pain (4 - 6)  dextrose Oral Gel 15 Gram(s) Oral once PRN Blood Glucose LESS THAN 70 milliGRAM(s)/deciliter      ALLERGIES:  Allergies    apple (Unknown)  Benadryl (Unknown)  penicillin (Hives)  watermelon (Unknown)    Intolerances        LABS:                        9.6    13.72 )-----------( 162      ( 17 Mar 2023 03:34 )             30.4     03-17    134<L>  |  100  |  87<H>  ----------------------------<  162<H>  5.3   |  12<L>  |  3.83<H>    Ca    8.5      17 Mar 2023 03:34  Phos  8.0     03-17  Mg     2.40     03-17    TPro  5.1<L>  /  Alb  2.3<L>  /  TBili  0.6  /  DBili  x   /  AST  102<H>  /  ALT  78<H>  /  AlkPhos  75  03-17          RADIOLOGY & ADDITIONAL TESTS: Reviewed.   INTERVAL HPI/OVERNIGHT EVENTS: Dexamethasone and fluticasone were d/c and prednisone was restarted. BHB was elevated to 2.9.      SUBJECTIVE: Patient seen and examined at bedside. Intubated, sedated, ROS cannot be obtained.       VITAL SIGNS:  ICU Vital Signs Last 24 Hrs  T(C): 35.6 (17 Mar 2023 04:00), Max: 36.8 (16 Mar 2023 16:00)  T(F): 96 (17 Mar 2023 04:00), Max: 98.3 (16 Mar 2023 16:00)  HR: 51 (17 Mar 2023 06:00) (51 - 92)  BP: --  BP(mean): --  ABP: 131/42 (17 Mar 2023 06:00) (95/57 - 183/70)  ABP(mean): 70 (17 Mar 2023 06:00) (66 - 106)  RR: 16 (17 Mar 2023 06:00) (16 - 90)  SpO2: 99% (17 Mar 2023 06:00) (92% - 100%)    O2 Parameters below as of 17 Mar 2023 06:00  Patient On (Oxygen Delivery Method): ventilator    O2 Concentration (%): 50      Mode: AC/ CMV (Assist Control/ Continuous Mandatory Ventilation), RR (machine): 16, TV (machine): 350, FiO2: 50, PEEP: 8, ITime: 0.7, MAP: 11, PIP: 22  Plateau pressure:   P/F ratio:     03-16 @ 07:01  -  03-17 @ 07:00  --------------------------------------------------------  IN: 2297.1 mL / OUT: 20 mL / NET: 2277.1 mL      CAPILLARY BLOOD GLUCOSE      POCT Blood Glucose.: 193 mg/dL (17 Mar 2023 05:54)    ECG:    PHYSICAL EXAM:  GENERAL: sedated, intubated completely, does not respond to verbal or physical stimuli  HEAD:  Atraumatic, Normocephalic  EYES:  conjunctiva and sclera clear, pupils minimally reactive  CHEST/LUNG: COarse rhonchi b/l worse on right  HEART: Regular rate and rhythm, on levophed   ABDOMEN: Distended, enlarged  NERVOUS SYSTEM:  Alert & Oriented X0   EXTREMITIES:  No pitting edema b/l    MEDICATIONS:  MEDICATIONS  (STANDING):  albuterol    0.083%. 2.5 milliGRAM(s) Nebulizer once  aspirin  chewable 81 milliGRAM(s) Enteral Tube daily  atovaquone  Suspension 1500 milliGRAM(s) Oral daily  cefTRIAXone   IVPB 2000 milliGRAM(s) IV Intermittent every 12 hours  chlorhexidine 0.12% Liquid 15 milliLiter(s) Oral Mucosa two times a day  chlorhexidine 2% Cloths 1 Application(s) Topical <User Schedule>  ciprofloxacin  0.3% Ophthalmic Solution for Otic Use 4 Drop(s) Right Ear two times a day  dextrose 5% 1000 milliLiter(s) (100 mL/Hr) IV Continuous <Continuous>  dextrose 5%. 1000 milliLiter(s) (100 mL/Hr) IV Continuous <Continuous>  dextrose 5%. 1000 milliLiter(s) (50 mL/Hr) IV Continuous <Continuous>  dextrose 50% Injectable 25 Gram(s) IV Push once  dextrose 50% Injectable 12.5 Gram(s) IV Push once  dextrose 50% Injectable 25 Gram(s) IV Push once  fentaNYL   Infusion. 0.5 MICROgram(s)/kG/Hr (1.89 mL/Hr) IV Continuous <Continuous>  glucagon  Injectable 1 milliGRAM(s) IntraMuscular once  insulin lispro (ADMELOG) corrective regimen sliding scale   SubCutaneous every 6 hours  levothyroxine Injectable 18 MICROGram(s) IV Push at bedtime  norepinephrine Infusion 0.05 MICROgram(s)/kG/Min (2.91 mL/Hr) IV Continuous <Continuous>  predniSONE   Tablet 5 milliGRAM(s) Oral daily  propofol Infusion 10 MICROgram(s)/kG/Min (2.27 mL/Hr) IV Continuous <Continuous>  vancomycin  IVPB 1000 milliGRAM(s) IV Intermittent once    MEDICATIONS  (PRN):  acetaminophen   Oral Liquid .. 650 milliGRAM(s) Enteral Tube every 6 hours PRN Temp greater or equal to 38C (100.4F), Mild Pain (1 - 3), Moderate Pain (4 - 6)  dextrose Oral Gel 15 Gram(s) Oral once PRN Blood Glucose LESS THAN 70 milliGRAM(s)/deciliter      ALLERGIES:  Allergies    apple (Unknown)  Benadryl (Unknown)  penicillin (Hives)  watermelon (Unknown)    Intolerances        LABS:                        9.6    13.72 )-----------( 162      ( 17 Mar 2023 03:34 )             30.4     03-17    134<L>  |  100  |  87<H>  ----------------------------<  162<H>  5.3   |  12<L>  |  3.83<H>    Ca    8.5      17 Mar 2023 03:34  Phos  8.0     03-17  Mg     2.40     03-17    TPro  5.1<L>  /  Alb  2.3<L>  /  TBili  0.6  /  DBili  x   /  AST  102<H>  /  ALT  78<H>  /  AlkPhos  75  03-17          RADIOLOGY & ADDITIONAL TESTS: Reviewed.

## 2023-03-17 NOTE — CHART NOTE - NSCHARTNOTEFT_GEN_A_CORE
Study Date: 03-16-23 12:06 to 03-17-23 08:00; Duration x Hours: 19:31  EEG Classification / Summary:  Abnormal  EEG in the awake / drowsy / asleep state(s).  Abundant central (max Cz/Fz) LPDs 1-4hz in frequency, at times occurring in runs of up to 3-4 seconds duration, possibly representing BIRDs.   Background slowing, generalized, severe due to burst suppression background    Clinical Impression:  Evidence for increased risk for seizures from the central head region.   Severe diffuse/multifocal cerebral dysfunction, not specific as to etiology  There were no epileptiform abnormalities recorded.      This is a prelim report only, pending review with attending prior to finalization.  -------------------------------------------------------------------------------------------------------    Prelim EEG reviewed with Epilepsy fellow/attending reviewed as above and discussed with Neurology attending.   Recommendations:   - Stat load Levetiracetam 500mg IV x1  - Then start maintenance Levetiracetam 250mg BID  - Continue propofol gtt  - F/u final EEG report    Discussed with primary team. Study Date: 03-16-23 12:06 to 03-17-23 08:00; Duration x Hours: 19:31  EEG Classification / Summary:  Abnormal  EEG in the awake / drowsy / asleep state(s).  Abundant central (max Cz/Fz) LPDs 1-4hz in frequency, at times occurring in runs of up to 3-4 seconds duration, possibly representing BIRDs.   Background slowing, generalized, severe due to burst suppression background    Clinical Impression:  Evidence for increased risk for seizures from the central head region.   Severe diffuse/multifocal cerebral dysfunction, not specific as to etiology  There were no epileptiform abnormalities recorded.      This is a prelim report only, pending review with attending prior to finalization.  -------------------------------------------------------------------------------------------------------    Prelim EEG reviewed with Epilepsy fellow/attending reviewed as above and discussed with Neurology attending.   Recommendations:   - Stat load Levetiracetam 500mg IV x1  - Then start maintenance Levetiracetam 250mg BID  - Continue propofol gtt  - F/u final EEG report  Plan developed with the guidance of the epilepsy team.    Discussed with primary team.  Thank you

## 2023-03-17 NOTE — DIETITIAN INITIAL EVALUATION ADULT - NS FNS DIET ORDER
Diet, NPO with Tube Feed:   Tube Feeding Modality: Orogastric  Nepro with Carb Steady (NEPRORTH)  Total Volume for 24 Hours (mL): 960  Continuous  Starting Tube Feed Rate {mL per Hour}: 10  Increase Tube Feed Rate by (mL): 10     Every 6 hours  Until Goal Tube Feed Rate (mL per Hour): 40  Tube Feed Duration (in Hours): 24  Tube Feed Start Time: 18:00 (03-16-23 @ 17:53)    provides:  960mL total volume  1728 kcals  78g protein

## 2023-03-17 NOTE — DIETITIAN INITIAL EVALUATION ADULT - REASON FOR ADMISSION
77yo F presenting w R sided HA & R ear pain & neck pain.  Dx otitis media w rupture of tympanic membrane.  LP with findings of bacterial meningitis.  S/p RRT (3/16), cardiac arrest, intubated/sedated.

## 2023-03-17 NOTE — PROGRESS NOTE ADULT - SUBJECTIVE AND OBJECTIVE BOX
[facility] - S Alea / Tita S /D Rama/ S Jovan/ S Keenna/ Cleve Gambino / DAGMAR Wilsonu/ O Daya  service -[phone], office [phone]  ---------------------------------------------------------------------------------------------------------------    Patient seen and examined bedside    Subjective and Objective: No overnight events, sob resolved. No complaints today. feeling better    Allergies: apple (Unknown)  Benadryl (Unknown)  penicillin (Hives)  watermelon (Unknown)      Hospital Medications:   MEDICATIONS  (STANDING):  albuterol    0.083%. 2.5 milliGRAM(s) Nebulizer once  aspirin  chewable 81 milliGRAM(s) Oral daily  atovaquone  Suspension 1500 milliGRAM(s) Oral daily  atropine Injectable 1 milliGRAM(s) IV Push once  cefTRIAXone   IVPB 2000 milliGRAM(s) IV Intermittent every 12 hours  chlorhexidine 0.12% Liquid 15 milliLiter(s) Oral Mucosa two times a day  chlorhexidine 2% Cloths 1 Application(s) Topical <User Schedule>  ciprofloxacin  0.3% Ophthalmic Solution for Otic Use 4 Drop(s) Right Ear two times a day  CRRT Treatment    <Continuous>  dextrose 5% 1000 milliLiter(s) (100 mL/Hr) IV Continuous <Continuous>  dextrose 5%. 1000 milliLiter(s) (100 mL/Hr) IV Continuous <Continuous>  dextrose 5%. 1000 milliLiter(s) (50 mL/Hr) IV Continuous <Continuous>  dextrose 50% Injectable 25 Gram(s) IV Push once  dextrose 50% Injectable 12.5 Gram(s) IV Push once  dextrose 50% Injectable 25 Gram(s) IV Push once  fentaNYL   Infusion. 0.5 MICROgram(s)/kG/Hr (1.89 mL/Hr) IV Continuous <Continuous>  fludroCORTISONE 0.05 milliGRAM(s) Oral daily  glucagon  Injectable 1 milliGRAM(s) IntraMuscular once  insulin lispro (ADMELOG) corrective regimen sliding scale   SubCutaneous every 6 hours  levETIRAcetam  IVPB 250 milliGRAM(s) IV Intermittent every 12 hours  levothyroxine Injectable 18 MICROGram(s) IV Push at bedtime  methylPREDNISolone sodium succinate Injectable 10 milliGRAM(s) IV Push every 6 hours  norepinephrine Infusion 0.05 MICROgram(s)/kG/Min (2.91 mL/Hr) IV Continuous <Continuous>  petrolatum Ophthalmic Ointment 1 Application(s) Both EYES every 12 hours  PrismaSATE Dialysate BGK 4 / 2.5 5000 milliLiter(s) (1000 mL/Hr) CRRT <Continuous>  PrismaSOL Filtration BGK 4 / 2.5 5000 milliLiter(s) (1000 mL/Hr) CRRT <Continuous>  PrismaSOL Filtration BGK 4 / 2.5 5000 milliLiter(s) (800 mL/Hr) CRRT <Continuous>  propofol Infusion 10 MICROgram(s)/kG/Min (2.27 mL/Hr) IV Continuous <Continuous>      REVIEW OF SYSTEMS:  CONSTITUTIONAL: No weakness, fevers or chills  EYES/ENT: No visual changes;  No vertigo or throat pain   NECK: No pain or stiffness  RESPIRATORY: No cough, wheezing, hemoptysis; No shortness of breath  CARDIOVASCULAR: No chest pain or palpitations.  GASTROINTESTINAL: No abdominal or epigastric pain. No nausea, vomiting, or hematemesis; No diarrhea or constipation. No melena or hematochezia.  GENITOURINARY: No dysuria, frequency, foamy urine, urinary urgency, incontinence or hematuria  NEUROLOGICAL: No numbness or weakness  SKIN: No itching, burning, rashes, or lesions   VASCULAR: No bilateral lower extremity edema.   All other review of systems is negative unless indicated above.    VITALS:  T(F): 95.1 (23 @ 16:00), Max: 96.3 (23 @ 20:00)  HR: 46 (23 @ 17:43)  BP: --  RR: 0 (23 @ 17:43)  SpO2: 99% (23 @ 17:43)  Wt(kg): --     @ 07:01  -   @ 07:00  --------------------------------------------------------  IN: 2297.1 mL / OUT: 20 mL / NET: 2277.1 mL     @ 07:01  -   @ 17:49  --------------------------------------------------------  IN: 2137.7 mL / OUT: 0 mL / NET: 2137.7 mL          PHYSICAL EXAM:  Constitutional: NAD  HEENT: anicteric sclera, oropharynx clear  Neck: No JVD  Respiratory: CTAB, no wheezes, rales or rhonchi  Cardiovascular: S1, S2, RRR  Gastrointestinal: BS+, soft, NT/ND  Extremities: No cyanosis or clubbing. No peripheral edema  Neurological: A/O x 3, no focal deficits  Psychiatric: Normal mood, normal affect  : No CVA tenderness. No snider.   Skin: No rashes  Vascular Access:    LABS:      139  |  100  |  90<H>  ----------------------------<  245<H>  5.1   |  16<L>  |  3.86<H>    Ca    8.1<L>      17 Mar 2023 11:28  Phos  7.8       Mg     2.20         TPro  4.6<L>  /  Alb  2.3<L>  /  TBili  0.5  /  DBili      /  AST  75<H>  /  ALT  66<H>  /  AlkPhos  68      Creatinine Trend: 3.86 <--, 3.83 <--, 3.62 <--, 3.31 <--, 3.10 <--, 3.03 <--, 2.98 <--                        8.5    11.89 )-----------( 139      ( 17 Mar 2023 11:28 )             27.8     Urine Studies:  Urinalysis Basic - ( 15 Mar 2023 03:43 )    Color: Yellow / Appearance: Clear / S.019 / pH:   Gluc:  / Ketone: Negative  / Bili: Negative / Urobili: <2 mg/dL   Blood:  / Protein: 300 mg/dL / Nitrite: Negative   Leuk Esterase: Negative / RBC: 8 /HPF / WBC 2 /HPF   Sq Epi:  / Non Sq Epi: 1 /HPF / Bacteria: Negative          RADIOLOGY & ADDITIONAL STUDIES:   [facility] - S Alea / Tita S /D Rama/ S Jovan/ S Keenan/ Cleve Gambino / DAGMAR Wilsonu/ O Daya  service -[phone], office [phone]  ---------------------------------------------------------------------------------------------------------------    Patient seen and examined bedside in MICU    Subjective and Objective: No overnight events. remains on MV, sedated    Allergies: apple (Unknown)  Benadryl (Unknown)  penicillin (Hives)  watermelon (Unknown)      Hospital Medications:   MEDICATIONS  (STANDING):  albuterol    0.083%. 2.5 milliGRAM(s) Nebulizer once  aspirin  chewable 81 milliGRAM(s) Oral daily  atovaquone  Suspension 1500 milliGRAM(s) Oral daily  atropine Injectable 1 milliGRAM(s) IV Push once  cefTRIAXone   IVPB 2000 milliGRAM(s) IV Intermittent every 12 hours  chlorhexidine 0.12% Liquid 15 milliLiter(s) Oral Mucosa two times a day  chlorhexidine 2% Cloths 1 Application(s) Topical <User Schedule>  ciprofloxacin  0.3% Ophthalmic Solution for Otic Use 4 Drop(s) Right Ear two times a day  CRRT Treatment    <Continuous>  dextrose 5% 1000 milliLiter(s) (100 mL/Hr) IV Continuous <Continuous>  dextrose 5%. 1000 milliLiter(s) (100 mL/Hr) IV Continuous <Continuous>  dextrose 5%. 1000 milliLiter(s) (50 mL/Hr) IV Continuous <Continuous>  dextrose 50% Injectable 25 Gram(s) IV Push once  dextrose 50% Injectable 12.5 Gram(s) IV Push once  dextrose 50% Injectable 25 Gram(s) IV Push once  fentaNYL   Infusion. 0.5 MICROgram(s)/kG/Hr (1.89 mL/Hr) IV Continuous <Continuous>  fludroCORTISONE 0.05 milliGRAM(s) Oral daily  glucagon  Injectable 1 milliGRAM(s) IntraMuscular once  insulin lispro (ADMELOG) corrective regimen sliding scale   SubCutaneous every 6 hours  levETIRAcetam  IVPB 250 milliGRAM(s) IV Intermittent every 12 hours  levothyroxine Injectable 18 MICROGram(s) IV Push at bedtime  methylPREDNISolone sodium succinate Injectable 10 milliGRAM(s) IV Push every 6 hours  norepinephrine Infusion 0.05 MICROgram(s)/kG/Min (2.91 mL/Hr) IV Continuous <Continuous>  petrolatum Ophthalmic Ointment 1 Application(s) Both EYES every 12 hours  PrismaSATE Dialysate BGK 4 / 2.5 5000 milliLiter(s) (1000 mL/Hr) CRRT <Continuous>  PrismaSOL Filtration BGK 4 / 2.5 5000 milliLiter(s) (1000 mL/Hr) CRRT <Continuous>  PrismaSOL Filtration BGK 4 / 2.5 5000 milliLiter(s) (800 mL/Hr) CRRT <Continuous>  propofol Infusion 10 MICROgram(s)/kG/Min (2.27 mL/Hr) IV Continuous <Continuous>    VITALS:  T(F): 95.1 (23 @ 16:00), Max: 96.3 (23 @ 20:00)  HR: 46 (23 @ 17:43)  BP: --  RR: 0 (23 @ 17:43)  SpO2: 99% (23 @ 17:43)  Wt(kg): --     @ 07:01  -   @ 07:00  --------------------------------------------------------  IN: 2297.1 mL / OUT: 20 mL / NET: 2277.1 mL     @ 07:01  -   @ 17:49  --------------------------------------------------------  IN: 2137.7 mL / OUT: 0 mL / NET: 2137.7 mL      PHYSICAL EXAM:  Constitutional: NAD  HEENT: ETT+, OGT+   Neck: No JVD  Respiratory: CTAB, no wheezes  Cardiovascular: S1, S2, RRR  Gastrointestinal: BS+, soft  Extremities: No peripheral edema  Neurological: sedated  : + snider.     LABS:      139  |  100  |  90<H>  ----------------------------<  245<H>  5.1   |  16<L>  |  3.86<H>    Ca    8.1<L>      17 Mar 2023 11:28  Phos  7.8       Mg     2.20         TPro  4.6<L>  /  Alb  2.3<L>  /  TBili  0.5  /  DBili      /  AST  75<H>  /  ALT  66<H>  /  AlkPhos  68      Creatinine Trend: 3.86 <--, 3.83 <--, 3.62 <--, 3.31 <--, 3.10 <--, 3.03 <--, 2.98 <--                        8.5    11.89 )-----------( 139      ( 17 Mar 2023 11:28 )             27.8     Urine Studies:  Urinalysis Basic - ( 15 Mar 2023 03:43 )    Color: Yellow / Appearance: Clear / S.019 / pH:   Gluc:  / Ketone: Negative  / Bili: Negative / Urobili: <2 mg/dL   Blood:  / Protein: 300 mg/dL / Nitrite: Negative   Leuk Esterase: Negative / RBC: 8 /HPF / WBC 2 /HPF   Sq Epi:  / Non Sq Epi: 1 /HPF / Bacteria: Negative          RADIOLOGY & ADDITIONAL STUDIES:

## 2023-03-17 NOTE — PROGRESS NOTE ADULT - SUBJECTIVE AND OBJECTIVE BOX
Patient is a 78y old  Female who presents with a chief complaint of AMS/R ear infxn (15 Mar 2023 10:36)    f/u pneumococcal sepsis, bacteremia and meningitis    Interval History/ROS:  remains critically ill, intubated on pressors. ROS unable    PAST MEDICAL & SURGICAL HISTORY:  Polycystic kidney disease  Glaucoma  Aneurysm  History of deep venous thrombosis (DVT) of distal vein of left lower extremity  Thrombocytopenia  Hypothyroid  Osteoporosis  Arthritis  PCP (pneumocystis carinii pneumonia)   C. difficile colitis  Type 2 diabetes mellitus, without long-term current use of insulin  Essential hypertension  H/O kidney transplant (cadaver , [facility])  H/O:   H/O eye surgery  S/P hysterectomy  H/O umbilical hernia repair    Allergies  apple (Unknown)  Benadryl (Unknown)  penicillin (Hives), tolerate cephalosporins  watermelon (Unknown)    ANTIMICROBIALS:  meropenem  IVPB 1000 every 12 hours (3/15)    active:  atovaquone  Suspension 1500 daily  vancomycin  IVPB (3/15-)  cefTRIAXone   IVPB 2000 every 12 hours (3/15-)    MEDICATIONS  (STANDING):  albuterol    0.083%. 2.5 once  aspirin  chewable 81 daily  fentaNYL   Infusion. 0.5 <Continuous>  fludroCORTISONE 0.05 daily  insulin lispro (ADMELOG) corrective regimen sliding scale  every 6 hours  levETIRAcetam  IVPB 250 every 12 hours  levETIRAcetam  IVPB 500 once  levothyroxine Injectable 18 at bedtime  methylPREDNISolone sodium succinate Injectable 10 every 6 hours  norepinephrine Infusion 0.05 <Continuous>  propofol Infusion 10 <Continuous>    Vital Signs Last 24 Hrs  T(F): 95.8 (23 @ 08:00), Max: 98.3 (23 @ 16:00)  HR: 49 (23 @ 11:14)  RR: 22 (23 @ 11:00)  SpO2: 100% (23 @ 11:14) (92% - 100%)    PHYSICAL EXAM:  Constitutional: sedated on vent  HEAD/EYES: keeps eyes closed  ENT:  not supple  Cardiovascular:   normal S1, S2  Respiratory:  clear BS bilaterally  GI:  soft, enlarged but non-tender  :  no snider  Musculoskeletal:  no synovitis  Neurologic: lethargic, sedated, unable to assess  Skin:  no rash  Psychiatric:  not able to assess                                 8.5    11.89 )-----------( 139      ( 17 Mar 2023 11:28 )             27.8     134  |  100  |  87  ----------------------------<  162  5.3   |  12  |  3.83  Ca    8.5      17 Mar 2023 03:34Phos  8.0     Mg     2.40       TPro  5.1  /  Alb  2.3  /  TBili  0.6  /  DBili  x   /  AST  102  /  ALT  78  /  AlkPhos  75      WBC Count: 11.89 (23 @ 11:28)  WBC Count: 13.72 (23 @ 03:34)  WBC Count: 13.11 (23 @ 23:47)  WBC Count: 14.30 (23 @ 16:20)  WBC Count: 9.29 (23 @ 09:45)  WBC Count: 22.19 (03-15-23 @ 05:45)  WBC Count: 14.94 (23 @ 21:38)    Creatinine, Serum: 3.83 ()  Creatinine, Serum: 3.62 (-16)  Creatinine, Serum: 3.31 (-16)  Creatinine, Serum: 3.10 (-16)  Creatinine, Serum: 3.03 (-15)  Creatinine, Serum: 2.98 (-14)    Urinalysis Basic - ( 15 Mar 2023 03:43 )  Color: Yellow / Appearance: Clear / S.019 / pH: x  Gluc: x / Ketone: Negative  / Bili: Negative / Urobili: <2 mg/dL   Blood: x / Protein: 300 mg/dL / Nitrite: Negative   Leuk Esterase: Negative / RBC: 8 /HPF / WBC 2 /HPF   Sq Epi: x / Non Sq Epi: 1 /HPF / Bacteria: Negative    MICROBIOLOGY:  Culture - Bronchial (collected 23 @ 13:35)  Source: .Bronchial Bronchial Lavage  Gram Stain (23 @ 22:59):    No polymorphonuclear cells seen per low power field    No squamous epithelial cells per low power field    No organisms seen per oil power field    Culture - Fungal, CSF (collected 03-15-23 @ 15:00)  Source: .CSF CSF  Preliminary Report (23 @ 12:01):    Testing in progress    Culture - CSF with Gram Stain (collected 03-15-23 @ 15:00)  Source: .CSF CSF  Gram Stain (03-15-23 @ 18:44):    Few polymorphonuclear leukocytes per low power field    Moderate Gram positive cocci in pairs per oil power field  Preliminary Report (23 @ 12:54):    No growth    Culture - Acid Fast - CSF (collected 03-15-23 @ 15:00)  Source: .CSF CSF    Culture - Urine (collected 03-15-23 @ 03:43)  Source: Clean Catch Clean Catch (Midstream)  Final Report (23 @ 07:32):    No growth    Culture - Blood (collected 03-15-23 @ 03:00)  Source: .Blood Blood-Venous  Gram Stain (03-15-23 @ 18:11):    Growth in aerobic and anaerobic bottles: Gram positive cocci in pairs  Final Report (23 @ 10:03):    Growth in aerobic and anaerobic bottles: Streptococcus pneumoniae    See previous culture [id number]    Culture - Blood (collected 03-15-23 @ 02:30)  Source: .Blood Blood-Peripheral  Gram Stain (03-15-23 @ 19:26):    Growth in aerobic bottle: Gram positive cocci in pairs    Growth in anaerobic bottle: Gram positive cocci in pairs  Final Report (23 @ 11:07):    Growth in aerobic and anaerobic bottles: Streptococcus pneumoniae    ***Blood Panel PCR results on this specimen are available    approximately 3 hours after the Gram stain result.***    Gram stain, PCR, and/or culture results may not always    correspond due to difference in methodologies.    ************************************************************    This PCR assay was performed by multiplex PCR. This    Assay tests for 66 bacterial and resistance gene targets.    Please refer to the [facility] test directory    at [email or web address] for details.  Organism: Blood Culture PCR  Streptococcus pneumoniae (23 @ 11:07)  Organism: Streptococcus pneumoniae (23 @ 11:07)      -  Ceftriaxone (meningitidis): S <=0.25      -  Ceftriaxone (non-meningitidis): S <=0.25      -  Erythromycin: S <=0.06 Predicts results for azithromycin.      -  Levofloxacin: S 0.5      -  Penicillin (meningitidis): S <=0.03      -  Penicillin (non-meningitidis): S <=0.03      -  Penicillin (oral penicillin V): S <=0.03      -  Trimethoprim/Sulfamethoxazole: S <=.25/4.75      -  Vancomycin: S 0.25      Method Type: KEN  Organism: Blood Culture PCR (23 @ 11:07)      -  Streptococcus pneumoniae: Detec      Method Type: PCR    RADIOLOGY:  imaging below personally reviewed and agree with findings    Xray Chest 1 View- PORTABLE-Urgent (Xray Chest 1 View- PORTABLE-Urgent .) (23 @ 17:50) >  IMPRESSION:  NG tube with the tip in the body of the stomach.  Bilateral pulmonary consolidations about the same or slightly worse on the left.    Xray Chest 1 View- PORTABLE-Urgent (Xray Chest 1 View- PORTABLE-Urgent .) (23 @ 14:45) >  IMPRESSION:  NG tube with the tip in the body of the stomach.  Bilateral pulmonary consolidations about the same or slightly worse on the left.    Xray Chest 1 View- PORTABLE-Urgent (Xray Chest 1 View- PORTABLE-Urgent .) (23 @ 14:45) >  IMPRESSION:  NG tube with the tip in the body of the stomach.  Bilateral pulmonary consolidations about the same or slightly worse on the left.    IR Procedure (03.15.23 @ 15:24) >   The opening pressure measured greater than 55 cm water. The closing pressure measured 32 cm water, after removal of 30 cc of cloudy CSF. The needle was removed. The patient tolerated the procedure well without initial complication.  IMPRESSION:  Status post successful lumbar puncture as described.  Markedly abnormal appearing CSF.  Elevated opening pressures.    US Kidney and Bladder (03.15.23 @ 12:10) >  IMPRESSION:  Mild fullness of the left lower quadrant transplant kidney collecting system.  Increased renal cortical echogenicity, possibly renal parenchymal disease.    CT Brain Perfusion Maps Stroke (23 @ 19:46) >  IMPRESSION:  CT PERFUSION:  Limited by late injection of the contrast bolus.  Cerebral blood flow less than 30% = 0 mL. No core infarct is predicted.  Tmax greater than 6 seconds = 0 mL. No brain parenchyma predicted to be at continued ischemicrisk in the presence of neurologic symptoms.  CTA BRAIN:  No flow-limiting stenosis or vascular aneurysm. No AVM.  CTA NECK:  Calcified plaque at the origin of the left internal carotid artery results in approximately 40% short segment stenosis.   Otherwise no flow-limiting stenosis. No evidence for arterial dissection.       Patient is a 78y old  Female who presents with a chief complaint of AMS/R ear infxn (15 Mar 2023 10:36)    f/u pneumococcal sepsis, bacteremia and meningitis    Interval History/ROS:  remains critically ill, intubated on pressors. ROS unable    PAST MEDICAL & SURGICAL HISTORY:  Polycystic kidney disease  Glaucoma  Aneurysm  History of deep venous thrombosis (DVT) of distal vein of left lower extremity  Thrombocytopenia  Hypothyroid  Osteoporosis  Arthritis  PCP (pneumocystis carinii pneumonia)   C. difficile colitis  Type 2 diabetes mellitus, without long-term current use of insulin  Essential hypertension  H/O kidney transplant (cadaver , [facility])  H/O:   H/O eye surgery  S/P hysterectomy  H/O umbilical hernia repair    Allergies  apple (Unknown)  Benadryl (Unknown)  penicillin (Hives), tolerate cephalosporins  watermelon (Unknown)    ANTIMICROBIALS:  meropenem  IVPB 1000 every 12 hours (3/15)    active:  atovaquone  Suspension 1500 daily  vancomycin  IVPB (3/15-)  cefTRIAXone   IVPB 2000 every 12 hours (3/15-)    MEDICATIONS  (STANDING):  albuterol    0.083%. 2.5 once  aspirin  chewable 81 daily  fentaNYL   Infusion. 0.5 <Continuous>  fludroCORTISONE 0.05 daily  insulin lispro (ADMELOG) corrective regimen sliding scale  every 6 hours  levETIRAcetam  IVPB 250 every 12 hours  levETIRAcetam  IVPB 500 once  levothyroxine Injectable 18 at bedtime  methylPREDNISolone sodium succinate Injectable 10 every 6 hours  norepinephrine Infusion 0.05 <Continuous>  propofol Infusion 10 <Continuous>    Vital Signs Last 24 Hrs  T(F): 95.8 (23 @ 08:00), Max: 98.3 (23 @ 16:00)  HR: 49 (23 @ 11:14)  RR: 22 (23 @ 11:00)  SpO2: 100% (23 @ 11:14) (92% - 100%)    PHYSICAL EXAM:  Constitutional: sedated on vent  HEAD/EYES: keeps eyes closed  ENT:  not supple  Cardiovascular:   normal S1, S2  Respiratory:  clear BS bilaterally  GI:  soft, enlarged but non-tender  :  no snider  Musculoskeletal:  no synovitis  Neurologic: lethargic, sedated, unable to assess  Skin:  no rash  Psychiatric:  not able to assess                                 8.5    11.89 )-----------( 139      ( 17 Mar 2023 11:28 )             27.8     134  |  100  |  87  ----------------------------<  162  5.3   |  12  |  3.83  Ca    8.5      17 Mar 2023 03:34Phos  8.0     Mg     2.40       TPro  5.1  /  Alb  2.3  /  TBili  0.6  /  DBili  x   /  AST  102  /  ALT  78  /  AlkPhos  75      WBC Count: 11.89 (23 @ 11:28)  WBC Count: 13.72 (23 @ 03:34)  WBC Count: 13.11 (23 @ 23:47)  WBC Count: 14.30 (23 @ 16:20)  WBC Count: 9.29 (23 @ 09:45)  WBC Count: 22.19 (03-15-23 @ 05:45)  WBC Count: 14.94 (23 @ 21:38)    Creatinine, Serum: 3.83 ()  Creatinine, Serum: 3.62 (-16)  Creatinine, Serum: 3.31 (-16)  Creatinine, Serum: 3.10 (-16)  Creatinine, Serum: 3.03 (-15)  Creatinine, Serum: 2.98 (-14)    Urinalysis Basic - ( 15 Mar 2023 03:43 )  Color: Yellow / Appearance: Clear / S.019 / pH: x  Gluc: x / Ketone: Negative  / Bili: Negative / Urobili: <2 mg/dL   Blood: x / Protein: 300 mg/dL / Nitrite: Negative   Leuk Esterase: Negative / RBC: 8 /HPF / WBC 2 /HPF   Sq Epi: x / Non Sq Epi: 1 /HPF / Bacteria: Negative    MICROBIOLOGY:  Culture - Bronchial (collected 23 @ 13:35)  Source: .Bronchial Bronchial Lavage  Gram Stain (23 @ 22:59):    No polymorphonuclear cells seen per low power field    No squamous epithelial cells per low power field    No organisms seen per oil power field    Culture - Fungal, CSF (collected 03-15-23 @ 15:00)  Source: .CSF CSF  Preliminary Report (23 @ 12:01):    Testing in progress    Culture - CSF with Gram Stain (collected 03-15-23 @ 15:00)  Source: .CSF CSF  Gram Stain (03-15-23 @ 18:44):    Few polymorphonuclear leukocytes per low power field    Moderate Gram positive cocci in pairs per oil power field  Preliminary Report (23 @ 12:54):    No growth    Culture - Acid Fast - CSF (collected 03-15-23 @ 15:00)  Source: .CSF CSF    Culture - Urine (collected 03-15-23 @ 03:43)  Source: Clean Catch Clean Catch (Midstream)  Final Report (23 @ 07:32):    No growth    Culture - Blood (collected 03-15-23 @ 03:00)  Source: .Blood Blood-Venous  Gram Stain (03-15-23 @ 18:11):    Growth in aerobic and anaerobic bottles: Gram positive cocci in pairs  Final Report (23 @ 10:03):    Growth in aerobic and anaerobic bottles: Streptococcus pneumoniae    See previous culture [id number]    Culture - Blood (collected 03-15-23 @ 02:30)  Source: .Blood Blood-Peripheral  Gram Stain (03-15-23 @ 19:26):    Growth in aerobic bottle: Gram positive cocci in pairs    Growth in anaerobic bottle: Gram positive cocci in pairs  Final Report (23 @ 11:07):    Growth in aerobic and anaerobic bottles: Streptococcus pneumoniae    ***Blood Panel PCR results on this specimen are available    approximately 3 hours after the Gram stain result.***    Gram stain, PCR, and/or culture results may not always    correspond due to difference in methodologies.    ************************************************************    This PCR assay was performed by multiplex PCR. This    Assay tests for 66 bacterial and resistance gene targets.    Please refer to the [facility] test directory    at [email or web address] for details.  Organism: Blood Culture PCR  Streptococcus pneumoniae (23 @ 11:07)  Organism: Streptococcus pneumoniae (23 @ 11:07)      -  Ceftriaxone (meningitidis): S <=0.25      -  Ceftriaxone (non-meningitidis): S <=0.25      -  Erythromycin: S <=0.06 Predicts results for azithromycin.      -  Levofloxacin: S 0.5      -  Penicillin (meningitidis): S <=0.03      -  Penicillin (non-meningitidis): S <=0.03      -  Penicillin (oral penicillin V): S <=0.03      -  Trimethoprim/Sulfamethoxazole: S <=.25/4.75      -  Vancomycin: S 0.25      Method Type: KEN  Organism: Blood Culture PCR (23 @ 11:07)      -  Streptococcus pneumoniae: Detec      Method Type: PCR    RADIOLOGY:  imaging below personally reviewed and agree with findings    Xray Chest 1 View- PORTABLE-Urgent (Xray Chest 1 View- PORTABLE-Urgent .) (23 @ 17:50) >  IMPRESSION:  NG tube with the tip in the body of the stomach.  Bilateral pulmonary consolidations about the same or slightly worse on the left.    Xray Chest 1 View- PORTABLE-Urgent (Xray Chest 1 View- PORTABLE-Urgent .) (23 @ 14:45) >  IMPRESSION:  NG tube with the tip in the body of the stomach.  Bilateral pulmonary consolidations about the same or slightly worse on the left.    Xray Chest 1 View- PORTABLE-Urgent (Xray Chest 1 View- PORTABLE-Urgent .) (23 @ 14:45) >  IMPRESSION:  NG tube with the tip in the body of the stomach.  Bilateral pulmonary consolidations about the same or slightly worse on the left.    IR Procedure (03.15.23 @ 15:24) >   The opening pressure measured greater than 55 cm water. The closing pressure measured 32 cm water, after removal of 30 cc of cloudy CSF. The needle was removed. The patient tolerated the procedure well without initial complication.  IMPRESSION:  Status post successful lumbar puncture as described.  Markedly abnormal appearing CSF.  Elevated opening pressures.    US Kidney and Bladder (03.15.23 @ 12:10) >  IMPRESSION:  Mild fullness of the left lower quadrant transplant kidney collecting system.  Increased renal cortical echogenicity, possibly renal parenchymal disease.    CT Brain Perfusion Maps Stroke (23 @ 19:46) >  IMPRESSION:  CT PERFUSION:  Limited by late injection of the contrast bolus.  Cerebral blood flow less than 30% = 0 mL. No core infarct is predicted.  Tmax greater than 6 seconds = 0 mL. No brain parenchyma predicted to be at continued ischemicrisk in the presence of neurologic symptoms.  CTA BRAIN:  No flow-limiting stenosis or vascular aneurysm. No AVM.  CTA NECK:  Calcified plaque at the origin of the left internal carotid artery results in approximately 40% short segment stenosis.   Otherwise no flow-limiting stenosis. No evidence for arterial dissection.

## 2023-03-17 NOTE — PROGRESS NOTE ADULT - ASSESSMENT
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 Went to ENT and was diagnosed with R otitis externa secondary to perforated TM. Prescribed cipro/dexamethasone.  Worsening headache and EMS called. Admitted 3/14.  CT head negative.  NAMAN.  Started on vanc/meropenem.  LP attempted but unsuccessful.  IR obtained LP.      Overall, renal transplant patient with pneumococcal meningitis, bacteremia, pneumonia.      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  - vancomycin and ceftriaxone  - on steroids  - repeat BC  - f/u sensitivities  - r/o endocarditis  - echo  - hx Cdiff, monitor for diarrhea  - continue mepron given hx PJP    NAMAN  - worsening renal failure  - monitor renal function  - to adjust antimicrobials as it changes    Leukocytosis  - better  - f/u all cultures    Guarded prognosis    Please call the ID service [phone] with questions or concerns over the weekend 78F with PCKD previously on HD via LUE AVF now s/p cadaveric renal transplant 2003 at [place], LLE DVT (resolved, nml dopplers 12/2022) on ASA, HTN, HLD, DM2, 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 Went to ENT and was diagnosed with R otitis externa secondary to perforated TM. Prescribed cipro/dexamethasone.  Worsening headache and EMS called. Admitted 3/14.  CT head negative.  NAMAN.  Started on vanc/meropenem.  LP attempted but unsuccessful.  IR obtained LP.      Overall, renal transplant patient with pneumococcal meningitis, bacteremia, pneumonia.      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

## 2023-03-17 NOTE — PROGRESS NOTE ADULT - ASSESSMENT
Ms. 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   Abx per primary team and ID.  Imaging when stable enough per ENT recommendations.   Continue EEG monitoring - she has myoclonus in the feet - exclude myoclonic seizures.    Too early to assess prognosis with regard to the anoxic ischemic encephalopathy but given her current examination her prognosis would be poor.    D/W MICU team.   Thank you    There is a high probability of sudden, clinically significant, or life threatening deterioration in the patient’s condition which requires the highest level of physician preparedness to intervene urgently.  Critical care time:  35 minutes.

## 2023-03-17 NOTE — PROGRESS NOTE ADULT - ASSESSMENT
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  Cardiac arrest on 3/16/23  in septic shock on iv pressors  intubated on MV  Renal following for NAMAN on CKD Mx.    NAMAN on CKD4  Creatinine Trend: 3.86 <--, 3.83 <--, 3.62 <--, 3.31 <--, 3.10 <--, 3.03 <--, 2.98 <--  w/worsening renal function, oliguric, acidosis, mild hyperkalemia  KTx 2003  B/L creat around 2.8 since Dec 2022  Patient receives Cyclosporine (Gengraf) 75mg PO q12hrs,  BID and Prednisone 5 QD for transplant    plan:  In light of severe sepsis and cardiac arrest: holding MMF and Cyclosporine  On Dexamethasone  Consent for HD obtained, signed by daughter 3/16/23  plan for CVVHDF tonight after shiley insertion by ICU team  keep Ponce  monitor U/O  monitor BMP, electrolytes q6h once CVVH starts  dose all meds for eGFR<15ml/min.   avoid ACEi/ARB/NSAIDs/Nephrotoxics if able.    Metabolic acidosis  lactate +  manage underlying condition (sepsis)    OM and OE  Abxs per Infectious disease specialist with dose adjustment per GFR  f/u cxs  vent Mx, pressors per ICU    prognosis guarded  [facility] Nephrologist Dr. Hugo Hernandez [phone]  will closely follow up.   poc d/w ICU team    labs, rad, chart reviewed  For any question, pl call:  Nephrology  Cell -[phone]  Office [phone]  Ans Serv [phone]

## 2023-03-17 NOTE — DIETITIAN INITIAL EVALUATION ADULT - OTHER INFO
Pt. remains intubated/sedated.  vEEG monitoring in place.   Propofol placed on hold this morning in an attempt to wean sedation, per RN.    Pt. also reported to may require CRRT, given worsening renal functioning & concern for possible ascites.

## 2023-03-17 NOTE — DIETITIAN INITIAL EVALUATION ADULT - NSFNSGIIOFT_GEN_A_CORE
03-16-23 @ 07:01  -  03-17-23 @ 07:00  --------------------------------------------------------  OUT:  Total OUT: 0 mL    Total NET: 180 mL      03-17-23 @ 07:01  -  03-17-23 @ 08:30  --------------------------------------------------------  OUT:  Total OUT: 0 mL    Total NET: 30 mL      No BM since admit (x3d).    No vomiting/diarrhea @ this time.  + Abdominal distention.

## 2023-03-17 NOTE — CHART NOTE - NSCHARTNOTEFT_GEN_A_CORE
EEG preliminary read (not final) on the initial recording to 2100     Central repetitive  spike wave much more abundant/continuous up to 4-5hz concerning for subtle focal NCSE vs prior 12 hours  ICU called regarding consideration to reverse sedative taper  Final report to follow tomorrow morning after completion of study.    [facility] EEG Reading Room Ph#: [phone]  Epilepsy Answering Service after 5PM and before 8:30AM: Ph#: [phone]

## 2023-03-18 NOTE — PROGRESS NOTE ADULT - SUBJECTIVE AND OBJECTIVE BOX
INTERVAL HPI/OVERNIGHT EVENTS:    SUBJECTIVE: Patient seen and examined at bedside. Intubated, sedated, ROS cannot be obtained.       VITAL SIGNS:  ICU Vital Signs Last 24 Hrs  T(C): 36.1 (18 Mar 2023 04:00), Max: 36.1 (18 Mar 2023 04:00)  T(F): 97 (18 Mar 2023 04:00), Max: 97 (18 Mar 2023 04:00)  HR: 50 (18 Mar 2023 06:00) (42 - 59)  BP: --  BP(mean): --  ABP: 112/54 (18 Mar 2023 06:00) (85/42 - 166/54)  ABP(mean): 73 (18 Mar 2023 06:00) (57 - 93)  RR: 18 (18 Mar 2023 06:00) (14 - 22)  SpO2: 97% (18 Mar 2023 06:00) (97% - 100%)    O2 Parameters below as of 18 Mar 2023 06:00  Patient On (Oxygen Delivery Method): ventilator    O2 Concentration (%): 40      Mode: AC/ CMV (Assist Control/ Continuous Mandatory Ventilation), RR (machine): 18, TV (machine): 350, FiO2: 40, PEEP: 8, ITime: 0.67, MAP: 12, PIP: 24  Plateau pressure:   P/F ratio:     03-17 @ 07:01  -  03-18 @ 07:00  --------------------------------------------------------  IN: 3290.9 mL / OUT: 592 mL / NET: 2698.9 mL      CAPILLARY BLOOD GLUCOSE      POCT Blood Glucose.: 153 mg/dL (18 Mar 2023 05:23)    ECG:    PHYSICAL EXAM:  GENERAL: sedated, intubated completely, does not respond to verbal or physical stimuli  HEAD:  Atraumatic, Normocephalic  EYES:  conjunctiva and sclera clear, pupils minimally reactive  CHEST/LUNG: COarse rhonchi b/l worse on right  HEART: Regular rate and rhythm, on levophed   ABDOMEN: Distended, enlarged  NERVOUS SYSTEM:  Alert & Oriented X0   EXTREMITIES:  No pitting edema b/l    MEDICATIONS:  MEDICATIONS  (STANDING):  albuterol    0.083%. 2.5 milliGRAM(s) Nebulizer once  aspirin  chewable 81 milliGRAM(s) Oral daily  atovaquone  Suspension 1500 milliGRAM(s) Oral daily  cefTRIAXone   IVPB 2000 milliGRAM(s) IV Intermittent every 12 hours  chlorhexidine 0.12% Liquid 15 milliLiter(s) Oral Mucosa two times a day  chlorhexidine 2% Cloths 1 Application(s) Topical <User Schedule>  ciprofloxacin  0.3% Ophthalmic Solution for Otic Use 4 Drop(s) Right Ear two times a day  CRRT Treatment    <Continuous>  dextrose 5%. 1000 milliLiter(s) (100 mL/Hr) IV Continuous <Continuous>  dextrose 5%. 1000 milliLiter(s) (50 mL/Hr) IV Continuous <Continuous>  dextrose 50% Injectable 25 Gram(s) IV Push once  dextrose 50% Injectable 12.5 Gram(s) IV Push once  dextrose 50% Injectable 25 Gram(s) IV Push once  fentaNYL   Infusion. 0.5 MICROgram(s)/kG/Hr (1.89 mL/Hr) IV Continuous <Continuous>  fludroCORTISONE 0.05 milliGRAM(s) Oral daily  glucagon  Injectable 1 milliGRAM(s) IntraMuscular once  insulin lispro (ADMELOG) corrective regimen sliding scale   SubCutaneous every 6 hours  levETIRAcetam  IVPB 250 milliGRAM(s) IV Intermittent every 12 hours  levothyroxine Injectable 18 MICROGram(s) IV Push at bedtime  methylPREDNISolone sodium succinate Injectable 10 milliGRAM(s) IV Push every 6 hours  norepinephrine Infusion 0.05 MICROgram(s)/kG/Min (2.91 mL/Hr) IV Continuous <Continuous>  petrolatum Ophthalmic Ointment 1 Application(s) Both EYES every 12 hours  polyethylene glycol 3350 17 Gram(s) Oral daily  PrismaSATE Dialysate BGK 4 / 2.5 5000 milliLiter(s) (1000 mL/Hr) CRRT <Continuous>  PrismaSOL Filtration BGK 4 / 2.5 5000 milliLiter(s) (1000 mL/Hr) CRRT <Continuous>  PrismaSOL Filtration BGK 4 / 2.5 5000 milliLiter(s) (200 mL/Hr) CRRT <Continuous>  propofol Infusion 10 MICROgram(s)/kG/Min (2.27 mL/Hr) IV Continuous <Continuous>  senna 2 Tablet(s) Oral at bedtime    MEDICATIONS  (PRN):  acetaminophen   Oral Liquid .. 650 milliGRAM(s) Enteral Tube every 6 hours PRN Temp greater or equal to 38C (100.4F), Mild Pain (1 - 3), Moderate Pain (4 - 6)  dextrose Oral Gel 15 Gram(s) Oral once PRN Blood Glucose LESS THAN 70 milliGRAM(s)/deciliter      ALLERGIES:  Allergies    apple (Unknown)  Benadryl (Unknown)  penicillin (Hives)  watermelon (Unknown)    Intolerances        LABS:                        8.0    11.15 )-----------( 117      ( 18 Mar 2023 02:53 )             24.2     03-18    137  |  101  |  69<H>  ----------------------------<  168<H>  4.5   |  19<L>  |  2.90<H>    Ca    7.8<L>      18 Mar 2023 02:53  Phos  4.8     03-18  Mg     2.20     03-18    TPro  4.3<L>  /  Alb  1.9<L>  /  TBili  0.3  /  DBili  x   /  AST  60<H>  /  ALT  60<H>  /  AlkPhos  67  03-18          RADIOLOGY & ADDITIONAL TESTS: Reviewed.   INTERVAL HPI/OVERNIGHT EVENTS: Central repetitive  spike wave much more abundant/continuous up to 4-5hz concerning for subtle focal NCSE, propofol increased back to 40. CRRT was tolerated. bicarb was d/c. sunction was started overnight along with a BM. decreased RR on vent and pt has been more hyperglycemic.     SUBJECTIVE: Patient seen and examined at bedside. Intubated, sedated, ROS cannot be obtained.       VITAL SIGNS:  ICU Vital Signs Last 24 Hrs  T(C): 36.1 (18 Mar 2023 04:00), Max: 36.1 (18 Mar 2023 04:00)  T(F): 97 (18 Mar 2023 04:00), Max: 97 (18 Mar 2023 04:00)  HR: 50 (18 Mar 2023 06:00) (42 - 59)  BP: --  BP(mean): --  ABP: 112/54 (18 Mar 2023 06:00) (85/42 - 166/54)  ABP(mean): 73 (18 Mar 2023 06:00) (57 - 93)  RR: 18 (18 Mar 2023 06:00) (14 - 22)  SpO2: 97% (18 Mar 2023 06:00) (97% - 100%)    O2 Parameters below as of 18 Mar 2023 06:00  Patient On (Oxygen Delivery Method): ventilator    O2 Concentration (%): 40      Mode: AC/ CMV (Assist Control/ Continuous Mandatory Ventilation), RR (machine): 18, TV (machine): 350, FiO2: 40, PEEP: 8, ITime: 0.67, MAP: 12, PIP: 24  Plateau pressure:   P/F ratio:     03-17 @ 07:01  -  03-18 @ 07:00  --------------------------------------------------------  IN: 3290.9 mL / OUT: 592 mL / NET: 2698.9 mL      CAPILLARY BLOOD GLUCOSE      POCT Blood Glucose.: 153 mg/dL (18 Mar 2023 05:23)    ECG:    PHYSICAL EXAM:  GENERAL: sedated, intubated completely, does not respond to verbal or physical stimuli  HEAD:  Atraumatic, Normocephalic  EYES:  conjunctiva and sclera clear, pupils minimally reactive  CHEST/LUNG: COarse rhonchi b/l worse on right  HEART: Regular rate and rhythm, on levophed   ABDOMEN: Distended, enlarged  NERVOUS SYSTEM:  Alert & Oriented X0   EXTREMITIES:  No pitting edema b/l    MEDICATIONS:  MEDICATIONS  (STANDING):  albuterol    0.083%. 2.5 milliGRAM(s) Nebulizer once  aspirin  chewable 81 milliGRAM(s) Oral daily  atovaquone  Suspension 1500 milliGRAM(s) Oral daily  cefTRIAXone   IVPB 2000 milliGRAM(s) IV Intermittent every 12 hours  chlorhexidine 0.12% Liquid 15 milliLiter(s) Oral Mucosa two times a day  chlorhexidine 2% Cloths 1 Application(s) Topical <User Schedule>  ciprofloxacin  0.3% Ophthalmic Solution for Otic Use 4 Drop(s) Right Ear two times a day  CRRT Treatment    <Continuous>  dextrose 5%. 1000 milliLiter(s) (100 mL/Hr) IV Continuous <Continuous>  dextrose 5%. 1000 milliLiter(s) (50 mL/Hr) IV Continuous <Continuous>  dextrose 50% Injectable 25 Gram(s) IV Push once  dextrose 50% Injectable 12.5 Gram(s) IV Push once  dextrose 50% Injectable 25 Gram(s) IV Push once  fentaNYL   Infusion. 0.5 MICROgram(s)/kG/Hr (1.89 mL/Hr) IV Continuous <Continuous>  fludroCORTISONE 0.05 milliGRAM(s) Oral daily  glucagon  Injectable 1 milliGRAM(s) IntraMuscular once  insulin lispro (ADMELOG) corrective regimen sliding scale   SubCutaneous every 6 hours  levETIRAcetam  IVPB 250 milliGRAM(s) IV Intermittent every 12 hours  levothyroxine Injectable 18 MICROGram(s) IV Push at bedtime  methylPREDNISolone sodium succinate Injectable 10 milliGRAM(s) IV Push every 6 hours  norepinephrine Infusion 0.05 MICROgram(s)/kG/Min (2.91 mL/Hr) IV Continuous <Continuous>  petrolatum Ophthalmic Ointment 1 Application(s) Both EYES every 12 hours  polyethylene glycol 3350 17 Gram(s) Oral daily  PrismaSATE Dialysate BGK 4 / 2.5 5000 milliLiter(s) (1000 mL/Hr) CRRT <Continuous>  PrismaSOL Filtration BGK 4 / 2.5 5000 milliLiter(s) (1000 mL/Hr) CRRT <Continuous>  PrismaSOL Filtration BGK 4 / 2.5 5000 milliLiter(s) (200 mL/Hr) CRRT <Continuous>  propofol Infusion 10 MICROgram(s)/kG/Min (2.27 mL/Hr) IV Continuous <Continuous>  senna 2 Tablet(s) Oral at bedtime    MEDICATIONS  (PRN):  acetaminophen   Oral Liquid .. 650 milliGRAM(s) Enteral Tube every 6 hours PRN Temp greater or equal to 38C (100.4F), Mild Pain (1 - 3), Moderate Pain (4 - 6)  dextrose Oral Gel 15 Gram(s) Oral once PRN Blood Glucose LESS THAN 70 milliGRAM(s)/deciliter      ALLERGIES:  Allergies    apple (Unknown)  Benadryl (Unknown)  penicillin (Hives)  watermelon (Unknown)    Intolerances        LABS:                        8.0    11.15 )-----------( 117      ( 18 Mar 2023 02:53 )             24.2     03-18    137  |  101  |  69<H>  ----------------------------<  168<H>  4.5   |  19<L>  |  2.90<H>    Ca    7.8<L>      18 Mar 2023 02:53  Phos  4.8     03-18  Mg     2.20     03-18    TPro  4.3<L>  /  Alb  1.9<L>  /  TBili  0.3  /  DBili  x   /  AST  60<H>  /  ALT  60<H>  /  AlkPhos  67  03-18          RADIOLOGY & ADDITIONAL TESTS: Reviewed.   INTERVAL HPI/OVERNIGHT EVENTS: Central repetitive  spike wave much more abundant/continuous up to 4-5hz concerning for subtle focal NCSE, propofol increased back to 40. CRRT was tolerated. bicarb was d/c. sunction was started overnight along with a BM. decreased RR on vent and pt has been more hyperglycemic.     SUBJECTIVE: Patient seen and examined at bedside. Intubated, sedated, ROS cannot be obtained.       VITAL SIGNS:  ICU Vital Signs Last 24 Hrs  T(C): 36.1 (18 Mar 2023 04:00), Max: 36.1 (18 Mar 2023 04:00)  T(F): 97 (18 Mar 2023 04:00), Max: 97 (18 Mar 2023 04:00)  HR: 50 (18 Mar 2023 06:00) (42 - 59)  BP: --  BP(mean): --  ABP: 112/54 (18 Mar 2023 06:00) (85/42 - 166/54)  ABP(mean): 73 (18 Mar 2023 06:00) (57 - 93)  RR: 18 (18 Mar 2023 06:00) (14 - 22)  SpO2: 97% (18 Mar 2023 06:00) (97% - 100%)    O2 Parameters below as of 18 Mar 2023 06:00  Patient On (Oxygen Delivery Method): ventilator    O2 Concentration (%): 40      Mode: AC/ CMV (Assist Control/ Continuous Mandatory Ventilation), RR (machine): 18, TV (machine): 350, FiO2: 40, PEEP: 8, ITime: 0.67, MAP: 12, PIP: 24  Plateau pressure:   P/F ratio:     03-17 @ 07:01  -  03-18 @ 07:00  --------------------------------------------------------  IN: 3290.9 mL / OUT: 592 mL / NET: 2698.9 mL      CAPILLARY BLOOD GLUCOSE      POCT Blood Glucose.: 153 mg/dL (18 Mar 2023 05:23)    ECG:    PHYSICAL EXAM:  GENERAL: sedated, intubated completely, does not respond to verbal or physical stimuli  HEAD:  Atraumatic, Normocephalic  EYES:  conjunctiva and sclera clear, pupils minimally reactive  CHEST/LUNG: CTABL  HEART: Regular rate and rhythm  ABDOMEN: Distended, enlarged, softer than yesterday  NERVOUS SYSTEM:  Alert & Oriented X0   EXTREMITIES:  No pitting edema b/l    MEDICATIONS:  MEDICATIONS  (STANDING):  albuterol    0.083%. 2.5 milliGRAM(s) Nebulizer once  aspirin  chewable 81 milliGRAM(s) Oral daily  atovaquone  Suspension 1500 milliGRAM(s) Oral daily  cefTRIAXone   IVPB 2000 milliGRAM(s) IV Intermittent every 12 hours  chlorhexidine 0.12% Liquid 15 milliLiter(s) Oral Mucosa two times a day  chlorhexidine 2% Cloths 1 Application(s) Topical <User Schedule>  ciprofloxacin  0.3% Ophthalmic Solution for Otic Use 4 Drop(s) Right Ear two times a day  CRRT Treatment    <Continuous>  dextrose 5%. 1000 milliLiter(s) (100 mL/Hr) IV Continuous <Continuous>  dextrose 5%. 1000 milliLiter(s) (50 mL/Hr) IV Continuous <Continuous>  dextrose 50% Injectable 25 Gram(s) IV Push once  dextrose 50% Injectable 12.5 Gram(s) IV Push once  dextrose 50% Injectable 25 Gram(s) IV Push once  fentaNYL   Infusion. 0.5 MICROgram(s)/kG/Hr (1.89 mL/Hr) IV Continuous <Continuous>  fludroCORTISONE 0.05 milliGRAM(s) Oral daily  glucagon  Injectable 1 milliGRAM(s) IntraMuscular once  insulin lispro (ADMELOG) corrective regimen sliding scale   SubCutaneous every 6 hours  levETIRAcetam  IVPB 250 milliGRAM(s) IV Intermittent every 12 hours  levothyroxine Injectable 18 MICROGram(s) IV Push at bedtime  methylPREDNISolone sodium succinate Injectable 10 milliGRAM(s) IV Push every 6 hours  norepinephrine Infusion 0.05 MICROgram(s)/kG/Min (2.91 mL/Hr) IV Continuous <Continuous>  petrolatum Ophthalmic Ointment 1 Application(s) Both EYES every 12 hours  polyethylene glycol 3350 17 Gram(s) Oral daily  PrismaSATE Dialysate BGK 4 / 2.5 5000 milliLiter(s) (1000 mL/Hr) CRRT <Continuous>  PrismaSOL Filtration BGK 4 / 2.5 5000 milliLiter(s) (1000 mL/Hr) CRRT <Continuous>  PrismaSOL Filtration BGK 4 / 2.5 5000 milliLiter(s) (200 mL/Hr) CRRT <Continuous>  propofol Infusion 10 MICROgram(s)/kG/Min (2.27 mL/Hr) IV Continuous <Continuous>  senna 2 Tablet(s) Oral at bedtime    MEDICATIONS  (PRN):  acetaminophen   Oral Liquid .. 650 milliGRAM(s) Enteral Tube every 6 hours PRN Temp greater or equal to 38C (100.4F), Mild Pain (1 - 3), Moderate Pain (4 - 6)  dextrose Oral Gel 15 Gram(s) Oral once PRN Blood Glucose LESS THAN 70 milliGRAM(s)/deciliter      ALLERGIES:  Allergies    apple (Unknown)  Benadryl (Unknown)  penicillin (Hives)  watermelon (Unknown)    Intolerances        LABS:                        8.0    11.15 )-----------( 117      ( 18 Mar 2023 02:53 )             24.2     03-18    137  |  101  |  69<H>  ----------------------------<  168<H>  4.5   |  19<L>  |  2.90<H>    Ca    7.8<L>      18 Mar 2023 02:53  Phos  4.8     03-18  Mg     2.20     03-18    TPro  4.3<L>  /  Alb  1.9<L>  /  TBili  0.3  /  DBili  x   /  AST  60<H>  /  ALT  60<H>  /  AlkPhos  67  03-18          RADIOLOGY & ADDITIONAL TESTS: Reviewed.   INTERVAL HPI/OVERNIGHT EVENTS: Central repetitive  spike wave much more abundant/continuous up to 4-5hz concerning for subtle focal NCSE, propofol increased back to 40. CRRT was tolerated. bicarb was d/c. suction was started overnight along with a bowel regimen. decreased RR on vent and pt has been more hyperglycemic.     SUBJECTIVE: Patient seen and examined at bedside. Intubated, sedated, ROS cannot be obtained.       VITAL SIGNS:  ICU Vital Signs Last 24 Hrs  T(C): 36.1 (18 Mar 2023 04:00), Max: 36.1 (18 Mar 2023 04:00)  T(F): 97 (18 Mar 2023 04:00), Max: 97 (18 Mar 2023 04:00)  HR: 50 (18 Mar 2023 06:00) (42 - 59)  BP: --  BP(mean): --  ABP: 112/54 (18 Mar 2023 06:00) (85/42 - 166/54)  ABP(mean): 73 (18 Mar 2023 06:00) (57 - 93)  RR: 18 (18 Mar 2023 06:00) (14 - 22)  SpO2: 97% (18 Mar 2023 06:00) (97% - 100%)    O2 Parameters below as of 18 Mar 2023 06:00  Patient On (Oxygen Delivery Method): ventilator    O2 Concentration (%): 40      Mode: AC/ CMV (Assist Control/ Continuous Mandatory Ventilation), RR (machine): 18, TV (machine): 350, FiO2: 40, PEEP: 8, ITime: 0.67, MAP: 12, PIP: 24  Plateau pressure:   P/F ratio:     03-17 @ 07:01  -  03-18 @ 07:00  --------------------------------------------------------  IN: 3290.9 mL / OUT: 592 mL / NET: 2698.9 mL      CAPILLARY BLOOD GLUCOSE      POCT Blood Glucose.: 153 mg/dL (18 Mar 2023 05:23)    ECG:    PHYSICAL EXAM:  GENERAL: sedated, intubated completely, does not respond to verbal or physical stimuli  HEAD:  Atraumatic, Normocephalic  EYES:  conjunctiva and sclera clear, pupils minimally reactive  CHEST/LUNG: CTABL  HEART: Regular rate and rhythm  ABDOMEN: Distended, enlarged, softer than yesterday  NERVOUS SYSTEM:  Alert & Oriented X0   EXTREMITIES:  No pitting edema b/l    MEDICATIONS:  MEDICATIONS  (STANDING):  albuterol    0.083%. 2.5 milliGRAM(s) Nebulizer once  aspirin  chewable 81 milliGRAM(s) Oral daily  atovaquone  Suspension 1500 milliGRAM(s) Oral daily  cefTRIAXone   IVPB 2000 milliGRAM(s) IV Intermittent every 12 hours  chlorhexidine 0.12% Liquid 15 milliLiter(s) Oral Mucosa two times a day  chlorhexidine 2% Cloths 1 Application(s) Topical <User Schedule>  ciprofloxacin  0.3% Ophthalmic Solution for Otic Use 4 Drop(s) Right Ear two times a day  CRRT Treatment    <Continuous>  dextrose 5%. 1000 milliLiter(s) (100 mL/Hr) IV Continuous <Continuous>  dextrose 5%. 1000 milliLiter(s) (50 mL/Hr) IV Continuous <Continuous>  dextrose 50% Injectable 25 Gram(s) IV Push once  dextrose 50% Injectable 12.5 Gram(s) IV Push once  dextrose 50% Injectable 25 Gram(s) IV Push once  fentaNYL   Infusion. 0.5 MICROgram(s)/kG/Hr (1.89 mL/Hr) IV Continuous <Continuous>  fludroCORTISONE 0.05 milliGRAM(s) Oral daily  glucagon  Injectable 1 milliGRAM(s) IntraMuscular once  insulin lispro (ADMELOG) corrective regimen sliding scale   SubCutaneous every 6 hours  levETIRAcetam  IVPB 250 milliGRAM(s) IV Intermittent every 12 hours  levothyroxine Injectable 18 MICROGram(s) IV Push at bedtime  methylPREDNISolone sodium succinate Injectable 10 milliGRAM(s) IV Push every 6 hours  norepinephrine Infusion 0.05 MICROgram(s)/kG/Min (2.91 mL/Hr) IV Continuous <Continuous>  petrolatum Ophthalmic Ointment 1 Application(s) Both EYES every 12 hours  polyethylene glycol 3350 17 Gram(s) Oral daily  PrismaSATE Dialysate BGK 4 / 2.5 5000 milliLiter(s) (1000 mL/Hr) CRRT <Continuous>  PrismaSOL Filtration BGK 4 / 2.5 5000 milliLiter(s) (1000 mL/Hr) CRRT <Continuous>  PrismaSOL Filtration BGK 4 / 2.5 5000 milliLiter(s) (200 mL/Hr) CRRT <Continuous>  propofol Infusion 10 MICROgram(s)/kG/Min (2.27 mL/Hr) IV Continuous <Continuous>  senna 2 Tablet(s) Oral at bedtime    MEDICATIONS  (PRN):  acetaminophen   Oral Liquid .. 650 milliGRAM(s) Enteral Tube every 6 hours PRN Temp greater or equal to 38C (100.4F), Mild Pain (1 - 3), Moderate Pain (4 - 6)  dextrose Oral Gel 15 Gram(s) Oral once PRN Blood Glucose LESS THAN 70 milliGRAM(s)/deciliter      ALLERGIES:  Allergies    apple (Unknown)  Benadryl (Unknown)  penicillin (Hives)  watermelon (Unknown)    Intolerances        LABS:                        8.0    11.15 )-----------( 117      ( 18 Mar 2023 02:53 )             24.2     03-18    137  |  101  |  69<H>  ----------------------------<  168<H>  4.5   |  19<L>  |  2.90<H>    Ca    7.8<L>      18 Mar 2023 02:53  Phos  4.8     03-18  Mg     2.20     03-18    TPro  4.3<L>  /  Alb  1.9<L>  /  TBili  0.3  /  DBili  x   /  AST  60<H>  /  ALT  60<H>  /  AlkPhos  67  03-18          RADIOLOGY & ADDITIONAL TESTS: Reviewed.

## 2023-03-18 NOTE — PROGRESS NOTE ADULT - ATTENDING COMMENTS
3/18/23  EEG Classification / Summary:  Abnormal  EEG in the awake / drowsy / asleep state(s).  Abundant central (max Cz/Fz) LPDs 1-4hz in frequency, at times occurring in runs of up to 3-4 seconds duration, initally.  After ~09:00 EEG more continuous with spiking near 3-4.5hz becoming increasingly continuous in the central region, likely with decreased sedation, concerning for focal seizures/NCSE.  After ~21:00 Gradual increase in background discontinuity with increased sedation, with discharges less continuous, less conspicuous, and lower in amplitude again.  Severe background slowing, discontinuity    Clinical Impression:  Highly focal seizures from the central head region, more continuous on lower doses of sedation during the day.  Improved again overnight with sedation increase.   Severe diffuse/multifocal cerebral dysfunction.      A/P  Ms. 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   Abx per primary team and ID.  Imaging when stable enough per ENT recommendations.   Continue EEG monitoring - Plan with regard to management of antiseizure medications was developed with the guidance of the epilepsy team.  Too early to assess prognosis with regard to the anoxic ischemic encephalopathy but given her acute critical medical illness, EEG findings and previous examination off sedation, her prognosis would be very poor for meaningful neurological functional recovery.    Neuron specific enolase day 3 - 3/19 AM.   D/W MICU team.   Thank you

## 2023-03-18 NOTE — PROGRESS NOTE ADULT - ASSESSMENT
Ms. Negrno is a 79 yo woman with h/o renal transplant on immunosuppressive therapy with pneumococcal ana-mastoiditis and meningitis and anoxic ischemic encephalopathy.   Abx per primary team and ID.  Imaging when stable enough per ENT recommendations.   Continue EEG monitoring   Too early to assess prognosis with regard to the anoxic ischemic encephalopathy but given her current examination her prognosis would be poor  please check serum level of neuron-specific enolase in the AM (3/19)  c/w Keppra 250mg BID  c/w Propofol gtt  D/W MICU team    Case discussed and seen at bedside w/ attending Dr. Bentley

## 2023-03-18 NOTE — PROGRESS NOTE ADULT - SUBJECTIVE AND OBJECTIVE BOX
Follow Up:  strep pneumo bacteremia and meningitis    Interval history: pt still in MICU, had seizures on EEG  ROS:  unable to obtain because: intubated, sedated         Allergies  apple (Unknown)  Benadryl (Unknown)  penicillin (Hives)  watermelon (Unknown)        ANTIMICROBIALS:  atovaquone  Suspension 1500 daily  cefTRIAXone   IVPB 2000 every 12 hours      OTHER MEDS:  acetaminophen   Oral Liquid .. 650 milliGRAM(s) Enteral Tube every 6 hours PRN  albuterol    0.083%. 2.5 milliGRAM(s) Nebulizer once  aspirin  chewable 81 milliGRAM(s) Oral daily  chlorhexidine 0.12% Liquid 15 milliLiter(s) Oral Mucosa two times a day  chlorhexidine 2% Cloths 1 Application(s) Topical <User Schedule>  ciprofloxacin  0.3% Ophthalmic Solution for Otic Use 4 Drop(s) Right Ear two times a day  CRRT Treatment    <Continuous>  dextrose 5%. 1000 milliLiter(s) IV Continuous <Continuous>  dextrose 5%. 1000 milliLiter(s) IV Continuous <Continuous>  dextrose 50% Injectable 25 Gram(s) IV Push once  dextrose 50% Injectable 12.5 Gram(s) IV Push once  dextrose 50% Injectable 25 Gram(s) IV Push once  dextrose Oral Gel 15 Gram(s) Oral once PRN  fentaNYL   Infusion. 0.5 MICROgram(s)/kG/Hr IV Continuous <Continuous>  fludroCORTISONE 0.05 milliGRAM(s) Oral daily  glucagon  Injectable 1 milliGRAM(s) IntraMuscular once  insulin lispro (ADMELOG) corrective regimen sliding scale   SubCutaneous every 6 hours  levETIRAcetam  IVPB 250 milliGRAM(s) IV Intermittent every 12 hours  levothyroxine Injectable 18 MICROGram(s) IV Push at bedtime  methylPREDNISolone sodium succinate Injectable 10 milliGRAM(s) IV Push every 6 hours  norepinephrine Infusion 0.05 MICROgram(s)/kG/Min IV Continuous <Continuous>  petrolatum Ophthalmic Ointment 1 Application(s) Both EYES every 12 hours  polyethylene glycol 3350 17 Gram(s) Oral daily  PrismaSATE Dialysate BGK 4 / 2.5 5000 milliLiter(s) CRRT <Continuous>  PrismaSOL Filtration BGK 4 / 2.5 5000 milliLiter(s) CRRT <Continuous>  PrismaSOL Filtration BGK 4 / 2.5 5000 milliLiter(s) CRRT <Continuous>  propofol Infusion 10 MICROgram(s)/kG/Min IV Continuous <Continuous>  senna 2 Tablet(s) Oral at bedtime      Vital Signs Last 24 Hrs  T(C): 36.3 (18 Mar 2023 08:00), Max: 36.3 (18 Mar 2023 08:00)  T(F): 97.4 (18 Mar 2023 08:00), Max: 97.4 (18 Mar 2023 08:00)  HR: 48 (18 Mar 2023 11:06) (42 - 59)  BP: --  BP(mean): --  RR: 18 (18 Mar 2023 10:00) (14 - 22)  SpO2: 100% (18 Mar 2023 11:06) (97% - 100%)    Parameters below as of 18 Mar 2023 10:00  Patient On (Oxygen Delivery Method): ventilator    O2 Concentration (%): 40    Physical Exam:  General:   intubated, sedated  Cardio:     regular   Respiratory:  ET on vent  abd:     soft,   BS +,   no tenderness  :   no CVAT,  no suprapubic tenderness,   no  snider  Musculoskeletal:   no joint swelling  vascular: R femoral CVC, L fem shiley  Skin:    no rash                          7.9    10.10 )-----------( 103      ( 18 Mar 2023 11:10 )             23.1       03-18    137  |  101  |  69<H>  ----------------------------<  168<H>  4.5   |  19<L>  |  2.90<H>    Ca    7.8<L>      18 Mar 2023 02:53  Phos  4.8     03-18  Mg     2.20     03-18    TPro  4.3<L>  /  Alb  1.9<L>  /  TBili  0.3  /  DBili  x   /  AST  60<H>  /  ALT  60<H>  /  AlkPhos  67  03-18          MICROBIOLOGY:  v  .Bronchial Bronchial Lavage  03-16-23   No growth to date.  --    No polymorphonuclear cells seen per low power field  No squamous epithelial cells per low power field  No organisms seen per oil power field      .CSF CSF  03-15-23   No growth to date  --    Few polymorphonuclear leukocytes per low power field  Moderate Gram positive cocci in pairs per oil power field      Clean Catch Clean Catch (Midstream)  03-15-23   No growth  --  --      .Blood Blood-Venous  03-15-23   Growth in aerobic and anaerobic bottles: Streptococcus pneumoniae  See previous culture [id number]  --    Growth in aerobic and anaerobic bottles: Gram positive cocci in pairs      .Blood Blood-Peripheral  03-15-23   Growth in aerobic and anaerobic bottles: Streptococcus pneumoniae  ***Blood Panel PCR results on this specimen are available  approximately 3 hours after the Gram stain result.***  Gram stain, PCR, and/or culture results may not always  correspond due to difference in methodologies.  ************************************************************  This PCR assay was performed by multiplex PCR. This  Assay tests for 66 bacterial and resistance gene targets.  Please refer to the [facility] test directory  at [email or web address] for details.  --  Blood Culture PCR  Streptococcus pneumoniae          EBV PCR: NotDetec IU/mL (03-15 @ 15:00)  HSV 1/2 PCR: NotDetec (03-15 @ 15:00)        RADIOLOGY:  Images independently visualized and reviewed personally, findings as below  < from: Xray Chest 1 View- PORTABLE-Urgent (Xray Chest 1 View- PORTABLE-Urgent .) (03.16.23 @ 17:50) >  FINDINGS:  3/16/2023 2:04 PM  Redemonstrated endotracheal tube with tip above the renetta, enteric tube   with tip below the field of view.  Redemonstrated near total opacification of the right upper and midlung   fields and right lower lung hazy opacities.  Left lung again shows near complete airspace opacity with air   bronchograms appears slightly increased since the study earlier in the   day.  There is no pneumothorax.  The heart is normal in size  The visualized osseous structures demonstrate no acute pathology.    5:43 PM:  Portable examination of the abdomen shows the enteric tube with the   sidehole and tip in the body of the stomach. There is a nonobstructive   gas pattern. No masses or pathologic calcifications no free air.    IMPRESSION:  NG tube with the tip in the body of the stomach.  Bilateral pulmonary consolidations about the same or slightly worse on   the left.    < end of copied text >  < from: Transthoracic Echocardiogram (03.17.23 @ 11:48) >  1.  Moderate aortic regurgitation.  2. Mild concentric left ventricular hypertrophy.  3. Normal left ventricular systolic function. No segmental  wall motion abnormalities.  4. Reversal of the E-A  waves of the mitral inflow pattern  is consistent with diastolic LV dysfunction.  5. Normal right ventricular size and function.  6. Normal tricuspid valve. Mild tricuspid regurgitation.  *** Compared with echocardiogram of 7/11/2018, no  significant changes noted.    < end of copied text >

## 2023-03-18 NOTE — EEG REPORT - NS EEG TEXT BOX
YARA SEGUNDO [id number]     Study Date: 		03-17-23 08:00 to 03-18-23 08:00  Duration x Hours: 24  --------------------------------------------------------------------------------------------------  History:  CC/ HPI Patient is a 78y old  Female who presents with a chief complaint of AMS/R ear infxn (17 Mar 2023 08:53)    MEDICATIONS  (STANDING):  albuterol    0.083%. 2.5 milliGRAM(s) Nebulizer once  aspirin  chewable 81 milliGRAM(s) Oral daily  atovaquone  Suspension 1500 milliGRAM(s) Oral daily  cefTRIAXone   IVPB 2000 milliGRAM(s) IV Intermittent every 12 hours  ciprofloxacin  0.3% Ophthalmic Solution for Otic Use 4 Drop(s) Right Ear two times a day  CRRT Treatment    <Continuous>  fentaNYL   Infusion. 0.5 MICROgram(s)/kG/Hr (1.89 mL/Hr) IV Continuous <Continuous>  fludroCORTISONE 0.05 milliGRAM(s) Oral daily  glucagon  Injectable 1 milliGRAM(s) IntraMuscular once  insulin lispro (ADMELOG) corrective regimen sliding scale   SubCutaneous every 6 hours  levETIRAcetam  IVPB 250 milliGRAM(s) IV Intermittent every 12 hours  levothyroxine Injectable 18 MICROGram(s) IV Push at bedtime  methylPREDNISolone sodium succinate Injectable 10 milliGRAM(s) IV Push every 6 hours  norepinephrine Infusion 0.05 MICROgram(s)/kG/Min (2.91 mL/Hr) IV Continuous <Continuous>  petrolatum Ophthalmic Ointment 1 Application(s) Both EYES every 12 hours  polyethylene glycol 3350 17 Gram(s) Oral daily  PrismaSATE Dialysate BGK 4 / 2.5 5000 milliLiter(s) (1000 mL/Hr) CRRT <Continuous>  PrismaSOL Filtration BGK 4 / 2.5 5000 milliLiter(s) (1000 mL/Hr) CRRT <Continuous>  PrismaSOL Filtration BGK 4 / 2.5 5000 milliLiter(s) (200 mL/Hr) CRRT <Continuous>  propofol Infusion 10 MICROgram(s)/kG/Min (2.27 mL/Hr) IV Continuous <Continuous>  senna 2 Tablet(s) Oral at bedtime      --------------------------------------------------------------------------------------------------  Study Interpretation:    [[[Abbreviation Key:  PDR=alpha rhythm/posterior dominant rhythm. A-P=anterior posterior.  Amplitude: ‘very low’:<20; ‘low’:20-49; ‘medium’:; ‘high’:>150uV.  Persistence for periodic/rhythmic patterns (% of epoch) ‘rare’:<1%; ‘occasional’:1-10%; ‘frequent’:10-50%; ‘abundant’:50-90%; ‘continuous’:>90%.  Persistence for sporadic discharges: ‘rare’:<1/hr; ‘occasional’:1/min-1/hr; ‘frequent’:>1/min; ‘abundant’:>1/10 sec.  RPP=rhythmic and periodic patterns; GRDA=generalized rhythmic delta activity; FIRDA=frontal intermittent GRDA; LRDA=lateralized rhythmic delta activity; TIRDA=temporal intermittent rhythmic delta activity;  LPD=PLED=lateralized periodic discharges; GPD=generalized periodic discharges; BIPDs =bilateral independent periodic discharges; Mf=multifocal; SIRPDs=stimulus induced rhythmic, periodic, or ictal appearing discharges; BIRDs=brief potentially ictal rhythmic discharges >4 Hz, lasting .5-10s; PFA (paroxysmal bursts >13 Hz or =8 Hz <10s).  Modifiers: +F=with fast component; +S=with spike component; +R=with rhythmic component.  S-B=burst suppression pattern.  Max=maximal. N1-drowsy; N2-stage II sleep; N3-slow wave sleep. SSS/BETS=small sharp spikes/benign epileptiform transients of sleep. HV=hyperventilation; PS=photic stimulation]]]    Daily EEG Visual Analysis    FINDINGS:      Background:  Burst suppressed background characterized 1-3 sec periods of attenuation intermixed with bursts of theta/delta activity. No PDR or AP gradient.   After ~09:00 EEG more continuous with diffuse theta/delta  After ~21:00 increased discontinuity again near 1-2s intermittently    Background Slowing:  Generalized slowing: Continuous diffuse delta and theta  Focal slowing: none was present.    State Changes:   -N2 sleep transients were not recorded.    Rhythmic and Periodic Patterns (RPPs):  Abundant central (max Cz/Fz) LPDs 1-4hz in frequency, at times occurring in runs of up to 3-4 seconds duration, possibly representing BIRDs on earlier record. No clear clinical correlate on video.  After ~09:00 EEG more continuous with spiking near 3-4.5hz becoming increasingly continuous in the central region, likely with decreased sedation  After ~21:00 Gradual increase in background discontinuity with increased sedation, with discharges less continuous, less conspicuous, and lower in amplitude again    Electrographic and Electroclinical seizures:  After ~09:00 EEG more continuous with spiking near 3-4.5hz becoming increasingly continuous in the central region, likely with decreased sedation, concerning for focal seizures  After ~21:00 Gradual increase in background discontinuity with increased sedation, with discharges less continuous, less conspicuous, and lower in amplitude again    Other Clinical Events:  None    Activation Procedures:   -Hyperventilation was not performed.    -Photic stimulation was not performed.    Artifacts:  Intermittent myogenic and movement artifacts were noted.    ECG:  The heart rate on single channel ECG was predominantly between xx BPM.    EEG Classification / Summary:  Abnormal  EEG in the awake / drowsy / asleep state(s).  Abundant central (max Cz/Fz) LPDs 1-4hz in frequency, at times occurring in runs of up to 3-4 seconds duration, initally.  After ~09:00 EEG more continuous with spiking near 3-4.5hz becoming increasingly continuous in the central region, likely with decreased sedation, concerning for focal seizures/NCSE.  After ~21:00 Gradual increase in background discontinuity with increased sedation, with discharges less continuous, less conspicuous, and lower in amplitude again.  Severe background slowing, discontinuity    Clinical Impression:  Highly focal seizures from the central head region, more continuous on lower doses of sedation during the day.  Improved again overnight with sedation increase.   Severe diffuse/multifocal cerebral dysfunction.      MD JR Cummings  Director, Continuous EEG Monitoring Program, [facility] and [facility]   and Epilepsy Fellowship ,   Department of Neurology, [facility] EEG Reading Room Ph#: [phone]  Epilepsy Answering Service after 5PM and before 8:30AM: Ph#: [phone]   YARA SEGUNDO [id number]     Study Date: 		03-17-23 08:00 to 03-18-23 08:00  Duration x Hours: 24  --------------------------------------------------------------------------------------------------  History:  CC/ HPI Patient is a 78y old  Female who presents with a chief complaint of AMS/R ear infxn (17 Mar 2023 08:53)    MEDICATIONS  (STANDING):  albuterol    0.083%. 2.5 milliGRAM(s) Nebulizer once  aspirin  chewable 81 milliGRAM(s) Oral daily  atovaquone  Suspension 1500 milliGRAM(s) Oral daily  cefTRIAXone   IVPB 2000 milliGRAM(s) IV Intermittent every 12 hours  ciprofloxacin  0.3% Ophthalmic Solution for Otic Use 4 Drop(s) Right Ear two times a day  CRRT Treatment    <Continuous>  fentaNYL   Infusion. 0.5 MICROgram(s)/kG/Hr (1.89 mL/Hr) IV Continuous <Continuous>  fludroCORTISONE 0.05 milliGRAM(s) Oral daily  glucagon  Injectable 1 milliGRAM(s) IntraMuscular once  insulin lispro (ADMELOG) corrective regimen sliding scale   SubCutaneous every 6 hours  levETIRAcetam  IVPB 250 milliGRAM(s) IV Intermittent every 12 hours  levothyroxine Injectable 18 MICROGram(s) IV Push at bedtime  methylPREDNISolone sodium succinate Injectable 10 milliGRAM(s) IV Push every 6 hours  norepinephrine Infusion 0.05 MICROgram(s)/kG/Min (2.91 mL/Hr) IV Continuous <Continuous>  petrolatum Ophthalmic Ointment 1 Application(s) Both EYES every 12 hours  polyethylene glycol 3350 17 Gram(s) Oral daily  PrismaSATE Dialysate BGK 4 / 2.5 5000 milliLiter(s) (1000 mL/Hr) CRRT <Continuous>  PrismaSOL Filtration BGK 4 / 2.5 5000 milliLiter(s) (1000 mL/Hr) CRRT <Continuous>  PrismaSOL Filtration BGK 4 / 2.5 5000 milliLiter(s) (200 mL/Hr) CRRT <Continuous>  propofol Infusion 10 MICROgram(s)/kG/Min (2.27 mL/Hr) IV Continuous <Continuous>  senna 2 Tablet(s) Oral at bedtime      --------------------------------------------------------------------------------------------------  Study Interpretation:    [[[Abbreviation Key:  PDR=alpha rhythm/posterior dominant rhythm. A-P=anterior posterior.  Amplitude: ‘very low’:<20; ‘low’:20-49; ‘medium’:; ‘high’:>150uV.  Persistence for periodic/rhythmic patterns (% of epoch) ‘rare’:<1%; ‘occasional’:1-10%; ‘frequent’:10-50%; ‘abundant’:50-90%; ‘continuous’:>90%.  Persistence for sporadic discharges: ‘rare’:<1/hr; ‘occasional’:1/min-1/hr; ‘frequent’:>1/min; ‘abundant’:>1/10 sec.  RPP=rhythmic and periodic patterns; GRDA=generalized rhythmic delta activity; FIRDA=frontal intermittent GRDA; LRDA=lateralized rhythmic delta activity; TIRDA=temporal intermittent rhythmic delta activity;  LPD=PLED=lateralized periodic discharges; GPD=generalized periodic discharges; BIPDs =bilateral independent periodic discharges; Mf=multifocal; SIRPDs=stimulus induced rhythmic, periodic, or ictal appearing discharges; BIRDs=brief potentially ictal rhythmic discharges >4 Hz, lasting .5-10s; PFA (paroxysmal bursts >13 Hz or =8 Hz <10s).  Modifiers: +F=with fast component; +S=with spike component; +R=with rhythmic component.  S-B=burst suppression pattern.  Max=maximal. N1-drowsy; N2-stage II sleep; N3-slow wave sleep. SSS/BETS=small sharp spikes/benign epileptiform transients of sleep. HV=hyperventilation; PS=photic stimulation]]]    Daily EEG Visual Analysis    FINDINGS:      Background:  Burst suppressed background characterized 1-3 sec periods of attenuation intermixed with bursts of theta/delta activity. No PDR or AP gradient.   After ~09:00 EEG more continuous with diffuse theta/delta  After ~21:00 increased discontinuity again near 1-2s intermittently    Background Slowing:  Generalized slowing: Continuous diffuse delta and theta  Focal slowing: none was present.    State Changes:   -N2 sleep transients were not recorded.    Rhythmic and Periodic Patterns (RPPs):  Abundant central (max Cz/Fz) LPDs 1-4hz in frequency, at times occurring in runs of up to 3-4 seconds duration, possibly representing BIRDs on earlier record. No clear clinical correlate on video.  After ~09:00 EEG more continuous with spiking near 3-4.5hz becoming increasingly continuous in the central region, likely with decreased sedation  After ~21:00 Gradual increase in background discontinuity with increased sedation, with discharges less continuous, less conspicuous, and lower in amplitude again    Electrographic and Electroclinical seizures:  After ~09:00 EEG more continuous with spiking near 3-4.5hz becoming increasingly continuous in the central region, likely with decreased sedation, concerning for focal seizures  After ~21:00 Gradual increase in background discontinuity with increased sedation, with discharges less continuous, less conspicuous, and lower in amplitude again    Other Clinical Events:  None    Activation Procedures:   -Hyperventilation was not performed.    -Photic stimulation was not performed.    Artifacts:  Intermittent myogenic and movement artifacts were noted.    ECG:  The heart rate on single channel ECG was predominantly between 54-60 BPM.    EEG Classification / Summary:  Abnormal  EEG in the awake / drowsy / asleep state(s).  Abundant central (max Cz/Fz) LPDs 1-4hz in frequency, at times occurring in runs of up to 3-4 seconds duration, initally.  After ~09:00 EEG more continuous with spiking near 3-4.5hz becoming increasingly continuous in the central region, likely with decreased sedation, concerning for focal seizures/NCSE.  After ~21:00 Gradual increase in background discontinuity with increased sedation, with discharges less continuous, less conspicuous, and lower in amplitude again.  Severe background slowing, discontinuity    Clinical Impression:  Highly focal seizures from the central head region, more continuous on lower doses of sedation during the day.  Improved again overnight with sedation increase.   Severe diffuse/multifocal cerebral dysfunction.      Meet Martínez MD FAHUGO  Director, Continuous EEG Monitoring Program, [facility]   and Epilepsy Fellowship ,   Department of Neurology, [facility] EEG Reading Room Ph#: [phone]  Epilepsy Answering Service after 5PM and before 8:30AM: Ph#: [phone]

## 2023-03-18 NOTE — PROGRESS NOTE ADULT - ASSESSMENT
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 Went to ENT and was diagnosed with R otitis externa secondary to perforated TM. Prescribed cipro/dexamethasone.  Worsening headache and EMS called. Admitted 3/14.  CT head negative.  NAMAN.  Started on vanc/meropenem.  LP attempted but unsuccessful.  IR obtained LP.      immunocompromised pt s/p renal transplant  with fever, leukocytosis, sepsis, NAMAN, pneumococcal meningitis, bacteremia, pneumonia.    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

## 2023-03-18 NOTE — PROGRESS NOTE ADULT - ASSESSMENT
77 y/o F with h/o HTN, HLD, DM2, PCKD previously on HD via LUE AVF then s/p renal transplant, LLE DVT (resolved, nml dopplers 12/2022) on ASA, very small supraclinoid aneurysm (reportedly unchanged on MRA 10/2020), hypothyroidism, PCP 2021 on atovaquone ppx, reportedly hospitalized in 12/2022 for rectal prolapse, had a blood transfusion at that time, also + CMV 1/25/23 who presented with R-sided HA, R ear pain and neck stiffness after visit to ENT earlier in the day, altered on presentation, CT head unremarkable for stroke, however LP with IR and BCx showing +strep pneumoniae. currently on vanc and CTX per ID. Cardiac arrest 3/16 with ROSC, transferred to MICU for further management.       Plan:   Neurological  #Currently Intubated, sedated   - on propofol and fentanyl    #Strep pneumo meningitis #Acute encephalopathy  severe HA, neck stiffness, fever, leukocytosis concerning for meningitis/encephalitis  CT head negative for CVA.   LP and BCx 3/15 both showing gram positive cocci in pairs, and + strep pneumoniae.   likely source of entry from R ear where pt c/o discomfort for several months.  patient is on cyclosporine, mycophenolate mofetil and prednisone due to kidney transplant and is immunocompromised   dental procedure 3/14 but less likely source   ID consulted, recs for continuing meropenem. s/p vancomycin 3/15.   - f/u BCx and LP sensitivities  - f/u MRI of the IAC w/ contrast at a later time when renal function improves  - f/u 24hr EEG: prelim read shows no seizures however anticonvulsant prophylaxis may be considered.    #Seizures  - keppra loaded 500mg IVP and started on 250mg IV q12h    Cardiovascular  #Cardiac Arrest  bradycardia to 30, pulseless, code blue was called 3/16   2 rounds of epi, and PEA arrest. on 3rd pulse check pulseless VT, 200J shock given  4th pulse check asystole, 5th ROSC, sinus tachy with HUMA. IO placed and levo started. Due to blood in mouth took several attempts with DL for ETT to be placed but ultimately confirmed with capnometry and bilateral breath sounds.       #Elevated troponin  uptrending troponin to 182 3/15 AM, was likely iso ESRD  s/p cardiac arrest, elevated ST segment   - trend troponin  - TTE from 3/17 showed EF of 63% with mild pulm htn, normal left ventricular systolic function, and diastolic LV dysfunction.        #HTN  on amlodipine 5mg, losartan 100mg, torsemide 20mg, and clonidine 0.2 patch weekly  - hold for now given sepsis      Respiratory/ENT  #Intubated  continue sedation    # Tympanic membrane rupture  discharge from R ear, evaluated by ENT given dexamethasone and cipro 3/14  - c/w broad spectrum abx   - f/u ENT recs      Gastrointestinal  #hematemesis  patient with melissa bright red blood from mouth on arrival to MICU  could be related to CPR, no pericardial effusion on bedside US  - PPI  - tube feeds    #aspiration precaution  previously failed dysphagia screen and was pending SLP eval  blood in mouth on arrival to MICU and increased wob concerning for aspiration  OGT in place, in stomach on CXR         Renal  #ESRD #s/p Kidney Transplant in 2003 at [place] #PCKD  previously HD through LLE AVF but no HD since transplant  Cr 3.8, unclear baseline  - avoid nephrotoxic agent  - holding cyclosporine and mycophenolate   - changed prednisone to medrol 10mg IVP q6h  - appreciate nephro recs  - strict I/O and replete electrolytes  - 4mg IV bumex and see and pt responds  - on bicarb drip       Hematological  # h/o LLE DVT  resolved, no DVT on doppler 12/22.  on ASA at home, has been receiving heparin subQ  - hold for now given bleeding in oropharynx.   - SCDs  - can restart heparin tomorrow if no bleeding    #Anemia  - s/p 1 unit pRBC on 3/16 for Hb drop from 10 to 7.9 and responded appropriately  - Evaluated by ENT for bleeding and found no signs of active bleeding from nasal cavity, nasopharynx or oral cavity, however, patient biting on tongue, which could be one source of bleeding. Tongue was edematous and lax      Infectious Disease  #Strep pneumoniae meningitis #R ear mastoiditis  treatment with IV CTX and vancomycin (dose by level)  - dexamethasone  - c/w ciprodex drops for R ear  - daily vanc trough and q24hr 1000mg IV vanc if trough <15  - f/u ID recs   - f/u repeat blood cx from 3/17    #h/o PCP  - atovaquone at home, continue      Endocrinological  #Hypothyroidism  last TSH 12/2022 >10  TSH 3/15 4.73  - c/w levothyroxine     #Diabetes mellitus Type II  - BHB elevated to 2.9, low suspicion for euglycemic DKA, CTM  - recheck BHB  - ISS      Ethics  FULL, GOC ongoing. 77 y/o F with h/o HTN, HLD, DM2, PCKD previously on HD via LUE AVF then s/p renal transplant, LLE DVT (resolved, nml dopplers 12/2022) on ASA, very small supraclinoid aneurysm (reportedly unchanged on MRA 10/2020), hypothyroidism, PCP 2021 on atovaquone ppx, reportedly hospitalized in 12/2022 for rectal prolapse, had a blood transfusion at that time, also + CMV 1/25/23 who presented with R-sided HA, R ear pain and neck stiffness after visit to ENT earlier in the day, altered on presentation, CT head unremarkable for stroke, however LP with IR and BCx showing +strep pneumoniae. currently on vanc and CTX per ID. Cardiac arrest 3/16 with ROSC, transferred to MICU for further management.       Plan:   Neurological  #Currently Intubated, sedated   - on propofol and fentanyl    #Strep pneumo meningitis #Acute encephalopathy  severe HA, neck stiffness, fever, leukocytosis concerning for meningitis/encephalitis  CT head negative for CVA.   LP and BCx 3/15 both showing gram positive cocci in pairs, and + strep pneumoniae.   likely source of entry from R ear where pt c/o discomfort for several months.  patient is on cyclosporine, mycophenolate mofetil and prednisone due to kidney transplant and is immunocompromised   dental procedure 3/14 but less likely source   ID consulted, recs for continuing meropenem. s/p vancomycin 3/15.   - f/u BCx and LP sensitivities  - f/u MRI of the IAC w/ contrast at a later time when renal function improves  - f/u 24hr EEG: Central repetitive  spike wave much more abundant/continuous up to 4-5hz concerning for subtle focal NCSE    #Seizures  - keppra loaded 500mg IVP and started on 250mg IV q12h    Cardiovascular  #Cardiac Arrest  bradycardia to 30, pulseless, code blue was called 3/16   2 rounds of epi, and PEA arrest. on 3rd pulse check pulseless VT, 200J shock given  4th pulse check asystole, 5th ROSC, sinus tachy with HUMA. IO placed and levo started. Due to blood in mouth took several attempts with DL for ETT to be placed but ultimately confirmed with capnometry and bilateral breath sounds.       #Elevated troponin  uptrending troponin to 182 3/15 AM, was likely iso ESRD  s/p cardiac arrest, elevated ST segment   - trend troponin  - TTE from 3/17 showed EF of 63% with mild pulm htn, normal left ventricular systolic function, and diastolic LV dysfunction.        #HTN  on amlodipine 5mg, losartan 100mg, torsemide 20mg, and clonidine 0.2 patch weekly  - hold for now given sepsis      Respiratory/ENT  #Intubated  continue sedation    # Tympanic membrane rupture  discharge from R ear, evaluated by ENT given dexamethasone and cipro 3/14  - c/w broad spectrum abx   - f/u ENT recs      Gastrointestinal  #hematemesis  patient with melissa bright red blood from mouth on arrival to MICU  could be related to CPR, no pericardial effusion on bedside US  - PPI  - tube feeds    #aspiration precaution  previously failed dysphagia screen and was pending SLP eval  blood in mouth on arrival to MICU and increased wob concerning for aspiration  OGT in place, in stomach on CXR         Renal  #ESRD #s/p Kidney Transplant in 2003 at [place] #PCKD  previously HD through LLE AVF but no HD since transplant  Cr 3.8, unclear baseline  - avoid nephrotoxic agent  - holding cyclosporine and mycophenolate   - changed prednisone to medrol 10mg IVP q6h  - appreciate nephro recs  - strict I/O and replete electrolytes  - 4mg IV bumex and see and pt responds  - on bicarb drip       Hematological  # h/o LLE DVT  resolved, no DVT on doppler 12/22.  on ASA at home, has been receiving heparin subQ  - hold for now given bleeding in oropharynx.   - SCDs  - can restart heparin tomorrow if no bleeding    #Anemia  - s/p 1 unit pRBC on 3/16 for Hb drop from 10 to 7.9 and responded appropriately  - Evaluated by ENT for bleeding and found no signs of active bleeding from nasal cavity, nasopharynx or oral cavity, however, patient biting on tongue, which could be one source of bleeding. Tongue was edematous and lax      Infectious Disease  #Strep pneumoniae meningitis #R ear mastoiditis  treatment with IV CTX and vancomycin (dose by level)  - dexamethasone  - c/w ciprodex drops for R ear  - daily vanc trough and q24hr 1000mg IV vanc if trough <15  - f/u ID recs   - f/u repeat blood cx from 3/17    #h/o PCP  - atovaquone at home, continue      Endocrinological  #Hypothyroidism  last TSH 12/2022 >10  TSH 3/15 4.73  - c/w levothyroxine     #Diabetes mellitus Type II  - BHB elevated to 2.9, low suspicion for euglycemic DKA, CTM  - recheck BHB  - ISS      Ethics  FULL, GOC ongoing. 79 y/o F with h/o HTN, HLD, DM2, PCKD previously on HD via LUE AVF then s/p renal transplant, LLE DVT (resolved, nml dopplers 12/2022) on ASA, very small supraclinoid aneurysm (reportedly unchanged on MRA 10/2020), hypothyroidism, PCP 2021 on atovaquone ppx, reportedly hospitalized in 12/2022 for rectal prolapse, had a blood transfusion at that time, also + CMV 1/25/23 who presented with R-sided HA, R ear pain and neck stiffness after visit to ENT earlier in the day, altered on presentation, CT head unremarkable for stroke, however LP with IR and BCx showing +strep pneumoniae. currently on vanc and CTX per ID. Cardiac arrest 3/16 with ROSC, transferred to MICU for further management.       Plan:   Neurological  #Currently Intubated, sedated   - on propofol and fentanyl    #Strep pneumo meningitis #Acute encephalopathy  severe HA, neck stiffness, fever, leukocytosis concerning for meningitis/encephalitis  CT head negative for CVA.   LP and BCx 3/15 both showing gram positive cocci in pairs, and + strep pneumoniae.   likely source of entry from R ear where pt c/o discomfort for several months.  patient is on cyclosporine, mycophenolate mofetil and prednisone due to kidney transplant and is immunocompromised   dental procedure 3/14 but less likely source   ID consulted, recs for continuing meropenem. s/p vancomycin 3/15.   - f/u BCx and LP sensitivities  - f/u MRI of the IAC w/ contrast at a later time when renal function improves    #Seizures  - f/u 24hr EEG: Central repetitive  spike wave much more abundant/continuous up to 4-5hz concerning for subtle focal NCSE  - keppra loaded 500mg IVP (3/17) and started on 250mg IV q12h (3/17 - [ ])    Cardiovascular  #Cardiac Arrest  bradycardia to 30, pulseless, code blue was called 3/16   2 rounds of epi, and PEA arrest. on 3rd pulse check pulseless VT, 200J shock given  4th pulse check asystole, 5th ROSC, sinus tachy with HUMA. IO placed and levo started. Due to blood in mouth took several attempts with DL for ETT to be placed but ultimately confirmed with capnometry and bilateral breath sounds.       #Elevated troponin  uptrending troponin to 182 3/15 AM, was likely iso ESRD  s/p cardiac arrest, elevated ST segment   - trend troponin  - TTE from 3/17 showed EF of 63% with mild pulm htn, normal left ventricular systolic function, and diastolic LV dysfunction.        #HTN  on amlodipine 5mg, losartan 100mg, torsemide 20mg, and clonidine 0.2 patch weekly  - hold for now given sepsis      Respiratory/ENT  #Intubated  continue sedation    # Tympanic membrane rupture  discharge from R ear, evaluated by ENT given dexamethasone and cipro 3/14  - c/w broad spectrum abx   - MRI of IAC with contrast when pt is stable  - f/u ENT recs      Gastrointestinal  - PPI  - tube feeds  - suction  - bowel regimen      Renal  #ESRD #s/p Kidney Transplant in 2003 at [place] #PCKD  previously HD through LLE AVF but no HD since transplant  Cr 3.8, unclear baseline  - avoid nephrotoxic agent  - holding cyclosporine and mycophenolate   - changed prednisone to medrol 10mg IVP q6h  - appreciate nephro recs  - strict I/O and replete electrolytes  - 4mg IV bumex with minimal urine production (3/17)  - started on CRRT (3/17)       Hematological  # h/o LLE DVT  resolved, no DVT on doppler 12/22.  on ASA at home, has been receiving heparin subQ  - hold for now given bleeding in oropharynx.   - SCDs  - can restart heparin if no bleeding    #Anemia  - s/p 1 unit pRBC on 3/16 for Hb drop from 10 to 7.9 and responded appropriately  - Evaluated by ENT for bleeding and found no signs of active bleeding from nasal cavity, nasopharynx or oral cavity, however, patient biting on tongue, which could be one source of bleeding. Tongue was edematous and lax      Infectious Disease  #Strep pneumoniae meningitis #R ear mastoiditis  treatment with IV CTX and vancomycin (dose by level)  - dexamethasone  - c/w ciprodex drops for R ear  - daily vanc trough and q24hr 1000mg IV vanc if trough <15  - f/u ID recs   - f/u repeat blood cx from 3/17    #h/o PCP  - atovaquone at home, continue      Endocrinological  #Hypothyroidism  last TSH 12/2022 >10  TSH 3/15 4.73  - c/w levothyroxine     #Diabetes mellitus Type II  - ISS      Ethics  FULL, GOC ongoing. 79 y/o F with h/o HTN, HLD, DM2, PCKD previously on HD via LUE AVF then s/p renal transplant, LLE DVT (resolved, nml dopplers 12/2022) on ASA, very small supraclinoid aneurysm (reportedly unchanged on MRA 10/2020), hypothyroidism, PCP 2021 on atovaquone ppx, reportedly hospitalized in 12/2022 for rectal prolapse, had a blood transfusion at that time, also + CMV 1/25/23 who presented with R-sided HA, R ear pain and neck stiffness after visit to ENT earlier in the day, altered on presentation, CT head unremarkable for stroke, however LP with IR and BCx showing +strep pneumoniae. currently on vanc and CTX per ID. Cardiac arrest 3/16 with ROSC, transferred to MICU for further management.       Plan:   Neurological  #Currently Intubated, sedated   - on propofol and fentanyl    #Strep pneumo meningitis #Acute encephalopathy  severe HA, neck stiffness, fever, leukocytosis concerning for meningitis/encephalitis  CT head negative for CVA.   LP and BCx 3/15 both showing gram positive cocci in pairs, and + strep pneumoniae.   likely source of entry from R ear where pt c/o discomfort for several months.  patient is on cyclosporine, mycophenolate mofetil and prednisone due to kidney transplant and is immunocompromised   dental procedure 3/14 but less likely source   ID consulted, recs for continuing meropenem. s/p vancomycin 3/15.   - f/u BCx and LP sensitivities  - f/u MRI of the IAC w/ contrast at a later time when renal function improves    #Seizures  - f/u 24hr EEG: Central repetitive  spike wave much more abundant/continuous up to 4-5hz concerning for subtle focal NCSE  - keppra loaded 500mg IVP (3/17) and started on 250mg IV q12h (3/17 - [ ])  - f/u neuro specific enolase on 3/19 AM    Cardiovascular  #Cardiac Arrest  bradycardia to 30, pulseless, code blue was called 3/16   2 rounds of epi, and PEA arrest. on 3rd pulse check pulseless VT, 200J shock given  4th pulse check asystole, 5th ROSC, sinus tachy with HUMA. IO placed and levo started. Due to blood in mouth took several attempts with DL for ETT to be placed but ultimately confirmed with capnometry and bilateral breath sounds.       #Elevated troponin  uptrending troponin to 182 3/15 AM, was likely iso ESRD  s/p cardiac arrest, elevated ST segment   - trend troponin  - TTE from 3/17 showed EF of 63% with mild pulm htn, normal left ventricular systolic function, and diastolic LV dysfunction.      #HTN  on amlodipine 5mg, losartan 100mg, torsemide 20mg, and clonidine 0.2 patch weekly  - hold for now given sepsis      Respiratory/ENT  #Intubated  continue sedation    # Tympanic membrane rupture  discharge from R ear, evaluated by ENT given dexamethasone and cipro 3/14  - c/w broad spectrum abx   - MRI of IAC with contrast when pt is stable  - f/u ENT recs      Gastrointestinal  - PPI  - trickle feeds  - intermittent suction  - bowel regimen      Renal  #ESRD #s/p Kidney Transplant in 2003 at [place] #PCKD  previously HD through LLE AVF but no HD since transplant  Cr 3.8, unclear baseline  - avoid nephrotoxic agent  - holding cyclosporine and mycophenolate   - changed prednisone to medrol 10mg IVP q6h  - appreciate nephro recs  - strict I/O and replete electrolytes  - 4mg IV bumex with minimal urine production (3/17)  - started on CRRT (3/17) net negative 75cc/hr goal       Hematological  # h/o LLE DVT  resolved, no DVT on doppler 12/22.  on ASA at home, has been receiving heparin subQ  - hold for now given bleeding in oropharynx.   - SCDs  - can restart heparin if no bleeding    #Anemia  - s/p 1 unit pRBC on 3/16 for Hb drop from 10 to 7.9 and responded appropriately  - Evaluated by ENT for bleeding and found no signs of active bleeding from nasal cavity, nasopharynx or oral cavity, however, patient biting on tongue, which could be one source of bleeding. Tongue was edematous and lax      Infectious Disease  #Strep pneumoniae meningitis #R ear mastoiditis  treatment with IV CTX   - c/w ciprodex drops for R ear  - f/u ID recs   - f/u repeat blood cx from 3/17    #h/o PCP  - atovaquone at home, continue      Endocrinological  #Hypothyroidism  last TSH 12/2022 >10  TSH 3/15 4.73  - c/w levothyroxine     #Diabetes mellitus Type II  - ISS      Ethics  FULL, GOC ongoing. 77 y/o F with h/o HTN, HLD, DM2, PCKD previously on HD via LUE AVF then s/p renal transplant, LLE DVT (resolved, nml dopplers 12/2022) on ASA, very small supraclinoid aneurysm (reportedly unchanged on MRA 10/2020), hypothyroidism, PCP 2021 on atovaquone ppx, reportedly hospitalized in 12/2022 for rectal prolapse, had a blood transfusion at that time, also + CMV 1/25/23 who presented with R-sided HA, R ear pain and neck stiffness after visit to ENT earlier in the day, altered on presentation, CT head unremarkable for stroke, however LP with IR and BCx showing +strep pneumoniae. currently on vanc and CTX per ID. Cardiac arrest 3/16 with ROSC, transferred to MICU for further management.       Plan:   Neurological  #Currently Intubated, sedated   - on propofol and fentanyl    #Strep pneumo meningitis #Acute encephalopathy  severe HA, neck stiffness, fever, leukocytosis concerning for meningitis/encephalitis  CT head negative for CVA.   LP and BCx 3/15 both showing gram positive cocci in pairs, and + strep pneumoniae.   likely source of entry from R ear where pt c/o discomfort for several months.  patient is on cyclosporine, mycophenolate mofetil and prednisone due to kidney transplant and is immunocompromised   dental procedure 3/14 but less likely source   ID consulted, recs for continuing meropenem. s/p vancomycin 3/15.   - f/u BCx and LP sensitivities  - f/u MRI of the IAC w/ contrast at a later time when renal function improves    #Seizures  - 24hr EEG: Highly focal seizures from the central head region, more continuous on lower doses of sedation during the day.  Improved again overnight with sedation increase. Severe diffuse/multifocal cerebral dysfunction.  - keppra loaded 500mg IVP (3/17) and started on 250mg IV q12h (3/17 - [ ])  - f/u neuro specific enolase on 3/19 AM    Cardiovascular  #Cardiac Arrest  bradycardia to 30, pulseless, code blue was called 3/16   2 rounds of epi, and PEA arrest. on 3rd pulse check pulseless VT, 200J shock given  4th pulse check asystole, 5th ROSC, sinus tachy with HUMA. IO placed and levo started. Due to blood in mouth took several attempts with DL for ETT to be placed but ultimately confirmed with capnometry and bilateral breath sounds.       #Elevated troponin  uptrending troponin to 182 3/15 AM, was likely iso ESRD  s/p cardiac arrest, elevated ST segment   - trend troponin  - TTE from 3/17 showed EF of 63% with mild pulm htn, normal left ventricular systolic function, and diastolic LV dysfunction.      #HTN  on amlodipine 5mg, losartan 100mg, torsemide 20mg, and clonidine 0.2 patch weekly  - hold for now given sepsis      Respiratory/ENT  #Intubated  continue sedation    # Tympanic membrane rupture  discharge from R ear, evaluated by ENT given dexamethasone and cipro 3/14  - c/w broad spectrum abx   - MRI of IAC with contrast when pt is stable  - f/u ENT recs      Gastrointestinal  - PPI  - trickle feeds  - intermittent suction  - bowel regimen      Renal  #ESRD #s/p Kidney Transplant in 2003 at [place] #PCKD  previously HD through LLE AVF but no HD since transplant  Cr 3.8, unclear baseline  - avoid nephrotoxic agent  - holding cyclosporine and mycophenolate   - changed prednisone to medrol 10mg IVP q6h  - appreciate nephro recs  - strict I/O and replete electrolytes  - 4mg IV bumex with minimal urine production (3/17)  - started on CRRT (3/17) net negative 75cc/hr goal       Hematological  # h/o LLE DVT  resolved, no DVT on doppler 12/22.  on ASA at home, has been receiving heparin subQ  - hold for now given bleeding in oropharynx.   - SCDs  - can restart heparin if no bleeding    #Anemia  - s/p 1 unit pRBC on 3/16 for Hb drop from 10 to 7.9 and responded appropriately  - Evaluated by ENT for bleeding and found no signs of active bleeding from nasal cavity, nasopharynx or oral cavity, however, patient biting on tongue, which could be one source of bleeding. Tongue was edematous and lax      Infectious Disease  #Strep pneumoniae meningitis #R ear mastoiditis  treatment with IV CTX   - c/w ciprodex drops for R ear  - f/u ID recs   - f/u repeat blood cx from 3/17    #h/o PCP  - atovaquone at home, continue      Endocrinological  #Hypothyroidism  last TSH 12/2022 >10  TSH 3/15 4.73  - c/w levothyroxine     #Diabetes mellitus Type II  - ISS      Ethics  FULL, GOC ongoing.

## 2023-03-18 NOTE — PROGRESS NOTE ADULT - ATTENDING COMMENTS
Patient is a 79 yo F w/ HTN, HLD, T2DM, PCKD previously on HD via LUE AVF then s/p renal transplant, LLE DVT (resolved) hypothyroidism, prior PCP PNA, CMV viremia who initially presented with R-sided HA, R ear pain and neck stiffness found to have Strep Pneumo bacteremia and meningitis in setting of otitis externa. Course c/b cardiac arrest 3/16 and post arrest seizures and NAMAN    #Septic Shock  #Strep bactermia  - c/w Ceftriaxone  - f/u repeat blood cultures  - TTE negative for vegetations, may need SORAYA if doesn't clear blood cultures  - fu ID  - Currently off vasopressors, will wean stress dose steroids as tolerated    #NAMAN  #s/p Renal Transplant  #PCKD  - c/w CRRT, will increase fluid removal to 75cc/hr as tolerated    #Encephalopathy post cardiac arrest  #Seizures  - c/w EEG  - c/w Prop for burst suppression  - c/w Keppra   - f/u neuro recs    #Dysphagia  - Trickle feeds    #Respiratory failure   - c/w mechanical ventilatory support, wean as tolerated    Nico Cantor MD  Pulmonary & Critical Care

## 2023-03-18 NOTE — PROGRESS NOTE ADULT - ASSESSMENT
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  Cardiac arrest on 3/16/23  in septic shock on iv pressors  intubated on MV  Renal following for NAMAN on CKD Mx.    NAMAN on CKD4  Creatinine Trend: 3.86 <--, 3.83 <--, 3.62 <--, 3.31 <--, 3.10 <--, 3.03 <--, 2.98 <--  w/worsening renal function, oliguric, acidosis, mild hyperkalemia  KTx 2003  B/L creat around 2.8 since Dec 2022  Patient receives Cyclosporine (Gengraf) 75mg PO q12hrs,  BID and Prednisone 5 QD for transplant    plan:  In light of severe sepsis and cardiac arrest: holding MMF and Cyclosporine  On Dexamethasone  Consent for HD obtained, signed by daughter 3/16/23  started CVVHDF with minimal UF  patient circuit clotted once on 3/17/23 -> currently no AC in circuit  keep Ponce  monitor U/O  monitor BMP, electrolytes q6h once CVVH starts  dose all meds for eGFR<15ml/min.   avoid ACEi/ARB/NSAIDs/Nephrotoxics if able.    Metabolic acidosis  lactate +  manage underlying condition (sepsis)    OM and OE  Abxs per Infectious disease specialist with dose adjustment per GFR  f/u cxs  vent Mx, pressors per ICU      prognosis guarded  [facility] Nephrologist Dr. Hugo Hernandez [phone]  For any question, call:  Cell # [phone]  Pager # [phone]  Callback # [phone]

## 2023-03-18 NOTE — PROGRESS NOTE ADULT - SUBJECTIVE AND OBJECTIVE BOX
Neurology Progress Note    SUBJECTIVE/OBJECTIVE/INTERVAL EVENTS: Patient seen and examined at bedside w/ neuro attending and team. Examined on propofol gtt. Non-responsive to noxious stimuli. Intubated and unable to answer questions.    INTERVAL HISTORY: EEG report obtained this AM, which found...  Highly focal seizures from the central head region, more continuous on lower doses of sedation during the day.  Improved again overnight with sedation increase.   Severe diffuse/multifocal cerebral dysfunction.      REVIEW OF SYSTEMS: unable to assess due to mental status    VITALS & EXAMINATION:  Vital Signs Last 24 Hrs  T(C): 36.3 (18 Mar 2023 08:00), Max: 36.3 (18 Mar 2023 08:00)  T(F): 97.4 (18 Mar 2023 08:00), Max: 97.4 (18 Mar 2023 08:00)  HR: 48 (18 Mar 2023 11:06) (42 - 59)  BP: --  BP(mean): --  RR: 18 (18 Mar 2023 10:00) (14 - 22)  SpO2: 100% (18 Mar 2023 11:06) (97% - 100%)    Parameters below as of 18 Mar 2023 10:00  Patient On (Oxygen Delivery Method): ventilator    O2 Concentration (%): 40    General:  Constitutional: Female, appears stated age  Head: Normocephalic; Eyes: clear sclera;   Extremities: No cyanosis; Skin: No melissa edema of LE  Resp: breathing comfortably on vent    Neurological (>12):  MS: sedated, not following commands  Language: sedated  CNs: pupils equal in size, reactive on L but not R, corneal reflex intact on L but not R, facial expression grossly symmetric, no cough reflex  Motor - flaccid tone throughout. no spontaneous movement of extremities  Sensation: no response to noxious stimuli in any extremity  Reflexes L/R:  Biceps(C5) 2/2  BR(C6) 2/2   Triceps(C7)  2/2 Patellar(L4)   2/2   Toes: mute  Coordination: unable to participate due to mental status  Gait: unable to participate due to mental status    LABORATORY:  CBC                       8.0    11.15 )-----------( 117      ( 18 Mar 2023 02:53 )             24.2     Chem 03-18    137  |  101  |  69<H>  ----------------------------<  168<H>  4.5   |  19<L>  |  2.90<H>    Ca    7.8<L>      18 Mar 2023 02:53  Phos  4.8     03-18  Mg     2.20     03-18    TPro  4.3<L>  /  Alb  1.9<L>  /  TBili  0.3  /  DBili  x   /  AST  60<H>  /  ALT  60<H>  /  AlkPhos  67  03-18    LFTs LIVER FUNCTIONS - ( 18 Mar 2023 02:53 )  Alb: 1.9 g/dL / Pro: 4.3 g/dL / ALK PHOS: 67 U/L / ALT: 60 U/L / AST: 60 U/L / GGT: x              Lipid Panel 03-15 Chol 153 LDL -- HDL 45<L> Trig 146      STUDIES & IMAGING: (EEG, CT, MR, U/S, TTE/SORAYA):  EEG report 3/18/23:  Highly focal seizures from the central head region, more continuous on lower doses of sedation during the day.  Improved again overnight with sedation increase.   Severe diffuse/multifocal cerebral dysfunction.

## 2023-03-18 NOTE — PROGRESS NOTE ADULT - SUBJECTIVE AND OBJECTIVE BOX
[facility] - DANNA Cosby / DANNA Rivers / LENCHO Ontiveros/ DANNA Aldana/ DANNA Hussein/ QUYEN Gambino / LUCIAN Mukherjee / GISELE Stapleton  ---------------------------------------------------------------------------------------------------------------  seen and examined today for KTx/CKD, on CVVHDF  Interval : patient poorly responsive, poor brain stem reflexes  VITALS:  T(F): 97.4 (03-18-23 @ 08:00), Max: 97.4 (03-18-23 @ 08:00)  HR: 48 (03-18-23 @ 11:06)  BP: --  RR: 18 (03-18-23 @ 10:00)  SpO2: 100% (03-18-23 @ 11:06)  Wt(kg): --    03-17 @ 07:01  -  03-18 @ 07:00  --------------------------------------------------------  IN: 3312 mL / OUT: 921 mL / NET: 2391 mL    03-18 @ 07:01  -  03-18 @ 11:14  --------------------------------------------------------  IN: 85.7 mL / OUT: 77 mL / NET: 8.7 mL      Physical Exam :-  Constitutional: Intubated/sedated  Neck: Supple.  Respiratory: Bilateral equal breath sounds,  Cardiovascular: S1, S2 normal,  Gastrointestinal: Bowel Sounds present, soft, non tender.  Extremities: No edema  Neurological: blunted gag reflex  Psychiatric: Non-responsive  Data:-  Allergies :   apple (Unknown)  Benadryl (Unknown)  penicillin (Hives)  watermelon (Unknown)    Hospital Medications:   MEDICATIONS  (STANDING):  albuterol    0.083%. 2.5 milliGRAM(s) Nebulizer once  aspirin  chewable 81 milliGRAM(s) Oral daily  atovaquone  Suspension 1500 milliGRAM(s) Oral daily  cefTRIAXone   IVPB 2000 milliGRAM(s) IV Intermittent every 12 hours  chlorhexidine 0.12% Liquid 15 milliLiter(s) Oral Mucosa two times a day  chlorhexidine 2% Cloths 1 Application(s) Topical <User Schedule>  ciprofloxacin  0.3% Ophthalmic Solution for Otic Use 4 Drop(s) Right Ear two times a day  CRRT Treatment    <Continuous>  dextrose 5%. 1000 milliLiter(s) (100 mL/Hr) IV Continuous <Continuous>  dextrose 5%. 1000 milliLiter(s) (50 mL/Hr) IV Continuous <Continuous>  dextrose 50% Injectable 25 Gram(s) IV Push once  dextrose 50% Injectable 12.5 Gram(s) IV Push once  dextrose 50% Injectable 25 Gram(s) IV Push once  fentaNYL   Infusion. 0.5 MICROgram(s)/kG/Hr (1.89 mL/Hr) IV Continuous <Continuous>  fludroCORTISONE 0.05 milliGRAM(s) Oral daily  glucagon  Injectable 1 milliGRAM(s) IntraMuscular once  insulin lispro (ADMELOG) corrective regimen sliding scale   SubCutaneous every 6 hours  levETIRAcetam  IVPB 250 milliGRAM(s) IV Intermittent every 12 hours  levothyroxine Injectable 18 MICROGram(s) IV Push at bedtime  methylPREDNISolone sodium succinate Injectable 10 milliGRAM(s) IV Push every 6 hours  norepinephrine Infusion 0.05 MICROgram(s)/kG/Min (2.91 mL/Hr) IV Continuous <Continuous>  petrolatum Ophthalmic Ointment 1 Application(s) Both EYES every 12 hours  polyethylene glycol 3350 17 Gram(s) Oral daily  PrismaSATE Dialysate BGK 4 / 2.5 5000 milliLiter(s) (1000 mL/Hr) CRRT <Continuous>  PrismaSOL Filtration BGK 4 / 2.5 5000 milliLiter(s) (1000 mL/Hr) CRRT <Continuous>  PrismaSOL Filtration BGK 4 / 2.5 5000 milliLiter(s) (200 mL/Hr) CRRT <Continuous>  propofol Infusion 10 MICROgram(s)/kG/Min (2.27 mL/Hr) IV Continuous <Continuous>  senna 2 Tablet(s) Oral at bedtime    03-18    137  |  101  |  69<H>  ----------------------------<  168<H>  4.5   |  19<L>  |  2.90<H>    Ca    7.8<L>      18 Mar 2023 02:53  Phos  4.8     03-18  Mg     2.20     03-18    TPro  4.3<L>  /  Alb  1.9<L>  /  TBili  0.3  /  DBili      /  AST  60<H>  /  ALT  60<H>  /  AlkPhos  67  03-18    Creatinine Trend: 2.90 <--, 3.86 <--, 3.83 <--, 3.62 <--, 3.31 <--, 3.10 <--, 3.03 <--, 2.98 <--                        8.0    11.15 )-----------( 117      ( 18 Mar 2023 02:53 )             24.2

## 2023-03-19 NOTE — PROGRESS NOTE ADULT - SUBJECTIVE AND OBJECTIVE BOX
Antony Narvaez PGY1  pager [phone] or Teams or check resident tab for coverage    Patient is a 78y old  Female who presents with a chief complaint of AMS/R ear infxn (18 Mar 2023 12:30)    SUBJECTIVE / OVERNIGHT EVENTS:    NAEO.  Patient this morning is,    Brief Daily Plan:    MEDICATIONS  MEDICATIONS  (STANDING):  aspirin  chewable 81 milliGRAM(s) Oral daily  atovaquone  Suspension 1500 milliGRAM(s) Oral daily  cefTRIAXone   IVPB 2000 milliGRAM(s) IV Intermittent every 12 hours  chlorhexidine 0.12% Liquid 15 milliLiter(s) Oral Mucosa two times a day  chlorhexidine 2% Cloths 1 Application(s) Topical <User Schedule>  ciprofloxacin  0.3% Ophthalmic Solution for Otic Use 4 Drop(s) Right Ear two times a day  CRRT Treatment    <Continuous>  dextrose 5%. 1000 milliLiter(s) (100 mL/Hr) IV Continuous <Continuous>  dextrose 5%. 1000 milliLiter(s) (50 mL/Hr) IV Continuous <Continuous>  dextrose 50% Injectable 25 Gram(s) IV Push once  dextrose 50% Injectable 12.5 Gram(s) IV Push once  dextrose 50% Injectable 25 Gram(s) IV Push once  fentaNYL   Infusion. 0.5 MICROgram(s)/kG/Hr (1.89 mL/Hr) IV Continuous <Continuous>  fludroCORTISONE 0.05 milliGRAM(s) Oral daily  glucagon  Injectable 1 milliGRAM(s) IntraMuscular once  insulin lispro (ADMELOG) corrective regimen sliding scale   SubCutaneous every 6 hours  levETIRAcetam  IVPB 250 milliGRAM(s) IV Intermittent every 12 hours  levothyroxine Injectable 18 MICROGram(s) IV Push at bedtime  methylPREDNISolone sodium succinate Injectable 10 milliGRAM(s) IV Push every 6 hours  norepinephrine Infusion 0.05 MICROgram(s)/kG/Min (2.91 mL/Hr) IV Continuous <Continuous>  petrolatum Ophthalmic Ointment 1 Application(s) Both EYES every 12 hours  polyethylene glycol 3350 17 Gram(s) Oral daily  PrismaSATE Dialysate BGK 4 / 2.5 5000 milliLiter(s) (1000 mL/Hr) CRRT <Continuous>  PrismaSOL Filtration BGK 4 / 2.5 5000 milliLiter(s) (1000 mL/Hr) CRRT <Continuous>  PrismaSOL Filtration BGK 4 / 2.5 5000 milliLiter(s) (200 mL/Hr) CRRT <Continuous>  propofol Infusion 10 MICROgram(s)/kG/Min (2.27 mL/Hr) IV Continuous <Continuous>  senna 2 Tablet(s) Oral at bedtime    MEDICATIONS  (PRN):  acetaminophen   Oral Liquid .. 650 milliGRAM(s) Enteral Tube every 6 hours PRN Temp greater or equal to 38C (100.4F), Mild Pain (1 - 3), Moderate Pain (4 - 6)  dextrose Oral Gel 15 Gram(s) Oral once PRN Blood Glucose LESS THAN 70 milliGRAM(s)/deciliter      VITALS  Vital Signs Last 24 Hrs  T(C): 36.2 (19 Mar 2023 04:00), Max: 36.8 (18 Mar 2023 16:00)  T(F): 97.2 (19 Mar 2023 04:00), Max: 98.2 (18 Mar 2023 16:00)  HR: 54 (19 Mar 2023 07:15) (46 - 68)  BP: --  BP(mean): --  RR: 14 (19 Mar 2023 07:00) (0 - 18)  SpO2: 100% (19 Mar 2023 07:15) (91% - 100%)    Parameters below as of 19 Mar 2023 07:00  Patient On (Oxygen Delivery Method): ventilator    O2 Concentration (%): 40    PHYSICAL EXAM:  GENERAL: no distress  PSYCH: A&O x3  HEAD: Atraumatic, Normocephalic  NECK: Supple, No JVD  CHEST/LUNG: clear to auscultation bilaterally  HEART: regular rate and rhythm, no murmurs  ABDOMEN: nontender to palpation, no rebound tenderness/guarding  EXTREMITIES: no edema on bilateral LE  NEUROLOGY: no focal neurologic deficit  SKIN: No rashes or lesions    LABS:  All labs personally Reviewed.    RADIOLOGY & ADDITIONAL TESTS:  All imaging personally Reviewed.  Antony Narvaez PGY1  pager [phone] or Teams or check resident tab for coverage    Patient is a 78y old  Female who presents with a chief complaint of AMS/R ear infxn (18 Mar 2023 12:30)    SUBJECTIVE / OVERNIGHT EVENTS:    NAEO.  Patient this morning is status quo. EEG, no further episodes of seizure. Mild blood on suction in oral cavity, none with in-line suction.     Brief Daily Plan:      MEDICATIONS  MEDICATIONS  (STANDING):  aspirin  chewable 81 milliGRAM(s) Oral daily  atovaquone  Suspension 1500 milliGRAM(s) Oral daily  cefTRIAXone   IVPB 2000 milliGRAM(s) IV Intermittent every 12 hours  chlorhexidine 0.12% Liquid 15 milliLiter(s) Oral Mucosa two times a day  chlorhexidine 2% Cloths 1 Application(s) Topical <User Schedule>  ciprofloxacin  0.3% Ophthalmic Solution for Otic Use 4 Drop(s) Right Ear two times a day  CRRT Treatment    <Continuous>  dextrose 5%. 1000 milliLiter(s) (100 mL/Hr) IV Continuous <Continuous>  dextrose 5%. 1000 milliLiter(s) (50 mL/Hr) IV Continuous <Continuous>  dextrose 50% Injectable 25 Gram(s) IV Push once  dextrose 50% Injectable 12.5 Gram(s) IV Push once  dextrose 50% Injectable 25 Gram(s) IV Push once  fentaNYL   Infusion. 0.5 MICROgram(s)/kG/Hr (1.89 mL/Hr) IV Continuous <Continuous>  fludroCORTISONE 0.05 milliGRAM(s) Oral daily  glucagon  Injectable 1 milliGRAM(s) IntraMuscular once  insulin lispro (ADMELOG) corrective regimen sliding scale   SubCutaneous every 6 hours  levETIRAcetam  IVPB 250 milliGRAM(s) IV Intermittent every 12 hours  levothyroxine Injectable 18 MICROGram(s) IV Push at bedtime  methylPREDNISolone sodium succinate Injectable 10 milliGRAM(s) IV Push every 6 hours  norepinephrine Infusion 0.05 MICROgram(s)/kG/Min (2.91 mL/Hr) IV Continuous <Continuous>  petrolatum Ophthalmic Ointment 1 Application(s) Both EYES every 12 hours  polyethylene glycol 3350 17 Gram(s) Oral daily  PrismaSATE Dialysate BGK 4 / 2.5 5000 milliLiter(s) (1000 mL/Hr) CRRT <Continuous>  PrismaSOL Filtration BGK 4 / 2.5 5000 milliLiter(s) (1000 mL/Hr) CRRT <Continuous>  PrismaSOL Filtration BGK 4 / 2.5 5000 milliLiter(s) (200 mL/Hr) CRRT <Continuous>  propofol Infusion 10 MICROgram(s)/kG/Min (2.27 mL/Hr) IV Continuous <Continuous>  senna 2 Tablet(s) Oral at bedtime    MEDICATIONS  (PRN):  acetaminophen   Oral Liquid .. 650 milliGRAM(s) Enteral Tube every 6 hours PRN Temp greater or equal to 38C (100.4F), Mild Pain (1 - 3), Moderate Pain (4 - 6)  dextrose Oral Gel 15 Gram(s) Oral once PRN Blood Glucose LESS THAN 70 milliGRAM(s)/deciliter      VITALS  Vital Signs Last 24 Hrs  T(C): 36.2 (19 Mar 2023 04:00), Max: 36.8 (18 Mar 2023 16:00)  T(F): 97.2 (19 Mar 2023 04:00), Max: 98.2 (18 Mar 2023 16:00)  HR: 54 (19 Mar 2023 07:15) (46 - 68)  BP: --  BP(mean): --  RR: 14 (19 Mar 2023 07:00) (0 - 18)  SpO2: 100% (19 Mar 2023 07:15) (91% - 100%)    Parameters below as of 19 Mar 2023 07:00  Patient On (Oxygen Delivery Method): ventilator    O2 Concentration (%): 40    PHYSICAL EXAM:  GENERAL: no distress  PSYCH: A&O x3  HEAD: Atraumatic, Normocephalic  NECK: Supple, No JVD  CHEST/LUNG: clear to auscultation bilaterally  HEART: regular rate and rhythm, no murmurs  ABDOMEN: nontender to palpation, no rebound tenderness/guarding  EXTREMITIES: no edema on bilateral LE  NEUROLOGY: no focal neurologic deficit  SKIN: No rashes or lesions    LABS:  All labs personally Reviewed.    RADIOLOGY & ADDITIONAL TESTS:  All imaging personally Reviewed.  Antony Narvaez PGY1  pager [phone] or Teams or check resident tab for coverage    Patient is a 78y old  Female who presents with a chief complaint of AMS/R ear infxn (18 Mar 2023 12:30)    SUBJECTIVE / OVERNIGHT EVENTS:    NAEO.  Patient this morning is status quo. EEG, no further episodes of seizure. Mild blood on suction in oral cavity, none with in-line suction.     Brief Daily Plan:  ·	increase keppra dose for CRRT, start vimpat per neuro, wean propofol as tolerated while on EEG  ·	c/w ceftriaxone, mepron    MEDICATIONS  MEDICATIONS  (STANDING):  aspirin  chewable 81 milliGRAM(s) Oral daily  atovaquone  Suspension 1500 milliGRAM(s) Oral daily  cefTRIAXone   IVPB 2000 milliGRAM(s) IV Intermittent every 12 hours  chlorhexidine 0.12% Liquid 15 milliLiter(s) Oral Mucosa two times a day  chlorhexidine 2% Cloths 1 Application(s) Topical <User Schedule>  ciprofloxacin  0.3% Ophthalmic Solution for Otic Use 4 Drop(s) Right Ear two times a day  CRRT Treatment    <Continuous>  dextrose 5%. 1000 milliLiter(s) (100 mL/Hr) IV Continuous <Continuous>  dextrose 5%. 1000 milliLiter(s) (50 mL/Hr) IV Continuous <Continuous>  dextrose 50% Injectable 25 Gram(s) IV Push once  dextrose 50% Injectable 12.5 Gram(s) IV Push once  dextrose 50% Injectable 25 Gram(s) IV Push once  fentaNYL   Infusion. 0.5 MICROgram(s)/kG/Hr (1.89 mL/Hr) IV Continuous <Continuous>  fludroCORTISONE 0.05 milliGRAM(s) Oral daily  glucagon  Injectable 1 milliGRAM(s) IntraMuscular once  insulin lispro (ADMELOG) corrective regimen sliding scale   SubCutaneous every 6 hours  levETIRAcetam  IVPB 250 milliGRAM(s) IV Intermittent every 12 hours  levothyroxine Injectable 18 MICROGram(s) IV Push at bedtime  methylPREDNISolone sodium succinate Injectable 10 milliGRAM(s) IV Push every 6 hours  norepinephrine Infusion 0.05 MICROgram(s)/kG/Min (2.91 mL/Hr) IV Continuous <Continuous>  petrolatum Ophthalmic Ointment 1 Application(s) Both EYES every 12 hours  polyethylene glycol 3350 17 Gram(s) Oral daily  PrismaSATE Dialysate BGK 4 / 2.5 5000 milliLiter(s) (1000 mL/Hr) CRRT <Continuous>  PrismaSOL Filtration BGK 4 / 2.5 5000 milliLiter(s) (1000 mL/Hr) CRRT <Continuous>  PrismaSOL Filtration BGK 4 / 2.5 5000 milliLiter(s) (200 mL/Hr) CRRT <Continuous>  propofol Infusion 10 MICROgram(s)/kG/Min (2.27 mL/Hr) IV Continuous <Continuous>  senna 2 Tablet(s) Oral at bedtime    MEDICATIONS  (PRN):  acetaminophen   Oral Liquid .. 650 milliGRAM(s) Enteral Tube every 6 hours PRN Temp greater or equal to 38C (100.4F), Mild Pain (1 - 3), Moderate Pain (4 - 6)  dextrose Oral Gel 15 Gram(s) Oral once PRN Blood Glucose LESS THAN 70 milliGRAM(s)/deciliter      VITALS  Vital Signs Last 24 Hrs  T(C): 36.2 (19 Mar 2023 04:00), Max: 36.8 (18 Mar 2023 16:00)  T(F): 97.2 (19 Mar 2023 04:00), Max: 98.2 (18 Mar 2023 16:00)  HR: 54 (19 Mar 2023 07:15) (46 - 68)  BP: --  BP(mean): --  RR: 14 (19 Mar 2023 07:00) (0 - 18)  SpO2: 100% (19 Mar 2023 07:15) (91% - 100%)    Parameters below as of 19 Mar 2023 07:00  Patient On (Oxygen Delivery Method): ventilator    O2 Concentration (%): 40    PHYSICAL EXAM:  GENERAL: no distress  PSYCH: not alert to pain or ET suction  HEAD: Atraumatic, Normocephalic  NECK: Supple, No JVD  CHEST/LUNG: clear to auscultation bilaterally  HEART: regular rate and rhythm, no murmurs  ABDOMEN: soft midline, palpable mass and firmness bilaterally  EXTREMITIES: trace edema on bilateral LE  NEUROLOGY: no focal neurologic deficit  SKIN: No rashes or lesions    LABS:  All labs personally Reviewed.    RADIOLOGY & ADDITIONAL TESTS:  All imaging personally Reviewed.

## 2023-03-19 NOTE — PROGRESS NOTE ADULT - SUBJECTIVE AND OBJECTIVE BOX
Follow Up:  strep pneumo bacteremia and meningitis    Interval history: pt still in MICU  ROS:  unable to obtain because: intubated, sedated       Allergies  apple (Unknown)  Benadryl (Unknown)  penicillin (Hives)  watermelon (Unknown)        ANTIMICROBIALS:  atovaquone  Suspension 1500 daily  cefTRIAXone   IVPB 2000 every 12 hours      OTHER MEDS:  acetaminophen   Oral Liquid .. 650 milliGRAM(s) Enteral Tube every 6 hours PRN  aspirin  chewable 81 milliGRAM(s) Oral daily  chlorhexidine 0.12% Liquid 15 milliLiter(s) Oral Mucosa two times a day  chlorhexidine 2% Cloths 1 Application(s) Topical <User Schedule>  ciprofloxacin  0.3% Ophthalmic Solution for Otic Use 4 Drop(s) Right Ear two times a day  CRRT Treatment    <Continuous>  dextrose 5%. 1000 milliLiter(s) IV Continuous <Continuous>  dextrose 5%. 1000 milliLiter(s) IV Continuous <Continuous>  dextrose 50% Injectable 25 Gram(s) IV Push once  dextrose 50% Injectable 12.5 Gram(s) IV Push once  dextrose 50% Injectable 25 Gram(s) IV Push once  dextrose Oral Gel 15 Gram(s) Oral once PRN  fentaNYL   Infusion. 0.5 MICROgram(s)/kG/Hr IV Continuous <Continuous>  fludroCORTISONE 0.05 milliGRAM(s) Oral daily  glucagon  Injectable 1 milliGRAM(s) IntraMuscular once  heparin   Injectable 5000 Unit(s) SubCutaneous every 8 hours  insulin lispro (ADMELOG) corrective regimen sliding scale   SubCutaneous every 6 hours  levETIRAcetam  IVPB 250 milliGRAM(s) IV Intermittent every 12 hours  levothyroxine Injectable 18 MICROGram(s) IV Push at bedtime  methylPREDNISolone sodium succinate Injectable 10 milliGRAM(s) IV Push every 6 hours  norepinephrine Infusion 0.05 MICROgram(s)/kG/Min IV Continuous <Continuous>  petrolatum Ophthalmic Ointment 1 Application(s) Both EYES every 12 hours  polyethylene glycol 3350 17 Gram(s) Oral two times a day  PrismaSATE Dialysate BGK 4 / 2.5 5000 milliLiter(s) CRRT <Continuous>  PrismaSOL Filtration BGK 4 / 2.5 5000 milliLiter(s) CRRT <Continuous>  PrismaSOL Filtration BGK 4 / 2.5 5000 milliLiter(s) CRRT <Continuous>  propofol Infusion 10 MICROgram(s)/kG/Min IV Continuous <Continuous>  senna 2 Tablet(s) Oral two times a day      Vital Signs Last 24 Hrs  T(C): 37.1 (19 Mar 2023 08:00), Max: 37.1 (19 Mar 2023 08:00)  T(F): 98.8 (19 Mar 2023 08:00), Max: 98.8 (19 Mar 2023 08:00)  HR: 48 (19 Mar 2023 09:45) (46 - 68)  BP: --  BP(mean): --  RR: 7 (19 Mar 2023 09:45) (0 - 18)  SpO2: 100% (19 Mar 2023 09:45) (91% - 100%)    Parameters below as of 19 Mar 2023 09:00  Patient On (Oxygen Delivery Method): ventilator    O2 Concentration (%): 40    Physical Exam:  General:   intubated, sedated  Cardio:     regular   Respiratory:  ET on vent  abd:     soft,   BS +,   no tenderness  :   no CVAT,  no suprapubic tenderness,   no  snider  Musculoskeletal:   no joint swelling  vascular: R femoral CVC, L fem shiley  Skin:    no rash                          8.9    10.06 )-----------( 103      ( 19 Mar 2023 06:36 )             26.5       03-19    133<L>  |  99  |  34<H>  ----------------------------<  185<H>  5.0   |  23  |  1.61<H>    Ca    8.4      19 Mar 2023 06:36  Phos  3.7     03-19  Mg     2.50     03-19    TPro  5.0<L>  /  Alb  2.2<L>  /  TBili  0.4  /  DBili  x   /  AST  <5<L>  /  ALT  <5<L>  /  AlkPhos  77  03-19          MICROBIOLOGY:  v  .Blood Blood-Peripheral  03-17-23   No growth to date.  --  --      .Blood Blood-Peripheral  03-17-23   No growth to date.  --  --      .Bronchial Bronchial Lavage  03-16-23   No growth  --    No polymorphonuclear cells seen per low power field  No squamous epithelial cells per low power field  No organisms seen per oil power field      .CSF CSF  03-15-23   Culture is being performed.  --    Few polymorphonuclear leukocytes per low power field  Moderate Gram positive cocci in pairs per oil power field      Clean Catch Clean Catch (Midstream)  03-15-23   No growth  --  --      .Blood Blood-Venous  03-15-23   Growth in aerobic and anaerobic bottles: Streptococcus pneumoniae  See previous culture [id number]  --    Growth in aerobic and anaerobic bottles: Gram positive cocci in pairs      .Blood Blood-Peripheral  03-15-23   Growth in aerobic and anaerobic bottles: Streptococcus pneumoniae  ***Blood Panel PCR results on this specimen are available  approximately 3 hours after the Gram stain result.***  Gram stain, PCR, and/or culture results may not always  correspond due to difference in methodologies.  ************************************************************  This PCR assay was performed by multiplex PCR. This  Assay tests for 66 bacterial and resistance gene targets.  Please refer to the [facility] test directory  at [email or web address] for details.  --  Blood Culture PCR  Streptococcus pneumoniae          EBV PCR: NotDetec IU/mL (03-15 @ 15:00)  HSV 1/2 PCR: NotDetec (03-15 @ 15:00)        RADIOLOGY:  Images independently visualized and reviewed personally, findings as below  < from: Xray Chest 1 View- PORTABLE-Urgent (Xray Chest 1 View- PORTABLE-Urgent .) (03.16.23 @ 17:50) >  IMPRESSION:  NG tube with the tip in the body of the stomach.  Bilateral pulmonary consolidations about the same or slightly worse on   the left.    < end of copied text >  < from: CT Brain Stroke Protocol (03.14.23 @ 19:29) >  IMPRESSION:    No hydrocephalus, acute intracranial hemorrhage, mass effect, or brain   edema.    Right mastoid air cell and tympanic cavity effusion, correlate for the   presence of acute otomastoiditis..      < end of copied text >  < from: Transthoracic Echocardiogram (03.17.23 @ 11:48) >  1.  Moderate aortic regurgitation.  2. Mild concentric left ventricular hypertrophy.  3. Normal left ventricular systolic function. No segmental  wall motion abnormalities.  4. Reversal of the E-A  waves of the mitral inflow pattern  is consistent with diastolic LV dysfunction.  5. Normal right ventricular size and function.  6. Normal tricuspid valve. Mild tricuspid regurgitation.  *** Compared with echocardiogram of 7/11/2018, no  significant changes noted.    < end of copied text >

## 2023-03-19 NOTE — PROGRESS NOTE ADULT - ASSESSMENT
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 Went to ENT and was diagnosed with R otitis externa secondary to perforated TM. Prescribed cipro/dexamethasone.  Worsening headache and EMS called. Admitted 3/14.  CT head negative.  NAMAN.  Started on vanc/meropenem.  LP attempted but unsuccessful.  IR obtained LP.      immunocompromised pt s/p renal transplant  with fever, leukocytosis, sepsis, NAMAN, pneumococcal meningitis, bacteremia, pneumonia.    seizure  Possible [place] Syndrome  cardiac arrest, holding MMF and Cyclosporine on CVVH and methyl prednisolone    * c/w ceftriaxone 2q12  * f/u the repeat blood cx 11/27 (negative for now)  * monitor CBC/diff and renal function  * c/w mepron ppx    The above assessment and plan was discussed with the primary team    Monique Hutchins MD  contact on teams  After 5pm and on weekends call [phone]

## 2023-03-19 NOTE — PROGRESS NOTE ADULT - ASSESSMENT
79 y/o F with h/o HTN, HLD, DM2, PCKD previously on HD via LUE AVF then s/p renal transplant, LLE DVT (resolved, nml dopplers 12/2022) on ASA, very small supraclinoid aneurysm (reportedly unchanged on MRA 10/2020), hypothyroidism, PCP 2021 on atovaquone ppx, reportedly hospitalized in 12/2022 for rectal prolapse, had a blood transfusion at that time, also + CMV 1/25/23 who presented with R-sided HA, R ear pain and neck stiffness after visit to ENT earlier in the day, altered on presentation, CT head unremarkable for stroke, however LP with IR and BCx showing +strep pneumoniae. currently on vanc and CTX per ID. Cardiac arrest 3/16 with ROSC, transferred to MICU for further management.       Plan:   Neurological  #Currently Intubated, sedated   - on propofol and fentanyl    #Strep pneumo meningitis #Acute encephalopathy  severe HA, neck stiffness, fever, leukocytosis concerning for meningitis/encephalitis  CT head negative for CVA.   LP and BCx 3/15 both showing gram positive cocci in pairs, and + strep pneumoniae.   likely source of entry from R ear where pt c/o discomfort for several months.  patient is on cyclosporine, mycophenolate mofetil and prednisone due to kidney transplant and is immunocompromised   dental procedure 3/14 but less likely source   ID consulted, recs for continuing meropenem. s/p vancomycin 3/15.   - f/u BCx and LP sensitivities  - f/u MRI of the IAC w/ contrast at a later time when renal function improves    #Seizures  - 24hr EEG: Highly focal seizures from the central head region, more continuous on lower doses of sedation during the day.  Improved again overnight with sedation increase. Severe diffuse/multifocal cerebral dysfunction.  - keppra loaded 500mg IVP (3/17) and started on 250mg IV q12h (3/17 - [ ])  - f/u neuro specific enolase on 3/19 AM    Cardiovascular  #Cardiac Arrest  bradycardia to 30, pulseless, code blue was called 3/16   2 rounds of epi, and PEA arrest. on 3rd pulse check pulseless VT, 200J shock given  4th pulse check asystole, 5th ROSC, sinus tachy with HUMA. IO placed and levo started. Due to blood in mouth took several attempts with DL for ETT to be placed but ultimately confirmed with capnometry and bilateral breath sounds.       #Elevated troponin  uptrending troponin to 182 3/15 AM, was likely iso ESRD  s/p cardiac arrest, elevated ST segment   - trend troponin  - TTE from 3/17 showed EF of 63% with mild pulm htn, normal left ventricular systolic function, and diastolic LV dysfunction.      #HTN  on amlodipine 5mg, losartan 100mg, torsemide 20mg, and clonidine 0.2 patch weekly  - hold for now given sepsis      Respiratory/ENT  #Intubated  continue sedation    # Tympanic membrane rupture  discharge from R ear, evaluated by ENT given dexamethasone and cipro 3/14  - c/w broad spectrum abx   - MRI of IAC with contrast when pt is stable  - f/u ENT recs      Gastrointestinal  - PPI  - trickle feeds  - intermittent suction  - bowel regimen      Renal  #ESRD #s/p Kidney Transplant in 2003 at [place] #PCKD  previously HD through LLE AVF but no HD since transplant  Cr 3.8, unclear baseline  - avoid nephrotoxic agent  - holding cyclosporine and mycophenolate   - changed prednisone to medrol 10mg IVP q6h  - appreciate nephro recs  - strict I/O and replete electrolytes  - 4mg IV bumex with minimal urine production (3/17)  - started on CRRT (3/17) net negative 75cc/hr goal       Hematological  # h/o LLE DVT  resolved, no DVT on doppler 12/22.  on ASA at home, has been receiving heparin subQ  - hold for now given bleeding in oropharynx.   - SCDs  - can restart heparin if no bleeding    #Anemia  - s/p 1 unit pRBC on 3/16 for Hb drop from 10 to 7.9 and responded appropriately  - Evaluated by ENT for bleeding and found no signs of active bleeding from nasal cavity, nasopharynx or oral cavity, however, patient biting on tongue, which could be one source of bleeding. Tongue was edematous and lax      Infectious Disease  #Strep pneumoniae meningitis #R ear mastoiditis  treatment with IV CTX   - c/w ciprodex drops for R ear  - f/u ID recs   - f/u repeat blood cx from 3/17    #h/o PCP  - atovaquone at home, continue      Endocrinological  #Hypothyroidism  last TSH 12/2022 >10  TSH 3/15 4.73  - c/w levothyroxine     #Diabetes mellitus Type II  - ISS      Ethics  FULL, GOC ongoing. 77 y/o F with h/o HTN, HLD, DM2, PCKD previously on HD via LUE AVF then s/p renal transplant, LLE DVT (resolved, nml dopplers 12/2022) on ASA, very small supraclinoid aneurysm (reportedly unchanged on MRA 10/2020), hypothyroidism, PCP 2021 on atovaquone ppx, reportedly hospitalized in 12/2022 for rectal prolapse, had a blood transfusion at that time, also + CMV 1/25/23 who presented with R-sided HA, R ear pain and neck stiffness after visit to ENT earlier in the day, altered on presentation, CT head unremarkable for stroke, however LP with IR and BCx showing +strep pneumoniae. currently on vanc and CTX per ID. Cardiac arrest 3/16 with ROSC, transferred to MICU for further management.       Plan:   Neurological  #Currently Intubated, sedated   - on propofol and fentanyl  - wean propofol while monitoring on EEG    #Strep pneumo meningitis #Acute encephalopathy  severe HA, neck stiffness, fever, leukocytosis concerning for meningitis/encephalitis  CT head negative for CVA.   LP and BCx 3/15 both showing gram positive cocci in pairs, and + strep pneumoniae.   likely source of entry from R ear where pt c/o discomfort for several months.  patient is on cyclosporine, mycophenolate mofetil and prednisone due to kidney transplant and is immunocompromised   dental procedure 3/14 but less likely source   ID consulted, recs for continuing meropenem. s/p vancomycin 3/15.   - f/u BCx and LP sensitivities  - f/u MRI of the IAC w/ contrast at a later time when renal function improves    #Seizures  - 24hr EEG: Highly focal seizures from the central head region, more continuous on lower doses of sedation during the day.  Improved again overnight with sedation increase. Severe diffuse/multifocal cerebral dysfunction.  - keppra loaded 500mg IVP (3/17) and started on 250mg IV q12h (3/17 - [ ])  - f/u neuro specific enolase on 3/19 AM    Cardiovascular  #Cardiac Arrest  bradycardia to 30, pulseless, code blue was called 3/16   2 rounds of epi, and PEA arrest. on 3rd pulse check pulseless VT, 200J shock given  4th pulse check asystole, 5th ROSC, sinus tachy with HUMA. IO placed and levo started. Due to blood in mouth took several attempts with DL for ETT to be placed but ultimately confirmed with capnometry and bilateral breath sounds.       #Elevated troponin  uptrending troponin to 182 3/15 AM, was likely iso ESRD  s/p cardiac arrest, elevated ST segment   - trend troponin  - TTE from 3/17 showed EF of 63% with mild pulm htn, normal left ventricular systolic function, and diastolic LV dysfunction.      #HTN  on amlodipine 5mg, losartan 100mg, torsemide 20mg, and clonidine 0.2 patch weekly  - hold for now given sepsis      Respiratory/ENT  #Intubated  continue sedation    # Tympanic membrane rupture  discharge from R ear, evaluated by ENT given dexamethasone and cipro 3/14  - c/w broad spectrum abx   - MRI of IAC with contrast when pt is stable  - f/u ENT recs      Gastrointestinal  - PPI  - trickle feeds  - intermittent suction  - bowel regimen      Renal  #ESRD #s/p Kidney Transplant in 2003 at [place] #PCKD  previously HD through LLE AVF but no HD since transplant  Cr 3.8, unclear baseline  - avoid nephrotoxic agent  - holding cyclosporine and mycophenolate   - changed prednisone to medrol 10mg IVP q6h  - appreciate nephro recs  - strict I/O and replete electrolytes  - 4mg IV bumex with minimal urine production (3/17)  - started on CRRT (3/17) net negative 75cc/hr goal       Hematological  # h/o LLE DVT  resolved, no DVT on doppler 12/22.  on ASA at home, has been receiving heparin subQ  - hold for now given bleeding in oropharynx.   - SCDs  - can restart heparin if no bleeding    #Anemia  - s/p 1 unit pRBC on 3/16 for Hb drop from 10 to 7.9 and responded appropriately  - Evaluated by ENT for bleeding and found no signs of active bleeding from nasal cavity, nasopharynx or oral cavity, however, patient biting on tongue, which could be one source of bleeding. Tongue was edematous and lax      Infectious Disease  #Strep pneumoniae meningitis #R ear mastoiditis  treatment with IV CTX   - c/w ciprodex drops for R ear  - f/u ID recs   - f/u repeat blood cx from 3/17    #h/o PCP  - atovaquone at home, continue      Endocrinological  #Hypothyroidism  last TSH 12/2022 >10  TSH 3/15 4.73  - c/w levothyroxine     #Diabetes mellitus Type II  - ISS      Ethics  FULL, GOC ongoing. 79 y/o F with h/o HTN, HLD, DM2, PCKD previously on HD via LUE AVF then s/p renal transplant, LLE DVT (resolved, nml dopplers 12/2022) on ASA, very small supraclinoid aneurysm (reportedly unchanged on MRA 10/2020), hypothyroidism, PCP 2021 on atovaquone ppx, reportedly hospitalized in 12/2022 for rectal prolapse, had a blood transfusion at that time, also + CMV 1/25/23 who presented with R-sided HA, R ear pain and neck stiffness after visit to ENT earlier in the day, altered on presentation, CT head unremarkable for stroke, however LP with IR and BCx showing +strep pneumoniae. currently on vanc and CTX per ID. Cardiac arrest 3/16 with ROSC, transferred to MICU for further management.       Plan:   Neurological  #Currently Intubated, sedated   - on propofol and fentanyl  - wean propofol while monitoring on EEG  - f/u neuro specific enolase sent on 3/19    #Strep pneumo meningitis #Acute encephalopathy  severe HA, neck stiffness, fever, leukocytosis concerning for meningitis/encephalitis  CT head negative for CVA.   LP and BCx 3/15 both showing gram positive cocci in pairs, and + strep pneumoniae.   likely source of entry from R ear where pt c/o discomfort for several months.  patient is on cyclosporine, mycophenolate mofetil and prednisone due to kidney transplant and is immunocompromised   dental procedure 3/14 but less likely source   ID consulted, recs for continuing meropenem. s/p vancomycin 3/15.   - f/u BCx and LP sensitivities  - f/u MRI of the IAC w/ contrast at a later time when renal function improves    #Seizures  - 24hr EEG initially with highly focal seizures, improved with increased sedation.   - keppra loaded 500mg IVP (3/17) and started on 250mg IV q12h (3/17 - 3/19) increased to 750 mg IV q12h (CRRT dosing (3/19 - [ ])  - decrease propofol while monitoring on EEG    Cardiovascular  #Cardiac Arrest  bradycardia to 30, pulseless, code blue was called 3/16   2 rounds of epi, and PEA arrest. on 3rd pulse check pulseless VT, 200J shock given  4th pulse check asystole, 5th ROSC, sinus tachy with HUMA. IO placed and levo started. Due to blood in mouth took several attempts with DL for ETT to be placed but ultimately confirmed with capnometry and bilateral breath sounds.       #Elevated troponin  uptrending troponin to 182 3/15 AM, was likely iso ESRD  s/p cardiac arrest, elevated ST segment   - trend troponin  - TTE from 3/17 showed EF of 63% with mild pulm htn, normal left ventricular systolic function, and diastolic LV dysfunction.      #HTN  on amlodipine 5mg, losartan 100mg, torsemide 20mg, and clonidine 0.2 patch weekly  - hold for now given sepsis      Respiratory/ENT  #Intubated  continue sedation    # Tympanic membrane rupture  discharge from R ear, evaluated by ENT given dexamethasone and cipro 3/14  - c/w broad spectrum abx   - MRI of IAC with contrast when pt is stable  - f/u ENT recs      Gastrointestinal  #Constipation  - f/u Abdominal xray for bowel obstruction  - if negative, increase trickle feeds and bowel regimen    Renal  #ESRD #s/p Kidney Transplant in 2003 at [place] #PCKD  previously HD through LLE AVF but no HD since transplant  Cr 3.8, unclear baseline  - avoid nephrotoxic agent  - holding cyclosporine and mycophenolate   - changed prednisone to medrol 10mg IVP q6h  - appreciate nephro recs  - strict I/O and replete electrolytes  - 4mg IV bumex with minimal urine production (3/17)  - started on CRRT (3/17) net negative 75cc/hr goal       Hematological  # h/o LLE DVT  resolved, no DVT on doppler 12/22.  on ASA at home, has been receiving heparin subQ  - hold ASA and SQH for now given bleeding in oropharynx.   - SCDs  - can restart heparin if no bleeding    #Anemia  - s/p 1 unit pRBC on 3/16 for Hb drop from 10 to 7.9 and responded appropriately  - Evaluated by ENT for bleeding and found no signs of active bleeding from nasal cavity, nasopharynx or oral cavity, however, patient biting on tongue, which could be one source of bleeding. Tongue was edematous and lax      Infectious Disease  #Strep pneumoniae meningitis #R ear mastoiditis  treatment with IV CTX   - c/w ciprodex drops for R ear  - f/u ID recs   - f/u repeat blood cx from 3/17    #h/o PCP  - atovaquone at home, continue      Endocrinological  #Hypothyroidism  last TSH 12/2022 >10  TSH 3/15 4.73  - c/w levothyroxine     #Diabetes mellitus Type II  - ISS      Ethics  FULL, GOC ongoing.

## 2023-03-19 NOTE — PROGRESS NOTE ADULT - ASSESSMENT
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  Cardiac arrest on 3/16/23  in septic shock on iv pressors  intubated on MV  Renal following for NAMAN on CKD Mx.    NAMAN on CKD4  Creatinine Trend: 3.86 <--, 3.83 <--, 3.62 <--, 3.31 <--, 3.10 <--, 3.03 <--, 2.98 <--  w/worsening renal function, oliguric, acidosis, mild hyperkalemia  KTx 2003  B/L creat around 2.8 since Dec 2022  Patient receives Cyclosporine (Gengraf) 75mg PO q12hrs,  BID and Prednisone 5 QD for transplant    plan:  In light of severe sepsis and cardiac arrest: holding MMF and Cyclosporine  On Dexamethasone  Consent for HD obtained, signed by daughter 3/16/23  patient circuit clotted once on 3/17/23 -> currently no AC in circuit  renewed CVVHDF orders--->0 to 50 cc uf per hour--d/w micu  keep Ponce  monitor U/O  monitor BMP, electrolytes q6h once CVVH starts  dose all meds for eGFR<15ml/min.   avoid ACEi/ARB/NSAIDs/Nephrotoxics if able.    Metabolic acidosis  lactate +  manage underlying condition (sepsis)    OM and OE  Abxs per Infectious disease specialist with dose adjustment per GFR  f/u cxs  vent Mx, pressors per ICU      prognosis guarded  [facility] Nephrologist Dr. Hugo Hernandez [phone]  Dr Aldana  [phone]

## 2023-03-19 NOTE — PROGRESS NOTE ADULT - ATTENDING COMMENTS
Patient is a 79 yo F w/ HTN, HLD, T2DM, PCKD previously on HD via LUE AVF then s/p renal transplant, LLE DVT (resolved) hypothyroidism, prior PCP PNA, CMV viremia who initially presented with R-sided HA, R ear pain and neck stiffness found to have Strep Pneumo bacteremia and meningitis in setting of otitis externa. Course c/b cardiac arrest 3/16 and post arrest seizures and NAMAN    #Septic Shock  #Strep bactermia  - c/w Ceftriaxone  - f/u repeat blood cultures, NGTD  - TTE negative for vegetations, may need SORAYA if doesn't clear blood cultures  - fu ID  - c/w vasopressors to maintain MAP > 65  - Will d/c flurocort    #NAMAN  #s/p Renal Transplant  #PCKD  - c/w CRRT, will c/w fluid removal 50 to 75cc/hr as tolerated    #Encephalopathy post cardiac arrest  #Seizures  - c/w EEG, improved with propofol infusion  - c/w Prop for burst suppression, will f/u neuro regarding when to wean propofol  - c/w Keppra   - f/u neuro recs    #Dysphagia  - Trickle feeds, increase as tolerated if AXR nonobstuctive    #Respiratory failure   - c/w mechanical ventilatory support, wean as tolerated    Nico Cantor MD  Pulmonary & Critical Care

## 2023-03-19 NOTE — EEG REPORT - NS EEG TEXT BOX
YARA SEGUNDO [id number]     Study Date: 		03-18-23 08:00 to 03-19-23 08:00  Duration x Hours: 24  --------------------------------------------------------------------------------------------------  History:  CC/ HPI Patient is a 78y old  Female who presents with a chief complaint of AMS/R ear infxn (17 Mar 2023 08:53)    MEDICATIONS  (STANDING):  aspirin  chewable 81 milliGRAM(s) Oral daily  atovaquone  Suspension 1500 milliGRAM(s) Oral daily  cefTRIAXone   IVPB 2000 milliGRAM(s) IV Intermittent every 12 hours  ciprofloxacin  0.3% Ophthalmic Solution for Otic Use 4 Drop(s) Right Ear two times a day  CRRT Treatment    <Continuous>  fentaNYL   Infusion. 0.5 MICROgram(s)/kG/Hr (1.89 mL/Hr) IV Continuous <Continuous>  fludroCORTISONE 0.05 milliGRAM(s) Oral daily  glucagon  Injectable 1 milliGRAM(s) IntraMuscular once  heparin   Injectable 5000 Unit(s) SubCutaneous every 8 hours  insulin lispro (ADMELOG) corrective regimen sliding scale   SubCutaneous every 6 hours  levETIRAcetam  IVPB 250 milliGRAM(s) IV Intermittent every 12 hours  levothyroxine Injectable 18 MICROGram(s) IV Push at bedtime  methylPREDNISolone sodium succinate Injectable 10 milliGRAM(s) IV Push every 6 hours  norepinephrine Infusion 0.05 MICROgram(s)/kG/Min (2.91 mL/Hr) IV Continuous <Continuous>  petrolatum Ophthalmic Ointment 1 Application(s) Both EYES every 12 hours  polyethylene glycol 3350 17 Gram(s) Oral two times a day  PrismaSATE Dialysate BGK 4 / 2.5 5000 milliLiter(s) (1000 mL/Hr) CRRT <Continuous>  PrismaSOL Filtration BGK 4 / 2.5 5000 milliLiter(s) (1000 mL/Hr) CRRT <Continuous>  PrismaSOL Filtration BGK 4 / 2.5 5000 milliLiter(s) (200 mL/Hr) CRRT <Continuous>  propofol Infusion 10 MICROgram(s)/kG/Min (2.27 mL/Hr) IV Continuous <Continuous>  senna 2 Tablet(s) Oral two times a day      --------------------------------------------------------------------------------------------------  Study Interpretation:    [[[Abbreviation Key:  PDR=alpha rhythm/posterior dominant rhythm. A-P=anterior posterior.  Amplitude: ‘very low’:<20; ‘low’:20-49; ‘medium’:; ‘high’:>150uV.  Persistence for periodic/rhythmic patterns (% of epoch) ‘rare’:<1%; ‘occasional’:1-10%; ‘frequent’:10-50%; ‘abundant’:50-90%; ‘continuous’:>90%.  Persistence for sporadic discharges: ‘rare’:<1/hr; ‘occasional’:1/min-1/hr; ‘frequent’:>1/min; ‘abundant’:>1/10 sec.  RPP=rhythmic and periodic patterns; GRDA=generalized rhythmic delta activity; FIRDA=frontal intermittent GRDA; LRDA=lateralized rhythmic delta activity; TIRDA=temporal intermittent rhythmic delta activity;  LPD=PLED=lateralized periodic discharges; GPD=generalized periodic discharges; BIPDs =bilateral independent periodic discharges; Mf=multifocal; SIRPDs=stimulus induced rhythmic, periodic, or ictal appearing discharges; BIRDs=brief potentially ictal rhythmic discharges >4 Hz, lasting .5-10s; PFA (paroxysmal bursts >13 Hz or =8 Hz <10s).  Modifiers: +F=with fast component; +S=with spike component; +R=with rhythmic component.  S-B=burst suppression pattern.  Max=maximal. N1-drowsy; N2-stage II sleep; N3-slow wave sleep. SSS/BETS=small sharp spikes/benign epileptiform transients of sleep. HV=hyperventilation; PS=photic stimulation]]]    Daily EEG Visual Analysis    FINDINGS:      Background:  Burst suppressed background characterized 1-3 sec periods of attenuation intermixed with bursts of theta/delta activity. No PDR or AP gradient.     Background Slowing:  Generalized slowing: Continuous diffuse delta and theta intermittently  Focal slowing: none was present.    State Changes:   -N2 sleep transients were not recorded.    Rhythmic and Periodic Patterns (RPPs):  Abundant central (max Cz/Fz) LPDs 1-4hz in frequency, at times occurring in runs of up to 1-4 seconds duration, (BIRDs)    Electrographic and Electroclinical seizures:  None    Other Clinical Events:  None    Activation Procedures:   -Hyperventilation was not performed.    -Photic stimulation was not performed.    Artifacts:  Intermittent myogenic and movement artifacts were noted.    ECG:  The heart rate on single channel ECG was predominantly between 54-66 BPM.    EEG Classification / Summary:  Abnormal  EEG in the awake / drowsy / asleep state(s).  Abundant central (max Cz/Fz) LPDs 1-4hz in frequency, at times occurring in runs of up to 1-4 seconds duration, (BIRDs)  Severe background slowing, discontinuity, incomplete burst suppression    Clinical Impression:  Risk of focal onset seizures from the central head region.  Improved vs prior day.  No seizures.  Severe diffuse/multifocal cerebral dysfunction.      MD JR Cummings  Director, Continuous EEG Monitoring Program, [facility]   and Epilepsy Fellowship ,   Department of Neurology, [employer]    [facility] EEG Reading Room Ph#: [phone]  Epilepsy Answering Service after 5PM and before 8:30AM: Ph#: [phone]

## 2023-03-19 NOTE — PROGRESS NOTE ADULT - SUBJECTIVE AND OBJECTIVE BOX
Patient seen and examined  obtunded- no significant mental status    apple (Unknown)  Benadryl (Unknown)  penicillin (Hives)  watermelon (Unknown)    [facility] Medications:   MEDICATIONS  (STANDING):  atovaquone  Suspension 1500 milliGRAM(s) Oral daily  cefTRIAXone   IVPB 2000 milliGRAM(s) IV Intermittent every 12 hours  chlorhexidine 0.12% Liquid 15 milliLiter(s) Oral Mucosa two times a day  chlorhexidine 2% Cloths 1 Application(s) Topical <User Schedule>  ciprofloxacin  0.3% Ophthalmic Solution for Otic Use 4 Drop(s) Right Ear two times a day  CRRT Treatment    <Continuous>  dextrose 5%. 1000 milliLiter(s) (100 mL/Hr) IV Continuous <Continuous>  dextrose 5%. 1000 milliLiter(s) (50 mL/Hr) IV Continuous <Continuous>  dextrose 50% Injectable 25 Gram(s) IV Push once  dextrose 50% Injectable 12.5 Gram(s) IV Push once  dextrose 50% Injectable 25 Gram(s) IV Push once  fentaNYL   Infusion. 0.5 MICROgram(s)/kG/Hr (1.89 mL/Hr) IV Continuous <Continuous>  glucagon  Injectable 1 milliGRAM(s) IntraMuscular once  insulin lispro (ADMELOG) corrective regimen sliding scale   SubCutaneous every 6 hours  levETIRAcetam  IVPB 750 milliGRAM(s) IV Intermittent every 12 hours  levothyroxine Injectable 18 MICROGram(s) IV Push at bedtime  methylPREDNISolone sodium succinate Injectable 10 milliGRAM(s) IV Push every 6 hours  norepinephrine Infusion 0.05 MICROgram(s)/kG/Min (2.91 mL/Hr) IV Continuous <Continuous>  petrolatum Ophthalmic Ointment 1 Application(s) Both EYES every 12 hours  polyethylene glycol 3350 17 Gram(s) Oral two times a day  PrismaSATE Dialysate BGK 4 / 2.5 5000 milliLiter(s) (1000 mL/Hr) CRRT <Continuous>  PrismaSOL Filtration BGK 4 / 2.5 5000 milliLiter(s) (1000 mL/Hr) CRRT <Continuous>  PrismaSOL Filtration BGK 4 / 2.5 5000 milliLiter(s) (200 mL/Hr) CRRT <Continuous>  propofol Infusion 10 MICROgram(s)/kG/Min (2.27 mL/Hr) IV Continuous <Continuous>  senna 2 Tablet(s) Oral two times a day      VITALS:  T(F): 98.8 (23 @ 08:00), Max: 98.8 (23 @ 08:00)  HR: 56 (23 @ 11:17)  BP: --  RR: 16 (23 @ 11:00)  SpO2: 98% (23 @ 11:17)  Wt(kg): --     @ 07:01  -   @ 07:00  --------------------------------------------------------  IN: 1428.1 mL / OUT: 2884 mL / NET: -1455.9 mL     @ 07:01  -   @ 11:51  --------------------------------------------------------  IN: 210.9 mL / OUT: 206 mL / NET: 4.9 mL      Physical Exam :-  Constitutional: Intubated/sedated  Respiratory: Bilateral equal breath sounds,  Cardiovascular: S1, S2 normal,  Gastrointestinal: Bowel Sounds present, soft, non tender.  Extremities: No edema  Neurological: blunted gag reflex  Psychiatric: Non-responsive  left fem [facility]    LABS:      133<L>  |  99  |  34<H>  ----------------------------<  185<H>  5.0   |  23  |  1.61<H>    Ca    8.4      19 Mar 2023 06:36  Phos  3.7       Mg     2.50         TPro  5.0<L>  /  Alb  2.2<L>  /  TBili  0.4  /  DBili      /  AST  <5<L>  /  ALT  <5<L>  /  AlkPhos  77      Creatinine Trend: 1.61 <--, 1.83 <--, 2.10 <--, 2.42 <--, 2.90 <--, 3.86 <--, 3.83 <--, 3.62 <--, 3.31 <--, 3.10 <--, 3.03 <--, 2.98 <--                        8.9    10.06 )-----------( 103      ( 19 Mar 2023 06:36 )             26.5     Urine Studies:  Urinalysis Basic - ( 15 Mar 2023 03:43 )    Color: Yellow / Appearance: Clear / S.019 / pH:   Gluc:  / Ketone: Negative  / Bili: Negative / Urobili: <2 mg/dL   Blood:  / Protein: 300 mg/dL / Nitrite: Negative   Leuk Esterase: Negative / RBC: 8 /HPF / WBC 2 /HPF   Sq Epi:  / Non Sq Epi: 1 /HPF / Bacteria: Negative        RADIOLOGY & ADDITIONAL STUDIES:

## 2023-03-19 NOTE — CHART NOTE - NSCHARTNOTEFT_GEN_A_CORE
EEG preliminary read (not final) on the initial recording to 4PM    No seizures recorded.  Central LPDs and BIRDs to 2-4sec noted.  Severe more continuous slowing noted, nonspecific.    Consider holding off on further sedation taper until tomorrow AM.  Final report to follow tomorrow after completion of study.    [facility] EEG Reading Room Ph#: [phone]  Epilepsy Answering Service after 5PM and before 8:30AM: Ph#: [phone]

## 2023-03-19 NOTE — PROGRESS NOTE ADULT - SUBJECTIVE AND OBJECTIVE BOX
Still on propofol.  Per nurse - not responsive at all. No clinical seizures.  No abnormal movements.     VITALS & EXAMINATION:  Vital Signs Last 24 Hrs  T(C): 37.1 (19 Mar 2023 08:00), Max: 37.1 (19 Mar 2023 08:00)  T(F): 98.8 (19 Mar 2023 08:00), Max: 98.8 (19 Mar 2023 08:00)  HR: 52 (19 Mar 2023 11:00) (46 - 68)  BP: -- 155/59  RR: 16 (19 Mar 2023 11:00) (0 - 114)  SpO2: 95% (19 Mar 2023 11:00) (90% - 100%)    Parameters below as of 19 Mar 2023 10:44      O2 Concentration (%): 100      General:  Coma.     Neurological:  MS: No response to pain - no purposeful eye opening or movement in limbs.  No attempts at verbalizations.  No follow commands.      CNs: Pupils - R slightly irregular and minimally reactive, L round - sluggishly reactive 2-->1.5mm, B.  No blink to threat.  Corneal reflex is decreased, symmetric.  OCR - no movement.       Motor/Sensory: Flaccid and no response to pain in all 4 ext.    No involuntary movements.     EEG  Study Date: 		03-18-23 08:00 to 03-19-23 08:00  Duration x Hours: 24EEG Classification / Summary:  Abnormal  EEG in the awake / drowsy / asleep state(s).  Abundant central (max Cz/Fz) LPDs 1-4hz in frequency, at times occurring in runs of up to 1-4 seconds duration, (BIRDs)  Severe background slowing, discontinuity, incomplete burst suppression    Clinical Impression:  Risk of focal onset seizures from the central head region.  Improved vs prior day.  No seizures.

## 2023-03-20 NOTE — PROGRESS NOTE ADULT - ASSESSMENT
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  Cardiac arrest on 3/16/23  in septic shock on iv pressors  intubated on MV  Renal following for NAMAN on CKD Mx.    NAMAN on CKD4  Creatinine Trend: 1.16 <--, 1.38 <--, 1.31 <--, 1.45 <--, 1.49 <--, 1.61 <--, 1.83 <--, 2.10 <--, 2.42 <--, 2.90 <--, 3.86 <--, 3.83 <--, 3.62 <--, 3.31 <--, 3.10 <--, 3.03 <--, 2.98 <--  w/worsened renal function, oliguria, acidosis, mild hyperkalemia- started on CVVHD  Consent for HD obtained, signed by daughter 3/16/23  KTx 2003  B/L creat around 2.8 since Dec 2022  Patient receives Cyclosporine (Gengraf) 75mg PO q12hrs,  BID and Prednisone 5 QD for transplant    plan:  In light of severe sepsis and cardiac arrest: holding MMF and Cyclosporine  c/w methylPREDNISolone sodium succinate Injectable 10 milliGRAM(s) IV Push every 6 hours  patient CVVH circuit clotted today AM -> currently no AC in circuit  remains oligoanuric- renewed CVVHDF orders--->0 to 50 cc uf per hour--d/w micu  keep Ponce  monitor U/O  monitor BMP, electrolytes q6h once CVVH starts  dose all meds for eGFR<15ml/min.   avoid ACEi/ARB/NSAIDs/Nephrotoxics if able.    Metabolic acidosis -improving  lactate +  manage underlying condition (sepsis)    OM and OE  Abxs per Infectious disease specialist with dose adjustment per GFR  f/u cxs  vent Mx, pressors per ICU    prognosis guarded  [facility] Nephrologist Dr. Hugo Hernandez [phone]  will closely follow up.   poc d/w ICU team  labs, rad, chart reviewed  For any question, pl call:  Nephrology  Cell -[phone]  Office [phone]  Ans Serv [phone]

## 2023-03-20 NOTE — PROGRESS NOTE ADULT - SUBJECTIVE AND OBJECTIVE BOX
SUBJECTIVE: Patient seen and examined with Neurology attending at bedside this AM. No acute events overnight. Per nurse, has not been responsive. No clinical seizures noted.       INTERVAL HISTORY: EEG reported to show one focal right frontal electrographic seizure lasting 16 seconds w/o clinical correlate at 07:11 this AM.      ROS: Unable to obtain due to mental status.       MEDICATIONS:  MEDICATIONS  (STANDING):  atovaquone  Suspension 1500 milliGRAM(s) Oral daily  cefTRIAXone   IVPB 2000 milliGRAM(s) IV Intermittent every 12 hours  chlorhexidine 0.12% Liquid 15 milliLiter(s) Oral Mucosa two times a day  chlorhexidine 2% Cloths 1 Application(s) Topical <User Schedule>  ciprofloxacin  0.3% Ophthalmic Solution for Otic Use 4 Drop(s) Right Ear two times a day  CRRT Treatment    <Continuous>  dextrose 5%. 1000 milliLiter(s) (100 mL/Hr) IV Continuous <Continuous>  dextrose 5%. 1000 milliLiter(s) (50 mL/Hr) IV Continuous <Continuous>  dextrose 50% Injectable 25 Gram(s) IV Push once  dextrose 50% Injectable 12.5 Gram(s) IV Push once  dextrose 50% Injectable 25 Gram(s) IV Push once  fentaNYL   Infusion. 0.5 MICROgram(s)/kG/Hr (1.89 mL/Hr) IV Continuous <Continuous>  glucagon  Injectable 1 milliGRAM(s) IntraMuscular once  heparin   Injectable 5000 Unit(s) SubCutaneous every 8 hours  insulin lispro (ADMELOG) corrective regimen sliding scale   SubCutaneous every 6 hours  lacosamide IVPB 100 milliGRAM(s) IV Intermittent every 12 hours  levETIRAcetam  IVPB 750 milliGRAM(s) IV Intermittent every 12 hours  levothyroxine Injectable 18 MICROGram(s) IV Push at bedtime  methylPREDNISolone sodium succinate Injectable 10 milliGRAM(s) IV Push every 6 hours  naloxegol 12.5 milliGRAM(s) Oral daily  norepinephrine Infusion 0.05 MICROgram(s)/kG/Min (2.91 mL/Hr) IV Continuous <Continuous>  petrolatum Ophthalmic Ointment 1 Application(s) Both EYES every 12 hours  polyethylene glycol 3350 17 Gram(s) Oral two times a day  PrismaSATE Dialysate BGK 4 / 2.5 5000 milliLiter(s) (1000 mL/Hr) CRRT <Continuous>  PrismaSOL Filtration BGK 4 / 2.5 5000 milliLiter(s) (1000 mL/Hr) CRRT <Continuous>  PrismaSOL Filtration BGK 4 / 2.5 5000 milliLiter(s) (200 mL/Hr) CRRT <Continuous>  propofol Infusion 10 MICROgram(s)/kG/Min (2.27 mL/Hr) IV Continuous <Continuous>  senna 2 Tablet(s) Oral two times a day  sodium phosphate 15 milliMole(s)/250 mL IVPB 15 milliMole(s) IV Intermittent once    MEDICATIONS  (PRN):  acetaminophen   Oral Liquid .. 650 milliGRAM(s) Enteral Tube every 6 hours PRN Temp greater or equal to 38C (100.4F), Mild Pain (1 - 3), Moderate Pain (4 - 6)  dextrose Oral Gel 15 Gram(s) Oral once PRN Blood Glucose LESS THAN 70 milliGRAM(s)/deciliter      VITALS & EXAMINATION:  Vital Signs Last 24 Hrs  T(C): 37.3 (20 Mar 2023 08:00), Max: 37.3 (20 Mar 2023 08:00)  T(F): 99.1 (20 Mar 2023 08:00), Max: 99.1 (20 Mar 2023 08:00)  HR: 60 (20 Mar 2023 08:00) (48 - 65)  BP: 99/56 (20 Mar 2023 08:00) (99/56 - 136/65)  BP(mean): 67 (20 Mar 2023 08:00) (67 - 83)  RR: 14 (20 Mar 2023 08:00) (13 - 114)  SpO2: 100% (20 Mar 2023 08:00) (90% - 100%)    Parameters below as of 20 Mar 2023 08:00  Patient On (Oxygen Delivery Method): ventilator,ac 14/350/40/10  O2 Concentration (%): 40      Neurological (as per attending eval.):  - Mental Status: Eyes initially closed. No response to verbal or noxious stimuli.  - Language: Intubated and sedated on fentanyl 1.5 mcg/kg.  - Cranial Nerves: Pupils equal b/l and reactive, oculocephalic reflex intact, corneal reflex intact b/l, possible grimace to gag, rest of CN exam is limited by mental status.   - Motor: No withdrawal to noxious stimulation.  - Sensory: No grimace to noxious stimulation.    - Reflexes: 2+ biceps & brachioradialis b/l.   - Coordination/Gait: Patient unable to participate due to mental status.      LABS:  CBC                       7.5    9.41  )-----------( 80       ( 20 Mar 2023 06:12 )             24.3     Chem 03-20    137  |  102  |  27<H>  ----------------------------<  165<H>  4.9   |  24  |  1.38<H>    Ca    8.8      20 Mar 2023 06:12  Phos  2.9     03-20  Mg     2.50     03-20    TPro  4.8<L>  /  Alb  2.6<L>  /  TBili  0.5  /  DBili  x   /  AST  46<H>  /  ALT  49<H>  /  AlkPhos  82  03-20    LFTs LIVER FUNCTIONS - ( 20 Mar 2023 06:12 )  Alb: 2.6 g/dL / Pro: 4.8 g/dL / ALK PHOS: 82 U/L / ALT: 49 U/L / AST: 46 U/L / GGT: x           Coagulopathy PT/INR - ( 20 Mar 2023 02:10 )   PT: 11.6 sec;   INR: 1.00 ratio         PTT - ( 20 Mar 2023 02:10 )  PTT:22.5 sec  Lipid Panel 03-15 Chol 153 LDL -- HDL 45<L> Trig 146  A1c   Cardiac enzymes     U/A   CSF CSF:    Total Nucleated Cell Count, CSF: 73033 cells/uL (03-15-23 @ 15:00)  RBC Count - Spinal Fluid: 1267 cells/uL (03-15-23 @ 15:00)  Protein, CSF: 1181 mg/dL (03-15-23 @ 15:00)          STUDIES & IMAGING:    Studies (EKG, EEG, EMG, etc):     EEGs    Study Date: 03/19/23 08:00 - 03/20/23 08:00; Duration: 23 hr 58 min  EEG Classification / Summary:  Abnormal EEG in a comatose patient due to:  -One focal right frontal electrographic seizure lasting 16 seconds with no clinical correlate, occurring on 03/20/23 at 07:11  -Frequent midline epileptiform discharges most often occurring as brief potentially ictal rhythmic discharges (BIRDs), occasionally in longer runs at 1-2 Hz, fluctuating without evolution, more prevalent with stimulation (stimulus-induced rhythmic, periodic, or ictal-appearing discharges, SIRPIDs). Longer runs become continuous in the morning of 03/20/23.  -Rare right frontal sharp waves  -20% suppressed background improving to continuous delta > theta slowing    Clinical Impression:  -One focal right frontal electrographic seizure and rare right frontal sharp waves indicate risk of further focal-onset seizures from this region.  -Frequent midline epileptiform discharges, BIRDs, and runs of discharges indicate risk of focal-onset seizures from this region, with risk of seizures increasing in the morning of 03/20/23.  -Severe diffuse cerebral dysfunction is nonspecific in etiology. Improvement in severity over the course of monitoring may be related to decrease in sedating medications.  -Single-lead EKG incidentally shows irregular rhythm at times.      Radiology (XR, CT, MR, U/S, TTE/SORAYA):    CT brain stroke protocol 3/14/23   No hydrocephalus, acute intracranial hemorrhage, mass effect, or brain edema. Right mastoid air cell and tympanic cavity effusion, correlate for the presence of acute otomastoiditis.    CT PERFUSION:  Limited by late injection of the contrast bolus.  Cerebral blood flow less than 30% = 0 mL. No core infarct is predicted.  Tmax greater than 6 seconds = 0 mL. No brain parenchyma predicted to be at continued ischemic risk in the presence of neurologic symptoms.    CTA BRAIN:  No flow-limiting stenosis or vascular aneurysm. No AVM.    CTA NECK:  Calcified plaque at the origin of the left internal carotid artery results in approximately 40% short segment stenosis. Otherwise no flow-limiting stenosis. No evidence for arterial dissection.

## 2023-03-20 NOTE — PROGRESS NOTE ADULT - ATTENDING COMMENTS
Ms Negron is a 78 year-old unknown handed woman with a PMHx of PCKD s/p renal transplant, aneurysm,  vascular risk factors (including DM, HLD, HTN) hypothyroidism, glaucoma, cataracts, DVT in leg, on asa who initially came to [facility] ER due to the severe headache, right ear pain, neck pain, markedly decreased responsiveness to external stimuli. 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 Subsequently required mechanical ventilation for air way protection on fentanyl and off the propofol. EEG showed electrographically seizure and last seizure was captured this morning 7:11 am. Therefore, we restart on sedation Versed and adjust antiepileptic medications.     Plan:  Neuroimaging are pending.  Continue video EEG  Continue Keppra 750 mg as per renal dose.   Increase the lacosamide 200mg Q12H and will give additional dose of 200 mg.   Continue medical management, neuro- check and fall precaution.  GI and DVT prophylaxis.  Patient is at high risk for complications and morbidity or mortality. There is high probability of imminent or life threatening deterioration in the patient's condition.  Patient is unable or incompetent to participate in giving a history and/or making decisions and discussion is necessary for determining treatment decisions.  I discussed the diagnosis, treatment plan and prognosis with the patient's family at bedside. Risk benefit and alternatives to the treatment were discussed. All questions and concerns were addressed. The patient's family demonstrated good understanding of the treatment plan.  My cumulative time taking care of this critically ill patient is 40 minutes  If you have any further questions, please do not hesitate to contact our team.  Thank you for allowing us to participate in this patient care.    Vazquez Goodrich MD

## 2023-03-20 NOTE — PROGRESS NOTE ADULT - ASSESSMENT
77 y/o F with h/o HTN, HLD, DM2, PCKD previously on HD via LUE AVF then s/p renal transplant, LLE DVT (resolved, nml dopplers 12/2022) on ASA, very small supraclinoid aneurysm (reportedly unchanged on MRA 10/2020), hypothyroidism, PCP 2021 on atovaquone ppx, reportedly hospitalized in 12/2022 for rectal prolapse, had a blood transfusion at that time, also + CMV 1/25/23 who presented with R-sided HA, R ear pain and neck stiffness after visit to ENT earlier in the day, altered on presentation, CT head unremarkable for stroke, however LP with IR and BCx showing +strep pneumoniae. currently on vanc and CTX per ID. Cardiac arrest 3/16 with ROSC, transferred to MICU for further management.       Plan:   Neurological  #Currently Intubated, sedated   - on propofol and fentanyl  - wean propofol while monitoring on EEG  - f/u neuro specific enolase sent on 3/19    #Strep pneumo meningitis #Acute encephalopathy  severe HA, neck stiffness, fever, leukocytosis concerning for meningitis/encephalitis  CT head negative for CVA.   LP and BCx 3/15 both showing gram positive cocci in pairs, and + strep pneumoniae.   likely source of entry from R ear where pt c/o discomfort for several months.  patient is on cyclosporine, mycophenolate mofetil and prednisone due to kidney transplant and is immunocompromised   dental procedure 3/14 but less likely source   ID consulted, recs for continuing meropenem. s/p vancomycin 3/15.   - f/u BCx and LP sensitivities  - f/u MRI of the IAC w/ contrast at a later time when renal function improves    #Seizures  - 24hr EEG initially with highly focal seizures, improved with increased sedation.   - keppra loaded 500mg IVP (3/17) and started on 250mg IV q12h (3/17 - 3/19) increased to 750 mg IV q12h (CRRT dosing (3/19 - [ ])  - decrease propofol while monitoring on EEG    Cardiovascular  #Cardiac Arrest  bradycardia to 30, pulseless, code blue was called 3/16   2 rounds of epi, and PEA arrest. on 3rd pulse check pulseless VT, 200J shock given  4th pulse check asystole, 5th ROSC, sinus tachy with HUMA. IO placed and levo started. Due to blood in mouth took several attempts with DL for ETT to be placed but ultimately confirmed with capnometry and bilateral breath sounds.       #Elevated troponin  uptrending troponin to 182 3/15 AM, was likely iso ESRD  s/p cardiac arrest, elevated ST segment   - trend troponin  - TTE from 3/17 showed EF of 63% with mild pulm htn, normal left ventricular systolic function, and diastolic LV dysfunction.      #HTN  on amlodipine 5mg, losartan 100mg, torsemide 20mg, and clonidine 0.2 patch weekly  - hold for now given sepsis      Respiratory/ENT  #Intubated  continue sedation    # Tympanic membrane rupture  discharge from R ear, evaluated by ENT given dexamethasone and cipro 3/14  - c/w broad spectrum abx   - MRI of IAC with contrast when pt is stable  - f/u ENT recs      Gastrointestinal  #Constipation  - f/u Abdominal xray for bowel obstruction  - if negative, increase trickle feeds and bowel regimen    Renal  #ESRD #s/p Kidney Transplant in 2003 at [facility] #PCKD  previously HD through LLE AVF but no HD since transplant  Cr 3.8, unclear baseline  - avoid nephrotoxic agent  - holding cyclosporine and mycophenolate   - changed prednisone to medrol 10mg IVP q6h  - appreciate nephro recs  - strict I/O and replete electrolytes  - 4mg IV bumex with minimal urine production (3/17)  - started on CRRT (3/17) net negative 75cc/hr goal       Hematological  # h/o LLE DVT  resolved, no DVT on doppler 12/22.  on ASA at home, has been receiving heparin subQ  - hold ASA and SQH for now given bleeding in oropharynx.   - SCDs  - can restart heparin if no bleeding    #Anemia  - s/p 1 unit pRBC on 3/16 for Hb drop from 10 to 7.9 and responded appropriately  - Evaluated by ENT for bleeding and found no signs of active bleeding from nasal cavity, nasopharynx or oral cavity, however, patient biting on tongue, which could be one source of bleeding. Tongue was edematous and lax      Infectious Disease  #Strep pneumoniae meningitis #R ear mastoiditis  treatment with IV CTX   - c/w ciprodex drops for R ear  - f/u ID recs   - f/u repeat blood cx from 3/17    #h/o PCP  - atovaquone at home, continue      Endocrinological  #Hypothyroidism  last TSH 12/2022 >10  TSH 3/15 4.73  - c/w levothyroxine     #Diabetes mellitus Type II  - ISS      Ethics  FULL, GOC ongoing. 79 y/o F with h/o HTN, HLD, DM2, PCKD previously on HD via LUE AVF then s/p renal transplant, LLE DVT (resolved, nml dopplers 12/2022) on ASA, very small supraclinoid aneurysm (reportedly unchanged on MRA 10/2020), hypothyroidism, PCP 2021 on atovaquone ppx, reportedly hospitalized in 12/2022 for rectal prolapse, had a blood transfusion at that time, also + CMV 1/25/23 who presented with R-sided HA, R ear pain and neck stiffness after visit to ENT earlier in the day, altered on presentation, CT head unremarkable for stroke, however LP with IR and BCx showing +strep pneumoniae. currently on vanc and CTX per ID. Cardiac arrest 3/16 with ROSC, transferred to MICU for further management.       Plan:   Neurological  #Currently Intubated, sedated   - on propofol and fentanyl  - wean propofol while monitoring on EEG  - f/u neuro specific enolase sent on 3/19    #Strep pneumo meningitis #Acute encephalopathy  severe HA, neck stiffness, fever, leukocytosis concerning for meningitis/encephalitis  CT head negative for CVA.   LP and BCx 3/15 both showing gram positive cocci in pairs, and + strep pneumoniae.   likely source of entry from R ear where pt c/o discomfort for several months.  patient is on cyclosporine, mycophenolate mofetil and prednisone due to kidney transplant and is immunocompromised   dental procedure 3/14 but less likely source   ID consulted, recs for continuing meropenem. s/p vancomycin 3/15.   - f/u BCx and LP sensitivities  - f/u MRI of the IAC w/ contrast at a later time when renal function improves    #Seizures  - 24hr EEG initially with highly focal seizures, improved with increased sedation.   - keppra loaded 500mg IVP (3/17) and started on 250mg IV q12h (3/17 - 3/19) increased to 750 mg IV q12h (CRRT dosing (3/19 - [ ])  - decrease propofol while monitoring on EEG    Cardiovascular  #Cardiac Arrest  bradycardia to 30, pulseless, code blue was called 3/16   2 rounds of epi, and PEA arrest. on 3rd pulse check pulseless VT, 200J shock given  4th pulse check asystole, 5th ROSC, sinus tachy with HUMA. IO placed and levo started. Due to blood in mouth took several attempts with DL for ETT to be placed but ultimately confirmed with capnometry and bilateral breath sounds.       #Elevated troponin  uptrending troponin to 182 3/15 AM, was likely iso ESRD  s/p cardiac arrest, elevated ST segment   - trend troponin  - TTE from 3/17 showed EF of 63% with mild pulm htn, normal left ventricular systolic function, and diastolic LV dysfunction.      #HTN  on amlodipine 5mg, losartan 100mg, torsemide 20mg, and clonidine 0.2 patch weekly  - hold for now given sepsis      Respiratory/ENT  #Intubated  continue sedation    # Tympanic membrane rupture  discharge from R ear, evaluated by ENT given dexamethasone and cipro 3/14  - c/w broad spectrum abx   - MRI of IAC with contrast when pt is stable  - f/u ENT recs      Gastrointestinal  #Constipation  - f/u Abdominal xray for bowel obstruction  - if negative, increase trickle feeds and bowel regimen    Renal  #ESRD #s/p Kidney Transplant in 2003 at [place] #PCKD  previously HD through LLE AVF but no HD since transplant  Cr 3.8, unclear baseline  - avoid nephrotoxic agent  - holding cyclosporine and mycophenolate   - changed prednisone to medrol 10mg IVP q6h  - appreciate nephro recs  - strict I/O and replete electrolytes  - 4mg IV bumex with minimal urine production (3/17)  - started on CRRT (3/17) net negative 100cc/hr goal       Hematological  # h/o LLE DVT  resolved, no DVT on doppler 12/22.  on ASA at home, has been receiving heparin subQ  - hold ASA and SQH for now given bleeding in oropharynx.   - SCDs  - can restart heparin if no bleeding    #Anemia  - s/p 1 unit pRBC on 3/16 for Hb drop from 10 to 7.9 and responded appropriately  - Evaluated by ENT for bleeding and found no signs of active bleeding from nasal cavity, nasopharynx or oral cavity, however, patient biting on tongue, which could be one source of bleeding. Tongue was edematous and lax      Infectious Disease  #Strep pneumoniae meningitis #R ear mastoiditis  treatment with IV CTX   - c/w ciprodex drops for R ear  - f/u ID recs   - f/u repeat blood cx from 3/17    #h/o PCP  - atovaquone at home, continue      Endocrinological  #Hypothyroidism  last TSH 12/2022 >10  TSH 3/15 4.73  - c/w levothyroxine     #Diabetes mellitus Type II  - ISS      Ethics  FULL, GOC ongoing. 77 y/o F with h/o HTN, HLD, DM2, PCKD previously on HD via LUE AVF then s/p renal transplant, LLE DVT (resolved, nml dopplers 12/2022) on ASA, very small supraclinoid aneurysm (reportedly unchanged on MRA 10/2020), hypothyroidism, PCP 2021 on atovaquone ppx, reportedly hospitalized in 12/2022 for rectal prolapse, had a blood transfusion at that time, also + CMV 1/25/23 who presented with R-sided HA, R ear pain and neck stiffness after visit to ENT earlier in the day, altered on presentation, CT head unremarkable for stroke, however LP with IR and BCx showing +strep pneumoniae. currently on vanc and CTX per ID. Cardiac arrest 3/16 with ROSC, transferred to MICU for further management.       Plan:   Neurological  #Currently Intubated, sedated   - on propofol and fentanyl  - wean propofol while monitoring on EEG  - f/u neuro specific enolase sent on 3/19    #Strep pneumo meningitis #Acute encephalopathy  severe HA, neck stiffness, fever, leukocytosis concerning for meningitis/encephalitis  CT head negative for CVA.   LP and BCx 3/15 both showing gram positive cocci in pairs, and + strep pneumoniae.   likely source of entry from R ear where pt c/o discomfort for several months.  patient is on cyclosporine, mycophenolate mofetil and prednisone due to kidney transplant and is immunocompromised   dental procedure 3/14 but less likely source   ID consulted, recs for continuing meropenem. s/p vancomycin 3/15.   - f/u BCx and LP sensitivities  - f/u MRI of the IAC w/ contrast at a later time when renal function improves    #Seizures  - 24hr EEG initially with highly focal seizures, improved with increased sedation.   - keppra loaded 500mg IVP (3/17) and started on 250mg IV q12h (3/17 - 3/19) increased to 750 mg IV q12h (CRRT dosing (3/19 - [ ])  - decrease propofol while monitoring on EEG    Cardiovascular  #Cardiac Arrest  bradycardia to 30, pulseless, code blue was called 3/16   2 rounds of epi, and PEA arrest. on 3rd pulse check pulseless VT, 200J shock given  4th pulse check asystole, 5th ROSC, sinus tachy with HUMA. IO placed and levo started. Due to blood in mouth took several attempts with DL for ETT to be placed but ultimately confirmed with capnometry and bilateral breath sounds.       #Elevated troponin  uptrending troponin to 182 3/15 AM, was likely iso ESRD  s/p cardiac arrest, elevated ST segment   - trend troponin  - TTE from 3/17 showed EF of 63% with mild pulm htn, normal left ventricular systolic function, and diastolic LV dysfunction.      #HTN  on amlodipine 5mg, losartan 100mg, torsemide 20mg, and clonidine 0.2 patch weekly  - hold for now given sepsis      Respiratory/ENT  #Intubated  continue sedation    # Tympanic membrane rupture  discharge from R ear, evaluated by ENT given dexamethasone and cipro 3/14  - c/w broad spectrum abx   - MRI of IAC with contrast when pt is stable  - f/u ENT recs    Gastrointestinal  #Constipation  - f/u Abdominal xray for bowel obstruction  - if negative, increase trickle feeds and bowel regimen    Renal  #ESRD #s/p Kidney Transplant in 2003 at [place] #PCKD  previously HD through LLE AVF but no HD since transplant  Cr 3.8, unclear baseline  - avoid nephrotoxic agent  - holding cyclosporine and mycophenolate   - changed prednisone to medrol 10mg IVP q6h  - appreciate nephro recs  - strict I/O and replete electrolytes  - 4mg IV bumex with minimal urine production (3/17)  - started on CRRT (3/17) net negative 100cc/hr goal       Hematological  # h/o LLE DVT  resolved, no DVT on doppler 12/22.  on ASA at home, has been receiving heparin subQ  - hold ASA and SQH for now given bleeding in oropharynx.   - SCDs  - can restart heparin if no bleeding    #Anemia  - s/p 1 unit pRBC on 3/16 for Hb drop from 10 to 7.9 and responded appropriately  - Evaluated by ENT for bleeding and found no signs of active bleeding from nasal cavity, nasopharynx or oral cavity, however, patient biting on tongue, which could be one source of bleeding. Tongue was edematous and lax      Infectious Disease  #Strep pneumoniae meningitis #R ear mastoiditis  treatment with IV CTX   - c/w ciprodex drops for R ear  - f/u ID recs   - f/u repeat blood cx from 3/17    #h/o PCP  - atovaquone at home, continue      Endocrinological  #Hypothyroidism  last TSH 12/2022 >10  TSH 3/15 4.73  - c/w levothyroxine     #Diabetes mellitus Type II  - ISS      Ethics  FULL, GOC ongoing. 79 y/o F with h/o HTN, HLD, DM2, PCKD previously on HD via LUE AVF then s/p renal transplant, LLE DVT (resolved, nml dopplers 12/2022) on ASA, very small supraclinoid aneurysm (reportedly unchanged on MRA 10/2020), hypothyroidism, PCP 2021 on atovaquone ppx, reportedly hospitalized in 12/2022 for rectal prolapse, had a blood transfusion at that time, also + CMV 1/25/23 who presented with R-sided HA, R ear pain and neck stiffness after visit to ENT earlier in the day, altered on presentation, CT head unremarkable for stroke, however LP with IR and BCx showing +strep pneumoniae. currently on vanc and CTX per ID. Cardiac arrest 3/16 with ROSC, transferred to MICU for further management.       Plan:   Neurological  #Currently Intubated, sedated   - on fentanyl, continue to wean  - reach out w/ neuro and see if she is having any seizures off propofol  - f/u neuron specific enolase sent on 3/19    #Strep pneumo meningitis #Acute encephalopathy  severe HA, neck stiffness, fever, leukocytosis concerning for meningitis/encephalitis  CT head negative for CVA.   LP and BCx 3/15 both showing gram positive cocci in pairs, and + strep pneumoniae.   likely source of entry from R ear where pt c/o discomfort for several months.  patient is on cyclosporine, mycophenolate mofetil and prednisone due to kidney transplant and is immunocompromised   dental procedure 3/14 but less likely source   ID consulted, recs for continuing meropenem. s/p vancomycin 3/15.   - f/u BCx and LP sensitivities  - f/u MRI of the IAC w/ contrast at a later time when renal function improves    #Seizures  - 24hr EEG initially with highly focal seizures, improved with increased sedation.   - keppra loaded 500mg IVP (3/17) and started on 250mg IV q12h (3/17 - 3/19) increased to 750 mg IV q12h (CRRT dosing (3/19 - [ ])  - vimpat   - decrease propofol while monitoring on EEG    Cardiovascular  #Cardiac Arrest  bradycardia to 30, pulseless, code blue was called 3/16   2 rounds of epi, and PEA arrest. on 3rd pulse check pulseless VT, 200J shock given  4th pulse check asystole, 5th ROSC, sinus tachy with HUMA. IO placed and levo started. Due to blood in mouth took several attempts with DL for ETT to be placed but ultimately confirmed with capnometry and bilateral breath sounds.       #Elevated troponin  uptrending troponin to 182 3/15 AM, was likely iso ESRD  s/p cardiac arrest, elevated ST segment   - TTE from 3/17 showed EF of 63% with mild pulm htn, normal left ventricular systolic function, and diastolic LV dysfunction.      #HTN  on amlodipine 5mg, losartan 100mg, torsemide 20mg, and clonidine 0.2 patch weekly  - hold for now given sepsis      Respiratory/ENT  #Intubated  continue sedation    # Tympanic membrane rupture  discharge from R ear, evaluated by ENT given dexamethasone and cipro 3/14  - c/w broad spectrum abx   - MRI of IAC with contrast when pt is stable  - f/u ENT recs    Gastrointestinal  #Constipation  - f/u Abdominal xray for bowel obstruction  - trial on movantic  - c/w bowel regimen  - if negative, increase trickle feeds as tolerated and bowel regimen    Renal  #ESRD #s/p Kidney Transplant in 2003 at [place] #PCKD  previously HD through LLE AVF but no HD since transplant  Cr 3.8, unclear baseline  - avoid nephrotoxic agent  - holding cyclosporine and mycophenolate   - changed prednisone to medrol 10mg IVP q6h  - appreciate nephro recs  - strict I/O and replete electrolytes  - 4mg IV bumex with minimal urine production (3/17)  - started on CRRT (3/17) net negative 100cc/hr goal       Hematological  # h/o LLE DVT  resolved, no DVT on doppler 12/22.  on ASA at home, has been receiving heparin subQ  - hold ASA and SQH for now given bleeding in oropharynx.   - SCDs  - can restart heparin subQ  - negative LLE duplex this admission 3/17    #Anemia  - s/p 1 unit pRBC on 3/16 for Hb drop from 10 to 7.9 and responded appropriately  - Evaluated by ENT for bleeding and found no signs of active bleeding from nasal cavity, nasopharynx or oral cavity, however, patient biting on tongue, which could be one source of bleeding. Tongue was edematous and lax      Infectious Disease  #Strep pneumoniae meningitis #R ear mastoiditis  treatment with IV CTX   - c/w ciprodex drops for R ear  - f/u ID recs   - f/u repeat blood cx from 3/17  - c/w CTX  - come down to home dose prednisone 3/21    #h/o PCP  - atovaquone at home, continue      Endocrinological  #Hypothyroidism  last TSH 12/2022 >10  TSH 3/15 4.73  - c/w levothyroxine     #Diabetes mellitus Type II  - ISS      Ethics  FULL, GOC ongoing. 79 y/o F with h/o HTN, HLD, DM2, PCKD previously on HD via LUE AVF then s/p renal transplant, LLE DVT (resolved, nml dopplers 12/2022) on ASA, very small supraclinoid aneurysm (reportedly unchanged on MRA 10/2020), hypothyroidism, PCP 2021 on atovaquone ppx, reportedly hospitalized in 12/2022 for rectal prolapse, had a blood transfusion at that time, also + CMV 1/25/23 who presented with R-sided HA, R ear pain and neck stiffness after visit to ENT earlier in the day, altered on presentation, CT head unremarkable for stroke, however LP with IR and BCx showing +strep pneumoniae. currently on vanc and CTX per ID. Cardiac arrest 3/16 with ROSC, transferred to MICU for further management.       Plan:   Neurological  #Currently Intubated, sedated   - on fentanyl, continue to wean  - reach out w/ neuro and see if she is having any seizures off propofol  - f/u neuron specific enolase sent on 3/19    #Strep pneumo meningitis #Acute encephalopathy  severe HA, neck stiffness, fever, leukocytosis concerning for meningitis/encephalitis  CT head negative for CVA.   LP and BCx 3/15 both showing gram positive cocci in pairs, and + strep pneumoniae.   likely source of entry from R ear where pt c/o discomfort for several months.  patient is on cyclosporine, mycophenolate mofetil and prednisone due to kidney transplant and is immunocompromised   dental procedure 3/14 but less likely source   ID consulted, recs for continuing meropenem. s/p vancomycin 3/15.   - f/u BCx and LP sensitivities  - f/u MRI of the IAC w/ contrast at a later time when renal function improves    #Seizures  - 24hr EEG initially with highly focal seizures, improved with increased sedation.   - keppra loaded 500mg IVP (3/17) and started on 250mg IV q12h (3/17 - 3/19) increased to 750 mg IV q12h (CRRT dosing (3/19 - [ ])  - f/u keppra serum level  - vimpat 100 BID  - decrease propofol while monitoring on EEG    Cardiovascular  #Cardiac Arrest  bradycardia to 30, pulseless, code blue was called 3/16   2 rounds of epi, and PEA arrest. on 3rd pulse check pulseless VT, 200J shock given  4th pulse check asystole, 5th ROSC, sinus tachy with HUMA. IO placed and levo started. Due to blood in mouth took several attempts with DL for ETT to be placed but ultimately confirmed with capnometry and bilateral breath sounds.       #Elevated troponin  uptrending troponin to 182 3/15 AM, was likely iso ESRD  s/p cardiac arrest, elevated ST segment   - TTE from 3/17 showed EF of 63% with mild pulm htn, normal left ventricular systolic function, and diastolic LV dysfunction.      #HTN  on amlodipine 5mg, losartan 100mg, torsemide 20mg, and clonidine 0.2 patch weekly  - hold for now given sepsis      Respiratory/ENT  #Intubated  continue sedation    # Tympanic membrane rupture  discharge from R ear, evaluated by ENT given dexamethasone and cipro 3/14  - c/w broad spectrum abx   - MRI of IAC with contrast when pt is stable  - f/u ENT recs    Gastrointestinal  #Constipation  - f/u Abdominal xray for bowel obstruction  - trial on movantic  - c/w bowel regimen  - if negative, increase trickle feeds as tolerated and bowel regimen    Renal  #ESRD #s/p Kidney Transplant in 2003 at [place] #PCKD  previously HD through LLE AVF but no HD since transplant  Cr 3.8, unclear baseline  - avoid nephrotoxic agent  - holding cyclosporine and mycophenolate   - changed prednisone to medrol 10mg IVP q6h  - appreciate nephro recs  - strict I/O and replete electrolytes  - 4mg IV bumex with minimal urine production (3/17)  - started on CRRT (3/17) net negative 100cc/hr goal       Hematological  # h/o LLE DVT  resolved, no DVT on doppler 12/22.  on ASA at home, has been receiving heparin subQ  - hold ASA and SQH for now given bleeding in oropharynx.   - SCDs  - can restart heparin subQ  - negative LLE duplex this admission 3/17    #Anemia  - s/p 1 unit pRBC on 3/16 for Hb drop from 10 to 7.9 and responded appropriately  - Evaluated by ENT for bleeding and found no signs of active bleeding from nasal cavity, nasopharynx or oral cavity, however, patient biting on tongue, which could be one source of bleeding. Tongue was edematous and lax      Infectious Disease  #Strep pneumoniae meningitis #R ear mastoiditis  treatment with IV CTX   - c/w ciprodex drops for R ear  - f/u ID recs   - f/u repeat blood cx from 3/17  - c/w CTX  - come down to home dose prednisone 3/21    #h/o PCP  - atovaquone at home, continue      Endocrinological  #Hypothyroidism  last TSH 12/2022 >10  TSH 3/15 4.73  - c/w levothyroxine     #Diabetes mellitus Type II  - ISS      Ethics  FULL, GOC ongoing.

## 2023-03-20 NOTE — PROGRESS NOTE ADULT - SUBJECTIVE AND OBJECTIVE BOX
[facility] - S Alea / Tita S /D Rama/ S Jovan/ S Keenan/ Cleve Gambino / DAGMAR Wilsonu/ O Daya  service -[phone], office [phone]  ---------------------------------------------------------------------------------------------------------------    Patient seen and examined bedside    Subjective and Objective: No overnight events, sob resolved. No complaints today. feeling better    Allergies: apple (Unknown)  Benadryl (Unknown)  penicillin (Hives)  watermelon (Unknown)      Hospital Medications:   MEDICATIONS  (STANDING):  atovaquone  Suspension 1500 milliGRAM(s) Oral daily  cefTRIAXone   IVPB 2000 milliGRAM(s) IV Intermittent every 12 hours  chlorhexidine 0.12% Liquid 15 milliLiter(s) Oral Mucosa two times a day  chlorhexidine 2% Cloths 1 Application(s) Topical <User Schedule>  ciprofloxacin  0.3% Ophthalmic Solution for Otic Use 4 Drop(s) Right Ear two times a day  CRRT Treatment    <Continuous>  dextrose 5%. 1000 milliLiter(s) (100 mL/Hr) IV Continuous <Continuous>  dextrose 5%. 1000 milliLiter(s) (50 mL/Hr) IV Continuous <Continuous>  dextrose 50% Injectable 25 Gram(s) IV Push once  dextrose 50% Injectable 12.5 Gram(s) IV Push once  dextrose 50% Injectable 25 Gram(s) IV Push once  fentaNYL   Infusion. 0.5 MICROgram(s)/kG/Hr (1.89 mL/Hr) IV Continuous <Continuous>  glucagon  Injectable 1 milliGRAM(s) IntraMuscular once  heparin   Injectable 5000 Unit(s) SubCutaneous every 8 hours  insulin lispro (ADMELOG) corrective regimen sliding scale   SubCutaneous every 6 hours  lacosamide IVPB 200 milliGRAM(s) IV Intermittent every 12 hours  levETIRAcetam  IVPB 1000 milliGRAM(s) IV Intermittent every 12 hours  levothyroxine Injectable 18 MICROGram(s) IV Push at bedtime  methylPREDNISolone sodium succinate Injectable 10 milliGRAM(s) IV Push every 6 hours  midazolam Infusion 0.05 mG/kG/Hr (3.11 mL/Hr) IV Continuous <Continuous>  naloxegol 12.5 milliGRAM(s) Oral daily  norepinephrine Infusion 0.05 MICROgram(s)/kG/Min (2.91 mL/Hr) IV Continuous <Continuous>  petrolatum Ophthalmic Ointment 1 Application(s) Both EYES every 12 hours  polyethylene glycol 3350 17 Gram(s) Oral two times a day  PrismaSATE Dialysate BGK 4 / 2.5 5000 milliLiter(s) (1000 mL/Hr) CRRT <Continuous>  PrismaSOL Filtration BGK 4 / 2.5 5000 milliLiter(s) (1000 mL/Hr) CRRT <Continuous>  PrismaSOL Filtration BGK 4 / 2.5 5000 milliLiter(s) (200 mL/Hr) CRRT <Continuous>  propofol Infusion 10 MICROgram(s)/kG/Min (2.27 mL/Hr) IV Continuous <Continuous>  senna 2 Tablet(s) Oral two times a day      REVIEW OF SYSTEMS:  CONSTITUTIONAL: No weakness, fevers or chills  EYES/ENT: No visual changes;  No vertigo or throat pain   NECK: No pain or stiffness  RESPIRATORY: No cough, wheezing, hemoptysis; No shortness of breath  CARDIOVASCULAR: No chest pain or palpitations.  GASTROINTESTINAL: No abdominal or epigastric pain. No nausea, vomiting, or hematemesis; No diarrhea or constipation. No melena or hematochezia.  GENITOURINARY: No dysuria, frequency, foamy urine, urinary urgency, incontinence or hematuria  NEUROLOGICAL: No numbness or weakness  SKIN: No itching, burning, rashes, or lesions   VASCULAR: No bilateral lower extremity edema.   All other review of systems is negative unless indicated above.    VITALS:  T(F): 97.6 (23 @ 12:00), Max: 99.1 (23 @ 08:00)  HR: 78 (23 @ 15:00)  BP: 69/14 (23 @ 15:00)  RR: 14 (23 @ 15:00)  SpO2: 100% (03-20-23 @ 15:00)  Wt(kg): --     @ 07:01  -   @ 07:00  --------------------------------------------------------  IN: 1401.5 mL / OUT: 2962 mL / NET: -1560.5 mL     @ 07:01   @ 15:22  --------------------------------------------------------  IN: 743.3 mL / OUT: 1184 mL / NET: -440.7 mL          PHYSICAL EXAM:  Constitutional: NAD  HEENT: anicteric sclera, oropharynx clear  Neck: No JVD  Respiratory: CTAB, no wheezes, rales or rhonchi  Cardiovascular: S1, S2, RRR  Gastrointestinal: BS+, soft, NT/ND  Extremities: No cyanosis or clubbing. No peripheral edema  Neurological: A/O x 3, no focal deficits  Psychiatric: Normal mood, normal affect  : No CVA tenderness. No snider.   Skin: No rashes  Vascular Access:    LABS:      136  |  100  |  24<H>  ----------------------------<  193<H>  5.5<H>   |  20<L>  |  1.16    Ca    8.7      20 Mar 2023 13:00  Phos  3.3       Mg     2.50         TPro  5.0<L>  /  Alb  2.6<L>  /  TBili  0.6  /  DBili      /  AST  64<H>  /  ALT  54<H>  /  AlkPhos  90      Creatinine Trend: 1.16 <--, 1.38 <--, 1.31 <--, 1.45 <--, 1.49 <--, 1.61 <--, 1.83 <--, 2.10 <--, 2.42 <--, 2.90 <--, 3.86 <--, 3.83 <--, 3.62 <--, 3.31 <--, 3.10 <--, 3.03 <--, 2.98 <--                        7.4    12.82 )-----------( 71       ( 20 Mar 2023 13:00 )             24.0     Urine Studies:  Urinalysis Basic - ( 15 Mar 2023 03:43 )    Color: Yellow / Appearance: Clear / S.019 / pH:   Gluc:  / Ketone: Negative  / Bili: Negative / Urobili: <2 mg/dL   Blood:  / Protein: 300 mg/dL / Nitrite: Negative   Leuk Esterase: Negative / RBC: 8 /HPF / WBC 2 /HPF   Sq Epi:  / Non Sq Epi: 1 /HPF / Bacteria: Negative          RADIOLOGY & ADDITIONAL STUDIES:   [facility] - S Alea / Tita S /D Rama/ S Jovan/ S Keenan/ Cleve Gambino / DAGMAR Wilsonu/ O Daya  service -[phone], office [phone]  ---------------------------------------------------------------------------------------------------------------    Patient seen and examined bedside in MICU    Subjective and Objective: No overnight events, remains intubated, sedated, on MV, iv pressors, hypotensive  cousin bedside  continued to require CVVH    Allergies: apple (Unknown)  Benadryl (Unknown)  penicillin (Hives)  watermelon (Unknown)      [facility] Medications:   MEDICATIONS  (STANDING):  atovaquone  Suspension 1500 milliGRAM(s) Oral daily  cefTRIAXone   IVPB 2000 milliGRAM(s) IV Intermittent every 12 hours  chlorhexidine 0.12% Liquid 15 milliLiter(s) Oral Mucosa two times a day  chlorhexidine 2% Cloths 1 Application(s) Topical <User Schedule>  ciprofloxacin  0.3% Ophthalmic Solution for Otic Use 4 Drop(s) Right Ear two times a day  CRRT Treatment    <Continuous>  dextrose 5%. 1000 milliLiter(s) (100 mL/Hr) IV Continuous <Continuous>  dextrose 5%. 1000 milliLiter(s) (50 mL/Hr) IV Continuous <Continuous>  dextrose 50% Injectable 25 Gram(s) IV Push once  dextrose 50% Injectable 12.5 Gram(s) IV Push once  dextrose 50% Injectable 25 Gram(s) IV Push once  fentaNYL   Infusion. 0.5 MICROgram(s)/kG/Hr (1.89 mL/Hr) IV Continuous <Continuous>  glucagon  Injectable 1 milliGRAM(s) IntraMuscular once  heparin   Injectable 5000 Unit(s) SubCutaneous every 8 hours  insulin lispro (ADMELOG) corrective regimen sliding scale   SubCutaneous every 6 hours  lacosamide IVPB 200 milliGRAM(s) IV Intermittent every 12 hours  levETIRAcetam  IVPB 1000 milliGRAM(s) IV Intermittent every 12 hours  levothyroxine Injectable 18 MICROGram(s) IV Push at bedtime  methylPREDNISolone sodium succinate Injectable 10 milliGRAM(s) IV Push every 6 hours  midazolam Infusion 0.05 mG/kG/Hr (3.11 mL/Hr) IV Continuous <Continuous>  naloxegol 12.5 milliGRAM(s) Oral daily  norepinephrine Infusion 0.05 MICROgram(s)/kG/Min (2.91 mL/Hr) IV Continuous <Continuous>  petrolatum Ophthalmic Ointment 1 Application(s) Both EYES every 12 hours  polyethylene glycol 3350 17 Gram(s) Oral two times a day  PrismaSATE Dialysate BGK 4 / 2.5 5000 milliLiter(s) (1000 mL/Hr) CRRT <Continuous>  PrismaSOL Filtration BGK 4 / 2.5 5000 milliLiter(s) (1000 mL/Hr) CRRT <Continuous>  PrismaSOL Filtration BGK 4 / 2.5 5000 milliLiter(s) (200 mL/Hr) CRRT <Continuous>  propofol Infusion 10 MICROgram(s)/kG/Min (2.27 mL/Hr) IV Continuous <Continuous>  senna 2 Tablet(s) Oral two times a day    VITALS:  T(F): 97.6 (23 @ 12:00), Max: 99.1 (23 @ 08:00)  HR: 78 (23 @ 15:00)  BP: 69/14 (23 @ 15:00)  RR: 14 (23 @ 15:00)  SpO2: 100% (23 @ 15:00)  Wt(kg): --     @ 07:01  -   @ 07:00  --------------------------------------------------------  IN: 1401.5 mL / OUT: 2962 mL / NET: -1560.5 mL     @ 07:01  -   @ 15:22  --------------------------------------------------------  IN: 743.3 mL / OUT: 1184 mL / NET: -440.7 mL      PHYSICAL EXAM:  Constitutional: NAD  HEENT: ETT+, OGT+   Neck: No JVD  Respiratory: CTAB, no wheezes, rales or rhonchi  Cardiovascular: S1, S2, RRR  Gastrointestinal: BS+  Extremities: No peripheral edema  Neurological: sedated  - +snider  Vascular Access: left femoral shiley+     LABS:      136  |  100  |  24<H>  ----------------------------<  193<H>  5.5<H>   |  20<L>  |  1.16    Ca    8.7      20 Mar 2023 13:00  Phos  3.3     20  Mg     2.50         TPro  5.0<L>  /  Alb  2.6<L>  /  TBili  0.6  /  DBili      /  AST  64<H>  /  ALT  54<H>  /  AlkPhos  90      Creatinine Trend: 1.16 <--, 1.38 <--, 1.31 <--, 1.45 <--, 1.49 <--, 1.61 <--, 1.83 <--, 2.10 <--, 2.42 <--, 2.90 <--, 3.86 <--, 3.83 <--, 3.62 <--, 3.31 <--, 3.10 <--, 3.03 <--, 2.98 <--                        7.4    12.82 )-----------( 71       ( 20 Mar 2023 13:00 )             24.0     Urine Studies:  Urinalysis Basic - ( 15 Mar 2023 03:43 )    Color: Yellow / Appearance: Clear / S.019 / pH:   Gluc:  / Ketone: Negative  / Bili: Negative / Urobili: <2 mg/dL   Blood:  / Protein: 300 mg/dL / Nitrite: Negative   Leuk Esterase: Negative / RBC: 8 /HPF / WBC 2 /HPF   Sq Epi:  / Non Sq Epi: 1 /HPF / Bacteria: Negative          RADIOLOGY & ADDITIONAL STUDIES:

## 2023-03-20 NOTE — PROGRESS NOTE ADULT - SUBJECTIVE AND OBJECTIVE BOX
INTERVAL HPI/OVERNIGHT EVENTS:    SUBJECTIVE: Patient seen and examined at bedside. Intubated, sedated, ROS cannot be obtained.       VITAL SIGNS:  ICU Vital Signs Last 24 Hrs  T(C): 36.2 (20 Mar 2023 04:00), Max: 37.1 (19 Mar 2023 08:00)  T(F): 97.1 (20 Mar 2023 04:00), Max: 98.8 (19 Mar 2023 08:00)  HR: 55 (20 Mar 2023 06:54) (48 - 66)  BP: 125/63 (20 Mar 2023 06:00) (101/58 - 125/63)  BP(mean): 78 (20 Mar 2023 06:00) (68 - 79)  ABP: 136/56 (20 Mar 2023 06:00) (96/41 - 171/59)  ABP(mean): 81 (20 Mar 2023 06:00) (58 - 96)  RR: 14 (20 Mar 2023 06:00) (13 - 114)  SpO2: 100% (20 Mar 2023 06:54) (90% - 100%)    O2 Parameters below as of 20 Mar 2023 06:00  Patient On (Oxygen Delivery Method): ventilator    O2 Concentration (%): 40      Mode: AC/ CMV (Assist Control/ Continuous Mandatory Ventilation), RR (machine): 14, TV (machine): 350, FiO2: 40, PEEP: 5, ITime: 0.6, MAP: 13, PIP: 24  Plateau pressure:   P/F ratio:     03-19 @ 07:01  -  03-20 @ 07:00  --------------------------------------------------------  IN: 1320.9 mL / OUT: 2343 mL / NET: -1022.1 mL      CAPILLARY BLOOD GLUCOSE      POCT Blood Glucose.: 161 mg/dL (20 Mar 2023 05:04)    ECG:    PHYSICAL EXAM:    General:   HEENT:   Neck:   Respiratory:   Cardiovascular:   Abdomen:   Extremities:  Neurological:    MEDICATIONS:  MEDICATIONS  (STANDING):  atovaquone  Suspension 1500 milliGRAM(s) Oral daily  cefTRIAXone   IVPB 2000 milliGRAM(s) IV Intermittent every 12 hours  chlorhexidine 0.12% Liquid 15 milliLiter(s) Oral Mucosa two times a day  chlorhexidine 2% Cloths 1 Application(s) Topical <User Schedule>  ciprofloxacin  0.3% Ophthalmic Solution for Otic Use 4 Drop(s) Right Ear two times a day  CRRT Treatment    <Continuous>  dextrose 5%. 1000 milliLiter(s) (100 mL/Hr) IV Continuous <Continuous>  dextrose 5%. 1000 milliLiter(s) (50 mL/Hr) IV Continuous <Continuous>  dextrose 50% Injectable 25 Gram(s) IV Push once  dextrose 50% Injectable 12.5 Gram(s) IV Push once  dextrose 50% Injectable 25 Gram(s) IV Push once  fentaNYL   Infusion. 0.5 MICROgram(s)/kG/Hr (1.89 mL/Hr) IV Continuous <Continuous>  glucagon  Injectable 1 milliGRAM(s) IntraMuscular once  insulin lispro (ADMELOG) corrective regimen sliding scale   SubCutaneous every 6 hours  lacosamide IVPB 100 milliGRAM(s) IV Intermittent every 12 hours  levETIRAcetam  IVPB 750 milliGRAM(s) IV Intermittent every 12 hours  levothyroxine Injectable 18 MICROGram(s) IV Push at bedtime  methylPREDNISolone sodium succinate Injectable 10 milliGRAM(s) IV Push every 6 hours  norepinephrine Infusion 0.05 MICROgram(s)/kG/Min (2.91 mL/Hr) IV Continuous <Continuous>  petrolatum Ophthalmic Ointment 1 Application(s) Both EYES every 12 hours  polyethylene glycol 3350 17 Gram(s) Oral two times a day  PrismaSATE Dialysate BGK 4 / 2.5 5000 milliLiter(s) (1000 mL/Hr) CRRT <Continuous>  PrismaSOL Filtration BGK 4 / 2.5 5000 milliLiter(s) (1000 mL/Hr) CRRT <Continuous>  PrismaSOL Filtration BGK 4 / 2.5 5000 milliLiter(s) (200 mL/Hr) CRRT <Continuous>  propofol Infusion 10 MICROgram(s)/kG/Min (2.27 mL/Hr) IV Continuous <Continuous>  senna 2 Tablet(s) Oral two times a day    MEDICATIONS  (PRN):  acetaminophen   Oral Liquid .. 650 milliGRAM(s) Enteral Tube every 6 hours PRN Temp greater or equal to 38C (100.4F), Mild Pain (1 - 3), Moderate Pain (4 - 6)  dextrose Oral Gel 15 Gram(s) Oral once PRN Blood Glucose LESS THAN 70 milliGRAM(s)/deciliter      ALLERGIES:  Allergies    apple (Unknown)  Benadryl (Unknown)  penicillin (Hives)  watermelon (Unknown)    Intolerances        LABS:                        7.7    9.13  )-----------( 88       ( 20 Mar 2023 02:10 )             24.9     03-20    134<L>  |  101  |  28<H>  ----------------------------<  164<H>  5.0   |  23  |  1.31<H>    Ca    8.7      20 Mar 2023 02:10  Phos  3.0     03-20  Mg     2.50     03-20    TPro  5.0<L>  /  Alb  2.4<L>  /  TBili  0.4  /  DBili  x   /  AST  51<H>  /  ALT  55<H>  /  AlkPhos  80  03-20    PT/INR - ( 20 Mar 2023 02:10 )   PT: 11.6 sec;   INR: 1.00 ratio         PTT - ( 20 Mar 2023 02:10 )  PTT:22.5 sec      RADIOLOGY & ADDITIONAL TESTS: Reviewed.   INTERVAL HPI/OVERNIGHT EVENTS:    SUBJECTIVE: Patient seen and examined at bedside. Intubated, sedated, ROS cannot be obtained.       VITAL SIGNS:  ICU Vital Signs Last 24 Hrs  T(C): 36.2 (20 Mar 2023 04:00), Max: 37.1 (19 Mar 2023 08:00)  T(F): 97.1 (20 Mar 2023 04:00), Max: 98.8 (19 Mar 2023 08:00)  HR: 55 (20 Mar 2023 06:54) (48 - 66)  BP: 125/63 (20 Mar 2023 06:00) (101/58 - 125/63)  BP(mean): 78 (20 Mar 2023 06:00) (68 - 79)  ABP: 136/56 (20 Mar 2023 06:00) (96/41 - 171/59)  ABP(mean): 81 (20 Mar 2023 06:00) (58 - 96)  RR: 14 (20 Mar 2023 06:00) (13 - 114)  SpO2: 100% (20 Mar 2023 06:54) (90% - 100%)    O2 Parameters below as of 20 Mar 2023 06:00  Patient On (Oxygen Delivery Method): ventilator    O2 Concentration (%): 40      Mode: AC/ CMV (Assist Control/ Continuous Mandatory Ventilation), RR (machine): 14, TV (machine): 350, FiO2: 40, PEEP: 5, ITime: 0.6, MAP: 13, PIP: 24  Plateau pressure:   P/F ratio:     03-19 @ 07:01  -  03-20 @ 07:00  --------------------------------------------------------  IN: 1320.9 mL / OUT: 2343 mL / NET: -1022.1 mL      CAPILLARY BLOOD GLUCOSE      POCT Blood Glucose.: 161 mg/dL (20 Mar 2023 05:04)    ECG:    PHYSICAL EXAM:  GENERAL: no distress, intubated  PSYCH: not alert to pain   HEENT: Atraumatic, Normocephalic, pupils reactive  NECK: Supple, No JVD  CHEST/LUNG: clear to auscultation bilaterally  HEART: regular rate and rhythm, no murmurs  ABDOMEN: soft midline, palpable mass and firmness present  EXTREMITIES: trace edema on bilateral LE  NEUROLOGY: intubated and sedated  SKIN: No rashes or lesions    MEDICATIONS:  MEDICATIONS  (STANDING):  atovaquone  Suspension 1500 milliGRAM(s) Oral daily  cefTRIAXone   IVPB 2000 milliGRAM(s) IV Intermittent every 12 hours  chlorhexidine 0.12% Liquid 15 milliLiter(s) Oral Mucosa two times a day  chlorhexidine 2% Cloths 1 Application(s) Topical <User Schedule>  ciprofloxacin  0.3% Ophthalmic Solution for Otic Use 4 Drop(s) Right Ear two times a day  CRRT Treatment    <Continuous>  dextrose 5%. 1000 milliLiter(s) (100 mL/Hr) IV Continuous <Continuous>  dextrose 5%. 1000 milliLiter(s) (50 mL/Hr) IV Continuous <Continuous>  dextrose 50% Injectable 25 Gram(s) IV Push once  dextrose 50% Injectable 12.5 Gram(s) IV Push once  dextrose 50% Injectable 25 Gram(s) IV Push once  fentaNYL   Infusion. 0.5 MICROgram(s)/kG/Hr (1.89 mL/Hr) IV Continuous <Continuous>  glucagon  Injectable 1 milliGRAM(s) IntraMuscular once  insulin lispro (ADMELOG) corrective regimen sliding scale   SubCutaneous every 6 hours  lacosamide IVPB 100 milliGRAM(s) IV Intermittent every 12 hours  levETIRAcetam  IVPB 750 milliGRAM(s) IV Intermittent every 12 hours  levothyroxine Injectable 18 MICROGram(s) IV Push at bedtime  methylPREDNISolone sodium succinate Injectable 10 milliGRAM(s) IV Push every 6 hours  norepinephrine Infusion 0.05 MICROgram(s)/kG/Min (2.91 mL/Hr) IV Continuous <Continuous>  petrolatum Ophthalmic Ointment 1 Application(s) Both EYES every 12 hours  polyethylene glycol 3350 17 Gram(s) Oral two times a day  PrismaSATE Dialysate BGK 4 / 2.5 5000 milliLiter(s) (1000 mL/Hr) CRRT <Continuous>  PrismaSOL Filtration BGK 4 / 2.5 5000 milliLiter(s) (1000 mL/Hr) CRRT <Continuous>  PrismaSOL Filtration BGK 4 / 2.5 5000 milliLiter(s) (200 mL/Hr) CRRT <Continuous>  propofol Infusion 10 MICROgram(s)/kG/Min (2.27 mL/Hr) IV Continuous <Continuous>  senna 2 Tablet(s) Oral two times a day    MEDICATIONS  (PRN):  acetaminophen   Oral Liquid .. 650 milliGRAM(s) Enteral Tube every 6 hours PRN Temp greater or equal to 38C (100.4F), Mild Pain (1 - 3), Moderate Pain (4 - 6)  dextrose Oral Gel 15 Gram(s) Oral once PRN Blood Glucose LESS THAN 70 milliGRAM(s)/deciliter      ALLERGIES:  Allergies    apple (Unknown)  Benadryl (Unknown)  penicillin (Hives)  watermelon (Unknown)    Intolerances        LABS:                        7.7    9.13  )-----------( 88       ( 20 Mar 2023 02:10 )             24.9     03-20    134<L>  |  101  |  28<H>  ----------------------------<  164<H>  5.0   |  23  |  1.31<H>    Ca    8.7      20 Mar 2023 02:10  Phos  3.0     03-20  Mg     2.50     03-20    TPro  5.0<L>  /  Alb  2.4<L>  /  TBili  0.4  /  DBili  x   /  AST  51<H>  /  ALT  55<H>  /  AlkPhos  80  03-20    PT/INR - ( 20 Mar 2023 02:10 )   PT: 11.6 sec;   INR: 1.00 ratio         PTT - ( 20 Mar 2023 02:10 )  PTT:22.5 sec      RADIOLOGY & ADDITIONAL TESTS: Reviewed.

## 2023-03-20 NOTE — PROGRESS NOTE ADULT - ATTENDING COMMENTS
Patient is a 79 yo F w/ HTN, HLD, T2DM, PCKD previously on HD via LUE AVF then s/p renal transplant, LLE DVT (resolved) hypothyroidism, prior PCP PNA, CMV viremia who initially presented with R-sided HA, R ear pain and neck stiffness found to have Strep Pneumo bacteremia and meningitis in setting of otitis externa. Course c/b cardiac arrest 3/16 and post arrest seizures and NAMAN    #Bradycardia - Asaf overnight, propofol weaned off  - Will check EKG    #Septic Shock  #Strep bactermia  - c/w Ceftriaxone  - f/u repeat blood cultures, NGTD  - TTE negative for vegetations, may need SORAYA if doesn't clear blood cultures  - fu ID  - c/w vasopressors to maintain MAP > 65    #NAMAN  #s/p Renal Transplant  #PCKD  - c/w CRRT, will c/w fluid removal 100 cc/hr as tolerated    #Encephalopathy post cardiac arrest  #Seizures  - c/w EEG, improved with propofol infusion now weaned off overnight for bradycardia but with one seizure  - Will load with Vimpat and increase as per Neuro. Will start Versed gtt for burst suppression  - c/w Keppra, increase as per Neuro  - f/u neuro recs    #Dysphagia  - Trickle feeds, increase as tolerated    #Respiratory failure   - c/w mechanical ventilatory support, wean as tolerated    #DVT ppx  - Resume     Nico Cantor MD  Pulmonary & Critical Care

## 2023-03-20 NOTE — CHART NOTE - NSCHARTNOTEFT_GEN_A_CORE
EEG preliminary read (not final) on the initial recording hour(s) = 9 hr 19 min  Reviewed from: 08:03-17:22    In the morning, there are abundant frontal midline periodic discharges, at times in runs concerning for possible focal seizures.  Around 13:15-13:30, discharges decrease in prevalence, with no further runs or possible seizures. Discharges later become occasional to frequent sporadic discharges.   Background slowing increases in the afternoon as well.    Final report to follow tomorrow morning after completion of study.    [facility] EEG Reading Room Ph#: [phone]  Epilepsy Answering Service after 5PM and before 8:30AM: Ph#: [phone] EEG preliminary read (not final) on the initial recording hour(s) = 9 hr 19 min  Reviewed from: 08:03-17:22    In the morning, there are abundant frontal midline periodic discharges, at times in runs concerning for possible focal seizures.  Around 13:15-13:30, discharges decrease in prevalence, with no further runs or possible seizures. Discharges later further decrease in prevalence to occasional to frequent sporadic discharges.   Background slowing increases in the afternoon as well.    Discussed with primary team.    Final report to follow tomorrow morning after completion of study.    [facility] EEG Reading Room Ph#: [phone]  Epilepsy Answering Service after 5PM and before 8:30AM: Ph#: [phone]

## 2023-03-20 NOTE — CHART NOTE - NSCHARTNOTEFT_GEN_A_CORE
: Richmond Simental    INDICATION:    PROCEDURE:  [x ] LIMITED ECHO  [ ] LIMITED CHEST  [ x] LIMITED RETROPERITONEAL  [x ] LIMITED ABDOMINAL  [ ] LIMITED DVT  [ ] NEEDLE GUIDANCE VASCULAR  [ ] NEEDLE GUIDANCE THORACENTESIS  [ ] NEEDLE GUIDANCE PARACENTESIS  [ ] NEEDLE GUIDANCE PERICARDIOCENTESIS  [ ] OTHER    FINDINGS/INTERPRETATION:  Echo: LVH, LVOT VTI 16. NO valvular abnormality IVC indeterminate    RP: Cystic kidney disease, peristalsis noted.     images uploaded to [employer].

## 2023-03-20 NOTE — EEG REPORT - NS EEG TEXT BOX
YARA SEGUNDO [id number]     Study Date: 03/19/23 08:00 - 03/20/23 08:00  Duration: 23 hr 58 min  --------------------------------------------------------------------------------------------------  History:  CC/ HPI Patient is a 78y old  Female who presents with a chief complaint of AMS/R ear infxn (20 Mar 2023 07:01)    MEDICATIONS  (STANDING):  atovaquone  Suspension 1500 milliGRAM(s) Oral daily  cefTRIAXone   IVPB 2000 milliGRAM(s) IV Intermittent every 12 hours  chlorhexidine 0.12% Liquid 15 milliLiter(s) Oral Mucosa two times a day  chlorhexidine 2% Cloths 1 Application(s) Topical <User Schedule>  ciprofloxacin  0.3% Ophthalmic Solution for Otic Use 4 Drop(s) Right Ear two times a day  CRRT Treatment    <Continuous>  dextrose 5%. 1000 milliLiter(s) (100 mL/Hr) IV Continuous <Continuous>  dextrose 5%. 1000 milliLiter(s) (50 mL/Hr) IV Continuous <Continuous>  dextrose 50% Injectable 25 Gram(s) IV Push once  dextrose 50% Injectable 12.5 Gram(s) IV Push once  dextrose 50% Injectable 25 Gram(s) IV Push once  fentaNYL   Infusion. 0.5 MICROgram(s)/kG/Hr (1.89 mL/Hr) IV Continuous <Continuous>  glucagon  Injectable 1 milliGRAM(s) IntraMuscular once  insulin lispro (ADMELOG) corrective regimen sliding scale   SubCutaneous every 6 hours  lacosamide IVPB 100 milliGRAM(s) IV Intermittent every 12 hours  levETIRAcetam  IVPB 750 milliGRAM(s) IV Intermittent every 12 hours  levothyroxine Injectable 18 MICROGram(s) IV Push at bedtime  methylPREDNISolone sodium succinate Injectable 10 milliGRAM(s) IV Push every 6 hours  norepinephrine Infusion 0.05 MICROgram(s)/kG/Min (2.91 mL/Hr) IV Continuous <Continuous>  petrolatum Ophthalmic Ointment 1 Application(s) Both EYES every 12 hours  polyethylene glycol 3350 17 Gram(s) Oral two times a day  PrismaSATE Dialysate BGK 4 / 2.5 5000 milliLiter(s) (1000 mL/Hr) CRRT <Continuous>  PrismaSOL Filtration BGK 4 / 2.5 5000 milliLiter(s) (1000 mL/Hr) CRRT <Continuous>  PrismaSOL Filtration BGK 4 / 2.5 5000 milliLiter(s) (200 mL/Hr) CRRT <Continuous>  propofol Infusion 10 MICROgram(s)/kG/Min (2.27 mL/Hr) IV Continuous <Continuous>  senna 2 Tablet(s) Oral two times a day  sodium phosphate 15 milliMole(s)/250 mL IVPB 15 milliMole(s) IV Intermittent once    --------------------------------------------------------------------------------------------------  Study Interpretation:    [[[Abbreviation Key:  PDR=alpha rhythm/posterior dominant rhythm. A-P=anterior posterior.  Amplitude: ‘very low’:<20; ‘low’:20-49; ‘medium’:; ‘high’:>150uV.  Persistence for periodic/rhythmic patterns (% of epoch) ‘rare’:<1%; ‘occasional’:1-10%; ‘frequent’:10-50%; ‘abundant’:50-90%; ‘continuous’:>90%.  Persistence for sporadic discharges: ‘rare’:<1/hr; ‘occasional’:1/min-1/hr; ‘frequent’:>1/min; ‘abundant’:>1/10 sec.  RPP=rhythmic and periodic patterns; GRDA=generalized rhythmic delta activity; FIRDA=frontal intermittent GRDA; LRDA=lateralized rhythmic delta activity; TIRDA=temporal intermittent rhythmic delta activity;  LPD=PLED=lateralized periodic discharges; GPD=generalized periodic discharges; BIPDs =bilateral independent periodic discharges; Mf=multifocal; SIRPDs=stimulus induced rhythmic, periodic, or ictal appearing discharges; BIRDs=brief potentially ictal rhythmic discharges >4 Hz, lasting .5-10s; PFA (paroxysmal bursts >13 Hz or =8 Hz <10s).  Modifiers: +F=with fast component; +S=with spike component; +R=with rhythmic component.  S-B=burst suppression pattern.  Max=maximal. N1-drowsy; N2-stage II sleep; N3-slow wave sleep. SSS/BETS=small sharp spikes/benign epileptiform transients of sleep. HV=hyperventilation; PS=photic stimulation]]]    Daily EEG Visual Analysis    FINDINGS:      Background:  The background is symmetric and initially approximately 20% suppressed, with 1-2-second periods of suppression (<10 uV) in between 1-10-second bursts of diffuse delta > theta frequencies. In the afternoon of 03/19/23, the background gradually becomes continuous and consists of diffuse delta > theta frequencies.    Sporadic Epileptiform Discharges and Rhythmic and Periodic Patterns (RPPs):  -Frequent midline (Cz maximal) spike-wave discharges/sharp waves occurring most often in bursts at 4-5.3 Hz for 1-3 seconds (brief potentially ictal rhythmic discharges, BIRDs), occasionally in longer runs at 1-2 Hz, fluctuating without evolution. These are more prevalent with stimulation (stimulus-induced rhythmic, periodic, or ictal-appearing discharges, SIRPIDs). Longer runs at 1-2 Hz become continuous in the morning of 03/20/23.  -Rare right frontal (F4) sharp waves.    Electrographic and Electroclinical seizures:  Focal right frontal electrographic seizure:  Time: 03/20/23 at 07:11  Duration: 16 seconds  Clinical: No clinical change is seen on video.  Electrographic: Right frontotemporal (F8) rhythmic theta activity at 7-8 Hz    Other Clinical Events:  None    Activation Procedures:   Hyperventilation was not performed.    Photic stimulation was not performed.    Artifacts:  Intermittent myogenic and movement artifacts are present.    EKG:  Single-lead EKG shows mostly regular rhythm, at times irregular, at 50-70 bpm.    EEG Classification / Summary:  Abnormal EEG in a comatose patient due to:  -One focal right frontal electrographic seizure lasting 16 seconds with no clinical correlate, occurring on 03/20/23 at 07:11  -Frequent midline epileptiform discharges most often occurring as brief potentially ictal rhythmic discharges (BIRDs), occasionally in longer runs at 1-2 Hz, fluctuating without evolution, more prevalent with stimulation (stimulus-induced rhythmic, periodic, or ictal-appearing discharges, SIRPIDs). Longer runs become continuous in the morning of 03/20/23.  -Rare right frontal sharp waves  -20% suppressed background improving to continuous delta > theta slowing    Clinical Impression:  -One focal right frontal electrographic seizure and rare right frontal sharp waves indicate risk of further focal-onset seizures from this region.  -Frequent midline epileptiform discharges, BIRDs, and runs of discharges indicate risk of focal-onset seizures from this region, with risk of seizures increasing in the morning of 03/20/23.  -Severe diffuse cerebral dysfunction is nonspecific in etiology. Improvement in severity over the course of monitoring may be related to decrease in sedating medications.  -Single-lead EKG incidentally shows irregular rhythm at times.      -------------------------------------------------------------------------------------------------------  [facility] EEG Reading Room Ph#: [phone]  Epilepsy Answering Service after 5PM and before 8:30AM: Ph#: [phone]    Meera Simental MD  Attending Physician, [facility]   YARA SEGUNDO [id number]     Study Date: 03/19/23 08:00 - 03/20/23 08:00  Duration: 23 hr 58 min  --------------------------------------------------------------------------------------------------  History:  CC/ HPI Patient is a 78y old  Female who presents with a chief complaint of AMS/R ear infxn (20 Mar 2023 07:01)    MEDICATIONS  (STANDING):  atovaquone  Suspension 1500 milliGRAM(s) Oral daily  cefTRIAXone   IVPB 2000 milliGRAM(s) IV Intermittent every 12 hours  chlorhexidine 0.12% Liquid 15 milliLiter(s) Oral Mucosa two times a day  chlorhexidine 2% Cloths 1 Application(s) Topical <User Schedule>  ciprofloxacin  0.3% Ophthalmic Solution for Otic Use 4 Drop(s) Right Ear two times a day  CRRT Treatment    <Continuous>  dextrose 5%. 1000 milliLiter(s) (100 mL/Hr) IV Continuous <Continuous>  dextrose 5%. 1000 milliLiter(s) (50 mL/Hr) IV Continuous <Continuous>  dextrose 50% Injectable 25 Gram(s) IV Push once  dextrose 50% Injectable 12.5 Gram(s) IV Push once  dextrose 50% Injectable 25 Gram(s) IV Push once  fentaNYL   Infusion. 0.5 MICROgram(s)/kG/Hr (1.89 mL/Hr) IV Continuous <Continuous>  glucagon  Injectable 1 milliGRAM(s) IntraMuscular once  insulin lispro (ADMELOG) corrective regimen sliding scale   SubCutaneous every 6 hours  lacosamide IVPB 100 milliGRAM(s) IV Intermittent every 12 hours  levETIRAcetam  IVPB 750 milliGRAM(s) IV Intermittent every 12 hours  levothyroxine Injectable 18 MICROGram(s) IV Push at bedtime  methylPREDNISolone sodium succinate Injectable 10 milliGRAM(s) IV Push every 6 hours  norepinephrine Infusion 0.05 MICROgram(s)/kG/Min (2.91 mL/Hr) IV Continuous <Continuous>  petrolatum Ophthalmic Ointment 1 Application(s) Both EYES every 12 hours  polyethylene glycol 3350 17 Gram(s) Oral two times a day  PrismaSATE Dialysate BGK 4 / 2.5 5000 milliLiter(s) (1000 mL/Hr) CRRT <Continuous>  PrismaSOL Filtration BGK 4 / 2.5 5000 milliLiter(s) (1000 mL/Hr) CRRT <Continuous>  PrismaSOL Filtration BGK 4 / 2.5 5000 milliLiter(s) (200 mL/Hr) CRRT <Continuous>  propofol Infusion 10 MICROgram(s)/kG/Min (2.27 mL/Hr) IV Continuous <Continuous>  senna 2 Tablet(s) Oral two times a day  sodium phosphate 15 milliMole(s)/250 mL IVPB 15 milliMole(s) IV Intermittent once    --------------------------------------------------------------------------------------------------  Study Interpretation:    [[[Abbreviation Key:  PDR=alpha rhythm/posterior dominant rhythm. A-P=anterior posterior.  Amplitude: ‘very low’:<20; ‘low’:20-49; ‘medium’:; ‘high’:>150uV.  Persistence for periodic/rhythmic patterns (% of epoch) ‘rare’:<1%; ‘occasional’:1-10%; ‘frequent’:10-50%; ‘abundant’:50-90%; ‘continuous’:>90%.  Persistence for sporadic discharges: ‘rare’:<1/hr; ‘occasional’:1/min-1/hr; ‘frequent’:>1/min; ‘abundant’:>1/10 sec.  RPP=rhythmic and periodic patterns; GRDA=generalized rhythmic delta activity; FIRDA=frontal intermittent GRDA; LRDA=lateralized rhythmic delta activity; TIRDA=temporal intermittent rhythmic delta activity;  LPD=PLED=lateralized periodic discharges; GPD=generalized periodic discharges; BIPDs =bilateral independent periodic discharges; Mf=multifocal; SIRPDs=stimulus induced rhythmic, periodic, or ictal appearing discharges; BIRDs=brief potentially ictal rhythmic discharges >4 Hz, lasting .5-10s; PFA (paroxysmal bursts >13 Hz or =8 Hz <10s).  Modifiers: +F=with fast component; +S=with spike component; +R=with rhythmic component.  S-B=burst suppression pattern.  Max=maximal. N1-drowsy; N2-stage II sleep; N3-slow wave sleep. SSS/BETS=small sharp spikes/benign epileptiform transients of sleep. HV=hyperventilation; PS=photic stimulation]]]    Daily EEG Visual Analysis    FINDINGS:      Background:  The background is symmetric and initially approximately 20% suppressed, with 1-2-second periods of suppression (<10 uV) in between 1-10-second bursts of diffuse delta > theta frequencies. In the afternoon of 03/19/23, the background gradually becomes continuous and consists of diffuse delta > theta frequencies.    Sporadic Epileptiform Discharges and Rhythmic and Periodic Patterns (RPPs):  -Frequent midline (Cz maximal) spike-wave discharges/sharp waves occurring most often in bursts at 4-5.3 Hz for 1-3 seconds (brief potentially ictal rhythmic discharges, BIRDs), occasionally in longer runs at 1-2 Hz, fluctuating without evolution. These are more prevalent with stimulation (stimulus-induced rhythmic, periodic, or ictal-appearing discharges, SIRPIDs). Longer runs at 1-2 Hz become continuous in the morning of 03/20/23.  -Rare right frontal (F4) sharp waves.    Electrographic and Electroclinical Seizures:  No clear seizures    Other Clinical Events:  None    Activation Procedures:   Hyperventilation was not performed.    Photic stimulation was not performed.    Artifacts:  Intermittent myogenic and movement artifacts are present. At 03/20/23 07:11, there is 16 seconds of right frontotemporal (F8) rhythmic theta activity at 7-8 Hz with no clinical change on video, possible artifact.    EKG:  Single-lead EKG shows mostly regular rhythm, at times irregular, at 50-70 bpm.    EEG Classification / Summary:  Abnormal EEG in a comatose patient due to:  -Frequent midline epileptiform discharges most often occurring as brief potentially ictal rhythmic discharges (BIRDs), occasionally in longer runs at 1-2 Hz, fluctuating without evolution, more prevalent with stimulation (stimulus-induced rhythmic, periodic, or ictal-appearing discharges, SIRPIDs). Longer runs become continuous in the morning of 03/20/23.  -Rare right frontal sharp waves  -Focal right frontal rhythmic theta activity - possible artifact - occurring on 03/20/23 at 07:11  -20% suppressed background improving to continuous delta > theta slowing    Clinical Impression:  -Rare right frontal sharp waves indicate risk of further focal-onset seizures from this region.  -Frequent midline epileptiform discharges, BIRDs, and runs of discharges indicate risk of focal-onset seizures from this region, with risk of seizures increasing in the morning of 03/20/23.  -Severe diffuse cerebral dysfunction is nonspecific in etiology. Improvement in severity over the course of monitoring may be related to decrease in sedating medications.  -Single-lead EKG incidentally shows irregular rhythm at times.    ADDENDUM 03/21/2023 11:45:  In retrospect, the finding on 03/20/23 at 07:11 may have been artifact rather than electrographic seizure. Report updated to reflect this.      -------------------------------------------------------------------------------------------------------  [facility] EEG Reading Room Ph#: [phone]  Epilepsy Answering Service after 5PM and before 8:30AM: Ph#: [phone]    Meera Simental MD  Attending Physician, [facility]

## 2023-03-20 NOTE — PROGRESS NOTE ADULT - SUBJECTIVE AND OBJECTIVE BOX
[facility]  [place]  Office: [phone]  Cell: [phone]  Can also be reached on [employer] Teams       INTERVAL HPI/OVERNIGHT EVENTS: started on cvvhd over weekend. EEG with seizure activity. Still with oxygen requirements but PEEP 10 and fio2 40% at time of exam. Still on vasopressor levophed      REVIEW OF SYSTEMS:  Unable to assess    Vital Signs Last 24 Hrs  T(C): 37.3 (20 Mar 2023 08:00), Max: 37.3 (20 Mar 2023 08:00)  T(F): 99.1 (20 Mar 2023 08:00), Max: 99.1 (20 Mar 2023 08:00)  HR: 60 (20 Mar 2023 08:00) (48 - 65)  BP: 99/56 (20 Mar 2023 08:00) (99/56 - 136/65)  BP(mean): 67 (20 Mar 2023 08:00) (67 - 83)  RR: 14 (20 Mar 2023 08:00) (13 - 114)  SpO2: 100% (20 Mar 2023 08:00) (90% - 100%)    Parameters below as of 20 Mar 2023 08:00  Patient On (Oxygen Delivery Method): ventilator,ac 14/350/40/10    O2 Concentration (%): 40    PHYSICAL EXAMINATION:  GENERAL: well developed, intubated,s edated, no distress  HEAD:  Atraumatic, Normocephalic  CHEST/LUNG: Clear to auscultation  HEART: Regular rate and rhythm; No murmurs, rubs, or gallops  ABDOMEN: Soft, Nontender, Nondistended; Bowel sounds present  NERVOUS SYSTEM:  Alert & Oriented X3,    EXTREMITIES: non pitting edema b/l lower extremity                          7.5    9.41  )-----------( 80       ( 20 Mar 2023 06:12 )             24.3     03-20    137  |  102  |  27<H>  ----------------------------<  165<H>  4.9   |  24  |  1.38<H>    Ca    8.8      20 Mar 2023 06:12  Phos  2.9     03-20  Mg     2.50     03-20    TPro  4.8<L>  /  Alb  2.6<L>  /  TBili  0.5  /  DBili  x   /  AST  46<H>  /  ALT  49<H>  /  AlkPhos  82  03-20    LIVER FUNCTIONS - ( 20 Mar 2023 06:12 )  Alb: 2.6 g/dL / Pro: 4.8 g/dL / ALK PHOS: 82 U/L / ALT: 49 U/L / AST: 46 U/L / GGT: x               PT/INR - ( 20 Mar 2023 02:10 )   PT: 11.6 sec;   INR: 1.00 ratio         PTT - ( 20 Mar 2023 02:10 )  PTT:22.5 sec    CAPILLARY BLOOD GLUCOSE      RADIOLOGY & ADDITIONAL TESTS:

## 2023-03-20 NOTE — PROGRESS NOTE ADULT - ASSESSMENT
Ms Negron is a 78 year-old woman with a PMHx of PCKD s/p renal transplant, brain cyst, DM, HLD, HTN, hypothyroidism, glaucoma, cataracts, DVT in leg, on asa who presents to ED for severe headache, right ear pain, neck pain, markedly decreased responsiveness to external stimuli. LP significant for decreased glucose, elevated WBC with neutrophil predominance, elevated protein, PCR (+) for strep. pneumoniae. antibiotics were started. Course complicated by PEA arrest, ROSC achieved after about 10 mins. Patient is now intubated and sedated on propofol and fentanyl.     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     Plan  [] MRI as per ENT when stable  [] Continue video EEG   [] S/p IV levetiracetam 500mg load x1 (3/17) and maintenance 250mg IV Q12 (3/17-3/19)  [] Continue levetiracetam (was increased to 750mg Q12H on 3/19-)  [] Continue lacosamide 100mg Q12H  [] Follow up neuron specific enolase (sent 3/19)  [] Follow up levetiracetam level   [x] UA (-), Bcx (+GPC in pairs - S. pneumo)  [x] SERUM: A1C 5.6, LDL 79, procal 45.63, CK 1074  [x] S/p LP 3/15: protein (>1181), gluc (<5), TNC (34,389, 88% neut), RBC (1267), AFB (-), CSF gram strain & culture (GPC in pairs), cryptococcal ag (-), EBV PCR (-), CSF PCR (+S. pneumoniae),   [x] Consults: ENT, ID, MICU, Renal/transplant    Ms Negron is a 78 year-old woman with a PMHx of PCKD s/p renal transplant, brain cyst, DM, HLD, HTN, hypothyroidism, glaucoma, cataracts, DVT in leg, on asa who presents to ED for severe headache, right ear pain, neck pain, markedly decreased responsiveness to external stimuli. LP significant for decreased glucose, elevated WBC with neutrophil predominance, elevated protein, PCR (+) for strep. pneumoniae. antibiotics were started. Course complicated by PEA arrest, ROSC achieved after about 10 mins. Patient is now intubated and sedated on propofol and fentanyl.     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     Plan  [] MRI as per ENT when stable  [] Continue video EEG   [] S/p IV levetiracetam 500mg load x1 (3/17) and maintenance 250mg IV Q12 (3/17-3/19)  [] Continue levetiracetam (was increased for CRRT to 750mg Q12H on 3/19-)  [] Continue lacosamide 100mg Q12H  [] Follow up neuron specific enolase (sent 3/19)  [] Follow up levetiracetam level   [x] UA (-), Bcx (+GPC in pairs - S. pneumo)  [x] SERUM: A1C 5.6, LDL 79, procal 45.63, CK 1074  [x] S/p LP 3/15: protein (>1181), gluc (<5), TNC (34,389, 88% neut), RBC (1267), AFB (-), CSF gram strain & culture (GPC in pairs), cryptococcal ag (-), EBV PCR (-), CSF PCR (+S. pneumoniae),   [x] Consults: ENT, ID, MICU, Renal/transplant

## 2023-03-20 NOTE — PROGRESS NOTE ADULT - SUBJECTIVE AND OBJECTIVE BOX
Patient is a 78y old  Female who presents with a chief complaint of AMS/R ear infxn (15 Mar 2023 10:36)    f/u pneumococcal sepsis, bacteremia and meningitis    Interval History/ROS:  remains critically ill, intubated on pressors. CVVHD.  ROS unable    PAST MEDICAL & SURGICAL HISTORY:  Polycystic kidney disease  Glaucoma  Aneurysm  History of deep venous thrombosis (DVT) of distal vein of left lower extremity  Thrombocytopenia  Hypothyroid  Osteoporosis  Arthritis  PCP (pneumocystis carinii pneumonia)   C. difficile colitis  Type 2 diabetes mellitus, without long-term current use of insulin  Essential hypertension  H/O kidney transplant (cadaver , [facility])  H/O:   H/O eye surgery  S/P hysterectomy  H/O umbilical hernia repair    Allergies  apple (Unknown)  Benadryl (Unknown)  penicillin (Hives), tolerate cephalosporins  watermelon (Unknown)    ANTIMICROBIALS:  meropenem  IVPB 1000 every 12 hours (3/15)  vancomycin  IVPB (3/15-3/17)  active:  cefTRIAXone   IVPB 2000 every 12 hours (3/15-)  atovaquone  Suspension 1500 daily    MEDICATIONS  (STANDING):  fentaNYL   Infusion. 0.5 <Continuous>  heparin   Injectable 5000 every 8 hours  insulin lispro (ADMELOG) corrective regimen sliding scale  every 6 hours  lacosamide IVPB 100 every 12 hours  levETIRAcetam  IVPB 750 every 12 hours  levothyroxine Injectable 18 at bedtime  methylPREDNISolone sodium succinate Injectable 10 every 6 hours  naloxegol 12.5 daily  norepinephrine Infusion 0.05 <Continuous>  polyethylene glycol 3350 17 two times a day  propofol Infusion 10 <Continuous>  senna 2 two times a day    Vital Signs Last 24 Hrs  T(F): 99.1 (23 @ 08:00), Max: 99.1 (23 @ 08:00)  HR: 69 (23 @ 10:34)  BP: 134/70 (23 @ 10:00)  RR: 12 (23 @ 10:00)  SpO2: 100% (23 @ 10:34) (90% - 100%)    PHYSICAL EXAM:  Constitutional: sedated on vent  HEAD/EYES: keeps eyes closed  ENT:  not supple  Cardiovascular:   normal S1, S2  Respiratory:  clear BS bilaterally  GI:  soft, non-tender  :  no snider  Musculoskeletal:  no synovitis  Neurologic: lethargic, sedated, unable to assess  Skin:  no rash, L laure hoffmann  Psychiatric:  not able to assess                                          7.5    9.41  )-----------( 80       ( 20 Mar 2023 06:12 )             24.3 -    137  |  102  |  27  ----------------------------<  165  4.9   |  24  |  1.38  Ca    8.8      20 Mar 2023 06:12Phos  2.9     -Mg     2.50       TPro  4.8  /  Alb  2.6  /  TBili  0.5  /  DBili  x   /  AST  46  /  ALT  49  /  AlkPhos  82      Creatinine, Serum: 1.38 (-20)  Creatinine, Serum: 1.31 (-20)  Creatinine, Serum: 1.45 (-)  Creatinine, Serum: 1.49 (-)  Creatinine, Serum: 1.61 (-)  Creatinine, Serum: 1.83 (-19)  Creatinine, Serum: 2.10 (-18)  Creatinine, Serum: 2.42 (-18)  Creatinine, Serum: 2.90 (-18)  Creatinine, Serum: 3.86 (-17)  Creatinine, Serum: 3.83 (-17)  Creatinine, Serum: 3.62 (-16)  Creatinine, Serum: 3.31 (-16)  Creatinine, Serum: 3.10 (-16)  Creatinine, Serum: 3.03 (-15)  Creatinine, Serum: 2.98 (-14)    Urinalysis Basic - ( 15 Mar 2023 03:43 )  Color: Yellow / Appearance: Clear / S.019 / pH: x  Gluc: x / Ketone: Negative  / Bili: Negative / Urobili: <2 mg/dL   Blood: x / Protein: 300 mg/dL / Nitrite: Negative   Leuk Esterase: Negative / RBC: 8 /HPF / WBC 2 /HPF   Sq Epi: x / Non Sq Epi: 1 /HPF / Bacteria: Negative    MICROBIOLOGY:  Culture - Bronchial (collected 23 @ 13:35)  Source: .Bronchial Bronchial Lavage  Gram Stain (23 @ 22:59):    No polymorphonuclear cells seen per low power field    No squamous epithelial cells per low power field    No organisms seen per oil power field    Culture - Fungal, CSF (collected 03-15-23 @ 15:00)  Source: .CSF CSF  Preliminary Report (23 @ 12:01):    Testing in progress    Culture - CSF with Gram Stain (collected 03-15-23 @ 15:00)  Source: .CSF CSF  Gram Stain (03-15-23 @ 18:44):    Few polymorphonuclear leukocytes per low power field    Moderate Gram positive cocci in pairs per oil power field  Preliminary Report (23 @ 12:54):    No growth    Culture - Acid Fast - CSF (collected 03-15-23 @ 15:00)  Source: .CSF CSF    Culture - Urine (collected 03-15-23 @ 03:43)  Source: Clean Catch Clean Catch (Midstream)  Final Report (23 @ 07:32):    No growth    Culture - Blood (collected 03-15-23 @ 03:00)  Source: .Blood Blood-Venous  Gram Stain (03-15-23 @ 18:11):    Growth in aerobic and anaerobic bottles: Gram positive cocci in pairs  Final Report (23 @ 10:03):    Growth in aerobic and anaerobic bottles: Streptococcus pneumoniae    See previous culture [id number]    Culture - Blood (collected 03-15-23 @ 02:30)  Source: .Blood Blood-Peripheral  Gram Stain (03-15-23 @ 19:26):    Growth in aerobic bottle: Gram positive cocci in pairs    Growth in anaerobic bottle: Gram positive cocci in pairs  Final Report (23 @ 11:07):    Growth in aerobic and anaerobic bottles: Streptococcus pneumoniae    ***Blood Panel PCR results on this specimen are available    approximately 3 hours after the Gram stain result.***    Gram stain, PCR, and/or culture results may not always    correspond due to difference in methodologies.    ************************************************************    This PCR assay was performed by multiplex PCR. This    Assay tests for 66 bacterial and resistance gene targets.    Please refer to the [facility] test directory    at [email or web address] for details.  Organism: Blood Culture PCR  Streptococcus pneumoniae (23 @ 11:07)  Organism: Streptococcus pneumoniae (23 @ 11:07)      -  Ceftriaxone (meningitidis): S <=0.25      -  Ceftriaxone (non-meningitidis): S <=0.25      -  Erythromycin: S <=0.06 Predicts results for azithromycin.      -  Levofloxacin: S 0.5      -  Penicillin (meningitidis): S <=0.03      -  Penicillin (non-meningitidis): S <=0.03      -  Penicillin (oral penicillin V): S <=0.03      -  Trimethoprim/Sulfamethoxazole: S <=.25/4.75      -  Vancomycin: S 0.25      Method Type: KEN  Organism: Blood Culture PCR (23 @ 11:07)      -  Streptococcus pneumoniae: Detec      Method Type: PCR    RADIOLOGY:  imaging below personally reviewed and agree with findings    Xray Chest 1 View- PORTABLE-Urgent (Xray Chest 1 View- PORTABLE-Urgent .) (23 @ 13:02) >  INTERPRETATION:  Endotracheal tube at the level of the renetta/right mainstem bronchus. Adjustment recommended.  New left lung whiteout. Findings discussed with provider Lock.    Xray Chest 1 View- PORTABLE-Urgent (Xray Chest 1 View- PORTABLE-Urgent .) (23 @ 17:50) >  IMPRESSION:  NG tube with the tip in the body of the stomach.  Bilateral pulmonary consolidations about the same or slightly worse on the left.    Xray Chest 1 View- PORTABLE-Urgent (Xray Chest 1 View- PORTABLE-Urgent .) (23 @ 14:45) >  IMPRESSION:  NG tube with the tip in the body of the stomach.  Bilateral pulmonary consolidations about the same or slightly worse on the left.    Xray Chest 1 View- PORTABLE-Urgent (Xray Chest 1 View- PORTABLE-Urgent .) (23 @ 14:45) >  IMPRESSION:  NG tube with the tip in the body of the stomach.  Bilateral pulmonary consolidations about the same or slightly worse on the left.    IR Procedure (03.15.23 @ 15:24) >   The opening pressure measured greater than 55 cm water. The closing pressure measured 32 cm water, after removal of 30 cc of cloudy CSF. The needle was removed. The patient tolerated the procedure well without initial complication.  IMPRESSION:  Status post successful lumbar puncture as described.  Markedly abnormal appearing CSF.  Elevated opening pressures.    US Kidney and Bladder (03.15.23 @ 12:10) >  IMPRESSION:  Mild fullness of the left lower quadrant transplant kidney collecting system.  Increased renal cortical echogenicity, possibly renal parenchymal disease.    CT Brain Perfusion Maps Stroke (23 @ 19:46) >  IMPRESSION:  CT PERFUSION:  Limited by late injection of the contrast bolus.  Cerebral blood flow less than 30% = 0 mL. No core infarct is predicted.  Tmax greater than 6 seconds = 0 mL. No brain parenchyma predicted to be at continued ischemicrisk in the presence of neurologic symptoms.  CTA BRAIN:  No flow-limiting stenosis or vascular aneurysm. No AVM.  CTA NECK:  Calcified plaque at the origin of the left internal carotid artery results in approximately 40% short segment stenosis.   Otherwise no flow-limiting stenosis. No evidence for arterial dissection.          ECHO  Transthoracic Echocardiogram (23 @ 11:48) >  CONCLUSIONS:  1.  Moderate aortic regurgitation.  2. Mild concentric left ventricular hypertrophy.  3. Normal left ventricular systolic function. No segmental wall motion abnormalities.  4. Reversal of the E-A  waves of the mitral inflow pattern is consistent with diastolic LV dysfunction.  5. Normal right ventricular size and function.  6. Normal tricuspid valve. Mild tricuspid regurgitation.  *** Compared with echocardiogram of 2018, no significant changes noted.

## 2023-03-20 NOTE — PROGRESS NOTE ADULT - ASSESSMENT
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 Went to ENT and was diagnosed with R otitis externa secondary to perforated TM. Prescribed cipro/dexamethasone.  Worsening headache and EMS called. Admitted 3/14.  CT head negative.  NAMAN.  Started on vanc/meropenem.  LP attempted but unsuccessful.  IR obtained LP.      Overall, renal transplant patient with pneumococcal meningitis, bacteremia, pneumonia.    - continue ceftriaxone  - r/o endocarditis  - would like SORAYA, if negative can limit CTX to 2 weeks  - hx Cdiff, monitor for diarrhea  - continue mepron given hx PJP    NAMAN  - worsening renal failure  - monitor renal function  - on CVVH now    Leukocytosis  - better  - f/u all cultures    Guarded prognosis

## 2023-03-21 NOTE — EEG REPORT - NS EEG TEXT BOX
YARA SEGUNDO [id number]     Study Date: 03/20/2023 08:03 - 03/21/2023 08:00  Duration: 23 hr 37 min  --------------------------------------------------------------------------------------------------  History:  CC/ HPI Patient is a 78y old  Female who presents with a chief complaint of AMS/R ear infxn (21 Mar 2023 10:39)    MEDICATIONS  (STANDING):  atovaquone  Suspension 1500 milliGRAM(s) Oral daily  cefTRIAXone   IVPB 2000 milliGRAM(s) IV Intermittent every 12 hours  chlorhexidine 0.12% Liquid 15 milliLiter(s) Oral Mucosa two times a day  chlorhexidine 2% Cloths 1 Application(s) Topical <User Schedule>  ciprofloxacin  0.3% Ophthalmic Solution for Otic Use 4 Drop(s) Right Ear two times a day  dextrose 5%. 1000 milliLiter(s) (100 mL/Hr) IV Continuous <Continuous>  dextrose 5%. 1000 milliLiter(s) (50 mL/Hr) IV Continuous <Continuous>  dextrose 50% Injectable 25 Gram(s) IV Push once  dextrose 50% Injectable 12.5 Gram(s) IV Push once  dextrose 50% Injectable 25 Gram(s) IV Push once  glucagon  Injectable 1 milliGRAM(s) IntraMuscular once  heparin   Injectable 5000 Unit(s) SubCutaneous every 8 hours  insulin lispro (ADMELOG) corrective regimen sliding scale   SubCutaneous every 6 hours  lacosamide IVPB 200 milliGRAM(s) IV Intermittent every 12 hours  levETIRAcetam  IVPB 1000 milliGRAM(s) IV Intermittent every 12 hours  levothyroxine Injectable 18 MICROGram(s) IV Push at bedtime  midazolam Infusion 0.05 mG/kG/Hr (3.11 mL/Hr) IV Continuous <Continuous>  naloxegol 12.5 milliGRAM(s) Oral daily  norepinephrine Infusion 0.05 MICROgram(s)/kG/Min (2.91 mL/Hr) IV Continuous <Continuous>  petrolatum Ophthalmic Ointment 1 Application(s) Both EYES every 12 hours  polyethylene glycol 3350 17 Gram(s) Oral two times a day  predniSONE   Tablet 5 milliGRAM(s) Oral daily  senna 2 Tablet(s) Oral two times a day    --------------------------------------------------------------------------------------------------  Study Interpretation:    [[[Abbreviation Key:  PDR=alpha rhythm/posterior dominant rhythm. A-P=anterior posterior.  Amplitude: ‘very low’:<20; ‘low’:20-49; ‘medium’:; ‘high’:>150uV.  Persistence for periodic/rhythmic patterns (% of epoch) ‘rare’:<1%; ‘occasional’:1-10%; ‘frequent’:10-50%; ‘abundant’:50-90%; ‘continuous’:>90%.  Persistence for sporadic discharges: ‘rare’:<1/hr; ‘occasional’:1/min-1/hr; ‘frequent’:>1/min; ‘abundant’:>1/10 sec.  RPP=rhythmic and periodic patterns; GRDA=generalized rhythmic delta activity; FIRDA=frontal intermittent GRDA; LRDA=lateralized rhythmic delta activity; TIRDA=temporal intermittent rhythmic delta activity;  LPD=PLED=lateralized periodic discharges; GPD=generalized periodic discharges; BIPDs =bilateral independent periodic discharges; Mf=multifocal; SIRPDs=stimulus induced rhythmic, periodic, or ictal appearing discharges; BIRDs=brief potentially ictal rhythmic discharges >4 Hz, lasting .5-10s; PFA (paroxysmal bursts >13 Hz or =8 Hz <10s).  Modifiers: +F=with fast component; +S=with spike component; +R=with rhythmic component.  S-B=burst suppression pattern.  Max=maximal. N1-drowsy; N2-stage II sleep; N3-slow wave sleep. SSS/BETS=small sharp spikes/benign epileptiform transients of sleep. HV=hyperventilation; PS=photic stimulation]]]    Daily EEG Visual Analysis    FINDINGS:    Background:  The background is symmetric and continuous  Around 13:15-13:30, there is increased background slowing with increased prevalence of diffuse polymorphic delta and later occasional 2-3-second periods of diffuse suppression on a background varying between diffuse polymorphic delta and periods of diffuse theta and alpha frequencies. The background later becomes continuous diffuse polymorphic delta >> theta.    Sporadic Epileptiform Discharges and Rhythmic and Periodic Patterns (RPPs):  -Midline (Cz/C4/C3 maximal) spike-wave discharges/sharp waves occurring initially as continuous lateralized periodic discharges (LPDs) at 1.3-4.1 Hz, fluctuating without clear evolution, at times concerning for electrographic seizures as below.  -After 13:00-13:15, discharges gradually decrease in prevalence to occasional.  -After 04:23, midline discharges appear as rare brief potentially ictal rhythmic discharges (BIRDs) and occasional to frequent sporadic discharges  -Discharges and seizures are more prevalent with stimulation (stimulus-induced rhythmic, periodic, or ictal-appearing discharges, SIRPIDs).  -Rare right frontal (F4) sharp waves    Electrographic and Electroclinical seizures:  Between 08:24-13:15, multiple runs of midline spike-wave discharges averaging >2.5 Hz for >= 10 seconds concerning for multiple focal seizures. No clinical change is seen on video.    Other Clinical Events:  None    Activation Procedures:   Hyperventilation was not performed.    Photic stimulation was not performed.    Artifacts:  Intermittent myogenic and movement artifacts are present.    EKG:  Single-lead EKG shows mostly regular rhythm, at times irregular, at 50-90 bpm.    EEG Classification / Summary:  Abnormal EEG in a comatose patient due to:  -Midline focal electrographic seizures and continuous LPDs between 08:24-13:15  -Midline epileptiform discharges then decrease in prevalence until 04:23, after which there is slight increase in prevalence to occasional to frequent discharges and rare brief potentially ictal rhythmic discharges (BIRDs).  -Epileptiform discharges and seizures are more prevalent with stimulation (stimulus-induced rhythmic, periodic, or ictal-appearing discharges, SIRPIDs).  -Rare right frontal sharp waves  -In retrospect, the finding on 03/20/23 at 07:11 may have been artifact rather than electrographic seizure.  -Variable severe diffuse slowing    Clinical Impression:  -Midline focal electrographic seizures and continuous LPDs between 03/20/23 08:24-13:15 indicate risk of further focal-onset seizures from this region  -Risk of seizures remains but decreases after 03/20/23 13:15  -Rare right frontal sharp waves indicate an additional potentially epileptogenic focus in this region.  -In retrospect, the finding on 03/20/23 at 07:11 may have been artifact rather than electrographic seizure.  -Severe diffuse cerebral dysfunction is nonspecific in etiology. Variation in severity may be related to changes in sedating medications.  -Single-lead EKG incidentally shows irregular rhythm at times.          -------------------------------------------------------------------------------------------------------  [facility] EEG Reading Room Ph#: [phone]  Epilepsy Answering Service after 5PM and before 8:30AM: Ph#: [phone]    Meera Simental MD  Attending Physician, [facility]   YARA SEGUNDO [id number]     Study Date: 03/20/2023 08:03 - 03/21/2023 08:00  Duration: 23 hr 37 min  --------------------------------------------------------------------------------------------------  History:  CC/ HPI Patient is a 78y old  Female who presents with a chief complaint of AMS/R ear infxn (21 Mar 2023 10:39)    MEDICATIONS  (STANDING):  atovaquone  Suspension 1500 milliGRAM(s) Oral daily  cefTRIAXone   IVPB 2000 milliGRAM(s) IV Intermittent every 12 hours  chlorhexidine 0.12% Liquid 15 milliLiter(s) Oral Mucosa two times a day  chlorhexidine 2% Cloths 1 Application(s) Topical <User Schedule>  ciprofloxacin  0.3% Ophthalmic Solution for Otic Use 4 Drop(s) Right Ear two times a day  dextrose 5%. 1000 milliLiter(s) (100 mL/Hr) IV Continuous <Continuous>  dextrose 5%. 1000 milliLiter(s) (50 mL/Hr) IV Continuous <Continuous>  dextrose 50% Injectable 25 Gram(s) IV Push once  dextrose 50% Injectable 12.5 Gram(s) IV Push once  dextrose 50% Injectable 25 Gram(s) IV Push once  glucagon  Injectable 1 milliGRAM(s) IntraMuscular once  heparin   Injectable 5000 Unit(s) SubCutaneous every 8 hours  insulin lispro (ADMELOG) corrective regimen sliding scale   SubCutaneous every 6 hours  lacosamide IVPB 200 milliGRAM(s) IV Intermittent every 12 hours  levETIRAcetam  IVPB 1000 milliGRAM(s) IV Intermittent every 12 hours  levothyroxine Injectable 18 MICROGram(s) IV Push at bedtime  midazolam Infusion 0.05 mG/kG/Hr (3.11 mL/Hr) IV Continuous <Continuous>  naloxegol 12.5 milliGRAM(s) Oral daily  norepinephrine Infusion 0.05 MICROgram(s)/kG/Min (2.91 mL/Hr) IV Continuous <Continuous>  petrolatum Ophthalmic Ointment 1 Application(s) Both EYES every 12 hours  polyethylene glycol 3350 17 Gram(s) Oral two times a day  predniSONE   Tablet 5 milliGRAM(s) Oral daily  senna 2 Tablet(s) Oral two times a day    --------------------------------------------------------------------------------------------------  Study Interpretation:    [[[Abbreviation Key:  PDR=alpha rhythm/posterior dominant rhythm. A-P=anterior posterior.  Amplitude: ‘very low’:<20; ‘low’:20-49; ‘medium’:; ‘high’:>150uV.  Persistence for periodic/rhythmic patterns (% of epoch) ‘rare’:<1%; ‘occasional’:1-10%; ‘frequent’:10-50%; ‘abundant’:50-90%; ‘continuous’:>90%.  Persistence for sporadic discharges: ‘rare’:<1/hr; ‘occasional’:1/min-1/hr; ‘frequent’:>1/min; ‘abundant’:>1/10 sec.  RPP=rhythmic and periodic patterns; GRDA=generalized rhythmic delta activity; FIRDA=frontal intermittent GRDA; LRDA=lateralized rhythmic delta activity; TIRDA=temporal intermittent rhythmic delta activity;  LPD=PLED=lateralized periodic discharges; GPD=generalized periodic discharges; BIPDs =bilateral independent periodic discharges; Mf=multifocal; SIRPDs=stimulus induced rhythmic, periodic, or ictal appearing discharges; BIRDs=brief potentially ictal rhythmic discharges >4 Hz, lasting .5-10s; PFA (paroxysmal bursts >13 Hz or =8 Hz <10s).  Modifiers: +F=with fast component; +S=with spike component; +R=with rhythmic component.  S-B=burst suppression pattern.  Max=maximal. N1-drowsy; N2-stage II sleep; N3-slow wave sleep. SSS/BETS=small sharp spikes/benign epileptiform transients of sleep. HV=hyperventilation; PS=photic stimulation]]]    Daily EEG Visual Analysis    FINDINGS:    Background:  The background is symmetric and continuous  Around 13:15-13:30, there is increased background slowing with increased prevalence of diffuse polymorphic delta and later occasional 2-3-second periods of diffuse suppression on a background varying between diffuse polymorphic delta and periods of diffuse theta and alpha frequencies. The background later becomes continuous diffuse polymorphic delta >> theta.    Sporadic Epileptiform Discharges and Rhythmic and Periodic Patterns (RPPs):  -Midline (Cz/C4/C3 maximal) spike-wave discharges/sharp waves occurring initially as continuous lateralized periodic discharges (LPDs) at 1.3-4.1 Hz, fluctuating without clear evolution, at times concerning for electrographic seizures as below.  -After 13:00-13:15, discharges gradually decrease in prevalence to occasional.  -After 04:23, midline discharges appear as rare brief potentially ictal rhythmic discharges (BIRDs) and occasional to frequent sporadic discharges  -Discharges and seizures are more prevalent with stimulation (stimulus-induced rhythmic, periodic, or ictal-appearing discharges, SIRPIDs).  -Rare right frontal (F4) sharp waves    Electrographic and Electroclinical seizures:  Between 08:24-13:15, multiple runs of midline spike-wave discharges averaging >2.5 Hz for >= 10 seconds concerning for multiple focal seizures. No clinical change is seen on video.    Other Clinical Events:  None    Activation Procedures:   Hyperventilation was not performed.    Photic stimulation was not performed.    Artifacts:  Intermittent myogenic and movement artifacts are present. Poor impedance from 04:24-04:29 limits interpretation.    EKG:  Single-lead EKG shows mostly regular rhythm, at times irregular, at 50-90 bpm.    EEG Classification / Summary:  Abnormal EEG in a comatose patient due to:  -Midline focal electrographic seizures and continuous LPDs between 08:24-13:15  -Midline epileptiform discharges then decrease in prevalence until 04:23, after which there is slight increase in prevalence to occasional to frequent discharges and rare brief potentially ictal rhythmic discharges (BIRDs).  -Epileptiform discharges and seizures are more prevalent with stimulation (stimulus-induced rhythmic, periodic, or ictal-appearing discharges, SIRPIDs).  -Rare right frontal sharp waves  -In retrospect, the finding on 03/20/23 at 07:11 may have been artifact rather than electrographic seizure.  -Variable severe diffuse slowing    Clinical Impression:  -Midline focal electrographic seizures and continuous LPDs between 03/20/23 08:24-13:15 indicate risk of further focal-onset seizures from this region  -Risk of seizures remains but decreases after 03/20/23 13:15  -Rare right frontal sharp waves indicate an additional potentially epileptogenic focus in this region.  -In retrospect, the finding on 03/20/23 at 07:11 may have been artifact rather than electrographic seizure.  -Severe diffuse cerebral dysfunction is nonspecific in etiology. Variation in severity may be related to changes in sedating medications.  -Single-lead EKG incidentally shows irregular rhythm at times.          -------------------------------------------------------------------------------------------------------  [facility] EEG Reading Room Ph#: [phone]  Epilepsy Answering Service after 5PM and before 8:30AM: Ph#: [phone]    Meera Simental MD  Attending Physician, [facility]

## 2023-03-21 NOTE — PROGRESS NOTE ADULT - SUBJECTIVE AND OBJECTIVE BOX
[facility]  Albert David  Office: [phone]  Cell: [phone]  Can also be reached on Microsoft Teams     INTERVAL HPI/OVERNIGHT EVENTS: Pressor requirement down. Still without mental status, started on versed from yesterday. Observed RN try to ilicit gag reflex- none      REVIEW OF SYSTEMS:  Unable to assess    Vital Signs Last 24 Hrs  T(C): 36.6 (21 Mar 2023 08:00), Max: 38.1 (21 Mar 2023 04:00)  T(F): 97.9 (21 Mar 2023 08:00), Max: 100.5 (21 Mar 2023 04:00)  HR: 71 (21 Mar 2023 09:00) (58 - 87)  BP: 103/59 (21 Mar 2023 09:00) (103/59 - 159/67)  BP(mean): 69 (21 Mar 2023 09:00) (69 - 90)  RR: 14 (21 Mar 2023 09:00) (12 - 27)  SpO2: 100% (21 Mar 2023 09:00) (92% - 100%)    Parameters below as of 21 Mar 2023 09:00      O2 Concentration (%): 40    PHYSICAL EXAMINATION:  GENERAL: well developed, intubated, sedated, no distress  HEAD:  Atraumatic, Normocephalic  CHEST/LUNG: Clear to auscultation  HEART: Regular rate and rhythm; No murmurs, rubs, or gallops  ABDOMEN: Soft, Nontender, Nondistended; Bowel sounds present  NERVOUS SYSTEM:  Alert & Oriented X3,    EXTREMITIES: non pitting edema b/l lower extremity                            6.8    12.14 )-----------( 66       ( 21 Mar 2023 05:05 )             21.6     03-21    135  |  102  |  25<H>  ----------------------------<  201<H>  4.7   |  23  |  1.26    Ca    8.3<L>      21 Mar 2023 05:05  Phos  2.8     03-21  Mg     2.50     03-21    TPro  4.8<L>  /  Alb  2.6<L>  /  TBili  0.5  /  DBili  x   /  AST  44<H>  /  ALT  50<H>  /  AlkPhos  93  03-21    LIVER FUNCTIONS - ( 21 Mar 2023 05:05 )  Alb: 2.6 g/dL / Pro: 4.8 g/dL / ALK PHOS: 93 U/L / ALT: 50 U/L / AST: 44 U/L / GGT: x               PT/INR - ( 21 Mar 2023 00:20 )   PT: 12.3 sec;   INR: 1.06 ratio         PTT - ( 21 Mar 2023 00:20 )  PTT:22.0 sec    CAPILLARY BLOOD GLUCOSE      RADIOLOGY & ADDITIONAL TESTS:

## 2023-03-21 NOTE — PROGRESS NOTE ADULT - PROBLEM SELECTOR PLAN 8
now s/p cardiac arrest, in shock.  On vasopressors
now s/p cardiac arrest, in shock.  On vasopressors
HSQ for DVT ppx  recent LE dopplers 12/2022 negative for DVT
now s/p cardiac arrest, in shock.  On vasopressors

## 2023-03-21 NOTE — PROGRESS NOTE ADULT - SUBJECTIVE AND OBJECTIVE BOX
INTERVAL HPI/OVERNIGHT EVENTS:    SUBJECTIVE: Patient seen and examined at bedside. Intubated, sedated, ROS cannot be obtained.       VITAL SIGNS:  ICU Vital Signs Last 24 Hrs  T(C): 38.1 (21 Mar 2023 04:00), Max: 38.1 (21 Mar 2023 04:00)  T(F): 100.5 (21 Mar 2023 04:00), Max: 100.5 (21 Mar 2023 04:00)  HR: 82 (21 Mar 2023 07:00) (58 - 87)  BP: 133/66 (21 Mar 2023 05:00) (99/56 - 159/67)  BP(mean): 83 (21 Mar 2023 05:00) (67 - 90)  ABP: 118/57 (21 Mar 2023 07:00) (76/38 - 161/60)  ABP(mean): 77 (21 Mar 2023 07:00) (-13 - 95)  RR: 14 (21 Mar 2023 07:00) (12 - 27)  SpO2: 100% (21 Mar 2023 07:00) (92% - 100%)    O2 Parameters below as of 21 Mar 2023 07:00  Patient On (Oxygen Delivery Method): ventilator    O2 Concentration (%): 40      Mode: AC/ CMV (Assist Control/ Continuous Mandatory Ventilation), RR (machine): 14, TV (machine): 350, FiO2: 40, PEEP: 8, ITime: 0.6, MAP: 14, PIP: 21  Plateau pressure:   P/F ratio:     03-20 @ 07:01  -  03-21 @ 07:00  --------------------------------------------------------  IN: 2026.7 mL / OUT: 3113 mL / NET: -1086.3 mL      CAPILLARY BLOOD GLUCOSE      POCT Blood Glucose.: 203 mg/dL (21 Mar 2023 05:05)    ECG:    PHYSICAL EXAM:    General:   HEENT:   Neck:   Respiratory:   Cardiovascular:   Abdomen:   Extremities:  Neurological:    MEDICATIONS:  MEDICATIONS  (STANDING):  atovaquone  Suspension 1500 milliGRAM(s) Oral daily  cefTRIAXone   IVPB 2000 milliGRAM(s) IV Intermittent every 12 hours  chlorhexidine 0.12% Liquid 15 milliLiter(s) Oral Mucosa two times a day  chlorhexidine 2% Cloths 1 Application(s) Topical <User Schedule>  ciprofloxacin  0.3% Ophthalmic Solution for Otic Use 4 Drop(s) Right Ear two times a day  CRRT Treatment    <Continuous>  dextrose 5%. 1000 milliLiter(s) (100 mL/Hr) IV Continuous <Continuous>  dextrose 5%. 1000 milliLiter(s) (50 mL/Hr) IV Continuous <Continuous>  dextrose 50% Injectable 25 Gram(s) IV Push once  dextrose 50% Injectable 12.5 Gram(s) IV Push once  dextrose 50% Injectable 25 Gram(s) IV Push once  fentaNYL   Infusion. 0.5 MICROgram(s)/kG/Hr (1.89 mL/Hr) IV Continuous <Continuous>  glucagon  Injectable 1 milliGRAM(s) IntraMuscular once  heparin   Injectable 5000 Unit(s) SubCutaneous every 8 hours  insulin lispro (ADMELOG) corrective regimen sliding scale   SubCutaneous every 6 hours  lacosamide IVPB 200 milliGRAM(s) IV Intermittent every 12 hours  levETIRAcetam  IVPB 1000 milliGRAM(s) IV Intermittent every 12 hours  levothyroxine Injectable 18 MICROGram(s) IV Push at bedtime  midazolam Infusion 0.05 mG/kG/Hr (3.11 mL/Hr) IV Continuous <Continuous>  naloxegol 12.5 milliGRAM(s) Oral daily  norepinephrine Infusion 0.05 MICROgram(s)/kG/Min (2.91 mL/Hr) IV Continuous <Continuous>  petrolatum Ophthalmic Ointment 1 Application(s) Both EYES every 12 hours  polyethylene glycol 3350 17 Gram(s) Oral two times a day  predniSONE   Tablet 5 milliGRAM(s) Oral daily  PrismaSATE Dialysate BGK 4 / 2.5 5000 milliLiter(s) (1000 mL/Hr) CRRT <Continuous>  PrismaSOL Filtration BGK 4 / 2.5 5000 milliLiter(s) (1000 mL/Hr) CRRT <Continuous>  PrismaSOL Filtration BGK 4 / 2.5 5000 milliLiter(s) (200 mL/Hr) CRRT <Continuous>  senna 2 Tablet(s) Oral two times a day    MEDICATIONS  (PRN):  acetaminophen   Oral Liquid .. 650 milliGRAM(s) Enteral Tube every 6 hours PRN Temp greater or equal to 38C (100.4F), Mild Pain (1 - 3), Moderate Pain (4 - 6)  dextrose Oral Gel 15 Gram(s) Oral once PRN Blood Glucose LESS THAN 70 milliGRAM(s)/deciliter      ALLERGIES:  Allergies    apple (Unknown)  Benadryl (Unknown)  penicillin (Hives)  watermelon (Unknown)    Intolerances        LABS:                        6.8    12.14 )-----------( 66       ( 21 Mar 2023 05:05 )             21.6     03-21    135  |  102  |  25<H>  ----------------------------<  201<H>  4.7   |  23  |  1.26    Ca    8.3<L>      21 Mar 2023 05:05  Phos  2.8     03-21  Mg     2.50     03-21    TPro  4.8<L>  /  Alb  2.6<L>  /  TBili  0.5  /  DBili  x   /  AST  44<H>  /  ALT  50<H>  /  AlkPhos  93  03-21    PT/INR - ( 21 Mar 2023 00:20 )   PT: 12.3 sec;   INR: 1.06 ratio         PTT - ( 21 Mar 2023 00:20 )  PTT:22.0 sec      RADIOLOGY & ADDITIONAL TESTS: Reviewed.   INTERVAL HPI/OVERNIGHT EVENTS: transfused 1 unit pRBCs, PEEP decreased from 10 to 8, CRRT clotted this AM.    SUBJECTIVE: Patient seen and examined at bedside. Intubated, sedated, ROS cannot be obtained.       VITAL SIGNS:  ICU Vital Signs Last 24 Hrs  T(C): 38.1 (21 Mar 2023 04:00), Max: 38.1 (21 Mar 2023 04:00)  T(F): 100.5 (21 Mar 2023 04:00), Max: 100.5 (21 Mar 2023 04:00)  HR: 82 (21 Mar 2023 07:00) (58 - 87)  BP: 133/66 (21 Mar 2023 05:00) (99/56 - 159/67)  BP(mean): 83 (21 Mar 2023 05:00) (67 - 90)  ABP: 118/57 (21 Mar 2023 07:00) (76/38 - 161/60)  ABP(mean): 77 (21 Mar 2023 07:00) (-13 - 95)  RR: 14 (21 Mar 2023 07:00) (12 - 27)  SpO2: 100% (21 Mar 2023 07:00) (92% - 100%)    O2 Parameters below as of 21 Mar 2023 07:00  Patient On (Oxygen Delivery Method): ventilator    O2 Concentration (%): 40      Mode: AC/ CMV (Assist Control/ Continuous Mandatory Ventilation), RR (machine): 14, TV (machine): 350, FiO2: 40, PEEP: 8, ITime: 0.6, MAP: 14, PIP: 21  Plateau pressure:   P/F ratio:     03-20 @ 07:01  -  03-21 @ 07:00  --------------------------------------------------------  IN: 2026.7 mL / OUT: 3113 mL / NET: -1086.3 mL      CAPILLARY BLOOD GLUCOSE      POCT Blood Glucose.: 203 mg/dL (21 Mar 2023 05:05)    ECG:    PHYSICAL EXAM:  GENERAL: no distress, intubated  PSYCH: A&Ox0  HEENT: Atraumatic, Normocephalic, pupils reactive  NECK: Supple, No JVD  CHEST/LUNG: clear to auscultation bilaterally  HEART: regular rate and rhythm, no murmurs  ABDOMEN: soft midline, palpable mass and firmness present  EXTREMITIES: trace edema on bilateral LE  NEUROLOGY: intubated and sedated  SKIN: No rashes or lesions    MEDICATIONS:  MEDICATIONS  (STANDING):  atovaquone  Suspension 1500 milliGRAM(s) Oral daily  cefTRIAXone   IVPB 2000 milliGRAM(s) IV Intermittent every 12 hours  chlorhexidine 0.12% Liquid 15 milliLiter(s) Oral Mucosa two times a day  chlorhexidine 2% Cloths 1 Application(s) Topical <User Schedule>  ciprofloxacin  0.3% Ophthalmic Solution for Otic Use 4 Drop(s) Right Ear two times a day  CRRT Treatment    <Continuous>  dextrose 5%. 1000 milliLiter(s) (100 mL/Hr) IV Continuous <Continuous>  dextrose 5%. 1000 milliLiter(s) (50 mL/Hr) IV Continuous <Continuous>  dextrose 50% Injectable 25 Gram(s) IV Push once  dextrose 50% Injectable 12.5 Gram(s) IV Push once  dextrose 50% Injectable 25 Gram(s) IV Push once  fentaNYL   Infusion. 0.5 MICROgram(s)/kG/Hr (1.89 mL/Hr) IV Continuous <Continuous>  glucagon  Injectable 1 milliGRAM(s) IntraMuscular once  heparin   Injectable 5000 Unit(s) SubCutaneous every 8 hours  insulin lispro (ADMELOG) corrective regimen sliding scale   SubCutaneous every 6 hours  lacosamide IVPB 200 milliGRAM(s) IV Intermittent every 12 hours  levETIRAcetam  IVPB 1000 milliGRAM(s) IV Intermittent every 12 hours  levothyroxine Injectable 18 MICROGram(s) IV Push at bedtime  midazolam Infusion 0.05 mG/kG/Hr (3.11 mL/Hr) IV Continuous <Continuous>  naloxegol 12.5 milliGRAM(s) Oral daily  norepinephrine Infusion 0.05 MICROgram(s)/kG/Min (2.91 mL/Hr) IV Continuous <Continuous>  petrolatum Ophthalmic Ointment 1 Application(s) Both EYES every 12 hours  polyethylene glycol 3350 17 Gram(s) Oral two times a day  predniSONE   Tablet 5 milliGRAM(s) Oral daily  PrismaSATE Dialysate BGK 4 / 2.5 5000 milliLiter(s) (1000 mL/Hr) CRRT <Continuous>  PrismaSOL Filtration BGK 4 / 2.5 5000 milliLiter(s) (1000 mL/Hr) CRRT <Continuous>  PrismaSOL Filtration BGK 4 / 2.5 5000 milliLiter(s) (200 mL/Hr) CRRT <Continuous>  senna 2 Tablet(s) Oral two times a day    MEDICATIONS  (PRN):  acetaminophen   Oral Liquid .. 650 milliGRAM(s) Enteral Tube every 6 hours PRN Temp greater or equal to 38C (100.4F), Mild Pain (1 - 3), Moderate Pain (4 - 6)  dextrose Oral Gel 15 Gram(s) Oral once PRN Blood Glucose LESS THAN 70 milliGRAM(s)/deciliter      ALLERGIES:  Allergies    apple (Unknown)  Benadryl (Unknown)  penicillin (Hives)  watermelon (Unknown)    Intolerances        LABS:                        6.8    12.14 )-----------( 66       ( 21 Mar 2023 05:05 )             21.6     03-21    135  |  102  |  25<H>  ----------------------------<  201<H>  4.7   |  23  |  1.26    Ca    8.3<L>      21 Mar 2023 05:05  Phos  2.8     03-21  Mg     2.50     03-21    TPro  4.8<L>  /  Alb  2.6<L>  /  TBili  0.5  /  DBili  x   /  AST  44<H>  /  ALT  50<H>  /  AlkPhos  93  03-21    PT/INR - ( 21 Mar 2023 00:20 )   PT: 12.3 sec;   INR: 1.06 ratio         PTT - ( 21 Mar 2023 00:20 )  PTT:22.0 sec      RADIOLOGY & ADDITIONAL TESTS: Reviewed.   INTERVAL HPI/OVERNIGHT EVENTS: transfused 1 unit pRBCs, PEEP decreased from 10 to 8, CRRT clotted this AM. Planning for intermittent HD.    SUBJECTIVE: Patient seen and examined at bedside. Intubated, sedated, ROS cannot be obtained.       VITAL SIGNS:  ICU Vital Signs Last 24 Hrs  T(C): 38.1 (21 Mar 2023 04:00), Max: 38.1 (21 Mar 2023 04:00)  T(F): 100.5 (21 Mar 2023 04:00), Max: 100.5 (21 Mar 2023 04:00)  HR: 82 (21 Mar 2023 07:00) (58 - 87)  BP: 133/66 (21 Mar 2023 05:00) (99/56 - 159/67)  BP(mean): 83 (21 Mar 2023 05:00) (67 - 90)  ABP: 118/57 (21 Mar 2023 07:00) (76/38 - 161/60)  ABP(mean): 77 (21 Mar 2023 07:00) (-13 - 95)  RR: 14 (21 Mar 2023 07:00) (12 - 27)  SpO2: 100% (21 Mar 2023 07:00) (92% - 100%)    O2 Parameters below as of 21 Mar 2023 07:00  Patient On (Oxygen Delivery Method): ventilator    O2 Concentration (%): 40      Mode: AC/ CMV (Assist Control/ Continuous Mandatory Ventilation), RR (machine): 14, TV (machine): 350, FiO2: 40, PEEP: 8, ITime: 0.6, MAP: 14, PIP: 21  Plateau pressure:   P/F ratio:     03-20 @ 07:01  -  03-21 @ 07:00  --------------------------------------------------------  IN: 2026.7 mL / OUT: 3113 mL / NET: -1086.3 mL      CAPILLARY BLOOD GLUCOSE      POCT Blood Glucose.: 203 mg/dL (21 Mar 2023 05:05)    ECG:    PHYSICAL EXAM:  GENERAL: no distress, intubated  PSYCH: A&Ox0  HEENT: Atraumatic, Normocephalic, pupils reactive  NECK: Supple, No JVD  CHEST/LUNG: clear to auscultation bilaterally  HEART: regular rate and rhythm, no murmurs  ABDOMEN: soft midline, palpable mass and firmness present  EXTREMITIES: trace edema on bilateral LE  NEUROLOGY: intubated and sedated  SKIN: No rashes or lesions    MEDICATIONS:  MEDICATIONS  (STANDING):  atovaquone  Suspension 1500 milliGRAM(s) Oral daily  cefTRIAXone   IVPB 2000 milliGRAM(s) IV Intermittent every 12 hours  chlorhexidine 0.12% Liquid 15 milliLiter(s) Oral Mucosa two times a day  chlorhexidine 2% Cloths 1 Application(s) Topical <User Schedule>  ciprofloxacin  0.3% Ophthalmic Solution for Otic Use 4 Drop(s) Right Ear two times a day  CRRT Treatment    <Continuous>  dextrose 5%. 1000 milliLiter(s) (100 mL/Hr) IV Continuous <Continuous>  dextrose 5%. 1000 milliLiter(s) (50 mL/Hr) IV Continuous <Continuous>  dextrose 50% Injectable 25 Gram(s) IV Push once  dextrose 50% Injectable 12.5 Gram(s) IV Push once  dextrose 50% Injectable 25 Gram(s) IV Push once  fentaNYL   Infusion. 0.5 MICROgram(s)/kG/Hr (1.89 mL/Hr) IV Continuous <Continuous>  glucagon  Injectable 1 milliGRAM(s) IntraMuscular once  heparin   Injectable 5000 Unit(s) SubCutaneous every 8 hours  insulin lispro (ADMELOG) corrective regimen sliding scale   SubCutaneous every 6 hours  lacosamide IVPB 200 milliGRAM(s) IV Intermittent every 12 hours  levETIRAcetam  IVPB 1000 milliGRAM(s) IV Intermittent every 12 hours  levothyroxine Injectable 18 MICROGram(s) IV Push at bedtime  midazolam Infusion 0.05 mG/kG/Hr (3.11 mL/Hr) IV Continuous <Continuous>  naloxegol 12.5 milliGRAM(s) Oral daily  norepinephrine Infusion 0.05 MICROgram(s)/kG/Min (2.91 mL/Hr) IV Continuous <Continuous>  petrolatum Ophthalmic Ointment 1 Application(s) Both EYES every 12 hours  polyethylene glycol 3350 17 Gram(s) Oral two times a day  predniSONE   Tablet 5 milliGRAM(s) Oral daily  PrismaSATE Dialysate BGK 4 / 2.5 5000 milliLiter(s) (1000 mL/Hr) CRRT <Continuous>  PrismaSOL Filtration BGK 4 / 2.5 5000 milliLiter(s) (1000 mL/Hr) CRRT <Continuous>  PrismaSOL Filtration BGK 4 / 2.5 5000 milliLiter(s) (200 mL/Hr) CRRT <Continuous>  senna 2 Tablet(s) Oral two times a day    MEDICATIONS  (PRN):  acetaminophen   Oral Liquid .. 650 milliGRAM(s) Enteral Tube every 6 hours PRN Temp greater or equal to 38C (100.4F), Mild Pain (1 - 3), Moderate Pain (4 - 6)  dextrose Oral Gel 15 Gram(s) Oral once PRN Blood Glucose LESS THAN 70 milliGRAM(s)/deciliter      ALLERGIES:  Allergies    apple (Unknown)  Benadryl (Unknown)  penicillin (Hives)  watermelon (Unknown)    Intolerances        LABS:                        6.8    12.14 )-----------( 66       ( 21 Mar 2023 05:05 )             21.6     03-21    135  |  102  |  25<H>  ----------------------------<  201<H>  4.7   |  23  |  1.26    Ca    8.3<L>      21 Mar 2023 05:05  Phos  2.8     03-21  Mg     2.50     03-21    TPro  4.8<L>  /  Alb  2.6<L>  /  TBili  0.5  /  DBili  x   /  AST  44<H>  /  ALT  50<H>  /  AlkPhos  93  03-21    PT/INR - ( 21 Mar 2023 00:20 )   PT: 12.3 sec;   INR: 1.06 ratio         PTT - ( 21 Mar 2023 00:20 )  PTT:22.0 sec      RADIOLOGY & ADDITIONAL TESTS: Reviewed.

## 2023-03-21 NOTE — PROGRESS NOTE ADULT - ASSESSMENT
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  Cardiac arrest on 3/16/23  in septic shock on iv pressors  intubated on MV  Renal following for NAMAN on CKD Mx.    NAMAN on CKD4  Creatinine Trend: 1.16 <--, 1.38 <--, 1.31 <--, 1.45 <--, 1.49 <--, 1.61 <--, 1.83 <--, 2.10 <--, 2.42 <--, 2.90 <--, 3.86 <--, 3.83 <--, 3.62 <--, 3.31 <--, 3.10 <--, 3.03 <--, 2.98 <--  w/worsened renal function, oliguria, acidosis, mild hyperkalemia- started on CVVHD  Consent for HD obtained, signed by daughter 3/16/23  KTx 2003  B/L creat around 2.8 since Dec 2022  Patient receives Cyclosporine (Gengraf) 75mg PO q12hrs,  BID and Prednisone 5 QD for transplant    plan:  In light of severe sepsis and cardiac arrest: holding MMF and Cyclosporine  c/w methylPREDNISolone sodium succinate Injectable 10 milliGRAM(s) IV Push every 6 hours  patient CVVH circuit clotted today AM again-> cvvh discontinued  pt u/o 50 cc since am, better than before. ?improving  no indication for HD today. will reassess tomorrow for intermittent HD if remains oliguric  keep Ponce  monitor U/O  monitor BMP qdaily now  dose all meds for eGFR<15ml/min.   avoid ACEi/ARB/NSAIDs/Nephrotoxics if able.    Metabolic acidosis -improved s/p cvvhd  lactate +  manage underlying condition (sepsis)    OM and OE  Abxs per Infectious disease specialist with dose adjustment per GFR  f/u cxs  vent Mx, pressors per ICU    prognosis guarded  [facility] Nephrologist Dr. Hugo Hernandez [phone]  will closely follow up.   poc d/w ICU team  labs, rad, chart reviewed  For any question, pl call:  Nephrology  Cell -[phone]  Office [phone]  Ans Serv [phone]

## 2023-03-21 NOTE — PROGRESS NOTE ADULT - ASSESSMENT
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 Went to ENT and was diagnosed with R otitis externa secondary to perforated TM. Prescribed cipro/dexamethasone.  Worsening headache and EMS called. Admitted 3/14.  CT head negative.  NAMAN.  Started on vanc/meropenem.  IR obtained LP.  Findings c/w meningitis    Overall, renal transplant patient with pneumococcal meningitis, bacteremia, pneumonia c/b possible seizures  - continue ceftriaxone  - r/o endocarditis  - would like SORAYA, if negative can limit CTX to 2 weeks  - hx Cdiff, monitor for diarrhea  - continue mepron given hx PJP    NAMAN  - worsening renal failure  - monitor renal function  - on dialysis now    Leukocytosis  - stable  - f/u all cultures    Guarded prognosis

## 2023-03-21 NOTE — PROGRESS NOTE ADULT - ASSESSMENT
Ms Negron is a 78 year-old woman with a PMHx of PCKD s/p renal transplant, brain cyst, DM, HLD, HTN, hypothyroidism, glaucoma, cataracts, DVT in leg, on asa who presents to ED for severe headache, right ear pain, neck pain, markedly decreased responsiveness to external stimuli. LP significant for decreased glucose, elevated WBC with neutrophil predominance, elevated protein, PCR (+) for strep. pneumoniae. antibiotics were started. Course complicated by PEA arrest, ROSC achieved after about 10 mins. Patient is now intubated and sedated.     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     Plan  [] MRI as per ENT when stable  [/] Continue video EEG   [x] S/p IV levetiracetam 500mg load x1 (3/17) and maintenance 250mg IV Q12 (3/17-3/19); then 750mg Q12H ( CRRT dosing 3/19-3/20)  [/] Levetiracetam increased to 1000mg Q12H (3/20 - )  [/] Lacosamide increased to 200mg Q12H (3/20)  [] Follow up neuron specific enolase (sent 3/19)  [] Follow up levetiracetam level   [x] UA (-), Bcx (+GPC in pairs - S. pneumo)  [x] SERUM: A1C 5.6, LDL 79, procal 45.63, CK 1074  [x] S/p LP 3/15: protein (>1181), gluc (<5), TNC (34,389, 88% neut), RBC (1267), AFB (-), CSF gram strain & culture (GPC in pairs), cryptococcal ag (-), EBV PCR (-), CSF PCR (+S. pneumoniae),   [x] Consults: ENT, ID, MICU, Renal/transplant

## 2023-03-21 NOTE — PROGRESS NOTE ADULT - PROBLEM SELECTOR PLAN 11
Plan per neuro- taper propofol in the day time. Start lacosimide if WA interval wnl. Check levetiracetam level
Plan per neuro- taper propofol in the day time. Start lacosimide if NE interval wnl. Check levetiracetam level
Elevated troponin in the setting of her advanced CKD.   - Do not suspect active MI or CAD.  - Obtain echocardiogram, monitor on telemetry for any events.
In the setting of CKD  Unclear cardiac arrest events however documented vtach and was shocked  - obtain echocardiogram

## 2023-03-21 NOTE — PROGRESS NOTE ADULT - PROBLEM SELECTOR PLAN 7
hold linagliptin  FS Q6H  w/ISS
No role for permissive hypertension, unlikely cva  - IV antihypertensives, labetalol for SBP >180mmHg, however controversial if not in hypertensive emergency.

## 2023-03-21 NOTE — PROGRESS NOTE ADULT - PROBLEM SELECTOR PROBLEM 7
Type 2 diabetes mellitus, without long-term current use of insulin
Essential hypertension

## 2023-03-21 NOTE — PROGRESS NOTE ADULT - ATTENDING COMMENTS
Ms Negron is a 78 year-old unknown handed woman with a PMHx of PCKD s/p renal transplant, aneurysm, vascular risk factors (including DM, HLD, HTN) hypothyroidism, glaucoma, cataracts, DVT in leg, initially she was brought to [facility] ER due to the severe headache, right ear pain, neck pain, markedly decreased responsiveness. 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 Currently she remains on mechanical ventilation for air way protection on Versed 0.5. Seizure seems are controlled on Versed and increasing Antiepileptic medications.     "EEG Classification / Summary:  Abnormal EEG in a comatose patient due to:  -Midline focal electrographic seizures and continuous LPDs between 08:24-13:15  -Midline epileptiform discharges then decrease in prevalence until 04:23, after which there is slight increase in prevalence to occasional to frequent discharges and rare brief potentially ictal rhythmic discharges (BIRDs).  -Epileptiform discharges and seizures are more prevalent with stimulation (stimulus-induced rhythmic, periodic, or ictal-appearing discharges, SIRPIDs).  -Rare right frontal sharp waves  -In retrospect, the finding on 03/20/23 at 07:11 may have been artifact rather than electrographic seizure.  -Variable severe diffuse slowing    Clinical Impression:  -Midline focal electrographic seizures and continuous LPDs between 03/20/23 08:24-13:15 indicate risk of further focal-onset seizures from this region  -Risk of seizures remains but decreases after 03/20/23 13:15  -Rare right frontal sharp waves indicate an additional potentially epileptogenic focus in this region.  -In retrospect, the finding on 03/20/23 at 07:11 may have been artifact rather than electrographic seizure.  -Severe diffuse cerebral dysfunction is nonspecific in etiology. Variation in severity may be related to changes in sedating medications."    Plan:  Neuroimaging are pending.  Continue video EEG  Continue Keppra 1000 mg BID  as per renal dose.   Continue lacosamide 200mg Q12H.  Continue medical management, neuro- check and fall precaution.  GI and DVT prophylaxis.  Patient is at high risk for complications and morbidity or mortality. There is high probability of imminent or life threatening deterioration in the patient's condition.  Patient is unable or incompetent to participate in giving a history and/or making decisions and discussion is necessary for determining treatment decisions.  My cumulative time taking care of this critically ill patient is 30 minutes  If you have any further questions, please do not hesitate to contact our team.  Thank you for allowing us to participate in this patient care.    Vazquez Goodrich MD .

## 2023-03-21 NOTE — PROGRESS NOTE ADULT - SUBJECTIVE AND OBJECTIVE BOX
[facility] - S Alea / Tita S /D Rama/ S Jovan/ S Keenan/ Cleve Gambino / DAGMAR Wilsonu/ O Daya  service -[phone], office [phone]  ---------------------------------------------------------------------------------------------------------------    Patient seen and examined bedside    Subjective and Objective: No overnight events, sob resolved. No complaints today. feeling better    Allergies: apple (Unknown)  Benadryl (Unknown)  penicillin (Hives)  watermelon (Unknown)      Hospital Medications:   MEDICATIONS  (STANDING):  atovaquone  Suspension 1500 milliGRAM(s) Oral daily  cefTRIAXone   IVPB 2000 milliGRAM(s) IV Intermittent every 12 hours  chlorhexidine 0.12% Liquid 15 milliLiter(s) Oral Mucosa two times a day  chlorhexidine 2% Cloths 1 Application(s) Topical <User Schedule>  ciprofloxacin  0.3% Ophthalmic Solution for Otic Use 4 Drop(s) Right Ear two times a day  dextrose 5%. 1000 milliLiter(s) (100 mL/Hr) IV Continuous <Continuous>  dextrose 5%. 1000 milliLiter(s) (50 mL/Hr) IV Continuous <Continuous>  dextrose 50% Injectable 25 Gram(s) IV Push once  dextrose 50% Injectable 12.5 Gram(s) IV Push once  dextrose 50% Injectable 25 Gram(s) IV Push once  glucagon  Injectable 1 milliGRAM(s) IntraMuscular once  heparin   Injectable 5000 Unit(s) SubCutaneous every 8 hours  insulin lispro (ADMELOG) corrective regimen sliding scale   SubCutaneous every 6 hours  lacosamide IVPB 200 milliGRAM(s) IV Intermittent every 12 hours  levETIRAcetam  IVPB 1000 milliGRAM(s) IV Intermittent every 12 hours  levothyroxine Injectable 18 MICROGram(s) IV Push at bedtime  midazolam Infusion 0.05 mG/kG/Hr (3.11 mL/Hr) IV Continuous <Continuous>  naloxegol 12.5 milliGRAM(s) Oral daily  norepinephrine Infusion 0.05 MICROgram(s)/kG/Min (2.91 mL/Hr) IV Continuous <Continuous>  petrolatum Ophthalmic Ointment 1 Application(s) Both EYES every 12 hours  polyethylene glycol 3350 17 Gram(s) Oral two times a day  predniSONE   Tablet 5 milliGRAM(s) Oral daily  senna 2 Tablet(s) Oral two times a day      REVIEW OF SYSTEMS:  CONSTITUTIONAL: No weakness, fevers or chills  EYES/ENT: No visual changes;  No vertigo or throat pain   NECK: No pain or stiffness  RESPIRATORY: No cough, wheezing, hemoptysis; No shortness of breath  CARDIOVASCULAR: No chest pain or palpitations.  GASTROINTESTINAL: No abdominal or epigastric pain. No nausea, vomiting, or hematemesis; No diarrhea or constipation. No melena or hematochezia.  GENITOURINARY: No dysuria, frequency, foamy urine, urinary urgency, incontinence or hematuria  NEUROLOGICAL: No numbness or weakness  SKIN: No itching, burning, rashes, or lesions   VASCULAR: No bilateral lower extremity edema.   All other review of systems is negative unless indicated above.    VITALS:  T(F): 97.9 (23 @ 12:00), Max: 100.5 (23 @ 04:00)  HR: 65 (23 @ 14:23)  BP: 101/55 (23 @ 12:00)  RR: 16 (23 @ 14:00)  SpO2: 100% (23 @ 14:23)  Wt(kg): --     @ 07:01  -   @ 07:00  --------------------------------------------------------  IN: 2026.7 mL / OUT: 3113 mL / NET: -1086.3 mL     @ 07:01  -   @ 16:19  --------------------------------------------------------  IN: 477.2 mL / OUT: 30 mL / NET: 447.2 mL          PHYSICAL EXAM:  Constitutional: NAD  HEENT: anicteric sclera, oropharynx clear  Neck: No JVD  Respiratory: CTAB, no wheezes, rales or rhonchi  Cardiovascular: S1, S2, RRR  Gastrointestinal: BS+, soft, NT/ND  Extremities: No cyanosis or clubbing. No peripheral edema  Neurological: A/O x 3, no focal deficits  Psychiatric: Normal mood, normal affect  : No CVA tenderness. No snider.   Skin: No rashes  Vascular Access:    LABS:      135  |  102  |  25<H>  ----------------------------<  201<H>  4.7   |  23  |  1.26    Ca    8.3<L>      21 Mar 2023 05:05  Phos  2.8       Mg     2.50         TPro  4.8<L>  /  Alb  2.6<L>  /  TBili  0.5  /  DBili      /  AST  44<H>  /  ALT  50<H>  /  AlkPhos  93      Creatinine Trend: 1.26 <--, 1.24 <--, 1.31 <--, 1.16 <--, 1.38 <--, 1.31 <--, 1.45 <--, 1.49 <--, 1.61 <--, 1.83 <--, 2.10 <--, 2.42 <--, 2.90 <--, 3.86 <--, 3.83 <--, 3.62 <--, 3.31 <--, 3.10 <--, 3.03 <--, 2.98 <--                        7.9    13.00 )-----------( 59       ( 21 Mar 2023 11:40 )             25.0     Urine Studies:  Urinalysis Basic - ( 15 Mar 2023 03:43 )    Color: Yellow / Appearance: Clear / S.019 / pH:   Gluc:  / Ketone: Negative  / Bili: Negative / Urobili: <2 mg/dL   Blood:  / Protein: 300 mg/dL / Nitrite: Negative   Leuk Esterase: Negative / RBC: 8 /HPF / WBC 2 /HPF   Sq Epi:  / Non Sq Epi: 1 /HPF / Bacteria: Negative          RADIOLOGY & ADDITIONAL STUDIES:   [facility] - S Alae / Tita S /D Rama/ S Jovan/ PAUL Hussein/ Cleve Gambnio / DAGMAR Wilsonu/ O Daya  service -[phone], office [phone]  ---------------------------------------------------------------------------------------------------------------    Patient seen and examined bedside in MICU    Subjective and Objective: overnight events noted- cvvh circuit clotted early am, cvvh held   remains intubated, 30%fi02 MV, sedated, on low dose iv pressor    Allergies: apple (Unknown)  Benadryl (Unknown)  penicillin (Hives)  watermelon (Unknown)      Hospital Medications:   MEDICATIONS  (STANDING):  atovaquone  Suspension 1500 milliGRAM(s) Oral daily  cefTRIAXone   IVPB 2000 milliGRAM(s) IV Intermittent every 12 hours  chlorhexidine 0.12% Liquid 15 milliLiter(s) Oral Mucosa two times a day  chlorhexidine 2% Cloths 1 Application(s) Topical <User Schedule>  ciprofloxacin  0.3% Ophthalmic Solution for Otic Use 4 Drop(s) Right Ear two times a day  dextrose 5%. 1000 milliLiter(s) (100 mL/Hr) IV Continuous <Continuous>  dextrose 5%. 1000 milliLiter(s) (50 mL/Hr) IV Continuous <Continuous>  dextrose 50% Injectable 25 Gram(s) IV Push once  dextrose 50% Injectable 12.5 Gram(s) IV Push once  dextrose 50% Injectable 25 Gram(s) IV Push once  glucagon  Injectable 1 milliGRAM(s) IntraMuscular once  heparin   Injectable 5000 Unit(s) SubCutaneous every 8 hours  insulin lispro (ADMELOG) corrective regimen sliding scale   SubCutaneous every 6 hours  lacosamide IVPB 200 milliGRAM(s) IV Intermittent every 12 hours  levETIRAcetam  IVPB 1000 milliGRAM(s) IV Intermittent every 12 hours  levothyroxine Injectable 18 MICROGram(s) IV Push at bedtime  midazolam Infusion 0.05 mG/kG/Hr (3.11 mL/Hr) IV Continuous <Continuous>  naloxegol 12.5 milliGRAM(s) Oral daily  norepinephrine Infusion 0.05 MICROgram(s)/kG/Min (2.91 mL/Hr) IV Continuous <Continuous>  petrolatum Ophthalmic Ointment 1 Application(s) Both EYES every 12 hours  polyethylene glycol 3350 17 Gram(s) Oral two times a day  predniSONE   Tablet 5 milliGRAM(s) Oral daily  senna 2 Tablet(s) Oral two times a day    VITALS:  T(F): 97.9 (23 @ 12:00), Max: 100.5 (23 @ 04:00)  HR: 65 (23 @ 14:23)  BP: 101/55 (23 @ 12:00)  RR: 16 (23 @ 14:00)  SpO2: 100% (23 @ 14:23)  Wt(kg): --     @ 07:01  -   @ 07:00  --------------------------------------------------------  IN: 2026.7 mL / OUT: 3113 mL / NET: -1086.3 mL     @ 07:01  -   @ 16:19  --------------------------------------------------------  IN: 477.2 mL / OUT: 30 mL / NET: 447.2 mL      PHYSICAL EXAM:  Constitutional: NAD  HEENT: ETT+, OGT+   Neck: No JVD  Respiratory: CTAB, no wheezes, rales or rhonchi  Cardiovascular: S1, S2, RRR  Gastrointestinal: BS+  Extremities: No peripheral edema  Neurological: sedated  - +snider  Vascular Access: left femoral shiley+     LABS:      135  |  102  |  25<H>  ----------------------------<  201<H>  4.7   |  23  |  1.26    Ca    8.3<L>      21 Mar 2023 05:05  Phos  2.8       Mg     2.50         TPro  4.8<L>  /  Alb  2.6<L>  /  TBili  0.5  /  DBili      /  AST  44<H>  /  ALT  50<H>  /  AlkPhos  93      Creatinine Trend: 1.26 <--, 1.24 <--, 1.31 <--, 1.16 <--, 1.38 <--, 1.31 <--, 1.45 <--, 1.49 <--, 1.61 <--, 1.83 <--, 2.10 <--, 2.42 <--, 2.90 <--, 3.86 <--, 3.83 <--, 3.62 <--, 3.31 <--, 3.10 <--, 3.03 <--, 2.98 <--                        7.9    13.00 )-----------( 59       ( 21 Mar 2023 11:40 )             25.0     Urine Studies:  Urinalysis Basic - ( 15 Mar 2023 03:43 )    Color: Yellow / Appearance: Clear / S.019 / pH:   Gluc:  / Ketone: Negative  / Bili: Negative / Urobili: <2 mg/dL   Blood:  / Protein: 300 mg/dL / Nitrite: Negative   Leuk Esterase: Negative / RBC: 8 /HPF / WBC 2 /HPF   Sq Epi:  / Non Sq Epi: 1 /HPF / Bacteria: Negative    RADIOLOGY & ADDITIONAL STUDIES:  < from: Xray Chest 1 View- PORTABLE-Urgent (Xray Chest 1 View- PORTABLE-Urgent .) (23 @ 14:53) >  FINDINGS:  Endotracheal tube tip in the midtrachea. Enteric tube courses below the   diaphragm and field-of-view.  No pleural effusion or pneumothorax. Improving aeration bilaterally.  The heart is normal in size.    IMPRESSION:  Support devices as above.  Improving bilateral aeration.    --- End of Report ---    < end of copied text >

## 2023-03-21 NOTE — PROGRESS NOTE ADULT - ASSESSMENT
77 y/o F with h/o HTN, HLD, DM2, PCKD previously on HD via LUE AVF then s/p renal transplant, LLE DVT (resolved, nml dopplers 12/2022) on ASA, very small supraclinoid aneurysm (reportedly unchanged on MRA 10/2020), hypothyroidism, PCP 2021 on atovaquone ppx, reportedly hospitalized in 12/2022 for rectal prolapse, had a blood transfusion at that time, also + CMV 1/25/23 who presented with R-sided HA, R ear pain and neck stiffness after visit to ENT earlier in the day, altered on presentation, CT head unremarkable for stroke, however LP with IR and BCx showing +strep pneumoniae. currently on vanc and CTX per ID. Cardiac arrest 3/16 with ROSC, transferred to MICU for further management.       Plan:   Neurological  #Currently Intubated, sedated   - on fentanyl, continue to wean  - reach out w/ neuro and see if she is having any seizures off propofol  - f/u neuron specific enolase sent on 3/19    #Strep pneumo meningitis #Acute encephalopathy  severe HA, neck stiffness, fever, leukocytosis concerning for meningitis/encephalitis  CT head negative for CVA.   LP and BCx 3/15 both showing gram positive cocci in pairs, and + strep pneumoniae.   likely source of entry from R ear where pt c/o discomfort for several months.  patient is on cyclosporine, mycophenolate mofetil and prednisone due to kidney transplant and is immunocompromised   dental procedure 3/14 but less likely source   ID consulted, recs for continuing meropenem. s/p vancomycin 3/15.   - f/u BCx and LP sensitivities  - f/u MRI of the IAC w/ contrast at a later time when renal function improves    #Seizures  - 24hr EEG initially with highly focal seizures, improved with increased sedation.   - keppra loaded 500mg IVP (3/17) and started on 250mg IV q12h (3/17 - 3/19) increased to 750 mg IV q12h (CRRT dosing (3/19 - [ ])  - f/u keppra serum level  - vimpat 100 BID  - decrease propofol while monitoring on EEG    Cardiovascular  #Cardiac Arrest  bradycardia to 30, pulseless, code blue was called 3/16   2 rounds of epi, and PEA arrest. on 3rd pulse check pulseless VT, 200J shock given  4th pulse check asystole, 5th ROSC, sinus tachy with HUMA. IO placed and levo started. Due to blood in mouth took several attempts with DL for ETT to be placed but ultimately confirmed with capnometry and bilateral breath sounds.       #Elevated troponin  uptrending troponin to 182 3/15 AM, was likely iso ESRD  s/p cardiac arrest, elevated ST segment   - TTE from 3/17 showed EF of 63% with mild pulm htn, normal left ventricular systolic function, and diastolic LV dysfunction.      #HTN  on amlodipine 5mg, losartan 100mg, torsemide 20mg, and clonidine 0.2 patch weekly  - hold for now given sepsis      Respiratory/ENT  #Intubated  continue sedation    # Tympanic membrane rupture  discharge from R ear, evaluated by ENT given dexamethasone and cipro 3/14  - c/w broad spectrum abx   - MRI of IAC with contrast when pt is stable  - f/u ENT recs    Gastrointestinal  #Constipation  - f/u Abdominal xray for bowel obstruction  - trial on movantic  - c/w bowel regimen  - if negative, increase trickle feeds as tolerated and bowel regimen    Renal  #ESRD #s/p Kidney Transplant in 2003 at [facility] #PCKD  previously HD through LLE AVF but no HD since transplant  Cr 3.8, unclear baseline  - avoid nephrotoxic agent  - holding cyclosporine and mycophenolate   - changed prednisone to medrol 10mg IVP q6h  - appreciate nephro recs  - strict I/O and replete electrolytes  - 4mg IV bumex with minimal urine production (3/17)  - started on CRRT (3/17) net negative 100cc/hr goal       Hematological  # h/o LLE DVT  resolved, no DVT on doppler 12/22.  on ASA at home, has been receiving heparin subQ  - hold ASA and SQH for now given bleeding in oropharynx.   - SCDs  - can restart heparin subQ  - negative LLE duplex this admission 3/17    #Anemia  - s/p 1 unit pRBC on 3/16 for Hb drop from 10 to 7.9 and responded appropriately  - Evaluated by ENT for bleeding and found no signs of active bleeding from nasal cavity, nasopharynx or oral cavity, however, patient biting on tongue, which could be one source of bleeding. Tongue was edematous and lax      Infectious Disease  #Strep pneumoniae meningitis #R ear mastoiditis  treatment with IV CTX   - c/w ciprodex drops for R ear  - f/u ID recs   - f/u repeat blood cx from 3/17  - c/w CTX  - come down to home dose prednisone 3/21    #h/o PCP  - atovaquone at home, continue      Endocrinological  #Hypothyroidism  last TSH 12/2022 >10  TSH 3/15 4.73  - c/w levothyroxine     #Diabetes mellitus Type II  - ISS      Ethics  FULL, GOC ongoing. 79 y/o F with h/o HTN, HLD, DM2, PCKD previously on HD via LUE AVF then s/p renal transplant, LLE DVT (resolved, nml dopplers 12/2022) on ASA, very small supraclinoid aneurysm (reportedly unchanged on MRA 10/2020), hypothyroidism, PCP 2021 on atovaquone ppx, reportedly hospitalized in 12/2022 for rectal prolapse, had a blood transfusion at that time, also + CMV 1/25/23 who presented with R-sided HA, R ear pain and neck stiffness after visit to ENT earlier in the day, altered on presentation, CT head unremarkable for stroke, however LP with IR and BCx showing +strep pneumoniae. currently on vanc and CTX per ID. Cardiac arrest 3/16 with ROSC, transferred to MICU for further management.       Plan:   Neurological  #Currently Intubated, sedated   - on fentanyl, continue to wean  - reach out w/ neuro and see if she is having any seizures off propofol  - f/u neuron specific enolase sent on 3/19    #Strep pneumo meningitis #Acute encephalopathy  severe HA, neck stiffness, fever, leukocytosis concerning for meningitis/encephalitis  CT head negative for CVA.   LP and BCx 3/15 both showing gram positive cocci in pairs, and + strep pneumoniae.   likely source of entry from R ear where pt c/o discomfort for several months.  patient is on cyclosporine, mycophenolate mofetil and prednisone due to kidney transplant and is immunocompromised   dental procedure 3/14 but less likely source   ID consulted, recs for continuing meropenem. s/p vancomycin 3/15.   - f/u BCx and LP sensitivities  - f/u MRI of the IAC w/ contrast at a later time when renal function improves    #Seizures  - 24hr EEG initially with highly focal seizures, improved with increased sedation.   - keppra loaded 500mg IVP (3/17) and started on 250mg IV q12h (3/17 - 3/19) increased to 750 mg IV q12h (CRRT dosing (3/19 - 3/20), then increased to 1000mg IV q12 (3/20 - [ ])  - f/u keppra serum level  - vimpat 100 BID increased to 200 BID (3/20 - [ ])  - decrease propofol while monitoring on EEG    Cardiovascular  #Cardiac Arrest  bradycardia to 30, pulseless, code blue was called 3/16   2 rounds of epi, and PEA arrest. on 3rd pulse check pulseless VT, 200J shock given  4th pulse check asystole, 5th ROSC, sinus tachy with HUMA. IO placed and levo started. Due to blood in mouth took several attempts with DL for ETT to be placed but ultimately confirmed with capnometry and bilateral breath sounds.       #Elevated troponin  uptrending troponin to 182 3/15 AM, was likely iso ESRD  s/p cardiac arrest, elevated ST segment   - TTE from 3/17 showed EF of 63% with mild pulm htn, normal left ventricular systolic function, and diastolic LV dysfunction.      #HTN  on amlodipine 5mg, losartan 100mg, torsemide 20mg, and clonidine 0.2 patch weekly  - hold for now given sepsis      Respiratory/ENT  #Intubated  continue sedation    # Tympanic membrane rupture  discharge from R ear, evaluated by ENT given dexamethasone and cipro 3/14  - c/w broad spectrum abx   - MRI of IAC with contrast when pt is stable  - f/u ENT recs    Gastrointestinal  #Constipation  - Abdominal xray negative for bowel obstruction  - trial on movantic  - c/w bowel regimen  - increase trickle feeds as tolerated and bowel regimen    Renal  #ESRD #s/p Kidney Transplant in 2003 at [place] #PCKD  previously HD through LLE AVF but no HD since transplant  Cr 3.8, unclear baseline  - avoid nephrotoxic agent  - holding cyclosporine and mycophenolate   - changed prednisone to medrol 10mg IVP q6h  - appreciate nephro recs  - strict I/O and replete electrolytes  - 4mg IV bumex with minimal urine production (3/17)  - started on CRRT (3/17) net negative 100cc/hr goal       Hematological  # h/o LLE DVT  resolved, no DVT on doppler 12/22.  on ASA at home, has been receiving heparin subQ  - hold ASA and SQH for now given bleeding in oropharynx.   - SCDs  - can restart heparin subQ  - negative LLE duplex this admission 3/17    #Anemia  - s/p 1 unit pRBC on 3/16 for Hb drop from 10 to 7.9 and responded appropriately  - Evaluated by ENT for bleeding and found no signs of active bleeding from nasal cavity, nasopharynx or oral cavity, however, patient biting on tongue, which could be one source of bleeding. Tongue was edematous and lax      Infectious Disease  #Strep pneumoniae meningitis #R ear mastoiditis  treatment with IV CTX   - c/w ciprodex drops for R ear  - f/u ID recs   - f/u repeat blood cx from 3/17  - c/w CTX  - come down to home dose prednisone 3/21    #h/o PCP  - atovaquone at home, continue      Endocrinological  #Hypothyroidism  last TSH 12/2022 >10  TSH 3/15 4.73  - c/w levothyroxine     #Diabetes mellitus Type II  - ISS      Ethics  FULL, GOC ongoing. 77 y/o F with h/o HTN, HLD, DM2, PCKD previously on HD via LUE AVF then s/p renal transplant, LLE DVT (resolved, nml dopplers 12/2022) on ASA, very small supraclinoid aneurysm (reportedly unchanged on MRA 10/2020), hypothyroidism, PCP 2021 on atovaquone ppx, reportedly hospitalized in 12/2022 for rectal prolapse, had a blood transfusion at that time, also + CMV 1/25/23 who presented with R-sided HA, R ear pain and neck stiffness after visit to ENT earlier in the day, altered on presentation, CT head unremarkable for stroke, however LP with IR and BCx showing +strep pneumoniae. currently on vanc and CTX per ID. Cardiac arrest 3/16 with ROSC, transferred to MICU for further management.       Plan:   Neurological  #Currently Intubated, sedated   - on fentanyl and versed, continue to wean  - f/u neuron specific enolase sent on 3/19    #Strep pneumo meningitis #Acute encephalopathy  severe HA, neck stiffness, fever, leukocytosis concerning for meningitis/encephalitis  CT head negative for CVA.   LP and BCx 3/15 both showing gram positive cocci in pairs, and + strep pneumoniae.   likely source of entry from R ear where pt c/o discomfort for several months.  patient is on cyclosporine, mycophenolate mofetil and prednisone due to kidney transplant and is immunocompromised   dental procedure 3/14 but less likely source   ID consulted, recs for continuing meropenem. s/p vancomycin 3/15.   - f/u BCx and LP sensitivities  - f/u MRI of the IAC w/ contrast at a later time when renal function improves    #Seizures  - 24hr EEG initially with highly focal seizures, improved with increased sedation.   - keppra loaded 500mg IVP (3/17) and started on 250mg IV q12h (3/17 - 3/19) increased to 750 mg IV q12h (CRRT dosing (3/19 - 3/20), then increased to 1000mg IV q12 (3/20 - [ ])  - f/u keppra serum level  - vimpat 100 BID increased to 200 BID (3/20 - [ ])  - decrease propofol while monitoring on EEG, decreased seizure activity after increasing vimpat and keppra doses on 3/20    Cardiovascular  #Cardiac Arrest  bradycardia to 30, pulseless, code blue was called 3/16   2 rounds of epi, and PEA arrest. on 3rd pulse check pulseless VT, 200J shock given  4th pulse check asystole, 5th ROSC, sinus tachy with HUMA. IO placed and levo started. Due to blood in mouth took several attempts with DL for ETT to be placed but ultimately confirmed with capnometry and bilateral breath sounds.       #Elevated troponin  uptrending troponin to 182 3/15 AM, was likely iso ESRD  s/p cardiac arrest, elevated ST segment   - TTE from 3/17 showed EF of 63% with mild pulm htn, normal left ventricular systolic function, and diastolic LV dysfunction.      #HTN  on amlodipine 5mg, losartan 100mg, torsemide 20mg, and clonidine 0.2 patch weekly  - hold for now given sepsis and on pressor      Respiratory/ENT  #Intubated  continue sedation    # Tympanic membrane rupture  discharge from R ear, evaluated by ENT given dexamethasone and cipro 3/14  - c/w broad spectrum abx   - MRI of IAC with contrast when pt is stable  - f/u ENT recs    Gastrointestinal  #Constipation  - Abdominal xray negative for bowel obstruction  - trial on movantik  - c/w bowel regimen  - increase trickle feeds as tolerated and bowel regimen      Renal  #ESRD #s/p Kidney Transplant in 2003 at [place] #PCKD  previously HD through LLE AVF but no HD since transplant  Cr 3.8, unclear baseline  - avoid nephrotoxic agent  - holding cyclosporine and mycophenolate   - changed prednisone to medrol 10mg IVP q6h  - appreciate nephro recs  - strict I/O and replete electrolytes  - 4mg IV bumex with minimal urine production (3/17)  - started on CRRT (3/17-3/21) net negative 100cc/hr goal  - plan for intermittent HD (3/21 - [ ])       Hematological  # h/o LLE DVT  resolved, no DVT on doppler 12/22.  - negative LLE duplex this admission 3/17  - can restart heparin subQ (3/21)      #Anemia  - s/p 1 unit pRBC on 3/16 for Hb drop from 10 to 7.9 and 1 unit on 3/21 for Hb 6.8  - Evaluated by ENT for bleeding and found no signs of active bleeding from nasal cavity, nasopharynx or oral cavity, however, patient biting on tongue, which could be one source of bleeding. Tongue was edematous and lax      Infectious Disease  #Strep pneumoniae meningitis #R ear mastoiditis  treatment with IV CTX   - c/w ciprodex drops for R ear  - f/u ID recs   - f/u repeat blood cx from 3/17  - c/w CTX  - come down to home dose prednisone 3/21 (5mg qday)    #h/o PCP  - atovaquone at home, continue      Endocrinological  #Hypothyroidism  last TSH 12/2022 >10  TSH 3/15 4.73  - c/w levothyroxine     #Diabetes mellitus Type II  - ISS      Ethics  FULL, GOC ongoing. 77 y/o F with h/o HTN, HLD, DM2, PCKD previously on HD via LUE AVF then s/p renal transplant, LLE DVT (resolved, nml dopplers 12/2022) on ASA, very small supraclinoid aneurysm (reportedly unchanged on MRA 10/2020), hypothyroidism, PCP 2021 on atovaquone ppx, reportedly hospitalized in 12/2022 for rectal prolapse, had a blood transfusion at that time, also + CMV 1/25/23 who presented with R-sided HA, R ear pain and neck stiffness after visit to ENT earlier in the day, altered on presentation, CT head unremarkable for stroke, however LP with IR and BCx showing +strep pneumoniae. currently on vanc and CTX per ID. Cardiac arrest 3/16 with ROSC, transferred to MICU for further management.       Plan:   Neurological  #Currently Intubated, sedated   - on fentanyl and versed, continue to wean  - f/u neuron specific enolase sent on 3/19    #Strep pneumo meningitis #Acute encephalopathy  severe HA, neck stiffness, fever, leukocytosis concerning for meningitis/encephalitis  CT head negative for CVA.   LP and BCx 3/15 both showing gram positive cocci in pairs, and + strep pneumoniae.   likely source of entry from R ear where pt c/o discomfort for several months.  patient is on cyclosporine, mycophenolate mofetil and prednisone due to kidney transplant and is immunocompromised   dental procedure 3/14 but less likely source   ID consulted, recs for continuing meropenem. s/p vancomycin 3/15.   - f/u BCx and LP sensitivities  - f/u MRI of the IAC w/ contrast at a later time when renal function improves    #Seizures  - 24hr EEG initially with highly focal seizures, improved with increased sedation.   - keppra loaded 500mg IVP (3/17) and started on 250mg IV q12h (3/17 - 3/19) increased to 750 mg IV q12h (CRRT dosing (3/19 - 3/20), then increased to 1000mg IV q12 (3/20 - [ ])  - f/u keppra serum level  - vimpat 100 BID increased to 200 BID (3/20 - [ ])  - decrease propofol while monitoring on EEG, decreased seizure activity after increasing vimpat and keppra doses on 3/20    Cardiovascular  #Cardiac Arrest  bradycardia to 30, pulseless, code blue was called 3/16   2 rounds of epi, and PEA arrest. on 3rd pulse check pulseless VT, 200J shock given  4th pulse check asystole, 5th ROSC, sinus tachy with HUMA. IO placed and levo started. Due to blood in mouth took several attempts with DL for ETT to be placed but ultimately confirmed with capnometry and bilateral breath sounds.       #Elevated troponin  uptrending troponin to 182 3/15 AM, was likely iso ESRD  s/p cardiac arrest, elevated ST segment   - TTE from 3/17 showed EF of 63% with mild pulm htn, normal left ventricular systolic function, and diastolic LV dysfunction.      #HTN  on amlodipine 5mg, losartan 100mg, torsemide 20mg, and clonidine 0.2 patch weekly  - hold for now given sepsis and on pressor      Respiratory/ENT  #Intubated  continue sedation    # Tympanic membrane rupture  discharge from R ear, evaluated by ENT given dexamethasone and cipro 3/14  - c/w broad spectrum abx   - MRI of IAC with contrast when pt is stable  - f/u ENT recs    Gastrointestinal  #Constipation  - Abdominal xray negative for bowel obstruction  - trial on movantik  - c/w bowel regimen  - increase trickle feeds as tolerated and bowel regimen      Renal  #ESRD #s/p Kidney Transplant in 2003 at [place] #PCKD  previously HD through LLE AVF but no HD since transplant  Cr 3.8, unclear baseline  - avoid nephrotoxic agent  - holding cyclosporine and mycophenolate   - changed prednisone to medrol 10mg IVP q6h  - appreciate nephro recs  - strict I/O and replete electrolytes  - 4mg IV bumex with minimal urine production (3/17)  - started on CRRT (3/17-3/21) net negative 100cc/hr goal  - plan for intermittent HD (3/21 - [ ])       Hematological  # h/o LLE DVT  resolved, no DVT on doppler 12/22.  - negative LLE duplex this admission 3/17  - can restart heparin subQ (3/21)      #Anemia  - s/p 1 unit pRBC on 3/16 for Hb drop from 10 to 7.9 and 1 unit on 3/21 for Hb 6.8 2/2 loss from CRRT clotting  - Evaluated by ENT for bleeding and found no signs of active bleeding from nasal cavity, nasopharynx or oral cavity, however, patient biting on tongue, which could be one source of bleeding. Tongue was edematous and lax      Infectious Disease  #Strep pneumoniae meningitis #R ear mastoiditis  treatment with IV CTX   - c/w ciprodex drops for R ear  - f/u ID recs   - f/u repeat blood cx from 3/17  - repeat cx on 3/19 2/2 fever  - c/w CTX  - come down to home dose prednisone 3/21 (5mg qday)    #h/o PCP  - atovaquone at home, continue      Endocrinological  #Hypothyroidism  last TSH 12/2022 >10  TSH 3/15 4.73  - c/w levothyroxine     #Diabetes mellitus Type II  - ISS      Ethics  FULL, GOC ongoing.

## 2023-03-21 NOTE — PROGRESS NOTE ADULT - CONVERSATION DETAILS
[facility] Hospital Medicine                                                                     Progress Note     Team: [facility] [place] [facility]OSP MED R Seb Fermin MD   Admit Date: 1/13/2019   Hospital Day: 3  COREY:  1/17/2019  Code status: Full Code   Principal Problem: Gastrointestinal hemorrhage     SUMMARY:     72 yof w/ HTN, DMT2, HLD, Hx of CVA, GERD, RA, diverticulosis, and hemorrhoids presenting with abdominal pain and BRBPR. In the ED she was initially hemodynamically stable. However, became hypotensive and increasingly more difficult to arouse. Her Hgb was 10.6 down from 11.9 with a normal lactate. CT abd/pelvis with contrast was obtained but did not demonstrate any concerning acute findings. She was resuscitated with 2L NS per 2 large bore IVs. Additionally she was started given a protonix bolus and started on protonix gtt. While being assessed by CRS, who placed quick-clot in her rectum, she had two syncopal episodes. The patient reports that she has a Hx of syncopal events with straining during bowel movements.    Patient was admitted to the ICU. GI consulted, s/p EGD which was fairly normal, and colonoscopy done today noting diverticulosis and multiple polyps s/p polypectomy. Suspecting bleed from diverticulosis vs ulcer near previous hemorrhoid banding site.    SUBJECTIVE:     Pt was seen and examined at bedside. Pt had no acute events overnight, and no new complaints this morning. Pt remained hemodynamically stable and afebrile. Pt has been tolerating PO intake well without any nausea, vomiting, diarrhea, constipation, blood in stools or trouble urinating. Pt denies any fevers, chills, malaise, headaches, chest pain, SOB, cough. Non ambulatory. No BM today.     ROS (Positive in Bold, otherwise negative)  Pain Scale: 0 /10   Constitutional: fever, chills, night  sweats  CV: chest pain, edema, palpitations  Resp: SOB, cough, sputum production  GI: changes in appetite, NVDC, pain, melena, hematochezia, GERD, hematemesis  : Dysuria, hematuria, urinary urgency, frequency  MSK: arthralgia/myalgia, joint swelling  SKIN: rashes, pruritis, petechiae   Neuro/Psych, FNDm anxiety, depression      OBJECTIVE:     Vitals:  Temp:  [97.6 °F (36.4 °C)-99 °F (37.2 °C)]   Pulse:  [69-86]   Resp:  [17-18]   BP: (135-182)/(62-88)   SpO2:  [95 %-99 %]      I & O (Last 24H):     Intake/Output Summary (Last 24 hours) at 1/16/2019 1607  Last data filed at 1/16/2019 1500  Gross per 24 hour   Intake 895 ml   Output 1800 ml   Net -905 ml         GEN: AA female  in no acute distress. Nontoxic. Resting in bed. Cooperative.  HEENT: NCAT. PERRL. EOMI. Conjunctivae/corneas clear, sclera Anicteric.  CVS: RRR. Normal s1 s2 no murmur, click, rub or gallop  LUNG: CTAB. Normal respiratory effort. No wheezes, rhonchi, or crackles.  ABD: Normoactive BS, soft, NT, ND, no masses or organomegaly.  EXT: No edema. No cyanosis. Full ROM.  SKIN: color, texture, turgor normal. No rashes or lesions  NEURO: Alert, oriented x 4, non focal exam      All recent labs and imaging has been reviewed.     Recent Results (from the past 24 hour(s))   POCT glucose    Collection Time: 01/15/19  4:17 PM   Result Value Ref Range    POCT Glucose 32 (LL) 70 - 110 mg/dL   POCT glucose    Collection Time: 01/15/19  4:33 PM   Result Value Ref Range    POCT Glucose 49 (LL) 70 - 110 mg/dL   POCT glucose    Collection Time: 01/15/19  4:53 PM   Result Value Ref Range    POCT Glucose 79 70 - 110 mg/dL   POCT glucose    Collection Time: 01/15/19  7:17 PM   Result Value Ref Range    POCT Glucose 150 (H) 70 - 110 mg/dL   POCT glucose    Collection Time: 01/16/19  7:26 AM   Result Value Ref Range    POCT Glucose 145 (H) 70 - 110 mg/dL   POCT glucose    Collection Time: 01/16/19 11:47 AM   Result Value Ref Range    POCT Glucose 175 (H) 70 - 110  mg/dL   CBC auto differential    Collection Time: 01/16/19 12:50 PM   Result Value Ref Range    WBC 4.49 3.90 - 12.70 K/uL    RBC 2.50 (L) 4.00 - 5.40 M/uL    Hemoglobin 8.6 (L) 12.0 - 16.0 g/dL    Hematocrit 27.0 (L) 37.0 - 48.5 %     (H) 82 - 98 fL    MCH 34.4 (H) 27.0 - 31.0 pg    MCHC 31.9 (L) 32.0 - 36.0 g/dL    RDW 12.6 11.5 - 14.5 %    Platelets 151 150 - 350 K/uL    MPV 11.5 9.2 - 12.9 fL    Immature Granulocytes 0.2 0.0 - 0.5 %    Gran # (ANC) 2.6 1.8 - 7.7 K/uL    Immature Grans (Abs) 0.01 0.00 - 0.04 K/uL    Lymph # 1.2 1.0 - 4.8 K/uL    Mono # 0.4 0.3 - 1.0 K/uL    Eos # 0.2 0.0 - 0.5 K/uL    Baso # 0.04 0.00 - 0.20 K/uL    nRBC 0 0 /100 WBC    Gran% 58.8 38.0 - 73.0 %    Lymph% 26.7 18.0 - 48.0 %    Mono% 9.4 4.0 - 15.0 %    Eosinophil% 4.0 0.0 - 8.0 %    Basophil% 0.9 0.0 - 1.9 %    Differential Method Automated    Comprehensive metabolic panel    Collection Time: 01/16/19 12:50 PM   Result Value Ref Range    Sodium 139 136 - 145 mmol/L    Potassium 4.5 3.5 - 5.1 mmol/L    Chloride 105 95 - 110 mmol/L    CO2 25 23 - 29 mmol/L    Glucose 160 (H) 70 - 110 mg/dL    BUN, Bld 11 8 - 23 mg/dL    Creatinine 1.1 0.5 - 1.4 mg/dL    Calcium 8.9 8.7 - 10.5 mg/dL    Total Protein 6.0 6.0 - 8.4 g/dL    Albumin 2.9 (L) 3.5 - 5.2 g/dL    Total Bilirubin 0.2 0.1 - 1.0 mg/dL    Alkaline Phosphatase 133 55 - 135 U/L    AST 29 10 - 40 U/L    ALT 31 10 - 44 U/L    Anion Gap 9 8 - 16 mmol/L    eGFR if African American 58.8 (A) >60 mL/min/1.73 m^2    eGFR if non  51.0 (A) >60 mL/min/1.73 m^2   Magnesium    Collection Time: 01/16/19 12:50 PM   Result Value Ref Range    Magnesium 1.7 1.6 - 2.6 mg/dL   Phosphorus    Collection Time: 01/16/19 12:50 PM   Result Value Ref Range    Phosphorus 4.0 2.7 - 4.5 mg/dL       Recent Labs   Lab 01/15/19  1617 01/15/19  1633 01/15/19  1653 01/15/19  1917 01/16/19  0726 01/16/19  1147   POCTGLUCOSE 32* 49* 79 150* 145* 175*       Hemoglobin A1C   Date Value Ref Range  Status   12/02/2018 6.6 (H) 4.0 - 5.6 % Final     Comment:     ADA Screening Guidelines:  5.7-6.4%  Consistent with prediabetes  >or=6.5%  Consistent with diabetes  High levels of fetal hemoglobin interfere with the HbA1C  assay. Heterozygous hemoglobin variants (HbS, HgC, etc)do  not significantly interfere with this assay.   However, presence of multiple variants may affect accuracy.     09/28/2018 6.6 (H) 4.0 - 5.6 % Final     Comment:     ADA Screening Guidelines:  5.7-6.4%  Consistent with prediabetes  >or=6.5%  Consistent with diabetes  High levels of fetal hemoglobin interfere with the HbA1C  assay. Heterozygous hemoglobin variants (HbS, HgC, etc)do  not significantly interfere with this assay.   However, presence of multiple variants may affect accuracy.     07/14/2018 6.3 (H) 4.0 - 5.6 % Final     Comment:     ADA Screening Guidelines:  5.7-6.4%  Consistent with prediabetes  >or=6.5%  Consistent with diabetes  High levels of fetal hemoglobin interfere with the HbA1C  assay. Heterozygous hemoglobin variants (HbS, HgC, etc)do  not significantly interfere with this assay.   However, presence of multiple variants may affect accuracy.          Active Hospital Problems    Diagnosis  POA    *Gastrointestinal hemorrhage [K92.2]  Yes    Acute blood loss anemia [D62]  Yes    Closed fracture of left wrist [S62.102A]  Yes    Gastroesophageal reflux disease without esophagitis [K21.9]  Yes     Chronic    Type 2 diabetes mellitus with diabetic nephropathy [E11.21]  Yes     Chronic    Seropositive rheumatoid arthritis of multiple sites [M05.79]  Yes     Chronic    Abdominal pain [R10.9]  Unknown    Essential hypertension [I10]  Yes     Chronic    Mixed hyperlipidemia [E78.2]  Yes     Chronic      Resolved Hospital Problems   No resolved problems to display.          ASSESSMENT AND PLAN:     Lower GIB  Hx of diverticulosis   Hx of hemorrhoids s/p banding   - Initially admitted to ICU but never required any  transfusions  - Hgb 13 --> 11.9 --> 10.6 --> 8.2 --> 8.4 --> 9.7 --> 8.6 today  - GI consulted s/p EGD w/o any overt source, and colonoscopy 1/15 noting diverticulosis and multiple polyps s/p polypectomy. Suspecting bleed from diverticulosis vs ulcer near previous hemorrhoid banding site. No active bleeding noted.   - Typed and screened, consented, transfuse as needed  - Avoid NSAIDs, heparin etc  - Currently remains HD stable and afebrile    Essential HTN  - On multiple home Antihypertensives including Coreg 25 mg bid, chlorthalidone 25mg qd, losartan 100mg qd, isosorbide mononitrate 120 mg, clonidine patch 0.3 every thursday, spironolactone 25 mg qd  - Resume meds as able   - Restarted coreg today 25 mg BID  - PRN hydralazine ordered    Hx of CVA  HLD  - resume home statin     RA  Debility   - holding home meloxicam, ibuprofen - needs to be discontinued   - resume tramadol 50 mg q6h, tizanidine 4mg TID PRN, Gabapentin 300 mg BID, Cymbalta 60 mg qhs  - PT OT    GERD  - resume PPI    DMT2  - SSI and accu-checks           Prophylaxis- SCDs, bleed    Code Status- Full Code     Discharge plan and follow up - d/c back to NH once medically stable    Seb Fermin MD  [facility] Medicine Staff  Pager [phone]     Phone conversation with Arely Cleopatra yesterday 3/20 @ 5:11PM.  Initiated conversation by providing Arely an update regarding patient's condition- cardiac arrest for at least 10 minutes, seizure on eeg, suspect may be due to hypoxia. Kidneys worsening- anuric and started on cvvhd. Still with vasopressor and oxygen requirement and no mental status. Arely states that her cousin was in the room yesterday and saw patient attempt to open her eyes. Also she is hopeful as her cousin stated that patient is overbreathing the vent.   I asked if Lisa had ever discussed what patient would have wanted if she were to be in a critical condition. Arely states that her mother would have wanted everything to be done to stay alive. Arely also hopeful as patient had very good functionality prior to meningitis episode and cardiac arrest. Arely and family still hopeful for recovery. Will continue to provide daily emotional support and reinforcement regarding patient's grave prognosis. Phone conversation with Arely Whelan yesterday 3/20 @ 5:11PM.  Initiated conversation by providing Arely an update regarding patient's condition- cardiac arrest for at least 10 minutes, seizure on eeg, suspect may be due to damage from cardiac arrest. Kidneys worsening- anuric and started on cvvhd. Still with vasopressor and oxygen requirement and no mental status. Arely states that her cousin was in the room yesterday and saw patient attempt to open her eyes. Also she is hopeful as her cousin stated that patient is overbreathing the vent.   I asked if Lisa had ever discussed what patient would have wanted if she were to be in a critical condition. Arely states that her mother would have wanted everything to be done to stay alive. Arely also hopeful as patient had very good functionality prior to meningitis episode and cardiac arrest. Arely and family still hopeful for recovery. Will continue to provide daily emotional support and reinforcement regarding patient's grave prognosis.

## 2023-03-21 NOTE — PROGRESS NOTE ADULT - SUBJECTIVE AND OBJECTIVE BOX
SUBJECTIVE: Patient seen and examined at bedside with neurology attending at bedside this AM. Remains intubated and sedated.      INTERVAL HISTORY: Overnight was transfused 1U pRBCs.   Prelim EEG yesterday with abundant frontal midline periodic discharges concerning for possible focal seizures. Lacosamide increased to 200mg Q12H, Keppra increased to 1000mg Q12H, Midazolam 0.05 mg/kg/hr gtt initiated.    ROS: Unable to obtain due to mental status.       MEDICATIONS:  MEDICATIONS  (STANDING):  atovaquone  Suspension 1500 milliGRAM(s) Oral daily  cefTRIAXone   IVPB 2000 milliGRAM(s) IV Intermittent every 12 hours  chlorhexidine 0.12% Liquid 15 milliLiter(s) Oral Mucosa two times a day  chlorhexidine 2% Cloths 1 Application(s) Topical <User Schedule>  ciprofloxacin  0.3% Ophthalmic Solution for Otic Use 4 Drop(s) Right Ear two times a day  CRRT Treatment    <Continuous>  dextrose 5%. 1000 milliLiter(s) (100 mL/Hr) IV Continuous <Continuous>  dextrose 5%. 1000 milliLiter(s) (50 mL/Hr) IV Continuous <Continuous>  dextrose 50% Injectable 25 Gram(s) IV Push once  dextrose 50% Injectable 12.5 Gram(s) IV Push once  dextrose 50% Injectable 25 Gram(s) IV Push once  fentaNYL   Infusion. 0.5 MICROgram(s)/kG/Hr (1.89 mL/Hr) IV Continuous <Continuous>  glucagon  Injectable 1 milliGRAM(s) IntraMuscular once  heparin   Injectable 5000 Unit(s) SubCutaneous every 8 hours  insulin lispro (ADMELOG) corrective regimen sliding scale   SubCutaneous every 6 hours  lacosamide IVPB 200 milliGRAM(s) IV Intermittent every 12 hours  levETIRAcetam  IVPB 1000 milliGRAM(s) IV Intermittent every 12 hours  levothyroxine Injectable 18 MICROGram(s) IV Push at bedtime  midazolam Infusion 0.05 mG/kG/Hr (3.11 mL/Hr) IV Continuous <Continuous>  naloxegol 12.5 milliGRAM(s) Oral daily  norepinephrine Infusion 0.05 MICROgram(s)/kG/Min (2.91 mL/Hr) IV Continuous <Continuous>  petrolatum Ophthalmic Ointment 1 Application(s) Both EYES every 12 hours  polyethylene glycol 3350 17 Gram(s) Oral two times a day  predniSONE   Tablet 5 milliGRAM(s) Oral daily  PrismaSATE Dialysate BGK 4 / 2.5 5000 milliLiter(s) (1000 mL/Hr) CRRT <Continuous>  PrismaSOL Filtration BGK 4 / 2.5 5000 milliLiter(s) (1000 mL/Hr) CRRT <Continuous>  PrismaSOL Filtration BGK 4 / 2.5 5000 milliLiter(s) (200 mL/Hr) CRRT <Continuous>  senna 2 Tablet(s) Oral two times a day    MEDICATIONS  (PRN):  acetaminophen   Oral Liquid .. 650 milliGRAM(s) Enteral Tube every 6 hours PRN Temp greater or equal to 38C (100.4F), Mild Pain (1 - 3), Moderate Pain (4 - 6)  dextrose Oral Gel 15 Gram(s) Oral once PRN Blood Glucose LESS THAN 70 milliGRAM(s)/deciliter      VITALS & EXAMINATION:  Vital Signs Last 24 Hrs  T(C): 38.1 (21 Mar 2023 04:00), Max: 38.1 (21 Mar 2023 04:00)  T(F): 100.5 (21 Mar 2023 04:00), Max: 100.5 (21 Mar 2023 04:00)  HR: 82 (21 Mar 2023 07:00) (58 - 87)  BP: 133/66 (21 Mar 2023 05:00) (104/64 - 159/67)  BP(mean): 83 (21 Mar 2023 05:00) (70 - 90)  RR: 14 (21 Mar 2023 07:00) (12 - 27)  SpO2: 100% (21 Mar 2023 07:00) (92% - 100%)    Parameters below as of 21 Mar 2023 07:00  Patient On (Oxygen Delivery Method): ventilator  O2 Concentration (%): 40    ***INCOMPLETE***  Neurological (as per attending eval.):  - Mental Status: Eyes initially closed. No response to verbal or noxious stimuli.  - Language: Intubated and sedated on fentanyl and versed***.  - Cranial Nerves: Pupils equal b/l and reactive, oculocephalic reflex intact, corneal reflex intact b/l, possible grimace to gag, rest of CN exam is limited by mental status.   - Motor: No withdrawal to noxious stimulation.  - Sensory: No grimace to noxious stimulation.    - Reflexes: 2+ biceps & brachioradialis b/l.   - Coordination/Gait: Patient unable to participate due to mental status.      LABS:  CBC                       6.8    12.14 )-----------( 66       ( 21 Mar 2023 05:05 )             21.6     Chem 03-21    135  |  102  |  25<H>  ----------------------------<  201<H>  4.7   |  23  |  1.26    Ca    8.3<L>      21 Mar 2023 05:05  Phos  2.8     03-21  Mg     2.50     03-21    TPro  4.8<L>  /  Alb  2.6<L>  /  TBili  0.5  /  DBili  x   /  AST  44<H>  /  ALT  50<H>  /  AlkPhos  93  03-21    LFTs LIVER FUNCTIONS - ( 21 Mar 2023 05:05 )  Alb: 2.6 g/dL / Pro: 4.8 g/dL / ALK PHOS: 93 U/L / ALT: 50 U/L / AST: 44 U/L / GGT: x           Coagulopathy PT/INR - ( 21 Mar 2023 00:20 )   PT: 12.3 sec;   INR: 1.06 ratio         PTT - ( 21 Mar 2023 00:20 )  PTT:22.0 sec  Lipid Panel 03-15 Chol 153 LDL -- HDL 45<L> Trig 146      CSF CSF:  Total Nucleated Cell Count, CSF: 73676 cells/uL (03-15-23 @ 15:00)  RBC Count - Spinal Fluid: 1267 cells/uL (03-15-23 @ 15:00)  Protein, CSF: 1181 mg/dL (03-15-23 @ 15:00)        STUDIES & IMAGING:    Studies (EKG, EEG, EMG, etc):     Study Date: 03/20/23  EEG preliminary read (not final) on the initial recording hour(s) = 9 hr 19 min  Reviewed from: 08:03-17:22    In the morning, there are abundant frontal midline periodic discharges, at times in runs concerning for possible focal seizures.  Around 13:15-13:30, discharges decrease in prevalence, with no further runs or possible seizures. Discharges later further decrease in prevalence to occasional to frequent sporadic discharges.   Background slowing increases in the afternoon as well.      Study Date: 03/19/23 08:00 - 03/20/23 08:00; Duration: 23 hr 58 min  EEG Classification / Summary:  Abnormal EEG in a comatose patient due to:  -One focal right frontal electrographic seizure lasting 16 seconds with no clinical correlate, occurring on 03/20/23 at 07:11  -Frequent midline epileptiform discharges most often occurring as brief potentially ictal rhythmic discharges (BIRDs), occasionally in longer runs at 1-2 Hz, fluctuating without evolution, more prevalent with stimulation (stimulus-induced rhythmic, periodic, or ictal-appearing discharges, SIRPIDs). Longer runs become continuous in the morning of 03/20/23.  -Rare right frontal sharp waves  -20% suppressed background improving to continuous delta > theta slowing    Clinical Impression:  -One focal right frontal electrographic seizure and rare right frontal sharp waves indicate risk of further focal-onset seizures from this region.  -Frequent midline epileptiform discharges, BIRDs, and runs of discharges indicate risk of focal-onset seizures from this region, with risk of seizures increasing in the morning of 03/20/23.  -Severe diffuse cerebral dysfunction is nonspecific in etiology. Improvement in severity over the course of monitoring may be related to decrease in sedating medications.  -Single-lead EKG incidentally shows irregular rhythm at times.      Radiology (XR, CT, MR, U/S, TTE/SORAYA):    CT brain stroke protocol 3/14/23   No hydrocephalus, acute intracranial hemorrhage, mass effect, or brain edema. Right mastoid air cell and tympanic cavity effusion, correlate for the presence of acute otomastoiditis.    CT PERFUSION:  Limited by late injection of the contrast bolus.  Cerebral blood flow less than 30% = 0 mL. No core infarct is predicted.  Tmax greater than 6 seconds = 0 mL. No brain parenchyma predicted to be at continued ischemic risk in the presence of neurologic symptoms.    CTA BRAIN:  No flow-limiting stenosis or vascular aneurysm. No AVM.    CTA NECK:  Calcified plaque at the origin of the left internal carotid artery results in approximately 40% short segment stenosis. Otherwise no flow-limiting stenosis. No evidence for arterial dissection.   SUBJECTIVE: Patient seen and examined at bedside with neurology attending at bedside this AM. Remains intubated and sedated.      INTERVAL HISTORY: Overnight was transfused 1U pRBCs.   Prelim EEG yesterday with abundant frontal midline periodic discharges concerning for possible focal seizures. Lacosamide increased to 200mg Q12H, Keppra increased to 1000mg Q12H, Midazolam 0.05 mg/kg/hr gtt initiated.    ROS: Unable to obtain due to mental status.       MEDICATIONS:  MEDICATIONS  (STANDING):  atovaquone  Suspension 1500 milliGRAM(s) Oral daily  cefTRIAXone   IVPB 2000 milliGRAM(s) IV Intermittent every 12 hours  chlorhexidine 0.12% Liquid 15 milliLiter(s) Oral Mucosa two times a day  chlorhexidine 2% Cloths 1 Application(s) Topical <User Schedule>  ciprofloxacin  0.3% Ophthalmic Solution for Otic Use 4 Drop(s) Right Ear two times a day  CRRT Treatment    <Continuous>  dextrose 5%. 1000 milliLiter(s) (100 mL/Hr) IV Continuous <Continuous>  dextrose 5%. 1000 milliLiter(s) (50 mL/Hr) IV Continuous <Continuous>  dextrose 50% Injectable 25 Gram(s) IV Push once  dextrose 50% Injectable 12.5 Gram(s) IV Push once  dextrose 50% Injectable 25 Gram(s) IV Push once  fentaNYL   Infusion. 0.5 MICROgram(s)/kG/Hr (1.89 mL/Hr) IV Continuous <Continuous>  glucagon  Injectable 1 milliGRAM(s) IntraMuscular once  heparin   Injectable 5000 Unit(s) SubCutaneous every 8 hours  insulin lispro (ADMELOG) corrective regimen sliding scale   SubCutaneous every 6 hours  lacosamide IVPB 200 milliGRAM(s) IV Intermittent every 12 hours  levETIRAcetam  IVPB 1000 milliGRAM(s) IV Intermittent every 12 hours  levothyroxine Injectable 18 MICROGram(s) IV Push at bedtime  midazolam Infusion 0.05 mG/kG/Hr (3.11 mL/Hr) IV Continuous <Continuous>  naloxegol 12.5 milliGRAM(s) Oral daily  norepinephrine Infusion 0.05 MICROgram(s)/kG/Min (2.91 mL/Hr) IV Continuous <Continuous>  petrolatum Ophthalmic Ointment 1 Application(s) Both EYES every 12 hours  polyethylene glycol 3350 17 Gram(s) Oral two times a day  predniSONE   Tablet 5 milliGRAM(s) Oral daily  PrismaSATE Dialysate BGK 4 / 2.5 5000 milliLiter(s) (1000 mL/Hr) CRRT <Continuous>  PrismaSOL Filtration BGK 4 / 2.5 5000 milliLiter(s) (1000 mL/Hr) CRRT <Continuous>  PrismaSOL Filtration BGK 4 / 2.5 5000 milliLiter(s) (200 mL/Hr) CRRT <Continuous>  senna 2 Tablet(s) Oral two times a day    MEDICATIONS  (PRN):  acetaminophen   Oral Liquid .. 650 milliGRAM(s) Enteral Tube every 6 hours PRN Temp greater or equal to 38C (100.4F), Mild Pain (1 - 3), Moderate Pain (4 - 6)  dextrose Oral Gel 15 Gram(s) Oral once PRN Blood Glucose LESS THAN 70 milliGRAM(s)/deciliter      VITALS & EXAMINATION:  Vital Signs Last 24 Hrs  T(C): 38.1 (21 Mar 2023 04:00), Max: 38.1 (21 Mar 2023 04:00)  T(F): 100.5 (21 Mar 2023 04:00), Max: 100.5 (21 Mar 2023 04:00)  HR: 82 (21 Mar 2023 07:00) (58 - 87)  BP: 133/66 (21 Mar 2023 05:00) (104/64 - 159/67)  BP(mean): 83 (21 Mar 2023 05:00) (70 - 90)  RR: 14 (21 Mar 2023 07:00) (12 - 27)  SpO2: 100% (21 Mar 2023 07:00) (92% - 100%)    Parameters below as of 21 Mar 2023 07:00  Patient On (Oxygen Delivery Method): ventilator  O2 Concentration (%): 40    ***INCOMPLETE***  Neurological (as per attending eval.):  - Mental Status: Eyes initially closed. No response to verbal or noxious stimuli.  - Language: Intubated and sedated on fentanyl and versed***.  - Cranial Nerves: Pupils equal b/l and reactive, oculocephalic reflex intact, corneal reflex intact b/l, possible grimace to gag, rest of CN exam is limited by mental status.   - Motor: No withdrawal to noxious stimulation.  - Sensory: No grimace to noxious stimulation.    - Reflexes: 2+ biceps & brachioradialis b/l.   - Coordination/Gait: Patient unable to participate due to mental status.      LABS:  CBC                       6.8    12.14 )-----------( 66       ( 21 Mar 2023 05:05 )             21.6     Chem 03-21    135  |  102  |  25<H>  ----------------------------<  201<H>  4.7   |  23  |  1.26    Ca    8.3<L>      21 Mar 2023 05:05  Phos  2.8     03-21  Mg     2.50     03-21    TPro  4.8<L>  /  Alb  2.6<L>  /  TBili  0.5  /  DBili  x   /  AST  44<H>  /  ALT  50<H>  /  AlkPhos  93  03-21    LFTs LIVER FUNCTIONS - ( 21 Mar 2023 05:05 )  Alb: 2.6 g/dL / Pro: 4.8 g/dL / ALK PHOS: 93 U/L / ALT: 50 U/L / AST: 44 U/L / GGT: x           Coagulopathy PT/INR - ( 21 Mar 2023 00:20 )   PT: 12.3 sec;   INR: 1.06 ratio         PTT - ( 21 Mar 2023 00:20 )  PTT:22.0 sec  Lipid Panel 03-15 Chol 153 LDL -- HDL 45<L> Trig 146      CSF CSF:  Total Nucleated Cell Count, CSF: 70209 cells/uL (03-15-23 @ 15:00)  RBC Count - Spinal Fluid: 1267 cells/uL (03-15-23 @ 15:00)  Protein, CSF: 1181 mg/dL (03-15-23 @ 15:00)        STUDIES & IMAGING:    EEG  Study Date: 03/20/2023 08:03 - 03/21/2023 08:00; Duration: 23 hr 37 min  EEG Classification / Summary:  Abnormal EEG in a comatose patient due to:  -Midline focal electrographic seizures and continuous LPDs between 08:24-13:15  -Midline epileptiform discharges then decrease in prevalence until 04:23, after which there is slight increase in prevalence to occasional to frequent discharges and rare brief potentially ictal rhythmic discharges (BIRDs).  -Epileptiform discharges and seizures are more prevalent with stimulation (stimulus-induced rhythmic, periodic, or ictal-appearing discharges, SIRPIDs).  -Rare right frontal sharp waves  -In retrospect, the finding on 03/20/23 at 07:11 may have been artifact rather than electrographic seizure.  -Variable severe diffuse slowing    Clinical Impression:  -Midline focal electrographic seizures and continuous LPDs between 03/20/23 08:24-13:15 indicate risk of further focal-onset seizures from this region  -Risk of seizures remains but decreases after 03/20/23 13:15  -Rare right frontal sharp waves indicate an additional potentially epileptogenic focus in this region.  -In retrospect, the finding on 03/20/23 at 07:11 may have been artifact rather than electrographic seizure.  -Severe diffuse cerebral dysfunction is nonspecific in etiology. Variation in severity may be related to changes in sedating medications.  -Single-lead EKG incidentally shows irregular rhythm at times.      Study Date: 03/20/23  EEG preliminary read (not final) on the initial recording hour(s) = 9 hr 19 min  Reviewed from: 08:03-17:22    In the morning, there are abundant frontal midline periodic discharges, at times in runs concerning for possible focal seizures.  Around 13:15-13:30, discharges decrease in prevalence, with no further runs or possible seizures. Discharges later further decrease in prevalence to occasional to frequent sporadic discharges.   Background slowing increases in the afternoon as well.      Study Date: 03/19/23 08:00 - 03/20/23 08:00; Duration: 23 hr 58 min  EEG Classification / Summary:  Abnormal EEG in a comatose patient due to:  -One focal right frontal electrographic seizure lasting 16 seconds with no clinical correlate, occurring on 03/20/23 at 07:11  -Frequent midline epileptiform discharges most often occurring as brief potentially ictal rhythmic discharges (BIRDs), occasionally in longer runs at 1-2 Hz, fluctuating without evolution, more prevalent with stimulation (stimulus-induced rhythmic, periodic, or ictal-appearing discharges, SIRPIDs). Longer runs become continuous in the morning of 03/20/23.  -Rare right frontal sharp waves  -20% suppressed background improving to continuous delta > theta slowing    Clinical Impression:  -One focal right frontal electrographic seizure and rare right frontal sharp waves indicate risk of further focal-onset seizures from this region.  -Frequent midline epileptiform discharges, BIRDs, and runs of discharges indicate risk of focal-onset seizures from this region, with risk of seizures increasing in the morning of 03/20/23.  -Severe diffuse cerebral dysfunction is nonspecific in etiology. Improvement in severity over the course of monitoring may be related to decrease in sedating medications.  -Single-lead EKG incidentally shows irregular rhythm at times.      Radiology (XR, CT, MR, U/S, TTE/SORAYA):    CT brain stroke protocol 3/14/23   No hydrocephalus, acute intracranial hemorrhage, mass effect, or brain edema. Right mastoid air cell and tympanic cavity effusion, correlate for the presence of acute otomastoiditis.    CT PERFUSION:  Limited by late injection of the contrast bolus.  Cerebral blood flow less than 30% = 0 mL. No core infarct is predicted.  Tmax greater than 6 seconds = 0 mL. No brain parenchyma predicted to be at continued ischemic risk in the presence of neurologic symptoms.    CTA BRAIN:  No flow-limiting stenosis or vascular aneurysm. No AVM.    CTA NECK:  Calcified plaque at the origin of the left internal carotid artery results in approximately 40% short segment stenosis. Otherwise no flow-limiting stenosis. No evidence for arterial dissection.

## 2023-03-21 NOTE — PROGRESS NOTE ADULT - PROBLEM SELECTOR PLAN 9
Uncontrolled hypothyroidism  TSH december >10 with normal fT4  continue levothyroxine
Uncontrolled hypothyroidism  TSH december >10 with normal fT4  - on IV replacement
Suspect due to uremia/NAMAN  lactate mildly elevated- 2.2  0.5L isotonic fluids overnight, on maintenance NS  - Follow up nephro
Suspect due to uremia/NAMAN  further plan per icu

## 2023-03-21 NOTE — PROGRESS NOTE ADULT - SUBJECTIVE AND OBJECTIVE BOX
INTERVAL HX:  Patient seen and examined, remains critically ill., intubated and sedated.     Vital Signs Last 24 Hrs  T(C): 36.6 (21 Mar 2023 08:00), Max: 38.1 (21 Mar 2023 04:00)  T(F): 97.9 (21 Mar 2023 08:00), Max: 100.5 (21 Mar 2023 04:00)  HR: 71 (21 Mar 2023 10:00) (58 - 87)  BP: 105/62 (21 Mar 2023 10:00) (103/59 - 159/67)  BP(mean): 73 (21 Mar 2023 10:00) (69 - 90)  RR: 14 (21 Mar 2023 10:00) (12 - 27)  SpO2: 100% (21 Mar 2023 10:00) (92% - 100%)    Parameters below as of 21 Mar 2023 10:00      O2 Concentration (%): 40    AD: dried blood in ear canal, canal hematoma resolved. Still with hemotympanum  AS: EAC clear, serous effusion      A/P:   78y Female with PMH PCKD s/p renal transplant, HTN, DM, LLE DVT on ASA, HLD, small supraclinoid aneurysm, glaucoma, hypothyroidism, and recurrent ENT visits for cerumen debridement who presents today with R-sided headache, neck pain, and ear pain. CTH suggesting R mastoid effusion but difficult to see on CT. Physical exam: AD hemotympanum, AS serous effusion. Hospital course c/b cardiac arrest, ROSC obtained. Now is critically ill in MICU     - please obtain MRI IAC with and without contrast when medically stable  - pending results of MRI we will consider need for myringotomy, but will defer at this time given overall medical condition  - continue abx per primary team  - notify ENT when imaging done, page with questions

## 2023-03-21 NOTE — PROGRESS NOTE ADULT - SUBJECTIVE AND OBJECTIVE BOX
Patient is a 78y old  Female who presents with a chief complaint of AMS/R ear infxn (15 Mar 2023 10:36)    f/u pneumococcal sepsis, bacteremia and meningitis    Interval History/ROS:  off pressors, did not tolerate CVVH.  remains intubated.  ROS unable    PAST MEDICAL & SURGICAL HISTORY:  Polycystic kidney disease  Glaucoma  Aneurysm  History of deep venous thrombosis (DVT) of distal vein of left lower extremity  Thrombocytopenia  Hypothyroid  Osteoporosis  Arthritis  PCP (pneumocystis carinii pneumonia)   C. difficile colitis  Type 2 diabetes mellitus, without long-term current use of insulin  Essential hypertension  H/O kidney transplant (cadaver , [facility])  H/O:   H/O eye surgery  S/P hysterectomy  H/O umbilical hernia repair    Allergies  apple (Unknown)  Benadryl (Unknown)  penicillin (Hives), tolerate cephalosporins  watermelon (Unknown)    ANTIMICROBIALS:  meropenem  IVPB 1000 every 12 hours (3/15)  vancomycin  IVPB (3/15-3/17)  active:  cefTRIAXone   IVPB 2000 every 12 hours (3/15-)  atovaquone  Suspension 1500 daily    MEDICATIONS  (STANDING):  heparin   Injectable 5000 every 8 hours  insulin lispro (ADMELOG) corrective regimen sliding scale  every 6 hours  lacosamide IVPB 200 every 12 hours  levETIRAcetam  IVPB 1000 every 12 hours  levothyroxine Injectable 18 at bedtime  midazolam Infusion 0.05 <Continuous>  naloxegol 12.5 daily  norepinephrine Infusion 0.05 <Continuous>  polyethylene glycol 3350 17 two times a day  predniSONE   Tablet 5 daily  senna 2 two times a day    Vital Signs Last 24 Hrs  T(F): 97.9 (23 @ 08:00), Max: 100.5 (23 @ 04:00)  HR: 71 (23 @ 10:00)  BP: 105/62 (23 @ 10:00)  RR: 14 (23 @ 10:00)  SpO2: 100% (23 @ 10:00) (92% - 100%)    PHYSICAL EXAM:  Constitutional: sedated on vent  HEAD/EYES: keeps eyes closed  ENT:  supple  Cardiovascular:   normal S1, S2  Respiratory:  clear BS bilaterally  GI:  soft, non-tender  :  no snider  Musculoskeletal:  no synovitis  Neurologic: lethargic, sedated, unable to assess  Skin:  no rash, L laure hoffmann  Psychiatric:  not able to assess                            6.8    12.14 )-----------( 66       ( 21 Mar 2023 05:05 )             21.6     135  |  102  |  25  ----------------------------<  201  4.7   |  23  |  1.26  Ca    8.3      21 Mar 2023 05:05Phos  2.8     Mg     2.50       TPro  4.8  /  Alb  2.6  /  TBili  0.5  /  DBili  x   /  AST  44  /  ALT  50  /  AlkPhos  93      Urinalysis Basic - ( 15 Mar 2023 03:43 )  Color: Yellow / Appearance: Clear / S.019 / pH: x  Gluc: x / Ketone: Negative  / Bili: Negative / Urobili: <2 mg/dL   Blood: x / Protein: 300 mg/dL / Nitrite: Negative   Leuk Esterase: Negative / RBC: 8 /HPF / WBC 2 /HPF   Sq Epi: x / Non Sq Epi: 1 /HPF / Bacteria: Negative    MICROBIOLOGY:  Culture - Bronchial (collected 23 @ 13:35)  Source: .Bronchial Bronchial Lavage  Gram Stain (23 @ 22:59):    No polymorphonuclear cells seen per low power field    No squamous epithelial cells per low power field    No organisms seen per oil power field    Culture - Fungal, CSF (collected 03-15-23 @ 15:00)  Source: .CSF CSF  Preliminary Report (23 @ 12:01):    Testing in progress    Culture - CSF with Gram Stain (collected 03-15-23 @ 15:00)  Source: .CSF CSF  Gram Stain (03-15-23 @ 18:44):    Few polymorphonuclear leukocytes per low power field    Moderate Gram positive cocci in pairs per oil power field  Preliminary Report (23 @ 12:54):    No growth    Culture - Acid Fast - CSF (collected 03-15-23 @ 15:00)  Source: .CSF CSF    Culture - Urine (collected 03-15-23 @ 03:43)  Source: Clean Catch Clean Catch (Midstream)  Final Report (23 @ 07:32):    No growth    Culture - Blood (collected 03-15-23 @ 03:00)  Source: .Blood Blood-Venous  Gram Stain (03-15-23 @ 18:11):    Growth in aerobic and anaerobic bottles: Gram positive cocci in pairs  Final Report (23 @ 10:03):    Growth in aerobic and anaerobic bottles: Streptococcus pneumoniae    See previous culture [id number]    Culture - Blood (collected 03-15-23 @ 02:30)  Source: .Blood Blood-Peripheral  Gram Stain (03-15-23 @ 19:26):    Growth in aerobic bottle: Gram positive cocci in pairs    Growth in anaerobic bottle: Gram positive cocci in pairs  Final Report (23 @ 11:07):    Growth in aerobic and anaerobic bottles: Streptococcus pneumoniae    ***Blood Panel PCR results on this specimen are available    approximately 3 hours after the Gram stain result.***    Gram stain, PCR, and/or culture results may not always    correspond due to difference in methodologies.    ************************************************************    This PCR assay was performed by multiplex PCR. This    Assay tests for 66 bacterial and resistance gene targets.    Please refer to the [facility] test directory    at [email or web address] for details.  Organism: Blood Culture PCR  Streptococcus pneumoniae (23 @ 11:07)  Organism: Streptococcus pneumoniae (23 @ 11:07)      -  Ceftriaxone (meningitidis): S <=0.25      -  Ceftriaxone (non-meningitidis): S <=0.25      -  Erythromycin: S <=0.06 Predicts results for azithromycin.      -  Levofloxacin: S 0.5      -  Penicillin (meningitidis): S <=0.03      -  Penicillin (non-meningitidis): S <=0.03      -  Penicillin (oral penicillin V): S <=0.03      -  Trimethoprim/Sulfamethoxazole: S <=.25/4.75      -  Vancomycin: S 0.25      Method Type: KEN  Organism: Blood Culture PCR (23 @ 11:07)      -  Streptococcus pneumoniae: Detec      Method Type: PCR    RADIOLOGY:  imaging below personally reviewed and agree with findings    Xray Chest 1 View- PORTABLE-Urgent (Xray Chest 1 View- PORTABLE-Urgent .) (23 @ 13:02) >  INTERPRETATION:  Endotracheal tube at the level of the renetta/right mainstem bronchus. Adjustment recommended.  New left lung whiteout. Findings discussed with provider Lock.    Xray Chest 1 View- PORTABLE-Urgent (Xray Chest 1 View- PORTABLE-Urgent .) (23 @ 17:50) >  IMPRESSION:  NG tube with the tip in the body of the stomach.  Bilateral pulmonary consolidations about the same or slightly worse on the left.    Xray Chest 1 View- PORTABLE-Urgent (Xray Chest 1 View- PORTABLE-Urgent .) (23 @ 14:45) >  IMPRESSION:  NG tube with the tip in the body of the stomach.  Bilateral pulmonary consolidations about the same or slightly worse on the left.    Xray Chest 1 View- PORTABLE-Urgent (Xray Chest 1 View- PORTABLE-Urgent .) (23 @ 14:45) >  IMPRESSION:  NG tube with the tip in the body of the stomach.  Bilateral pulmonary consolidations about the same or slightly worse on the left.    IR Procedure (03.15.23 @ 15:24) >   The opening pressure measured greater than 55 cm water. The closing pressure measured 32 cm water, after removal of 30 cc of cloudy CSF. The needle was removed. The patient tolerated the procedure well without initial complication.  IMPRESSION:  Status post successful lumbar puncture as described.  Markedly abnormal appearing CSF.  Elevated opening pressures.    US Kidney and Bladder (03.15.23 @ 12:10) >  IMPRESSION:  Mild fullness of the left lower quadrant transplant kidney collecting system.  Increased renal cortical echogenicity, possibly renal parenchymal disease.    CT Brain Perfusion Maps Stroke (23 @ 19:46) >  IMPRESSION:  CT PERFUSION:  Limited by late injection of the contrast bolus.  Cerebral blood flow less than 30% = 0 mL. No core infarct is predicted.  Tmax greater than 6 seconds = 0 mL. No brain parenchyma predicted to be at continued ischemicrisk in the presence of neurologic symptoms.  CTA BRAIN:  No flow-limiting stenosis or vascular aneurysm. No AVM.  CTA NECK:  Calcified plaque at the origin of the left internal carotid artery results in approximately 40% short segment stenosis.   Otherwise no flow-limiting stenosis. No evidence for arterial dissection.      ECHO  Transthoracic Echocardiogram (23 @ 11:48) >  CONCLUSIONS:  1.  Moderate aortic regurgitation.  2. Mild concentric left ventricular hypertrophy.  3. Normal left ventricular systolic function. No segmental wall motion abnormalities.  4. Reversal of the E-A  waves of the mitral inflow pattern is consistent with diastolic LV dysfunction.  5. Normal right ventricular size and function.  6. Normal tricuspid valve. Mild tricuspid regurgitation.  *** Compared with echocardiogram of 2018, no significant changes noted.

## 2023-03-21 NOTE — PROGRESS NOTE ADULT - ATTENDING COMMENTS
Patient is a 79 yo F w/ HTN, HLD, T2DM, PCKD previously on HD via LUE AVF then s/p renal transplant, LLE DVT (resolved) hypothyroidism, prior PCP PNA, CMV viremia who initially presented with R-sided HA, R ear pain and neck stiffness found to have Strep Pneumo bacteremia and meningitis in setting of otitis externa. Course c/b cardiac arrest 3/16 and post arrest seizures and NAMAN    #Septic Shock  #Strep bactermia  - c/w Ceftriaxone  - f/u repeat blood cultures, NGTD  - TTE negative for vegetations, may need SORAYA if doesn't clear blood cultures  - fu ID  - c/w vasopressors to maintain MAP > 65  - MRI IAC as per ENT  - Repeat cultures as WBC increasing    #NAMAN  #s/p Renal Transplant  #PCKD  - Transition to intermittent HD as CVVHD clotting intermittently and pressures now improved    #Encephalopathy post cardiac arrest  #Seizures  - c/w EEG, focal seizures yesterday prior to Vimpat load and initiation of Versed gtt  - c/w Vimpat, Keppra, Versed gtt  - f/u neuro recs    #Dysphagia  - Tube feeds as tolerated    #Respiratory failure   - c/w mechanical ventilatory support, wean as tolerated    #DVT ppx  - Resume     Nico Canotr MD  Pulmonary & Critical Care

## 2023-03-22 NOTE — PROGRESS NOTE ADULT - ATTENDING COMMENTS
Ms Negron is a 78 year-old unknown handed woman with a PMHx of PCKD s/p renal transplant, aneurysm, vascular risk factors (including DM, HLD, HTN) hypothyroidism, glaucoma, cataracts, DVT in leg, initially she was brought to [facility] ER due to the severe headache, right ear pain, neck pain, markedly decreased responsiveness. 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 Currently she remains on mechanical ventilation for air way protection on Versed 0.5. Seizure are controlled on Versed and current antiepileptic medications. Today will try to gradually titrate down Versed drip and follow up EEG tomorrow.     Plan:  MRI brain is pending.  Continue video EEG  Continue Keppra 750 mg BID  as per renal dose.   Continue lacosamide 200mg Q12H.  Continue medical management, neuro- check and fall precaution.  GI and DVT prophylaxis.  Patient is at high risk for complications and morbidity or mortality. There is high probability of imminent or life threatening deterioration in the patient's condition.  Patient is unable or incompetent to participate in giving a history and/or making decisions and discussion is necessary for determining treatment decisions.  My cumulative time taking care of this critically ill patient is 30 minutes  If you have any further questions, please do not hesitate to contact our team.  Thank you for allowing us to participate in this patient care.    Vazquez Goodrich MD .

## 2023-03-22 NOTE — PROGRESS NOTE ADULT - SUBJECTIVE AND OBJECTIVE BOX
[facility] - DANNA Cosby / DANNA Rivers / LENCHO Ontiveros/ DANNA Aldana/ DANNA Hussein/ QUYEN Gambino / LUCIAN Mukherjee / GISELE Stapleton  ---------------------------------------------------------------------------------------------------------------  seen and examined today for ESRD  Interval : NAD  VITALS:  T(F): 100 (03-22-23 @ 08:00), Max: 100 (03-22-23 @ 08:00)  HR: 107 (03-22-23 @ 09:30)  BP: 118/62 (03-22-23 @ 09:00)  RR: 20 (03-22-23 @ 09:30)  SpO2: 100% (03-22-23 @ 09:30)  Wt(kg): --    03-21 @ 07:01  -  03-22 @ 07:00  --------------------------------------------------------  IN: 1752.1 mL / OUT: 510 mL / NET: 1242.1 mL    03-22 @ 07:01  -  03-22 @ 10:11  --------------------------------------------------------  IN: 81.7 mL / OUT: 115 mL / NET: -33.3 mL      Physical Exam :-  Constitutional: intubated, sedated  Neck: Supple.  Respiratory: Bilateral equal breath sounds,  Cardiovascular: S1, S2 normal,  Gastrointestinal: Bowel Sounds present, soft, non tender.  Extremities: trace edema  Neurological: poorly responsive  Psychiatric: Normal mood, normal affect  Data:-  Allergies :   apple (Unknown)  Benadryl (Unknown)  penicillin (Hives)  watermelon (Unknown)    Hospital Medications:   MEDICATIONS  (STANDING):  atovaquone  Suspension 1500 milliGRAM(s) Oral daily  cefTRIAXone   IVPB 2000 milliGRAM(s) IV Intermittent every 12 hours  chlorhexidine 0.12% Liquid 15 milliLiter(s) Oral Mucosa two times a day  chlorhexidine 2% Cloths 1 Application(s) Topical <User Schedule>  ciprofloxacin  0.3% Ophthalmic Solution for Otic Use 4 Drop(s) Right Ear two times a day  dextrose 5%. 1000 milliLiter(s) (100 mL/Hr) IV Continuous <Continuous>  dextrose 5%. 1000 milliLiter(s) (50 mL/Hr) IV Continuous <Continuous>  dextrose 50% Injectable 25 Gram(s) IV Push once  dextrose 50% Injectable 12.5 Gram(s) IV Push once  dextrose 50% Injectable 25 Gram(s) IV Push once  glucagon  Injectable 1 milliGRAM(s) IntraMuscular once  heparin   Injectable 5000 Unit(s) SubCutaneous every 8 hours  insulin lispro (ADMELOG) corrective regimen sliding scale   SubCutaneous every 6 hours  lacosamide IVPB 200 milliGRAM(s) IV Intermittent every 12 hours  levETIRAcetam  IVPB 750 milliGRAM(s) IV Intermittent every 12 hours  levothyroxine Injectable 18 MICROGram(s) IV Push at bedtime  midazolam Infusion 0.05 mG/kG/Hr (3.11 mL/Hr) IV Continuous <Continuous>  naloxegol 12.5 milliGRAM(s) Oral daily  petrolatum Ophthalmic Ointment 1 Application(s) Both EYES every 12 hours  polyethylene glycol 3350 17 Gram(s) Oral two times a day  predniSONE   Tablet 5 milliGRAM(s) Oral daily  senna Syrup 10 milliLiter(s) Oral two times a day    03-22    134<L>  |  101  |  40<H>  ----------------------------<  211<H>  4.3   |  21<L>  |  1.95<H>    Ca    8.2<L>      22 Mar 2023 01:05  Phos  2.8     03-21  Mg     2.50     03-21    TPro  4.5<L>  /  Alb  2.4<L>  /  TBili  0.5  /  DBili      /  AST  36<H>  /  ALT  43<H>  /  AlkPhos  99  03-22    Creatinine Trend: 1.95 <--, 1.26 <--, 1.24 <--, 1.31 <--, 1.16 <--, 1.38 <--, 1.31 <--, 1.45 <--, 1.49 <--, 1.61 <--, 1.83 <--, 2.10 <--, 2.42 <--, 2.90 <--, 3.86 <--, 3.83 <--, 3.62 <--, 3.31 <--, 3.10 <--                        7.2    11.13 )-----------( 66       ( 22 Mar 2023 01:05 )             23.3

## 2023-03-22 NOTE — PROGRESS NOTE ADULT - ATTENDING COMMENTS
Patient is a 77 yo F w/ HTN, HLD, T2DM, PCKD previously on HD via LUE AVF then s/p renal transplant, LLE DVT (resolved) hypothyroidism, prior PCP PNA, CMV viremia who initially presented with R-sided HA, R ear pain and neck stiffness found to have Strep Pneumo bacteremia and meningitis in setting of otitis externa. Course c/b cardiac arrest 3/16 and post arrest seizures and NAMAN    #Septic Shock  #Strep bactermia  - c/w Ceftriaxone  - f/u repeat blood cultures, NGTD  - TTE negative for vegetations, possible SORAYA today  - fu ID  - MRI IAC as per ENT  - f/u repeat cultures    #NAMAN  #s/p Renal Transplant  #PCKD  - Transition to intermittent HD as CVVHD clotting intermittently and pressures now improved    #Encephalopathy post cardiac arrest  #Seizures - No seizures on EEG overnight  - c/w Vimpat, Keppra, Versed gtt  - Wean Versed gtt  - Monitor EEG  - f/u neuro recs    #Dysphagia  - Tube feeds as tolerated    #Respiratory failure   - c/w mechanical ventilatory support, wean as tolerated    Nico Cantor MD  Pulmonary & Critical Care

## 2023-03-22 NOTE — PROGRESS NOTE ADULT - SUBJECTIVE AND OBJECTIVE BOX
SUBJECTIVE: ***      INTERVAL HISTORY:***      ROS: Unable to obtain due to mental status.      MEDICATIONS:  MEDICATIONS  (STANDING):  atovaquone  Suspension 1500 milliGRAM(s) Oral daily  cefTRIAXone   IVPB 2000 milliGRAM(s) IV Intermittent every 12 hours  chlorhexidine 0.12% Liquid 15 milliLiter(s) Oral Mucosa two times a day  chlorhexidine 2% Cloths 1 Application(s) Topical <User Schedule>  ciprofloxacin  0.3% Ophthalmic Solution for Otic Use 4 Drop(s) Right Ear two times a day  dextrose 5%. 1000 milliLiter(s) (100 mL/Hr) IV Continuous <Continuous>  dextrose 5%. 1000 milliLiter(s) (50 mL/Hr) IV Continuous <Continuous>  dextrose 50% Injectable 25 Gram(s) IV Push once  dextrose 50% Injectable 12.5 Gram(s) IV Push once  dextrose 50% Injectable 25 Gram(s) IV Push once  glucagon  Injectable 1 milliGRAM(s) IntraMuscular once  heparin   Injectable 5000 Unit(s) SubCutaneous every 8 hours  insulin lispro (ADMELOG) corrective regimen sliding scale   SubCutaneous every 6 hours  lacosamide IVPB 200 milliGRAM(s) IV Intermittent every 12 hours  levETIRAcetam  IVPB 750 milliGRAM(s) IV Intermittent every 12 hours  levothyroxine Injectable 18 MICROGram(s) IV Push at bedtime  midazolam Infusion 0.05 mG/kG/Hr (3.11 mL/Hr) IV Continuous <Continuous>  naloxegol 12.5 milliGRAM(s) Oral daily  petrolatum Ophthalmic Ointment 1 Application(s) Both EYES every 12 hours  polyethylene glycol 3350 17 Gram(s) Oral two times a day  predniSONE   Tablet 5 milliGRAM(s) Oral daily  senna Syrup 10 milliLiter(s) Oral two times a day    MEDICATIONS  (PRN):  acetaminophen   Oral Liquid .. 650 milliGRAM(s) Enteral Tube every 6 hours PRN Temp greater or equal to 38C (100.4F), Mild Pain (1 - 3), Moderate Pain (4 - 6)  dextrose Oral Gel 15 Gram(s) Oral once PRN Blood Glucose LESS THAN 70 milliGRAM(s)/deciliter      VITALS & EXAMINATION:  Vital Signs Last 24 Hrs  T(C): 37.8 (22 Mar 2023 08:00), Max: 37.8 (22 Mar 2023 08:00)  T(F): 100 (22 Mar 2023 08:00), Max: 100 (22 Mar 2023 08:00)  HR: 85 (22 Mar 2023 11:00) (62 - 107)  BP: 118/62 (22 Mar 2023 09:00) (95/53 - 164/72)  BP(mean): 74 (22 Mar 2023 09:00) (62 - 94)  RR: 20 (22 Mar 2023 11:00) (12 - 20)  SpO2: 96% (22 Mar 2023 11:00) (91% - 100%)    Parameters below as of 22 Mar 2023 09:00  Patient On (Oxygen Delivery Method): ventilator,ac 10/350/30/5    **To be documented by attending**        LABS:  CBC                       7.2    11.13 )-----------( 66       ( 22 Mar 2023 01:05 )             23.3     Chem 03-22    134<L>  |  101  |  40<H>  ----------------------------<  211<H>  4.3   |  21<L>  |  1.95<H>    Ca    8.2<L>      22 Mar 2023 01:05  Phos  2.9     03-22  Mg     2.60     03-22    TPro  4.5<L>  /  Alb  2.4<L>  /  TBili  0.5  /  DBili  x   /  AST  36<H>  /  ALT  43<H>  /  AlkPhos  99  03-22    LFTs LIVER FUNCTIONS - ( 22 Mar 2023 01:05 )  Alb: 2.4 g/dL / Pro: 4.5 g/dL / ALK PHOS: 99 U/L / ALT: 43 U/L / AST: 36 U/L / GGT: x           Coagulopathy PT/INR - ( 21 Mar 2023 00:20 )   PT: 12.3 sec;   INR: 1.06 ratio         PTT - ( 21 Mar 2023 00:20 )  PTT:22.0 sec  Lipid Panel 03-15 Chol 153 LDL -- HDL 45<L> Trig 146      Total Nucleated Cell Count, CSF: 39516 cells/uL (03-15-23 @ 15:00)  RBC Count - Spinal Fluid: 1267 cells/uL (03-15-23 @ 15:00)  Protein, CSF: 1181 mg/dL (03-15-23 @ 15:00)      STUDIES & IMAGING:    EEG  Study Date: 03/21/2023 08:00 - 03/22/2023 08:00; Duration: 23 hr 58 min  EEG Classification / Summary:  Abnormal EEG in the awake, drowsy, and asleep states due to:  -Occasional left midline spike-wave discharges most often occurring as brief potentially ictal rhythmic discharges (BIRDs) lasting <1-2 seconds or in bursts of lateralized periodic discharges (LPDs) at 1.5-2 Hz  -Severe diffuse slowing  Multiple event button presses, including reported BUE jerking, are not associated with electrographic seizure on EEG.    Clinical Impression:  Risk of seizures has decreased compared to the prior day.  No electrographic seizures are captured.  Multiple event button presses, including reported BUE jerking, are not associated with electrographic seizure on EEG.  Severe diffuse cerebral dysfunction is nonspecific in etiology. In this case, sedating medications may be contributing.  --------------------------------------------------------------------  Study Date: 03/20/2023 08:03 - 03/21/2023 08:00; Duration: 23 hr 37 min  EEG Classification / Summary:  Abnormal EEG in a comatose patient due to:  -Midline focal electrographic seizures and continuous LPDs between 08:24-13:15  -Midline epileptiform discharges then decrease in prevalence until 04:23, after which there is slight increase in prevalence to occasional to frequent discharges and rare brief potentially ictal rhythmic discharges (BIRDs).  -Epileptiform discharges and seizures are more prevalent with stimulation (stimulus-induced rhythmic, periodic, or ictal-appearing discharges, SIRPIDs).  -Rare right frontal sharp waves  -In retrospect, the finding on 03/20/23 at 07:11 may have been artifact rather than electrographic seizure.  -Variable severe diffuse slowing    Clinical Impression:  -Midline focal electrographic seizures and continuous LPDs between 03/20/23 08:24-13:15 indicate risk of further focal-onset seizures from this region  -Risk of seizures remains but decreases after 03/20/23 13:15  -Rare right frontal sharp waves indicate an additional potentially epileptogenic focus in this region.  -In retrospect, the finding on 03/20/23 at 07:11 may have been artifact rather than electrographic seizure.  -Severe diffuse cerebral dysfunction is nonspecific in etiology. Variation in severity may be related to changes in sedating medications.  -Single-lead EKG incidentally shows irregular rhythm at times.  ------------------------------------------------------------    Study Date: 03/19/23 08:00 - 03/20/23 08:00; Duration: 23 hr 58 min  EEG Classification / Summary:  Abnormal EEG in a comatose patient due to:  -One focal right frontal electrographic seizure lasting 16 seconds with no clinical correlate, occurring on 03/20/23 at 07:11  -Frequent midline epileptiform discharges most often occurring as brief potentially ictal rhythmic discharges (BIRDs), occasionally in longer runs at 1-2 Hz, fluctuating without evolution, more prevalent with stimulation (stimulus-induced rhythmic, periodic, or ictal-appearing discharges, SIRPIDs). Longer runs become continuous in the morning of 03/20/23.  -Rare right frontal sharp waves  -20% suppressed background improving to continuous delta > theta slowing    Clinical Impression:  -One focal right frontal electrographic seizure and rare right frontal sharp waves indicate risk of further focal-onset seizures from this region.  -Frequent midline epileptiform discharges, BIRDs, and runs of discharges indicate risk of focal-onset seizures from this region, with risk of seizures increasing in the morning of 03/20/23.  -Severe diffuse cerebral dysfunction is nonspecific in etiology. Improvement in severity over the course of monitoring may be related to decrease in sedating medications.  -Single-lead EKG incidentally shows irregular rhythm at times.  -------------------------------------------------------------    Radiology (XR, CT, MR, U/S, TTE/SORAYA):    CT brain stroke protocol 3/14/23   No hydrocephalus, acute intracranial hemorrhage, mass effect, or brain edema. Right mastoid air cell and tympanic cavity effusion, correlate for the presence of acute otomastoiditis.    CT PERFUSION:  Limited by late injection of the contrast bolus.  Cerebral blood flow less than 30% = 0 mL. No core infarct is predicted.  Tmax greater than 6 seconds = 0 mL. No brain parenchyma predicted to be at continued ischemic risk in the presence of neurologic symptoms.    CTA BRAIN:  No flow-limiting stenosis or vascular aneurysm. No AVM.    CTA NECK:  Calcified plaque at the origin of the left internal carotid artery results in approximately 40% short segment stenosis. Otherwise no flow-limiting stenosis. No evidence for arterial dissection.       SUBJECTIVE/INTERVAL HISTORY: Patient seen and evaluated by neurology attending this AM. Remains intubated and sedated.        ROS: Unable to obtain due to mental status.      MEDICATIONS:  MEDICATIONS  (STANDING):  atovaquone  Suspension 1500 milliGRAM(s) Oral daily  cefTRIAXone   IVPB 2000 milliGRAM(s) IV Intermittent every 12 hours  chlorhexidine 0.12% Liquid 15 milliLiter(s) Oral Mucosa two times a day  chlorhexidine 2% Cloths 1 Application(s) Topical <User Schedule>  ciprofloxacin  0.3% Ophthalmic Solution for Otic Use 4 Drop(s) Right Ear two times a day  dextrose 5%. 1000 milliLiter(s) (100 mL/Hr) IV Continuous <Continuous>  dextrose 5%. 1000 milliLiter(s) (50 mL/Hr) IV Continuous <Continuous>  dextrose 50% Injectable 25 Gram(s) IV Push once  dextrose 50% Injectable 12.5 Gram(s) IV Push once  dextrose 50% Injectable 25 Gram(s) IV Push once  glucagon  Injectable 1 milliGRAM(s) IntraMuscular once  heparin   Injectable 5000 Unit(s) SubCutaneous every 8 hours  insulin lispro (ADMELOG) corrective regimen sliding scale   SubCutaneous every 6 hours  lacosamide IVPB 200 milliGRAM(s) IV Intermittent every 12 hours  levETIRAcetam  IVPB 750 milliGRAM(s) IV Intermittent every 12 hours  levothyroxine Injectable 18 MICROGram(s) IV Push at bedtime  midazolam Infusion 0.05 mG/kG/Hr (3.11 mL/Hr) IV Continuous <Continuous>  naloxegol 12.5 milliGRAM(s) Oral daily  petrolatum Ophthalmic Ointment 1 Application(s) Both EYES every 12 hours  polyethylene glycol 3350 17 Gram(s) Oral two times a day  predniSONE   Tablet 5 milliGRAM(s) Oral daily  senna Syrup 10 milliLiter(s) Oral two times a day    MEDICATIONS  (PRN):  acetaminophen   Oral Liquid .. 650 milliGRAM(s) Enteral Tube every 6 hours PRN Temp greater or equal to 38C (100.4F), Mild Pain (1 - 3), Moderate Pain (4 - 6)  dextrose Oral Gel 15 Gram(s) Oral once PRN Blood Glucose LESS THAN 70 milliGRAM(s)/deciliter      VITALS & EXAMINATION:  Vital Signs Last 24 Hrs  T(C): 37.8 (22 Mar 2023 08:00), Max: 37.8 (22 Mar 2023 08:00)  T(F): 100 (22 Mar 2023 08:00), Max: 100 (22 Mar 2023 08:00)  HR: 85 (22 Mar 2023 11:00) (62 - 107)  BP: 118/62 (22 Mar 2023 09:00) (95/53 - 164/72)  BP(mean): 74 (22 Mar 2023 09:00) (62 - 94)  RR: 20 (22 Mar 2023 11:00) (12 - 20)  SpO2: 96% (22 Mar 2023 11:00) (91% - 100%)    Parameters below as of 22 Mar 2023 09:00  Patient On (Oxygen Delivery Method): ventilator,ac 10/350/30/5    **As documented by attending**        LABS:  CBC                       7.2    11.13 )-----------( 66       ( 22 Mar 2023 01:05 )             23.3     Chem 03-22    134<L>  |  101  |  40<H>  ----------------------------<  211<H>  4.3   |  21<L>  |  1.95<H>    Ca    8.2<L>      22 Mar 2023 01:05  Phos  2.9     03-22  Mg     2.60     03-22    TPro  4.5<L>  /  Alb  2.4<L>  /  TBili  0.5  /  DBili  x   /  AST  36<H>  /  ALT  43<H>  /  AlkPhos  99  03-22    LFTs LIVER FUNCTIONS - ( 22 Mar 2023 01:05 )  Alb: 2.4 g/dL / Pro: 4.5 g/dL / ALK PHOS: 99 U/L / ALT: 43 U/L / AST: 36 U/L / GGT: x           Coagulopathy PT/INR - ( 21 Mar 2023 00:20 )   PT: 12.3 sec;   INR: 1.06 ratio         PTT - ( 21 Mar 2023 00:20 )  PTT:22.0 sec  Lipid Panel 03-15 Chol 153 LDL -- HDL 45<L> Trig 146      Total Nucleated Cell Count, CSF: 91980 cells/uL (03-15-23 @ 15:00)  RBC Count - Spinal Fluid: 1267 cells/uL (03-15-23 @ 15:00)  Protein, CSF: 1181 mg/dL (03-15-23 @ 15:00)      STUDIES & IMAGING:    EEG  Study Date: 03/21/2023 08:00 - 03/22/2023 08:00; Duration: 23 hr 58 min  EEG Classification / Summary:  Abnormal EEG in the awake, drowsy, and asleep states due to:  -Occasional left midline spike-wave discharges most often occurring as brief potentially ictal rhythmic discharges (BIRDs) lasting <1-2 seconds or in bursts of lateralized periodic discharges (LPDs) at 1.5-2 Hz  -Severe diffuse slowing  Multiple event button presses, including reported BUE jerking, are not associated with electrographic seizure on EEG.    Clinical Impression:  Risk of seizures has decreased compared to the prior day.  No electrographic seizures are captured.  Multiple event button presses, including reported BUE jerking, are not associated with electrographic seizure on EEG.  Severe diffuse cerebral dysfunction is nonspecific in etiology. In this case, sedating medications may be contributing.  --------------------------------------------------------------------  Study Date: 03/20/2023 08:03 - 03/21/2023 08:00; Duration: 23 hr 37 min  EEG Classification / Summary:  Abnormal EEG in a comatose patient due to:  -Midline focal electrographic seizures and continuous LPDs between 08:24-13:15  -Midline epileptiform discharges then decrease in prevalence until 04:23, after which there is slight increase in prevalence to occasional to frequent discharges and rare brief potentially ictal rhythmic discharges (BIRDs).  -Epileptiform discharges and seizures are more prevalent with stimulation (stimulus-induced rhythmic, periodic, or ictal-appearing discharges, SIRPIDs).  -Rare right frontal sharp waves  -In retrospect, the finding on 03/20/23 at 07:11 may have been artifact rather than electrographic seizure.  -Variable severe diffuse slowing    Clinical Impression:  -Midline focal electrographic seizures and continuous LPDs between 03/20/23 08:24-13:15 indicate risk of further focal-onset seizures from this region  -Risk of seizures remains but decreases after 03/20/23 13:15  -Rare right frontal sharp waves indicate an additional potentially epileptogenic focus in this region.  -In retrospect, the finding on 03/20/23 at 07:11 may have been artifact rather than electrographic seizure.  -Severe diffuse cerebral dysfunction is nonspecific in etiology. Variation in severity may be related to changes in sedating medications.  -Single-lead EKG incidentally shows irregular rhythm at times.  ------------------------------------------------------------    Study Date: 03/19/23 08:00 - 03/20/23 08:00; Duration: 23 hr 58 min  EEG Classification / Summary:  Abnormal EEG in a comatose patient due to:  -One focal right frontal electrographic seizure lasting 16 seconds with no clinical correlate, occurring on 03/20/23 at 07:11  -Frequent midline epileptiform discharges most often occurring as brief potentially ictal rhythmic discharges (BIRDs), occasionally in longer runs at 1-2 Hz, fluctuating without evolution, more prevalent with stimulation (stimulus-induced rhythmic, periodic, or ictal-appearing discharges, SIRPIDs). Longer runs become continuous in the morning of 03/20/23.  -Rare right frontal sharp waves  -20% suppressed background improving to continuous delta > theta slowing    Clinical Impression:  -One focal right frontal electrographic seizure and rare right frontal sharp waves indicate risk of further focal-onset seizures from this region.  -Frequent midline epileptiform discharges, BIRDs, and runs of discharges indicate risk of focal-onset seizures from this region, with risk of seizures increasing in the morning of 03/20/23.  -Severe diffuse cerebral dysfunction is nonspecific in etiology. Improvement in severity over the course of monitoring may be related to decrease in sedating medications.  -Single-lead EKG incidentally shows irregular rhythm at times.  -------------------------------------------------------------    Radiology (XR, CT, MR, U/S, TTE/SORAYA):    CT brain stroke protocol 3/14/23   No hydrocephalus, acute intracranial hemorrhage, mass effect, or brain edema. Right mastoid air cell and tympanic cavity effusion, correlate for the presence of acute otomastoiditis.    CT PERFUSION:  Limited by late injection of the contrast bolus.  Cerebral blood flow less than 30% = 0 mL. No core infarct is predicted.  Tmax greater than 6 seconds = 0 mL. No brain parenchyma predicted to be at continued ischemic risk in the presence of neurologic symptoms.    CTA BRAIN:  No flow-limiting stenosis or vascular aneurysm. No AVM.    CTA NECK:  Calcified plaque at the origin of the left internal carotid artery results in approximately 40% short segment stenosis. Otherwise no flow-limiting stenosis. No evidence for arterial dissection.

## 2023-03-22 NOTE — PROGRESS NOTE ADULT - ASSESSMENT
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 Went to ENT and was diagnosed with R otitis externa secondary to perforated TM. Prescribed cipro/dexamethasone.  Worsening headache and EMS called. Admitted 3/14.  CT head negative.  NAMAN.  Started on vanc/meropenem.  IR obtained LP.  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

## 2023-03-22 NOTE — PROGRESS NOTE ADULT - ASSESSMENT
Ms Negron is a 78 year-old woman with a PMHx of PCKD s/p renal transplant, brain cyst, DM, HLD, HTN, hypothyroidism, glaucoma, cataracts, DVT in leg, on asa who presents to ED for severe headache, right ear pain, neck pain, markedly decreased responsiveness to external stimuli. LP significant for decreased glucose, elevated WBC with neutrophil predominance, elevated protein, PCR (+) for strep. pneumoniae. antibiotics were started. Course complicated by PEA arrest, ROSC achieved after about 10 mins. Patient is now intubated and sedated.     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     Plan  [] MRI as per ENT when stable  [/] Continue video EEG   [x] S/p IV levetiracetam 500mg load x1 (3/17) and maintenance 250mg IV Q12 (3/17-3/19); then 750mg Q12H ( CRRT dosing 3/19-3/20); increased to 1000mg Q12H (3/20 - 3/21)  [/] Levetiracetam decreased back to 750mg Q12H (3/22 - )  [/] Continue lacosamide 200mg Q12H (3/20)  [] Follow up neuron specific enolase (sent 3/19)  [] Follow up levetiracetam level   [x] UA (-), Bcx (+GPC in pairs - S. pneumo)  [x] SERUM: A1C 5.6, LDL 79, procal 45.63, CK 1074  [x] S/p LP 3/15: protein (>1181), gluc (<5), TNC (34,389, 88% neut), RBC (1267), AFB (-), CSF gram strain & culture (GPC in pairs), cryptococcal ag (-), EBV PCR (-), CSF PCR (+S. pneumoniae),   [x] Consults: ENT, ID, MICU, Renal/transplant

## 2023-03-22 NOTE — PROGRESS NOTE ADULT - ASSESSMENT
77 y/o F with h/o HTN, HLD, DM2, PCKD previously on HD via LUE AVF then s/p renal transplant, LLE DVT (resolved, nml dopplers 12/2022) on ASA, very small supraclinoid aneurysm (reportedly unchanged on MRA 10/2020), hypothyroidism, PCP 2021 on atovaquone ppx, reportedly hospitalized in 12/2022 for rectal prolapse, had a blood transfusion at that time, also + CMV 1/25/23 who presented with R-sided HA, R ear pain and neck stiffness after visit to ENT earlier in the day, altered on presentation, CT head unremarkable for stroke, however LP with IR and BCx showing +strep pneumoniae. currently on vanc and CTX per ID. Cardiac arrest 3/16 with ROSC, transferred to MICU for further management.       Plan:   Neurological  #Currently Intubated, sedated   - on fentanyl and versed, continue to wean  - f/u neuron specific enolase sent on 3/19    #Strep pneumo meningitis #Acute encephalopathy  severe HA, neck stiffness, fever, leukocytosis concerning for meningitis/encephalitis  CT head negative for CVA.   LP and BCx 3/15 both showing gram positive cocci in pairs, and + strep pneumoniae.   likely source of entry from R ear where pt c/o discomfort for several months.  patient is on cyclosporine, mycophenolate mofetil and prednisone due to kidney transplant and is immunocompromised   dental procedure 3/14 but less likely source   ID consulted, recs for continuing meropenem. s/p vancomycin 3/15.   - f/u BCx and LP sensitivities  - f/u MRI of the IAC w/ contrast at a later time when renal function improves    #Seizures  - 24hr EEG initially with highly focal seizures, improved with increased sedation.   - keppra loaded 500mg IVP (3/17) and started on 250mg IV q12h (3/17 - 3/19) increased to 750 mg IV q12h (CRRT dosing (3/19 - 3/20), then increased to 1000mg IV q12 (3/20 - [ ])  - f/u keppra serum level  - vimpat 100 BID increased to 200 BID (3/20 - [ ])  - decrease propofol while monitoring on EEG, decreased seizure activity after increasing vimpat and keppra doses on 3/20    Cardiovascular  #Cardiac Arrest  bradycardia to 30, pulseless, code blue was called 3/16   2 rounds of epi, and PEA arrest. on 3rd pulse check pulseless VT, 200J shock given  4th pulse check asystole, 5th ROSC, sinus tachy with HUMA. IO placed and levo started. Due to blood in mouth took several attempts with DL for ETT to be placed but ultimately confirmed with capnometry and bilateral breath sounds.       #Elevated troponin  uptrending troponin to 182 3/15 AM, was likely iso ESRD  s/p cardiac arrest, elevated ST segment   - TTE from 3/17 showed EF of 63% with mild pulm htn, normal left ventricular systolic function, and diastolic LV dysfunction.      #HTN  on amlodipine 5mg, losartan 100mg, torsemide 20mg, and clonidine 0.2 patch weekly  - hold for now given sepsis and on pressor      Respiratory/ENT  #Intubated  continue sedation    # Tympanic membrane rupture  discharge from R ear, evaluated by ENT given dexamethasone and cipro 3/14  - c/w broad spectrum abx   - MRI of IAC with contrast when pt is stable  - f/u ENT recs    Gastrointestinal  #Constipation  - Abdominal xray negative for bowel obstruction  - trial on movantik  - c/w bowel regimen  - increase trickle feeds as tolerated and bowel regimen      Renal  #ESRD #s/p Kidney Transplant in 2003 at [place] #PCKD  previously HD through LLE AVF but no HD since transplant  Cr 3.8, unclear baseline  - avoid nephrotoxic agent  - holding cyclosporine and mycophenolate   - changed prednisone to medrol 10mg IVP q6h  - appreciate nephro recs  - strict I/O and replete electrolytes  - 4mg IV bumex with minimal urine production (3/17)  - started on CRRT (3/17-3/21) net negative 100cc/hr goal  - plan for intermittent HD (3/21 - [ ])       Hematological  # h/o LLE DVT  resolved, no DVT on doppler 12/22.  - negative LLE duplex this admission 3/17  - can restart heparin subQ (3/21)      #Anemia  - s/p 1 unit pRBC on 3/16 for Hb drop from 10 to 7.9 and 1 unit on 3/21 for Hb 6.8 2/2 loss from CRRT clotting  - Evaluated by ENT for bleeding and found no signs of active bleeding from nasal cavity, nasopharynx or oral cavity, however, patient biting on tongue, which could be one source of bleeding. Tongue was edematous and lax      Infectious Disease  #Strep pneumoniae meningitis #R ear mastoiditis  treatment with IV CTX   - c/w ciprodex drops for R ear  - f/u ID recs   - f/u repeat blood cx from 3/17  - repeat cx on 3/19 2/2 fever  - c/w CTX  - come down to home dose prednisone 3/21 (5mg qday)    #h/o PCP  - atovaquone at home, continue      Endocrinological  #Hypothyroidism  last TSH 12/2022 >10  TSH 3/15 4.73  - c/w levothyroxine     #Diabetes mellitus Type II  - ISS      Ethics  FULL, GOC ongoing. 79 y/o F with h/o HTN, HLD, DM2, PCKD previously on HD via LUE AVF then s/p renal transplant, LLE DVT (resolved, nml dopplers 12/2022) on ASA, very small supraclinoid aneurysm (reportedly unchanged on MRA 10/2020), hypothyroidism, PCP 2021 on atovaquone ppx, reportedly hospitalized in 12/2022 for rectal prolapse, had a blood transfusion at that time, also + CMV 1/25/23 who presented with R-sided HA, R ear pain and neck stiffness after visit to ENT earlier in the day, altered on presentation, CT head unremarkable for stroke, however LP with IR and BCx showing +strep pneumoniae. currently on vanc and CTX per ID. Cardiac arrest 3/16 with ROSC, transferred to MICU for further management.       Plan:   Neurological  #Currently Intubated, sedated   - on versed, continue to wean  - f/u neuron specific enolase sent on 3/19    #Strep pneumo meningitis #Acute encephalopathy  severe HA, neck stiffness, fever, leukocytosis concerning for meningitis/encephalitis  CT head negative for CVA.   LP and BCx 3/15 both showing gram positive cocci in pairs, and + strep pneumoniae.   likely source of entry from R ear where pt c/o discomfort for several months.  patient is on cyclosporine, mycophenolate mofetil and prednisone due to kidney transplant and is immunocompromised   dental procedure 3/14 but less likely source   ID consulted, recs for continuing meropenem. s/p vancomycin 3/15.   - f/u BCx and LP sensitivities  - f/u MRI of the IAC w/ contrast at a later time when renal function improves    #Seizures  - 24hr EEG initially with highly focal seizures, improved with increased sedation.   - keppra loaded 500mg IVP (3/17) and started on 250mg IV q12h (3/17 - 3/19) increased to 750 mg IV q12h (CRRT dosing (3/19 - 3/20), then increased to 1000mg IV q12 (3/20 - 3/22), then back to 750mg IV q12 (3/22 - [ ])  - f/u keppra serum level  - vimpat 100 BID increased to 200 BID (3/20 - [ ])  - c/w versed drip  - decrease propofol while monitoring on EEG, decreased seizure activity after increasing vimpat and keppra doses on 3/20    Cardiovascular  #Cardiac Arrest  bradycardia to 30, pulseless, code blue was called 3/16   2 rounds of epi, and PEA arrest. on 3rd pulse check pulseless VT, 200J shock given  4th pulse check asystole, 5th ROSC, sinus tachy with HUMA. IO placed and levo started. Due to blood in mouth took several attempts with DL for ETT to be placed but ultimately confirmed with capnometry and bilateral breath sounds.   - possible SORAYA today?      #Elevated troponin  uptrending troponin to 182 3/15 AM, was likely iso ESRD  s/p cardiac arrest, elevated ST segment   - TTE from 3/17 showed EF of 63% with mild pulm htn, normal left ventricular systolic function, and diastolic LV dysfunction.      #HTN  on amlodipine 5mg, losartan 100mg, torsemide 20mg, and clonidine 0.2 patch weekly  - hold for now given sepsis and on pressor      Respiratory/ENT  #Intubated  continue sedation    # Tympanic membrane rupture  discharge from R ear, evaluated by ENT given dexamethasone and cipro 3/14  - c/w broad spectrum abx   - MRI of IAC with contrast when pt is stable  - f/u ENT recs    Gastrointestinal  #Constipation  - Abdominal xray negative for bowel obstruction  - trial on movantik  - c/w bowel regimen  - increase trickle feeds as tolerated and bowel regimen      Renal  #ESRD #s/p Kidney Transplant in 2003 at [place] #PCKD  previously HD through LLE AVF but no HD since transplant  Cr 3.8, unclear baseline  - avoid nephrotoxic agent  - holding cyclosporine and mycophenolate   - c/w home prednisone 5mg qday  - appreciate nephro recs  - strict I/O and replete electrolytes  - 4mg IV bumex with minimal urine production (3/17)  - started on CRRT (3/17-3/21) net negative 100cc/hr goal  - plan for intermittent HD (3/22 - [ ])       Hematological  # h/o LLE DVT  resolved, no DVT on doppler 12/22.  - negative LLE duplex this admission 3/17  - can restart heparin subQ (3/21)      #Anemia  - s/p 1 unit pRBC on 3/16 for Hb drop from 10 to 7.9 and 1 unit on 3/21 for Hb 6.8 2/2 loss from CRRT clotting  - Evaluated by ENT for bleeding and found no signs of active bleeding from nasal cavity, nasopharynx or oral cavity, however, patient biting on tongue, which could be one source of bleeding. Tongue was edematous and lax      Infectious Disease  #Strep pneumoniae meningitis #R ear mastoiditis  treatment with IV CTX   - c/w ciprodex drops for R ear  - f/u ID recs   - f/u repeat blood cx from 3/17  - repeat cx on 3/19 2/2 fever  - c/w CTX  - come down to home dose prednisone 3/21 (5mg qday)    #h/o PCP  - atovaquone at home, continue      Endocrinological  #Hypothyroidism  last TSH 12/2022 >10  TSH 3/15 4.73  - c/w levothyroxine     #Diabetes mellitus Type II  - ISS      Ethics  FULL, GOC ongoing.

## 2023-03-22 NOTE — PROGRESS NOTE ADULT - ASSESSMENT
78-year-old female with PCKD previously on HD via LUE AVF now s/p renal transplant since 2003 at [facility], LLE DVT (resolved, nml dopplers 12/2022) on ASA, HTN, HLD, DM2, very small supraclinoid aneurysm on R and very small aneurysm vs infundibulum L supraclinoid artery (reportedly unchanged on MRA 10/2020), glaucoma/cataract, hypothyroid, history of PCP 2021 on atovaquone ppx, reportedly hospitalized in 12/2022 for rectal prolapse, CMV (unclear active or prior infxn), presenting with R-sided HA, R ear pain and neck pain after recent visit to ENT earlier in the day.  Cardiac arrest on 3/16/23  in septic shock on iv pressors  intubated on MV  Renal following for NAMAN on CKD Mx.    NAMAN on CKD4 with H/o KTx 2003  Creatinine Trend: 1.95 <--, 1.26 <--, 1.24 <--, 1.31 <--, 1.16 <--, 1.38 <--, 1.31 <--, 1.45 <--, 1.49 <--, 1.61 <--, 1.83 <--, 2.10 <--, 2.42 <--, 2.90 <--, 3.86 <--, 3.83 <--, 3.62 <--, 3.31 <--, 3.10 <--  w/worsened renal function, oliguria, acidosis, mild hyperkalemia-  Consent for HD obtained, signed by daughter 3/16/23  B/L creat around 2.8 since Dec 2022  Patient receives Cyclosporine (Gengraf) 75mg PO q12hrs,  BID and Prednisone 5 QD for transplant    plan:  In light of severe sepsis and cardiac arrest: holding MMF and Cyclosporine  c/w methylPREDNISolone sodium succinate Injectable 10 milliGRAM(s) IV Push every 6 hours  patient had CVVHDF with circuit clotting a few times -> on minimal pressors, transition to IHD today  keep Ponce  monitor U/O  monitor BMP qdaily now  dose all meds for eGFR<15ml/min.   avoid ACEi/ARB/NSAIDs/Nephrotoxics if able.    Metabolic acidosis -improved s/p cvvhd  lactate +  manage underlying condition (sepsis)    OM and OE  Abxs per Infectious disease specialist with dose adjustment per GFR  f/u cxs  vent Mx, pressors per ICU      prognosis guarded  OS Nephrologist Dr. Hugo Hernandez [phone]  will closely follow up.   For any question, call:  Cell # [phone]  Pager # [phone]  Callback # [phone]

## 2023-03-22 NOTE — PROGRESS NOTE ADULT - SUBJECTIVE AND OBJECTIVE BOX
Patient is a 78y old  Female who presents with a chief complaint of AMS/R ear infxn (15 Mar 2023 10:36)    f/u pneumococcal sepsis, bacteremia and meningitis    Interval History/ROS:      PAST MEDICAL & SURGICAL HISTORY:  Polycystic kidney disease  Glaucoma  Aneurysm  History of deep venous thrombosis (DVT) of distal vein of left lower extremity  Thrombocytopenia  Hypothyroid  Osteoporosis  Arthritis  PCP (pneumocystis carinii pneumonia)   C. difficile colitis  Type 2 diabetes mellitus, without long-term current use of insulin  Essential hypertension  H/O kidney transplant (cadaver , [facility])  H/O:   H/O eye surgery  S/P hysterectomy  H/O umbilical hernia repair    Allergies  apple (Unknown)  Benadryl (Unknown)  penicillin (Hives), tolerate cephalosporins  watermelon (Unknown)    ANTIMICROBIALS:  meropenem  IVPB 1000 every 12 hours (3/15)  vancomycin  IVPB (3/15-3/17)  active:  cefTRIAXone   IVPB 2000 every 12 hours (3/15-)  atovaquone  Suspension 1500 daily    MEDICATIONS  (STANDING):  heparin   Injectable 5000 every 8 hours  insulin lispro (ADMELOG) corrective regimen sliding scale  every 6 hours  lacosamide IVPB 200 every 12 hours  levETIRAcetam  IVPB 750 every 12 hours  levothyroxine Injectable 18 at bedtime  midazolam Infusion 0.05 <Continuous>  naloxegol 12.5 daily  polyethylene glycol 3350 17 two times a day  predniSONE   Tablet 5 daily  senna Syrup 10 two times a day    Vital Signs Last 24 Hrs  T(F): 100 (23 @ 08:00), Max: 100 (23 @ 08:00)  HR: 87 (23 @ 10:55)  BP: 118/62 (23 @ 09:00)  RR: 20 (23 @ 09:30)  SpO2: 96% (23 @ 10:55) (96% - 100%)  Wt(kg): --    PHYSICAL EXAM:                                                    7.2    11.13 )-----------( 66       ( 22 Mar 2023 01:05 )             23.3     134  |  101  |  40  ----------------------------<  211  4.3   |  21  |  1.95  Ca    8.2      22 Mar 2023 01:05Phos  2.9     Mg     2.60       TPro  4.5  /  Alb  2.4  /  TBili  0.5  /  DBili  x   /  AST  36  /  ALT  43  /  AlkPhos  99      Urinalysis Basic - ( 15 Mar 2023 03:43 )  Color: Yellow / Appearance: Clear / S.019 / pH: x  Gluc: x / Ketone: Negative  / Bili: Negative / Urobili: <2 mg/dL   Blood: x / Protein: 300 mg/dL / Nitrite: Negative   Leuk Esterase: Negative / RBC: 8 /HPF / WBC 2 /HPF   Sq Epi: x / Non Sq Epi: 1 /HPF / Bacteria: Negative    MICROBIOLOGY:  3/19 Trach Asp Tracheal Aspirate (-)  3/17 BC (-)  3/16 Bronchial Lavage (-)  3/16 CSF (+) Streptococcus pneumoniae PCR  3/15 BC (+) Streptococcus pneumoniae    RADIOLOGY:  imaging below personally reviewed and agree with findings    Xray Chest 1 View- PORTABLE-Urgent (Xray Chest 1 View- PORTABLE-Urgent .) (23 @ 13:02) >  INTERPRETATION:  Endotracheal tube at the level of the renetta/right mainstem bronchus. Adjustment recommended.  New left lung whiteout. Findings discussed with provider Lock.    Xray Chest 1 View- PORTABLE-Urgent (Xray Chest 1 View- PORTABLE-Urgent .) (23 @ 17:50) >  IMPRESSION:  NG tube with the tip in the body of the stomach.  Bilateral pulmonary consolidations about the same or slightly worse on the left.    Xray Chest 1 View- PORTABLE-Urgent (Xray Chest 1 View- PORTABLE-Urgent .) (23 @ 14:45) >  IMPRESSION:  NG tube with the tip in the body of the stomach.  Bilateral pulmonary consolidations about the same or slightly worse on the left.    Xray Chest 1 View- PORTABLE-Urgent (Xray Chest 1 View- PORTABLE-Urgent .) (23 @ 14:45) >  IMPRESSION:  NG tube with the tip in the body of the stomach.  Bilateral pulmonary consolidations about the same or slightly worse on the left.    IR Procedure (03.15.23 @ 15:24) >   The opening pressure measured greater than 55 cm water. The closing pressure measured 32 cm water, after removal of 30 cc of cloudy CSF. The needle was removed. The patient tolerated the procedure well without initial complication.  IMPRESSION:  Status post successful lumbar puncture as described.  Markedly abnormal appearing CSF.  Elevated opening pressures.    US Kidney and Bladder (03.15.23 @ 12:10) >  IMPRESSION:  Mild fullness of the left lower quadrant transplant kidney collecting system.  Increased renal cortical echogenicity, possibly renal parenchymal disease.    CT Brain Perfusion Maps Stroke (23 @ 19:46) >  IMPRESSION:  CT PERFUSION:  Limited by late injection of the contrast bolus.  Cerebral blood flow less than 30% = 0 mL. No core infarct is predicted.  Tmax greater than 6 seconds = 0 mL. No brain parenchyma predicted to be at continued ischemicrisk in the presence of neurologic symptoms.  CTA BRAIN:  No flow-limiting stenosis or vascular aneurysm. No AVM.  CTA NECK:  Calcified plaque at the origin of the left internal carotid artery results in approximately 40% short segment stenosis.   Otherwise no flow-limiting stenosis. No evidence for arterial dissection.      ECHO  Transthoracic Echocardiogram (23 @ 11:48) >  CONCLUSIONS:  1.  Moderate aortic regurgitation.  2. Mild concentric left ventricular hypertrophy.  3. Normal left ventricular systolic function. No segmental wall motion abnormalities.  4. Reversal of the E-A  waves of the mitral inflow pattern is consistent with diastolic LV dysfunction.  5. Normal right ventricular size and function.  6. Normal tricuspid valve. Mild tricuspid regurgitation.  *** Compared with echocardiogram of 2018, no significant changes noted. Patient is a 78y old  Female who presents with a chief complaint of AMS/R ear infxn (15 Mar 2023 10:36)    f/u pneumococcal sepsis, bacteremia and meningitis    Interval History/ROS:  off pressors, EEG video monitoring    PAST MEDICAL & SURGICAL HISTORY:  Polycystic kidney disease  Glaucoma  Aneurysm  History of deep venous thrombosis (DVT) of distal vein of left lower extremity  Thrombocytopenia  Hypothyroid  Osteoporosis  Arthritis  PCP (pneumocystis carinii pneumonia)   C. difficile colitis  Type 2 diabetes mellitus, without long-term current use of insulin  Essential hypertension  H/O kidney transplant (cadaver , [facility])  H/O:   H/O eye surgery  S/P hysterectomy  H/O umbilical hernia repair    Allergies  apple (Unknown)  Benadryl (Unknown)  penicillin (Hives), tolerate cephalosporins  watermelon (Unknown)    ANTIMICROBIALS:  meropenem  IVPB 1000 every 12 hours (3/15)  vancomycin  IVPB (3/15-3/17)  active:  cefTRIAXone   IVPB 2000 every 12 hours (3/15-)  atovaquone  Suspension 1500 daily    MEDICATIONS  (STANDING):  heparin   Injectable 5000 every 8 hours  insulin lispro (ADMELOG) corrective regimen sliding scale  every 6 hours  lacosamide IVPB 200 every 12 hours  levETIRAcetam  IVPB 750 every 12 hours  levothyroxine Injectable 18 at bedtime  midazolam Infusion 0.05 <Continuous>  naloxegol 12.5 daily  polyethylene glycol 3350 17 two times a day  predniSONE   Tablet 5 daily  senna Syrup 10 two times a day    Vital Signs Last 24 Hrs  T(F): 100 (23 @ 08:00), Max: 100 (23 @ 08:00)  HR: 87 (23 @ 10:55)  BP: 118/62 (23 @ 09:00)  RR: 20 (23 @ 09:30)  SpO2: 96% (23 @ 10:55) (96% - 100%)  Wt(kg): --    PHYSICAL EXAM:  Constitutional: sedated on vent  HEAD/EYES: keeps eyes closed  ENT:  supple  Cardiovascular:   normal S1, S2  Respiratory:  clear BS bilaterally  GI:  soft, non-tender  :  no snider  Musculoskeletal:  no synovitis  Neurologic: lethargic, sedated, unable to assess  Skin:  no rash, L groin shiley  Psychiatric:  not able to assess                                 7.2    11.13 )-----------( 66       ( 22 Mar 2023 01:05 )             23.3 03-    134  |  101  |  40  ----------------------------<  211  4.3   |  21  |  1.95  Ca    8.2      22 Mar 2023 01:05Phos  2.9     Mg     2.60       TPro  4.5  /  Alb  2.4  /  TBili  0.5  /  DBili  x   /  AST  36  /  ALT  43  /  AlkPhos  99      Urinalysis Basic - ( 15 Mar 2023 03:43 )  Color: Yellow / Appearance: Clear / S.019 / pH: x  Gluc: x / Ketone: Negative  / Bili: Negative / Urobili: <2 mg/dL   Blood: x / Protein: 300 mg/dL / Nitrite: Negative   Leuk Esterase: Negative / RBC: 8 /HPF / WBC 2 /HPF   Sq Epi: x / Non Sq Epi: 1 /HPF / Bacteria: Negative    MICROBIOLOGY:  3/19 Trach Asp Tracheal Aspirate (-)  3/17 BC (-)  3/16 Bronchial Lavage (-)  3/16 CSF (+) Streptococcus pneumoniae PCR  3/15 BC (+) Streptococcus pneumoniae    RADIOLOGY:  imaging below personally reviewed and agree with findings    Xray Chest 1 View- PORTABLE-Urgent (Xray Chest 1 View- PORTABLE-Urgent .) (23 @ 14:53) >  IMPRESSION:  Support devices as above.  Improving bilateral aeration.    IR Procedure (03.15.23 @ 15:24) >  The opening pressure measured greater than 55 cm water. The closing pressure measured 32 cm water, after removal of 30 cc of cloudy CSF. The needle was removed. The patient tolerated the procedure well without initial complication.  IMPRESSION:  Status post successful lumbar puncture as described.  Markedly abnormal appearing CSF.  Elevated opening pressures.    US Kidney and Bladder (03.15.23 @ 12:10) >  IMPRESSION:  Mild fullness of the left lower quadrant transplant kidney collecting system.  Increased renal cortical echogenicity, possibly renal parenchymal disease.    CT Brain Perfusion Maps Stroke (23 @ 19:46) >  IMPRESSION:  CT PERFUSION:  Limited by late injection of the contrast bolus.  Cerebral blood flow less than 30% = 0 mL. No core infarct is predicted.  Tmax greater than 6 seconds = 0 mL. No brain parenchyma predicted to be at continued ischemicrisk in the presence of neurologic symptoms.  CTA BRAIN:  No flow-limiting stenosis or vascular aneurysm. No AVM.  CTA NECK:  Calcified plaque at the origin of the left internal carotid artery results in approximately 40% short segment stenosis.   Otherwise no flow-limiting stenosis. No evidence for arterial dissection.      ECHO  Transthoracic Echocardiogram (23 @ 11:48) >  CONCLUSIONS:  1.  Moderate aortic regurgitation.  2. Mild concentric left ventricular hypertrophy.  3. Normal left ventricular systolic function. No segmental wall motion abnormalities.  4. Reversal of the E-A  waves of the mitral inflow pattern is consistent with diastolic LV dysfunction.  5. Normal right ventricular size and function.  6. Normal tricuspid valve. Mild tricuspid regurgitation.  *** Compared with echocardiogram of 2018, no significant changes noted.

## 2023-03-22 NOTE — PROGRESS NOTE ADULT - SUBJECTIVE AND OBJECTIVE BOX
INTERVAL HPI/OVERNIGHT EVENTS: Pt was given bumex 2mg IV overnight 2/2 decreasing urine output and rising Cr. Planning for possible HD today.    SUBJECTIVE: Patient seen and examined at bedside. Intubated, sedated, ROS cannot be obtained.       VITAL SIGNS:  ICU Vital Signs Last 24 Hrs  T(C): 37.7 (22 Mar 2023 04:00), Max: 37.7 (22 Mar 2023 04:00)  T(F): 99.9 (22 Mar 2023 04:00), Max: 99.9 (22 Mar 2023 04:00)  HR: 91 (22 Mar 2023 07:00) (62 - 91)  BP: 95/53 (22 Mar 2023 07:00) (95/53 - 164/72)  BP(mean): 62 (22 Mar 2023 07:00) (62 - 94)  ABP: 121/61 (22 Mar 2023 07:00) (86/48 - 187/87)  ABP(mean): 79 (22 Mar 2023 07:00) (59 - 123)  RR: 14 (22 Mar 2023 07:00) (12 - 24)  SpO2: 100% (22 Mar 2023 07:00) (96% - 100%)    O2 Parameters below as of 22 Mar 2023 06:00  Patient On (Oxygen Delivery Method): ventilator    O2 Concentration (%): 30      Mode: AC/ CMV (Assist Control/ Continuous Mandatory Ventilation), RR (machine): 14, TV (machine): 350, FiO2: 30, PEEP: 5, ITime: 0.74, MAP: 10, PIP: 21  Plateau pressure:   P/F ratio:     03-21 @ 07:01  -  03-22 @ 07:00  --------------------------------------------------------  IN: 1752.1 mL / OUT: 510 mL / NET: 1242.1 mL      CAPILLARY BLOOD GLUCOSE      POCT Blood Glucose.: 195 mg/dL (22 Mar 2023 06:09)    ECG:    PHYSICAL EXAM:  GENERAL: no distress, intubated  PSYCH: A&Ox0  HEENT: Atraumatic, Normocephalic, pupils reactive  NECK: Supple, No JVD  CHEST/LUNG: clear to auscultation bilaterally  HEART: regular rate and rhythm, no murmurs  ABDOMEN: soft midline, palpable mass and firmness present  EXTREMITIES: trace edema on bilateral LE  NEUROLOGY: intubated and sedated  SKIN: No rashes or lesions    MEDICATIONS:  MEDICATIONS  (STANDING):  atovaquone  Suspension 1500 milliGRAM(s) Oral daily  cefTRIAXone   IVPB 2000 milliGRAM(s) IV Intermittent every 12 hours  chlorhexidine 0.12% Liquid 15 milliLiter(s) Oral Mucosa two times a day  chlorhexidine 2% Cloths 1 Application(s) Topical <User Schedule>  ciprofloxacin  0.3% Ophthalmic Solution for Otic Use 4 Drop(s) Right Ear two times a day  dextrose 5%. 1000 milliLiter(s) (100 mL/Hr) IV Continuous <Continuous>  dextrose 5%. 1000 milliLiter(s) (50 mL/Hr) IV Continuous <Continuous>  dextrose 50% Injectable 25 Gram(s) IV Push once  dextrose 50% Injectable 12.5 Gram(s) IV Push once  dextrose 50% Injectable 25 Gram(s) IV Push once  glucagon  Injectable 1 milliGRAM(s) IntraMuscular once  heparin   Injectable 5000 Unit(s) SubCutaneous every 8 hours  insulin lispro (ADMELOG) corrective regimen sliding scale   SubCutaneous every 6 hours  lacosamide IVPB 200 milliGRAM(s) IV Intermittent every 12 hours  levETIRAcetam  IVPB 1000 milliGRAM(s) IV Intermittent every 12 hours  levothyroxine Injectable 18 MICROGram(s) IV Push at bedtime  midazolam Infusion 0.05 mG/kG/Hr (3.11 mL/Hr) IV Continuous <Continuous>  naloxegol 12.5 milliGRAM(s) Oral daily  petrolatum Ophthalmic Ointment 1 Application(s) Both EYES every 12 hours  polyethylene glycol 3350 17 Gram(s) Oral two times a day  predniSONE   Tablet 5 milliGRAM(s) Oral daily  senna Syrup 10 milliLiter(s) Oral two times a day    MEDICATIONS  (PRN):  acetaminophen   Oral Liquid .. 650 milliGRAM(s) Enteral Tube every 6 hours PRN Temp greater or equal to 38C (100.4F), Mild Pain (1 - 3), Moderate Pain (4 - 6)  dextrose Oral Gel 15 Gram(s) Oral once PRN Blood Glucose LESS THAN 70 milliGRAM(s)/deciliter      ALLERGIES:  Allergies    apple (Unknown)  Benadryl (Unknown)  penicillin (Hives)  watermelon (Unknown)    Intolerances        LABS:                        7.2    11.13 )-----------( 66       ( 22 Mar 2023 01:05 )             23.3     03-22    134<L>  |  101  |  40<H>  ----------------------------<  211<H>  4.3   |  21<L>  |  1.95<H>    Ca    8.2<L>      22 Mar 2023 01:05  Phos  2.8     03-21  Mg     2.50     03-21    TPro  4.5<L>  /  Alb  2.4<L>  /  TBili  0.5  /  DBili  x   /  AST  36<H>  /  ALT  43<H>  /  AlkPhos  99  03-22    PT/INR - ( 21 Mar 2023 00:20 )   PT: 12.3 sec;   INR: 1.06 ratio         PTT - ( 21 Mar 2023 00:20 )  PTT:22.0 sec      RADIOLOGY & ADDITIONAL TESTS: Reviewed.   INTERVAL HPI/OVERNIGHT EVENTS: Pt was given bumex 2mg IV overnight 2/2 decreasing urine output and rising Cr. Planning for HD today.    SUBJECTIVE: Patient seen and examined at bedside. Intubated, sedated, ROS cannot be obtained.       VITAL SIGNS:  ICU Vital Signs Last 24 Hrs  T(C): 37.7 (22 Mar 2023 04:00), Max: 37.7 (22 Mar 2023 04:00)  T(F): 99.9 (22 Mar 2023 04:00), Max: 99.9 (22 Mar 2023 04:00)  HR: 91 (22 Mar 2023 07:00) (62 - 91)  BP: 95/53 (22 Mar 2023 07:00) (95/53 - 164/72)  BP(mean): 62 (22 Mar 2023 07:00) (62 - 94)  ABP: 121/61 (22 Mar 2023 07:00) (86/48 - 187/87)  ABP(mean): 79 (22 Mar 2023 07:00) (59 - 123)  RR: 14 (22 Mar 2023 07:00) (12 - 24)  SpO2: 100% (22 Mar 2023 07:00) (96% - 100%)    O2 Parameters below as of 22 Mar 2023 06:00  Patient On (Oxygen Delivery Method): ventilator    O2 Concentration (%): 30      Mode: AC/ CMV (Assist Control/ Continuous Mandatory Ventilation), RR (machine): 14, TV (machine): 350, FiO2: 30, PEEP: 5, ITime: 0.74, MAP: 10, PIP: 21  Plateau pressure:   P/F ratio:     03-21 @ 07:01  -  03-22 @ 07:00  --------------------------------------------------------  IN: 1752.1 mL / OUT: 510 mL / NET: 1242.1 mL      CAPILLARY BLOOD GLUCOSE      POCT Blood Glucose.: 195 mg/dL (22 Mar 2023 06:09)    ECG:    PHYSICAL EXAM:  GENERAL: no distress, intubated  PSYCH: A&Ox0  HEENT: Atraumatic, Normocephalic, pupils reactive  NECK: Supple, No JVD  CHEST/LUNG: clear to auscultation bilaterally  HEART: regular rate and rhythm, no murmurs  ABDOMEN: soft midline, palpable mass and firmness present  EXTREMITIES: trace edema on bilateral LE  NEUROLOGY: intubated and sedated  SKIN: No rashes or lesions    MEDICATIONS:  MEDICATIONS  (STANDING):  atovaquone  Suspension 1500 milliGRAM(s) Oral daily  cefTRIAXone   IVPB 2000 milliGRAM(s) IV Intermittent every 12 hours  chlorhexidine 0.12% Liquid 15 milliLiter(s) Oral Mucosa two times a day  chlorhexidine 2% Cloths 1 Application(s) Topical <User Schedule>  ciprofloxacin  0.3% Ophthalmic Solution for Otic Use 4 Drop(s) Right Ear two times a day  dextrose 5%. 1000 milliLiter(s) (100 mL/Hr) IV Continuous <Continuous>  dextrose 5%. 1000 milliLiter(s) (50 mL/Hr) IV Continuous <Continuous>  dextrose 50% Injectable 25 Gram(s) IV Push once  dextrose 50% Injectable 12.5 Gram(s) IV Push once  dextrose 50% Injectable 25 Gram(s) IV Push once  glucagon  Injectable 1 milliGRAM(s) IntraMuscular once  heparin   Injectable 5000 Unit(s) SubCutaneous every 8 hours  insulin lispro (ADMELOG) corrective regimen sliding scale   SubCutaneous every 6 hours  lacosamide IVPB 200 milliGRAM(s) IV Intermittent every 12 hours  levETIRAcetam  IVPB 1000 milliGRAM(s) IV Intermittent every 12 hours  levothyroxine Injectable 18 MICROGram(s) IV Push at bedtime  midazolam Infusion 0.05 mG/kG/Hr (3.11 mL/Hr) IV Continuous <Continuous>  naloxegol 12.5 milliGRAM(s) Oral daily  petrolatum Ophthalmic Ointment 1 Application(s) Both EYES every 12 hours  polyethylene glycol 3350 17 Gram(s) Oral two times a day  predniSONE   Tablet 5 milliGRAM(s) Oral daily  senna Syrup 10 milliLiter(s) Oral two times a day    MEDICATIONS  (PRN):  acetaminophen   Oral Liquid .. 650 milliGRAM(s) Enteral Tube every 6 hours PRN Temp greater or equal to 38C (100.4F), Mild Pain (1 - 3), Moderate Pain (4 - 6)  dextrose Oral Gel 15 Gram(s) Oral once PRN Blood Glucose LESS THAN 70 milliGRAM(s)/deciliter      ALLERGIES:  Allergies    apple (Unknown)  Benadryl (Unknown)  penicillin (Hives)  watermelon (Unknown)    Intolerances        LABS:                        7.2    11.13 )-----------( 66       ( 22 Mar 2023 01:05 )             23.3     03-22    134<L>  |  101  |  40<H>  ----------------------------<  211<H>  4.3   |  21<L>  |  1.95<H>    Ca    8.2<L>      22 Mar 2023 01:05  Phos  2.8     03-21  Mg     2.50     03-21    TPro  4.5<L>  /  Alb  2.4<L>  /  TBili  0.5  /  DBili  x   /  AST  36<H>  /  ALT  43<H>  /  AlkPhos  99  03-22    PT/INR - ( 21 Mar 2023 00:20 )   PT: 12.3 sec;   INR: 1.06 ratio         PTT - ( 21 Mar 2023 00:20 )  PTT:22.0 sec      RADIOLOGY & ADDITIONAL TESTS: Reviewed.

## 2023-03-22 NOTE — EEG REPORT - NS EEG TEXT BOX
YARA SEGUNDO [id number]     Study Date: 03/21/2023 08:00 - 03/22/2023 08:00  Duration: 23 hr 58 min  --------------------------------------------------------------------------------------------------  History:  CC/ HPI Patient is a 78y old  Female who presents with a chief complaint of AMS/R ear infxn (22 Mar 2023 07:17)    MEDICATIONS  (STANDING):  atovaquone  Suspension 1500 milliGRAM(s) Oral daily  cefTRIAXone   IVPB 2000 milliGRAM(s) IV Intermittent every 12 hours  chlorhexidine 0.12% Liquid 15 milliLiter(s) Oral Mucosa two times a day  chlorhexidine 2% Cloths 1 Application(s) Topical <User Schedule>  ciprofloxacin  0.3% Ophthalmic Solution for Otic Use 4 Drop(s) Right Ear two times a day  dextrose 5%. 1000 milliLiter(s) (100 mL/Hr) IV Continuous <Continuous>  dextrose 5%. 1000 milliLiter(s) (50 mL/Hr) IV Continuous <Continuous>  dextrose 50% Injectable 25 Gram(s) IV Push once  dextrose 50% Injectable 12.5 Gram(s) IV Push once  dextrose 50% Injectable 25 Gram(s) IV Push once  glucagon  Injectable 1 milliGRAM(s) IntraMuscular once  heparin   Injectable 5000 Unit(s) SubCutaneous every 8 hours  insulin lispro (ADMELOG) corrective regimen sliding scale   SubCutaneous every 6 hours  lacosamide IVPB 200 milliGRAM(s) IV Intermittent every 12 hours  levETIRAcetam  IVPB 1000 milliGRAM(s) IV Intermittent every 12 hours  levothyroxine Injectable 18 MICROGram(s) IV Push at bedtime  midazolam Infusion 0.05 mG/kG/Hr (3.11 mL/Hr) IV Continuous <Continuous>  naloxegol 12.5 milliGRAM(s) Oral daily  petrolatum Ophthalmic Ointment 1 Application(s) Both EYES every 12 hours  polyethylene glycol 3350 17 Gram(s) Oral two times a day  predniSONE   Tablet 5 milliGRAM(s) Oral daily  senna Syrup 10 milliLiter(s) Oral two times a day    --------------------------------------------------------------------------------------------------  Study Interpretation:    [[[Abbreviation Key:  PDR=alpha rhythm/posterior dominant rhythm. A-P=anterior posterior.  Amplitude: ‘very low’:<20; ‘low’:20-49; ‘medium’:; ‘high’:>150uV.  Persistence for periodic/rhythmic patterns (% of epoch) ‘rare’:<1%; ‘occasional’:1-10%; ‘frequent’:10-50%; ‘abundant’:50-90%; ‘continuous’:>90%.  Persistence for sporadic discharges: ‘rare’:<1/hr; ‘occasional’:1/min-1/hr; ‘frequent’:>1/min; ‘abundant’:>1/10 sec.  RPP=rhythmic and periodic patterns; GRDA=generalized rhythmic delta activity; FIRDA=frontal intermittent GRDA; LRDA=lateralized rhythmic delta activity; TIRDA=temporal intermittent rhythmic delta activity;  LPD=PLED=lateralized periodic discharges; GPD=generalized periodic discharges; BIPDs =bilateral independent periodic discharges; Mf=multifocal; SIRPDs=stimulus induced rhythmic, periodic, or ictal appearing discharges; BIRDs=brief potentially ictal rhythmic discharges >4 Hz, lasting .5-10s; PFA (paroxysmal bursts >13 Hz or =8 Hz <10s).  Modifiers: +F=with fast component; +S=with spike component; +R=with rhythmic component.  S-B=burst suppression pattern.  Max=maximal. N1-drowsy; N2-stage II sleep; N3-slow wave sleep. SSS/BETS=small sharp spikes/benign epileptiform transients of sleep. HV=hyperventilation; PS=photic stimulation]]]    Daily EEG Visual Analysis    FINDINGS:      Background:  The predominant background in the most wakeful state is continuous and symmetric, consisting of diffuse polymorphic delta > theta frequencies. A less wakeful state is characterized by 1-4-second periods of diffuse attenuation/suppression in between diffuse polymorphic delta > theta frequencies. No normal sleep architecture is captured.    Sporadic Epileptiform Discharges and Rhythmic and Periodic Patterns (RPPs):  Occasional left midline (Cz/C3) spike-wave discharges, most often occurring in bursts at 3-7 Hz lasting <1-2 seconds (brief potentially ictal rhythmic discharges, BIRDs) or in bursts at at 1.5-2 Hz (lateralized periodic discharges, LPDs).    Electrographic and Electroclinical seizures:  None    Other Clinical Events:  The event button is pressed at:  03/21/23 16:31: No clinical change is seen on video. There is no electrographic seizure around this time.  03/21/23 18:23, 18:27, 18:33, 18:33: BUE jerking is reported, for which patient was given lorazepam total of 4 mg. On video, there is slow intermittent RUE movement (elbow extended, arm raises slightly off the bed), not rhythmic. LUE is difficult to visualize on video due to camera angle and limited resolution. There is no EEG correlate of this event.  03/22/23 05:26: No clinical change is seen on video. Staff at bedside makes BUE pronation movements as if demonstrating what the patient is doing. There is no electrographic seizure around this time.    Activation Procedures:   Hyperventilation was not performed.    Photic stimulation was not performed.    Artifacts:  Intermittent myogenic and movement artifacts are present.    EKG:  Single-lead EKG shows regular rhythm at 70-80 bpm.    EEG Classification / Summary:  Abnormal EEG in the awake, drowsy, and asleep states due to:  -Occasional left midline spike-wave discharges most often occurring as brief potentially ictal rhythmic discharges (BIRDs) lasting <1-2 seconds or in bursts of lateralized periodic discharges (LPDs) at 1.5-2 Hz  -Severe diffuse slowing  Multiple event button presses, including reported BUE jerking, are not associated with electrographic seizure on EEG.    Clinical Impression:  Risk of seizures has decreased compared to the prior day.  No electrographic seizures are captured.  Multiple event button presses, including reported BUE jerking, are not associated with electrographic seizure on EEG.  Severe diffuse cerebral dysfunction is nonspecific in etiology. In this case, sedating medications may be contributing.          -------------------------------------------------------------------------------------------------------  [facility] EEG Reading Room Ph#: [phone]  Epilepsy Answering Service after 5PM and before 8:30AM: Ph#: [phone]    Meera Simental MD  Attending Physician, [facility]

## 2023-03-23 NOTE — PROGRESS NOTE ADULT - SUBJECTIVE AND OBJECTIVE BOX
Patient is a 78y old  Female who presents with a chief complaint of AMS/R ear infxn (15 Mar 2023 10:36)    f/u pneumococcal sepsis, bacteremia and meningitis    Interval History/ROS:  off pressors, sedated on ventilator ROS unable    PAST MEDICAL & SURGICAL HISTORY:  Polycystic kidney disease  Glaucoma  Aneurysm  History of deep venous thrombosis (DVT) of distal vein of left lower extremity  Thrombocytopenia  Hypothyroid  Osteoporosis  Arthritis  PCP (pneumocystis carinii pneumonia)   C. difficile colitis  Type 2 diabetes mellitus, without long-term current use of insulin  Essential hypertension  H/O kidney transplant (cadaver , [facility])  H/O:   H/O eye surgery  S/P hysterectomy  H/O umbilical hernia repair    Allergies  apple (Unknown)  Benadryl (Unknown)  penicillin (Hives), tolerate cephalosporins  watermelon (Unknown)    ANTIMICROBIALS:  meropenem  IVPB 1000 every 12 hours (3/15)  vancomycin  IVPB (3/15-3/17)  active:  cefTRIAXone   IVPB 2000 every 12 hours (3/15-)  atovaquone  Suspension 1500 daily    MEDICATIONS  (STANDING):  heparin   Injectable 5000 every 8 hours  insulin lispro (ADMELOG) corrective regimen sliding scale  every 6 hours  lacosamide IVPB 200 every 12 hours  levETIRAcetam  IVPB 750 every 12 hours  levothyroxine Injectable 18 at bedtime  midazolam Infusion 0.05 <Continuous>  naloxegol 12.5 daily  polyethylene glycol 3350 17 two times a day  predniSONE   Tablet 5 daily  senna Syrup 10 two times a day    Vital Signs Last 24 Hrs  T(F): 100.9 (23 @ 08:00), Max: 100.9 (23 @ 08:00)  HR: 88 (23 @ 10:46)  RR: 22 (23 @ 10:00)  SpO2: 100% (23 @ 10:46) (100% - 100%)    PHYSICAL EXAM:  Constitutional: sedated on vent  HEAD/EYES: keeps eyes closed  ENT:  supple  Cardiovascular:   normal S1, S2  Respiratory:  clear BS bilaterally  GI:  soft, non-tender, enlarged   :  no snider  Musculoskeletal:  no synovitis  Neurologic: lethargic, sedated, unable to assess  Skin:  no rash  Psychiatric:  not able to assess                                       7.1    12.72 )-----------( 77       ( 23 Mar 2023 01:30 )             21.9 -    131  |  97  |  33  ----------------------------<  191  3.8   |  23  |  1.72  Ca    8.2      23 Mar 2023 01:30Phos  2.8     Mg     2.10       TPro  4.6  /  Alb  2.4  /  TBili  0.5  /  DBili  x   /  AST  38  /  ALT  41  /  AlkPhos  94      Urinalysis Basic - ( 15 Mar 2023 03:43 )  Color: Yellow / Appearance: Clear / S.019 / pH: x  Gluc: x / Ketone: Negative  / Bili: Negative / Urobili: <2 mg/dL   Blood: x / Protein: 300 mg/dL / Nitrite: Negative   Leuk Esterase: Negative / RBC: 8 /HPF / WBC 2 /HPF   Sq Epi: x / Non Sq Epi: 1 /HPF / Bacteria: Negative    MICROBIOLOGY:  3/21 BC (-)  3/19 Trach Asp Tracheal Aspirate (-)  3/17 BC (-)  3/16 Bronchial Lavage (-)  3/16 CSF (+) Streptococcus pneumoniae PCR  3/15 BC (+) Streptococcus pneumoniae    RADIOLOGY:  imaging below personally reviewed and agree with findings    Xray Chest 1 View- PORTABLE-Urgent (Xray Chest 1 View- PORTABLE-Urgent .) (23 @ 14:53) >  IMPRESSION:  Support devices as above.  Improving bilateral aeration.    IR Procedure (03.15.23 @ 15:24) >  The opening pressure measured greater than 55 cm water. The closing pressure measured 32 cm water, after removal of 30 cc of cloudy CSF. The needle was removed. The patient tolerated the procedure well without initial complication.  IMPRESSION:  Status post successful lumbar puncture as described.  Markedly abnormal appearing CSF.  Elevated opening pressures.    US Kidney and Bladder (03.15.23 @ 12:10) >  IMPRESSION:  Mild fullness of the left lower quadrant transplant kidney collecting system.  Increased renal cortical echogenicity, possibly renal parenchymal disease.    CT Brain Perfusion Maps Stroke (23 @ 19:46) >  IMPRESSION:  CT PERFUSION:  Limited by late injection of the contrast bolus.  Cerebral blood flow less than 30% = 0 mL. No core infarct is predicted.  Tmax greater than 6 seconds = 0 mL. No brain parenchyma predicted to be at continued ischemicrisk in the presence of neurologic symptoms.  CTA BRAIN:  No flow-limiting stenosis or vascular aneurysm. No AVM.  CTA NECK:  Calcified plaque at the origin of the left internal carotid artery results in approximately 40% short segment stenosis.   Otherwise no flow-limiting stenosis. No evidence for arterial dissection.      ECHO  Transthoracic Echocardiogram (23 @ 11:48) >  CONCLUSIONS:  1.  Moderate aortic regurgitation.  2. Mild concentric left ventricular hypertrophy.  3. Normal left ventricular systolic function. No segmental wall motion abnormalities.  4. Reversal of the E-A  waves of the mitral inflow pattern is consistent with diastolic LV dysfunction.  5. Normal right ventricular size and function.  6. Normal tricuspid valve. Mild tricuspid regurgitation.  *** Compared with echocardiogram of 2018, no significant changes noted.

## 2023-03-23 NOTE — PROGRESS NOTE ADULT - SUBJECTIVE AND OBJECTIVE BOX
[facility] - S Alea / Tita S /D Rama/ S Jovan/ S Keenan/ Cleve Gambino / DAGMAR Wilsonu/ O Daya  service -[phone], office [phone]  ---------------------------------------------------------------------------------------------------------------    Patient seen and examined bedside    Subjective and Objective: No overnight events, sob resolved. No complaints today. feeling better    Allergies: apple (Unknown)  Benadryl (Unknown)  penicillin (Hives)  watermelon (Unknown)      Hospital Medications:   MEDICATIONS  (STANDING):  atovaquone  Suspension 1500 milliGRAM(s) Oral daily  cefTRIAXone   IVPB 2000 milliGRAM(s) IV Intermittent every 12 hours  chlorhexidine 0.12% Liquid 15 milliLiter(s) Oral Mucosa two times a day  chlorhexidine 2% Cloths 1 Application(s) Topical <User Schedule>  ciprofloxacin  0.3% Ophthalmic Solution for Otic Use 4 Drop(s) Right Ear two times a day  dextrose 5%. 1000 milliLiter(s) (100 mL/Hr) IV Continuous <Continuous>  dextrose 5%. 1000 milliLiter(s) (50 mL/Hr) IV Continuous <Continuous>  dextrose 50% Injectable 25 Gram(s) IV Push once  dextrose 50% Injectable 12.5 Gram(s) IV Push once  dextrose 50% Injectable 25 Gram(s) IV Push once  glucagon  Injectable 1 milliGRAM(s) IntraMuscular once  heparin   Injectable 5000 Unit(s) SubCutaneous every 8 hours  insulin lispro (ADMELOG) corrective regimen sliding scale   SubCutaneous every 6 hours  lacosamide IVPB 200 milliGRAM(s) IV Intermittent every 12 hours  levETIRAcetam  IVPB 750 milliGRAM(s) IV Intermittent every 12 hours  levothyroxine Injectable 18 MICROGram(s) IV Push at bedtime  midazolam Infusion 0.05 mG/kG/Hr (3.11 mL/Hr) IV Continuous <Continuous>  naloxegol 12.5 milliGRAM(s) Oral daily  petrolatum Ophthalmic Ointment 1 Application(s) Both EYES every 12 hours  polyethylene glycol 3350 17 Gram(s) Oral two times a day  predniSONE   Tablet 5 milliGRAM(s) Oral daily  senna Syrup 10 milliLiter(s) Oral two times a day      REVIEW OF SYSTEMS:  CONSTITUTIONAL: No weakness, fevers or chills  EYES/ENT: No visual changes;  No vertigo or throat pain   NECK: No pain or stiffness  RESPIRATORY: No cough, wheezing, hemoptysis; No shortness of breath  CARDIOVASCULAR: No chest pain or palpitations.  GASTROINTESTINAL: No abdominal or epigastric pain. No nausea, vomiting, or hematemesis; No diarrhea or constipation. No melena or hematochezia.  GENITOURINARY: No dysuria, frequency, foamy urine, urinary urgency, incontinence or hematuria  NEUROLOGICAL: No numbness or weakness  SKIN: No itching, burning, rashes, or lesions   VASCULAR: No bilateral lower extremity edema.   All other review of systems is negative unless indicated above.    VITALS:  T(F): 99.8 (03-23-23 @ 12:00), Max: 100.9 (03-23-23 @ 08:00)  HR: 74 (03-23-23 @ 13:00)  BP: --  RR: 16 (03-23-23 @ 13:00)  SpO2: 100% (03-23-23 @ 13:00)  Wt(kg): --    03-22 @ 07:01  -  03-23 @ 07:00  --------------------------------------------------------  IN: 1834.9 mL / OUT: 2240 mL / NET: -405.1 mL    03-23 @ 07:01  -  03-23 @ 14:19  --------------------------------------------------------  IN: 483.7 mL / OUT: 45 mL / NET: 438.7 mL          PHYSICAL EXAM:  Constitutional: NAD  HEENT: anicteric sclera, oropharynx clear  Neck: No JVD  Respiratory: CTAB, no wheezes, rales or rhonchi  Cardiovascular: S1, S2, RRR  Gastrointestinal: BS+, soft, NT/ND  Extremities: No cyanosis or clubbing. No peripheral edema  Neurological: A/O x 3, no focal deficits  Psychiatric: Normal mood, normal affect  : No CVA tenderness. No snider.   Skin: No rashes  Vascular Access:    LABS:  03-23    131<L>  |  97<L>  |  33<H>  ----------------------------<  191<H>  3.8   |  23  |  1.72<H>    Ca    8.2<L>      23 Mar 2023 01:30  Phos  2.8     03-23  Mg     2.10     03-23    TPro  4.6<L>  /  Alb  2.4<L>  /  TBili  0.5  /  DBili      /  AST  38<H>  /  ALT  41<H>  /  AlkPhos  94  03-23    Creatinine Trend: 1.72 <--, 1.95 <--, 1.26 <--, 1.24 <--, 1.31 <--, 1.16 <--, 1.38 <--, 1.31 <--, 1.45 <--, 1.49 <--, 1.61 <--, 1.83 <--, 2.10 <--, 2.42 <--, 2.90 <--, 3.86 <--, 3.83 <--, 3.62 <--, 3.31 <--                        7.1    12.72 )-----------( 77       ( 23 Mar 2023 01:30 )             21.9     Urine Studies:  Urinalysis Basic - ( 23 Mar 2023 12:55 )    Color: Yellow / Appearance: Slightly Turbid / SG: >1.030 / pH:   Gluc:  / Ketone: Trace  / Bili: Negative / Urobili: <2 mg/dL   Blood:  / Protein: >300 / Nitrite: Negative   Leuk Esterase: Negative / RBC: 0-5 /HPF / WBC 6-10 /HPF   Sq Epi:  / Non Sq Epi: Occasional / Bacteria: Occasional          RADIOLOGY & ADDITIONAL STUDIES:   [facility] - S Alea / Tita S /D Rama/ S Jovan/ S Keenan/ Cleve Gambino / DAGMAR Wilsonu/ O Daya  service -[phone], office [phone]  ---------------------------------------------------------------------------------------------------------------    Patient seen and examined bedside    Subjective and Objective: No overnight events, remains on MV, sedated    Allergies: apple (Unknown)  Benadryl (Unknown)  penicillin (Hives)  watermelon (Unknown)      Hospital Medications:   MEDICATIONS  (STANDING):  atovaquone  Suspension 1500 milliGRAM(s) Oral daily  cefTRIAXone   IVPB 2000 milliGRAM(s) IV Intermittent every 12 hours  chlorhexidine 0.12% Liquid 15 milliLiter(s) Oral Mucosa two times a day  chlorhexidine 2% Cloths 1 Application(s) Topical <User Schedule>  ciprofloxacin  0.3% Ophthalmic Solution for Otic Use 4 Drop(s) Right Ear two times a day  dextrose 5%. 1000 milliLiter(s) (100 mL/Hr) IV Continuous <Continuous>  dextrose 5%. 1000 milliLiter(s) (50 mL/Hr) IV Continuous <Continuous>  dextrose 50% Injectable 25 Gram(s) IV Push once  dextrose 50% Injectable 12.5 Gram(s) IV Push once  dextrose 50% Injectable 25 Gram(s) IV Push once  glucagon  Injectable 1 milliGRAM(s) IntraMuscular once  heparin   Injectable 5000 Unit(s) SubCutaneous every 8 hours  insulin lispro (ADMELOG) corrective regimen sliding scale   SubCutaneous every 6 hours  lacosamide IVPB 200 milliGRAM(s) IV Intermittent every 12 hours  levETIRAcetam  IVPB 750 milliGRAM(s) IV Intermittent every 12 hours  levothyroxine Injectable 18 MICROGram(s) IV Push at bedtime  midazolam Infusion 0.05 mG/kG/Hr (3.11 mL/Hr) IV Continuous <Continuous>  naloxegol 12.5 milliGRAM(s) Oral daily  petrolatum Ophthalmic Ointment 1 Application(s) Both EYES every 12 hours  polyethylene glycol 3350 17 Gram(s) Oral two times a day  predniSONE   Tablet 5 milliGRAM(s) Oral daily  senna Syrup 10 milliLiter(s) Oral two times a da    VITALS:  T(F): 99.8 (03-23-23 @ 12:00), Max: 100.9 (03-23-23 @ 08:00)  HR: 74 (03-23-23 @ 13:00)  BP: --  RR: 16 (03-23-23 @ 13:00)  SpO2: 100% (03-23-23 @ 13:00)  Wt(kg): --    03-22 @ 07:01  -  03-23 @ 07:00  --------------------------------------------------------  IN: 1834.9 mL / OUT: 2240 mL / NET: -405.1 mL    03-23 @ 07:01  -  03-23 @ 14:19  --------------------------------------------------------  IN: 483.7 mL / OUT: 45 mL / NET: 438.7 mL      PHYSICAL EXAM:  Constitutional: NAD  HEENT: ETT+, OGT+   Neck: No JVD  Respiratory: CTAB, no wheezes, rales or rhonchi  Cardiovascular: S1, S2, RRR  Gastrointestinal: BS+  Extremities: No peripheral edema  Neurological: sedated  - +snider  Vascular Access: left femoral shiley+     LABS:  03-23    131<L>  |  97<L>  |  33<H>  ----------------------------<  191<H>  3.8   |  23  |  1.72<H>    Ca    8.2<L>      23 Mar 2023 01:30  Phos  2.8     03-23  Mg     2.10     03-23    TPro  4.6<L>  /  Alb  2.4<L>  /  TBili  0.5  /  DBili      /  AST  38<H>  /  ALT  41<H>  /  AlkPhos  94  03-23    Creatinine Trend: 1.72 <--, 1.95 <--, 1.26 <--, 1.24 <--, 1.31 <--, 1.16 <--, 1.38 <--, 1.31 <--, 1.45 <--, 1.49 <--, 1.61 <--, 1.83 <--, 2.10 <--, 2.42 <--, 2.90 <--, 3.86 <--, 3.83 <--, 3.62 <--, 3.31 <--                        7.1    12.72 )-----------( 77       ( 23 Mar 2023 01:30 )             21.9     Urine Studies:  Urinalysis Basic - ( 23 Mar 2023 12:55 )    Color: Yellow / Appearance: Slightly Turbid / SG: >1.030 / pH:   Gluc:  / Ketone: Trace  / Bili: Negative / Urobili: <2 mg/dL   Blood:  / Protein: >300 / Nitrite: Negative   Leuk Esterase: Negative / RBC: 0-5 /HPF / WBC 6-10 /HPF   Sq Epi:  / Non Sq Epi: Occasional / Bacteria: Occasional          RADIOLOGY & ADDITIONAL STUDIES:

## 2023-03-23 NOTE — PROGRESS NOTE ADULT - ATTENDING COMMENTS
Patient is a 79 yo F w/ HTN, HLD, T2DM, PCKD previously on HD via LUE AVF then s/p renal transplant, LLE DVT (resolved) hypothyroidism, prior PCP PNA, CMV viremia who initially presented with R-sided HA, R ear pain and neck stiffness found to have Strep Pneumo bacteremia and meningitis in setting of otitis externa. Course c/b cardiac arrest 3/16 and post arrest seizures and NAMAN    #Septic Shock  #Strep bactermia  - c/w Ceftriaxone  - f/u repeat blood cultures, NGTD  - TTE negative for vegetations, SORAYA if cultures remain positive  - fu ID  - MRI IAC as per ENT  - Repeat blood and sputum cultures and UA given new fevers and rising WBC    #NAMAN  #s/p Renal Transplant  #PCKD  - Transitioned to intermittent HD as CVVHD clotting intermittently and pressures now improved. Still making urine    #Encephalopathy post cardiac arrest  #Seizures - No seizures on EEG overnight  - c/w Vimpat, Keppra, Versed gtt  - Wean Versed gtt to off today  - Monitor EEG  - f/u neuro recs    #Dysphagia  - Tube feeds as tolerated    #Respiratory failure   - c/w mechanical ventilatory support, wean as tolerated    Nico Cantor MD  Pulmonary & Critical Care

## 2023-03-23 NOTE — PROGRESS NOTE ADULT - ATTENDING COMMENTS
Ms Negron is a 78 year-old unknown handed woman with a PMHx of PCKD s/p renal transplant, aneurysm, vascular risk factors (including DM, HLD, HTN) hypothyroidism, glaucoma, cataracts, DVT in leg, initially she was brought to [facility] ER due to the severe headache, right ear pain, neck pain, markedly decreased responsiveness. 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 Currently she remains on mechanical ventilation for air way protection on Versed 0.2. Seizure are controlled on Versed and current antiepileptic medications. Today EEG showed no electrographic seizures were captured.  Continuously gradually titrate down Versed drip and goal is completely stop the versed drip. Continue follow up EEG on daily basis. Plan of care discussed with MICU team during multidisciplinary round.      Plan:  MRI brain is pending.  Continue video EEG.  Continue Keppra 750 mg BID as per renal dose.   Continue lacosamide 200mg Q12H.  Continue medical management, neuro- check and fall precaution.  GI and DVT prophylaxis.  Patient is at high risk for complications and morbidity or mortality. There is high probability of imminent or life threatening deterioration in the patient's condition.  Patient is unable or incompetent to participate in giving a history and/or making decisions and discussion is necessary for determining treatment decisions.  My cumulative time taking care of this critically ill patient is 25 minutes  If you have any further questions, please do not hesitate to contact our team.  Thank you for allowing us to participate in this patient care.    Vazquez Goodrich MD .

## 2023-03-23 NOTE — EEG REPORT - NS EEG TEXT BOX
YARA SEGUNDO [id number]     Study Date: 03/22/23 08:00 - 03/23/23 08:00  Duration: 23 hr 58 min  --------------------------------------------------------------------------------------------------  History:  CC/ HPI Patient is a 78y old  Female who presents with a chief complaint of AMS/R ear infxn (23 Mar 2023 08:52)    MEDICATIONS  (STANDING):  atovaquone  Suspension 1500 milliGRAM(s) Oral daily  cefTRIAXone   IVPB 2000 milliGRAM(s) IV Intermittent every 12 hours  chlorhexidine 0.12% Liquid 15 milliLiter(s) Oral Mucosa two times a day  chlorhexidine 2% Cloths 1 Application(s) Topical <User Schedule>  ciprofloxacin  0.3% Ophthalmic Solution for Otic Use 4 Drop(s) Right Ear two times a day  dextrose 5%. 1000 milliLiter(s) (100 mL/Hr) IV Continuous <Continuous>  dextrose 5%. 1000 milliLiter(s) (50 mL/Hr) IV Continuous <Continuous>  dextrose 50% Injectable 25 Gram(s) IV Push once  dextrose 50% Injectable 12.5 Gram(s) IV Push once  dextrose 50% Injectable 25 Gram(s) IV Push once  glucagon  Injectable 1 milliGRAM(s) IntraMuscular once  heparin   Injectable 5000 Unit(s) SubCutaneous every 8 hours  insulin lispro (ADMELOG) corrective regimen sliding scale   SubCutaneous every 6 hours  lacosamide IVPB 200 milliGRAM(s) IV Intermittent every 12 hours  levETIRAcetam  IVPB 750 milliGRAM(s) IV Intermittent every 12 hours  levothyroxine Injectable 18 MICROGram(s) IV Push at bedtime  midazolam Infusion 0.05 mG/kG/Hr (3.11 mL/Hr) IV Continuous <Continuous>  naloxegol 12.5 milliGRAM(s) Oral daily  petrolatum Ophthalmic Ointment 1 Application(s) Both EYES every 12 hours  polyethylene glycol 3350 17 Gram(s) Oral two times a day  predniSONE   Tablet 5 milliGRAM(s) Oral daily  senna Syrup 10 milliLiter(s) Oral two times a day    --------------------------------------------------------------------------------------------------  Study Interpretation:    [[[Abbreviation Key:  PDR=alpha rhythm/posterior dominant rhythm. A-P=anterior posterior.  Amplitude: ‘very low’:<20; ‘low’:20-49; ‘medium’:; ‘high’:>150uV.  Persistence for periodic/rhythmic patterns (% of epoch) ‘rare’:<1%; ‘occasional’:1-10%; ‘frequent’:10-50%; ‘abundant’:50-90%; ‘continuous’:>90%.  Persistence for sporadic discharges: ‘rare’:<1/hr; ‘occasional’:1/min-1/hr; ‘frequent’:>1/min; ‘abundant’:>1/10 sec.  RPP=rhythmic and periodic patterns; GRDA=generalized rhythmic delta activity; FIRDA=frontal intermittent GRDA; LRDA=lateralized rhythmic delta activity; TIRDA=temporal intermittent rhythmic delta activity;  LPD=PLED=lateralized periodic discharges; GPD=generalized periodic discharges; BIPDs =bilateral independent periodic discharges; Mf=multifocal; SIRPDs=stimulus induced rhythmic, periodic, or ictal appearing discharges; BIRDs=brief potentially ictal rhythmic discharges >4 Hz, lasting .5-10s; PFA (paroxysmal bursts >13 Hz or =8 Hz <10s).  Modifiers: +F=with fast component; +S=with spike component; +R=with rhythmic component.  S-B=burst suppression pattern.  Max=maximal. N1-drowsy; N2-stage II sleep; N3-slow wave sleep. SSS/BETS=small sharp spikes/benign epileptiform transients of sleep. HV=hyperventilation; PS=photic stimulation]]]    Daily EEG Visual Analysis    FINDINGS:    Background:  The predominant background in the most wakeful state is continuous and symmetric, consisting of diffuse polymorphic delta > theta frequencies. A less wakeful state is characterized by 1-4-second periods of diffuse attenuation/suppression in between diffuse polymorphic delta > theta frequencies. No normal sleep architecture is captured.    Sporadic Epileptiform Discharges and Rhythmic and Periodic Patterns (RPPs):  -Occasional left central (Cz/C3) spike-wave discharges, most often occurring in bursts at 5-6.5 Hz lasting <1-2 seconds (brief potentially ictal rhythmic discharges, BIRDs) or in bursts or runs at 1.5-3 Hz (lateralized periodic discharges, LPDs), fluctuating without evolution. At times, runs of LPDs have intermixed theta activity.  -Rare right frontal (F4>C4) sharp-wave discharges.    Electrographic and Electroclinical seizures:  None    Other Clinical Events:  None    Activation Procedures:   Hyperventilation was not performed.    Photic stimulation was not performed.    Artifacts:  Intermittent myogenic and movement artifacts are present.    EKG:  Single-lead EKG shows regular rhythm at 70-90 bpm.    Impression:  Abnormal EEG in a comatose patient due to:  -Occasional left central spike-wave discharges most often occurring as brief potentially ictal rhythmic discharges (BIRDs) lasting <1-2 seconds or in bursts or runs of lateralized periodic discharges (LPDs) at 1.5-3 Hz, fluctuating without evolution  -Rare right frontal sharp-wave discharges  -Severe diffuse slowing  No electrographic seizures are captured    Clinical Correlation:  -Occasional left central epileptiform discharges occurring in bursts and runs, most often as brief potentially ictal rhythmic discharges (BIRDs), indicate a risk of focal-onset seizures from this region.  -Rare right frontal epileptiform discharges indicate an additional potentially epileptogenic focus in this region.  -Severe diffuse cerebral dysfunction is nonspecific in etiology. In this case sedating medications may be contributing.  -No electrographic seizures are captured      -------------------------------------------------------------------------------------------------------  [facility] EEG Reading Room Ph#: [phone]  Epilepsy Answering Service after 5PM and before 8:30AM: Ph#: [phone]    Aleksander Mondragon MD  Child Neurology Resident    Meera Simental MD  Attending Physician, [facility]ilepsy [place]

## 2023-03-23 NOTE — PROGRESS NOTE ADULT - ASSESSMENT
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  Cardiac arrest on 3/16/23  in septic shock on iv pressors  intubated on MV  Renal following for NAMAN on CKD Mx.    NAMAN on CKD4  Creatinine Trend: 1.72 <--, 1.95 <--, 1.26 <--, 1.24 <--, 1.31 <--, 1.16 <--, 1.38 <--, 1.31 <--, 1.45 <--, 1.49 <--, 1.61 <--, 1.83 <--, 2.10 <--, 2.42 <--, 2.90 <--, 3.86 <--, 3.83 <--, 3.62 <--, 3.31 <--  w/worsened renal function, oliguria, acidosis, mild hyperkalemia- s/p CVVHD  Consent for HD obtained, signed by daughter 3/16/23  KTx 2003  B/L creat around 2.8 since Dec 2022  Patient receives Cyclosporine (Gengraf) 75mg PO q12hrs,  BID and Prednisone 5 QD for transplant    plan:  In light of severe sepsis and cardiac arrest: holding MMF and Cyclosporine  c/w methylPREDNISolone sodium succinate Injectable 10 milliGRAM(s) IV Push every 6 hours  now off cvvh   s/p intermittent HD yesterday, Rx sheet reviewed, net UF 1kg removed, tolerated well. uneventful.   no indication for HD today.  plan for next hemodialysis tomorrow w/2k bath, uf 1kg as tolerated by bp  keep Ponce  monitor U/O  monitor BMP qdaily now  dose all meds for eGFR<15ml/min.   avoid ACEi/ARB/NSAIDs/Nephrotoxics if able.    Metabolic acidosis -resolved  OM and OE  Abxs per Infectious disease specialist with dose adjustment per GFR  f/u cxs  vent Mx, pressors per ICU    prognosis guarded  [facility] Nephrologist Dr. Hugo Hernandez [phone]  will closely follow up.   poc d/w ICU team, HD RN  labs, rad, chart reviewed  For any question, pl call:  Nephrology  Cell [phone]  Office [phone]  Ans Serv [phone]

## 2023-03-23 NOTE — PROGRESS NOTE ADULT - SUBJECTIVE AND OBJECTIVE BOX
SUBJECTIVE/INTERVAL HISTORY:  ***    ROS: Unable to obtain due to mental status.     MEDICATIONS:  MEDICATIONS  (STANDING):  atovaquone  Suspension 1500 milliGRAM(s) Oral daily  cefTRIAXone   IVPB 2000 milliGRAM(s) IV Intermittent every 12 hours  chlorhexidine 0.12% Liquid 15 milliLiter(s) Oral Mucosa two times a day  chlorhexidine 2% Cloths 1 Application(s) Topical <User Schedule>  ciprofloxacin  0.3% Ophthalmic Solution for Otic Use 4 Drop(s) Right Ear two times a day  dextrose 5%. 1000 milliLiter(s) (100 mL/Hr) IV Continuous <Continuous>  dextrose 5%. 1000 milliLiter(s) (50 mL/Hr) IV Continuous <Continuous>  dextrose 50% Injectable 25 Gram(s) IV Push once  dextrose 50% Injectable 12.5 Gram(s) IV Push once  dextrose 50% Injectable 25 Gram(s) IV Push once  glucagon  Injectable 1 milliGRAM(s) IntraMuscular once  heparin   Injectable 5000 Unit(s) SubCutaneous every 8 hours  insulin lispro (ADMELOG) corrective regimen sliding scale   SubCutaneous every 6 hours  lacosamide IVPB 200 milliGRAM(s) IV Intermittent every 12 hours  levETIRAcetam  IVPB 750 milliGRAM(s) IV Intermittent every 12 hours  levothyroxine Injectable 18 MICROGram(s) IV Push at bedtime  midazolam Infusion 0.05 mG/kG/Hr (3.11 mL/Hr) IV Continuous <Continuous>  naloxegol 12.5 milliGRAM(s) Oral daily  petrolatum Ophthalmic Ointment 1 Application(s) Both EYES every 12 hours  polyethylene glycol 3350 17 Gram(s) Oral two times a day  predniSONE   Tablet 5 milliGRAM(s) Oral daily  senna Syrup 10 milliLiter(s) Oral two times a day    MEDICATIONS  (PRN):  acetaminophen   Oral Liquid .. 650 milliGRAM(s) Enteral Tube every 6 hours PRN Temp greater or equal to 38C (100.4F), Mild Pain (1 - 3), Moderate Pain (4 - 6)  dextrose Oral Gel 15 Gram(s) Oral once PRN Blood Glucose LESS THAN 70 milliGRAM(s)/deciliter      VITALS & EXAMINATION:  Vital Signs Last 24 Hrs  T(C): 38.3 (23 Mar 2023 08:00), Max: 38.3 (23 Mar 2023 08:00)  T(F): 100.9 (23 Mar 2023 08:00), Max: 100.9 (23 Mar 2023 08:00)  HR: 91 (23 Mar 2023 08:00) (75 - 107)  BP: 118/62 (22 Mar 2023 09:00) (118/62 - 118/62)  BP(mean): 74 (22 Mar 2023 09:00) (74 - 74)  RR: 17 (23 Mar 2023 08:00) (12 - 33)  SpO2: 100% (23 Mar 2023 08:00) (91% - 100%)    Parameters below as of 23 Mar 2023 06:00  Patient On (Oxygen Delivery Method): ventilator    O2 Concentration (%): 30      LABS:  CBC                       7.1    12.72 )-----------( 77       ( 23 Mar 2023 01:30 )             21.9     Chem 03-23    131<L>  |  97<L>  |  33<H>  ----------------------------<  191<H>  3.8   |  23  |  1.72<H>    Ca    8.2<L>      23 Mar 2023 01:30  Phos  2.8     03-23  Mg     2.10     03-23    TPro  4.6<L>  /  Alb  2.4<L>  /  TBili  0.5  /  DBili  x   /  AST  38<H>  /  ALT  41<H>  /  AlkPhos  94  03-23    LFTs LIVER FUNCTIONS - ( 23 Mar 2023 01:30 )  Alb: 2.4 g/dL / Pro: 4.6 g/dL / ALK PHOS: 94 U/L / ALT: 41 U/L / AST: 38 U/L / GGT: x           Coagulopathy   Lipid Panel 03-15 Chol 153 LDL -- HDL 45<L> Trig 146  A1c   Cardiac enzymes     U/A   CSF CSF:  Total Nucleated Cell Count, CSF: 89034 cells/uL (03-15-23 @ 15:00)  RBC Count - Spinal Fluid: 1267 cells/uL (03-15-23 @ 15:00)  Protein, CSF: 1181 mg/dL (03-15-23 @ 15:00)          STUDIES & IMAGING:    EEG  Study Date: 03/21/2023 08:00 - 03/22/2023 08:00; Duration: 23 hr 58 min  EEG Classification / Summary:  Abnormal EEG in the awake, drowsy, and asleep states due to:  -Occasional left midline spike-wave discharges most often occurring as brief potentially ictal rhythmic discharges (BIRDs) lasting <1-2 seconds or in bursts of lateralized periodic discharges (LPDs) at 1.5-2 Hz  -Severe diffuse slowing  Multiple event button presses, including reported BUE jerking, are not associated with electrographic seizure on EEG.    Clinical Impression:  Risk of seizures has decreased compared to the prior day.  No electrographic seizures are captured.  Multiple event button presses, including reported BUE jerking, are not associated with electrographic seizure on EEG.  Severe diffuse cerebral dysfunction is nonspecific in etiology. In this case, sedating medications may be contributing.  --------------------------------------------------------------------  Study Date: 03/20/2023 08:03 - 03/21/2023 08:00; Duration: 23 hr 37 min  EEG Classification / Summary:  Abnormal EEG in a comatose patient due to:  -Midline focal electrographic seizures and continuous LPDs between 08:24-13:15  -Midline epileptiform discharges then decrease in prevalence until 04:23, after which there is slight increase in prevalence to occasional to frequent discharges and rare brief potentially ictal rhythmic discharges (BIRDs).  -Epileptiform discharges and seizures are more prevalent with stimulation (stimulus-induced rhythmic, periodic, or ictal-appearing discharges, SIRPIDs).  -Rare right frontal sharp waves  -In retrospect, the finding on 03/20/23 at 07:11 may have been artifact rather than electrographic seizure.  -Variable severe diffuse slowing    Clinical Impression:  -Midline focal electrographic seizures and continuous LPDs between 03/20/23 08:24-13:15 indicate risk of further focal-onset seizures from this region  -Risk of seizures remains but decreases after 03/20/23 13:15  -Rare right frontal sharp waves indicate an additional potentially epileptogenic focus in this region.  -In retrospect, the finding on 03/20/23 at 07:11 may have been artifact rather than electrographic seizure.  -Severe diffuse cerebral dysfunction is nonspecific in etiology. Variation in severity may be related to changes in sedating medications.  -Single-lead EKG incidentally shows irregular rhythm at times.  ------------------------------------------------------------    Study Date: 03/19/23 08:00 - 03/20/23 08:00; Duration: 23 hr 58 min  EEG Classification / Summary:  Abnormal EEG in a comatose patient due to:  -One focal right frontal electrographic seizure lasting 16 seconds with no clinical correlate, occurring on 03/20/23 at 07:11  -Frequent midline epileptiform discharges most often occurring as brief potentially ictal rhythmic discharges (BIRDs), occasionally in longer runs at 1-2 Hz, fluctuating without evolution, more prevalent with stimulation (stimulus-induced rhythmic, periodic, or ictal-appearing discharges, SIRPIDs). Longer runs become continuous in the morning of 03/20/23.  -Rare right frontal sharp waves  -20% suppressed background improving to continuous delta > theta slowing    Clinical Impression:  -One focal right frontal electrographic seizure and rare right frontal sharp waves indicate risk of further focal-onset seizures from this region.  -Frequent midline epileptiform discharges, BIRDs, and runs of discharges indicate risk of focal-onset seizures from this region, with risk of seizures increasing in the morning of 03/20/23.  -Severe diffuse cerebral dysfunction is nonspecific in etiology. Improvement in severity over the course of monitoring may be related to decrease in sedating medications.  -Single-lead EKG incidentally shows irregular rhythm at times.  -------------------------------------------------------------    Radiology (XR, CT, MR, U/S, TTE/SORAYA):    CT brain stroke protocol 3/14/23   No hydrocephalus, acute intracranial hemorrhage, mass effect, or brain edema. Right mastoid air cell and tympanic cavity effusion, correlate for the presence of acute otomastoiditis.    CT PERFUSION:  Limited by late injection of the contrast bolus.  Cerebral blood flow less than 30% = 0 mL. No core infarct is predicted.  Tmax greater than 6 seconds = 0 mL. No brain parenchyma predicted to be at continued ischemic risk in the presence of neurologic symptoms.    CTA BRAIN:  No flow-limiting stenosis or vascular aneurysm. No AVM.    CTA NECK:  Calcified plaque at the origin of the left internal carotid artery results in approximately 40% short segment stenosis. Otherwise no flow-limiting stenosis. No evidence for arterial dissection.   SUBJECTIVE/INTERVAL HISTORY:  ***    ROS: Unable to obtain due to mental status.     MEDICATIONS:  MEDICATIONS  (STANDING):  atovaquone  Suspension 1500 milliGRAM(s) Oral daily  cefTRIAXone   IVPB 2000 milliGRAM(s) IV Intermittent every 12 hours  chlorhexidine 0.12% Liquid 15 milliLiter(s) Oral Mucosa two times a day  chlorhexidine 2% Cloths 1 Application(s) Topical <User Schedule>  ciprofloxacin  0.3% Ophthalmic Solution for Otic Use 4 Drop(s) Right Ear two times a day  dextrose 5%. 1000 milliLiter(s) (100 mL/Hr) IV Continuous <Continuous>  dextrose 5%. 1000 milliLiter(s) (50 mL/Hr) IV Continuous <Continuous>  dextrose 50% Injectable 25 Gram(s) IV Push once  dextrose 50% Injectable 12.5 Gram(s) IV Push once  dextrose 50% Injectable 25 Gram(s) IV Push once  glucagon  Injectable 1 milliGRAM(s) IntraMuscular once  heparin   Injectable 5000 Unit(s) SubCutaneous every 8 hours  insulin lispro (ADMELOG) corrective regimen sliding scale   SubCutaneous every 6 hours  lacosamide IVPB 200 milliGRAM(s) IV Intermittent every 12 hours  levETIRAcetam  IVPB 750 milliGRAM(s) IV Intermittent every 12 hours  levothyroxine Injectable 18 MICROGram(s) IV Push at bedtime  midazolam Infusion 0.05 mG/kG/Hr (3.11 mL/Hr) IV Continuous <Continuous>  naloxegol 12.5 milliGRAM(s) Oral daily  petrolatum Ophthalmic Ointment 1 Application(s) Both EYES every 12 hours  polyethylene glycol 3350 17 Gram(s) Oral two times a day  predniSONE   Tablet 5 milliGRAM(s) Oral daily  senna Syrup 10 milliLiter(s) Oral two times a day    MEDICATIONS  (PRN):  acetaminophen   Oral Liquid .. 650 milliGRAM(s) Enteral Tube every 6 hours PRN Temp greater or equal to 38C (100.4F), Mild Pain (1 - 3), Moderate Pain (4 - 6)  dextrose Oral Gel 15 Gram(s) Oral once PRN Blood Glucose LESS THAN 70 milliGRAM(s)/deciliter      VITALS & EXAMINATION:  Vital Signs Last 24 Hrs  T(C): 38.3 (23 Mar 2023 08:00), Max: 38.3 (23 Mar 2023 08:00)  T(F): 100.9 (23 Mar 2023 08:00), Max: 100.9 (23 Mar 2023 08:00)  HR: 91 (23 Mar 2023 08:00) (75 - 107)  BP: 118/62 (22 Mar 2023 09:00) (118/62 - 118/62)  BP(mean): 74 (22 Mar 2023 09:00) (74 - 74)  RR: 17 (23 Mar 2023 08:00) (12 - 33)  SpO2: 100% (23 Mar 2023 08:00) (91% - 100%)    Parameters below as of 23 Mar 2023 06:00  Patient On (Oxygen Delivery Method): ventilator  O2 Concentration (%): 30    Neurological (as per attending eval.):  - Mental Status: Eyes initially closed. No response to verbal or noxious stimuli.  - Language: Intubated and sedated on midazolam gtt.  - Cranial Nerves: Pupils equal b/l and reactive, oculocephalic reflex intact, corneal reflex intact b/l, grimace to gag, rest of CN exam is limited by mental status.   - Motor: No withdrawal to noxious stimulation.  - Sensory: No grimace to noxious stimulation.    - Coordination/Gait: Patient unable to participate due to mental status.    LABS:  CBC                       7.1    12.72 )-----------( 77       ( 23 Mar 2023 01:30 )             21.9     Chem 03-23    131<L>  |  97<L>  |  33<H>  ----------------------------<  191<H>  3.8   |  23  |  1.72<H>    Ca    8.2<L>      23 Mar 2023 01:30  Phos  2.8     03-23  Mg     2.10     03-23    TPro  4.6<L>  /  Alb  2.4<L>  /  TBili  0.5  /  DBili  x   /  AST  38<H>  /  ALT  41<H>  /  AlkPhos  94  03-23    LFTs LIVER FUNCTIONS - ( 23 Mar 2023 01:30 )  Alb: 2.4 g/dL / Pro: 4.6 g/dL / ALK PHOS: 94 U/L / ALT: 41 U/L / AST: 38 U/L / GGT: x           Coagulopathy   Lipid Panel 03-15 Chol 153 LDL -- HDL 45<L> Trig 146  A1c   Cardiac enzymes     U/A   CSF CSF:  Total Nucleated Cell Count, CSF: 60419 cells/uL (03-15-23 @ 15:00)  RBC Count - Spinal Fluid: 1267 cells/uL (03-15-23 @ 15:00)  Protein, CSF: 1181 mg/dL (03-15-23 @ 15:00)      STUDIES & IMAGING:    EEG  Study Date: 03/22/23 08:00 - 03/23/23 08:00; Duration: 23 hr 58 min  Impression:  Abnormal EEG in a comatose patient due to:  -Occasional left central spike-wave discharges most often occurring as brief potentially ictal rhythmic discharges (BIRDs) lasting <1-2 seconds or in bursts or runs of lateralized periodic discharges (LPDs) at 1.5-3 Hz, fluctuating without evolution  -Rare right frontal sharp-wave discharges  -Severe diffuse slowing  No electrographic seizures are captured    Clinical Correlation:  -Occasional left central epileptiform discharges occurring in bursts and runs, most often as brief potentially ictal rhythmic discharges (BIRDs), indicate a risk of focal-onset seizures from this region.  -Rare right frontal epileptiform discharges indicate an additional potentially epileptogenic focus in this region.  -Severe diffuse cerebral dysfunction is nonspecific in etiology. In this case sedating medications may be contributing.  -No electrographic seizures are captured  ----------------------------------------------------------  Study Date: 03/21/2023 08:00 - 03/22/2023 08:00; Duration: 23 hr 58 min  EEG Classification / Summary:  Abnormal EEG in the awake, drowsy, and asleep states due to:  -Occasional left midline spike-wave discharges most often occurring as brief potentially ictal rhythmic discharges (BIRDs) lasting <1-2 seconds or in bursts of lateralized periodic discharges (LPDs) at 1.5-2 Hz  -Severe diffuse slowing  Multiple event button presses, including reported BUE jerking, are not associated with electrographic seizure on EEG.    Clinical Impression:  Risk of seizures has decreased compared to the prior day.  No electrographic seizures are captured.  Multiple event button presses, including reported BUE jerking, are not associated with electrographic seizure on EEG.  Severe diffuse cerebral dysfunction is nonspecific in etiology. In this case, sedating medications may be contributing.  --------------------------------------------------------------------  Study Date: 03/20/2023 08:03 - 03/21/2023 08:00; Duration: 23 hr 37 min  EEG Classification / Summary:  Abnormal EEG in a comatose patient due to:  -Midline focal electrographic seizures and continuous LPDs between 08:24-13:15  -Midline epileptiform discharges then decrease in prevalence until 04:23, after which there is slight increase in prevalence to occasional to frequent discharges and rare brief potentially ictal rhythmic discharges (BIRDs).  -Epileptiform discharges and seizures are more prevalent with stimulation (stimulus-induced rhythmic, periodic, or ictal-appearing discharges, SIRPIDs).  -Rare right frontal sharp waves  -In retrospect, the finding on 03/20/23 at 07:11 may have been artifact rather than electrographic seizure.  -Variable severe diffuse slowing    Clinical Impression:  -Midline focal electrographic seizures and continuous LPDs between 03/20/23 08:24-13:15 indicate risk of further focal-onset seizures from this region  -Risk of seizures remains but decreases after 03/20/23 13:15  -Rare right frontal sharp waves indicate an additional potentially epileptogenic focus in this region.  -In retrospect, the finding on 03/20/23 at 07:11 may have been artifact rather than electrographic seizure.  -Severe diffuse cerebral dysfunction is nonspecific in etiology. Variation in severity may be related to changes in sedating medications.  -Single-lead EKG incidentally shows irregular rhythm at times.  ------------------------------------------------------------    Study Date: 03/19/23 08:00 - 03/20/23 08:00; Duration: 23 hr 58 min  EEG Classification / Summary:  Abnormal EEG in a comatose patient due to:  -One focal right frontal electrographic seizure lasting 16 seconds with no clinical correlate, occurring on 03/20/23 at 07:11  -Frequent midline epileptiform discharges most often occurring as brief potentially ictal rhythmic discharges (BIRDs), occasionally in longer runs at 1-2 Hz, fluctuating without evolution, more prevalent with stimulation (stimulus-induced rhythmic, periodic, or ictal-appearing discharges, SIRPIDs). Longer runs become continuous in the morning of 03/20/23.  -Rare right frontal sharp waves  -20% suppressed background improving to continuous delta > theta slowing    Clinical Impression:  -One focal right frontal electrographic seizure and rare right frontal sharp waves indicate risk of further focal-onset seizures from this region.  -Frequent midline epileptiform discharges, BIRDs, and runs of discharges indicate risk of focal-onset seizures from this region, with risk of seizures increasing in the morning of 03/20/23.  -Severe diffuse cerebral dysfunction is nonspecific in etiology. Improvement in severity over the course of monitoring may be related to decrease in sedating medications.  -Single-lead EKG incidentally shows irregular rhythm at times.  -------------------------------------------------------------    Radiology (XR, CT, MR, U/S, TTE/SORAYA):    CT brain stroke protocol 3/14/23   No hydrocephalus, acute intracranial hemorrhage, mass effect, or brain edema. Right mastoid air cell and tympanic cavity effusion, correlate for the presence of acute otomastoiditis.    CT PERFUSION:  Limited by late injection of the contrast bolus.  Cerebral blood flow less than 30% = 0 mL. No core infarct is predicted.  Tmax greater than 6 seconds = 0 mL. No brain parenchyma predicted to be at continued ischemic risk in the presence of neurologic symptoms.    CTA BRAIN:  No flow-limiting stenosis or vascular aneurysm. No AVM.    CTA NECK:  Calcified plaque at the origin of the left internal carotid artery results in approximately 40% short segment stenosis. Otherwise no flow-limiting stenosis. No evidence for arterial dissection.   SUBJECTIVE/INTERVAL HISTORY: Patient seen and examined with neurology attending this AM. No acute events noted. Remains intubated and sedated.      ROS: Unable to obtain due to mental status.     MEDICATIONS:  MEDICATIONS  (STANDING):  atovaquone  Suspension 1500 milliGRAM(s) Oral daily  cefTRIAXone   IVPB 2000 milliGRAM(s) IV Intermittent every 12 hours  chlorhexidine 0.12% Liquid 15 milliLiter(s) Oral Mucosa two times a day  chlorhexidine 2% Cloths 1 Application(s) Topical <User Schedule>  ciprofloxacin  0.3% Ophthalmic Solution for Otic Use 4 Drop(s) Right Ear two times a day  dextrose 5%. 1000 milliLiter(s) (100 mL/Hr) IV Continuous <Continuous>  dextrose 5%. 1000 milliLiter(s) (50 mL/Hr) IV Continuous <Continuous>  dextrose 50% Injectable 25 Gram(s) IV Push once  dextrose 50% Injectable 12.5 Gram(s) IV Push once  dextrose 50% Injectable 25 Gram(s) IV Push once  glucagon  Injectable 1 milliGRAM(s) IntraMuscular once  heparin   Injectable 5000 Unit(s) SubCutaneous every 8 hours  insulin lispro (ADMELOG) corrective regimen sliding scale   SubCutaneous every 6 hours  lacosamide IVPB 200 milliGRAM(s) IV Intermittent every 12 hours  levETIRAcetam  IVPB 750 milliGRAM(s) IV Intermittent every 12 hours  levothyroxine Injectable 18 MICROGram(s) IV Push at bedtime  midazolam Infusion 0.05 mG/kG/Hr (3.11 mL/Hr) IV Continuous <Continuous>  naloxegol 12.5 milliGRAM(s) Oral daily  petrolatum Ophthalmic Ointment 1 Application(s) Both EYES every 12 hours  polyethylene glycol 3350 17 Gram(s) Oral two times a day  predniSONE   Tablet 5 milliGRAM(s) Oral daily  senna Syrup 10 milliLiter(s) Oral two times a day    MEDICATIONS  (PRN):  acetaminophen   Oral Liquid .. 650 milliGRAM(s) Enteral Tube every 6 hours PRN Temp greater or equal to 38C (100.4F), Mild Pain (1 - 3), Moderate Pain (4 - 6)  dextrose Oral Gel 15 Gram(s) Oral once PRN Blood Glucose LESS THAN 70 milliGRAM(s)/deciliter      VITALS & EXAMINATION:  Vital Signs Last 24 Hrs  T(C): 38.3 (23 Mar 2023 08:00), Max: 38.3 (23 Mar 2023 08:00)  T(F): 100.9 (23 Mar 2023 08:00), Max: 100.9 (23 Mar 2023 08:00)  HR: 91 (23 Mar 2023 08:00) (75 - 107)  BP: 118/62 (22 Mar 2023 09:00) (118/62 - 118/62)  BP(mean): 74 (22 Mar 2023 09:00) (74 - 74)  RR: 17 (23 Mar 2023 08:00) (12 - 33)  SpO2: 100% (23 Mar 2023 08:00) (91% - 100%)    Parameters below as of 23 Mar 2023 06:00  Patient On (Oxygen Delivery Method): ventilator  O2 Concentration (%): 30    Neurological (as per attending eval.):  - Mental Status: Eyes initially closed. No response to verbal or noxious stimuli.  - Language: Intubated and sedated on midazolam gtt.  - Cranial Nerves: Pupils equal b/l and reactive, oculocephalic reflex intact, corneal reflex intact b/l, grimace to gag, rest of CN exam is limited by mental status.   - Motor: No withdrawal to noxious stimulation.  - Sensory: No grimace to noxious stimulation.    - Coordination/Gait: Patient unable to participate due to mental status.    LABS:  CBC                       7.1    12.72 )-----------( 77       ( 23 Mar 2023 01:30 )             21.9     Chem 03-23    131<L>  |  97<L>  |  33<H>  ----------------------------<  191<H>  3.8   |  23  |  1.72<H>    Ca    8.2<L>      23 Mar 2023 01:30  Phos  2.8     03-23  Mg     2.10     03-23    TPro  4.6<L>  /  Alb  2.4<L>  /  TBili  0.5  /  DBili  x   /  AST  38<H>  /  ALT  41<H>  /  AlkPhos  94  03-23    LFTs LIVER FUNCTIONS - ( 23 Mar 2023 01:30 )  Alb: 2.4 g/dL / Pro: 4.6 g/dL / ALK PHOS: 94 U/L / ALT: 41 U/L / AST: 38 U/L / GGT: x           Coagulopathy   Lipid Panel 03-15 Chol 153 LDL -- HDL 45<L> Trig 146  A1c   Cardiac enzymes     U/A   CSF CSF:  Total Nucleated Cell Count, CSF: 88409 cells/uL (03-15-23 @ 15:00)  RBC Count - Spinal Fluid: 1267 cells/uL (03-15-23 @ 15:00)  Protein, CSF: 1181 mg/dL (03-15-23 @ 15:00)      STUDIES & IMAGING:    EEG  Study Date: 03/22/23 08:00 - 03/23/23 08:00; Duration: 23 hr 58 min  Impression:  Abnormal EEG in a comatose patient due to:  -Occasional left central spike-wave discharges most often occurring as brief potentially ictal rhythmic discharges (BIRDs) lasting <1-2 seconds or in bursts or runs of lateralized periodic discharges (LPDs) at 1.5-3 Hz, fluctuating without evolution  -Rare right frontal sharp-wave discharges  -Severe diffuse slowing  No electrographic seizures are captured    Clinical Correlation:  -Occasional left central epileptiform discharges occurring in bursts and runs, most often as brief potentially ictal rhythmic discharges (BIRDs), indicate a risk of focal-onset seizures from this region.  -Rare right frontal epileptiform discharges indicate an additional potentially epileptogenic focus in this region.  -Severe diffuse cerebral dysfunction is nonspecific in etiology. In this case sedating medications may be contributing.  -No electrographic seizures are captured  ----------------------------------------------------------  Study Date: 03/21/2023 08:00 - 03/22/2023 08:00; Duration: 23 hr 58 min  EEG Classification / Summary:  Abnormal EEG in the awake, drowsy, and asleep states due to:  -Occasional left midline spike-wave discharges most often occurring as brief potentially ictal rhythmic discharges (BIRDs) lasting <1-2 seconds or in bursts of lateralized periodic discharges (LPDs) at 1.5-2 Hz  -Severe diffuse slowing  Multiple event button presses, including reported BUE jerking, are not associated with electrographic seizure on EEG.    Clinical Impression:  Risk of seizures has decreased compared to the prior day.  No electrographic seizures are captured.  Multiple event button presses, including reported BUE jerking, are not associated with electrographic seizure on EEG.  Severe diffuse cerebral dysfunction is nonspecific in etiology. In this case, sedating medications may be contributing.  --------------------------------------------------------------------  Study Date: 03/20/2023 08:03 - 03/21/2023 08:00; Duration: 23 hr 37 min  EEG Classification / Summary:  Abnormal EEG in a comatose patient due to:  -Midline focal electrographic seizures and continuous LPDs between 08:24-13:15  -Midline epileptiform discharges then decrease in prevalence until 04:23, after which there is slight increase in prevalence to occasional to frequent discharges and rare brief potentially ictal rhythmic discharges (BIRDs).  -Epileptiform discharges and seizures are more prevalent with stimulation (stimulus-induced rhythmic, periodic, or ictal-appearing discharges, SIRPIDs).  -Rare right frontal sharp waves  -In retrospect, the finding on 03/20/23 at 07:11 may have been artifact rather than electrographic seizure.  -Variable severe diffuse slowing    Clinical Impression:  -Midline focal electrographic seizures and continuous LPDs between 03/20/23 08:24-13:15 indicate risk of further focal-onset seizures from this region  -Risk of seizures remains but decreases after 03/20/23 13:15  -Rare right frontal sharp waves indicate an additional potentially epileptogenic focus in this region.  -In retrospect, the finding on 03/20/23 at 07:11 may have been artifact rather than electrographic seizure.  -Severe diffuse cerebral dysfunction is nonspecific in etiology. Variation in severity may be related to changes in sedating medications.  -Single-lead EKG incidentally shows irregular rhythm at times.  ------------------------------------------------------------    Study Date: 03/19/23 08:00 - 03/20/23 08:00; Duration: 23 hr 58 min  EEG Classification / Summary:  Abnormal EEG in a comatose patient due to:  -One focal right frontal electrographic seizure lasting 16 seconds with no clinical correlate, occurring on 03/20/23 at 07:11  -Frequent midline epileptiform discharges most often occurring as brief potentially ictal rhythmic discharges (BIRDs), occasionally in longer runs at 1-2 Hz, fluctuating without evolution, more prevalent with stimulation (stimulus-induced rhythmic, periodic, or ictal-appearing discharges, SIRPIDs). Longer runs become continuous in the morning of 03/20/23.  -Rare right frontal sharp waves  -20% suppressed background improving to continuous delta > theta slowing    Clinical Impression:  -One focal right frontal electrographic seizure and rare right frontal sharp waves indicate risk of further focal-onset seizures from this region.  -Frequent midline epileptiform discharges, BIRDs, and runs of discharges indicate risk of focal-onset seizures from this region, with risk of seizures increasing in the morning of 03/20/23.  -Severe diffuse cerebral dysfunction is nonspecific in etiology. Improvement in severity over the course of monitoring may be related to decrease in sedating medications.  -Single-lead EKG incidentally shows irregular rhythm at times.  -------------------------------------------------------------    Radiology (XR, CT, MR, U/S, TTE/SORAYA):    CT brain stroke protocol 3/14/23   No hydrocephalus, acute intracranial hemorrhage, mass effect, or brain edema. Right mastoid air cell and tympanic cavity effusion, correlate for the presence of acute otomastoiditis.    CT PERFUSION:  Limited by late injection of the contrast bolus.  Cerebral blood flow less than 30% = 0 mL. No core infarct is predicted.  Tmax greater than 6 seconds = 0 mL. No brain parenchyma predicted to be at continued ischemic risk in the presence of neurologic symptoms.    CTA BRAIN:  No flow-limiting stenosis or vascular aneurysm. No AVM.    CTA NECK:  Calcified plaque at the origin of the left internal carotid artery results in approximately 40% short segment stenosis. Otherwise no flow-limiting stenosis. No evidence for arterial dissection.

## 2023-03-23 NOTE — PROGRESS NOTE ADULT - SUBJECTIVE AND OBJECTIVE BOX
INTERVAL HPI/OVERNIGHT EVENTS:    SUBJECTIVE: Patient seen and examined at bedside. Intubated, sedated, ROS cannot be obtained.       VITAL SIGNS:  ICU Vital Signs Last 24 Hrs  T(C): 37.8 (23 Mar 2023 04:00), Max: 37.8 (22 Mar 2023 08:00)  T(F): 100.1 (23 Mar 2023 04:00), Max: 100.1 (23 Mar 2023 04:00)  HR: 89 (23 Mar 2023 06:56) (75 - 107)  BP: 118/62 (22 Mar 2023 09:00) (118/62 - 141/65)  BP(mean): 74 (22 Mar 2023 09:00) (74 - 85)  ABP: 178/102 (23 Mar 2023 06:00) (102/51 - 183/90)  ABP(mean): 124 (23 Mar 2023 06:00) (65 - 124)  RR: 13 (23 Mar 2023 06:00) (12 - 33)  SpO2: 100% (23 Mar 2023 06:56) (91% - 100%)    O2 Parameters below as of 23 Mar 2023 06:00  Patient On (Oxygen Delivery Method): ventilator    O2 Concentration (%): 30      Mode: AC/ CMV (Assist Control/ Continuous Mandatory Ventilation), RR (machine): 10, TV (machine): 320, FiO2: 30, PEEP: 5, ITime: 0.7, MAP: 9, PIP: 16  Plateau pressure:   P/F ratio:     03-22 @ 07:01  -  03-23 @ 07:00  --------------------------------------------------------  IN: 1834.9 mL / OUT: 2240 mL / NET: -405.1 mL      CAPILLARY BLOOD GLUCOSE      POCT Blood Glucose.: 175 mg/dL (23 Mar 2023 05:46)    ECG:    PHYSICAL EXAM:    General:   HEENT:   Neck:   Respiratory:   Cardiovascular:   Abdomen:   Extremities:  Neurological:    MEDICATIONS:  MEDICATIONS  (STANDING):  atovaquone  Suspension 1500 milliGRAM(s) Oral daily  cefTRIAXone   IVPB 2000 milliGRAM(s) IV Intermittent every 12 hours  chlorhexidine 0.12% Liquid 15 milliLiter(s) Oral Mucosa two times a day  chlorhexidine 2% Cloths 1 Application(s) Topical <User Schedule>  ciprofloxacin  0.3% Ophthalmic Solution for Otic Use 4 Drop(s) Right Ear two times a day  dextrose 5%. 1000 milliLiter(s) (100 mL/Hr) IV Continuous <Continuous>  dextrose 5%. 1000 milliLiter(s) (50 mL/Hr) IV Continuous <Continuous>  dextrose 50% Injectable 25 Gram(s) IV Push once  dextrose 50% Injectable 12.5 Gram(s) IV Push once  dextrose 50% Injectable 25 Gram(s) IV Push once  glucagon  Injectable 1 milliGRAM(s) IntraMuscular once  heparin   Injectable 5000 Unit(s) SubCutaneous every 8 hours  insulin lispro (ADMELOG) corrective regimen sliding scale   SubCutaneous every 6 hours  lacosamide IVPB 200 milliGRAM(s) IV Intermittent every 12 hours  levETIRAcetam  IVPB 750 milliGRAM(s) IV Intermittent every 12 hours  levothyroxine Injectable 18 MICROGram(s) IV Push at bedtime  midazolam Infusion 0.05 mG/kG/Hr (3.11 mL/Hr) IV Continuous <Continuous>  naloxegol 12.5 milliGRAM(s) Oral daily  petrolatum Ophthalmic Ointment 1 Application(s) Both EYES every 12 hours  polyethylene glycol 3350 17 Gram(s) Oral two times a day  predniSONE   Tablet 5 milliGRAM(s) Oral daily  senna Syrup 10 milliLiter(s) Oral two times a day    MEDICATIONS  (PRN):  acetaminophen   Oral Liquid .. 650 milliGRAM(s) Enteral Tube every 6 hours PRN Temp greater or equal to 38C (100.4F), Mild Pain (1 - 3), Moderate Pain (4 - 6)  dextrose Oral Gel 15 Gram(s) Oral once PRN Blood Glucose LESS THAN 70 milliGRAM(s)/deciliter      ALLERGIES:  Allergies    apple (Unknown)  Benadryl (Unknown)  penicillin (Hives)  watermelon (Unknown)    Intolerances        LABS:                        7.1    12.72 )-----------( 77       ( 23 Mar 2023 01:30 )             21.9     03-23    131<L>  |  97<L>  |  33<H>  ----------------------------<  191<H>  3.8   |  23  |  1.72<H>    Ca    8.2<L>      23 Mar 2023 01:30  Phos  2.8     03-23  Mg     2.10     03-23    TPro  4.6<L>  /  Alb  2.4<L>  /  TBili  0.5  /  DBili  x   /  AST  38<H>  /  ALT  41<H>  /  AlkPhos  94  03-23          RADIOLOGY & ADDITIONAL TESTS: Reviewed.   INTERVAL HPI/OVERNIGHT EVENTS: No acute events overnight. s/p HD yesterday w/ removal of 1L.    SUBJECTIVE: Patient seen and examined at bedside. Intubated, sedated, ROS cannot be obtained.       VITAL SIGNS:  ICU Vital Signs Last 24 Hrs  T(C): 37.8 (23 Mar 2023 04:00), Max: 37.8 (22 Mar 2023 08:00)  T(F): 100.1 (23 Mar 2023 04:00), Max: 100.1 (23 Mar 2023 04:00)  HR: 89 (23 Mar 2023 06:56) (75 - 107)  BP: 118/62 (22 Mar 2023 09:00) (118/62 - 141/65)  BP(mean): 74 (22 Mar 2023 09:00) (74 - 85)  ABP: 178/102 (23 Mar 2023 06:00) (102/51 - 183/90)  ABP(mean): 124 (23 Mar 2023 06:00) (65 - 124)  RR: 13 (23 Mar 2023 06:00) (12 - 33)  SpO2: 100% (23 Mar 2023 06:56) (91% - 100%)    O2 Parameters below as of 23 Mar 2023 06:00  Patient On (Oxygen Delivery Method): ventilator    O2 Concentration (%): 30      Mode: AC/ CMV (Assist Control/ Continuous Mandatory Ventilation), RR (machine): 10, TV (machine): 320, FiO2: 30, PEEP: 5, ITime: 0.7, MAP: 9, PIP: 16  Plateau pressure:   P/F ratio:     03-22 @ 07:01  -  03-23 @ 07:00  --------------------------------------------------------  IN: 1834.9 mL / OUT: 2240 mL / NET: -405.1 mL      CAPILLARY BLOOD GLUCOSE      POCT Blood Glucose.: 175 mg/dL (23 Mar 2023 05:46)    ECG:    PHYSICAL EXAM:  GENERAL: no distress, intubated  PSYCH: A&Ox0  HEENT: Atraumatic, Normocephalic, pupils reactive b/l  NECK: Supple, No JVD  CHEST/LUNG: clear to auscultation bilaterally  HEART: regular rate and rhythm, no murmurs  ABDOMEN: soft midline, palpable mass and firmness present 2/2 polycystic kidney  EXTREMITIES: edema on bilateral LE  NEUROLOGY: intubated and sedated  SKIN: No rashes or lesions      MEDICATIONS:  MEDICATIONS  (STANDING):  atovaquone  Suspension 1500 milliGRAM(s) Oral daily  cefTRIAXone   IVPB 2000 milliGRAM(s) IV Intermittent every 12 hours  chlorhexidine 0.12% Liquid 15 milliLiter(s) Oral Mucosa two times a day  chlorhexidine 2% Cloths 1 Application(s) Topical <User Schedule>  ciprofloxacin  0.3% Ophthalmic Solution for Otic Use 4 Drop(s) Right Ear two times a day  dextrose 5%. 1000 milliLiter(s) (100 mL/Hr) IV Continuous <Continuous>  dextrose 5%. 1000 milliLiter(s) (50 mL/Hr) IV Continuous <Continuous>  dextrose 50% Injectable 25 Gram(s) IV Push once  dextrose 50% Injectable 12.5 Gram(s) IV Push once  dextrose 50% Injectable 25 Gram(s) IV Push once  glucagon  Injectable 1 milliGRAM(s) IntraMuscular once  heparin   Injectable 5000 Unit(s) SubCutaneous every 8 hours  insulin lispro (ADMELOG) corrective regimen sliding scale   SubCutaneous every 6 hours  lacosamide IVPB 200 milliGRAM(s) IV Intermittent every 12 hours  levETIRAcetam  IVPB 750 milliGRAM(s) IV Intermittent every 12 hours  levothyroxine Injectable 18 MICROGram(s) IV Push at bedtime  midazolam Infusion 0.05 mG/kG/Hr (3.11 mL/Hr) IV Continuous <Continuous>  naloxegol 12.5 milliGRAM(s) Oral daily  petrolatum Ophthalmic Ointment 1 Application(s) Both EYES every 12 hours  polyethylene glycol 3350 17 Gram(s) Oral two times a day  predniSONE   Tablet 5 milliGRAM(s) Oral daily  senna Syrup 10 milliLiter(s) Oral two times a day    MEDICATIONS  (PRN):  acetaminophen   Oral Liquid .. 650 milliGRAM(s) Enteral Tube every 6 hours PRN Temp greater or equal to 38C (100.4F), Mild Pain (1 - 3), Moderate Pain (4 - 6)  dextrose Oral Gel 15 Gram(s) Oral once PRN Blood Glucose LESS THAN 70 milliGRAM(s)/deciliter      ALLERGIES:  Allergies    apple (Unknown)  Benadryl (Unknown)  penicillin (Hives)  watermelon (Unknown)    Intolerances        LABS:                        7.1    12.72 )-----------( 77       ( 23 Mar 2023 01:30 )             21.9     03-23    131<L>  |  97<L>  |  33<H>  ----------------------------<  191<H>  3.8   |  23  |  1.72<H>    Ca    8.2<L>      23 Mar 2023 01:30  Phos  2.8     03-23  Mg     2.10     03-23    TPro  4.6<L>  /  Alb  2.4<L>  /  TBili  0.5  /  DBili  x   /  AST  38<H>  /  ALT  41<H>  /  AlkPhos  94  03-23          RADIOLOGY & ADDITIONAL TESTS: Reviewed.

## 2023-03-23 NOTE — PROGRESS NOTE ADULT - ASSESSMENT
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 Went to ENT and was diagnosed with R otitis externa secondary to perforated TM. Prescribed cipro/dexamethasone.  Worsening headache and EMS called. Admitted 3/14.  CT head negative.  NAMAN.  Started on vanc/meropenem.  IR obtained LP.  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

## 2023-03-23 NOTE — PROGRESS NOTE ADULT - ASSESSMENT
77 y/o F with h/o HTN, HLD, DM2, PCKD previously on HD via LUE AVF then s/p renal transplant, LLE DVT (resolved, nml dopplers 12/2022) on ASA, very small supraclinoid aneurysm (reportedly unchanged on MRA 10/2020), hypothyroidism, PCP 2021 on atovaquone ppx, reportedly hospitalized in 12/2022 for rectal prolapse, had a blood transfusion at that time, also + CMV 1/25/23 who presented with R-sided HA, R ear pain and neck stiffness after visit to ENT earlier in the day, altered on presentation, CT head unremarkable for stroke, however LP with IR and BCx showing +strep pneumoniae. currently on vanc and CTX per ID. Cardiac arrest 3/16 with ROSC, transferred to MICU for further management.       Plan:   Neurological  #Currently Intubated, sedated   - on versed, continue to wean  - f/u neuron specific enolase sent on 3/19    #Strep pneumo meningitis #Acute encephalopathy  severe HA, neck stiffness, fever, leukocytosis concerning for meningitis/encephalitis  CT head negative for CVA.   LP and BCx 3/15 both showing gram positive cocci in pairs, and + strep pneumoniae.   likely source of entry from R ear where pt c/o discomfort for several months.  patient is on cyclosporine, mycophenolate mofetil and prednisone due to kidney transplant and is immunocompromised   dental procedure 3/14 but less likely source   - f/u MRI of the IAC w/ contrast at a later time when renal function improves    #Seizures  - 24hr EEG initially with highly focal seizures, improved with increased sedation.   - keppra loaded 500mg IVP (3/17) and started on 250mg IV q12h (3/17 - 3/19) increased to 750 mg IV q12h (CRRT dosing (3/19 - 3/20), then increased to 1000mg IV q12 (3/20 - 3/22), then back to 750mg IV q12 (3/22 - [ ])  - f/u keppra serum level  - vimpat 100 BID increased to 200 BID (3/20 - [ ])  - c/w versed drip  - decrease propofol while monitoring on EEG, decreased seizure activity after increasing vimpat and keppra doses on 3/20    Cardiovascular  #Cardiac Arrest  bradycardia to 30, pulseless, code blue was called 3/16   2 rounds of epi, and PEA arrest. on 3rd pulse check pulseless VT, 200J shock given  4th pulse check asystole, 5th ROSC, sinus tachy with HUMA. IO placed and levo started. Due to blood in mouth took several attempts with DL for ETT to be placed but ultimately confirmed with capnometry and bilateral breath sounds.     #Elevated troponin  uptrending troponin to 182 3/15 AM, was likely iso ESRD  s/p cardiac arrest, elevated ST segment   - TTE from 3/17 showed EF of 63% with mild pulm htn, normal left ventricular systolic function, and diastolic LV dysfunction.      #HTN  on amlodipine 5mg, losartan 100mg, torsemide 20mg, and clonidine 0.2 patch weekly  - hold for now given sepsis and on pressor      Respiratory/ENT  #Intubated  continue sedation    # Tympanic membrane rupture  discharge from R ear, evaluated by ENT given dexamethasone and cipro 3/14  - c/w broad spectrum abx   - MRI of IAC with contrast when pt is stable  - f/u ENT recs    Gastrointestinal  #Constipation  - Abdominal xray negative for bowel obstruction  - trial on movantik  - c/w bowel regimen  - increase trickle feeds as tolerated and bowel regimen      Renal  #ESRD #s/p Kidney Transplant in 2003 at [place] #PCKD  previously HD through LLE AVF but no HD since transplant  Cr 3.8, unclear baseline  - avoid nephrotoxic agent  - holding cyclosporine and mycophenolate   - c/w home prednisone 5mg qday  - appreciate nephro recs  - strict I/O and replete electrolytes  - 4mg IV bumex with minimal urine production (3/17)  - started on CRRT (3/17-3/21) net negative 100cc/hr goal  - s/p intermittent HD (3/22), removed 1L       Hematological  # h/o LLE DVT  resolved, no DVT on doppler 12/22.  - negative LLE duplex this admission 3/17  - restarted heparin subQ (3/21)      #Anemia  - s/p 1 unit pRBC on 3/16 for Hb drop from 10 to 7.9 and 1 unit on 3/21 for Hb 6.8 2/2 loss from CRRT clotting  - Evaluated by ENT for bleeding and found no signs of active bleeding from nasal cavity, nasopharynx or oral cavity, however, patient biting on tongue, which could be one source of bleeding. Tongue was edematous and lax      Infectious Disease  #Strep pneumoniae meningitis #R ear mastoiditis  treatment with IV CTX   - c/w ciprodex drops for R ear  - f/u ID recs   - repeat cx from 3/21 NGTD  - c/w CTX (3/16 - [ ])  - possible SORAYA to rule out endocarditis per ID        #h/o PCP  - atovaquone at home, continue      Endocrinological  #Hypothyroidism  last TSH 12/2022 >10  TSH 3/15 4.73  - c/w levothyroxine     #Diabetes mellitus Type II  - ISS      Ethics  FULL, GOC ongoing.   77 y/o F with h/o HTN, HLD, DM2, PCKD previously on HD via LUE AVF then s/p renal transplant, LLE DVT (resolved, nml dopplers 12/2022) on ASA, very small supraclinoid aneurysm (reportedly unchanged on MRA 10/2020), hypothyroidism, PCP 2021 on atovaquone ppx, reportedly hospitalized in 12/2022 for rectal prolapse, had a blood transfusion at that time, also + CMV 1/25/23 who presented with R-sided HA, R ear pain and neck stiffness after visit to ENT earlier in the day, altered on presentation, CT head unremarkable for stroke, however LP with IR and BCx showing +strep pneumoniae. currently on vanc and CTX per ID. Cardiac arrest 3/16 with ROSC, transferred to MICU for further management.       Plan:   Neurological  #Currently Intubated, sedated   - on versed, continue to wean  - f/u neuron specific enolase sent on 3/19    #Strep pneumo meningitis #Acute encephalopathy  severe HA, neck stiffness, fever, leukocytosis concerning for meningitis/encephalitis  CT head negative for CVA.   LP and BCx 3/15 both showing gram positive cocci in pairs, and + strep pneumoniae.   likely source of entry from R ear where pt c/o discomfort for several months.  patient is on cyclosporine, mycophenolate mofetil and prednisone due to kidney transplant and is immunocompromised   dental procedure 3/14 but less likely source   - f/u MRI of the IAC w/ contrast at a later time when renal function improves    #Seizures  - 24hr EEG initially with highly focal seizures, improved with increased sedation.   - keppra loaded 500mg IVP (3/17) and started on 250mg IV q12h (3/17 - 3/19) increased to 750 mg IV q12h (CRRT dosing (3/19 - 3/20), then increased to 1000mg IV q12 (3/20 - 3/22), then back to 750mg IV q12 (3/22 - [ ])  - keppra serum level wnl  - vimpat 100 BID increased to 200 BID (3/20 - [ ])  -  wean versed as appropriate  - decrease propofol while monitoring on EEG, decreased seizure activity after increasing vimpat and keppra doses on 3/20    Cardiovascular  #Cardiac Arrest  bradycardia to 30, pulseless, code blue was called 3/16   2 rounds of epi, and PEA arrest. on 3rd pulse check pulseless VT, 200J shock given  4th pulse check asystole, 5th ROSC, sinus tachy with HUMA. IO placed and levo started. Due to blood in mouth took several attempts with DL for ETT to be placed but ultimately confirmed with capnometry and bilateral breath sounds.     #Elevated troponin  uptrending troponin to 182 3/15 AM, was likely iso ESRD  s/p cardiac arrest, elevated ST segment   - TTE from 3/17 showed EF of 63% with mild pulm htn, normal left ventricular systolic function, and diastolic LV dysfunction.      #HTN  on amlodipine 5mg, losartan 100mg, torsemide 20mg, and clonidine 0.2 patch weekly  - can consider restarting as pt has been hypertensive off pressors      Respiratory/ENT  #Intubated  continue sedation    # Tympanic membrane rupture  discharge from R ear, evaluated by ENT given dexamethasone and cipro 3/14  - c/w broad spectrum abx   - MRI of IAC with contrast when pt is stable  - f/u ENT recs    Gastrointestinal  #Constipation  - Abdominal xray negative for bowel obstruction  - trial on movantik  - c/w bowel regimen  - increase trickle feeds as tolerated and bowel regimen    Renal  #ESRD #s/p Kidney Transplant in 2003 at [place] #PCKD  previously HD through LLE AVF but no HD since transplant  Cr 3.8, unclear baseline  - avoid nephrotoxic agents  - holding cyclosporine and mycophenolate   - c/w home prednisone 5mg qday  - strict I/O and replete electrolytes  - s/p 4mg IV bumex with minimal urine production (3/17)  - started on CRRT (3/17-3/21) net negative 100cc/hr goal  - s/p intermittent HD (3/22), removed 1L       Hematological  # h/o LLE DVT  resolved, no DVT on doppler 12/22.  - negative LLE duplex this admission 3/17  - restarted heparin subQ (3/21)      #Anemia  - s/p 1 unit pRBC on 3/16 for Hb drop from 10 to 7.9 and 1 unit on 3/21 for Hb 6.8 2/2 loss from CRRT clotting  - Evaluated by ENT for bleeding and found no signs of active bleeding from nasal cavity, nasopharynx or oral cavity, however, patient biting on tongue, which could be one source of bleeding. Tongue was edematous and lax      Infectious Disease  #Strep pneumoniae meningitis #R ear mastoiditis  treatment with IV CTX   - c/w ciprodex drops for R ear  - f/u ID recs   - repeat cx from 3/21 NGTD  - c/w CTX (3/16 - [ ])  - possible SORAYA to rule out endocarditis per ID      #h/o PCP  - atovaquone at home, continue      Endocrinological  #Hypothyroidism  last TSH 12/2022 >10  TSH 3/15 4.73  - c/w levothyroxine     #Diabetes mellitus Type II  - ISS      Ethics  FULL, GOC ongoing.   77 y/o F with h/o HTN, HLD, DM2, PCKD previously on HD via LUE AVF then s/p renal transplant, LLE DVT (resolved, nml dopplers 12/2022) on ASA, very small supraclinoid aneurysm (reportedly unchanged on MRA 10/2020), hypothyroidism, PCP 2021 on atovaquone ppx, reportedly hospitalized in 12/2022 for rectal prolapse, had a blood transfusion at that time, also + CMV 1/25/23 who presented with R-sided HA, R ear pain and neck stiffness after visit to ENT earlier in the day, altered on presentation, CT head unremarkable for stroke, however LP with IR and BCx showing +strep pneumoniae. currently on vanc and CTX per ID. Cardiac arrest 3/16 with ROSC, transferred to MICU for further management.       Plan:   Neurological  #Currently Intubated, sedated   - on versed, continue to wean  - EEG no seizures  - f/u neuron specific enolase sent on 3/19    #Strep pneumo meningitis #Acute encephalopathy  severe HA, neck stiffness, fever, leukocytosis concerning for meningitis/encephalitis  CT head negative for CVA.   LP and BCx 3/15 both showing gram positive cocci in pairs, and + strep pneumoniae.   likely source of entry from R ear where pt c/o discomfort for several months.  patient is on cyclosporine, mycophenolate mofetil and prednisone due to kidney transplant and is immunocompromised   dental procedure 3/14 but less likely source   - f/u MRI of the IAC w/ contrast at a later time when renal function improves    #Seizures  - 24hr EEG initially with highly focal seizures, improved with increased sedation.   - keppra loaded 500mg IVP (3/17) and started on 250mg IV q12h (3/17 - 3/19) increased to 750 mg IV q12h (CRRT dosing (3/19 - 3/20), then increased to 1000mg IV q12 (3/20 - 3/22), then back to 750mg IV q12 (3/22 - [ ])  - vimpat 100 BID increased to 200 BID (3/20 - [ ])  -  wean versed as appropriate  - decrease propofol while monitoring on EEG, decreased seizure activity after increasing vimpat and keppra doses on 3/20  - f/u repeat keppra level    Cardiovascular  #Cardiac Arrest  bradycardia to 30, pulseless, code blue was called 3/16   2 rounds of epi, and PEA arrest. on 3rd pulse check pulseless VT, 200J shock given  4th pulse check asystole, 5th ROSC, sinus tachy with HUMA. IO placed and levo started. Due to blood in mouth took several attempts with DL for ETT to be placed but ultimately confirmed with capnometry and bilateral breath sounds.     #Elevated troponin  uptrending troponin to 182 3/15 AM, was likely iso ESRD  s/p cardiac arrest, elevated ST segment   - TTE from 3/17 showed EF of 63% with mild pulm htn, normal left ventricular systolic function, and diastolic LV dysfunction.      #HTN  on amlodipine 5mg, losartan 100mg, torsemide 20mg, and clonidine 0.2 patch weekly  - can consider restarting as pt has been hypertensive off pressors      Respiratory/ENT  #Intubated  continue sedation  - CPAP trial when more awake    # Tympanic membrane rupture  discharge from R ear, evaluated by ENT given dexamethasone and cipro 3/14  - c/w broad spectrum abx   - MRI of IAC with contrast when pt is stable  - f/u ENT recs    Gastrointestinal  #Constipation  - Abdominal xray negative for bowel obstruction  - trial on movantik  - c/w bowel regimen  - increase trickle feeds as tolerated and bowel regimen    Renal  #ESRD #s/p Kidney Transplant in 2003 at [place] #PCKD  previously HD through LLE AVF but no HD since transplant  Cr 3.8, unclear baseline  - avoid nephrotoxic agents  - holding cyclosporine and mycophenolate   - c/w home prednisone 5mg qday  - strict I/O and replete electrolytes  - s/p 4mg IV bumex with minimal urine production (3/17)  - started on CRRT (3/17-3/21) net negative 100cc/hr goal  - s/p intermittent HD (3/22), removed 1L       Hematological  # h/o LLE DVT  resolved, no DVT on doppler 12/22.  - negative LLE duplex this admission 3/17  - restarted heparin subQ (3/21)      #Anemia  - s/p 1 unit pRBC on 3/16 for Hb drop from 10 to 7.9 and 1 unit on 3/21 for Hb 6.8 2/2 loss from CRRT clotting  - Evaluated by ENT for bleeding and found no signs of active bleeding from nasal cavity, nasopharynx or oral cavity, however, patient biting on tongue, which could be one source of bleeding. Tongue was edematous and lax      Infectious Disease  #Strep pneumoniae meningitis #R ear mastoiditis  treatment with IV CTX   - c/w ciprodex drops for R ear  - f/u ID recs   - repeat cx from 3/21 NGTD  - c/w CTX (3/16 - [ ])  - repeat blood and sputum cx on 3/23 2/2 new fever  - f/u UA  - possible SORAYA to rule out endocarditis per ID      #h/o PCP  - atovaquone at home, continue      Endocrinological  #Hypothyroidism  last TSH 12/2022 >10  TSH 3/15 4.73  - c/w levothyroxine     #Diabetes mellitus Type II  - ISS      Ethics  FULL, GOC ongoing.

## 2023-03-23 NOTE — PROGRESS NOTE ADULT - ASSESSMENT
Ms Negron is a 78 year-old woman with a PMHx of PCKD s/p renal transplant, brain cyst, DM, HLD, HTN, hypothyroidism, glaucoma, cataracts, DVT in leg, on asa who presents to ED for severe headache, right ear pain, neck pain, markedly decreased responsiveness to external stimuli. LP significant for decreased glucose, elevated WBC with neutrophil predominance, elevated protein, PCR (+) for strep. pneumoniae. antibiotics were started. Course complicated by PEA arrest, ROSC achieved after about 10 mins. Patient is now intubated and sedated.     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     Plan  [] MRI as per ENT when stable  [/] Continue video EEG   [x] S/p IV levetiracetam 500mg load x1 (3/17) and maintenance 250mg IV Q12 (3/17-3/19); then 750mg Q12H ( CRRT dosing 3/19-3/20); increased to 1000mg Q12H (3/20 - 3/21)  [/] Levetiracetam decreased back to 750mg Q12H (3/22 - )  [/] Continue lacosamide 200mg Q12H (3/20)  [] Follow up neuron specific enolase (sent 3/19)  [] Follow up levetiracetam level   [x] UA (-), Bcx (+GPC in pairs - S. pneumo)  [x] SERUM: A1C 5.6, LDL 79, procal 45.63, CK 1074  [x] S/p LP 3/15: protein (>1181), gluc (<5), TNC (34,389, 88% neut), RBC (1267), AFB (-), CSF gram strain & culture (GPC in pairs), cryptococcal ag (-), EBV PCR (-), CSF PCR (+S. pneumoniae),   [x] Consults: ENT, ID, MICU, Renal/transplant    Ms Negron is a 78 year-old woman with a PMHx of PCKD s/p renal transplant, brain cyst, DM, HLD, HTN, hypothyroidism, glaucoma, cataracts, DVT in leg, on asa who presents to ED for severe headache, right ear pain, neck pain, markedly decreased responsiveness to external stimuli. LP significant for decreased glucose, elevated WBC with neutrophil predominance, elevated protein, PCR (+) for strep. pneumoniae. antibiotics were started. Course complicated by PEA arrest, ROSC achieved after about 10 mins. Patient is now intubated and sedated. EEG shows occasional left epileptiform discharges and rare right frontal epileptiform discharges indicating potential risk of seizures. Last electrographic seizure reported 3/20.    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     Plan  [] MRI as per ENT when stable  [/] Continue video EEG   [x] S/p IV levetiracetam 500mg load x1 (3/17) and maintenance 250mg IV Q12 (3/17-3/19); then 750mg Q12H ( CRRT dosing 3/19-3/20); increased to 1000mg Q12H (3/20 - 3/21)  [/] Levetiracetam decreased back to 750mg Q12H (3/22 - )  [/] Continue lacosamide 200mg Q12H (3/20)  [/] Continue to wean midazolam  [/] Continue antimicrobials as per primary team/ID  [] Follow up neuron specific enolase (sent 3/19)  [] Follow up levetiracetam level   [x] UA (-), Bcx (+GPC in pairs - S. pneumo)  [x] SERUM: A1C 5.6, LDL 79, procal 45.63, CK 1074  [x] S/p LP 3/15: protein (>1181), gluc (<5), TNC (34,389, 88% neut), RBC (1267), AFB (-), CSF gram strain & culture (GPC in pairs), cryptococcal ag (-), EBV PCR (-), CSF PCR (+S. pneumoniae),

## 2023-03-24 NOTE — PROGRESS NOTE ADULT - ATTENDING COMMENTS
Patient is a 77 yo F w/ HTN, HLD, T2DM, PCKD previously on HD via LUE AVF then s/p renal transplant, LLE DVT (resolved) hypothyroidism, prior PCP PNA, CMV viremia who initially presented with R-sided HA, R ear pain and neck stiffness found to have Strep Pneumo bacteremia and meningitis in setting of otitis externa. Course c/b cardiac arrest 3/16 and post arrest seizures and NAMAN    #Septic Shock  #Strep bactermia  - c/w Ceftriaxone for now  - f/u repeat blood cultures  - TTE negative for vegetations, SORAYA if cultures remain positive  - fu ID  - MRI IAC as per ENT  - Repeat blood and sputum cultures and UA given new fevers and rising WBC    #NAMAN  #s/p Renal Transplant  #PCKD  - Transitioned to intermittent HD as CVVHD clotting intermittently and pressures now improved. Still making urine    #Encephalopathy post cardiac arrest  #Seizures - No seizures on EEG overnight off Versed  - c/w Vimpat, Keppra  - d/c EEG if OK with neuro  - f/u neuro recs  - Monitor off sedation    #Dysphagia  - Tube feeds as tolerated    #Respiratory failure   - c/w mechanical ventilatory support, wean as tolerated    #HTN  - c/w Amlodipine, monitor    #Hypothyroid  - Check Free T4    Nico Cantor MD  Pulmonary & Critical Care

## 2023-03-24 NOTE — PROGRESS NOTE ADULT - ASSESSMENT
78-year-old female with PCKD previously on HD via LUE AVF now s/p renal transplant since 2003 at [place], LLE DVT (resolved, nml dopplers 12/2022) on ASA, HTN, HLD, DM2, very small supraclinoid aneurysm on R and very small aneurysm vs infundibulum L supraclinoid artery (reportedly unchanged on MRA 10/2020), glaucoma/cataract, hypothyroid, history of PCP 2021 on atovaquone ppx, reportedly hospitalized in 12/2022 for rectal prolapse, CMV (unclear active or prior infxn), presenting with R-sided HA, R ear pain and neck pain after recent visit to ENT earlier in the day.  Cardiac arrest on 3/16/23  septic shock s/p iv pressors  intubated on MV  Renal following for NAMAN on CKD Mx.    NAMAN on CKD4  Creatinine Trend: 2.35 <--, 1.72 <--, 1.95 <--, 1.26 <--, 1.24 <--, 1.31 <--, 1.16 <--, 1.38 <--, 1.31 <--, 1.45 <--, 1.49 <--, 1.61 <--, 1.83 <--, 2.10 <--, 2.42 <--, 2.90 <--, 3.86 <--, 3.83 <--, 3.62 <--, 3.31 <--  w/worsened renal function, oliguria, acidosis, mild hyperkalemia- s/p CVVHD, now on  intermittent HD  Consent for HD obtained, signed by daughter 3/16/23  KTx 2003  B/L creat around 2.8 since Dec 2022  Patient receives Cyclosporine (Gengraf) 75mg PO q12hrs,  BID and Prednisone 5 QD for transplant    plan:  In light of severe sepsis and cardiac arrest: holding MMF and Cyclosporine  c/w methylPREDNISolone sodium succinate Injectable 10 milliGRAM(s) IV Push every 6 hours  now off cvvh   s/p HD earlier today, Rx sheet reviewed, net UF 1kg removed, tolerated well. uneventful.   recommend removing femoral shiley and insert new IJ shiley for next HD  bp high- will plan to inc uf w/next hd. rec Labetalol 5-10mg ivp q6h   keep Ponce  monitor U/O  monitor BMP qdaily now  dose all meds for eGFR<15ml/min.   avoid ACEi/ARB/NSAIDs/Nephrotoxics if able.    Metabolic acidosis -resolved  OM and OE  Abxs per Infectious disease specialist with dose adjustment per GFR  f/u cxs  vent Mx, per ICU  off pressors now.     prognosis guarded  [facility] Nephrologist Dr. Hugo Hernandez [phone]  will closely follow up.   poc d/w ICU team, HD RN  labs, rad, chart reviewed  For any question, pl call:  Nephrology  Cell -[phone]  Office [phone]  Ans Serv [phone]

## 2023-03-24 NOTE — PROGRESS NOTE ADULT - SUBJECTIVE AND OBJECTIVE BOX
[facility] - S Alea / Tita S /D Rama/ S Jovan/ S Keenan/ Cleve Gambino / DAGMAR Wilsonu/ O Daya  service -[phone], office [phone]  ---------------------------------------------------------------------------------------------------------------    Patient seen and examined bedside    Subjective and Objective: No overnight events, sob resolved. No complaints today. feeling better    Allergies: apple (Unknown)  Benadryl (Unknown)  penicillin (Hives)  watermelon (Unknown)      Hospital Medications:   MEDICATIONS  (STANDING):  amLODIPine   Tablet 10 milliGRAM(s) Oral every 24 hours  atovaquone  Suspension 1500 milliGRAM(s) Oral daily  cefTRIAXone   IVPB 2000 milliGRAM(s) IV Intermittent every 12 hours  chlorhexidine 0.12% Liquid 15 milliLiter(s) Oral Mucosa two times a day  chlorhexidine 2% Cloths 1 Application(s) Topical <User Schedule>  ciprofloxacin  0.3% Ophthalmic Solution for Otic Use 4 Drop(s) Right Ear two times a day  dextrose 5%. 1000 milliLiter(s) (100 mL/Hr) IV Continuous <Continuous>  dextrose 5%. 1000 milliLiter(s) (50 mL/Hr) IV Continuous <Continuous>  dextrose 50% Injectable 25 Gram(s) IV Push once  dextrose 50% Injectable 12.5 Gram(s) IV Push once  dextrose 50% Injectable 25 Gram(s) IV Push once  glucagon  Injectable 1 milliGRAM(s) IntraMuscular once  heparin   Injectable 5000 Unit(s) SubCutaneous every 8 hours  insulin lispro (ADMELOG) corrective regimen sliding scale   SubCutaneous every 6 hours  lacosamide IVPB 200 milliGRAM(s) IV Intermittent every 12 hours  levETIRAcetam  IVPB 750 milliGRAM(s) IV Intermittent every 12 hours  levothyroxine Injectable 18 MICROGram(s) IV Push at bedtime  naloxegol 12.5 milliGRAM(s) Oral daily  petrolatum Ophthalmic Ointment 1 Application(s) Both EYES every 12 hours  polyethylene glycol 3350 17 Gram(s) Oral two times a day  predniSONE   Tablet 5 milliGRAM(s) Oral daily  senna Syrup 10 milliLiter(s) Oral two times a day      REVIEW OF SYSTEMS:  CONSTITUTIONAL: No weakness, fevers or chills  EYES/ENT: No visual changes;  No vertigo or throat pain   NECK: No pain or stiffness  RESPIRATORY: No cough, wheezing, hemoptysis; No shortness of breath  CARDIOVASCULAR: No chest pain or palpitations.  GASTROINTESTINAL: No abdominal or epigastric pain. No nausea, vomiting, or hematemesis; No diarrhea or constipation. No melena or hematochezia.  GENITOURINARY: No dysuria, frequency, foamy urine, urinary urgency, incontinence or hematuria  NEUROLOGICAL: No numbness or weakness  SKIN: No itching, burning, rashes, or lesions   VASCULAR: No bilateral lower extremity edema.   All other review of systems is negative unless indicated above.    VITALS:  T(F): 98.8 (03-24-23 @ 16:00), Max: 101 (03-24-23 @ 00:00)  HR: 86 (03-24-23 @ 17:00)  BP: 184/65 (03-24-23 @ 17:00)  RR: 20 (03-24-23 @ 17:00)  SpO2: 100% (03-24-23 @ 17:00)  Wt(kg): --    03-23 @ 07:01  -  03-24 @ 07:00  --------------------------------------------------------  IN: 1898.7 mL / OUT: 1130 mL / NET: 768.7 mL    03-24 @ 07:01  -  03-24 @ 18:10  --------------------------------------------------------  IN: 645 mL / OUT: 1490 mL / NET: -845 mL          PHYSICAL EXAM:  Constitutional: NAD  HEENT: anicteric sclera, oropharynx clear  Neck: No JVD  Respiratory: CTAB, no wheezes, rales or rhonchi  Cardiovascular: S1, S2, RRR  Gastrointestinal: BS+, soft, NT/ND  Extremities: No cyanosis or clubbing. No peripheral edema  Neurological: A/O x 3, no focal deficits  Psychiatric: Normal mood, normal affect  : No CVA tenderness. No snider.   Skin: No rashes  Vascular Access:    LABS:  03-24    131<L>  |  96<L>  |  48<H>  ----------------------------<  197<H>  3.9   |  23  |  2.35<H>    Ca    8.4      24 Mar 2023 00:10  Phos  3.6     03-24  Mg     2.30     03-24    TPro  4.7<L>  /  Alb  2.4<L>  /  TBili  0.4  /  DBili      /  AST  34<H>  /  ALT  34<H>  /  AlkPhos  117  03-24    Creatinine Trend: 2.35 <--, 1.72 <--, 1.95 <--, 1.26 <--, 1.24 <--, 1.31 <--, 1.16 <--, 1.38 <--, 1.31 <--, 1.45 <--, 1.49 <--, 1.61 <--, 1.83 <--, 2.10 <--, 2.42 <--, 2.90 <--                        6.6    13.71 )-----------( 105      ( 24 Mar 2023 00:10 )             21.2     Urine Studies:  Urinalysis Basic - ( 23 Mar 2023 12:55 )    Color: Yellow / Appearance: Slightly Turbid / SG: >1.030 / pH:   Gluc:  / Ketone: Trace  / Bili: Negative / Urobili: <2 mg/dL   Blood:  / Protein: >300 / Nitrite: Negative   Leuk Esterase: Negative / RBC: 0-5 /HPF / WBC 6-10 /HPF   Sq Epi:  / Non Sq Epi: Occasional / Bacteria: Occasional          RADIOLOGY & ADDITIONAL STUDIES:   [facility] - S Alea / Tita S /D Rama/ S Jovan/ S Keenan/ Cleve Gambino / DAGMAR Wilsonu/ O Daya  service -[phone], office [phone]  ---------------------------------------------------------------------------------------------------------------    Patient seen and examined bedside in MICU    Subjective and Objective: No overnight events. off all sedation, remains unresponsive, on MV  off iv pressors. bp was high- Labetalol ivp given per RN bedside    Allergies: apple (Unknown)  Benadryl (Unknown)  penicillin (Hives)  watermelon (Unknown)      [facility] Medications:   MEDICATIONS  (STANDING):  amLODIPine   Tablet 10 milliGRAM(s) Oral every 24 hours  atovaquone  Suspension 1500 milliGRAM(s) Oral daily  cefTRIAXone   IVPB 2000 milliGRAM(s) IV Intermittent every 12 hours  chlorhexidine 0.12% Liquid 15 milliLiter(s) Oral Mucosa two times a day  chlorhexidine 2% Cloths 1 Application(s) Topical <User Schedule>  ciprofloxacin  0.3% Ophthalmic Solution for Otic Use 4 Drop(s) Right Ear two times a day  dextrose 5%. 1000 milliLiter(s) (100 mL/Hr) IV Continuous <Continuous>  dextrose 5%. 1000 milliLiter(s) (50 mL/Hr) IV Continuous <Continuous>  dextrose 50% Injectable 25 Gram(s) IV Push once  dextrose 50% Injectable 12.5 Gram(s) IV Push once  dextrose 50% Injectable 25 Gram(s) IV Push once  glucagon  Injectable 1 milliGRAM(s) IntraMuscular once  heparin   Injectable 5000 Unit(s) SubCutaneous every 8 hours  insulin lispro (ADMELOG) corrective regimen sliding scale   SubCutaneous every 6 hours  lacosamide IVPB 200 milliGRAM(s) IV Intermittent every 12 hours  levETIRAcetam  IVPB 750 milliGRAM(s) IV Intermittent every 12 hours  levothyroxine Injectable 18 MICROGram(s) IV Push at bedtime  naloxegol 12.5 milliGRAM(s) Oral daily  petrolatum Ophthalmic Ointment 1 Application(s) Both EYES every 12 hours  polyethylene glycol 3350 17 Gram(s) Oral two times a day  predniSONE   Tablet 5 milliGRAM(s) Oral daily  senna Syrup 10 milliLiter(s) Oral two times a day    VITALS:  T(F): 98.8 (03-24-23 @ 16:00), Max: 101 (03-24-23 @ 00:00)  HR: 86 (03-24-23 @ 17:00)  BP: 184/65 (03-24-23 @ 17:00)  RR: 20 (03-24-23 @ 17:00)  SpO2: 100% (03-24-23 @ 17:00)  Wt(kg): --    03-23 @ 07:01  -  03-24 @ 07:00  --------------------------------------------------------  IN: 1898.7 mL / OUT: 1130 mL / NET: 768.7 mL    03-24 @ 07:01  -  03-24 @ 18:10  --------------------------------------------------------  IN: 645 mL / OUT: 1490 mL / NET: -845 mL      PHYSICAL EXAM:  Constitutional: NAD  HEENT: ETT+, OGT+   Neck: No JVD  Respiratory: CTAB, no wheezes, rales or rhonchi  Cardiovascular: S1, S2, RRR  Gastrointestinal: BS+  Extremities: No peripheral edema  Neurological: unresponsive  - +snider  Vascular Access: left femoral shiley+     LABS:  03-24    131<L>  |  96<L>  |  48<H>  ----------------------------<  197<H>  3.9   |  23  |  2.35<H>    Ca    8.4      24 Mar 2023 00:10  Phos  3.6     03-24  Mg     2.30     03-24    TPro  4.7<L>  /  Alb  2.4<L>  /  TBili  0.4  /  DBili      /  AST  34<H>  /  ALT  34<H>  /  AlkPhos  117  03-24    Creatinine Trend: 2.35 <--, 1.72 <--, 1.95 <--, 1.26 <--, 1.24 <--, 1.31 <--, 1.16 <--, 1.38 <--, 1.31 <--, 1.45 <--, 1.49 <--, 1.61 <--, 1.83 <--, 2.10 <--, 2.42 <--, 2.90 <--                        6.6    13.71 )-----------( 105      ( 24 Mar 2023 00:10 )             21.2     Urine Studies:  Urinalysis Basic - ( 23 Mar 2023 12:55 )    Color: Yellow / Appearance: Slightly Turbid / SG: >1.030 / pH:   Gluc:  / Ketone: Trace  / Bili: Negative / Urobili: <2 mg/dL   Blood:  / Protein: >300 / Nitrite: Negative   Leuk Esterase: Negative / RBC: 0-5 /HPF / WBC 6-10 /HPF   Sq Epi:  / Non Sq Epi: Occasional / Bacteria: Occasional      RADIOLOGY & ADDITIONAL STUDIES:  < from: Xray Chest 1 View- PORTABLE-Urgent (Xray Chest 1 View- PORTABLE-Urgent .) (03.20.23 @ 14:53) >  IMPRESSION:  Support devices as above.  Improving bilateral aeration.    --- End of Report ---    < end of copied text >

## 2023-03-24 NOTE — EEG REPORT - NS EEG TEXT BOX
YARA SEGUNDO [id number]     Study Date: 03-24-23 08:00-11:54  Duration: 3 hr 52 min  --------------------------------------------------------------------------------------------------  History:  CC/ HPI Patient is a 78y old  Female who presents with a chief complaint of AMS/R ear infxn (24 Mar 2023 13:02)    MEDICATIONS  (STANDING):  amLODIPine   Tablet 5 milliGRAM(s) Oral every 24 hours  atovaquone  Suspension 1500 milliGRAM(s) Oral daily  cefTRIAXone   IVPB 2000 milliGRAM(s) IV Intermittent every 12 hours  chlorhexidine 0.12% Liquid 15 milliLiter(s) Oral Mucosa two times a day  chlorhexidine 2% Cloths 1 Application(s) Topical <User Schedule>  ciprofloxacin  0.3% Ophthalmic Solution for Otic Use 4 Drop(s) Right Ear two times a day  dextrose 5%. 1000 milliLiter(s) (100 mL/Hr) IV Continuous <Continuous>  dextrose 5%. 1000 milliLiter(s) (50 mL/Hr) IV Continuous <Continuous>  dextrose 50% Injectable 25 Gram(s) IV Push once  dextrose 50% Injectable 12.5 Gram(s) IV Push once  dextrose 50% Injectable 25 Gram(s) IV Push once  glucagon  Injectable 1 milliGRAM(s) IntraMuscular once  heparin   Injectable 5000 Unit(s) SubCutaneous every 8 hours  insulin lispro (ADMELOG) corrective regimen sliding scale   SubCutaneous every 6 hours  lacosamide IVPB 200 milliGRAM(s) IV Intermittent every 12 hours  levETIRAcetam  IVPB 750 milliGRAM(s) IV Intermittent every 12 hours  levothyroxine Injectable 18 MICROGram(s) IV Push at bedtime  naloxegol 12.5 milliGRAM(s) Oral daily  petrolatum Ophthalmic Ointment 1 Application(s) Both EYES every 12 hours  polyethylene glycol 3350 17 Gram(s) Oral two times a day  predniSONE   Tablet 5 milliGRAM(s) Oral daily  senna Syrup 10 milliLiter(s) Oral two times a day    --------------------------------------------------------------------------------------------------  Study Interpretation:    [[[Abbreviation Key:  PDR=alpha rhythm/posterior dominant rhythm. A-P=anterior posterior.  Amplitude: ‘very low’:<20; ‘low’:20-49; ‘medium’:; ‘high’:>150uV.  Persistence for periodic/rhythmic patterns (% of epoch) ‘rare’:<1%; ‘occasional’:1-10%; ‘frequent’:10-50%; ‘abundant’:50-90%; ‘continuous’:>90%.  Persistence for sporadic discharges: ‘rare’:<1/hr; ‘occasional’:1/min-1/hr; ‘frequent’:>1/min; ‘abundant’:>1/10 sec.  RPP=rhythmic and periodic patterns; GRDA=generalized rhythmic delta activity; FIRDA=frontal intermittent GRDA; LRDA=lateralized rhythmic delta activity; TIRDA=temporal intermittent rhythmic delta activity;  LPD=PLED=lateralized periodic discharges; GPD=generalized periodic discharges; BIPDs =bilateral independent periodic discharges; Mf=multifocal; SIRPDs=stimulus induced rhythmic, periodic, or ictal appearing discharges; BIRDs=brief potentially ictal rhythmic discharges >4 Hz, lasting .5-10s; PFA (paroxysmal bursts >13 Hz or =8 Hz <10s).  Modifiers: +F=with fast component; +S=with spike component; +R=with rhythmic component.  S-B=burst suppression pattern.  Max=maximal. N1-drowsy; N2-stage II sleep; N3-slow wave sleep. SSS/BETS=small sharp spikes/benign epileptiform transients of sleep. HV=hyperventilation; PS=photic stimulation]]]    Daily EEG Visual Analysis    FINDINGS:      Background:  The predominant background in the most wakeful state is continuous and symmetric, consisting of diffuse polymorphic delta > theta frequencies. A less wakeful state is characterized by 1-4-second periods of diffuse attenuation/suppression in between diffuse polymorphic delta > theta frequencies. Later in the course of this day of monitoring, there are periods with increase in prevalence of theta frequencies. No normal sleep architecture is captured.    Sporadic Epileptiform Discharges and Rhythmic and Periodic Patterns (RPPs):  -Occasional left central (Cz/C3) spike-wave discharges, most often occurring in bursts at 4-8 Hz lasting <1-2 seconds (brief potentially ictal rhythmic discharges, BIRDs) or rarely in bursts at 2-8 Hz lasting < 10 seconds (lateralized periodic discharges, LPDs), fluctuating without evolution.    Electrographic and Electroclinical seizures:  None    Other Clinical Events:  None    Activation Procedures:   Hyperventilation was not performed.    Photic stimulation was not performed.    Artifacts:  Intermittent myogenic and movement artifacts are present. Interpretation is limited for most of the study due to poor electrode impedance.    EKG:  Single-lead EKG shows regular rhythm at 70-90 bpm.    Impression:  Abnormal EEG in a comatose patient due to:  -Occasional left central spike-wave discharges most often occurring as brief potentially ictal rhythmic discharges (BIRDs) lasting <1-2 seconds or in bursts of lateralized periodic discharges (LPDs) at 2-8 Hz, fluctuating without evolution  -Severe diffuse slowing  No electrographic seizures are captured.    Clinical Correlation:  -Occasional left central epileptiform discharges occurring in bursts and runs, most often as brief potentially ictal rhythmic discharges (BIRDs), indicate a risk of focal-onset seizures from this region.  -Severe diffuse cerebral dysfunction is nonspecific in etiology.  -No electrographic seizures are captured        -------------------------------------------------------------------------------------------------------  [facility] EEG Reading Room Ph#: [phone]  Epilepsy Answering Service after 5PM and before 8:30AM: Ph#: [phone]    Meera Simental MD  Attending Physician, [facility]   YARA SEGUNDO [id number]     Study Date: 03-24-23 08:00-11:54  Duration: 3 hr 52 min  --------------------------------------------------------------------------------------------------  History:  CC/ HPI Patient is a 78y old  Female who presents with a chief complaint of AMS/R ear infxn (24 Mar 2023 13:02)    MEDICATIONS  (STANDING):  amLODIPine   Tablet 5 milliGRAM(s) Oral every 24 hours  atovaquone  Suspension 1500 milliGRAM(s) Oral daily  cefTRIAXone   IVPB 2000 milliGRAM(s) IV Intermittent every 12 hours  chlorhexidine 0.12% Liquid 15 milliLiter(s) Oral Mucosa two times a day  chlorhexidine 2% Cloths 1 Application(s) Topical <User Schedule>  ciprofloxacin  0.3% Ophthalmic Solution for Otic Use 4 Drop(s) Right Ear two times a day  dextrose 5%. 1000 milliLiter(s) (100 mL/Hr) IV Continuous <Continuous>  dextrose 5%. 1000 milliLiter(s) (50 mL/Hr) IV Continuous <Continuous>  dextrose 50% Injectable 25 Gram(s) IV Push once  dextrose 50% Injectable 12.5 Gram(s) IV Push once  dextrose 50% Injectable 25 Gram(s) IV Push once  glucagon  Injectable 1 milliGRAM(s) IntraMuscular once  heparin   Injectable 5000 Unit(s) SubCutaneous every 8 hours  insulin lispro (ADMELOG) corrective regimen sliding scale   SubCutaneous every 6 hours  lacosamide IVPB 200 milliGRAM(s) IV Intermittent every 12 hours  levETIRAcetam  IVPB 750 milliGRAM(s) IV Intermittent every 12 hours  levothyroxine Injectable 18 MICROGram(s) IV Push at bedtime  naloxegol 12.5 milliGRAM(s) Oral daily  petrolatum Ophthalmic Ointment 1 Application(s) Both EYES every 12 hours  polyethylene glycol 3350 17 Gram(s) Oral two times a day  predniSONE   Tablet 5 milliGRAM(s) Oral daily  senna Syrup 10 milliLiter(s) Oral two times a day    --------------------------------------------------------------------------------------------------  Study Interpretation:    [[[Abbreviation Key:  PDR=alpha rhythm/posterior dominant rhythm. A-P=anterior posterior.  Amplitude: ‘very low’:<20; ‘low’:20-49; ‘medium’:; ‘high’:>150uV.  Persistence for periodic/rhythmic patterns (% of epoch) ‘rare’:<1%; ‘occasional’:1-10%; ‘frequent’:10-50%; ‘abundant’:50-90%; ‘continuous’:>90%.  Persistence for sporadic discharges: ‘rare’:<1/hr; ‘occasional’:1/min-1/hr; ‘frequent’:>1/min; ‘abundant’:>1/10 sec.  RPP=rhythmic and periodic patterns; GRDA=generalized rhythmic delta activity; FIRDA=frontal intermittent GRDA; LRDA=lateralized rhythmic delta activity; TIRDA=temporal intermittent rhythmic delta activity;  LPD=PLED=lateralized periodic discharges; GPD=generalized periodic discharges; BIPDs =bilateral independent periodic discharges; Mf=multifocal; SIRPDs=stimulus induced rhythmic, periodic, or ictal appearing discharges; BIRDs=brief potentially ictal rhythmic discharges >4 Hz, lasting .5-10s; PFA (paroxysmal bursts >13 Hz or =8 Hz <10s).  Modifiers: +F=with fast component; +S=with spike component; +R=with rhythmic component.  S-B=burst suppression pattern.  Max=maximal. N1-drowsy; N2-stage II sleep; N3-slow wave sleep. SSS/BETS=small sharp spikes/benign epileptiform transients of sleep. HV=hyperventilation; PS=photic stimulation]]]    Daily EEG Visual Analysis    FINDINGS:      Background:  The predominant background in the most wakeful state is continuous and symmetric, consisting of diffuse polymorphic delta > theta frequencies, with a period of increase in prevalence of theta frequencies. A less wakeful state is characterized by 1-4-second periods of diffuse attenuation/suppression in between diffuse polymorphic delta > theta frequencies. No normal sleep architecture is captured.    Sporadic Epileptiform Discharges and Rhythmic and Periodic Patterns (RPPs):  -Occasional left central (Cz/C3) spike-wave discharges, most often occurring in bursts at 4-8 Hz lasting <1-2 seconds (brief potentially ictal rhythmic discharges, BIRDs) or rarely in bursts at 2-8 Hz lasting < 10 seconds (lateralized periodic discharges, LPDs), fluctuating without evolution.    Electrographic and Electroclinical seizures:  None    Other Clinical Events:  None    Activation Procedures:   Hyperventilation was not performed.    Photic stimulation was not performed.    Artifacts:  Intermittent myogenic and movement artifacts are present. Interpretation is limited for most of the study due to poor electrode impedance.    EKG:  Single-lead EKG shows regular rhythm at 70-90 bpm.    Impression:  Abnormal EEG in a comatose patient due to:  -Occasional left central spike-wave discharges most often occurring as brief potentially ictal rhythmic discharges (BIRDs) lasting <1-2 seconds or in bursts of lateralized periodic discharges (LPDs) at 2-8 Hz, fluctuating without evolution  -Severe diffuse slowing  No electrographic seizures are captured.    Clinical Correlation:  -Occasional left central epileptiform discharges occurring in bursts, most often as brief potentially ictal rhythmic discharges (BIRDs), indicate a risk of focal-onset seizures from this region.  -Severe diffuse cerebral dysfunction is nonspecific in etiology.  -No electrographic seizures are captured        -------------------------------------------------------------------------------------------------------  [facility] EEG Reading Room Ph#: [phone]  Epilepsy Answering Service after 5PM and before 8:30AM: Ph#: [phone]    Meera Simental MD  Attending Physician, [facility] [place]

## 2023-03-24 NOTE — PROGRESS NOTE ADULT - SUBJECTIVE AND OBJECTIVE BOX
Interval History:  Patient seen and examined at the bedside. No acute events overnight.     ROS: Pertinent positives in HPI, all other ROS were reviewed and are negative.      MEDICATIONS  (STANDING):  amLODIPine   Tablet 5 milliGRAM(s) Oral every 24 hours  atovaquone  Suspension 1500 milliGRAM(s) Oral daily  cefTRIAXone   IVPB 2000 milliGRAM(s) IV Intermittent every 12 hours  chlorhexidine 0.12% Liquid 15 milliLiter(s) Oral Mucosa two times a day  chlorhexidine 2% Cloths 1 Application(s) Topical <User Schedule>  ciprofloxacin  0.3% Ophthalmic Solution for Otic Use 4 Drop(s) Right Ear two times a day  dextrose 5%. 1000 milliLiter(s) (100 mL/Hr) IV Continuous <Continuous>  dextrose 5%. 1000 milliLiter(s) (50 mL/Hr) IV Continuous <Continuous>  dextrose 50% Injectable 25 Gram(s) IV Push once  dextrose 50% Injectable 12.5 Gram(s) IV Push once  dextrose 50% Injectable 25 Gram(s) IV Push once  glucagon  Injectable 1 milliGRAM(s) IntraMuscular once  heparin   Injectable 5000 Unit(s) SubCutaneous every 8 hours  insulin lispro (ADMELOG) corrective regimen sliding scale   SubCutaneous every 6 hours  lacosamide IVPB 200 milliGRAM(s) IV Intermittent every 12 hours  levETIRAcetam  IVPB 750 milliGRAM(s) IV Intermittent every 12 hours  levothyroxine Injectable 18 MICROGram(s) IV Push at bedtime  naloxegol 12.5 milliGRAM(s) Oral daily  petrolatum Ophthalmic Ointment 1 Application(s) Both EYES every 12 hours  polyethylene glycol 3350 17 Gram(s) Oral two times a day  predniSONE   Tablet 5 milliGRAM(s) Oral daily  senna Syrup 10 milliLiter(s) Oral two times a day    MEDICATIONS  (PRN):  acetaminophen   Oral Liquid .. 650 milliGRAM(s) Enteral Tube every 6 hours PRN Temp greater or equal to 38C (100.4F), Mild Pain (1 - 3), Moderate Pain (4 - 6)  dextrose Oral Gel 15 Gram(s) Oral once PRN Blood Glucose LESS THAN 70 milliGRAM(s)/deciliter    Allergies  apple (Unknown)  Benadryl (Unknown)  penicillin (Hives)  watermelon (Unknown)    Intolerances      Vital Signs Last 24 Hrs  T(C): 36.7 (24 Mar 2023 11:40), Max: 38.3 (24 Mar 2023 00:00)  T(F): 98.1 (24 Mar 2023 11:40), Max: 101 (24 Mar 2023 00:00)  HR: 85 (24 Mar 2023 12:00) (72 - 94)  BP: 151/63 (24 Mar 2023 12:00) (107/46 - 158/65)  BP(mean): 80 (24 Mar 2023 12:00) (60 - 88)  RR: 17 (24 Mar 2023 12:00) (10 - 22)  SpO2: 99% (24 Mar 2023 12:00) (96% - 100%)    Parameters below as of 24 Mar 2023 06:00  Patient On (Oxygen Delivery Method): ventilator    O2 Concentration (%): 30    NEUROLOGICAL EXAM:  - Mental Status: Eyes initially closed. No response to verbal or noxious stimuli.  - Language: Intubated and sedated on midazolam gtt.  - Cranial Nerves: Pupils equal b/l and reactive, oculocephalic reflex intact, corneal reflex intact b/l, grimace to gag, rest of CN exam is limited by mental status.   - Motor: No withdrawal to noxious stimulation.  - Sensory: No grimace to noxious stimulation.    - Coordination/Gait: Patient unable to participate due to mental status.      Labs:   cbc                      6.6    13.71 )-----------( 105      ( 24 Mar 2023 00:10 )             21.2     [id number]    131<L>  |  96<L>  |  48<H>  ----------------------------<  197<H>  3.9   |  23  |  2.35<H>    Ca    8.4      24 Mar 2023 00:10  Phos  3.6     03-24  Mg     2.30     03-24    TPro  4.7<L>  /  Alb  2.4<L>  /  TBili  0.4  /  DBili  x   /  AST  34<H>  /  ALT  34<H>  /  AlkPhos  117  03-24    CoagsPT/INR - ( 24 Mar 2023 00:10 )   PT: 13.2 sec;   INR: 1.14 ratio         PTT - ( 24 Mar 2023 00:10 )  PTT:23.7 sec  Lipids03-15 Chol 153 LDL -- HDL 45<L> Trig 146  A1C  CardiacMarkers    LFTsLIVER FUNCTIONS - ( 24 Mar 2023 00:10 )  Alb: 2.4 g/dL / Pro: 4.7 g/dL / ALK PHOS: 117 U/L / ALT: 34 U/L / AST: 34 U/L / GGT: x           UAUrinalysis Basic - ( 23 Mar 2023 12:55 )    Color: Yellow / Appearance: Slightly Turbid / SG: >1.030 / pH: x  Gluc: x / Ketone: Trace  / Bili: Negative / Urobili: <2 mg/dL   Blood: x / Protein: >300 / Nitrite: Negative   Leuk Esterase: Negative / RBC: 0-5 /HPF / WBC 6-10 /HPF   Sq Epi: x / Non Sq Epi: Occasional / Bacteria: Occasional

## 2023-03-24 NOTE — PROGRESS NOTE ADULT - SUBJECTIVE AND OBJECTIVE BOX
Patient is a 78y old  Female who presents with a chief complaint of AMS/R ear infxn (15 Mar 2023 10:36)    f/u pneumococcal sepsis, bacteremia and meningitis    Interval History/ROS:  off pressors, remains lethargic off sedation.  on ventilator ROS unable    PAST MEDICAL & SURGICAL HISTORY:  Polycystic kidney disease  Glaucoma  Aneurysm  History of deep venous thrombosis (DVT) of distal vein of left lower extremity  Thrombocytopenia  Hypothyroid  Osteoporosis  Arthritis  PCP (pneumocystis carinii pneumonia)   C. difficile colitis  Type 2 diabetes mellitus, without long-term current use of insulin  Essential hypertension  H/O kidney transplant (cadaver , [facility])  H/O:   H/O eye surgery  S/P hysterectomy  H/O umbilical hernia repair    Allergies  apple (Unknown)  Benadryl (Unknown)  penicillin (Hives), tolerate cephalosporins  watermelon (Unknown)    ANTIMICROBIALS:  meropenem  IVPB 1000 every 12 hours (3/15)  vancomycin  IVPB (3/15-3/17)    active:  cefTRIAXone   IVPB 2000 every 12 hours (3/15-)  atovaquone  Suspension 1500 daily    MEDICATIONS  (STANDING):  amLODIPine   Tablet 5 daily  cloNIDine Patch 0.2 mG/24Hr(s) 1 every 7 days  heparin   Injectable 5000 every 8 hours  insulin lispro (ADMELOG) corrective regimen sliding scale  every 6 hours  lacosamide IVPB 200 every 12 hours  levETIRAcetam  IVPB 750 every 12 hours  levothyroxine Injectable 18 at bedtime  naloxegol 12.5 daily  polyethylene glycol 3350 17 two times a day  predniSONE   Tablet 5 daily  senna Syrup 10 two times a day    Vital Signs Last 24 Hrs  T(F): 97.7 (23 @ 08:00), Max: 101 (23 @ 00:00)  HR: 74 (23 @ 10:38)  BP: 152/65 (23 @ 10:00)  RR: 19 (23 @ 10:00)  SpO2: 100% (23 @ 10:38) (96% - 100%)    PHYSICAL EXAM:  Constitutional: lethargic off sedation on vent  HEAD/EYES: keeps eyes closed  ENT:  supple  Cardiovascular:   normal S1, S2  Respiratory:  clear BS bilaterally  GI:  soft, non-tender, enlarged   :  snider  Musculoskeletal:  no synovitis  Neurologic: lethargic, sedated, unable to assess  Skin:  no rash, left femoral HD catheter  Psychiatric:  not able to assess                                                6.6    13.71 )-----------( 105      ( 24 Mar 2023 00:10 )             21.2     131  |  96  |  48  ----------------------------<  197  3.9   |  23  |  2.35  Ca    8.4      24 Mar 2023 00:10Phos  3.6     Mg     2.30       TPro  4.7  /  Alb  2.4  /  TBili  0.4  /  DBili  x   /  AST  34  /  ALT  34  /  AlkPhos  117      Urinalysis Basic - ( 15 Mar 2023 03:43 )  Color: Yellow / Appearance: Clear / S.019 / pH: x  Gluc: x / Ketone: Negative  / Bili: Negative / Urobili: <2 mg/dL   Blood: x / Protein: 300 mg/dL / Nitrite: Negative   Leuk Esterase: Negative / RBC: 8 /HPF / WBC 2 /HPF   Sq Epi: x / Non Sq Epi: 1 /HPF / Bacteria: Negative    MICROBIOLOGY:  Culture - Sputum (collected 23 @ 12:30)  Source: ET Tube ET Tube  Gram Stain (23 @ 19:27):    No polymorphonuclear leukocytes seen per low power field    Rare Squamous epithelial cells seen per low power field    No organisms seen per oil power field    3/21 BC (-)  3/19 Trach Asp Tracheal Aspirate (-)  3/17 BC (-)  3/16 Bronchial Lavage (-)  3/16 CSF (+) Streptococcus pneumoniae PCR  3/15 BC (+) Streptococcus pneumoniae    RADIOLOGY:  imaging below personally reviewed and agree with findings    Xray Chest 1 View- PORTABLE-Urgent (Xray Chest 1 View- PORTABLE-Urgent .) (23 @ 14:53) >  IMPRESSION:  Support devices as above.  Improving bilateral aeration.    IR Procedure (03.15.23 @ 15:24) >  The opening pressure measured greater than 55 cm water. The closing pressure measured 32 cm water, after removal of 30 cc of cloudy CSF. The needle was removed. The patient tolerated the procedure well without initial complication.  IMPRESSION:  Status post successful lumbar puncture as described.  Markedly abnormal appearing CSF.  Elevated opening pressures.    US Kidney and Bladder (03.15.23 @ 12:10) >  IMPRESSION:  Mild fullness of the left lower quadrant transplant kidney collecting system.  Increased renal cortical echogenicity, possibly renal parenchymal disease.    CT Brain Perfusion Maps Stroke (23 @ 19:46) >  IMPRESSION:  CT PERFUSION:  Limited by late injection of the contrast bolus.  Cerebral blood flow less than 30% = 0 mL. No core infarct is predicted.  Tmax greater than 6 seconds = 0 mL. No brain parenchyma predicted to be at continued ischemicrisk in the presence of neurologic symptoms.  CTA BRAIN:  No flow-limiting stenosis or vascular aneurysm. No AVM.  CTA NECK:  Calcified plaque at the origin of the left internal carotid artery results in approximately 40% short segment stenosis.   Otherwise no flow-limiting stenosis. No evidence for arterial dissection.      ECHO  Transthoracic Echocardiogram (23 @ 11:48) >  CONCLUSIONS:  1.  Moderate aortic regurgitation.  2. Mild concentric left ventricular hypertrophy.  3. Normal left ventricular systolic function. No segmental wall motion abnormalities.  4. Reversal of the E-A  waves of the mitral inflow pattern is consistent with diastolic LV dysfunction.  5. Normal right ventricular size and function.  6. Normal tricuspid valve. Mild tricuspid regurgitation.  *** Compared with echocardiogram of 2018, no significant changes noted.

## 2023-03-24 NOTE — PROGRESS NOTE ADULT - SUBJECTIVE AND OBJECTIVE BOX
Note incomplete; pt to be seen and assessed; plan tentative    INTERVAL HPI/OVERNIGHT EVENTS:    SUBJECTIVE: Patient seen and examined at bedside.       VITAL SIGNS:  ICU Vital Signs Last 24 Hrs  T(C): 36.4 (24 Mar 2023 04:00), Max: 38.3 (23 Mar 2023 08:00)  T(F): 97.5 (24 Mar 2023 04:00), Max: 101 (24 Mar 2023 00:00)  HR: 75 (24 Mar 2023 06:30) (73 - 94)  BP: 115/51 (24 Mar 2023 06:00) (107/46 - 149/65)  BP(mean): 65 (24 Mar 2023 06:00) (60 - 84)  ABP: 168/69 (24 Mar 2023 01:00) (105/52 - 187/81)  ABP(mean): 103 (24 Mar 2023 01:00) (69 - 118)  RR: 10 (24 Mar 2023 06:00) (10 - 22)  SpO2: 100% (24 Mar 2023 06:30) (96% - 100%)    O2 Parameters below as of 24 Mar 2023 06:00  Patient On (Oxygen Delivery Method): ventilator    O2 Concentration (%): 30      Mode: AC/ CMV (Assist Control/ Continuous Mandatory Ventilation), RR (machine): 10, TV (machine): 320, FiO2: 30, PEEP: 5, ITime: 0.7, MAP: 9, PIP: 21  Plateau pressure:   P/F ratio:     03-23 @ 07:01  -  03-24 @ 07:00  --------------------------------------------------------  IN: 1898.7 mL / OUT: 1130 mL / NET: 768.7 mL      CAPILLARY BLOOD GLUCOSE      POCT Blood Glucose.: 161 mg/dL (24 Mar 2023 05:31)    ECG:    PHYSICAL EXAM:    General:   HEENT:   Neck:   Respiratory:   Cardiovascular:   Abdomen:   Extremities:  Neurological:    MEDICATIONS:  MEDICATIONS  (STANDING):  amLODIPine   Tablet 5 milliGRAM(s) Oral daily  atovaquone  Suspension 1500 milliGRAM(s) Oral daily  cefTRIAXone   IVPB 2000 milliGRAM(s) IV Intermittent every 12 hours  chlorhexidine 0.12% Liquid 15 milliLiter(s) Oral Mucosa two times a day  chlorhexidine 2% Cloths 1 Application(s) Topical <User Schedule>  ciprofloxacin  0.3% Ophthalmic Solution for Otic Use 4 Drop(s) Right Ear two times a day  cloNIDine Patch 0.2 mG/24Hr(s) 1 patch Transdermal every 7 days  dextrose 5%. 1000 milliLiter(s) (100 mL/Hr) IV Continuous <Continuous>  dextrose 5%. 1000 milliLiter(s) (50 mL/Hr) IV Continuous <Continuous>  dextrose 50% Injectable 25 Gram(s) IV Push once  dextrose 50% Injectable 12.5 Gram(s) IV Push once  dextrose 50% Injectable 25 Gram(s) IV Push once  glucagon  Injectable 1 milliGRAM(s) IntraMuscular once  heparin   Injectable 5000 Unit(s) SubCutaneous every 8 hours  insulin lispro (ADMELOG) corrective regimen sliding scale   SubCutaneous every 6 hours  lacosamide IVPB 200 milliGRAM(s) IV Intermittent every 12 hours  levETIRAcetam  IVPB 750 milliGRAM(s) IV Intermittent every 12 hours  levothyroxine Injectable 18 MICROGram(s) IV Push at bedtime  naloxegol 12.5 milliGRAM(s) Oral daily  petrolatum Ophthalmic Ointment 1 Application(s) Both EYES every 12 hours  polyethylene glycol 3350 17 Gram(s) Oral two times a day  predniSONE   Tablet 5 milliGRAM(s) Oral daily  senna Syrup 10 milliLiter(s) Oral two times a day    MEDICATIONS  (PRN):  acetaminophen   Oral Liquid .. 650 milliGRAM(s) Enteral Tube every 6 hours PRN Temp greater or equal to 38C (100.4F), Mild Pain (1 - 3), Moderate Pain (4 - 6)  dextrose Oral Gel 15 Gram(s) Oral once PRN Blood Glucose LESS THAN 70 milliGRAM(s)/deciliter      ALLERGIES:  Allergies    apple (Unknown)  Benadryl (Unknown)  penicillin (Hives)  watermelon (Unknown)    Intolerances        LABS:                        6.6    13.71 )-----------( 105      ( 24 Mar 2023 00:10 )             21.2     03-24    131<L>  |  96<L>  |  48<H>  ----------------------------<  197<H>  3.9   |  23  |  2.35<H>    Ca    8.4      24 Mar 2023 00:10  Phos  3.6     03-24  Mg     2.30     03-24    TPro  4.7<L>  /  Alb  2.4<L>  /  TBili  0.4  /  DBili  x   /  AST  34<H>  /  ALT  34<H>  /  AlkPhos  117  03-24    PT/INR - ( 24 Mar 2023 00:10 )   PT: 13.2 sec;   INR: 1.14 ratio         PTT - ( 24 Mar 2023 00:10 )  PTT:23.7 sec  Urinalysis Basic - ( 23 Mar 2023 12:55 )    Color: Yellow / Appearance: Slightly Turbid / SG: >1.030 / pH: x  Gluc: x / Ketone: Trace  / Bili: Negative / Urobili: <2 mg/dL   Blood: x / Protein: >300 / Nitrite: Negative   Leuk Esterase: Negative / RBC: 0-5 /HPF / WBC 6-10 /HPF   Sq Epi: x / Non Sq Epi: Occasional / Bacteria: Occasional        RADIOLOGY & ADDITIONAL TESTS: Reviewed.    INTERVAL HPI/OVERNIGHT EVENTS: NAEON, off versed     SUBJECTIVE: Patient seen and examined at bedside.  Pt unable to provider interval history and ROS      VITAL SIGNS:  ICU Vital Signs Last 24 Hrs  T(C): 36.4 (24 Mar 2023 04:00), Max: 38.3 (23 Mar 2023 08:00)  T(F): 97.5 (24 Mar 2023 04:00), Max: 101 (24 Mar 2023 00:00)  HR: 75 (24 Mar 2023 06:30) (73 - 94)  BP: 115/51 (24 Mar 2023 06:00) (107/46 - 149/65)  BP(mean): 65 (24 Mar 2023 06:00) (60 - 84)  ABP: 168/69 (24 Mar 2023 01:00) (105/52 - 187/81)  ABP(mean): 103 (24 Mar 2023 01:00) (69 - 118)  RR: 10 (24 Mar 2023 06:00) (10 - 22)  SpO2: 100% (24 Mar 2023 06:30) (96% - 100%)    O2 Parameters below as of 24 Mar 2023 06:00  Patient On (Oxygen Delivery Method): ventilator    O2 Concentration (%): 30      Mode: AC/ CMV (Assist Control/ Continuous Mandatory Ventilation), RR (machine): 10, TV (machine): 320, FiO2: 30, PEEP: 5, ITime: 0.7, MAP: 9, PIP: 21  Plateau pressure:   P/F ratio:     03-23 @ 07:01  -  03-24 @ 07:00  --------------------------------------------------------  IN: 1898.7 mL / OUT: 1130 mL / NET: 768.7 mL      CAPILLARY BLOOD GLUCOSE      POCT Blood Glucose.: 161 mg/dL (24 Mar 2023 05:31)    ECG:    PHYSICAL EXAM:    General: intubated  HEENT: PERRL, 4mm b/l, slow.  Respiratory: Intubated, coarse  Cardiovascular: RRR  Abdomen: NTND  Extremities: no edema  Neurological: PERRL, overbreaths vent. does not move or withdraw to pain x 4 extremities     MEDICATIONS:  MEDICATIONS  (STANDING):  amLODIPine   Tablet 5 milliGRAM(s) Oral daily  atovaquone  Suspension 1500 milliGRAM(s) Oral daily  cefTRIAXone   IVPB 2000 milliGRAM(s) IV Intermittent every 12 hours  chlorhexidine 0.12% Liquid 15 milliLiter(s) Oral Mucosa two times a day  chlorhexidine 2% Cloths 1 Application(s) Topical <User Schedule>  ciprofloxacin  0.3% Ophthalmic Solution for Otic Use 4 Drop(s) Right Ear two times a day  cloNIDine Patch 0.2 mG/24Hr(s) 1 patch Transdermal every 7 days  dextrose 5%. 1000 milliLiter(s) (100 mL/Hr) IV Continuous <Continuous>  dextrose 5%. 1000 milliLiter(s) (50 mL/Hr) IV Continuous <Continuous>  dextrose 50% Injectable 25 Gram(s) IV Push once  dextrose 50% Injectable 12.5 Gram(s) IV Push once  dextrose 50% Injectable 25 Gram(s) IV Push once  glucagon  Injectable 1 milliGRAM(s) IntraMuscular once  heparin   Injectable 5000 Unit(s) SubCutaneous every 8 hours  insulin lispro (ADMELOG) corrective regimen sliding scale   SubCutaneous every 6 hours  lacosamide IVPB 200 milliGRAM(s) IV Intermittent every 12 hours  levETIRAcetam  IVPB 750 milliGRAM(s) IV Intermittent every 12 hours  levothyroxine Injectable 18 MICROGram(s) IV Push at bedtime  naloxegol 12.5 milliGRAM(s) Oral daily  petrolatum Ophthalmic Ointment 1 Application(s) Both EYES every 12 hours  polyethylene glycol 3350 17 Gram(s) Oral two times a day  predniSONE   Tablet 5 milliGRAM(s) Oral daily  senna Syrup 10 milliLiter(s) Oral two times a day    MEDICATIONS  (PRN):  acetaminophen   Oral Liquid .. 650 milliGRAM(s) Enteral Tube every 6 hours PRN Temp greater or equal to 38C (100.4F), Mild Pain (1 - 3), Moderate Pain (4 - 6)  dextrose Oral Gel 15 Gram(s) Oral once PRN Blood Glucose LESS THAN 70 milliGRAM(s)/deciliter      ALLERGIES:  Allergies    apple (Unknown)  Benadryl (Unknown)  penicillin (Hives)  watermelon (Unknown)    Intolerances        LABS:                        6.6    13.71 )-----------( 105      ( 24 Mar 2023 00:10 )             21.2     03-24    131<L>  |  96<L>  |  48<H>  ----------------------------<  197<H>  3.9   |  23  |  2.35<H>    Ca    8.4      24 Mar 2023 00:10  Phos  3.6     03-24  Mg     2.30     03-24    TPro  4.7<L>  /  Alb  2.4<L>  /  TBili  0.4  /  DBili  x   /  AST  34<H>  /  ALT  34<H>  /  AlkPhos  117  03-24    PT/INR - ( 24 Mar 2023 00:10 )   PT: 13.2 sec;   INR: 1.14 ratio         PTT - ( 24 Mar 2023 00:10 )  PTT:23.7 sec  Urinalysis Basic - ( 23 Mar 2023 12:55 )    Color: Yellow / Appearance: Slightly Turbid / SG: >1.030 / pH: x  Gluc: x / Ketone: Trace  / Bili: Negative / Urobili: <2 mg/dL   Blood: x / Protein: >300 / Nitrite: Negative   Leuk Esterase: Negative / RBC: 0-5 /HPF / WBC 6-10 /HPF   Sq Epi: x / Non Sq Epi: Occasional / Bacteria: Occasional    FINDINGS:    Background:  The predominant background in the most wakeful state is continuous and symmetric, consisting of diffuse polymorphic delta > theta frequencies. A less wakeful state is characterized by 1-4-second periods of diffuse in between diffuse polymorphic delta > theta frequencies. Later in the course of this day of monitoring, there are periods with increase in prevalence of theta frequencies. No normal sleep architecture is captured.    Sporadic Epileptiform Discharges and Rhythmic and Periodic Patterns (RPPs):  -Occasional left central (Cz/C3) spike-wave discharges, most often occurring in bursts at 5-6.5 Hz lasting <1-2 seconds (brief potentially ictal rhythmic discharges, BIRDs) or rarely in bursts or runs at 1.5-3 Hz (lateralized periodic discharges, LPDs), fluctuating without evolution. At times, runs of LPDs have intermixed theta activity.  -Rare right frontal (F4) sharp-wave discharges.    Electrographic and Electroclinical seizures:  None    Other Clinical Events:  None    Activation Procedures:   Hyperventilation was not performed.    Photic stimulation was not performed.    Artifacts:  Intermittent myogenic and movement artifacts are present.    EKG:  Single-lead EKG shows regular rhythm at 70-90 bpm.    Impression:  Abnormal EEG in a comatose patient due to:  -Occasional left central spike-wave discharges most often occurring as brief potentially ictal rhythmic discharges (BIRDs) lasting <1-2 seconds or in bursts or runs of lateralized periodic discharges (LPDs) at 1.5-3 Hz, fluctuating without evolution  -Rare right frontal sharp-wave discharges  -Severe diffuse slowing  No electrographic seizures are captured.    Clinical Correlation:  -Occasional left central epileptiform discharges occurring in bursts and runs, most often as brief potentially ictal rhythmic discharges (BIRDs), indicate a risk of focal-onset seizures from this region.  -Rare right frontal epileptiform discharges indicate an additional potentially epileptogenic focus in this region.  -Severe diffuse cerebral dysfunction is nonspecific in etiology.  -No electrographic seizures are captured      RADIOLOGY & ADDITIONAL TESTS: Reviewed.

## 2023-03-24 NOTE — PROGRESS NOTE ADULT - ASSESSMENT
79 y/o F with h/o HTN, HLD, DM2, PCKD previously on HD via LUE AVF then s/p renal transplant, LLE DVT (resolved, nml dopplers 12/2022) on ASA, very small supraclinoid aneurysm (reportedly unchanged on MRA 10/2020), hypothyroidism, PCP 2021 on atovaquone ppx, reportedly hospitalized in 12/2022 for rectal prolapse, had a blood transfusion at that time, also + CMV 1/25/23 who presented with R-sided HA, R ear pain and neck stiffness after visit to ENT earlier in the day, altered on presentation, CT head unremarkable for stroke, however LP with IR and BCx showing +strep pneumoniae. currently on vanc and CTX per ID. Cardiac arrest 3/16 with ROSC, transferred to MICU for further management.       Plan:   Neurological  #Currently Intubated, sedated   - on versed, continue to wean  - EEG no seizures  - f/u neuron specific enolase sent on 3/19    #Strep pneumo meningitis #Acute encephalopathy  severe HA, neck stiffness, fever, leukocytosis concerning for meningitis/encephalitis  CT head negative for CVA.   LP and BCx 3/15 both showing gram positive cocci in pairs, and + strep pneumoniae.   likely source of entry from R ear where pt c/o discomfort for several months.  patient is on cyclosporine, mycophenolate mofetil and prednisone due to kidney transplant and is immunocompromised   dental procedure 3/14 but less likely source   - f/u MRI of the IAC w/ contrast at a later time when renal function improves    #Seizures  - 24hr EEG initially with highly focal seizures, improved with increased sedation.   - keppra loaded 500mg IVP (3/17) and started on 250mg IV q12h (3/17 - 3/19) increased to 750 mg IV q12h (CRRT dosing (3/19 - 3/20), then increased to 1000mg IV q12 (3/20 - 3/22), then back to 750mg IV q12 (3/22 - [ ])  - vimpat 100 BID increased to 200 BID (3/20 - [ ])  -  wean versed as appropriate  - decrease propofol while monitoring on EEG, decreased seizure activity after increasing vimpat and keppra doses on 3/20  - f/u repeat keppra level    Cardiovascular  #Cardiac Arrest  bradycardia to 30, pulseless, code blue was called 3/16   2 rounds of epi, and PEA arrest. on 3rd pulse check pulseless VT, 200J shock given  4th pulse check asystole, 5th ROSC, sinus tachy with HUMA. IO placed and levo started. Due to blood in mouth took several attempts with DL for ETT to be placed but ultimately confirmed with capnometry and bilateral breath sounds.     #Elevated troponin  uptrending troponin to 182 3/15 AM, was likely iso ESRD  s/p cardiac arrest, elevated ST segment   - TTE from 3/17 showed EF of 63% with mild pulm htn, normal left ventricular systolic function, and diastolic LV dysfunction.      #HTN  on amlodipine 5mg, losartan 100mg, torsemide 20mg, and clonidine 0.2 patch weekly  - can consider restarting as pt has been hypertensive off pressors      Respiratory/ENT  #Intubated  continue sedation  - CPAP trial when more awake    # Tympanic membrane rupture  discharge from R ear, evaluated by ENT given dexamethasone and cipro 3/14  - c/w broad spectrum abx   - MRI of IAC with contrast when pt is stable  - f/u ENT recs    Gastrointestinal  #Constipation  - Abdominal xray negative for bowel obstruction  - trial on movantik  - c/w bowel regimen  - increase trickle feeds as tolerated and bowel regimen    Renal  #ESRD #s/p Kidney Transplant in 2003 at [place] #PCKD  previously HD through LLE AVF but no HD since transplant  Cr 3.8, unclear baseline  - avoid nephrotoxic agents  - holding cyclosporine and mycophenolate   - c/w home prednisone 5mg qday  - strict I/O and replete electrolytes  - s/p 4mg IV bumex with minimal urine production (3/17)  - started on CRRT (3/17-3/21) net negative 100cc/hr goal  - s/p intermittent HD (3/22), removed 1L       Hematological  # h/o LLE DVT  resolved, no DVT on doppler 12/22.  - negative LLE duplex this admission 3/17  - restarted heparin subQ (3/21)      #Anemia  - s/p 1 unit pRBC on 3/16 for Hb drop from 10 to 7.9 and 1 unit on 3/21 for Hb 6.8 2/2 loss from CRRT clotting  - Evaluated by ENT for bleeding and found no signs of active bleeding from nasal cavity, nasopharynx or oral cavity, however, patient biting on tongue, which could be one source of bleeding. Tongue was edematous and lax      Infectious Disease  #Strep pneumoniae meningitis #R ear mastoiditis  treatment with IV CTX   - c/w ciprodex drops for R ear  - f/u ID recs   - repeat cx from 3/21 NGTD  - c/w CTX (3/16 - [ ])  - repeat blood and sputum cx on 3/23 2/2 new fever  - f/u UA  - possible SORAYA to rule out endocarditis per ID      #h/o PCP  - atovaquone at home, continue      Endocrinological  #Hypothyroidism  last TSH 12/2022 >10  TSH 3/15 4.73  - c/w levothyroxine     #Diabetes mellitus Type II  - ISS      Ethics  FULL, GOC ongoing.   79 y/o F with h/o HTN, HLD, DM2, PCKD previously on HD via LUE AVF then s/p renal transplant, LLE DVT (resolved, nml dopplers 12/2022) on ASA, very small supraclinoid aneurysm (reportedly unchanged on MRA 10/2020), hypothyroidism, PCP 2021 on atovaquone ppx, reportedly hospitalized in 12/2022 for rectal prolapse, had a blood transfusion at that time, also + CMV 1/25/23 who presented with R-sided HA, R ear pain and neck stiffness after visit to ENT earlier in the day, altered on presentation, CT head unremarkable for stroke, however LP with IR and BCx showing +strep pneumoniae. currently on vanc and CTX per ID. Cardiac arrest 3/16 with ROSC, transferred to MICU for further management.       Plan:   Neurological  #Currently Intubated, sedated   - off versed  - EEG no seizures  - neuron specific enolase wnl    #Strep pneumo meningitis #Acute encephalopathy  severe HA, neck stiffness, fever, leukocytosis concerning for meningitis/encephalitis  CT head negative for CVA.   LP and BCx 3/15 both showing gram positive cocci in pairs, and + strep pneumoniae.   likely source of entry from R ear where pt c/o discomfort for several months.  patient is on cyclosporine, mycophenolate mofetil and prednisone due to kidney transplant and is immunocompromised   dental procedure 3/14 but less likely source   - f/u MRI of the IAC w/ contrast at a later time when renal function improves    #Seizures  - 24hr EEG initially with highly focal seizures, improved with increased sedation.   - keppra loaded 500mg IVP (3/17) and started on 250mg IV q12h (3/17 - 3/19) increased to 750 mg IV q12h (CRRT dosing (3/19 - 3/20), then increased to 1000mg IV q12 (3/20 - 3/22), then back to 750mg IV q12 (3/22 - [ ])  - vimpat 100 BID increased to 200 BID (3/20 - [ ])  -  wean versed as appropriate  - decrease propofol while monitoring on EEG, decreased seizure activity after increasing vimpat and keppra doses on 3/20  - f/u repeat keppra level    Cardiovascular  #Cardiac Arrest  bradycardia to 30, pulseless, code blue was called 3/16   2 rounds of epi, and PEA arrest. on 3rd pulse check pulseless VT, 200J shock given  4th pulse check asystole, 5th ROSC, sinus tachy with HUMA. IO placed and levo started. Due to blood in mouth took several attempts with DL for ETT to be placed but ultimately confirmed with capnometry and bilateral breath sounds.     #Elevated troponin  uptrending troponin to 182 3/15 AM, was likely iso ESRD  s/p cardiac arrest, elevated ST segment   - TTE from 3/17 showed EF of 63% with mild pulm htn, normal left ventricular systolic function, and diastolic LV dysfunction.      #HTN  on amlodipine 5mg, losartan 100mg, torsemide 20mg, and clonidine 0.2 patch weekly  - can consider restarting as pt has been hypertensive off pressors      Respiratory/ENT  #Intubated  continue sedation  - CPAP trial when more awake    # Tympanic membrane rupture  discharge from R ear, evaluated by ENT given dexamethasone and cipro 3/14  - c/w broad spectrum abx   - MRI of IAC with contrast when pt is stable  - f/u ENT recs    Gastrointestinal  #Constipation  - Abdominal xray negative for bowel obstruction  - trial on movantik  - c/w bowel regimen  - increase trickle feeds as tolerated and bowel regimen    Renal  #ESRD #s/p Kidney Transplant in 2003 at [place] #PCKD  previously HD through LLE AVF but no HD since transplant  Cr 3.8, unclear baseline  - avoid nephrotoxic agents  - holding cyclosporine and mycophenolate   - c/w home prednisone 5mg qday  - strict I/O and replete electrolytes  - s/p 4mg IV bumex with minimal urine production (3/17)  - started on CRRT (3/17-3/21) net negative 100cc/hr goal  - s/p intermittent HD (3/22), removed 1L       Hematological  # h/o LLE DVT  resolved, no DVT on doppler 12/22.  - negative LLE duplex this admission 3/17  - restarted heparin subQ (3/21)      #Anemia  - s/p 1 unit pRBC on 3/16 for Hb drop from 10 to 7.9 and 1 unit on 3/21 for Hb 6.8 2/2 loss from CRRT clotting  - Evaluated by ENT for bleeding and found no signs of active bleeding from nasal cavity, nasopharynx or oral cavity, however, patient biting on tongue, which could be one source of bleeding. Tongue was edematous and lax      Infectious Disease  #Strep pneumoniae meningitis #R ear mastoiditis  treatment with IV CTX   - c/w ciprodex drops for R ear  - f/u ID recs   - repeat cx from 3/21 NGTD  - c/w CTX (3/16 - [ ])  - repeat blood and sputum cx on 3/23 2/2 new fever  - f/u UA  - possible SORAYA to rule out endocarditis per ID      #h/o PCP  - atovaquone at home, continue      Endocrinological  #Hypothyroidism  last TSH 12/2022 >10  TSH 3/15 4.73  - c/w levothyroxine     #Diabetes mellitus Type II  - ISS      Ethics  FULL, GOC ongoing.   77 y/o F with h/o HTN, HLD, DM2, PCKD previously on HD via LUE AVF then s/p renal transplant, LLE DVT (resolved, nml dopplers 12/2022) on ASA, very small supraclinoid aneurysm (reportedly unchanged on MRA 10/2020), hypothyroidism, PCP 2021 on atovaquone ppx, reportedly hospitalized in 12/2022 for rectal prolapse, had a blood transfusion at that time, also + CMV 1/25/23 who presented with R-sided HA, R ear pain and neck stiffness after visit to ENT earlier in the day, altered on presentation, CT head unremarkable for stroke, however LP with IR and BCx showing +strep pneumoniae. currently on vanc and CTX per ID. Cardiac arrest 3/16 with ROSC, transferred to MICU for further management.       Plan:   Neurological  #Currently Intubated, off sedation  - off versed  - EEG no seizures  - neuron specific enolase wnl    #Strep pneumo meningitis #Acute encephalopathy  severe HA, neck stiffness, fever, leukocytosis concerning for meningitis/encephalitis  CT head negative for CVA.   LP and BCx 3/15 both showing gram positive cocci in pairs, and + strep pneumoniae.   likely source of entry from R ear where pt c/o discomfort for several months.  patient is on cyclosporine, mycophenolate mofetil and prednisone due to kidney transplant and is immunocompromised   dental procedure 3/14 but less likely source   - f/u MRI of the IAC (holding EEG to get MRI)    #Seizures  - 24hr EEG initially with highly focal seizures, improved with increased sedation.   - keppra loaded 500mg IVP (3/17) and started on 250mg IV q12h (3/17 - 3/19) increased to 750 mg IV q12h (CRRT dosing (3/19 - 3/20), then increased to 1000mg IV q12 (3/20 - 3/22), then back to 750mg IV q12 (3/22 - [ ])  - vimpat 100 BID increased to 200 BID (3/20 - [ ])  -Off versed; off prop  -now seizure free    Cardiovascular  #Cardiac Arrest  bradycardia to 30, pulseless, code blue was called 3/16   2 rounds of epi, and PEA arrest. on 3rd pulse check pulseless VT, 200J shock given  4th pulse check asystole, 5th ROSC, sinus tachy with HUMA. IO placed and levo started. Due to blood in mouth took several attempts with DL for ETT to be placed but ultimately confirmed with capnometry and bilateral breath sounds.     #Elevated troponin  uptrending troponin to 182 3/15 AM, was likely iso ESRD  s/p cardiac arrest, elevated ST segment   - TTE from 3/17 showed EF of 63% with mild pulm htn, normal left ventricular systolic function, and diastolic LV dysfunction.      #HTN  on amlodipine 5mg, losartan 100mg, torsemide 20mg, and clonidine 0.2 patch weekly  -Back on amlodipine, can uptitrate if needed or resume other oral agents        Respiratory/ENT  #Intubated  continue sedation  - CPAP trial when more awake    # Tympanic membrane rupture  discharge from R ear, evaluated by ENT given dexamethasone and cipro 3/14  - c/w broad spectrum abx   - MRI of IAC   - f/u ENT recs    Gastrointestinal  #Constipation  - Abdominal xray negative for bowel obstruction  - trial on movantik  - c/w bowel regimen  - increase trickle feeds as tolerated and bowel regimen  -now having bowel movements    Renal  #ESRD #s/p Kidney Transplant in 2003 at [place] #PCKD  previously HD through LLE AVF but no HD since transplant  Cr 3.8, unclear baseline  - avoid nephrotoxic agents  - holding cyclosporine and mycophenolate   - c/w home prednisone 5mg qday  - strict I/O and replete electrolytes  - s/p 4mg IV bumex with minimal urine production (3/17)  - started on CRRT (3/17-3/21) net negative 100cc/hr goal  - s/p intermittent HD (3/22), removed 1L  - Dialyze again 3/24       Hematological  # h/o LLE DVT  resolved, no DVT on doppler 12/22.  - negative LLE duplex this admission 3/17  - restarted heparin subQ (3/21)      #Anemia  - s/p 1 unit pRBC on 3/16 for Hb drop from 10 to 7.9 and 1 unit on 3/21 for Hb 6.8 2/2 loss from CRRT clotting  - Evaluated by ENT for bleeding and found no signs of active bleeding from nasal cavity, nasopharynx or oral cavity, however, patient biting on tongue, which could be one source of bleeding. Tongue was edematous and lax      Infectious Disease  #Strep pneumoniae meningitis #R ear mastoiditis  treatment with IV CTX   - c/w ciprodex drops for R ear  - f/u ID recs   - repeat cx from 3/21 NGTD  - c/w CTX (3/16 - [ ])  - repeat blood and sputum cx on 3/23 2/2 new fever  - f/u UA  - cultures cleared, holding off on SORAYA      #h/o PCP  - atovaquone at home, continue      Endocrinological  #Hypothyroidism  last TSH 12/2022 >10  TSH 3/15 4.73  - c/w levothyroxine     #Diabetes mellitus Type II  - ISS      Ethics  FULL, GOC ongoing.

## 2023-03-24 NOTE — PROGRESS NOTE ADULT - ASSESSMENT
78F with PCKD previously on HD via LUE AVF now s/p cadaveric renal transplant 2003 at [place], LLE DVT (resolved, nml dopplers 12/2022) on ASA, HTN, HLD, DM2, 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 Went to ENT and was diagnosed with R otitis externa secondary to perforated TM. Prescribed cipro/dexamethasone.  Worsening headache and EMS called. Admitted 3/14.  CT head negative.  NAMAN.  Started on vanc/meropenem.  IR obtained LP.  Findings c/w meningitis, culture +pneumococcus.  Antibiotics narrowed to ceftriaxone.  Remains lethargic off sedation with possible seizures.  Intermittent fevers.      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, bacteremia, pneumonia c/b possible seizures, fevers  - continue ceftriaxone  - r/o endocarditis, for SORAYA  - for MRI IAC  - continue mepron given hx PJP    NAMAN  - worsening renal failure  - monitor renal function  - on dialysis now    Leukocytosis and fever  - mild  - continue to trend wbc  - f/u all cultures    Guarded prognosis    I have discussed plan of care as detailed above with micu housestaff    Please call the ID service [phone] with questions or concerns over the weekend

## 2023-03-24 NOTE — PROGRESS NOTE ADULT - ASSESSMENT
Ms Negron is a 78 year-old woman with a PMHx of PCKD s/p renal transplant, brain cyst, DM, HLD, HTN, hypothyroidism, glaucoma, cataracts, DVT in leg, on asa who presents to ED for severe headache, right ear pain, neck pain, markedly decreased responsiveness to external stimuli. LP significant for decreased glucose, elevated WBC with neutrophil predominance, elevated protein, PCR (+) for strep. pneumoniae. antibiotics were started. Course complicated by PEA arrest, ROSC achieved after about 10 mins. Patient is now intubated and sedated. EEG shows occasional left epileptiform discharges and rare right frontal epileptiform discharges indicating potential risk of seizures. Last electrographic seizure reported 3/20.    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     Plan  [] MRI as per ENT when stable  [/] Scalp break for EEG today, can reconsider connecting tomorrow   [x] S/p IV levetiracetam 500mg load x1 (3/17) and maintenance 250mg IV Q12 (3/17-3/19); then 750mg Q12H ( CRRT dosing 3/19-3/20); increased to 1000mg Q12H (3/20 - 3/21)  [/] Levetiracetam decreased back to 750mg Q12H (3/22 - )  [/] Continue lacosamide 200mg Q12H (3/20)  [/] Continue to wean midazolam  [/] Continue antimicrobials as per primary team/ID  [] Follow up neuron specific enolase (sent 3/19)  [] Follow up levetiracetam level   [x] UA (-), Bcx (+GPC in pairs - S. pneumo)  [x] SERUM: A1C 5.6, LDL 79, procal 45.63, CK 1074  [x] S/p LP 3/15: protein (>1181), gluc (<5), TNC (34,389, 88% neut), RBC (1267), AFB (-), CSF gram strain & culture (GPC in pairs), cryptococcal ag (-), EBV PCR (-), CSF PCR (+S. pneumoniae),      Case discussed with neurology attending Dr. Goodrich

## 2023-03-24 NOTE — EEG REPORT - NS EEG TEXT BOX
YARA SEGUNDO [id number]     Study Date: 03/23/2023 08:00 - 03/24/2023 08:00  Duration: 23 hr 58 min  --------------------------------------------------------------------------------------------------  History:  CC/ HPI Patient is a 78y old  Female who presents with a chief complaint of AMS/R ear infxn (24 Mar 2023 07:10)    MEDICATIONS  (STANDING):  amLODIPine   Tablet 5 milliGRAM(s) Oral daily  atovaquone  Suspension 1500 milliGRAM(s) Oral daily  cefTRIAXone   IVPB 2000 milliGRAM(s) IV Intermittent every 12 hours  chlorhexidine 0.12% Liquid 15 milliLiter(s) Oral Mucosa two times a day  chlorhexidine 2% Cloths 1 Application(s) Topical <User Schedule>  ciprofloxacin  0.3% Ophthalmic Solution for Otic Use 4 Drop(s) Right Ear two times a day  cloNIDine Patch 0.2 mG/24Hr(s) 1 patch Transdermal every 7 days  dextrose 5%. 1000 milliLiter(s) (100 mL/Hr) IV Continuous <Continuous>  dextrose 5%. 1000 milliLiter(s) (50 mL/Hr) IV Continuous <Continuous>  dextrose 50% Injectable 25 Gram(s) IV Push once  dextrose 50% Injectable 12.5 Gram(s) IV Push once  dextrose 50% Injectable 25 Gram(s) IV Push once  glucagon  Injectable 1 milliGRAM(s) IntraMuscular once  heparin   Injectable 5000 Unit(s) SubCutaneous every 8 hours  insulin lispro (ADMELOG) corrective regimen sliding scale   SubCutaneous every 6 hours  lacosamide IVPB 200 milliGRAM(s) IV Intermittent every 12 hours  levETIRAcetam  IVPB 750 milliGRAM(s) IV Intermittent every 12 hours  levothyroxine Injectable 18 MICROGram(s) IV Push at bedtime  naloxegol 12.5 milliGRAM(s) Oral daily  petrolatum Ophthalmic Ointment 1 Application(s) Both EYES every 12 hours  polyethylene glycol 3350 17 Gram(s) Oral two times a day  predniSONE   Tablet 5 milliGRAM(s) Oral daily  senna Syrup 10 milliLiter(s) Oral two times a day    --------------------------------------------------------------------------------------------------  Study Interpretation:    [[[Abbreviation Key:  PDR=alpha rhythm/posterior dominant rhythm. A-P=anterior posterior.  Amplitude: ‘very low’:<20; ‘low’:20-49; ‘medium’:; ‘high’:>150uV.  Persistence for periodic/rhythmic patterns (% of epoch) ‘rare’:<1%; ‘occasional’:1-10%; ‘frequent’:10-50%; ‘abundant’:50-90%; ‘continuous’:>90%.  Persistence for sporadic discharges: ‘rare’:<1/hr; ‘occasional’:1/min-1/hr; ‘frequent’:>1/min; ‘abundant’:>1/10 sec.  RPP=rhythmic and periodic patterns; GRDA=generalized rhythmic delta activity; FIRDA=frontal intermittent GRDA; LRDA=lateralized rhythmic delta activity; TIRDA=temporal intermittent rhythmic delta activity;  LPD=PLED=lateralized periodic discharges; GPD=generalized periodic discharges; BIPDs =bilateral independent periodic discharges; Mf=multifocal; SIRPDs=stimulus induced rhythmic, periodic, or ictal appearing discharges; BIRDs=brief potentially ictal rhythmic discharges >4 Hz, lasting .5-10s; PFA (paroxysmal bursts >13 Hz or =8 Hz <10s).  Modifiers: +F=with fast component; +S=with spike component; +R=with rhythmic component.  S-B=burst suppression pattern.  Max=maximal. N1-drowsy; N2-stage II sleep; N3-slow wave sleep. SSS/BETS=small sharp spikes/benign epileptiform transients of sleep. HV=hyperventilation; PS=photic stimulation]]]    Daily EEG Visual Analysis    FINDINGS:    Background:  The predominant background in the most wakeful state is continuous and symmetric, consisting of diffuse polymorphic delta > theta frequencies. A less wakeful state is characterized by 1-4-second periods of diffuse in between diffuse polymorphic delta > theta frequencies. Later in the course of this day of monitoring, there are periods with increase in prevalence of theta frequencies. No normal sleep architecture is captured.    Sporadic Epileptiform Discharges and Rhythmic and Periodic Patterns (RPPs):  -Occasional left central (Cz/C3) spike-wave discharges, most often occurring in bursts at 5-6.5 Hz lasting <1-2 seconds (brief potentially ictal rhythmic discharges, BIRDs) or rarely in bursts or runs at 1.5-3 Hz (lateralized periodic discharges, LPDs), fluctuating without evolution. At times, runs of LPDs have intermixed theta activity.  -Rare right frontal (F4) sharp-wave discharges.    Electrographic and Electroclinical seizures:  None    Other Clinical Events:  None    Activation Procedures:   Hyperventilation was not performed.    Photic stimulation was not performed.    Artifacts:  Intermittent myogenic and movement artifacts are present.    EKG:  Single-lead EKG shows regular rhythm at 70-90 bpm.    Impression:  Abnormal EEG in a comatose patient due to:  -Occasional left central spike-wave discharges most often occurring as brief potentially ictal rhythmic discharges (BIRDs) lasting <1-2 seconds or in bursts or runs of lateralized periodic discharges (LPDs) at 1.5-3 Hz, fluctuating without evolution  -Rare right frontal sharp-wave discharges  -Severe diffuse slowing  No electrographic seizures are captured.    Clinical Correlation:  -Occasional left central epileptiform discharges occurring in bursts and runs, most often as brief potentially ictal rhythmic discharges (BIRDs), indicate a risk of focal-onset seizures from this region.  -Rare right frontal epileptiform discharges indicate an additional potentially epileptogenic focus in this region.  -Severe diffuse cerebral dysfunction is nonspecific in etiology.  -No electrographic seizures are captured        -------------------------------------------------------------------------------------------------------  [facility] EEG Reading Room Ph#: [phone]  Epilepsy Answering Service after 5PM and before 8:30AM: Ph#: [phone]    Meera Simental MD  Attending Physician, [facility] Epilepsy [place]   YARA SEGUNDO [id number]     Study Date: 03/23/2023 08:00 - 03/24/2023 08:00  Duration: 23 hr 58 min  --------------------------------------------------------------------------------------------------  History:  CC/ HPI Patient is a 78y old  Female who presents with a chief complaint of AMS/R ear infxn (24 Mar 2023 07:10)    MEDICATIONS  (STANDING):  amLODIPine   Tablet 5 milliGRAM(s) Oral daily  atovaquone  Suspension 1500 milliGRAM(s) Oral daily  cefTRIAXone   IVPB 2000 milliGRAM(s) IV Intermittent every 12 hours  chlorhexidine 0.12% Liquid 15 milliLiter(s) Oral Mucosa two times a day  chlorhexidine 2% Cloths 1 Application(s) Topical <User Schedule>  ciprofloxacin  0.3% Ophthalmic Solution for Otic Use 4 Drop(s) Right Ear two times a day  cloNIDine Patch 0.2 mG/24Hr(s) 1 patch Transdermal every 7 days  dextrose 5%. 1000 milliLiter(s) (100 mL/Hr) IV Continuous <Continuous>  dextrose 5%. 1000 milliLiter(s) (50 mL/Hr) IV Continuous <Continuous>  dextrose 50% Injectable 25 Gram(s) IV Push once  dextrose 50% Injectable 12.5 Gram(s) IV Push once  dextrose 50% Injectable 25 Gram(s) IV Push once  glucagon  Injectable 1 milliGRAM(s) IntraMuscular once  heparin   Injectable 5000 Unit(s) SubCutaneous every 8 hours  insulin lispro (ADMELOG) corrective regimen sliding scale   SubCutaneous every 6 hours  lacosamide IVPB 200 milliGRAM(s) IV Intermittent every 12 hours  levETIRAcetam  IVPB 750 milliGRAM(s) IV Intermittent every 12 hours  levothyroxine Injectable 18 MICROGram(s) IV Push at bedtime  naloxegol 12.5 milliGRAM(s) Oral daily  petrolatum Ophthalmic Ointment 1 Application(s) Both EYES every 12 hours  polyethylene glycol 3350 17 Gram(s) Oral two times a day  predniSONE   Tablet 5 milliGRAM(s) Oral daily  senna Syrup 10 milliLiter(s) Oral two times a day    --------------------------------------------------------------------------------------------------  Study Interpretation:    [[[Abbreviation Key:  PDR=alpha rhythm/posterior dominant rhythm. A-P=anterior posterior.  Amplitude: ‘very low’:<20; ‘low’:20-49; ‘medium’:; ‘high’:>150uV.  Persistence for periodic/rhythmic patterns (% of epoch) ‘rare’:<1%; ‘occasional’:1-10%; ‘frequent’:10-50%; ‘abundant’:50-90%; ‘continuous’:>90%.  Persistence for sporadic discharges: ‘rare’:<1/hr; ‘occasional’:1/min-1/hr; ‘frequent’:>1/min; ‘abundant’:>1/10 sec.  RPP=rhythmic and periodic patterns; GRDA=generalized rhythmic delta activity; FIRDA=frontal intermittent GRDA; LRDA=lateralized rhythmic delta activity; TIRDA=temporal intermittent rhythmic delta activity;  LPD=PLED=lateralized periodic discharges; GPD=generalized periodic discharges; BIPDs =bilateral independent periodic discharges; Mf=multifocal; SIRPDs=stimulus induced rhythmic, periodic, or ictal appearing discharges; BIRDs=brief potentially ictal rhythmic discharges >4 Hz, lasting .5-10s; PFA (paroxysmal bursts >13 Hz or =8 Hz <10s).  Modifiers: +F=with fast component; +S=with spike component; +R=with rhythmic component.  S-B=burst suppression pattern.  Max=maximal. N1-drowsy; N2-stage II sleep; N3-slow wave sleep. SSS/BETS=small sharp spikes/benign epileptiform transients of sleep. HV=hyperventilation; PS=photic stimulation]]]    Daily EEG Visual Analysis    FINDINGS:    Background:  The predominant background in the most wakeful state is continuous and symmetric, consisting of diffuse polymorphic delta > theta frequencies. A less wakeful state is characterized by 1-4-second periods of diffuse attenuation/suppression in between diffuse polymorphic delta > theta frequencies. Later in the course of this day of monitoring, there are periods with increase in prevalence of theta frequencies. No normal sleep architecture is captured.    Sporadic Epileptiform Discharges and Rhythmic and Periodic Patterns (RPPs):  -Occasional left central (Cz/C3) spike-wave discharges, most often occurring in bursts at 5-6.5 Hz lasting <1-2 seconds (brief potentially ictal rhythmic discharges, BIRDs) or rarely in bursts or runs at 1.5-3 Hz (lateralized periodic discharges, LPDs), fluctuating without evolution. At times, runs of LPDs have intermixed theta activity.  -Rare right frontal (F4) sharp-wave discharges.    Electrographic and Electroclinical seizures:  None    Other Clinical Events:  None    Activation Procedures:   Hyperventilation was not performed.    Photic stimulation was not performed.    Artifacts:  Intermittent myogenic and movement artifacts are present.    EKG:  Single-lead EKG shows regular rhythm at 70-90 bpm.    Impression:  Abnormal EEG in a comatose patient due to:  -Occasional left central spike-wave discharges most often occurring as brief potentially ictal rhythmic discharges (BIRDs) lasting <1-2 seconds or in bursts or runs of lateralized periodic discharges (LPDs) at 1.5-3 Hz, fluctuating without evolution  -Rare right frontal sharp-wave discharges  -Severe diffuse slowing  No electrographic seizures are captured.    Clinical Correlation:  -Occasional left central epileptiform discharges occurring in bursts and runs, most often as brief potentially ictal rhythmic discharges (BIRDs), indicate a risk of focal-onset seizures from this region.  -Rare right frontal epileptiform discharges indicate an additional potentially epileptogenic focus in this region.  -Severe diffuse cerebral dysfunction is nonspecific in etiology.  -No electrographic seizures are captured        -------------------------------------------------------------------------------------------------------  [facility] EEG Reading Room Ph#: [phone]  Epilepsy Answering Service after 5PM and before 8:30AM: Ph#: [phone]    Meera Simental MD  Attending Physician, [facility] Epilepsy [place]

## 2023-03-25 NOTE — PROGRESS NOTE ADULT - ATTENDING COMMENTS
78 year-old woman with a PMHx of PCKD s/p renal transplant, brain cyst, DM, HLD, HTN, hypothyroidism, glaucoma, cataracts, DVT in leg, on asa who presents to ED for severe headache, right ear pain, neck pain, markedly decreased responsiveness to external stimuli. LP significant for decreased glucose, elevated WBC with neutrophil predominance, elevated protein, PCR (+) for strep. pneumoniae. antibiotics were started. Course complicated by PEA arrest, ROSC achieved after about 10 mins. Patient is now intubated and sedated. EEG shows occasional left epileptiform discharges and rare right frontal epileptiform discharges indicating potential risk of seizures. Last electrographic seizure reported 3/20.  continue sz medication   MRI as per ent

## 2023-03-25 NOTE — PROGRESS NOTE ADULT - SUBJECTIVE AND OBJECTIVE BOX
Fauzia Singleton  PGY-1 Resident Physician   Pager [phone]/ [phone]    Patient is a 78y old  Female who presents with a chief complaint of AMS/R ear infxn (24 Mar 2023 18:10)      SUBJECTIVE / OVERNIGHT EVENTS:  Patient seen and evaluated at bedside.  Overnight with temp 101 and cultures sent.   Patient without spontaneous movements, not responsive to commands.     Vital Signs Last 24 Hrs  T(C): 37.6 (25 Mar 2023 12:00), Max: 37.6 (25 Mar 2023 12:00)  T(F): 99.6 (25 Mar 2023 12:00), Max: 99.6 (25 Mar 2023 12:00)  HR: 85 (25 Mar 2023 12:00) (75 - 86)  BP: 153/79 (25 Mar 2023 12:00) (134/61 - 184/65)  BP(mean): 96 (25 Mar 2023 12:00) (77 - 100)  RR: 17 (25 Mar 2023 12:00) (10 - 20)  SpO2: 100% (25 Mar 2023 12:00) (100% - 100%)    Parameters below as of 25 Mar 2023 11:00  Patient On (Oxygen Delivery Method): ventilator    O2 Concentration (%): 30    PHYSICAL EXAM:  GENERAL: intubated, no spontaneous movements, pupils sluggish to light  CHEST/LUNG: Clear to auscultation bilaterally; No wheeze  HEART: Regular rate and rhythm; Normal S1 S2, No murmurs, rubs, or gallops  ABDOMEN: Soft, large   EXTREMITIES:  2+ edema in the thighs . R shin with some clear yellow drainage ?prior IO  PSYCH: AAOx0     LABS:                        7.8    16.03 )-----------( 124      ( 24 Mar 2023 20:54 )             24.5     Hgb Trend: 7.8<--, 6.6<--, 7.1<--, 7.2<--, 7.9<--  03-24    136  |  98  |  38<H>  ----------------------------<  136<H>  4.2   |  27  |  2.04<H>    Ca    8.7      24 Mar 2023 20:54  Phos  3.6     03-24  Mg     2.20     03-24    TPro  4.9<L>  /  Alb  2.3<L>  /  TBili  0.6  /  DBili  x   /  AST  39<H>  /  ALT  36<H>  /  AlkPhos  123<H>  03-24    Creatinine Trend: 2.04<--, 2.35<--, 1.72<--, 1.95<--, 1.26<--, 1.24<--  LIVER FUNCTIONS - ( 24 Mar 2023 20:54 )  Alb: 2.3 g/dL / Pro: 4.9 g/dL / ALK PHOS: 123 U/L / ALT: 36 U/L / AST: 39 U/L / GGT: x           PT/INR - ( 24 Mar 2023 20:54 )   PT: 13.7 sec;   INR: 1.18 ratio         PTT - ( 24 Mar 2023 20:54 )  PTT:22.3 sec

## 2023-03-25 NOTE — PROGRESS NOTE ADULT - SUBJECTIVE AND OBJECTIVE BOX
[facility] - S Alea / Tita S /D Rama/ S Jovan/ S Keenan/ Cleve Gambino / DAGMAR Wilsonu/ O Daya  service -[phone], office [phone]  ---------------------------------------------------------------------------------------------------------------    Patient seen and examined bedside    Subjective and Objective: No overnight events, sob resolved. No complaints today. feeling better    Allergies: apple (Unknown)  Benadryl (Unknown)  penicillin (Hives)  watermelon (Unknown)      Hospital Medications:   MEDICATIONS  (STANDING):  amLODIPine   Tablet 10 milliGRAM(s) Oral every 24 hours  atovaquone  Suspension 1500 milliGRAM(s) Oral daily  cefTRIAXone   IVPB 2000 milliGRAM(s) IV Intermittent every 12 hours  chlorhexidine 0.12% Liquid 15 milliLiter(s) Oral Mucosa two times a day  chlorhexidine 2% Cloths 1 Application(s) Topical <User Schedule>  ciprofloxacin  0.3% Ophthalmic Solution for Otic Use 4 Drop(s) Right Ear two times a day  dextrose 5%. 1000 milliLiter(s) (100 mL/Hr) IV Continuous <Continuous>  dextrose 5%. 1000 milliLiter(s) (50 mL/Hr) IV Continuous <Continuous>  dextrose 50% Injectable 25 Gram(s) IV Push once  dextrose 50% Injectable 12.5 Gram(s) IV Push once  dextrose 50% Injectable 25 Gram(s) IV Push once  glucagon  Injectable 1 milliGRAM(s) IntraMuscular once  heparin   Injectable 5000 Unit(s) SubCutaneous every 8 hours  insulin lispro (ADMELOG) corrective regimen sliding scale   SubCutaneous every 6 hours  lacosamide IVPB 200 milliGRAM(s) IV Intermittent every 12 hours  levETIRAcetam  IVPB 750 milliGRAM(s) IV Intermittent every 12 hours  levothyroxine Injectable 18 MICROGram(s) IV Push at bedtime  naloxegol 12.5 milliGRAM(s) Oral daily  petrolatum Ophthalmic Ointment 1 Application(s) Both EYES every 12 hours  polyethylene glycol 3350 17 Gram(s) Oral two times a day  predniSONE   Tablet 5 milliGRAM(s) Oral daily  senna Syrup 10 milliLiter(s) Oral two times a day      REVIEW OF SYSTEMS:  CONSTITUTIONAL: No weakness, fevers or chills  EYES/ENT: No visual changes;  No vertigo or throat pain   NECK: No pain or stiffness  RESPIRATORY: No cough, wheezing, hemoptysis; No shortness of breath  CARDIOVASCULAR: No chest pain or palpitations.  GASTROINTESTINAL: No abdominal or epigastric pain. No nausea, vomiting, or hematemesis; No diarrhea or constipation. No melena or hematochezia.  GENITOURINARY: No dysuria, frequency, foamy urine, urinary urgency, incontinence or hematuria  NEUROLOGICAL: No numbness or weakness  SKIN: No itching, burning, rashes, or lesions   VASCULAR: No bilateral lower extremity edema.   All other review of systems is negative unless indicated above.    VITALS:  T(F): 100.1 (03-25-23 @ 16:00), Max: 100.1 (03-25-23 @ 16:00)  HR: 86 (03-25-23 @ 17:00)  BP: 144/73 (03-25-23 @ 17:00)  RR: 25 (03-25-23 @ 17:00)  SpO2: 100% (03-25-23 @ 17:00)  Wt(kg): --    03-24 @ 07:01  -  03-25 @ 07:00  --------------------------------------------------------  IN: 890 mL / OUT: 1745 mL / NET: -855 mL    03-25 @ 07:01  -  03-25 @ 17:51  --------------------------------------------------------  IN: 1155 mL / OUT: 785 mL / NET: 370 mL          PHYSICAL EXAM:  Constitutional: NAD  HEENT: anicteric sclera, oropharynx clear  Neck: No JVD  Respiratory: CTAB, no wheezes, rales or rhonchi  Cardiovascular: S1, S2, RRR  Gastrointestinal: BS+, soft, NT/ND  Extremities: No cyanosis or clubbing. No peripheral edema  Neurological: A/O x 3, no focal deficits  Psychiatric: Normal mood, normal affect  : No CVA tenderness. No snider.   Skin: No rashes  Vascular Access:    LABS:  03-25    135  |  99  |  49<H>  ----------------------------<  162<H>  4.3   |  25  |  2.39<H>    Ca    8.8      25 Mar 2023 14:58  Phos  4.3     03-25  Mg     2.30     03-25    TPro  5.0<L>  /  Alb  2.2<L>  /  TBili  0.4  /  DBili      /  AST  34<H>  /  ALT  34<H>  /  AlkPhos  121<H>  03-25    Creatinine Trend: 2.39 <--, 2.04 <--, 2.35 <--, 1.72 <--, 1.95 <--, 1.26 <--, 1.24 <--, 1.31 <--, 1.16 <--, 1.38 <--, 1.31 <--, 1.45 <--, 1.49 <--, 1.61 <--, 1.83 <--                        7.8    14.94 )-----------( 147      ( 25 Mar 2023 14:58 )             24.5     Urine Studies:  Urinalysis Basic - ( 23 Mar 2023 12:55 )    Color: Yellow / Appearance: Slightly Turbid / SG: >1.030 / pH:   Gluc:  / Ketone: Trace  / Bili: Negative / Urobili: <2 mg/dL   Blood:  / Protein: >300 / Nitrite: Negative   Leuk Esterase: Negative / RBC: 0-5 /HPF / WBC 6-10 /HPF   Sq Epi:  / Non Sq Epi: Occasional / Bacteria: Occasional          RADIOLOGY & ADDITIONAL STUDIES:   [facility] - S Alea / Tita S /D Rama/ S Jovan/ S Keenan/ Cleve Gambino / DAGMAR Wilsonu/ O Daya  service [phone], office [phone]  ---------------------------------------------------------------------------------------------------------------    Patient seen and examined bedside in MICU    Subjective and Objective: No overnight events, remains unresponsive, on MV    Allergies: apple (Unknown)  Benadryl (Unknown)  penicillin (Hives)  watermelon (Unknown)      [facility] Medications:   MEDICATIONS  (STANDING):  amLODIPine   Tablet 10 milliGRAM(s) Oral every 24 hours  atovaquone  Suspension 1500 milliGRAM(s) Oral daily  cefTRIAXone   IVPB 2000 milliGRAM(s) IV Intermittent every 12 hours  chlorhexidine 0.12% Liquid 15 milliLiter(s) Oral Mucosa two times a day  chlorhexidine 2% Cloths 1 Application(s) Topical <User Schedule>  ciprofloxacin  0.3% Ophthalmic Solution for Otic Use 4 Drop(s) Right Ear two times a day  dextrose 5%. 1000 milliLiter(s) (100 mL/Hr) IV Continuous <Continuous>  dextrose 5%. 1000 milliLiter(s) (50 mL/Hr) IV Continuous <Continuous>  dextrose 50% Injectable 25 Gram(s) IV Push once  dextrose 50% Injectable 12.5 Gram(s) IV Push once  dextrose 50% Injectable 25 Gram(s) IV Push once  glucagon  Injectable 1 milliGRAM(s) IntraMuscular once  heparin   Injectable 5000 Unit(s) SubCutaneous every 8 hours  insulin lispro (ADMELOG) corrective regimen sliding scale   SubCutaneous every 6 hours  lacosamide IVPB 200 milliGRAM(s) IV Intermittent every 12 hours  levETIRAcetam  IVPB 750 milliGRAM(s) IV Intermittent every 12 hours  levothyroxine Injectable 18 MICROGram(s) IV Push at bedtime  naloxegol 12.5 milliGRAM(s) Oral daily  petrolatum Ophthalmic Ointment 1 Application(s) Both EYES every 12 hours  polyethylene glycol 3350 17 Gram(s) Oral two times a day  predniSONE   Tablet 5 milliGRAM(s) Oral daily  senna Syrup 10 milliLiter(s) Oral two times a day    VITALS:  T(F): 100.1 (03-25-23 @ 16:00), Max: 100.1 (03-25-23 @ 16:00)  HR: 86 (03-25-23 @ 17:00)  BP: 144/73 (03-25-23 @ 17:00)  RR: 25 (03-25-23 @ 17:00)  SpO2: 100% (03-25-23 @ 17:00)  Wt(kg): --    03-24 @ 07:01  -  03-25 @ 07:00  --------------------------------------------------------  IN: 890 mL / OUT: 1745 mL / NET: -855 mL    03-25 @ 07:01  -  03-25 @ 17:51  --------------------------------------------------------  IN: 1155 mL / OUT: 785 mL / NET: 370 mL      PHYSICAL EXAM:  Constitutional: NAD  HEENT: ETT+, OGT+   Neck: No JVD  Respiratory: CTAB, no wheezes, rales or rhonchi  Cardiovascular: S1, S2, RRR  Gastrointestinal: BS+  Extremities: No peripheral edema  Neurological: unresponsive  - +snider  Vascular Access: left femoral shiley+     LABS:  03-25    135  |  99  |  49<H>  ----------------------------<  162<H>  4.3   |  25  |  2.39<H>    Ca    8.8      25 Mar 2023 14:58  Phos  4.3     03-25  Mg     2.30     03-25    TPro  5.0<L>  /  Alb  2.2<L>  /  TBili  0.4  /  DBili      /  AST  34<H>  /  ALT  34<H>  /  AlkPhos  121<H>  03-25    Creatinine Trend: 2.39 <--, 2.04 <--, 2.35 <--, 1.72 <--, 1.95 <--, 1.26 <--, 1.24 <--, 1.31 <--, 1.16 <--, 1.38 <--, 1.31 <--, 1.45 <--, 1.49 <--, 1.61 <--, 1.83 <--                        7.8    14.94 )-----------( 147      ( 25 Mar 2023 14:58 )             24.5     Urine Studies:  Urinalysis Basic - ( 23 Mar 2023 12:55 )    Color: Yellow / Appearance: Slightly Turbid / SG: >1.030 / pH:   Gluc:  / Ketone: Trace  / Bili: Negative / Urobili: <2 mg/dL   Blood:  / Protein: >300 / Nitrite: Negative   Leuk Esterase: Negative / RBC: 0-5 /HPF / WBC 6-10 /HPF   Sq Epi:  / Non Sq Epi: Occasional / Bacteria: Occasional          RADIOLOGY & ADDITIONAL STUDIES:

## 2023-03-25 NOTE — DOWNTIME INTERRUPTION NOTE - WHICH MANUAL FORMS INITIATED?
See paper chart for clinical documentation recorded during the downtime.
Due to the [employer] bundled upgrade / Downtime 03/24-03/25/2023 all the notes and flow sheets related to Respiratory Care Services provided to this patient related to mechanical ventilation, Noninvasive Ventilation (NIV) and High Flow Nasal Cannula (HFNC) during this [facility] downtime are filed in patient’s paper chart.

## 2023-03-25 NOTE — PROGRESS NOTE ADULT - SUBJECTIVE AND OBJECTIVE BOX
Neurology Progress Note    SUBJECTIVE/OBJECTIVE/INTERVAL EVENTS: Patient seen and examined at bedside w/ neuro attending and team.     INTERVAL HISTORY:    REVIEW OF SYSTEMS: refer to hpi    MEDICATIONS  (STANDING):  amLODIPine   Tablet 10 milliGRAM(s) Oral every 24 hours  atovaquone  Suspension 1500 milliGRAM(s) Oral daily  cefTRIAXone   IVPB 2000 milliGRAM(s) IV Intermittent every 12 hours  chlorhexidine 0.12% Liquid 15 milliLiter(s) Oral Mucosa two times a day  chlorhexidine 2% Cloths 1 Application(s) Topical <User Schedule>  ciprofloxacin  0.3% Ophthalmic Solution for Otic Use 4 Drop(s) Right Ear two times a day  dextrose 5%. 1000 milliLiter(s) (100 mL/Hr) IV Continuous <Continuous>  dextrose 5%. 1000 milliLiter(s) (50 mL/Hr) IV Continuous <Continuous>  dextrose 50% Injectable 25 Gram(s) IV Push once  dextrose 50% Injectable 12.5 Gram(s) IV Push once  dextrose 50% Injectable 25 Gram(s) IV Push once  glucagon  Injectable 1 milliGRAM(s) IntraMuscular once  heparin   Injectable 5000 Unit(s) SubCutaneous every 8 hours  insulin lispro (ADMELOG) corrective regimen sliding scale   SubCutaneous every 6 hours  lacosamide IVPB 200 milliGRAM(s) IV Intermittent every 12 hours  levETIRAcetam  IVPB 750 milliGRAM(s) IV Intermittent every 12 hours  levothyroxine Injectable 18 MICROGram(s) IV Push at bedtime  naloxegol 12.5 milliGRAM(s) Oral daily  petrolatum Ophthalmic Ointment 1 Application(s) Both EYES every 12 hours  polyethylene glycol 3350 17 Gram(s) Oral two times a day  predniSONE   Tablet 5 milliGRAM(s) Oral daily  senna Syrup 10 milliLiter(s) Oral two times a day    MEDICATIONS  (PRN):  acetaminophen   Oral Liquid .. 650 milliGRAM(s) Enteral Tube every 6 hours PRN Temp greater or equal to 38C (100.4F), Mild Pain (1 - 3), Moderate Pain (4 - 6)  dextrose Oral Gel 15 Gram(s) Oral once PRN Blood Glucose LESS THAN 70 milliGRAM(s)/deciliter      VITALS & EXAMINATION:  Vital Signs Last 24 Hrs  T(C): 37.6 (25 Mar 2023 12:00), Max: 37.6 (25 Mar 2023 12:00)  T(F): 99.6 (25 Mar 2023 12:00), Max: 99.6 (25 Mar 2023 12:00)  HR: 86 (25 Mar 2023 15:00) (75 - 87)  BP: 150/83 (25 Mar 2023 15:00) (127/75 - 184/65)  BP(mean): 99 (25 Mar 2023 15:00) (77 - 99)  RR: 21 (25 Mar 2023 15:00) (10 - 21)  SpO2: 100% (25 Mar 2023 15:00) (96% - 100%)    Parameters below as of 25 Mar 2023 11:00  Patient On (Oxygen Delivery Method): ventilator    O2 Concentration (%): 30    General:  Constitutional: Female, appears stated age   Head: Normocephalic; Eyes: clear sclera;   Extremities: No cyanosis; Skin: No melissa edema of LE  Resp: breathing comfortably     Neurological (>12):  MS: Awake, alert.  Follows commands. Attends to examiner  Language: Speech is hypophonic, clear, fluent, good repetition,  comprehension, registration of words.  CNs: PERRL (R 3mm, L 3mm). VFF. EOMI. V1-3 intact LT, No facial asymmetry b/l. Hearing grossly normal b/l. Tongue midline.     Motor - Normal bulk and tone throughout. No pronator drift.    L/R         Deltoid  5/5    Biceps   5/5      Triceps  5/5         Wrist Extension 5/5   Wrist Flexion  5/5      5/5   L/R         Hip Flexion  5/5    Hip Extension  5/5  Knee Extension  5/5  Dorsiflexion  5/5      Plantar Flexion 5/5     Sensation: Intact to LT b/l. Cortical: Extinction on DSS (neglect): none  Reflexes L/R:  Biceps(C5) 2/2  BR(C6) 2/2   Triceps(C7)  2/2 Patellar(L4)   2/2   Toes: mute  Coordination: No dysmetria to FTN b/l UE  Gait: Normal Romberg. No postural instability. Normal stance.    LABORATORY:  CBC                       7.8    14.94 )-----------( 147      ( 25 Mar 2023 14:58 )             24.5     Chem 03-25    135  |  99  |  49<H>  ----------------------------<  162<H>  4.3   |  25  |  2.39<H>    Ca    8.8      25 Mar 2023 14:58  Phos  4.3     03-25  Mg     2.30     03-25    TPro  5.0<L>  /  Alb  2.2<L>  /  TBili  0.4  /  DBili  x   /  AST  34<H>  /  ALT  34<H>  /  AlkPhos  121<H>  03-25    LFTs LIVER FUNCTIONS - ( 25 Mar 2023 14:58 )  Alb: 2.2 g/dL / Pro: 5.0 g/dL / ALK PHOS: 121 U/L / ALT: 34 U/L / AST: 34 U/L / GGT: x           Coagulopathy PT/INR - ( 25 Mar 2023 14:58 )   PT: 13.5 sec;   INR: 1.16 ratio       PTT - ( 25 Mar 2023 14:58 )  PTT:25.5 sec  Lipid Panel 03-15 Chol 153 LDL -- HDL 45<L> Trig 146  A1c   Cardiac enzymes     U/A   CSF  Immunological  Other    STUDIES & IMAGING: (EEG, CT, MR, U/S, TTE/SORAYA): Neurology Progress Note    SUBJECTIVE/OBJECTIVE/INTERVAL EVENTS: Patient seen and examined at bedside w/ neuro attending and team.     INTERVAL HISTORY: 78 year-old woman with a PMHx of PCKD s/p renal transplant, brain cyst, DM, HLD, HTN, hypothyroidism, glaucoma, cataracts, DVT in leg, on asa who presents to ED for severe headache, right ear pain, neck pain, markedly decreased responsiveness to external stimuli. LP significant for decreased glucose, elevated WBC with neutrophil predominance, elevated protein, PCR (+) for strep. pneumoniae. antibiotics were started. Course complicated by PEA arrest, ROSC achieved after about 10 mins. Patient is now intubated and sedated. EEG shows occasional left epileptiform discharges and rare right frontal epileptiform discharges indicating potential risk of seizures. Last electrographic seizure reported 3/20.    REVIEW OF SYSTEMS: refer to [facility]    MEDICATIONS  (STANDING):  amLODIPine   Tablet 10 milliGRAM(s) Oral every 24 hours  atovaquone  Suspension 1500 milliGRAM(s) Oral daily  cefTRIAXone   IVPB 2000 milliGRAM(s) IV Intermittent every 12 hours  chlorhexidine 0.12% Liquid 15 milliLiter(s) Oral Mucosa two times a day  chlorhexidine 2% Cloths 1 Application(s) Topical <User Schedule>  ciprofloxacin  0.3% Ophthalmic Solution for Otic Use 4 Drop(s) Right Ear two times a day  dextrose 5%. 1000 milliLiter(s) (100 mL/Hr) IV Continuous <Continuous>  dextrose 5%. 1000 milliLiter(s) (50 mL/Hr) IV Continuous <Continuous>  dextrose 50% Injectable 25 Gram(s) IV Push once  dextrose 50% Injectable 12.5 Gram(s) IV Push once  dextrose 50% Injectable 25 Gram(s) IV Push once  glucagon  Injectable 1 milliGRAM(s) IntraMuscular once  heparin   Injectable 5000 Unit(s) SubCutaneous every 8 hours  insulin lispro (ADMELOG) corrective regimen sliding scale   SubCutaneous every 6 hours  lacosamide IVPB 200 milliGRAM(s) IV Intermittent every 12 hours  levETIRAcetam  IVPB 750 milliGRAM(s) IV Intermittent every 12 hours  levothyroxine Injectable 18 MICROGram(s) IV Push at bedtime  naloxegol 12.5 milliGRAM(s) Oral daily  petrolatum Ophthalmic Ointment 1 Application(s) Both EYES every 12 hours  polyethylene glycol 3350 17 Gram(s) Oral two times a day  predniSONE   Tablet 5 milliGRAM(s) Oral daily  senna Syrup 10 milliLiter(s) Oral two times a day    MEDICATIONS  (PRN):  acetaminophen   Oral Liquid .. 650 milliGRAM(s) Enteral Tube every 6 hours PRN Temp greater or equal to 38C (100.4F), Mild Pain (1 - 3), Moderate Pain (4 - 6)  dextrose Oral Gel 15 Gram(s) Oral once PRN Blood Glucose LESS THAN 70 milliGRAM(s)/deciliter      VITALS & EXAMINATION:  Vital Signs Last 24 Hrs  T(C): 37.6 (25 Mar 2023 12:00), Max: 37.6 (25 Mar 2023 12:00)  T(F): 99.6 (25 Mar 2023 12:00), Max: 99.6 (25 Mar 2023 12:00)  HR: 86 (25 Mar 2023 15:00) (75 - 87)  BP: 150/83 (25 Mar 2023 15:00) (127/75 - 184/65)  BP(mean): 99 (25 Mar 2023 15:00) (77 - 99)  RR: 21 (25 Mar 2023 15:00) (10 - 21)  SpO2: 100% (25 Mar 2023 15:00) (96% - 100%)    Parameters below as of 25 Mar 2023 11:00  Patient On (Oxygen Delivery Method): ventilator    O2 Concentration (%): 30    Neurological Exam  - Mental Status: Eyes initially closed. No response to verbal or noxious stimuli.  - Language: Intubated. off sedation,   - Cranial Nerves: Pupils equal b/l and reactive, oculocephalic reflex not intact, corneal reflex intact b/l, grimace to gag, rest of CN exam is limited by mental status.   - Motor: No withdrawal to noxious stimulation.  - Sensory: No grimace to noxious stimulation.    - Coordination/Gait: Patient unable to participate due to mental status.      LABORATORY:  CBC                       7.8    14.94 )-----------( 147      ( 25 Mar 2023 14:58 )             24.5     Chem 03-25    135  |  99  |  49<H>  ----------------------------<  162<H>  4.3   |  25  |  2.39<H>    Ca    8.8      25 Mar 2023 14:58  Phos  4.3     03-25  Mg     2.30     03-25    TPro  5.0<L>  /  Alb  2.2<L>  /  TBili  0.4  /  DBili  x   /  AST  34<H>  /  ALT  34<H>  /  AlkPhos  121<H>  03-25    LFTs LIVER FUNCTIONS - ( 25 Mar 2023 14:58 )  Alb: 2.2 g/dL / Pro: 5.0 g/dL / ALK PHOS: 121 U/L / ALT: 34 U/L / AST: 34 U/L / GGT: x           Coagulopathy PT/INR - ( 25 Mar 2023 14:58 )   PT: 13.5 sec;   INR: 1.16 ratio       PTT - ( 25 Mar 2023 14:58 )  PTT:25.5 sec  Lipid Panel 03-15 Chol 153 LDL -- HDL 45<L> Trig 146  A1c   Cardiac enzymes     U/A   CSF  Immunological  Other    STUDIES & IMAGING: (EEG, CT, MR, U/S, TTE/SORAYA):

## 2023-03-25 NOTE — PROGRESS NOTE ADULT - ASSESSMENT
Assessment: 78 year-old woman with a PMHx of PCKD s/p renal transplant, brain cyst, DM, HLD, HTN, hypothyroidism, glaucoma, cataracts, DVT in leg, on asa who presents to ED for severe headache, right ear pain, neck pain, markedly decreased responsiveness to external stimuli. LP significant for decreased glucose, elevated WBC with neutrophil predominance, elevated protein, PCR (+) for strep. pneumoniae. antibiotics were started. Course complicated by PEA arrest, ROSC achieved after about 10 mins. Patient is now intubated and sedated. EEG shows occasional left epileptiform discharges and rare right frontal epileptiform discharges indicating potential risk of seizures. Last electrographic seizure reported 3/20.      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     Plan  [] MRI as per ENT when stable  [x] discontinued EEG  [x] S/p IV levetiracetam 500mg load x1 (3/17) and maintenance 250mg IV Q12 (3/17-3/19); then 750mg Q12H ( CRRT dosing 3/19-3/20); increased to 1000mg Q12H (3/20 - 3/21)  [/] Levetiracetam decreased back to 750mg Q12H (3/22 - )  [/] Continue lacosamide 200mg Q12H (3/20)  [/] Continue to wean midazolam  [/] Continue antimicrobials as per primary team/ID  [x] Follow up neuron specific enolase (sent 3/19) 15.9mg   [x] Follow up levetiracetam level, 51.8 mg  [x] UA (-), Bcx (+GPC in pairs - S. pneumo)  [x] SERUM: A1C 5.6, LDL 79, procal 45.63, CK 1074  [x] S/p LP 3/15: protein (>1181), gluc (<5), TNC (34,389, 88% neut), RBC (1267), AFB (-), CSF gram strain & culture (GPC in pairs), cryptococcal ag (-), EBV PCR (-), CSF PCR (+S. pneumoniae),       Case seen with Neurology Attending, Dr. Laura Schoenberg

## 2023-03-25 NOTE — PROGRESS NOTE ADULT - ASSESSMENT
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  Cardiac arrest on 3/16/23  septic shock s/p iv pressors  intubated on MV  Renal following for NAMAN on CKD Mx.    NAMAN on CKD4  Creatinine Trend: 2.39 <--, 2.04 <--, 2.35 <--, 1.72 <--, 1.95 <--, 1.26 <--, 1.24 <--, 1.31 <--, 1.16 <--, 1.38 <--, 1.31 <--, 1.45 <--, 1.49 <--, 1.61 <--, 1.83 <--, 2.10 <--, 2.42 <--, 2.90 <--, 3.86 <--, 3.83 <--, 3.62 <--, 3.31 <--  w/worsened renal function, oliguria, acidosis, mild hyperkalemia- s/p CVVHD, now on  intermittent HD  Consent for HD obtained, signed by daughter 3/16/23  KTx 2003  B/L creat around 2.8 since Dec 2022  Patient receives Cyclosporine (Gengraf) 75mg PO q12hrs,  BID and Prednisone 5 QD for transplant    plan:  In light of severe sepsis and cardiac arrest: holding MMF and Cyclosporine  c/w methylPREDNISolone sodium succinate Injectable 10 milliGRAM(s) IV Push every 6 hours  now off cvvh   s/p HD 3/24, Rx sheet reviewed, net UF 1kg removed, tolerated well. uneventful.   no indication for hd today  plan for next routine hd Monday  recommend removing femoral shiley and insert new IJ shiley for next HD  bp better now- will plan to inc uf w/next hd. on amLODIPine   Tablet 10 milliGRAM(s) every 24 hours  keep Ponce  monitor U/O  monitor BMP qdaily now  dose all meds for eGFR<15ml/min.   avoid ACEi/ARB/NSAIDs/Nephrotoxics if able.    Metabolic acidosis -resolved  OM and OE  Abxs per Infectious disease specialist with dose adjustment per GFR  f/u cxs  vent Mx, per ICU  off pressors now.     prognosis guarded  [facility] Nephrologist Dr. Hugo Hernandez [phone]  will closely follow up.   poc d/w ICU team, HD RN  labs, chart reviewed  For any question, pl call:  Nephrology  Cell -[phone]  Office [phone]  Ans Serv [phone]

## 2023-03-25 NOTE — PROGRESS NOTE ADULT - ASSESSMENT
79 y/o F with h/o HTN, HLD, DM2, PCKD previously on HD via LUE AVF then s/p renal transplant, LLE DVT (resolved, nml dopplers 12/2022) on ASA, very small supraclinoid aneurysm (reportedly unchanged on MRA 10/2020), hypothyroidism, PCP 2021 on atovaquone ppx, reportedly hospitalized in 12/2022 for rectal prolapse, had a blood transfusion at that time, also + CMV 1/25/23 who presented with R-sided HA, R ear pain and neck stiffness after visit to ENT earlier in the day, altered on presentation, CT head unremarkable for stroke, however LP with IR and BCx showing +strep pneumoniae. currently on vanc and CTX per ID. Cardiac arrest 3/16 with ROSC, transferred to MICU for further management.       Plan:   Neurological  #Currently Intubated, off sedation  - off versed over 48 hours  - EEG no seizures  - neuron specific enolase wnl    #Strep pneumo meningitis #Acute encephalopathy  severe HA, neck stiffness, fever, leukocytosis concerning for meningitis/encephalitis  CT head negative for CVA.   LP and BCx 3/15 both showing gram positive cocci in pairs, and + strep pneumoniae.   likely source of entry from R ear where pt c/o discomfort for several months.  patient is on cyclosporine, mycophenolate mofetil and prednisone due to kidney transplant and is immunocompromised   dental procedure 3/14 but less likely source   - f/u MRI of the IAC     #Seizures  - 24hr EEG initially with highly focal seizures, improved with increased sedation.   - keppra loaded 500mg IVP (3/17) and started on 250mg IV q12h (3/17 - 3/19) increased to 750 mg IV q12h (CRRT dosing (3/19 - 3/20), then increased to 1000mg IV q12 (3/20 - 3/22), then back to 750mg IV q12 (3/22 - [ ])  - vimpat 100 BID increased to 200 BID (3/20 - [ ])  -Off versed; off prop  -now seizure free    Cardiovascular  #Cardiac Arrest  bradycardia to 30, pulseless, code blue was called 3/16   2 rounds of epi, and PEA arrest. on 3rd pulse check pulseless VT, 200J shock given  4th pulse check asystole, 5th ROSC, sinus tachy with HUMA. IO placed and levo started. Due to blood in mouth took several attempts with DL for ETT to be placed but ultimately confirmed with capnometry and bilateral breath sounds.     #Elevated troponin  uptrending troponin to 182 3/15 AM, was likely iso ESRD  s/p cardiac arrest, elevated ST segment   - TTE from 3/17 showed EF of 63% with mild pulm htn, normal left ventricular systolic function, and diastolic LV dysfunction.      #HTN  on amlodipine 5mg, losartan 100mg, torsemide 20mg, and clonidine 0.2 patch weekly  -Back on amlodipine, can uptitrate if needed or resume other oral agents        Respiratory/ENT  #Intubated  - off sedation  - CPAP trial 8/5 on 3/25    # Tympanic membrane rupture  discharge from R ear, evaluated by ENT given dexamethasone and cipro 3/14  - c/w broad spectrum abx   - MRI of IAC   - f/u ENT recs    Gastrointestinal  #Constipation  - Abdominal xray negative for bowel obstruction  - trial on movantik  - c/w bowel regimen  - increase trickle feeds as tolerated and bowel regimen  - now having bowel movements    Renal  #ESRD #s/p Kidney Transplant in 2003 at [facility] #PCKD  previously HD through LLE AVF but no HD since transplant  Cr 3.8, unclear baseline  - avoid nephrotoxic agents  - holding cyclosporine and mycophenolate   - c/w home prednisone 5mg qday  - strict I/O and replete electrolytes  - s/p 4mg IV bumex with minimal urine production (3/17)  - started on CRRT (3/17-3/21) net negative 100cc/hr goal  - s/p intermittent HD (3/22 and 3/24)       Hematological  # h/o LLE DVT  resolved, no DVT on doppler 12/22.  - negative LLE duplex this admission 3/17  - restarted heparin subQ (3/21)      #Anemia  - s/p 1 unit pRBC on 3/16 for Hb drop from 10 to 7.9 and 1 unit on 3/21 for Hb 6.8 2/2 loss from CRRT clotting  - Evaluated by ENT for bleeding and found no signs of active bleeding from nasal cavity, nasopharynx or oral cavity, however, patient biting on tongue, which could be one source of bleeding. Tongue was edematous and lax      Infectious Disease  #Strep pneumoniae meningitis #R ear mastoiditis  treatment with IV CTX   - c/w ciprodex drops for R ear  - f/u ID recs   - repeat cx from 3/21 NGTD  - c/w CTX (3/16 - [ ])  - repeat blood and sputum cx on 3/23 2/2 new fever  - f/u UA  - cultures cleared, holding off on SORAYA      #h/o PCP  - atovaquone at home, continue      Endocrinological  #Hypothyroidism  last TSH 12/2022 >10  TSH 3/15 4.73  - c/w levothyroxine     #Diabetes mellitus Type II  - ISS      Ethics  FULL, GOC ongoing.

## 2023-03-25 NOTE — PROGRESS NOTE ADULT - ATTENDING COMMENTS
Patient is a 77 yo F w/ HTN, HLD, T2DM, PCKD previously on HD via LUE AVF then s/p renal transplant, LLE DVT (resolved) hypothyroidism, prior PCP PNA, CMV viremia who initially presented with R-sided HA, R ear pain and neck stiffness found to have Strep Pneumo bacteremia and meningitis in setting of otitis externa. Course c/b cardiac arrest 3/16 and post arrest seizures and NAMAN.    1) Sepsis 2/2 strep pneumoniae bacteremia/meningitis - Vasopressors weaned off. Continue ceftriaxone. TTE negative for vegetations. Blood cultures cleared since 3/17. Awaiting brain MRI.  2) PCKD s/p renal transplant c/b NAMAN - Now requiring intermittent HD. Last HD session 3/24. Continue prednisone for renal transplant, holding further immunosuppression due to strep bacteremia and meningitis. Monitor electrolytes. F/u renal recs.  3) Persistent seizures - Continue keppra and vimpat. EEG negative for further seizures, d/c vEEG. Continue to monitor off sedation. Pending brain MRI.  4) Acute hypoxic respiratory failure - Continue mechanical ventilation, wean support as tolerated. Daily SBT as tolerated. Mental status precluding extubation at this time.  5) HTN - Continue amlodipine. Well controlled.

## 2023-03-26 NOTE — PROGRESS NOTE ADULT - ATTENDING COMMENTS
Patient is a 77 yo F w/ HTN, HLD, T2DM, PCKD previously on HD via LUE AVF then s/p renal transplant, LLE DVT (resolved) hypothyroidism, prior PCP PNA, CMV viremia who initially presented with R-sided HA, R ear pain and neck stiffness found to have Strep Pneumo bacteremia and meningitis in setting of otitis externa. Course c/b cardiac arrest 3/16 and post arrest seizures and NAMAN.    1) Sepsis 2/2 strep pneumoniae bacteremia/meningitis - Vasopressors weaned off. Continue ceftriaxone. TTE negative for vegetations. Blood cultures cleared since 3/17. Awaiting brain MRI.  2) PCKD s/p renal transplant c/b NAMAN - Now requiring intermittent HD. Last HD session 3/24. Continue prednisone for renal transplant, holding further immunosuppression due to strep bacteremia and meningitis. Monitor electrolytes. F/u renal recs.  3) Persistent seizures - Continue keppra and vimpat. EEG negative for further seizures, vEEG d/c 3/25. Continue to monitor off sedation. Pending brain MRI.  4) Acute hypoxic respiratory failure - Continue mechanical ventilation, wean support as tolerated. Daily SBT as tolerated. Mental status precluding extubation at this time.  5) HTN - Continue amlodipine. Well controlled. Patient is a 77 yo F w/ HTN, HLD, T2DM, PCKD previously on HD via LUE AVF then s/p renal transplant, LLE DVT (resolved) hypothyroidism, prior PCP PNA, CMV viremia who initially presented with R-sided HA, R ear pain and neck stiffness found to have Strep Pneumo bacteremia and meningitis in setting of otitis externa. Course c/b cardiac arrest 3/16 and post arrest seizures and NAMAN.    1) Sepsis 2/2 strep pneumoniae bacteremia/meningitis - Vasopressors weaned off. Continue ceftriaxone. TTE negative for vegetations. Blood cultures cleared since 3/17. Awaiting brain MRI.  2) PCKD s/p renal transplant c/b NAMAN - Now requiring intermittent HD. Last HD session 3/24. Continue prednisone for renal transplant, holding further immunosuppression due to strep bacteremia and meningitis. Monitor electrolytes. F/u renal recs.  3) Persistent seizures - Continue keppra and vimpat. EEG negative for further seizures, vEEG d/c 3/25. Continue to monitor off sedation. Pending brain MRI.  4) Acute hypoxic respiratory failure - Continue mechanical ventilation, wean support as tolerated. Daily SBT as tolerated. Mental status precluding extubation at this time.  5) HTN - Continue amlodipine. Well controlled.  6) AMS - Check TSH, ammonia. EEG negative for seizures. Pending brain MRI.

## 2023-03-26 NOTE — PROGRESS NOTE ADULT - ASSESSMENT
77 y/o F with h/o HTN, HLD, DM2, PCKD previously on HD via LUE AVF then s/p renal transplant, LLE DVT (resolved, nml dopplers 12/2022) on ASA, very small supraclinoid aneurysm (reportedly unchanged on MRA 10/2020), hypothyroidism, PCP 2021 on atovaquone ppx, reportedly hospitalized in 12/2022 for rectal prolapse, had a blood transfusion at that time, also + CMV 1/25/23 who presented with R-sided HA, R ear pain and neck stiffness after visit to ENT earlier in the day, altered on presentation, CT head unremarkable for stroke, however LP with IR and BCx showing +strep pneumoniae. currently on vanc and CTX per ID. Cardiac arrest 3/16 with ROSC, transferred to MICU for further management.       Plan:   Neurological  #Currently Intubated, off sedation  - off versed over 48 hours  - EEG no seizures  - neuron specific enolase wnl    #Strep pneumo meningitis #Acute encephalopathy  severe HA, neck stiffness, fever, leukocytosis concerning for meningitis/encephalitis  CT head negative for CVA.   LP and BCx 3/15 both showing gram positive cocci in pairs, and + strep pneumoniae.   likely source of entry from R ear where pt c/o discomfort for several months.  patient is on cyclosporine, mycophenolate mofetil and prednisone due to kidney transplant and is immunocompromised   dental procedure 3/14 but less likely source   - f/u MRI of the IAC     #Seizures  - 24hr EEG initially with highly focal seizures, improved with increased sedation.   - keppra loaded 500mg IVP (3/17) and started on 250mg IV q12h (3/17 - 3/19) increased to 750 mg IV q12h (CRRT dosing (3/19 - 3/20), then increased to 1000mg IV q12 (3/20 - 3/22), then back to 750mg IV q12 (3/22 - [ ])  - vimpat 100 BID increased to 200 BID (3/20 - [ ])  -Off versed; off prop  -now seizure free    Cardiovascular  #Cardiac Arrest  bradycardia to 30, pulseless, code blue was called 3/16   2 rounds of epi, and PEA arrest. on 3rd pulse check pulseless VT, 200J shock given  4th pulse check asystole, 5th ROSC, sinus tachy with HUMA. IO placed and levo started. Due to blood in mouth took several attempts with DL for ETT to be placed but ultimately confirmed with capnometry and bilateral breath sounds.     #Elevated troponin  uptrending troponin to 182 3/15 AM, was likely iso ESRD  s/p cardiac arrest, elevated ST segment   - TTE from 3/17 showed EF of 63% with mild pulm htn, normal left ventricular systolic function, and diastolic LV dysfunction.      #HTN  on amlodipine 5mg, losartan 100mg, torsemide 20mg, and clonidine 0.2 patch weekly  -Back on amlodipine, can uptitrate if needed or resume other oral agents        Respiratory/ENT  #Intubated  - off sedation  - CPAP trial 8/5 on 3/25    # Tympanic membrane rupture  discharge from R ear, evaluated by ENT given dexamethasone and cipro 3/14  - c/w broad spectrum abx   - MRI of IAC   - f/u ENT recs    Gastrointestinal  #Constipation  - Abdominal xray negative for bowel obstruction  - trial on movantik  - c/w bowel regimen  - increase trickle feeds as tolerated and bowel regimen  - now having bowel movements    Renal  #ESRD #s/p Kidney Transplant in 2003 at [place] #PCKD  previously HD through LLE AVF but no HD since transplant  Cr 3.8, unclear baseline  - avoid nephrotoxic agents  - holding cyclosporine and mycophenolate   - c/w home prednisone 5mg qday  - strict I/O and replete electrolytes  - s/p 4mg IV bumex with minimal urine production (3/17)  - started on CRRT (3/17-3/21) net negative 100cc/hr goal  - s/p intermittent HD (3/22 and 3/24)       Hematological  # h/o LLE DVT  resolved, no DVT on doppler 12/22.  - negative LLE duplex this admission 3/17  - restarted heparin subQ (3/21)      #Anemia  - s/p 1 unit pRBC on 3/16 for Hb drop from 10 to 7.9 and 1 unit on 3/21 for Hb 6.8 2/2 loss from CRRT clotting  - Evaluated by ENT for bleeding and found no signs of active bleeding from nasal cavity, nasopharynx or oral cavity, however, patient biting on tongue, which could be one source of bleeding. Tongue was edematous and lax      Infectious Disease  #Strep pneumoniae meningitis #R ear mastoiditis  treatment with IV CTX   - c/w ciprodex drops for R ear  - f/u ID recs   - repeat cx from 3/21 NGTD  - c/w CTX (3/16 - [ ])  - repeat blood and sputum cx on 3/23 2/2 new fever  - f/u UA  - cultures cleared, holding off on SORAYA      #h/o PCP  - atovaquone at home, continue      Endocrinological  #Hypothyroidism  last TSH 12/2022 >10  TSH 3/15 4.73  - c/w levothyroxine     #Diabetes mellitus Type II  - ISS      Ethics  FULL, GOC ongoing. 77 y/o F with h/o HTN, HLD, DM2, PCKD previously on HD via LUE AVF then s/p renal transplant, LLE DVT (resolved, nml dopplers 12/2022) on ASA, very small supraclinoid aneurysm (reportedly unchanged on MRA 10/2020), hypothyroidism, PCP 2021 on atovaquone ppx, reportedly hospitalized in 12/2022 for rectal prolapse, had a blood transfusion at that time, also + CMV 1/25/23 who presented with R-sided HA, R ear pain and neck stiffness after visit to ENT earlier in the day, altered on presentation, CT head unremarkable for stroke, however LP with IR and BCx showing +strep pneumoniae. currently on vanc and CTX per ID. Cardiac arrest 3/16 with ROSC, transferred to MICU for further management.       Plan:   Neurological  #Currently Intubated, off sedation  - off versed over 48 hours  - EEG no seizures  - neuron specific enolase wnl  - f/u repeat ammonia level and TSH     #Strep pneumo meningitis #Acute encephalopathy  severe HA, neck stiffness, fever, leukocytosis concerning for meningitis/encephalitis  CT head negative for CVA.   LP and BCx 3/15 both showing gram positive cocci in pairs, and + strep pneumoniae.   likely source of entry from R ear where pt c/o discomfort for several months.  patient is on cyclosporine, mycophenolate mofetil and prednisone due to kidney transplant and is immunocompromised   dental procedure 3/14 but less likely source   - f/u MRI of the IAC     #Seizures  - 24hr EEG initially with highly focal seizures, improved with increased sedation.   - keppra loaded 500mg IVP (3/17) and started on 250mg IV q12h (3/17 - 3/19) increased to 750 mg IV q12h (CRRT dosing (3/19 - 3/20), then increased to 1000mg IV q12 (3/20 - 3/22), then back to 750mg IV q12 (3/22 - [ ])  - vimpat 100 BID increased to 200 BID (3/20 - [ ])  -Off versed; off prop  -now seizure free    Cardiovascular  #Cardiac Arrest  bradycardia to 30, pulseless, code blue was called 3/16   2 rounds of epi, and PEA arrest. on 3rd pulse check pulseless VT, 200J shock given  4th pulse check asystole, 5th ROSC, sinus tachy with HUMA. IO placed and levo started. Due to blood in mouth took several attempts with DL for ETT to be placed but ultimately confirmed with capnometry and bilateral breath sounds.     #Elevated troponin  uptrending troponin to 182 3/15 AM, was likely iso ESRD  s/p cardiac arrest, elevated ST segment   - TTE from 3/17 showed EF of 63% with mild pulm htn, normal left ventricular systolic function, and diastolic LV dysfunction.      #HTN  on amlodipine 5mg, losartan 100mg, torsemide 20mg, and clonidine 0.2 patch weekly  -Back on amlodipine, can uptitrate if needed or resume other oral agents        Respiratory/ENT  #Intubated  - off sedation  - CPAP trial 8/5 on 3/25    # Tympanic membrane rupture  discharge from R ear, evaluated by ENT given dexamethasone and cipro 3/14  - c/w broad spectrum abx   - MRI of IAC   - f/u ENT recs    Gastrointestinal  #Constipation  - Abdominal xray negative for bowel obstruction  - trial on movantik  - c/w bowel regimen  - increase trickle feeds as tolerated and bowel regimen  - now having bowel movements    Renal  #ESRD #s/p Kidney Transplant in 2003 at [place] #PCKD  previously HD through LLE AVF but no HD since transplant  Cr 3.8, unclear baseline  - avoid nephrotoxic agents  - holding cyclosporine and mycophenolate   - c/w home prednisone 5mg qday  - strict I/O and replete electrolytes  - s/p 4mg IV bumex with minimal urine production (3/17)  - started on CRRT (3/17-3/21) net negative 100cc/hr goal  - s/p intermittent HD (3/22 and 3/24)       Hematological  # h/o LLE DVT  resolved, no DVT on doppler 12/22.  - negative LLE duplex this admission 3/17  - restarted heparin subQ (3/21)      #Anemia  - s/p 1 unit pRBC on 3/16 for Hb drop from 10 to 7.9 and 1 unit on 3/21 for Hb 6.8 2/2 loss from CRRT clotting  - Evaluated by ENT for bleeding and found no signs of active bleeding from nasal cavity, nasopharynx or oral cavity, however, patient biting on tongue, which could be one source of bleeding. Tongue was edematous and lax      Infectious Disease  #Strep pneumoniae meningitis #R ear mastoiditis  treatment with IV CTX   - c/w ciprodex drops for R ear  - f/u ID recs   - repeat cx from 3/21 NGTD  - c/w CTX (3/16 - [ ])  - repeat blood and sputum cx on 3/23 2/2 new fever  - f/u UA  - cultures cleared, holding off on SORAYA      #h/o PCP  - atovaquone at home, continue      Endocrinological  #Hypothyroidism  last TSH 12/2022 >10  TSH 3/15 4.73  - c/w levothyroxine     #Diabetes mellitus Type II  - ISS      Ethics  FULL, GOC ongoing.

## 2023-03-26 NOTE — PROGRESS NOTE ADULT - SUBJECTIVE AND OBJECTIVE BOX
[facility] - S Alea / Tita S /D Rama/ S Jovan/ S Keenan/ Cleve Gambino / DAGMAR Wilsonu/ O Daya  service -[phone], office [phone]  ---------------------------------------------------------------------------------------------------------------    Patient seen and examined bedside earlier today in MICU    Subjective and Objective: No overnight events, remains on MV, unresponsive    Allergies: apple (Unknown)  Benadryl (Unknown)  penicillin (Hives)  watermelon (Unknown)      Hospital Medications:   MEDICATIONS  (STANDING):  amLODIPine   Tablet 10 milliGRAM(s) Oral every 24 hours  atovaquone  Suspension 1500 milliGRAM(s) Oral daily  cefTRIAXone   IVPB 2000 milliGRAM(s) IV Intermittent every 12 hours  chlorhexidine 0.12% Liquid 15 milliLiter(s) Oral Mucosa two times a day  chlorhexidine 2% Cloths 1 Application(s) Topical <User Schedule>  ciprofloxacin  0.3% Ophthalmic Solution for Otic Use 4 Drop(s) Right Ear two times a day  dextrose 5%. 1000 milliLiter(s) (100 mL/Hr) IV Continuous <Continuous>  dextrose 5%. 1000 milliLiter(s) (50 mL/Hr) IV Continuous <Continuous>  dextrose 50% Injectable 25 Gram(s) IV Push once  dextrose 50% Injectable 12.5 Gram(s) IV Push once  dextrose 50% Injectable 25 Gram(s) IV Push once  glucagon  Injectable 1 milliGRAM(s) IntraMuscular once  heparin   Injectable 5000 Unit(s) SubCutaneous every 8 hours  insulin lispro (ADMELOG) corrective regimen sliding scale   SubCutaneous every 6 hours  lacosamide IVPB 200 milliGRAM(s) IV Intermittent every 12 hours  levETIRAcetam  IVPB 750 milliGRAM(s) IV Intermittent every 12 hours  levothyroxine Injectable 18 MICROGram(s) IV Push at bedtime  naloxegol 12.5 milliGRAM(s) Oral daily  petrolatum Ophthalmic Ointment 1 Application(s) Both EYES every 12 hours  polyethylene glycol 3350 17 Gram(s) Oral two times a day  predniSONE   Tablet 5 milliGRAM(s) Oral daily  senna Syrup 10 milliLiter(s) Oral two times a day    VITALS:  T(F): 101 (03-26-23 @ 22:00), Max: 101 (03-26-23 @ 04:00)  HR: 89 (03-26-23 @ 22:00)  BP: 143/67 (03-26-23 @ 22:00)  RR: 17 (03-26-23 @ 22:00)  SpO2: 100% (03-26-23 @ 22:00)  Wt(kg): --    03-25 @ 07:01  -  03-26 @ 07:00  --------------------------------------------------------  IN: 2010 mL / OUT: 1140 mL / NET: 870 mL    03-26 @ 07:01  -  03-26 @ 22:48  --------------------------------------------------------  IN: 570 mL / OUT: 215 mL / NET: 355 mL      PHYSICAL EXAM:  Constitutional: NAD  HEENT: ETT+, OGT+   Neck: No JVD  Respiratory: CTAB, no wheezes, rales or rhonchi  Cardiovascular: S1, S2, RRR  Gastrointestinal: BS+  Extremities: No peripheral edema  Neurological: unresponsive  - +snider  Vascular Access: left femoral shiley+     LABS:  03-26    134<L>  |  96<L>  |  55<H>  ----------------------------<  157<H>  4.1   |  23  |  2.60<H>    Ca    8.8      26 Mar 2023 03:08  Phos  4.3     03-26  Mg     2.30     03-26    TPro  5.3<L>  /  Alb  2.4<L>  /  TBili  0.4  /  DBili      /  AST  34<H>  /  ALT  34<H>  /  AlkPhos  123<H>  03-26    Creatinine Trend: 2.60 <--, 2.39 <--, 2.04 <--, 2.35 <--, 1.72 <--, 1.95 <--, 1.26 <--, 1.24 <--, 1.31 <--, 1.16 <--, 1.38 <--, 1.31 <--                        8.4    13.88 )-----------( 159      ( 26 Mar 2023 03:08 )             26.5     Urine Studies:  Urinalysis Basic - ( 23 Mar 2023 12:55 )    Color: Yellow / Appearance: Slightly Turbid / SG: >1.030 / pH:   Gluc:  / Ketone: Trace  / Bili: Negative / Urobili: <2 mg/dL   Blood:  / Protein: >300 / Nitrite: Negative   Leuk Esterase: Negative / RBC: 0-5 /HPF / WBC 6-10 /HPF   Sq Epi:  / Non Sq Epi: Occasional / Bacteria: Occasional          RADIOLOGY & ADDITIONAL STUDIES:

## 2023-03-26 NOTE — PROGRESS NOTE ADULT - SUBJECTIVE AND OBJECTIVE BOX
INTERVAL HISTORY: 78 year-old woman with a PMHx of PCKD s/p renal transplant, brain cyst, DM, HLD, HTN, hypothyroidism, glaucoma, cataracts, DVT in leg, on asa who presents to ED for severe headache, right ear pain, neck pain, markedly decreased responsiveness to external stimuli. LP significant for decreased glucose, elevated WBC with neutrophil predominance, elevated protein, PCR (+) for strep. pneumoniae. antibiotics were started. Course complicated by PEA arrest, ROSC achieved after about 10 mins. Patient is now intubated and sedated. EEG shows occasional left epileptiform discharges and rare right frontal epileptiform discharges indicating potential risk of seizures. Last electrographic seizure reported 3/20.    REVIEW OF SYSTEMS: refer to [facility]    MEDICATIONS  (STANDING):  amLODIPine   Tablet 10 milliGRAM(s) Oral every 24 hours  atovaquone  Suspension 1500 milliGRAM(s) Oral daily  cefTRIAXone   IVPB 2000 milliGRAM(s) IV Intermittent every 12 hours  chlorhexidine 0.12% Liquid 15 milliLiter(s) Oral Mucosa two times a day  chlorhexidine 2% Cloths 1 Application(s) Topical <User Schedule>  ciprofloxacin  0.3% Ophthalmic Solution for Otic Use 4 Drop(s) Right Ear two times a day  dextrose 5%. 1000 milliLiter(s) (100 mL/Hr) IV Continuous <Continuous>  dextrose 5%. 1000 milliLiter(s) (50 mL/Hr) IV Continuous <Continuous>  dextrose 50% Injectable 25 Gram(s) IV Push once  dextrose 50% Injectable 12.5 Gram(s) IV Push once  dextrose 50% Injectable 25 Gram(s) IV Push once  glucagon  Injectable 1 milliGRAM(s) IntraMuscular once  heparin   Injectable 5000 Unit(s) SubCutaneous every 8 hours  insulin lispro (ADMELOG) corrective regimen sliding scale   SubCutaneous every 6 hours  lacosamide IVPB 200 milliGRAM(s) IV Intermittent every 12 hours  levETIRAcetam  IVPB 750 milliGRAM(s) IV Intermittent every 12 hours  levothyroxine Injectable 18 MICROGram(s) IV Push at bedtime  naloxegol 12.5 milliGRAM(s) Oral daily  petrolatum Ophthalmic Ointment 1 Application(s) Both EYES every 12 hours  polyethylene glycol 3350 17 Gram(s) Oral two times a day  predniSONE   Tablet 5 milliGRAM(s) Oral daily  senna Syrup 10 milliLiter(s) Oral two times a day    MEDICATIONS  (PRN):  acetaminophen   Oral Liquid .. 650 milliGRAM(s) Enteral Tube every 6 hours PRN Temp greater or equal to 38C (100.4F), Mild Pain (1 - 3), Moderate Pain (4 - 6)  dextrose Oral Gel 15 Gram(s) Oral once PRN Blood Glucose LESS THAN 70 milliGRAM(s)/deciliter      VITALS & EXAMINATION:  Vital Signs Last 24 Hrs  T(C): 37.6 (25 Mar 2023 12:00), Max: 37.6 (25 Mar 2023 12:00)  T(F): 99.6 (25 Mar 2023 12:00), Max: 99.6 (25 Mar 2023 12:00)  HR: 86 (25 Mar 2023 15:00) (75 - 87)  BP: 150/83 (25 Mar 2023 15:00) (127/75 - 184/65)  BP(mean): 99 (25 Mar 2023 15:00) (77 - 99)  RR: 21 (25 Mar 2023 15:00) (10 - 21)  SpO2: 100% (25 Mar 2023 15:00) (96% - 100%)    Parameters below as of 25 Mar 2023 11:00  Patient On (Oxygen Delivery Method): ventilator    O2 Concentration (%): 30    Neurological Exam  - Mental Status: Eyes initially closed. No response to verbal or noxious stimuli.  - Language: Intubated. off sedation,   - Cranial Nerves: Pupils equal b/l and reactive, oculocephalic reflex not intact, corneal reflex intact b/l, grimace to gag, rest of CN exam is limited by mental status.   - Motor: No withdrawal to noxious stimulation.  - Sensory: No grimace to noxious stimulation.    - Coordination/Gait: Patient unable to participate due to mental status.      LABORATORY:  CBC                       7.8    14.94 )-----------( 147      ( 25 Mar 2023 14:58 )             24.5     Chem 03-25    135  |  99  |  49<H>  ----------------------------<  162<H>  4.3   |  25  |  2.39<H>    Ca    8.8      25 Mar 2023 14:58  Phos  4.3     03-25  Mg     2.30     03-25    TPro  5.0<L>  /  Alb  2.2<L>  /  TBili  0.4  /  DBili  x   /  AST  34<H>  /  ALT  34<H>  /  AlkPhos  121<H>  03-25    LFTs LIVER FUNCTIONS - ( 25 Mar 2023 14:58 )  Alb: 2.2 g/dL / Pro: 5.0 g/dL / ALK PHOS: 121 U/L / ALT: 34 U/L / AST: 34 U/L / GGT: x           Coagulopathy PT/INR - ( 25 Mar 2023 14:58 )   PT: 13.5 sec;   INR: 1.16 ratio       PTT - ( 25 Mar 2023 14:58 )  PTT:25.5 sec  Lipid Panel 03-15 Chol 153 LDL -- HDL 45<L> Trig 146  A1c   Cardiac enzymes     U/A   CSF  Immunological  Other    STUDIES & IMAGING: (EEG, CT, MR, U/S, TTE/SORAYA):      Assessment and Plan:   · Assessment	  Assessment: 78 year-old woman with a PMHx of PCKD s/p renal transplant, brain cyst, DM, HLD, HTN, hypothyroidism, glaucoma, cataracts, DVT in leg, on asa who presents to ED for severe headache, right ear pain, neck pain, markedly decreased responsiveness to external stimuli. LP significant for decreased glucose, elevated WBC with neutrophil predominance, elevated protein, PCR (+) for strep. pneumoniae. antibiotics were started. Course complicated by PEA arrest, ROSC achieved after about 10 mins. Patient is now intubated and sedated. EEG shows occasional left epileptiform discharges and rare right frontal epileptiform discharges indicating potential risk of seizures. Last electrographic seizure reported 3/20.      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     Plan  [] MRI as per ENT when stable  [x] discontinued EEG  [x] S/p IV levetiracetam 500mg load x1 (3/17) and maintenance 250mg IV Q12 (3/17-3/19); then 750mg Q12H ( CRRT dosing 3/19-3/20); increased to 1000mg Q12H (3/20 - 3/21)  [/] Levetiracetam decreased back to 750mg Q12H (3/22 - )  [/] Continue lacosamide 200mg Q12H (3/20)  [[/] Continue antimicrobials as per primary team/ID  [x] Follow up neuron specific enolase (sent 3/19) 15.9mg   [x] Follow up levetiracetam level, 51.8 mg  [x] UA (-), Bcx (+GPC in pairs - S. pneumo)  [x] SERUM: A1C 5.6, LDL 79, procal 45.63, CK 1074  [x] S/p LP 3/15: protein (>1181), gluc (<5), TNC (34,389, 88% neut), RBC (1267), AFB (-), CSF gram strain & culture (GPC in pairs), cryptococcal ag (-), EBV PCR (-), CSF PCR (+S. pneumoniae),                 Electronic Signatures:  Schoenberg, Laura Gray (MD)

## 2023-03-26 NOTE — PROGRESS NOTE ADULT - ASSESSMENT
78-year-old female with PCKD previously on HD via LUE AVF now s/p renal transplant since 2003 at [place], LLE DVT (resolved, nml dopplers 12/2022) on ASA, HTN, HLD, DM2, very small supraclinoid aneurysm on R and very small aneurysm vs infundibulum L supraclinoid artery (reportedly unchanged on MRA 10/2020), glaucoma/cataract, hypothyroid, history of PCP 2021 on atovaquone ppx, reportedly hospitalized in 12/2022 for rectal prolapse, CMV (unclear active or prior infxn), presenting with R-sided HA, R ear pain and neck pain after recent visit to ENT earlier in the day.  Cardiac arrest on 3/16/23  septic shock s/p iv pressors  intubated on MV  Renal following for NAMAN on CKD Mx.    NAMAN on CKD4  Creatinine Trend: 2.60 <--, 2.39 <--, 2.04 <--, 2.35 <--, 1.72 <--, 1.95 <--, 1.26 <--, 1.24 <--, 1.31 <--, 1.16 <--, 1.38 <--, 1.31 <--  w/worsened renal function, oliguria, acidosis, mild hyperkalemia- s/p CVVHD, now on  intermittent HD  Consent for HD obtained, signed by daughter 3/16/23  KTx 2003  B/L creat around 2.8 since Dec 2022  Patient receives Cyclosporine (Gengraf) 75mg PO q12hrs,  BID and Prednisone 5 QD for transplant  oliguria- u/o 445ml 3/25 24hrs; 215 ml today fr 12hrs  Cr rising since last hd    plan:  In light of severe sepsis and cardiac arrest: holding MMF and Cyclosporine  c/w methylPREDNISolone sodium succinate Injectable 10 milliGRAM(s) IV Push every 6 hours  now off cvvh   s/p last HD 3/24, Rx sheet reviewed, net UF 1kg removed, tolerated well. uneventful.   no indication for hd today  plan for next routine hd Monday  unsuccessful IJ shiley insertion by ICU team today. keep femoral shiley for HD access for now.  HTN- bp better now, controlled- plan to inc uf w/next hd. on amLODIPine   Tablet 10 milliGRAM(s) every 24 hours  keep Ponce  monitor U/O  monitor BMP qdaily now  dose all meds for eGFR<15ml/min.   avoid ACEi/ARB/NSAIDs/Nephrotoxics if able.    Metabolic acidosis -resolved  OM and OE  Abxs per Infectious disease specialist with dose adjustment per GFR  f/u cxs  vent Mx, per ICU  off pressors now.     prognosis guarded  [facility] Nephrologist Dr. Hugo Hernandez [phone]  will closely follow up.   poc d/w ICU team  labs, chart reviewed  For any question, pl call:  Nephrology  Cell [phone]  Office [phone]  Ans Serv [phone]

## 2023-03-26 NOTE — PROGRESS NOTE ADULT - SUBJECTIVE AND OBJECTIVE BOX
Fauzia Singleton  PGY-1 Resident Physician   Pager [phone]/ [phone]    Patient is a 78y old  Female who presents with a chief complaint of AMS/R ear infxn (24 Mar 2023 18:10)      SUBJECTIVE / OVERNIGHT EVENTS:  Patient seen and evaluated at bedside.  Overnight with temp 101 and cultures sent.   Patient without spontaneous movements, not responsive to commands.     Vital Signs Last 24 Hrs  T(C): 37.6 (25 Mar 2023 12:00), Max: 37.6 (25 Mar 2023 12:00)  T(F): 99.6 (25 Mar 2023 12:00), Max: 99.6 (25 Mar 2023 12:00)  HR: 85 (25 Mar 2023 12:00) (75 - 86)  BP: 153/79 (25 Mar 2023 12:00) (134/61 - 184/65)  BP(mean): 96 (25 Mar 2023 12:00) (77 - 100)  RR: 17 (25 Mar 2023 12:00) (10 - 20)  SpO2: 100% (25 Mar 2023 12:00) (100% - 100%)    Parameters below as of 25 Mar 2023 11:00  Patient On (Oxygen Delivery Method): ventilator    O2 Concentration (%): 30    PHYSICAL EXAM:  GENERAL: intubated, no spontaneous movements, pupils sluggish to light  CHEST/LUNG: Clear to auscultation bilaterally; No wheeze  HEART: Regular rate and rhythm; Normal S1 S2, No murmurs, rubs, or gallops  ABDOMEN: Soft, large   EXTREMITIES:  2+ edema in the thighs . R shin with some clear yellow drainage ?prior IO  PSYCH: AAOx0     LABS:                        7.8    16.03 )-----------( 124      ( 24 Mar 2023 20:54 )             24.5     Hgb Trend: 7.8<--, 6.6<--, 7.1<--, 7.2<--, 7.9<--  03-24    136  |  98  |  38<H>  ----------------------------<  136<H>  4.2   |  27  |  2.04<H>    Ca    8.7      24 Mar 2023 20:54  Phos  3.6     03-24  Mg     2.20     03-24    TPro  4.9<L>  /  Alb  2.3<L>  /  TBili  0.6  /  DBili  x   /  AST  39<H>  /  ALT  36<H>  /  AlkPhos  123<H>  03-24    Creatinine Trend: 2.04<--, 2.35<--, 1.72<--, 1.95<--, 1.26<--, 1.24<--  LIVER FUNCTIONS - ( 24 Mar 2023 20:54 )  Alb: 2.3 g/dL / Pro: 4.9 g/dL / ALK PHOS: 123 U/L / ALT: 36 U/L / AST: 39 U/L / GGT: x           PT/INR - ( 24 Mar 2023 20:54 )   PT: 13.7 sec;   INR: 1.18 ratio         PTT - ( 24 Mar 2023 20:54 )  PTT:22.3 sec       PROGRESS NOTE:   Authored by Sis Vasquez DO , PGY1     Patient is a 78y old  Female who presents with a chief complaint of AMS/R ear infxn (26 Mar 2023 10:08)      SUBJECTIVE / OVERNIGHT EVENTS:  No acute events overnight   All other review of systems is negative unless indicated above.    MEDICATIONS  (STANDING):  amLODIPine   Tablet 10 milliGRAM(s) Oral every 24 hours  atovaquone  Suspension 1500 milliGRAM(s) Oral daily  cefTRIAXone   IVPB 2000 milliGRAM(s) IV Intermittent every 12 hours  chlorhexidine 0.12% Liquid 15 milliLiter(s) Oral Mucosa two times a day  chlorhexidine 2% Cloths 1 Application(s) Topical <User Schedule>  ciprofloxacin  0.3% Ophthalmic Solution for Otic Use 4 Drop(s) Right Ear two times a day  dextrose 5%. 1000 milliLiter(s) (100 mL/Hr) IV Continuous <Continuous>  dextrose 5%. 1000 milliLiter(s) (50 mL/Hr) IV Continuous <Continuous>  dextrose 50% Injectable 25 Gram(s) IV Push once  dextrose 50% Injectable 12.5 Gram(s) IV Push once  dextrose 50% Injectable 25 Gram(s) IV Push once  glucagon  Injectable 1 milliGRAM(s) IntraMuscular once  heparin   Injectable 5000 Unit(s) SubCutaneous every 8 hours  insulin lispro (ADMELOG) corrective regimen sliding scale   SubCutaneous every 6 hours  lacosamide IVPB 200 milliGRAM(s) IV Intermittent every 12 hours  levETIRAcetam  IVPB 750 milliGRAM(s) IV Intermittent every 12 hours  levothyroxine Injectable 18 MICROGram(s) IV Push at bedtime  naloxegol 12.5 milliGRAM(s) Oral daily  petrolatum Ophthalmic Ointment 1 Application(s) Both EYES every 12 hours  polyethylene glycol 3350 17 Gram(s) Oral two times a day  predniSONE   Tablet 5 milliGRAM(s) Oral daily  senna Syrup 10 milliLiter(s) Oral two times a day    MEDICATIONS  (PRN):  acetaminophen   Oral Liquid .. 650 milliGRAM(s) Enteral Tube every 6 hours PRN Temp greater or equal to 38C (100.4F), Mild Pain (1 - 3), Moderate Pain (4 - 6)  dextrose Oral Gel 15 Gram(s) Oral once PRN Blood Glucose LESS THAN 70 milliGRAM(s)/deciliter      CAPILLARY BLOOD GLUCOSE      POCT Blood Glucose.: 206 mg/dL (26 Mar 2023 11:42)  POCT Blood Glucose.: 174 mg/dL (26 Mar 2023 06:32)  POCT Blood Glucose.: 215 mg/dL (25 Mar 2023 23:31)  POCT Blood Glucose.: 123 mg/dL (25 Mar 2023 17:22)    I&O's Summary    25 Mar 2023 07:01  -  26 Mar 2023 07:00  --------------------------------------------------------  IN: 2010 mL / OUT: 1140 mL / NET: 870 mL    26 Mar 2023 07:01  -  26 Mar 2023 13:08  --------------------------------------------------------  IN: 260 mL / OUT: 100 mL / NET: 160 mL        PHYSICAL EXAM:  Vital Signs Last 24 Hrs  T(C): 37.2 (26 Mar 2023 08:00), Max: 38.3 (25 Mar 2023 20:00)  T(F): 99 (26 Mar 2023 08:00), Max: 101 (26 Mar 2023 04:00)  HR: 68 (26 Mar 2023 11:15) (68 - 92)  BP: 104/59 (26 Mar 2023 11:00) (97/53 - 170/76)  BP(mean): 68 (26 Mar 2023 11:00) (63 - 101)  RR: 17 (26 Mar 2023 11:00) (16 - 25)  SpO2: 99% (26 Mar 2023 11:15) (98% - 100%)    Parameters below as of 26 Mar 2023 08:00  Patient On (Oxygen Delivery Method): ventilator,ac10,320,30% +5    O2 Concentration (%): 30    GENERAL: No acute distress, well-developed  HEAD:  Atraumatic, Normocephalic  EYES: EOMI, PERRLA, conjunctiva and sclera clear  NECK: Supple, no lymphadenopathy, no JVD  CHEST/LUNG: CTAB; No wheezes, rales, or rhonchi  HEART: Regular rate and rhythm; No murmurs, rubs, or gallops  ABDOMEN: Soft, non-tender, non-distended; normal bowel sounds, no organomegaly  EXTREMITIES:  2+ peripheral pulses b/l, No clubbing, cyanosis, or edema  NEUROLOGY: A&O x 3, no focal deficits  SKIN: No rashes or lesions    LABS:                        8.4    13.88 )-----------( 159      ( 26 Mar 2023 03:08 )             26.5     03-26    134<L>  |  96<L>  |  55<H>  ----------------------------<  157<H>  4.1   |  23  |  2.60<H>    Ca    8.8      26 Mar 2023 03:08  Phos  4.3     03-26  Mg     2.30     03-26    TPro  5.3<L>  /  Alb  2.4<L>  /  TBili  0.4  /  DBili  x   /  AST  34<H>  /  ALT  34<H>  /  AlkPhos  123<H>  03-26    PT/INR - ( 26 Mar 2023 03:08 )   PT: 13.3 sec;   INR: 1.14 ratio         PTT - ( 26 Mar 2023 03:08 )  PTT:25.9 sec          Culture - Blood (collected 23 Mar 2023 13:10)  Source: .Blood Blood-Peripheral  Preliminary Report (24 Mar 2023 19:01):    No growth to date.        RADIOLOGY & ADDITIONAL TESTS:  Results Reviewed:   Imaging Personally Reviewed:  Electrocardiogram Personally Reviewed:    COORDINATION OF CARE:  Care Discussed with Consultants/Other Providers [Y/N]:  Prior or Outpatient Records Reviewed [Y/N]:

## 2023-03-27 NOTE — CHART NOTE - NSCHARTNOTEFT_GEN_A_CORE
NUTRITION FOLLOW-UP      77yo F w Hx of DM2, HTN, HLD, PCKD previously on HD, then s/p renal Tx, presenting w R sided HA & R ear pain & neck pain.  Dx otitis media w rupture of tympanic membrane.  LP with findings of bacterial meningitis.  S/p RRT (3/16), cardiac arrest requiring intubation/sedation.  Off pressors.  Also with persistent seizures, AMS and NAMAN on CKD IV initiated on intermittent HD.  Currently on CPAP.      Previously constipated... on bowel regimen.  +Diarrhea (3/26).  Only ~100mL output from rectal tube within past 24hrs.   Has consistently been receiving Nepro TF @ prescribed goal, hence likely meeting estimated energy/protein needs.        _________________Diet___________________  Diet, NPO with Tube Feed:   Tube Feeding Modality: Orogastric  Nepro with Carb Steady (NEPRORTH)  Total Volume for 24 Hours (mL): 840  Continuous  Starting Tube Feed Rate {mL per Hour}: 10  Increase Tube Feed Rate by (mL): 10     Every 6 hours  Until Goal Tube Feed Rate (mL per Hour): 35  Tube Feed Duration (in Hours): 24  Tube Feed Start Time: 09:00  No Carb Prosource TF     Qty per Day:  1 (03-21-23 @ 07:27) [Active]    provides:  840mL total volume  1512 kcals  68g protein (+15 g additional protein via NoCarb Prosource)= 83g total protein   611mL free water       Weight:                    Height:  62"                   Ideal Body Weight: 110lbs  70kg (3/27)  61.5kg (3/20)  62.1kg (3/16)          _____Re Estimated Energy Needs_____  20-25 kcals/kg admit weight (62.1kg) = 3010-0500 kcals/d  1.6-1.8g protein/kg Ideal Body Weight = 80-90g protein/d        Edema:  Skin: No pressure injuries         ________________________Pertinent Medications____________   MEDICATIONS  (STANDING):  amLODIPine   Tablet 10 milliGRAM(s) Oral every 24 hours  atovaquone  Suspension 1500 milliGRAM(s) Oral daily  cefTRIAXone   IVPB 2000 milliGRAM(s) IV Intermittent every 12 hours  chlorhexidine 0.12% Liquid 15 milliLiter(s) Oral Mucosa two times a day  chlorhexidine 2% Cloths 1 Application(s) Topical <User Schedule>  ciprofloxacin  0.3% Ophthalmic Solution for Otic Use 4 Drop(s) Right Ear two times a day  dextrose 5%. 1000 milliLiter(s) (100 mL/Hr) IV Continuous <Continuous>  dextrose 5%. 1000 milliLiter(s) (50 mL/Hr) IV Continuous <Continuous>  dextrose 50% Injectable 25 Gram(s) IV Push once  dextrose 50% Injectable 12.5 Gram(s) IV Push once  dextrose 50% Injectable 25 Gram(s) IV Push once  glucagon  Injectable 1 milliGRAM(s) IntraMuscular once  heparin   Injectable 5000 Unit(s) SubCutaneous every 8 hours  insulin lispro (ADMELOG) corrective regimen sliding scale   SubCutaneous every 6 hours  lacosamide IVPB 200 milliGRAM(s) IV Intermittent every 12 hours  levETIRAcetam  IVPB 750 milliGRAM(s) IV Intermittent every 12 hours  levothyroxine Injectable 18 MICROGram(s) IV Push at bedtime  petrolatum Ophthalmic Ointment 1 Application(s) Both EYES every 12 hours  polyethylene glycol 3350 17 Gram(s) Oral two times a day  predniSONE   Tablet 5 milliGRAM(s) Oral daily  senna Syrup 10 milliLiter(s) Oral two times a day    MEDICATIONS  (PRN):  acetaminophen   Oral Liquid .. 650 milliGRAM(s) Enteral Tube every 6 hours PRN Temp greater or equal to 38C (100.4F), Mild Pain (1 - 3), Moderate Pain (4 - 6)  dextrose Oral Gel 15 Gram(s) Oral once PRN Blood Glucose LESS THAN 70 milliGRAM(s)/deciliter          _________________________Pertinent Labs____________________     03-27    131<L>  |  96<L>  |  67<H>  ----------------------------<  197<H>  3.9   |  23  |  2.94<H>    Ca    8.7      27 Mar 2023 00:45  Phos  4.8     03-27  Mg     2.30     03-27    TPro  4.7<L>  /  Alb  1.9<L>  /  TBili  0.3  /  DBili  x   /  AST  29  /  ALT  28  /  AlkPhos  124<H>  03-27                                                                   6.8    8.79  )-----------( 158      ( 27 Mar 2023 01:50 )             21.8         CAPILLARY BLOOD GLUCOSE      POCT Blood Glucose.: 158 mg/dL (27 Mar 2023 05:11)    POCT Blood Glucose.: 158 mg/dL (03-27-23 @ 05:11)  POCT Blood Glucose.: 210 mg/dL (03-26-23 @ 23:39)  POCT Blood Glucose.: 130 mg/dL (03-26-23 @ 17:37)              PLAN/RECOMMENDATIONS:    1)   2) Obtain daily weights  3) Monitor tolerance to TF (GI status, electrolytes, glucose)     RDN remains available and will f/u PRN.          Ursula Selby RDN, CDN pager [phone] NUTRITION FOLLOW-UP      77yo F w Hx of DM2, HTN, HLD, PCKD previously on HD, then s/p renal Tx, presenting w R sided HA & R ear pain & neck pain.  Dx otitis media w rupture of tympanic membrane.  LP with findings of bacterial meningitis.  S/p RRT (3/16), cardiac arrest requiring intubation/sedation.  Off pressors.  Also with persistent seizures, AMS and NAMAN on CKD IV initiated on intermittent HD.  Currently on SBT CPAP.  Spoke w RN.      Previously constipated... on bowel regimen.  +Diarrhea (3/26).  Only ~100mL output from rectal tube within past 24hrs.  Appears to have abdominal distention.    Has consistently been receiving Nepro TF @ prescribed goal, hence likely meeting estimated energy/protein needs. Observed at goal at time of RDN visit.   Weight status variable, perhaps somewhat fluid related due to edema.  Current 2+ generalized.    Pt. with visible signs of severe muscle wasting: temples & severe subcutaneous fat loss: orbitals.          _________________Diet___________________  Diet, NPO with Tube Feed:   Tube Feeding Modality: Orogastric  Nepro with Carb Steady (NEPRORTH)  Total Volume for 24 Hours (mL): 840  Continuous  Starting Tube Feed Rate {mL per Hour}: 10  Increase Tube Feed Rate by (mL): 10     Every 6 hours  Until Goal Tube Feed Rate (mL per Hour): 35  Tube Feed Duration (in Hours): 24  Tube Feed Start Time: 09:00  No Carb Prosource TF     Qty per Day:  1 (03-21-23 @ 07:27) [Active]    provides:  840mL total volume  1512 kcals  68g protein (+15 g additional protein via NoCarb Prosource)= 83g total protein   611mL free water       Weight:                    Height:  62"                   Ideal Body Weight: 110lbs  70kg (3/27)  61.5kg (3/20)  62.1kg (3/16)          _____Re Estimated Energy Needs_____  20-25 kcals/kg admit weight (62.1kg) = 9476-1119 kcals/d  1.6-1.8g protein/kg Ideal Body Weight = 80-90g protein/d        Edema:  2+ generalized   Skin: No pressure injuries         ________________________Pertinent Medications____________   MEDICATIONS  (STANDING):  amLODIPine   Tablet 10 milliGRAM(s) Oral every 24 hours  atovaquone  Suspension 1500 milliGRAM(s) Oral daily  cefTRIAXone   IVPB 2000 milliGRAM(s) IV Intermittent every 12 hours  chlorhexidine 0.12% Liquid 15 milliLiter(s) Oral Mucosa two times a day  chlorhexidine 2% Cloths 1 Application(s) Topical <User Schedule>  ciprofloxacin  0.3% Ophthalmic Solution for Otic Use 4 Drop(s) Right Ear two times a day  dextrose 5%. 1000 milliLiter(s) (100 mL/Hr) IV Continuous <Continuous>  dextrose 5%. 1000 milliLiter(s) (50 mL/Hr) IV Continuous <Continuous>  dextrose 50% Injectable 25 Gram(s) IV Push once  dextrose 50% Injectable 12.5 Gram(s) IV Push once  dextrose 50% Injectable 25 Gram(s) IV Push once  glucagon  Injectable 1 milliGRAM(s) IntraMuscular once  heparin   Injectable 5000 Unit(s) SubCutaneous every 8 hours  insulin lispro (ADMELOG) corrective regimen sliding scale   SubCutaneous every 6 hours  lacosamide IVPB 200 milliGRAM(s) IV Intermittent every 12 hours  levETIRAcetam  IVPB 750 milliGRAM(s) IV Intermittent every 12 hours  levothyroxine Injectable 18 MICROGram(s) IV Push at bedtime  petrolatum Ophthalmic Ointment 1 Application(s) Both EYES every 12 hours  polyethylene glycol 3350 17 Gram(s) Oral two times a day  predniSONE   Tablet 5 milliGRAM(s) Oral daily  senna Syrup 10 milliLiter(s) Oral two times a day    MEDICATIONS  (PRN):  acetaminophen   Oral Liquid .. 650 milliGRAM(s) Enteral Tube every 6 hours PRN Temp greater or equal to 38C (100.4F), Mild Pain (1 - 3), Moderate Pain (4 - 6)  dextrose Oral Gel 15 Gram(s) Oral once PRN Blood Glucose LESS THAN 70 milliGRAM(s)/deciliter          _________________________Pertinent Labs____________________     03-27    131<L>  |  96<L>  |  67<H>  ----------------------------<  197<H>  3.9   |  23  |  2.94<H>    Ca    8.7      27 Mar 2023 00:45  Phos  4.8     03-27  Mg     2.30     03-27    TPro  4.7<L>  /  Alb  1.9<L>  /  TBili  0.3  /  DBili  x   /  AST  29  /  ALT  28  /  AlkPhos  124<H>  03-27                                                                   6.8    8.79  )-----------( 158      ( 27 Mar 2023 01:50 )             21.8         CAPILLARY BLOOD GLUCOSE      POCT Blood Glucose.: 158 mg/dL (27 Mar 2023 05:11)    POCT Blood Glucose.: 158 mg/dL (03-27-23 @ 05:11)  POCT Blood Glucose.: 210 mg/dL (03-26-23 @ 23:39)  POCT Blood Glucose.: 130 mg/dL (03-26-23 @ 17:37)            PLAN/RECOMMENDATIONS:    1) Continue current enteral regimen.  2) Obtain daily weights  3) Monitor tolerance to TF (GI status, electrolytes, glucose)     RDN remains available and will f/u PRN.          Ursula Selby RDN, CDN pager [phone]

## 2023-03-27 NOTE — PROGRESS NOTE ADULT - ASSESSMENT
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  Cardiac arrest on 3/16/23  septic shock s/p iv pressors  intubated on MV  Renal following for NAMAN on CKD Mx.    NAMAN on CKD4  Creatinine Trend: 2.60 <--, 2.39 <--, 2.04 <--, 2.35 <--, 1.72 <--, 1.95 <--, 1.26 <--, 1.24 <--, 1.31 <--, 1.16 <--, 1.38 <--, 1.31 <--  w/worsened renal function, oliguria, acidosis, mild hyperkalemia- s/p CVVHD, now on  intermittent HD  Consent for HD obtained, signed by daughter 3/16/23  KTx 2003  B/L creat around 2.8 since Dec 2022  Patient receives Cyclosporine (Gengraf) 75mg PO q12hrs,  BID and Prednisone 5 QD for transplant  oliguria- u/o 445ml 3/25 24hrs; 215 ml today fr 12hrs  Cr rising since last hd    plan:  In light of severe sepsis and cardiac arrest: holding MMF and Cyclosporine  c/w predniSONE   Tablet 5 milliGRAM(s) Oral daily  off cvvh   s/p last HD 3/24, Rx sheet reviewed, net UF 1kg removed, tolerated well. uneventful.   unsuccessful IJ shiley insertion by ICU team 3/26.   attempted to dialyze today using femoral shiley, poor bfr 200ml/min, lasted for about 1/2hr, then didn't work. HD aborted  pt would need a new HD access vs change femoral over guide wire  plan for next routine hd tomorrow after procedure/new shiley  HTN- bp better now, controlled- c/w amLODIPine   Tablet 10 milliGRAM(s) every 24 hours  keep Ponce  monitor U/O  c/w TF  monitor BMP qdaily now  dose all meds for eGFR<15ml/min.   avoid ACEi/ARB/NSAIDs/Nephrotoxics if able.    Metabolic acidosis -resolved  OM and OE  Abxs per Infectious disease specialist with dose adjustment per GFR  f/u cxs  vent Mx, per ICU  off pressors now.     prognosis guarded  [facility] Nephrologist Dr. Hugo Hernandez [phone]  will closely follow up.   poc d/w ICU team, HD RN, daughter over phone  labs, chart reviewed  For any question, pl call:  Nephrology  Cell -[phone]  Office [phone]  Ans Serv [phone]

## 2023-03-27 NOTE — PROGRESS NOTE ADULT - ASSESSMENT
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 Went to ENT and was diagnosed with R otitis externa secondary to perforated TM. Prescribed cipro/dexamethasone.  Worsening headache and EMS called. Admitted 3/14.  CT head negative.  NAMAN.  Started on vanc/meropenem.  IR obtained LP.  Findings c/w meningitis, culture +pneumococcus.  Antibiotics narrowed to ceftriaxone.  Remains lethargic off sedation with possible seizures.  Intermittent fevers.      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

## 2023-03-27 NOTE — PROGRESS NOTE ADULT - ASSESSMENT
77 y/o F with h/o HTN, HLD, DM2, PCKD previously on HD via LUE AVF then s/p renal transplant, LLE DVT (resolved, nml dopplers 12/2022) on ASA, very small supraclinoid aneurysm (reportedly unchanged on MRA 10/2020), hypothyroidism, PCP 2021 on atovaquone ppx, reportedly hospitalized in 12/2022 for rectal prolapse, had a blood transfusion at that time, also + CMV 1/25/23 who presented with R-sided HA, R ear pain and neck stiffness after visit to ENT earlier in the day, altered on presentation, CT head unremarkable for stroke, however LP with IR and BCx showing +strep pneumoniae. currently on vanc and CTX per ID. Cardiac arrest 3/16 with ROSC, transferred to MICU for further management.       Plan:   Neurological  #Currently Intubated, off sedation  - off versed over 48 hours  - EEG no seizures  - neuron specific enolase wnl  - f/u repeat ammonia level and TSH     #Strep pneumo meningitis #Acute encephalopathy  severe HA, neck stiffness, fever, leukocytosis concerning for meningitis/encephalitis  CT head negative for CVA.   LP and BCx 3/15 both showing gram positive cocci in pairs, and + strep pneumoniae.   likely source of entry from R ear where pt c/o discomfort for several months.  patient is on cyclosporine, mycophenolate mofetil and prednisone due to kidney transplant and is immunocompromised   dental procedure 3/14 but less likely source   - f/u MRI of the IAC     #Seizures  - 24hr EEG initially with highly focal seizures, improved with increased sedation.   - keppra loaded 500mg IVP (3/17) and started on 250mg IV q12h (3/17 - 3/19) increased to 750 mg IV q12h (CRRT dosing (3/19 - 3/20), then increased to 1000mg IV q12 (3/20 - 3/22), then back to 750mg IV q12 (3/22 - [ ])  - vimpat 100 BID increased to 200 BID (3/20 - [ ])  -Off versed; off prop  -now seizure free    Cardiovascular  #Cardiac Arrest  bradycardia to 30, pulseless, code blue was called 3/16   2 rounds of epi, and PEA arrest. on 3rd pulse check pulseless VT, 200J shock given  4th pulse check asystole, 5th ROSC, sinus tachy with HUMA. IO placed and levo started. Due to blood in mouth took several attempts with DL for ETT to be placed but ultimately confirmed with capnometry and bilateral breath sounds.     #Elevated troponin  uptrending troponin to 182 3/15 AM, was likely iso ESRD  s/p cardiac arrest, elevated ST segment   - TTE from 3/17 showed EF of 63% with mild pulm htn, normal left ventricular systolic function, and diastolic LV dysfunction.      #HTN  on amlodipine 5mg, losartan 100mg, torsemide 20mg, and clonidine 0.2 patch weekly  -Back on amlodipine, can uptitrate if needed or resume other oral agents        Respiratory/ENT  #Intubated  - off sedation  - CPAP trial 8/5 on 3/25    # Tympanic membrane rupture  discharge from R ear, evaluated by ENT given dexamethasone and cipro 3/14  - c/w broad spectrum abx   - MRI of IAC   - f/u ENT recs    Gastrointestinal  #Constipation  - Abdominal xray negative for bowel obstruction  - trial on movantik  - c/w bowel regimen  - increase trickle feeds as tolerated and bowel regimen  - now having bowel movements    Renal  #ESRD #s/p Kidney Transplant in 2003 at [place] #PCKD  previously HD through LLE AVF but no HD since transplant  Cr 3.8, unclear baseline  - avoid nephrotoxic agents  - holding cyclosporine and mycophenolate   - c/w home prednisone 5mg qday  - strict I/O and replete electrolytes  - s/p 4mg IV bumex with minimal urine production (3/17)  - started on CRRT (3/17-3/21) net negative 100cc/hr goal  - s/p intermittent HD (3/22 and 3/24)       Hematological  # h/o LLE DVT  resolved, no DVT on doppler 12/22.  - negative LLE duplex this admission 3/17  - restarted heparin subQ (3/21)      #Anemia  - s/p 1 unit pRBC on 3/16 for Hb drop from 10 to 7.9 and 1 unit on 3/21 for Hb 6.8 2/2 loss from CRRT clotting  - Evaluated by ENT for bleeding and found no signs of active bleeding from nasal cavity, nasopharynx or oral cavity, however, patient biting on tongue, which could be one source of bleeding. Tongue was edematous and lax      Infectious Disease  #Strep pneumoniae meningitis #R ear mastoiditis  treatment with IV CTX   - c/w ciprodex drops for R ear  - f/u ID recs   - repeat cx from 3/21 NGTD  - c/w CTX (3/16 - [ ])  - repeat blood and sputum cx on 3/23 2/2 new fever  - f/u UA  - cultures cleared, holding off on SORAYA      #h/o PCP  - atovaquone at home, continue      Endocrinological  #Hypothyroidism  last TSH 12/2022 >10  TSH 3/15 4.73  - c/w levothyroxine     #Diabetes mellitus Type II  - ISS      Ethics  FULL, GOC ongoing. 77 y/o F with h/o HTN, HLD, DM2, PCKD previously on HD via LUE AVF then s/p renal transplant, LLE DVT (resolved, nml dopplers 12/2022) on ASA, very small supraclinoid aneurysm (reportedly unchanged on MRA 10/2020), hypothyroidism, PCP 2021 on atovaquone ppx, reportedly hospitalized in 12/2022 for rectal prolapse, had a blood transfusion at that time, also + CMV 1/25/23 who presented with R-sided HA, R ear pain and neck stiffness after visit to ENT earlier in the day, altered on presentation, CT head unremarkable for stroke, however LP with IR and BCx showing +strep pneumoniae. currently on vanc and CTX per ID. Cardiac arrest 3/16 with ROSC, transferred to MICU for further management.       Plan:   Neurological  #Currently Intubated, off sedation  - off versed over 48 hours  - EEG no seizures  - neuron specific enolase wnl  - f/u repeat ammonia level and TSH     #Strep pneumo meningitis #Acute encephalopathy  severe HA, neck stiffness, fever, leukocytosis concerning for meningitis/encephalitis  CT head negative for CVA.   LP and BCx 3/15 both showing gram positive cocci in pairs, and + strep pneumoniae.   likely source of entry from R ear where pt c/o discomfort for several months.  patient is on cyclosporine, mycophenolate mofetil and prednisone due to kidney transplant and is immunocompromised   dental procedure 3/14 but less likely source   - f/u MRI of the IAC and brain    #Seizures  - 24hr EEG initially with highly focal seizures, improved with increased sedation.   - keppra loaded 500mg IVP (3/17) and started on 250mg IV q12h (3/17 - 3/19) increased to 750 mg IV q12h (CRRT dosing (3/19 - 3/20), then increased to 1000mg IV q12 (3/20 - 3/22), then back to 750mg IV q12 (3/22 - [ ])  - vimpat 100 BID increased to 200 BID (3/20 - [ ])  -Off versed; off prop  -now seizure free    Cardiovascular  #Cardiac Arrest  bradycardia to 30, pulseless, code blue was called 3/16   2 rounds of epi, and PEA arrest. on 3rd pulse check pulseless VT, 200J shock given  4th pulse check asystole, 5th ROSC, sinus tachy with HUMA. IO placed and levo started. Due to blood in mouth took several attempts with DL for ETT to be placed but ultimately confirmed with capnometry and bilateral breath sounds.     #Elevated troponin  uptrending troponin to 182 3/15 AM, was likely iso ESRD  s/p cardiac arrest, elevated ST segment   - TTE from 3/17 showed EF of 63% with mild pulm htn, normal left ventricular systolic function, and diastolic LV dysfunction.      #HTN  on amlodipine 5mg, losartan 100mg, torsemide 20mg, and clonidine 0.2 patch weekly  -Back on amlodipine, can uptitrate if needed or resume other oral agents        Respiratory/ENT  #Intubated  - off sedation  - CPAP trial 8/5 on 3/25    # Tympanic membrane rupture  discharge from R ear, evaluated by ENT given dexamethasone and cipro 3/14  - c/w broad spectrum abx   - MRI of IAC   - f/u ENT recs    Gastrointestinal  #Constipation  - Abdominal xray negative for bowel obstruction  - trial on movantik  - c/w bowel regimen  - increase trickle feeds as tolerated and bowel regimen  - now having bowel movements    Renal  #ESRD #s/p Kidney Transplant in 2003 at [place] #PCKD  previously HD through LLE AVF but no HD since transplant  Cr 3.8, unclear baseline  - avoid nephrotoxic agents  - holding cyclosporine and mycophenolate   - c/w home prednisone 5mg qday  - strict I/O and replete electrolytes  - s/p 4mg IV bumex with minimal urine production (3/17)  - started on CRRT (3/17-3/21) net negative 100cc/hr goal  - s/p intermittent HD (3/22 and 3/24)  - Patient with uptrending Scr       Hematological  # h/o LLE DVT  resolved, no DVT on doppler 12/22.  - negative LLE duplex this admission 3/17  - restarted heparin subQ (3/21)      #Anemia  - s/p 1 unit pRBC on 3/16 for Hb drop from 10 to 7.9 and 1 unit on 3/21 for Hb 6.8 2/2 loss from CRRT clotting  - Evaluated by ENT for bleeding and found no signs of active bleeding from nasal cavity, nasopharynx or oral cavity, however, patient biting on tongue, which could be one source of bleeding. Tongue was edematous and lax  - transfuse 1u pRBC 3/27      Infectious Disease  #Strep pneumoniae meningitis #R ear mastoiditis  treatment with IV CTX   - c/w ciprodex drops for R ear  - f/u ID recs   - repeat cx from 3/21 NGTD  - c/w CTX (3/16 - [ ])  - repeat blood and sputum cx on 3/23 2/2 new fever  - f/u UA  - cultures cleared, holding off on SORAYA      #h/o PCP  - atovaquone at home, continue      Endocrinological  #Hypothyroidism  last TSH 12/2022 >10  TSH 3/15 4.73  - c/w levothyroxine     #Diabetes mellitus Type II  - ISS      Ethics  FULL, GOC ongoing. 77 y/o F with h/o HTN, HLD, DM2, PCKD previously on HD via LUE AVF then s/p renal transplant, LLE DVT (resolved, nml dopplers 12/2022) on ASA, very small supraclinoid aneurysm (reportedly unchanged on MRA 10/2020), hypothyroidism, PCP 2021 on atovaquone ppx, reportedly hospitalized in 12/2022 for rectal prolapse, had a blood transfusion at that time, also + CMV 1/25/23 who presented with R-sided HA, R ear pain and neck stiffness after visit to ENT earlier in the day, altered on presentation, CT head unremarkable for stroke, however LP with IR and BCx showing +strep pneumoniae. currently on vanc and CTX per ID. Cardiac arrest 3/16 with ROSC, transferred to MICU for further management.       Plan:   Neurological  #Currently Intubated, off sedation  - off versed over 48 hours  - EEG no seizures  - neuron specific enolase wnl  - f/u repeat ammonia level and TSH     #Strep pneumo meningitis #Acute encephalopathy  severe HA, neck stiffness, fever, leukocytosis concerning for meningitis/encephalitis  CT head negative for CVA.   LP and BCx 3/15 both showing gram positive cocci in pairs, and + strep pneumoniae.   likely source of entry from R ear where pt c/o discomfort for several months.  patient is on cyclosporine, mycophenolate mofetil and prednisone due to kidney transplant and is immunocompromised   dental procedure 3/14 but less likely source   - f/u MRI of the IAC and brain    #Seizures  - 24hr EEG initially with highly focal seizures, improved with increased sedation.   - keppra loaded 500mg IVP (3/17) and started on 250mg IV q12h (3/17 - 3/19) increased to 750 mg IV q12h (CRRT dosing (3/19 - 3/20), then increased to 1000mg IV q12 (3/20 - 3/22), then back to 750mg IV q12 (3/22 - [ ])  - vimpat 100 BID increased to 200 BID (3/20 - [ ])  -Off versed; off prop  -now seizure free    Cardiovascular  #Cardiac Arrest  bradycardia to 30, pulseless, code blue was called 3/16   2 rounds of epi, and PEA arrest. on 3rd pulse check pulseless VT, 200J shock given  4th pulse check asystole, 5th ROSC, sinus tachy with HUMA. IO placed and levo started. Due to blood in mouth took several attempts with DL for ETT to be placed but ultimately confirmed with capnometry and bilateral breath sounds.     #Elevated troponin  uptrending troponin to 182 3/15 AM, was likely iso ESRD  s/p cardiac arrest, elevated ST segment   - TTE from 3/17 showed EF of 63% with mild pulm htn, normal left ventricular systolic function, and diastolic LV dysfunction.      #HTN  on amlodipine 5mg, losartan 100mg, torsemide 20mg, and clonidine 0.2 patch weekly  -Back on amlodipine, can uptitrate if needed or resume other oral agents        Respiratory/ENT  #Intubated  - off sedation  - CPAP trial 8/5 on 3/25 and 3/27    # Tympanic membrane rupture  discharge from R ear, evaluated by ENT given dexamethasone and cipro 3/14  - c/w broad spectrum abx   - MRI of IAC   - f/u ENT recs    Gastrointestinal  #Constipation  - Abdominal xray negative for bowel obstruction  - trial on movantik  - c/w bowel regimen  - increase trickle feeds as tolerated and bowel regimen  - now having consistent bowel movements    Renal  #ESRD #s/p Kidney Transplant in 2003 at [place] #PCKD  previously HD through LLE AVF but no HD since transplant  Cr 3.8, unclear baseline  - avoid nephrotoxic agents  - holding cyclosporine and mycophenolate   - c/w home prednisone 5mg qday  - strict I/O and replete electrolytes  - s/p 4mg IV bumex with minimal urine production (3/17)  - started on CRRT (3/17-3/21) net negative 100cc/hr goal  - s/p intermittent HD (3/22 and 3/24)  - Patient with uptrending Scr; repeat CBC after 1u prbc 3/27       Hematological  # h/o LLE DVT  resolved, no DVT on doppler 12/22.  - negative LLE duplex this admission 3/17  - restarted heparin subQ (3/21)      #Anemia  - s/p 1 unit pRBC on 3/16 for Hb drop from 10 to 7.9 and 1 unit on 3/21 for Hb 6.8 2/2 loss from CRRT clotting  - Evaluated by ENT for bleeding and found no signs of active bleeding from nasal cavity, nasopharynx or oral cavity, however, patient biting on tongue, which could be one source of bleeding. Tongue was edematous and lax  - transfuse 1u pRBC 3/27      Infectious Disease  #Strep pneumoniae meningitis #R ear mastoiditis  treatment with IV CTX   - c/w ciprodex drops for R ear  - f/u ID recs   - repeat cx from 3/21 NGTD  - c/w CTX (3/16 - [ ])  - repeat blood and sputum cx on 3/23 2/2 new fever  - f/u UA  - cultures cleared, holding off on SORAYA      #h/o PCP  - atovaquone at home, continue      Endocrinological  #Hypothyroidism  last TSH 12/2022 >10  TSH 3/15 4.73  - c/w levothyroxine     #Diabetes mellitus Type II  - ISS      Ethics  FULL, GOC ongoing.

## 2023-03-27 NOTE — PROGRESS NOTE ADULT - SUBJECTIVE AND OBJECTIVE BOX
Patient is a 78y old  Female who presents with a chief complaint of AMS/R ear infxn (15 Mar 2023 10:36)    f/u pneumococcal sepsis, bacteremia and meningitis    Interval History/ROS:  off pressors, remains lethargic off sedation.  on ventilator ROS unable    PAST MEDICAL & SURGICAL HISTORY:  Polycystic kidney disease  Glaucoma  Aneurysm  History of deep venous thrombosis (DVT) of distal vein of left lower extremity  Thrombocytopenia  Hypothyroid  Osteoporosis  Arthritis  PCP (pneumocystis carinii pneumonia)   C. difficile colitis  Type 2 diabetes mellitus, without long-term current use of insulin  Essential hypertension  H/O kidney transplant (cadaver , [facility])  H/O:   H/O eye surgery  S/P hysterectomy  H/O umbilical hernia repair    Allergies  apple (Unknown)  Benadryl (Unknown)  penicillin (Hives), tolerate cephalosporins  watermelon (Unknown)    ANTIMICROBIALS:  meropenem  IVPB 1000 every 12 hours (3/15)  vancomycin  IVPB (3/15-3/17)    active:  cefTRIAXone   IVPB 2000 every 12 hours (3/15-)  atovaquone  Suspension 1500 daily    MEDICATIONS  (STANDING):  amLODIPine   Tablet 10 every 24 hours  heparin   Injectable 5000 every 8 hours  insulin lispro (ADMELOG) corrective regimen sliding scale  every 6 hours  lacosamide IVPB 200 every 12 hours  levETIRAcetam  IVPB 750 every 12 hours  levothyroxine Injectable 18 at bedtime  polyethylene glycol 3350 17 two times a day  predniSONE   Tablet 5 daily  senna Syrup 10 two times a day    Vital Signs Last 24 Hrs  T(F): 97.3 (23 @ 12:00), Max: 101 (23 @ 22:00)  HR: 72 (23 @ 12:00)  BP: 136/73 (23 @ 12:00)  RR: 18 (23 @ 12:00)  SpO2: 100% (23 @ 12:00) (95% - 100%)  Wt(kg): --    PHYSICAL EXAM:  Constitutional: lethargic off sedation on vent  HEAD/EYES: keeps eyes closed  ENT:  supple  Cardiovascular:   normal S1, S2  Respiratory:  clear BS bilaterally  GI:  soft, non-tender, enlarged   :  snider  Musculoskeletal:  no synovitis  Neurologic: lethargic, sedated, unable to assess  Skin:  no rash, left femoral HD catheter  Psychiatric:  not able to assess                                                         6.8    8.79  )-----------( 158      ( 27 Mar 2023 01:50 )             21.8     131  |  96  |  67  ----------------------------<  197  3.9   |  23  |  2.94  Ca    8.7      27 Mar 2023 00:45Phos  4.8     Mg     2.30       TPro  4.7  /  Alb  1.9  /  TBili  0.3  /  DBili  x   /  AST  29  /  ALT  28  /  AlkPhos  124      Urinalysis Basic - ( 15 Mar 2023 03:43 )  Color: Yellow / Appearance: Clear / S.019 / pH: x  Gluc: x / Ketone: Negative  / Bili: Negative / Urobili: <2 mg/dL   Blood: x / Protein: 300 mg/dL / Nitrite: Negative   Leuk Esterase: Negative / RBC: 8 /HPF / WBC 2 /HPF   Sq Epi: x / Non Sq Epi: 1 /HPF / Bacteria: Negative    MICROBIOLOGY:  Culture - Sputum (collected 23 @ 12:30)  Source: ET Tube ET Tube  Gram Stain (23 @ 19:27):    No polymorphonuclear leukocytes seen per low power field    Rare Squamous epithelial cells seen per low power field    No organisms seen per oil power field    3/21 BC (-)  3/19 Trach Asp Tracheal Aspirate (-)  3/17 BC (-)  3/16 Bronchial Lavage (-)  3/16 CSF (+) Streptococcus pneumoniae PCR  3/15 BC (+) Streptococcus pneumoniae    RADIOLOGY:  imaging below personally reviewed and agree with findings    Xray Chest 1 View- PORTABLE-Urgent (Xray Chest 1 View- PORTABLE-Urgent .) (23 @ 14:53) >  IMPRESSION:  Support devices as above.  Improving bilateral aeration.    IR Procedure (03.15.23 @ 15:24) >  The opening pressure measured greater than 55 cm water. The closing pressure measured 32 cm water, after removal of 30 cc of cloudy CSF. The needle was removed. The patient tolerated the procedure well without initial complication.  IMPRESSION:  Status post successful lumbar puncture as described.  Markedly abnormal appearing CSF.  Elevated opening pressures.    US Kidney and Bladder (03.15.23 @ 12:10) >  IMPRESSION:  Mild fullness of the left lower quadrant transplant kidney collecting system.  Increased renal cortical echogenicity, possibly renal parenchymal disease.    CT Brain Perfusion Maps Stroke (23 @ 19:46) >  IMPRESSION:  CT PERFUSION:  Limited by late injection of the contrast bolus.  Cerebral blood flow less than 30% = 0 mL. No core infarct is predicted.  Tmax greater than 6 seconds = 0 mL. No brain parenchyma predicted to be at continued ischemicrisk in the presence of neurologic symptoms.  CTA BRAIN:  No flow-limiting stenosis or vascular aneurysm. No AVM.  CTA NECK:  Calcified plaque at the origin of the left internal carotid artery results in approximately 40% short segment stenosis.   Otherwise no flow-limiting stenosis. No evidence for arterial dissection.      ECHO  Transthoracic Echocardiogram (23 @ 11:48) >  CONCLUSIONS:  1.  Moderate aortic regurgitation.  2. Mild concentric left ventricular hypertrophy.  3. Normal left ventricular systolic function. No segmental wall motion abnormalities.  4. Reversal of the E-A  waves of the mitral inflow pattern is consistent with diastolic LV dysfunction.  5. Normal right ventricular size and function.  6. Normal tricuspid valve. Mild tricuspid regurgitation.  *** Compared with echocardiogram of 2018, no significant changes noted.

## 2023-03-27 NOTE — PROGRESS NOTE ADULT - SUBJECTIVE AND OBJECTIVE BOX
Inge Zhang PGY1  pager [phone] or check resident tab for coverage    Patient is a 78y old  Female who presents with a chief complaint of AMS/R ear infxn (26 Mar 2023 22:48)      SUBJECTIVE / OVERNIGHT EVENTS:    MEDICATIONS  (STANDING):  amLODIPine   Tablet 10 milliGRAM(s) Oral every 24 hours  atovaquone  Suspension 1500 milliGRAM(s) Oral daily  cefTRIAXone   IVPB 2000 milliGRAM(s) IV Intermittent every 12 hours  chlorhexidine 0.12% Liquid 15 milliLiter(s) Oral Mucosa two times a day  chlorhexidine 2% Cloths 1 Application(s) Topical <User Schedule>  ciprofloxacin  0.3% Ophthalmic Solution for Otic Use 4 Drop(s) Right Ear two times a day  dextrose 5%. 1000 milliLiter(s) (100 mL/Hr) IV Continuous <Continuous>  dextrose 5%. 1000 milliLiter(s) (50 mL/Hr) IV Continuous <Continuous>  dextrose 50% Injectable 25 Gram(s) IV Push once  dextrose 50% Injectable 12.5 Gram(s) IV Push once  dextrose 50% Injectable 25 Gram(s) IV Push once  glucagon  Injectable 1 milliGRAM(s) IntraMuscular once  heparin   Injectable 5000 Unit(s) SubCutaneous every 8 hours  insulin lispro (ADMELOG) corrective regimen sliding scale   SubCutaneous every 6 hours  lacosamide IVPB 200 milliGRAM(s) IV Intermittent every 12 hours  levETIRAcetam  IVPB 750 milliGRAM(s) IV Intermittent every 12 hours  levothyroxine Injectable 18 MICROGram(s) IV Push at bedtime  petrolatum Ophthalmic Ointment 1 Application(s) Both EYES every 12 hours  polyethylene glycol 3350 17 Gram(s) Oral two times a day  predniSONE   Tablet 5 milliGRAM(s) Oral daily  senna Syrup 10 milliLiter(s) Oral two times a day    MEDICATIONS  (PRN):  acetaminophen   Oral Liquid .. 650 milliGRAM(s) Enteral Tube every 6 hours PRN Temp greater or equal to 38C (100.4F), Mild Pain (1 - 3), Moderate Pain (4 - 6)  dextrose Oral Gel 15 Gram(s) Oral once PRN Blood Glucose LESS THAN 70 milliGRAM(s)/deciliter      CAPILLARY BLOOD GLUCOSE      POCT Blood Glucose.: 158 mg/dL (27 Mar 2023 05:11)  POCT Blood Glucose.: 210 mg/dL (26 Mar 2023 23:39)  POCT Blood Glucose.: 130 mg/dL (26 Mar 2023 17:37)    I&O's Summary    26 Mar 2023 07:01  -  27 Mar 2023 07:00  --------------------------------------------------------  IN: 1490 mL / OUT: 550 mL / NET: 940 mL    27 Mar 2023 07:01  -  27 Mar 2023 11:59  --------------------------------------------------------  IN: 140 mL / OUT: 75 mL / NET: 65 mL        Vital Signs Last 24 Hrs  T(C): 37.1 (27 Mar 2023 08:00), Max: 38.3 (26 Mar 2023 22:00)  T(F): 98.7 (27 Mar 2023 08:00), Max: 101 (26 Mar 2023 22:00)  HR: 80 (27 Mar 2023 11:36) (71 - 89)  BP: 121/62 (27 Mar 2023 11:00) (98/50 - 172/73)  BP(mean): 75 (27 Mar 2023 11:00) (60 - 102)  RR: 15 (27 Mar 2023 11:00) (15 - 25)  SpO2: 98% (27 Mar 2023 11:36) (95% - 100%)    Parameters below as of 27 Mar 2023 10:00  Patient On (Oxygen Delivery Method): ventilator        PHYSICAL EXAM:  GENERAL: no distress  PSYCH: A&O x3  HEAD: Atraumatic, Normocephalic  NECK: Supple, No JVD  CHEST/LUNG: clear to auscultation bilaterally  HEART: regular rate and rhythm, no murmurs  ABDOMEN: nontender to palpation, no rebound tenderness/guarding  EXTREMITIES: no edema on bilateral LE  NEUROLOGY: no focal neurologic deficit  SKIN: No rashes or lesions    LABS:                        6.8    8.79  )-----------( 158      ( 27 Mar 2023 01:50 )             21.8      03-27    131<L>  |  96<L>  |  67<H>  ----------------------------<  197<H>  3.9   |  23  |  2.94<H>    Ca    8.7      27 Mar 2023 00:45  Phos  4.8     03-27  Mg     2.30     03-27    TPro  4.7<L>  /  Alb  1.9<L>  /  TBili  0.3  /  DBili  x   /  AST  29  /  ALT  28  /  AlkPhos  124<H>  03-27    PT/INR - ( 27 Mar 2023 00:45 )   PT: 13.7 sec;   INR: 1.18 ratio         PTT - ( 27 Mar 2023 00:45 )  PTT:25.0 sec          RADIOLOGY & ADDITIONAL TESTS:    Imaging Personally Reviewed:    Consultant(s) Notes Reviewed:      Care Discussed with Consultants/Other Providers:   Inge Zhang PGY1  pager [phone] or check resident tab for coverage    Patient is a 78y old  Female who presents with a chief complaint of AMS/R ear infxn (26 Mar 2023 22:48)      SUBJECTIVE / OVERNIGHT EVENTS: Patient still encephalopathic in the AM off of sedation. Not responsive to verbal or painful stimuli. Not following commands.    MEDICATIONS  (STANDING):  amLODIPine   Tablet 10 milliGRAM(s) Oral every 24 hours  atovaquone  Suspension 1500 milliGRAM(s) Oral daily  cefTRIAXone   IVPB 2000 milliGRAM(s) IV Intermittent every 12 hours  chlorhexidine 0.12% Liquid 15 milliLiter(s) Oral Mucosa two times a day  chlorhexidine 2% Cloths 1 Application(s) Topical <User Schedule>  ciprofloxacin  0.3% Ophthalmic Solution for Otic Use 4 Drop(s) Right Ear two times a day  dextrose 5%. 1000 milliLiter(s) (100 mL/Hr) IV Continuous <Continuous>  dextrose 5%. 1000 milliLiter(s) (50 mL/Hr) IV Continuous <Continuous>  dextrose 50% Injectable 25 Gram(s) IV Push once  dextrose 50% Injectable 12.5 Gram(s) IV Push once  dextrose 50% Injectable 25 Gram(s) IV Push once  glucagon  Injectable 1 milliGRAM(s) IntraMuscular once  heparin   Injectable 5000 Unit(s) SubCutaneous every 8 hours  insulin lispro (ADMELOG) corrective regimen sliding scale   SubCutaneous every 6 hours  lacosamide IVPB 200 milliGRAM(s) IV Intermittent every 12 hours  levETIRAcetam  IVPB 750 milliGRAM(s) IV Intermittent every 12 hours  levothyroxine Injectable 18 MICROGram(s) IV Push at bedtime  petrolatum Ophthalmic Ointment 1 Application(s) Both EYES every 12 hours  polyethylene glycol 3350 17 Gram(s) Oral two times a day  predniSONE   Tablet 5 milliGRAM(s) Oral daily  senna Syrup 10 milliLiter(s) Oral two times a day    MEDICATIONS  (PRN):  acetaminophen   Oral Liquid .. 650 milliGRAM(s) Enteral Tube every 6 hours PRN Temp greater or equal to 38C (100.4F), Mild Pain (1 - 3), Moderate Pain (4 - 6)  dextrose Oral Gel 15 Gram(s) Oral once PRN Blood Glucose LESS THAN 70 milliGRAM(s)/deciliter      CAPILLARY BLOOD GLUCOSE      POCT Blood Glucose.: 158 mg/dL (27 Mar 2023 05:11)  POCT Blood Glucose.: 210 mg/dL (26 Mar 2023 23:39)  POCT Blood Glucose.: 130 mg/dL (26 Mar 2023 17:37)    I&O's Summary    26 Mar 2023 07:01  -  27 Mar 2023 07:00  --------------------------------------------------------  IN: 1490 mL / OUT: 550 mL / NET: 940 mL    27 Mar 2023 07:01  -  27 Mar 2023 11:59  --------------------------------------------------------  IN: 140 mL / OUT: 75 mL / NET: 65 mL        Vital Signs Last 24 Hrs  T(C): 37.1 (27 Mar 2023 08:00), Max: 38.3 (26 Mar 2023 22:00)  T(F): 98.7 (27 Mar 2023 08:00), Max: 101 (26 Mar 2023 22:00)  HR: 80 (27 Mar 2023 11:36) (71 - 89)  BP: 121/62 (27 Mar 2023 11:00) (98/50 - 172/73)  BP(mean): 75 (27 Mar 2023 11:00) (60 - 102)  RR: 15 (27 Mar 2023 11:00) (15 - 25)  SpO2: 98% (27 Mar 2023 11:36) (95% - 100%)    Parameters below as of 27 Mar 2023 10:00  Patient On (Oxygen Delivery Method): ventilator        PHYSICAL EXAM:  GENERAL: no distress  PSYCH: A&O x0  HEAD: Atraumatic, Normocephalic  NECK: Supple, No JVD  CHEST/LUNG: clear to auscultation bilaterally  HEART: regular rate and rhythm, no murmurs  ABDOMEN: nontender to palpation, no rebound tenderness/guarding  EXTREMITIES: no edema on bilateral LE  NEUROLOGY: no focal neurologic deficit  SKIN: No rashes or lesions    LABS:                        6.8    8.79  )-----------( 158      ( 27 Mar 2023 01:50 )             21.8      03-27    131<L>  |  96<L>  |  67<H>  ----------------------------<  197<H>  3.9   |  23  |  2.94<H>    Ca    8.7      27 Mar 2023 00:45  Phos  4.8     03-27  Mg     2.30     03-27    TPro  4.7<L>  /  Alb  1.9<L>  /  TBili  0.3  /  DBili  x   /  AST  29  /  ALT  28  /  AlkPhos  124<H>  03-27    PT/INR - ( 27 Mar 2023 00:45 )   PT: 13.7 sec;   INR: 1.18 ratio         PTT - ( 27 Mar 2023 00:45 )  PTT:25.0 sec          RADIOLOGY & ADDITIONAL TESTS:    Imaging Personally Reviewed:    Consultant(s) Notes Reviewed:      Care Discussed with Consultants/Other Providers:

## 2023-03-27 NOTE — PROGRESS NOTE ADULT - ATTENDING COMMENTS
77 yo F w/ HTN, HLD, T2DM, PCKD previously on HD via LUE AVF then s/p renal transplant, LLE DVT (resolved) hypothyroidism, prior PCP PNA, CMV viremia who initially presented with R-sided HA, R ear pain and neck stiffness found to have Strep Pneumo bacteremia and meningitis in setting of otitis externa. Course c/b cardiac arrest 3/16 and post arrest seizures and NAMAN.    1) Sepsis 2/2 strep pneumoniae bacteremia/meningitis - Vasopressors weaned off. Continue ceftriaxone. TTE negative for vegetations. Blood cultures cleared since 3/17. Awaiting MRI of auditory canal, mri brain ordered today  2) PCKD s/p renal transplant c/b NAMAN - Now requiring intermittent HD. Last HD session 3/24. Continue prednisone for renal transplant, holding further immunosuppression due to strep bacteremia and meningitis. Monitor electrolytes. F/u renal recs, likely will need HD today  3) Persistent seizures - Continue keppra and vimpat. EEG negative for further seizures, vEEG d/c 3/25. Continue to monitor off sedation. Pending brain MRI.  4) Acute hypoxic respiratory failure - Continue mechanical ventilation, wean support as tolerated. Daily SBT as tolerated. Mental status precluding extubation at this time.  5) HTN - Continue amlodipine. Well controlled.  6) AMS - unclear etiology, could be related to acute metabolic encephalopathy versus cardiac arrest, f/u brain mri    Critically ill patient requiring frequent bedside visits with therapy changes.

## 2023-03-27 NOTE — PROGRESS NOTE ADULT - SUBJECTIVE AND OBJECTIVE BOX
[facility] - S Alea / Tita S /D Rama/ S Jovan/ S Keenan/ Cleve Gambino / DAGMAR Wilsonu/ O Daya  service -[phone], office [phone]  ---------------------------------------------------------------------------------------------------------------    Patient seen and examined bedside    Subjective and Objective: No overnight events, sob resolved. No complaints today. feeling better    Allergies: apple (Unknown)  Benadryl (Unknown)  penicillin (Hives)  watermelon (Unknown)      Hospital Medications:   MEDICATIONS  (STANDING):  amLODIPine   Tablet 10 milliGRAM(s) Oral every 24 hours  atovaquone  Suspension 1500 milliGRAM(s) Oral daily  cefTRIAXone   IVPB 2000 milliGRAM(s) IV Intermittent every 12 hours  chlorhexidine 0.12% Liquid 15 milliLiter(s) Oral Mucosa two times a day  chlorhexidine 2% Cloths 1 Application(s) Topical <User Schedule>  ciprofloxacin  0.3% Ophthalmic Solution for Otic Use 4 Drop(s) Right Ear two times a day  dextrose 5%. 1000 milliLiter(s) (100 mL/Hr) IV Continuous <Continuous>  dextrose 5%. 1000 milliLiter(s) (50 mL/Hr) IV Continuous <Continuous>  dextrose 50% Injectable 25 Gram(s) IV Push once  dextrose 50% Injectable 12.5 Gram(s) IV Push once  dextrose 50% Injectable 25 Gram(s) IV Push once  glucagon  Injectable 1 milliGRAM(s) IntraMuscular once  heparin   Injectable 5000 Unit(s) SubCutaneous every 8 hours  insulin lispro (ADMELOG) corrective regimen sliding scale   SubCutaneous every 6 hours  lacosamide IVPB 200 milliGRAM(s) IV Intermittent every 12 hours  levETIRAcetam  IVPB 750 milliGRAM(s) IV Intermittent every 12 hours  levothyroxine Injectable 18 MICROGram(s) IV Push at bedtime  petrolatum Ophthalmic Ointment 1 Application(s) Both EYES every 12 hours  polyethylene glycol 3350 17 Gram(s) Oral two times a day  predniSONE   Tablet 5 milliGRAM(s) Oral daily  senna Syrup 10 milliLiter(s) Oral two times a day      REVIEW OF SYSTEMS:  CONSTITUTIONAL: No weakness, fevers or chills  EYES/ENT: No visual changes;  No vertigo or throat pain   NECK: No pain or stiffness  RESPIRATORY: No cough, wheezing, hemoptysis; No shortness of breath  CARDIOVASCULAR: No chest pain or palpitations.  GASTROINTESTINAL: No abdominal or epigastric pain. No nausea, vomiting, or hematemesis; No diarrhea or constipation. No melena or hematochezia.  GENITOURINARY: No dysuria, frequency, foamy urine, urinary urgency, incontinence or hematuria  NEUROLOGICAL: No numbness or weakness  SKIN: No itching, burning, rashes, or lesions   VASCULAR: No bilateral lower extremity edema.   All other review of systems is negative unless indicated above.    VITALS:  T(F): 97.3 (03-27-23 @ 13:33), Max: 101 (03-26-23 @ 22:00)  HR: 76 (03-27-23 @ 15:00)  BP: 119/61 (03-27-23 @ 15:00)  RR: 20 (03-27-23 @ 15:00)  SpO2: 100% (03-27-23 @ 15:00)  Wt(kg): --    03-26 @ 07:01  -  03-27 @ 07:00  --------------------------------------------------------  IN: 1490 mL / OUT: 550 mL / NET: 940 mL    03-27 @ 07:01  -  03-27 @ 15:58  --------------------------------------------------------  IN: 315 mL / OUT: 175 mL / NET: 140 mL          PHYSICAL EXAM:  Constitutional: NAD  HEENT: anicteric sclera, oropharynx clear  Neck: No JVD  Respiratory: CTAB, no wheezes, rales or rhonchi  Cardiovascular: S1, S2, RRR  Gastrointestinal: BS+, soft, NT/ND  Extremities: No cyanosis or clubbing. No peripheral edema  Neurological: A/O x 3, no focal deficits  Psychiatric: Normal mood, normal affect  : No CVA tenderness. No snider.   Skin: No rashes  Vascular Access:    LABS:  03-27    131<L>  |  96<L>  |  67<H>  ----------------------------<  197<H>  3.9   |  23  |  2.94<H>    Ca    8.7      27 Mar 2023 00:45  Phos  4.8     03-27  Mg     2.30     03-27    TPro  4.7<L>  /  Alb  1.9<L>  /  TBili  0.3  /  DBili      /  AST  29  /  ALT  28  /  AlkPhos  124<H>  03-27    Creatinine Trend: 2.94 <--, 2.60 <--, 2.39 <--, 2.04 <--, 2.35 <--, 1.72 <--, 1.95 <--, 1.26 <--, 1.24 <--, 1.31 <--                        6.8    8.79  )-----------( 158      ( 27 Mar 2023 01:50 )             21.8     Urine Studies:  Urinalysis Basic - ( 23 Mar 2023 12:55 )    Color: Yellow / Appearance: Slightly Turbid / SG: >1.030 / pH:   Gluc:  / Ketone: Trace  / Bili: Negative / Urobili: <2 mg/dL   Blood:  / Protein: >300 / Nitrite: Negative   Leuk Esterase: Negative / RBC: 0-5 /HPF / WBC 6-10 /HPF   Sq Epi:  / Non Sq Epi: Occasional / Bacteria: Occasional          RADIOLOGY & ADDITIONAL STUDIES:   [facility] - S Alea / Tita S /D Rama/ S Jovan/ S Keenan/ Cleve Gambino / DAGMAR Wilsonu/ O Daya  service -[phone], office [phone]  ---------------------------------------------------------------------------------------------------------------    Patient seen and examined bedside in MICU    Subjective and Objective: No overnight events, remains unresponsive, on MV  family bedside    Allergies: apple (Unknown)  Benadryl (Unknown)  penicillin (Hives)  watermelon (Unknown)      [facility] Medications:   MEDICATIONS  (STANDING):  amLODIPine   Tablet 10 milliGRAM(s) Oral every 24 hours  atovaquone  Suspension 1500 milliGRAM(s) Oral daily  cefTRIAXone   IVPB 2000 milliGRAM(s) IV Intermittent every 12 hours  chlorhexidine 0.12% Liquid 15 milliLiter(s) Oral Mucosa two times a day  chlorhexidine 2% Cloths 1 Application(s) Topical <User Schedule>  ciprofloxacin  0.3% Ophthalmic Solution for Otic Use 4 Drop(s) Right Ear two times a day  dextrose 5%. 1000 milliLiter(s) (100 mL/Hr) IV Continuous <Continuous>  dextrose 5%. 1000 milliLiter(s) (50 mL/Hr) IV Continuous <Continuous>  dextrose 50% Injectable 25 Gram(s) IV Push once  dextrose 50% Injectable 12.5 Gram(s) IV Push once  dextrose 50% Injectable 25 Gram(s) IV Push once  glucagon  Injectable 1 milliGRAM(s) IntraMuscular once  heparin   Injectable 5000 Unit(s) SubCutaneous every 8 hours  insulin lispro (ADMELOG) corrective regimen sliding scale   SubCutaneous every 6 hours  lacosamide IVPB 200 milliGRAM(s) IV Intermittent every 12 hours  levETIRAcetam  IVPB 750 milliGRAM(s) IV Intermittent every 12 hours  levothyroxine Injectable 18 MICROGram(s) IV Push at bedtime  petrolatum Ophthalmic Ointment 1 Application(s) Both EYES every 12 hours  polyethylene glycol 3350 17 Gram(s) Oral two times a day  predniSONE   Tablet 5 milliGRAM(s) Oral daily  senna Syrup 10 milliLiter(s) Oral two times a day    VITALS:  T(F): 97.3 (03-27-23 @ 13:33), Max: 101 (03-26-23 @ 22:00)  HR: 76 (03-27-23 @ 15:00)  BP: 119/61 (03-27-23 @ 15:00)  RR: 20 (03-27-23 @ 15:00)  SpO2: 100% (03-27-23 @ 15:00)  Wt(kg): --    03-26 @ 07:01  -  03-27 @ 07:00  --------------------------------------------------------  IN: 1490 mL / OUT: 550 mL / NET: 940 mL    03-27 @ 07:01  -  03-27 @ 15:58  --------------------------------------------------------  IN: 315 mL / OUT: 175 mL / NET: 140 mL      PHYSICAL EXAM:  Constitutional: NAD  HEENT: ETT+, OGT+   Neck: No JVD  Respiratory: CTAB, no wheezes, rales or rhonchi  Cardiovascular: S1, S2, RRR  Gastrointestinal: BS+  Extremities: No peripheral edema  Neurological: unresponsive  - +snider  Vascular Access: left femoral shiley+     LABS:  03-27    131<L>  |  96<L>  |  67<H>  ----------------------------<  197<H>  3.9   |  23  |  2.94<H>    Ca    8.7      27 Mar 2023 00:45  Phos  4.8     03-27  Mg     2.30     03-27    TPro  4.7<L>  /  Alb  1.9<L>  /  TBili  0.3  /  DBili      /  AST  29  /  ALT  28  /  AlkPhos  124<H>  03-27    Creatinine Trend: 2.94 <--, 2.60 <--, 2.39 <--, 2.04 <--, 2.35 <--, 1.72 <--, 1.95 <--, 1.26 <--, 1.24 <--, 1.31 <--                        6.8    8.79  )-----------( 158      ( 27 Mar 2023 01:50 )             21.8     Urine Studies:  Urinalysis Basic - ( 23 Mar 2023 12:55 )    Color: Yellow / Appearance: Slightly Turbid / SG: >1.030 / pH:   Gluc:  / Ketone: Trace  / Bili: Negative / Urobili: <2 mg/dL   Blood:  / Protein: >300 / Nitrite: Negative   Leuk Esterase: Negative / RBC: 0-5 /HPF / WBC 6-10 /HPF   Sq Epi:  / Non Sq Epi: Occasional / Bacteria: Occasional          RADIOLOGY & ADDITIONAL STUDIES:

## 2023-03-28 NOTE — PROGRESS NOTE ADULT - SUBJECTIVE AND OBJECTIVE BOX
Patient is a 78y old  Female who presents with a chief complaint of AMS/R ear infxn (15 Mar 2023 10:36)    f/u pneumococcal sepsis, bacteremia and meningitis    Interval History/ROS:  remains lethargic,  on ventilator ROS unable    PAST MEDICAL & SURGICAL HISTORY:  Polycystic kidney disease  Glaucoma  Aneurysm  History of deep venous thrombosis (DVT) of distal vein of left lower extremity  Thrombocytopenia  Hypothyroid  Osteoporosis  Arthritis  PCP (pneumocystis carinii pneumonia)   C. difficile colitis  Type 2 diabetes mellitus, without long-term current use of insulin  Essential hypertension  H/O kidney transplant (cadaver , [facility])  H/O:   H/O eye surgery  S/P hysterectomy  H/O umbilical hernia repair    Allergies  apple (Unknown)  Benadryl (Unknown)  penicillin (Hives), tolerate cephalosporins  watermelon (Unknown)    ANTIMICROBIALS:  meropenem  IVPB 1000 every 12 hours (3/15)  vancomycin  IVPB (3/15-3/17)    active:  cefTRIAXone   IVPB 2000 every 12 hours (3/15-)  atovaquone  Suspension 1500 daily    MEDICATIONS  (STANDING):  amLODIPine   Tablet 10 every 24 hours  heparin   Injectable 5000 every 8 hours  insulin lispro (ADMELOG) corrective regimen sliding scale  every 6 hours  lacosamide IVPB 200 every 12 hours  levETIRAcetam  IVPB 750 every 12 hours  levothyroxine Injectable 18 at bedtime  polyethylene glycol 3350 17 two times a day  predniSONE   Tablet 5 daily  senna Syrup 10 two times a day    Vital Signs Last 24 Hrs  T(F): 99.5 (23 @ 08:00), Max: 100.3 (23 @ 04:00)  HR: 83 (23 @ 09:00)  BP: 135/70 (23 @ 09:00)  RR: 17 (23 @ 09:00)  SpO2: 100% (23 @ 09:00) (98% - 100%)    PHYSICAL EXAM:  Constitutional: lethargic off sedation on vent  HEAD/EYES: keeps eyes closed  ENT:  supple  Cardiovascular:   normal S1, S2  Respiratory:  clear BS bilaterally  GI:  soft, non-tender, enlarged   :  snider  Musculoskeletal:  no synovitis  Neurologic: lethargic, sedated, unable to assess  Skin:  no rash, left femoral HD catheter  Psychiatric:  not able to assess                                             9.3    11.13 )-----------( 209      ( 28 Mar 2023 00:25 )             29.5     128  |  93  |  72  ----------------------------<  149  4.1   |  22  |  2.95  Ca    8.7      28 Mar 2023 00:25Phos  4.8     -Mg     2.30       TPro  5.2  /  Alb  2.1  /  TBili  0.4  /  DBili  x   /  AST  34  /  ALT  31  /  AlkPhos  131      Urinalysis Basic - ( 15 Mar 2023 03:43 )  Color: Yellow / Appearance: Clear / S.019 / pH: x  Gluc: x / Ketone: Negative  / Bili: Negative / Urobili: <2 mg/dL   Blood: x / Protein: 300 mg/dL / Nitrite: Negative   Leuk Esterase: Negative / RBC: 8 /HPF / WBC 2 /HPF   Sq Epi: x / Non Sq Epi: 1 /HPF / Bacteria: Negative    MICROBIOLOGY:  Culture - Sputum (collected 23 @ 12:30)  Source: ET Tube ET Tube  Gram Stain (23 @ 19:27):    No polymorphonuclear leukocytes seen per low power field    Rare Squamous epithelial cells seen per low power field    No organisms seen per oil power field    3/21 BC (-)  3/19 Trach Asp Tracheal Aspirate (-)  3/17 BC (-)  3/16 Bronchial Lavage (-)  3/16 CSF (+) Streptococcus pneumoniae PCR  3/15 BC (+) Streptococcus pneumoniae    RADIOLOGY:  imaging below personally reviewed and agree with findings    MRI ordered not yet done    Xray Chest 1 View- PORTABLE-Urgent (Xray Chest 1 View- PORTABLE-Urgent .) (23 @ 03:05) >  IMPRESSION: No pneumothorax post central line attempt.    Xray Chest 1 View- PORTABLE-Urgent (Xray Chest 1 View- PORTABLE-Urgent .) (23 @ 14:53) >  IMPRESSION:  Support devices as above.  Improving bilateral aeration.    IR Procedure (03.15.23 @ 15:24) >  The opening pressure measured greater than 55 cm water. The closing pressure measured 32 cm water, after removal of 30 cc of cloudy CSF. The needle was removed. The patient tolerated the procedure well without initial complication.  IMPRESSION:  Status post successful lumbar puncture as described.  Markedly abnormal appearing CSF.  Elevated opening pressures.    US Kidney and Bladder (03.15.23 @ 12:10) >  IMPRESSION:  Mild fullness of the left lower quadrant transplant kidney collecting system.  Increased renal cortical echogenicity, possibly renal parenchymal disease.    CT Brain Perfusion Maps Stroke (23 @ 19:46) >  IMPRESSION:  CT PERFUSION:  Limited by late injection of the contrast bolus.  Cerebral blood flow less than 30% = 0 mL. No core infarct is predicted.  Tmax greater than 6 seconds = 0 mL. No brain parenchyma predicted to be at continued ischemicrisk in the presence of neurologic symptoms.  CTA BRAIN:  No flow-limiting stenosis or vascular aneurysm. No AVM.  CTA NECK:  Calcified plaque at the origin of the left internal carotid artery results in approximately 40% short segment stenosis.   Otherwise no flow-limiting stenosis. No evidence for arterial dissection.      ECHO  Transthoracic Echocardiogram (23 @ 11:48) >  CONCLUSIONS:  1.  Moderate aortic regurgitation.  2. Mild concentric left ventricular hypertrophy.  3. Normal left ventricular systolic function. No segmental wall motion abnormalities.  4. Reversal of the E-A  waves of the mitral inflow pattern is consistent with diastolic LV dysfunction.  5. Normal right ventricular size and function.  6. Normal tricuspid valve. Mild tricuspid regurgitation.  *** Compared with echocardiogram of 2018, no significant changes noted.

## 2023-03-28 NOTE — PROGRESS NOTE ADULT - SUBJECTIVE AND OBJECTIVE BOX
Inge Zhang PGY1  pager [phone] or check resident tab for coverage    Patient is a 78y old  Female who presents with a chief complaint of AMS/R ear infxn (27 Mar 2023 15:57)      SUBJECTIVE / OVERNIGHT EVENTS:    MEDICATIONS  (STANDING):  amLODIPine   Tablet 10 milliGRAM(s) Oral every 24 hours  atovaquone  Suspension 1500 milliGRAM(s) Oral daily  cefTRIAXone   IVPB 2000 milliGRAM(s) IV Intermittent every 12 hours  chlorhexidine 0.12% Liquid 15 milliLiter(s) Oral Mucosa two times a day  chlorhexidine 2% Cloths 1 Application(s) Topical <User Schedule>  ciprofloxacin  0.3% Ophthalmic Solution for Otic Use 4 Drop(s) Right Ear two times a day  dextrose 5%. 1000 milliLiter(s) (100 mL/Hr) IV Continuous <Continuous>  dextrose 5%. 1000 milliLiter(s) (50 mL/Hr) IV Continuous <Continuous>  dextrose 50% Injectable 25 Gram(s) IV Push once  dextrose 50% Injectable 12.5 Gram(s) IV Push once  dextrose 50% Injectable 25 Gram(s) IV Push once  glucagon  Injectable 1 milliGRAM(s) IntraMuscular once  heparin   Injectable 5000 Unit(s) SubCutaneous every 8 hours  insulin lispro (ADMELOG) corrective regimen sliding scale   SubCutaneous every 6 hours  lacosamide IVPB 200 milliGRAM(s) IV Intermittent every 12 hours  levETIRAcetam  IVPB 750 milliGRAM(s) IV Intermittent every 12 hours  levothyroxine Injectable 18 MICROGram(s) IV Push at bedtime  petrolatum Ophthalmic Ointment 1 Application(s) Both EYES every 12 hours  polyethylene glycol 3350 17 Gram(s) Oral two times a day  predniSONE   Tablet 5 milliGRAM(s) Oral daily  senna Syrup 10 milliLiter(s) Oral two times a day    MEDICATIONS  (PRN):  acetaminophen   Oral Liquid .. 650 milliGRAM(s) Enteral Tube every 6 hours PRN Temp greater or equal to 38C (100.4F), Mild Pain (1 - 3), Moderate Pain (4 - 6)  dextrose Oral Gel 15 Gram(s) Oral once PRN Blood Glucose LESS THAN 70 milliGRAM(s)/deciliter      CAPILLARY BLOOD GLUCOSE      POCT Blood Glucose.: 190 mg/dL (28 Mar 2023 05:18)  POCT Blood Glucose.: 111 mg/dL (27 Mar 2023 23:29)  POCT Blood Glucose.: 141 mg/dL (27 Mar 2023 17:22)  POCT Blood Glucose.: 144 mg/dL (27 Mar 2023 12:25)  POCT Blood Glucose.: 154 mg/dL (27 Mar 2023 11:41)    I&O's Summary    27 Mar 2023 07:01  -  28 Mar 2023 07:00  --------------------------------------------------------  IN: 1820 mL / OUT: 775 mL / NET: 1045 mL        Vital Signs Last 24 Hrs  T(C): 37.9 (28 Mar 2023 04:00), Max: 37.9 (28 Mar 2023 04:00)  T(F): 100.3 (28 Mar 2023 04:00), Max: 100.3 (28 Mar 2023 04:00)  HR: 84 (28 Mar 2023 06:00) (72 - 94)  BP: 160/67 (28 Mar 2023 06:00) (119/61 - 171/97)  BP(mean): 87 (28 Mar 2023 06:00) (71 - 112)  RR: 17 (28 Mar 2023 06:00) (15 - 23)  SpO2: 100% (28 Mar 2023 06:00) (98% - 100%)    Parameters below as of 27 Mar 2023 20:00  Patient On (Oxygen Delivery Method): ventilator,ac        PHYSICAL EXAM:  GENERAL: no distress  PSYCH: A&O x3  HEAD: Atraumatic, Normocephalic  NECK: Supple, No JVD  CHEST/LUNG: clear to auscultation bilaterally  HEART: regular rate and rhythm, no murmurs  ABDOMEN: nontender to palpation, no rebound tenderness/guarding  EXTREMITIES: no edema on bilateral LE  NEUROLOGY: no focal neurologic deficit  SKIN: No rashes or lesions    LABS:                        9.3    11.13 )-----------( 209      ( 28 Mar 2023 00:25 )             29.5      03-28    128<L>  |  93<L>  |  72<H>  ----------------------------<  149<H>  4.1   |  22  |  2.95<H>    Ca    8.7      28 Mar 2023 00:25  Phos  4.8     03-27  Mg     2.30     03-27    TPro  5.2<L>  /  Alb  2.1<L>  /  TBili  0.4  /  DBili  x   /  AST  34<H>  /  ALT  31  /  AlkPhos  131<H>  03-28    PT/INR - ( 27 Mar 2023 00:45 )   PT: 13.7 sec;   INR: 1.18 ratio         PTT - ( 27 Mar 2023 00:45 )  PTT:25.0 sec          RADIOLOGY & ADDITIONAL TESTS:    Imaging Personally Reviewed:    Consultant(s) Notes Reviewed:      Care Discussed with Consultants/Other Providers:   Inge Zhang PGY1  pager [phone] or check resident tab for coverage    Patient is a 78y old  Female who presents with a chief complaint of AMS/R ear infxn (27 Mar 2023 15:57)      SUBJECTIVE / OVERNIGHT EVENTS: Unable to place Shiley overnight. Patient still encephalopathic in the AM on no sedation.     MEDICATIONS  (STANDING):  amLODIPine   Tablet 10 milliGRAM(s) Oral every 24 hours  atovaquone  Suspension 1500 milliGRAM(s) Oral daily  cefTRIAXone   IVPB 2000 milliGRAM(s) IV Intermittent every 12 hours  chlorhexidine 0.12% Liquid 15 milliLiter(s) Oral Mucosa two times a day  chlorhexidine 2% Cloths 1 Application(s) Topical <User Schedule>  ciprofloxacin  0.3% Ophthalmic Solution for Otic Use 4 Drop(s) Right Ear two times a day  dextrose 5%. 1000 milliLiter(s) (100 mL/Hr) IV Continuous <Continuous>  dextrose 5%. 1000 milliLiter(s) (50 mL/Hr) IV Continuous <Continuous>  dextrose 50% Injectable 25 Gram(s) IV Push once  dextrose 50% Injectable 12.5 Gram(s) IV Push once  dextrose 50% Injectable 25 Gram(s) IV Push once  glucagon  Injectable 1 milliGRAM(s) IntraMuscular once  heparin   Injectable 5000 Unit(s) SubCutaneous every 8 hours  insulin lispro (ADMELOG) corrective regimen sliding scale   SubCutaneous every 6 hours  lacosamide IVPB 200 milliGRAM(s) IV Intermittent every 12 hours  levETIRAcetam  IVPB 750 milliGRAM(s) IV Intermittent every 12 hours  levothyroxine Injectable 18 MICROGram(s) IV Push at bedtime  petrolatum Ophthalmic Ointment 1 Application(s) Both EYES every 12 hours  polyethylene glycol 3350 17 Gram(s) Oral two times a day  predniSONE   Tablet 5 milliGRAM(s) Oral daily  senna Syrup 10 milliLiter(s) Oral two times a day    MEDICATIONS  (PRN):  acetaminophen   Oral Liquid .. 650 milliGRAM(s) Enteral Tube every 6 hours PRN Temp greater or equal to 38C (100.4F), Mild Pain (1 - 3), Moderate Pain (4 - 6)  dextrose Oral Gel 15 Gram(s) Oral once PRN Blood Glucose LESS THAN 70 milliGRAM(s)/deciliter      CAPILLARY BLOOD GLUCOSE      POCT Blood Glucose.: 190 mg/dL (28 Mar 2023 05:18)  POCT Blood Glucose.: 111 mg/dL (27 Mar 2023 23:29)  POCT Blood Glucose.: 141 mg/dL (27 Mar 2023 17:22)  POCT Blood Glucose.: 144 mg/dL (27 Mar 2023 12:25)  POCT Blood Glucose.: 154 mg/dL (27 Mar 2023 11:41)    I&O's Summary    27 Mar 2023 07:01  -  28 Mar 2023 07:00  --------------------------------------------------------  IN: 1820 mL / OUT: 775 mL / NET: 1045 mL        Vital Signs Last 24 Hrs  T(C): 37.9 (28 Mar 2023 04:00), Max: 37.9 (28 Mar 2023 04:00)  T(F): 100.3 (28 Mar 2023 04:00), Max: 100.3 (28 Mar 2023 04:00)  HR: 84 (28 Mar 2023 06:00) (72 - 94)  BP: 160/67 (28 Mar 2023 06:00) (119/61 - 171/97)  BP(mean): 87 (28 Mar 2023 06:00) (71 - 112)  RR: 17 (28 Mar 2023 06:00) (15 - 23)  SpO2: 100% (28 Mar 2023 06:00) (98% - 100%)    Parameters below as of 27 Mar 2023 20:00  Patient On (Oxygen Delivery Method): ventilator,ac        PHYSICAL EXAM:  GENERAL: no distress  PSYCH: A&O x0  HEAD: Atraumatic, Normocephalic  NECK: Supple, No JVD  CHEST/LUNG: coarse sounds to auscultation bilaterally  HEART: regular rate and rhythm, no murmurs  ABDOMEN: nontender to palpation, no rebound tenderness/guarding  EXTREMITIES: no edema on bilateral LE  NEUROLOGY: no focal neurologic deficit  SKIN: No rashes or lesions    LABS:                        9.3    11.13 )-----------( 209      ( 28 Mar 2023 00:25 )             29.5      03-28    128<L>  |  93<L>  |  72<H>  ----------------------------<  149<H>  4.1   |  22  |  2.95<H>    Ca    8.7      28 Mar 2023 00:25  Phos  4.8     03-27  Mg     2.30     03-27    TPro  5.2<L>  /  Alb  2.1<L>  /  TBili  0.4  /  DBili  x   /  AST  34<H>  /  ALT  31  /  AlkPhos  131<H>  03-28    PT/INR - ( 27 Mar 2023 00:45 )   PT: 13.7 sec;   INR: 1.18 ratio         PTT - ( 27 Mar 2023 00:45 )  PTT:25.0 sec          RADIOLOGY & ADDITIONAL TESTS:    Imaging Personally Reviewed:    Consultant(s) Notes Reviewed:      Care Discussed with Consultants/Other Providers:   Inge Zhang PGY1  pager [phone] or check resident tab for covera ge    Patient is a 78y old  Female who presents with a chief complaint of AMS/R ear infxn (27 Mar 2023 15:57)      SUBJECTIVE / OVERNIGHT EVENTS: Unable to place Shiley overnight. Patient still encephalopathic in the AM on no sedation.     MEDICATIONS  (STANDING):  amLODIPine   Tablet 10 milliGRAM(s) Oral every 24 hours  atovaquone  Suspension 1500 milliGRAM(s) Oral daily  cefTRIAXone   IVPB 2000 milliGRAM(s) IV Intermittent every 12 hours  chlorhexidine 0.12% Liquid 15 milliLiter(s) Oral Mucosa two times a day  chlorhexidine 2% Cloths 1 Application(s) Topical <User Schedule>  ciprofloxacin  0.3% Ophthalmic Solution for Otic Use 4 Drop(s) Right Ear two times a day  dextrose 5%. 1000 milliLiter(s) (100 mL/Hr) IV Continuous <Continuous>  dextrose 5%. 1000 milliLiter(s) (50 mL/Hr) IV Continuous <Continuous>  dextrose 50% Injectable 25 Gram(s) IV Push once  dextrose 50% Injectable 12.5 Gram(s) IV Push once  dextrose 50% Injectable 25 Gram(s) IV Push once  glucagon  Injectable 1 milliGRAM(s) IntraMuscular once  heparin   Injectable 5000 Unit(s) SubCutaneous every 8 hours  insulin lispro (ADMELOG) corrective regimen sliding scale   SubCutaneous every 6 hours  lacosamide IVPB 200 milliGRAM(s) IV Intermittent every 12 hours  levETIRAcetam  IVPB 750 milliGRAM(s) IV Intermittent every 12 hours  levothyroxine Injectable 18 MICROGram(s) IV Push at bedtime  petrolatum Ophthalmic Ointment 1 Application(s) Both EYES every 12 hours  polyethylene glycol 3350 17 Gram(s) Oral two times a day  predniSONE   Tablet 5 milliGRAM(s) Oral daily  senna Syrup 10 milliLiter(s) Oral two times a day    MEDICATIONS  (PRN):  acetaminophen   Oral Liquid .. 650 milliGRAM(s) Enteral Tube every 6 hours PRN Temp greater or equal to 38C (100.4F), Mild Pain (1 - 3), Moderate Pain (4 - 6)  dextrose Oral Gel 15 Gram(s) Oral once PRN Blood Glucose LESS THAN 70 milliGRAM(s)/deciliter      CAPILLARY BLOOD GLUCOSE      POCT Blood Glucose.: 190 mg/dL (28 Mar 2023 05:18)  POCT Blood Glucose.: 111 mg/dL (27 Mar 2023 23:29)  POCT Blood Glucose.: 141 mg/dL (27 Mar 2023 17:22)  POCT Blood Glucose.: 144 mg/dL (27 Mar 2023 12:25)  POCT Blood Glucose.: 154 mg/dL (27 Mar 2023 11:41)    I&O's Summary    27 Mar 2023 07:01  -  28 Mar 2023 07:00  --------------------------------------------------------  IN: 1820 mL / OUT: 775 mL / NET: 1045 mL        Vital Signs Last 24 Hrs  T(C): 37.9 (28 Mar 2023 04:00), Max: 37.9 (28 Mar 2023 04:00)  T(F): 100.3 (28 Mar 2023 04:00), Max: 100.3 (28 Mar 2023 04:00)  HR: 84 (28 Mar 2023 06:00) (72 - 94)  BP: 160/67 (28 Mar 2023 06:00) (119/61 - 171/97)  BP(mean): 87 (28 Mar 2023 06:00) (71 - 112)  RR: 17 (28 Mar 2023 06:00) (15 - 23)  SpO2: 100% (28 Mar 2023 06:00) (98% - 100%)    Parameters below as of 27 Mar 2023 20:00  Patient On (Oxygen Delivery Method): ventilator,ac        PHYSICAL EXAM:  GENERAL: no distress  PSYCH: A&O x0  HEAD: Atraumatic, Normocephalic  NECK: Supple, No JVD  CHEST/LUNG: coarse sounds to auscultation bilaterally  HEART: regular rate and rhythm, no murmurs  ABDOMEN: nontender to palpation, no rebound tenderness/guarding  EXTREMITIES: no edema on bilateral LE  NEUROLOGY: no focal neurologic deficit  SKIN: No rashes or lesions    LABS:                        9.3    11.13 )-----------( 209      ( 28 Mar 2023 00:25 )             29.5      03-28    128<L>  |  93<L>  |  72<H>  ----------------------------<  149<H>  4.1   |  22  |  2.95<H>    Ca    8.7      28 Mar 2023 00:25  Phos  4.8     03-27  Mg     2.30     03-27    TPro  5.2<L>  /  Alb  2.1<L>  /  TBili  0.4  /  DBili  x   /  AST  34<H>  /  ALT  31  /  AlkPhos  131<H>  03-28    PT/INR - ( 27 Mar 2023 00:45 )   PT: 13.7 sec;   INR: 1.18 ratio         PTT - ( 27 Mar 2023 00:45 )  PTT:25.0 sec          RADIOLOGY & ADDITIONAL TESTS:    Imaging Personally Reviewed:    Consultant(s) Notes Reviewed:      Care Discussed with Consultants/Other Providers:

## 2023-03-28 NOTE — CHART NOTE - NSCHARTNOTEFT_GEN_A_CORE
: Marcia Carrero PGY3    INDICATION: EVAL of shiley    PROCEDURE:  [ ] LIMITED ECHO  [ ] LIMITED CHEST  [ ] LIMITED RETROPERITONEAL  [ ] LIMITED ABDOMINAL  [x ] LIMITED DVT  [ ] NEEDLE GUIDANCE VASCULAR  [ ] NEEDLE GUIDANCE THORACENTESIS  [ ] NEEDLE GUIDANCE PARACENTESIS  [ ] NEEDLE GUIDANCE PERICARDIOCENTESIS  [ ] OTHER    FINDINGS: RIGHT femoral vein with smoke sign suggestive of congestion vs. clot with suspicious hyperintense finding intravascularly. Femoral vein remains compressible. LEFT femoral vein unremarkable.      INTERPRETATION: RIGHT femoral vein with findings suggestive of stasis vs. clot. Will obtain formal doppler.        Images uploaded to [employer]

## 2023-03-28 NOTE — CONSULT NOTE ADULT - ASSESSMENT
79 y/o F with h/o HTN, HLD, DM2, PCKD previously on HD via LUE AVF then s/p renal transplant, LLE DVT (resolved, nml dopplers 12/2022) on ASA, very small supraclinoid aneurysm (reportedly unchanged on MRA 10/2020), hypothyroidism, PCP 2021 on atovaquone ppx, reportedly hospitalized in 12/2022 for rectal prolapse, had a blood transfusion at that time, also + CMV 1/25/23 who presented with R-sided HA, R ear pain and neck stiffness after visit to ENT earlier in the day, altered on presentation, CT head unremarkable for stroke, however LP with IR and BCx showing +strep pneumoniae. currently on vanc and CTX per ID. Cardiac arrest 3/16 with ROSC, transferred to MICU for further management. Patient remains intubated, off sedation but not arousable.     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 Pt without overt GI bleeding during this hospitalization. Noted to have down-trending Hgb requiring transfusions on 3/16, 3/21, 3/24, and 3/27    Recommendations  - trend CBC, keep active T&S, transfuse for Hgb<8  - please obtain CT A/P to r/o RP bleed, can obtain noconcon given reduced renal function    All recommendations are tentative until note is attested by attending.     Estefany Barajas, PGY5  Gastroenterology/Hepatology Fellow  Available on Microsoft Teams  [phone] ([facility] Short Range Pager)  [phone] (Long Range Pager)    After 5pm, please contact the on-call GI fellow. [phone]

## 2023-03-28 NOTE — PROGRESS NOTE ADULT - ASSESSMENT
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 Went to ENT and was diagnosed with R otitis externa secondary to perforated TM. Prescribed cipro/dexamethasone.  Worsening headache and EMS called. Admitted 3/14.  CT head negative.  NAMAN.  Started on vanc/meropenem.  IR obtained LP.  Findings c/w meningitis, culture +pneumococcus.  Antibiotics narrowed to ceftriaxone.  Remains lethargic off sedation with possible seizures.  Intermittent fevers.      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

## 2023-03-28 NOTE — PROGRESS NOTE ADULT - ASSESSMENT
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  Cardiac arrest on 3/16/23  septic shock s/p iv pressors  intubated on MV  Renal following for NAMAN on CKD Mx.    NAMAN on CKD4  Creatinine Trend: 2.60 <--, 2.39 <--, 2.04 <--, 2.35 <--, 1.72 <--, 1.95 <--, 1.26 <--, 1.24 <--, 1.31 <--, 1.16 <--, 1.38 <--, 1.31 <--  w/worsened renal function, oliguria, acidosis, mild hyperkalemia- s/p CVVHD, now on  intermittent HD  Consent for HD obtained, signed by daughter 3/16/23  KTx 2003  B/L creat around 2.8 since Dec 2022  Patient receives Cyclosporine (Gengraf) 75mg PO q12hrs,  BID and Prednisone 5 QD for transplant  oliguria- u/o 445ml 3/25 24hrs; 215 ml today fr 12hrs  Cr rising since last hd    plan:  In light of severe sepsis and cardiac arrest: holding MMF and Cyclosporine  c/w predniSONE   Tablet 5 milliGRAM(s) Oral daily  off cvvh   s/p last HD 3/24, Rx sheet reviewed, net UF 1kg removed, tolerated well. uneventful.   unsuccessful IJ shiley insertion by ICU team 3/26.   attempted to dialyze ysterday using femoral shiley, poor bfr 200ml/min, lasted for about 1/2hr, then didn't work. HD aborted  Had TPA instilled in catheter, ICU team instructs to attempt to use access again today  will attempt HD today with shiley S/P TPA -> will need exchange if unable to use for HD  HTN- bp better now, controlled- c/w amLODIPine   Tablet 10 milliGRAM(s) every 24 hours  keep Ponce  monitor U/O  c/w TF  monitor BMP qdaily now  dose all meds for eGFR<15ml/min.   avoid ACEi/ARB/NSAIDs/Nephrotoxics if able.    Metabolic acidosis -resolved  OM and OE  Abxs per Infectious disease specialist with dose adjustment per GFR  f/u cxs  vent Mx, per ICU  off pressors now.       prognosis guarded  [facility] Nephrologist Dr. Hugo Hernandez [phone]  For any question, call:  Cell # [phone]  Pager # [phone]  Callback # [phone]  Ans Serv [phone]

## 2023-03-28 NOTE — PROGRESS NOTE ADULT - SUBJECTIVE AND OBJECTIVE BOX
[facility] - DANNA Cosby / DANNA Rivers / LENCHO Ontiveros/ DANNA Aldana/ DANNA Hussein/ QUYEN Gambino / LUCIAN Mukherjee / GISELE Stapleton  ---------------------------------------------------------------------------------------------------------------  seen and examined today for NAMAN on KTx requiring HD  Interval : pending HD today  VITALS:  T(F): 97.5 (03-28-23 @ 12:00), Max: 100.3 (03-28-23 @ 04:00)  HR: 78 (03-28-23 @ 15:00)  BP: 131/71 (03-28-23 @ 15:00)  RR: 19 (03-28-23 @ 15:00)  SpO2: 100% (03-28-23 @ 15:00)  Wt(kg): --    03-27 @ 07:01  -  03-28 @ 07:00  --------------------------------------------------------  IN: 1820 mL / OUT: 775 mL / NET: 1045 mL    03-28 @ 07:01  -  03-28 @ 15:22  --------------------------------------------------------  IN: 260 mL / OUT: 90 mL / NET: 170 mL      Physical Exam :-  Constitutional: NAD  Neck: Supple.  Respiratory: Bilateral equal breath sounds,  Cardiovascular: S1, S2 normal,  Gastrointestinal: Bowel Sounds present, soft, non tender.  Extremities: B/L LE edema  Neurological: obtunded  Psychiatric: poorly responsive  Data:-  Allergies :   apple (Unknown)  Benadryl (Unknown)  penicillin (Hives)  watermelon (Unknown)    Hospital Medications:   MEDICATIONS  (STANDING):  amLODIPine   Tablet 10 milliGRAM(s) Oral every 24 hours  atovaquone  Suspension 1500 milliGRAM(s) Oral daily  cefTRIAXone   IVPB 2000 milliGRAM(s) IV Intermittent every 12 hours  chlorhexidine 0.12% Liquid 15 milliLiter(s) Oral Mucosa two times a day  chlorhexidine 2% Cloths 1 Application(s) Topical <User Schedule>  ciprofloxacin  0.3% Ophthalmic Solution for Otic Use 4 Drop(s) Right Ear two times a day  dextrose 5%. 1000 milliLiter(s) (100 mL/Hr) IV Continuous <Continuous>  dextrose 5%. 1000 milliLiter(s) (50 mL/Hr) IV Continuous <Continuous>  dextrose 50% Injectable 25 Gram(s) IV Push once  dextrose 50% Injectable 12.5 Gram(s) IV Push once  dextrose 50% Injectable 25 Gram(s) IV Push once  glucagon  Injectable 1 milliGRAM(s) IntraMuscular once  heparin   Injectable 5000 Unit(s) SubCutaneous every 8 hours  insulin lispro (ADMELOG) corrective regimen sliding scale   SubCutaneous every 6 hours  lacosamide IVPB 200 milliGRAM(s) IV Intermittent every 12 hours  levETIRAcetam  IVPB 750 milliGRAM(s) IV Intermittent every 12 hours  levothyroxine Injectable 18 MICROGram(s) IV Push at bedtime  petrolatum Ophthalmic Ointment 1 Application(s) Both EYES every 12 hours  polyethylene glycol 3350 17 Gram(s) Oral two times a day  predniSONE   Tablet 5 milliGRAM(s) Oral daily  senna Syrup 10 milliLiter(s) Oral two times a day    03-28    128<L>  |  93<L>  |  72<H>  ----------------------------<  149<H>  4.1   |  22  |  2.95<H>    Ca    8.7      28 Mar 2023 00:25  Phos  4.8     03-27  Mg     2.30     03-27    TPro  5.2<L>  /  Alb  2.1<L>  /  TBili  0.4  /  DBili      /  AST  34<H>  /  ALT  31  /  AlkPhos  131<H>  03-28    Creatinine Trend: 2.95 <--, 2.94 <--, 2.60 <--, 2.39 <--, 2.04 <--, 2.35 <--, 1.72 <--, 1.95 <--                        9.3    11.13 )-----------( 209      ( 28 Mar 2023 00:25 )             29.5

## 2023-03-28 NOTE — PROGRESS NOTE ADULT - ATTENDING COMMENTS
79 yo F w/ HTN, HLD, T2DM, PCKD previously on HD via LUE AVF then s/p renal transplant, LLE DVT (resolved) hypothyroidism, prior PCP PNA, CMV viremia who initially presented with R-sided HA, R ear pain and neck stiffness found to have Strep Pneumo bacteremia and meningitis in setting of otitis externa. Course c/b cardiac arrest 3/16 and post arrest seizures and NAMAN.    1) Sepsis 2/2 strep pneumoniae bacteremia/meningitis - Vasopressors weaned off. Continue ceftriaxone. TTE negative for vegetations. Blood cultures cleared since 3/17. Awaiting MRI of auditory canal, mri brain still pending  2) PCKD s/p renal transplant c/b NAMAN - Now requiring intermittent HD. Last HD session 3/24. Continue prednisone for renal transplant, holding further immunosuppression due to strep bacteremia and meningitis. HD unable to be completed yesterday due to ?clot in catheter, will try cathflo or call IR given inability to pass wire in either IJ; check LE duplex  3) Persistent seizures - Continue keppra and vimpat. EEG negative for further seizures, vEEG d/c 3/25. Continue to monitor off sedation. Pending brain MRI.  4) Acute hypoxic respiratory failure - Continue mechanical ventilation, wean support as tolerated. Daily SBT as tolerated. Mental status precluding extubation at this time.  5) HTN - Continue amlodipine. Well controlled.  6) AMS - unclear etiology, could be related to acute metabolic encephalopathy versus cardiac arrest, f/u brain mri    Critically ill patient requiring frequent bedside visits with therapy changes.

## 2023-03-28 NOTE — PROGRESS NOTE ADULT - ASSESSMENT
79 y/o F with h/o HTN, HLD, DM2, PCKD previously on HD via LUE AVF then s/p renal transplant, LLE DVT (resolved, nml dopplers 12/2022) on ASA, very small supraclinoid aneurysm (reportedly unchanged on MRA 10/2020), hypothyroidism, PCP 2021 on atovaquone ppx, reportedly hospitalized in 12/2022 for rectal prolapse, had a blood transfusion at that time, also + CMV 1/25/23 who presented with R-sided HA, R ear pain and neck stiffness after visit to ENT earlier in the day, altered on presentation, CT head unremarkable for stroke, however LP with IR and BCx showing +strep pneumoniae. currently on vanc and CTX per ID. Cardiac arrest 3/16 with ROSC, transferred to MICU for further management.       Plan:   Neurological  #Currently Intubated, off sedation  - off versed over 48 hours  - EEG no seizures  - neuron specific enolase wnl  - f/u repeat ammonia level and TSH     #Strep pneumo meningitis #Acute encephalopathy  severe HA, neck stiffness, fever, leukocytosis concerning for meningitis/encephalitis  CT head negative for CVA.   LP and BCx 3/15 both showing gram positive cocci in pairs, and + strep pneumoniae.   likely source of entry from R ear where pt c/o discomfort for several months.  patient is on cyclosporine, mycophenolate mofetil and prednisone due to kidney transplant and is immunocompromised   dental procedure 3/14 but less likely source   - f/u MRI of the IAC and brain    #Seizures  - 24hr EEG initially with highly focal seizures, improved with increased sedation.   - keppra loaded 500mg IVP (3/17) and started on 250mg IV q12h (3/17 - 3/19) increased to 750 mg IV q12h (CRRT dosing (3/19 - 3/20), then increased to 1000mg IV q12 (3/20 - 3/22), then back to 750mg IV q12 (3/22 - [ ])  - vimpat 100 BID increased to 200 BID (3/20 - [ ])  -Off versed; off prop  -now seizure free    Cardiovascular  #Cardiac Arrest  bradycardia to 30, pulseless, code blue was called 3/16   2 rounds of epi, and PEA arrest. on 3rd pulse check pulseless VT, 200J shock given  4th pulse check asystole, 5th ROSC, sinus tachy with HUMA. IO placed and levo started. Due to blood in mouth took several attempts with DL for ETT to be placed but ultimately confirmed with capnometry and bilateral breath sounds.     #Elevated troponin  uptrending troponin to 182 3/15 AM, was likely iso ESRD  s/p cardiac arrest, elevated ST segment   - TTE from 3/17 showed EF of 63% with mild pulm htn, normal left ventricular systolic function, and diastolic LV dysfunction.      #HTN  on amlodipine 5mg, losartan 100mg, torsemide 20mg, and clonidine 0.2 patch weekly  -Back on amlodipine, can uptitrate if needed or resume other oral agents        Respiratory/ENT  #Intubated  - off sedation  - CPAP trial 8/5 on 3/25 and 3/27    # Tympanic membrane rupture  discharge from R ear, evaluated by ENT given dexamethasone and cipro 3/14  - c/w broad spectrum abx   - MRI of IAC   - f/u ENT recs    Gastrointestinal  #Constipation  - Abdominal xray negative for bowel obstruction  - trial on movantik  - c/w bowel regimen  - increase trickle feeds as tolerated and bowel regimen  - now having consistent bowel movements    Renal  #ESRD #s/p Kidney Transplant in 2003 at [place] #PCKD  previously HD through LLE AVF but no HD since transplant  Cr 3.8, unclear baseline  - avoid nephrotoxic agents  - holding cyclosporine and mycophenolate   - c/w home prednisone 5mg qday  - strict I/O and replete electrolytes  - s/p 4mg IV bumex with minimal urine production (3/17)  - started on CRRT (3/17-3/21) net negative 100cc/hr goal  - s/p intermittent HD (3/22 and 3/24)  - Patient with uptrending Scr; repeat CBC after 1u prbc 3/27       Hematological  # h/o LLE DVT  resolved, no DVT on doppler 12/22.  - negative LLE duplex this admission 3/17  - restarted heparin subQ (3/21)      #Anemia  - s/p 1 unit pRBC on 3/16 for Hb drop from 10 to 7.9 and 1 unit on 3/21 for Hb 6.8 2/2 loss from CRRT clotting  - Evaluated by ENT for bleeding and found no signs of active bleeding from nasal cavity, nasopharynx or oral cavity, however, patient biting on tongue, which could be one source of bleeding. Tongue was edematous and lax  - transfuse 1u pRBC 3/27      Infectious Disease  #Strep pneumoniae meningitis #R ear mastoiditis  treatment with IV CTX   - c/w ciprodex drops for R ear  - f/u ID recs   - repeat cx from 3/21 NGTD  - c/w CTX (3/16 - [ ])  - repeat blood and sputum cx on 3/23 2/2 new fever  - f/u UA  - cultures cleared, holding off on SORAYA      #h/o PCP  - atovaquone at home, continue      Endocrinological  #Hypothyroidism  last TSH 12/2022 >10  TSH 3/15 4.73  - c/w levothyroxine     #Diabetes mellitus Type II  - ISS      Ethics  FULL, GOC ongoing. 79 y/o F with h/o HTN, HLD, DM2, PCKD previously on HD via LUE AVF then s/p renal transplant, LLE DVT (resolved, nml dopplers 12/2022) on ASA, very small supraclinoid aneurysm (reportedly unchanged on MRA 10/2020), hypothyroidism, PCP 2021 on atovaquone ppx, reportedly hospitalized in 12/2022 for rectal prolapse, had a blood transfusion at that time, also + CMV 1/25/23 who presented with R-sided HA, R ear pain and neck stiffness after visit to ENT earlier in the day, altered on presentation, CT head unremarkable for stroke, however LP with IR and BCx showing +strep pneumoniae. currently on vanc and CTX per ID. Cardiac arrest 3/16 with ROSC, transferred to MICU for further management.       Plan:   Neurological  #Currently Intubated, patient still unresponsive to painful and verbal stimuli  - off sedation  - EEG no seizures now  - f/u pending MRI IAC and brain      #Strep pneumo meningitis #Acute encephalopathy  severe HA, neck stiffness, fever, leukocytosis concerning for meningitis/encephalitis  CT head negative for CVA.   LP and BCx 3/15 both showing gram positive cocci in pairs, and + strep pneumoniae.   likely source of entry from R ear where pt c/o discomfort for several months.  patient is on cyclosporine, mycophenolate mofetil and prednisone due to kidney transplant and is immunocompromised   dental procedure 3/14 but less likely source   - f/u MRI of the IAC and brain    #Seizures  - 24hr EEG initially with highly focal seizures, improved with increased sedation.   - keppra loaded 500mg IVP (3/17) and started on 250mg IV q12h (3/17 - 3/19) increased to 750 mg IV q12h (CRRT dosing (3/19 - 3/20), then increased to 1000mg IV q12 (3/20 - 3/22), then back to 750mg IV q12 (3/22 - [ ])  - vimpat 100 BID increased to 200 BID (3/20 - [ ])  -Off versed; off prop  -now seizure free    Cardiovascular  #Cardiac Arrest  bradycardia to 30, pulseless, code blue was called 3/16   2 rounds of epi, and PEA arrest. on 3rd pulse check pulseless VT, 200J shock given  4th pulse check asystole, 5th ROSC, sinus tachy with HUMA. IO placed and levo started. Due to blood in mouth took several attempts with DL for ETT to be placed but ultimately confirmed with capnometry and bilateral breath sounds.     #Elevated troponin  uptrending troponin to 182 3/15 AM, was likely iso ESRD  s/p cardiac arrest, elevated ST segment   - TTE from 3/17 showed EF of 63% with mild pulm htn, normal left ventricular systolic function, and diastolic LV dysfunction.      #HTN  on amlodipine 5mg, losartan 100mg, torsemide 20mg, and clonidine 0.2 patch weekly  -Back on amlodipine, can uptitrate if needed or resume other oral agents        Respiratory/ENT  #Intubated  - off sedation  - CPAP trial 8/5 on 3/25 and 3/27    # Tympanic membrane rupture  discharge from R ear, evaluated by ENT given dexamethasone and cipro 3/14  - c/w broad spectrum abx   - MRI of IAC   - f/u ENT recs    Gastrointestinal  #Constipation  - Abdominal xray negative for bowel obstruction  - trial on movantik  - c/w bowel regimen  - increase trickle feeds as tolerated and bowel regimen  - now having consistent bowel movements    Renal  #ESRD #s/p Kidney Transplant in 2003 at [place] #PCKD  previously HD through LLE AVF but no HD since transplant  - avoid nephrotoxic agents  - holding cyclosporine and mycophenolate   - c/w home prednisone 5mg qday  - strict I/O and replete electrolytes  - s/p 4mg IV bumex with minimal urine production (3/17)  - started on CRRT (3/17-3/21) net negative 100cc/hr goal  - s/p intermittent HD (3/22 and 3/24)  - Patient with uptrending Scr; repeat CBC after 1u prbc 3/27--stable  - [facility]       Hematological  # h/o LLE DVT  resolved, no DVT on doppler 12/22.  - negative LLE duplex this admission 3/17  - restarted heparin subQ (3/21)  - POCUS showed residual clot in RLE; f/u dopplers      #Anemia  - s/p 1 unit pRBC on 3/16 for Hb drop from 10 to 7.9 and 1 unit on 3/21 for Hb 6.8 2/2 loss from CRRT clotting  - Evaluated by ENT for bleeding and found no signs of active bleeding from nasal cavity, nasopharynx or oral cavity, however, patient biting on tongue, which could be one source of bleeding. Tongue was edematous and lax  - transfuse 1u pRBC 3/27      Infectious Disease  #Strep pneumoniae meningitis #R ear mastoiditis  treatment with IV CTX   - c/w ciprodex drops for R ear  - f/u ID recs   - repeat cx from 3/21 NGTD  - c/w CTX (3/16 - [ ])  - repeat blood and sputum cx on 3/23 2/2 new fever  - f/u UA  - cultures cleared, holding off on SORAYA      #h/o PCP  - atovaquone at home, continue      Endocrinological  #Hypothyroidism  last TSH 12/2022 >10  TSH 3/15 4.73  - c/w levothyroxine     #Diabetes mellitus Type II  - ISS      Ethics  FULL, GOC ongoing.

## 2023-03-28 NOTE — CONSULT NOTE ADULT - SUBJECTIVE AND OBJECTIVE BOX
HPI:  79 y/o F with h/o HTN, HLD, DM2, PCKD previously on HD via LUE AVF then s/p renal transplant, LLE DVT (resolved, nml dopplers 2022) on ASA, very small supraclinoid aneurysm (reportedly unchanged on MRA 10/2020), hypothyroidism, PCP  on atovaquone ppx, reportedly hospitalized in 2022 for rectal prolapse, had a blood transfusion at that time, also + CMV 25 who presented with R-sided HA, R ear pain and neck stiffness after visit to ENT earlier in the day, altered on presentation, CT head unremarkable for stroke, however LP with IR and BCx showing +strep pneumoniae. currently on vanc and CTX per ID. Cardiac arrest 3/16 with ROSC, transferred to MICU for further management. Patient remains intubated, off sedation but not arousable. Pt noted to have seizure activity on EEG.    GI consulted for anemia. Pt without any noted overt GI bleeding during her hospitalization - no melena, hematochezia, hematemesis. Not on AC currently.     Allergies:  apple (Unknown)  Benadryl (Unknown)  penicillin (Hives)  watermelon (Unknown)    Home Medications:  amLODIPine 5 mg oral tablet: 1 tab(s) orally once a day (15 Mar 2023 01:37)  Aspirin Enteric Coated 81 mg oral delayed release tablet: 1 tab(s) orally every 48 hours (15 Mar 2023 01:37)  atorvastatin 10 mg oral tablet: 1 tab(s) orally every 48 hours (15 Mar 2023 01:37)  atorvastatin 20 mg oral tablet: 1 tab(s) orally every 48 hours (15 Mar 2023 01:37)  atovaquone 750 mg/5 mL oral suspension: 10 milliliter(s) orally once a day (15 Mar 2023 01:37)  Calcium 500+D oral tablet, chewable: 1 tab(s) orally once a day (15 Mar 2023 01:37)  cloNIDine 0.2 mg/24 hr transdermal film, extended release: 1 patch transdermal once a week on  (15 Mar 2023 01:37)  cyanocobalamin 100 mcg oral tablet: 1 tab(s) orally once a day (15 Mar 2023 01:37)  cycloSPORINE 25 mg oral capsule: 3 cap(s) orally every 12 hours (15 Mar 2023 01:37)  famotidine 20 mg oral tablet: 1 tab(s) orally once a day (15 Mar 2023 01:37)  ferrous sulfate 325 mg (65 mg elemental iron) oral tablet: 1 tab(s) orally 3 times a day (15 Mar 2023 01:37)  folic acid 1 mg oral tablet: 1 tab(s) orally once a day (15 Mar 2023 01:37)  lactobacillus acidophilus oral tablet: 1 tab(s) orally once a day (15 Mar 2023 01:37)  losartan 100 mg oral tablet: 1 tab(s) orally once a day (15 Mar 2023 01:37)  magnesium oxide 400 mg oral tablet: 1 tab(s) orally once a day (15 Mar 2023 01:37)  MiraLax oral powder for reconstitution: 1 dose(s) orally every other day (15 Mar 2023 01:37)  mycophenolate mofetil: 750 milligram(s) orally 2 times a day (15 Mar 2023 01:37)  predniSONE 5 mg oral tablet: 1 tab(s) orally once a day (15 Mar 2023 01:37)  torsemide 20 mg oral tablet: 1 tab(s) orally once a day (15 Mar 2023 01:37)  Tradjenta 5 mg oral tablet: 1 tab(s) orally once a day (15 Mar 2023 01:)  Vitamin D2 1.25 mg (50,000 intl units) oral capsule: 1 cap(s) orally once a week (15 Mar 2023 01:37)    Hospital Medications:  acetaminophen   Oral Liquid .. 650 milliGRAM(s) Enteral Tube every 6 hours PRN  amLODIPine   Tablet 10 milliGRAM(s) Oral every 24 hours  atovaquone  Suspension 1500 milliGRAM(s) Oral daily  cefTRIAXone   IVPB 2000 milliGRAM(s) IV Intermittent every 12 hours  chlorhexidine 0.12% Liquid 15 milliLiter(s) Oral Mucosa two times a day  chlorhexidine 2% Cloths 1 Application(s) Topical <User Schedule>  ciprofloxacin  0.3% Ophthalmic Solution for Otic Use 4 Drop(s) Right Ear two times a day  dextrose 5%. 1000 milliLiter(s) IV Continuous <Continuous>  dextrose 5%. 1000 milliLiter(s) IV Continuous <Continuous>  dextrose 50% Injectable 25 Gram(s) IV Push once  dextrose 50% Injectable 12.5 Gram(s) IV Push once  dextrose 50% Injectable 25 Gram(s) IV Push once  dextrose Oral Gel 15 Gram(s) Oral once PRN  glucagon  Injectable 1 milliGRAM(s) IntraMuscular once  heparin   Injectable 5000 Unit(s) SubCutaneous every 8 hours  insulin lispro (ADMELOG) corrective regimen sliding scale   SubCutaneous every 6 hours  lacosamide IVPB 200 milliGRAM(s) IV Intermittent every 12 hours  levETIRAcetam  IVPB 750 milliGRAM(s) IV Intermittent every 12 hours  levothyroxine Injectable 18 MICROGram(s) IV Push at bedtime  petrolatum Ophthalmic Ointment 1 Application(s) Both EYES every 12 hours  polyethylene glycol 3350 17 Gram(s) Oral two times a day  predniSONE   Tablet 5 milliGRAM(s) Oral daily  senna Syrup 10 milliLiter(s) Oral two times a day      PMHX/PSHX:  Polycystic kidney disease    HTN (hypertension)    Glaucoma    Aneurysm    History of deep venous thrombosis (DVT) of distal vein of left lower extremity    Thrombocytopenia    Hypothyroid    Osteoporosis    Arthritis    PCP (pneumocystis carinii pneumonia)    C. difficile colitis    Type 2 diabetes mellitus, without long-term current use of insulin    Essential hypertension    H/O kidney transplant    H/O:     H/O eye surgery    S/P hysterectomy    H/O umbilical hernia repair    Family history:  No pertinent family history in first degree relatives    FH: diabetes mellitus (Sibling)    Denies family history of colon cancer/polyps, stomach cancer/polyps, pancreatic cancer/masses, liver cancer/disease, ovarian cancer and endometrial cancer.    Social History:   Tob: Denies  EtOH: Denies  Illicit Drugs: Denies    ROS:   unable to obtain    PHYSICAL EXAM:   GENERAL:  intubated  HEENT:  NCAT, no scleral icterus   CHEST:  no respiratory distress  HEART:  Regular rate and rhythm  ABDOMEN:  Soft, non-tender, distended  EXTREMITIES: No edema  SKIN:  No rash/erythema/ecchymoses   NEURO: unarousable    Vital Signs:  Vital Signs Last 24 Hrs  T(C): 36.4 (28 Mar 2023 12:00), Max: 37.9 (28 Mar 2023 04:00)  T(F): 97.5 (28 Mar 2023 12:00), Max: 100.3 (28 Mar 2023 04:00)  HR: 77 (28 Mar 2023 14:00) (76 - 94)  BP: 128/69 (28 Mar 2023 14:00) (108/58 - 171/97)  BP(mean): 83 (28 Mar 2023 14:00) (66 - 112)  RR: 17 (28 Mar 2023 14:00) (15 - 23)  SpO2: 100% (28 Mar 2023 14:00) (98% - 100%)    Parameters below as of 28 Mar 2023 14:00  Patient On (Oxygen Delivery Method): ventilator,CPAp 10/5      Daily     Daily Weight in k.7 (28 Mar 2023 01:00)    LABS:                        9.3    11.13 )-----------( 209      ( 28 Mar 2023 00:25 )             29.5     Mean Cell Volume: 83.1 fL (- @ 00:25)        128<L>  |  93<L>  |  72<H>  ----------------------------<  149<H>  4.1   |  22  |  2.95<H>    Ca    8.7      28 Mar 2023 00:25  Phos  4.8       Mg     2.30         TPro  5.2<L>  /  Alb  2.1<L>  /  TBili  0.4  /  DBili  x   /  AST  34<H>  /  ALT  31  /  AlkPhos  131<H>      LIVER FUNCTIONS - ( 28 Mar 2023 00:25 )  Alb: 2.1 g/dL / Pro: 5.2 g/dL / ALK PHOS: 131 U/L / ALT: 31 U/L / AST: 34 U/L / GGT: x           PT/INR - ( 27 Mar 2023 00:45 )   PT: 13.7 sec;   INR: 1.18 ratio         PTT - ( 27 Mar 2023 00:45 )  PTT:25.0 sec                            9.3    11.13 )-----------( 209      ( 28 Mar 2023 00:25 )             29.5                         8.9    10.85 )-----------( 198      ( 27 Mar 2023 14:22 )             27.9                         6.8    8.79  )-----------( 158      ( 27 Mar 2023 01:50 )             21.8                         6.9    9.44  )-----------( 173      ( 27 Mar 2023 00:45 )             21.9                         8.4    13.88 )-----------( 159      ( 26 Mar 2023 03:08 )             26.5       Imaging:  reviewed       46

## 2023-03-28 NOTE — CONSULT NOTE ADULT - ATTENDING COMMENTS
78F with hx of renal transplant with opportunistic infectious complications presenting with S. pneumoniae meningitis.   GI consulted for drop in hgb  Patient has required four 1 unit PRBC transfusions since she has been here.   Has flexiseal with brown stool.   Has abnormal abdominal exam with distension  Low suspicion for luminal GI bleeding at this time  Would recommend CT A/P with PO contrast for further evaluation

## 2023-03-29 NOTE — PROGRESS NOTE ADULT - ASSESSMENT
78F with PCKD previously on HD via LUE AVF now s/p cadaveric renal transplant 2003 at [place], LLE DVT (resolved, nml dopplers 12/2022) on ASA, HTN, HLD, DM2, 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 Went to ENT and was diagnosed with R otitis externa secondary to perforated TM. Prescribed cipro/dexamethasone.  Worsening headache and EMS called. Admitted 3/14.  CT head negative.  NAMAN.  Started on vanc/meropenem.  IR obtained LP.  Findings c/w meningitis, culture +pneumococcus.  Antibiotics narrowed to ceftriaxone.  Remains lethargic off sedation with possible seizures.  Intermittent fevers.      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, bacteremia, pneumonia c/b possible seizures, fevers  - continue ceftriaxone today is D#15  - even though BC cleared, would favor SORAYA to r/o IE which would help determine if we can discontinue IV ceftriaxone now or in 6 weeks (was discussed with icu yesterday)  - awaiting MRI     Fever  - BC repeated, so far negative    NAMAN  - on dialysis now    Leukocytosis and fever  - mild leukocytosis resolved  - continue to trend wbc  - f/u all cultures    Guarded prognosis    I have discussed plan of care as detailed above with housestaff 55M with PMH IVDA, MSSA bacteremia, OM of L4-L5 on Ancef via PICC line, 25pack year prior smoker, on methadone 135mg (last dose yesterday) who was BIBEMS for hypoxia to 70s on RA. Pt saturating 92% on NRB, CXR shows R upper opacity. Given pt reporting cough the last several days, was treated for PNA with levaquin, cefepime. WBC 20, initial lactate 2.7, improved to 2.3. ICU requested BIPAP which was done and pt admitted for further mgmt.

## 2023-03-29 NOTE — PROGRESS NOTE ADULT - ASSESSMENT
78-year-old female with PCKD previously on HD via LUE AVF now s/p renal transplant since 2003 at [place], LLE DVT (resolved, nml dopplers 12/2022) on ASA, HTN, HLD, DM2, very small supraclinoid aneurysm on R and very small aneurysm vs infundibulum L supraclinoid artery (reportedly unchanged on MRA 10/2020), glaucoma/cataract, hypothyroid, history of PCP 2021 on atovaquone ppx, reportedly hospitalized in 12/2022 for rectal prolapse, CMV (unclear active or prior infxn), presenting with R-sided HA, R ear pain and neck pain after recent visit to ENT earlier in the day.  Cardiac arrest on 3/16/23  septic shock s/p iv pressors  intubated on MV  Renal following for NAMAN on CKD Mx.    NAMAN on CKD4  w/worsened renal function, oliguria, acidosis, mild hyperkalemia- s/p CVVHD, now on  intermittent HD  Consent for HD obtained, signed by daughter 3/16/23  KTx 2003  B/L creat around 2.8 since Dec 2022  Patient receives Cyclosporine (Gengraf) 75mg PO q12hrs,  BID and Prednisone 5 QD for transplant    plan:  In light of severe sepsis and cardiac arrest: holding MMF and Cyclosporine  c/w predniSONE   Tablet 5 milliGRAM(s) Oral daily  off cvvh   had malfunctioning shiley catheter tolerated HD after TPA infusion on 3/28/23  plan for HD tomorrow  HTN- bp better now, controlled- c/w amLODIPine   Tablet 10 milliGRAM(s) every 24 hours  keep Ponce  monitor U/O  c/w TF  monitor BMP qdaily now  dose all meds for eGFR<15ml/min.   avoid ACEi/ARB/NSAIDs/Nephrotoxics if able.    Metabolic acidosis -resolved  OM and OE  Abxs per Infectious disease specialist with dose adjustment per GFR  f/u cxs  vent Mx, per ICU  off pressors now.       prognosis guarded  OS Nephrologist Dr. Hugo Hernandez [phone]  For any question, call:  Cell # [phone]  Pager # [phone]  Callback # [phone]  Ans Serv [phone]

## 2023-03-29 NOTE — PROGRESS NOTE ADULT - ATTENDING COMMENTS
79 yo F w/ HTN, HLD, T2DM, PCKD previously on HD via LUE AVF then s/p renal transplant, LLE DVT (resolved) hypothyroidism, prior PCP PNA, CMV viremia who initially presented with R-sided HA, R ear pain and neck stiffness found to have Strep Pneumo bacteremia and meningitis in setting of otitis externa. Course c/b cardiac arrest 3/16 and post arrest seizures and NAMAN.    1) Sepsis 2/2 strep pneumoniae bacteremia/meningitis - Vasopressors weaned off. Continue ceftriaxone. TTE negative for vegetations. Blood cultures cleared since 3/17. Awaiting MRI of auditory canal, mri brain still pending (hopefully today)  2) PCKD s/p renal transplant c/b NAMAN - Now requiring intermittent HD. Last HD session 3/24. Continue prednisone for renal transplant, holding further immunosuppression due to strep bacteremia and meningitis. tolerated HD yesterday after cathflo, plan for hd tomorrow  3) Persistent seizures - Continue keppra and vimpat. EEG negative for further seizures, vEEG d/c 3/25. Continue to monitor off sedation. Pending brain MRI.  4) Acute hypoxic respiratory failure - Continue mechanical ventilation, wean support as tolerated. Daily SBT as tolerated. Mental status precluding extubation at this time.  5) HTN - Continue amlodipine. Well controlled.  6) AMS - unclear etiology, could be related to acute metabolic encephalopathy versus cardiac arrest, f/u brain mri  7) acute blood loss anemia unclear source, fu CT a/p, GI reccs    Critically ill patient requiring frequent bedside visits with therapy changes. 79 yo F w/ HTN, HLD, T2DM, PCKD previously on HD via LUE AVF then s/p renal transplant, LLE DVT (resolved) hypothyroidism, prior PCP PNA, CMV viremia who initially presented with R-sided HA, R ear pain and neck stiffness found to have Strep Pneumo bacteremia and meningitis in setting of otitis externa. Course c/b cardiac arrest 3/16 and post arrest seizures and NAMAN.    1) Sepsis 2/2 strep pneumoniae bacteremia/meningitis - Vasopressors weaned off. Continue ceftriaxone. TTE negative for vegetations. Blood cultures cleared since 3/17. Awaiting MRI of auditory canal, mri brain still pending (hopefully today)  2) PCKD s/p renal transplant c/b NAMAN - Now requiring intermittent HD. Last HD session 3/24. Continue prednisone for renal transplant, holding further immunosuppression due to strep bacteremia and meningitis. tolerated HD yesterday after cathflo, plan for hd tomorrow  3) Persistent seizures - Continue keppra and vimpat. EEG negative for further seizures, vEEG d/c 3/25. Continue to monitor off sedation. Pending brain MRI.  4) Acute hypoxic respiratory failure - Continue mechanical ventilation, wean support as tolerated. Daily SBT as tolerated. Mental status precluding extubation at this time.  5) HTN - Continue amlodipine. Well controlled.  6) AMS - unclear etiology, could be related to acute metabolic encephalopathy versus cardiac arrest, f/u brain mri  7) acute blood loss anemia unclear source, fu CT a/p, GI reccs    Critically ill patient requiring frequent bedside visits with therapy changes .

## 2023-03-29 NOTE — PROGRESS NOTE ADULT - SUBJECTIVE AND OBJECTIVE BOX
Patient is a 78y old  Female who presents with a chief complaint of AMS/R ear infxn (15 Mar 2023 10:36)    f/u pneumococcal sepsis, bacteremia and meningitis    Interval History/ROS:  continues to be lethargic,  on ventilator ROS unable    PAST MEDICAL & SURGICAL HISTORY:  Polycystic kidney disease  Glaucoma  Aneurysm  History of deep venous thrombosis (DVT) of distal vein of left lower extremity  Thrombocytopenia  Hypothyroid  Osteoporosis  Arthritis  PCP (pneumocystis carinii pneumonia)   C. difficile colitis  Type 2 diabetes mellitus, without long-term current use of insulin  Essential hypertension  H/O kidney transplant (cadaver , [facility])  H/O:   H/O eye surgery  S/P hysterectomy  H/O umbilical hernia repair    Allergies  apple (Unknown)  Benadryl (Unknown)  penicillin (Hives), tolerate cephalosporins  watermelon (Unknown)    ANTIMICROBIALS:  meropenem  IVPB 1000 every 12 hours (3/15)  vancomycin  IVPB (3/15-3/17)    active:  cefTRIAXone   IVPB 2000 every 12 hours (3/15-)  atovaquone  Suspension 1500 daily    MEDICATIONS  (STANDING):  amLODIPine   Tablet 10 every 24 hours  heparin   Injectable 5000 every 8 hours  insulin lispro (ADMELOG) corrective regimen sliding scale  every 6 hours  lacosamide IVPB 200 every 12 hours  levETIRAcetam  IVPB 750 every 12 hours  levothyroxine Injectable 18 at bedtime  polyethylene glycol 3350 17 two times a day  predniSONE   Tablet 5 daily  senna Syrup 10 two times a day    Vital Signs Last 24 Hrs  T(F): 100.3 (23 @ 08:00), Max: 100.3 (23 @ 08:00)  HR: 83 (23 @ 11:00)  BP: 112/51 (23 @ 11:00)  RR: 14 (23 @ 11:00)  SpO2: 100% (23 @ 11:00) (99% - 100%)    PHYSICAL EXAM:  Constitutional: lethargic off sedation on vent  HEAD/EYES: keeps eyes closed  ENT:  supple  Cardiovascular:   normal S1, S2  Respiratory:  clear BS bilaterally  GI:  soft, non-tender, enlarged   :  snider  Musculoskeletal:  no synovitis  Neurologic: lethargic, sedated, unable to assess  Skin:  no rash, left femoral HD catheter  Psychiatric:  not able to assess                                                      8.0    7.62  )-----------( 200      ( 29 Mar 2023 01:34 )             25.2     136  |  98  |  50  ----------------------------<  166  3.6   |  24  |  2.28  Ca    8.7      29 Mar 2023 01:34Phos  4.1     Mg     2.20       TPro  5.0  /  Alb  2.0  /  TBili  0.3  /  DBili  x   /  AST  31  /  ALT  26  /  AlkPhos  131      MICROBIOLOGY:  3/23 BC (-)  3/23 Sputum (-)  3/21 BC (-)  3/19 Trach Asp Tracheal Aspirate (-)  3/17 BC (-)  3/16 Bronchial Lavage (-)  3/16 CSF (+) Streptococcus pneumoniae PCR  3/15 BC (+) Streptococcus pneumoniae    RADIOLOGY:  imaging below personally reviewed and agree with findings    MRI ordered not yet done    CT Abdomen and Pelvis No Cont (23 @ 20:23) >  ******PRELIMINARY REPORT******   INTERPRETATION:  Enlarged bilateral polycystic kidneys and innumerabkle hepatic cysts.  Transplant kidney in left pelvis, with moderate hydronephrosis.  No retroperitoneal hematoma.  Follow up offical report.    Xray Chest 1 View- PORTABLE-Urgent (Xray Chest 1 View- PORTABLE-Urgent .) (23 @ 03:05) >  IMPRESSION: No pneumothorax post central line attempt.    Xray Chest 1 View- PORTABLE-Urgent (Xray Chest 1 View- PORTABLE-Urgent .) (23 @ 14:53) >  IMPRESSION:  Support devices as above.  Improving bilateral aeration.    IR Procedure (03.15.23 @ 15:24) >  The opening pressure measured greater than 55 cm water. The closing pressure measured 32 cm water, after removal of 30 cc of cloudy CSF. The needle was removed. The patient tolerated the procedure well without initial complication.  IMPRESSION:  Status post successful lumbar puncture as described.  Markedly abnormal appearing CSF.  Elevated opening pressures.    US Kidney and Bladder (03.15.23 @ 12:10) >  IMPRESSION:  Mild fullness of the left lower quadrant transplant kidney collecting system.  Increased renal cortical echogenicity, possibly renal parenchymal disease.    CT Brain Perfusion Maps Stroke (23 @ 19:46) >  IMPRESSION:  CT PERFUSION:  Limited by late injection of the contrast bolus.  Cerebral blood flow less than 30% = 0 mL. No core infarct is predicted.  Tmax greater than 6 seconds = 0 mL. No brain parenchyma predicted to be at continued ischemicrisk in the presence of neurologic symptoms.  CTA BRAIN:  No flow-limiting stenosis or vascular aneurysm. No AVM.  CTA NECK:  Calcified plaque at the origin of the left internal carotid artery results in approximately 40% short segment stenosis.   Otherwise no flow-limiting stenosis. No evidence for arterial dissection.      ECHO  Transthoracic Echocardiogram (23 @ 11:48) >  CONCLUSIONS:  1.  Moderate aortic regurgitation.  2. Mild concentric left ventricular hypertrophy.  3. Normal left ventricular systolic function. No segmental wall motion abnormalities.  4. Reversal of the E-A  waves of the mitral inflow pattern is consistent with diastolic LV dysfunction.  5. Normal right ventricular size and function.  6. Normal tricuspid valve. Mild tricuspid regurgitation.  *** Compared with echocardiogram of 2018, no significant changes noted.

## 2023-03-29 NOTE — PROGRESS NOTE ADULT - SUBJECTIVE AND OBJECTIVE BOX
Gastroenterology/Hepatology Progress Note    Interval Events: No hematochezia, melena appreciated overnight. Brown stool in rectal tube.  Hgb 9.3>8    Allergies:  apple (Unknown)  Benadryl (Unknown)  penicillin (Hives)  watermelon (Unknown)    Hospital Medications:  acetaminophen   Oral Liquid .. 650 milliGRAM(s) Enteral Tube every 6 hours PRN  amLODIPine   Tablet 10 milliGRAM(s) Oral every 24 hours  atovaquone  Suspension 1500 milliGRAM(s) Oral daily  cefTRIAXone   IVPB 2000 milliGRAM(s) IV Intermittent every 12 hours  chlorhexidine 0.12% Liquid 15 milliLiter(s) Oral Mucosa two times a day  chlorhexidine 2% Cloths 1 Application(s) Topical <User Schedule>  ciprofloxacin  0.3% Ophthalmic Solution for Otic Use 4 Drop(s) Right Ear two times a day  dextrose 5%. 1000 milliLiter(s) IV Continuous <Continuous>  dextrose 5%. 1000 milliLiter(s) IV Continuous <Continuous>  dextrose 50% Injectable 25 Gram(s) IV Push once  dextrose 50% Injectable 12.5 Gram(s) IV Push once  dextrose 50% Injectable 25 Gram(s) IV Push once  dextrose Oral Gel 15 Gram(s) Oral once PRN  glucagon  Injectable 1 milliGRAM(s) IntraMuscular once  heparin   Injectable 5000 Unit(s) SubCutaneous every 8 hours  insulin lispro (ADMELOG) corrective regimen sliding scale   SubCutaneous every 6 hours  lacosamide IVPB 200 milliGRAM(s) IV Intermittent every 12 hours  levETIRAcetam  IVPB 750 milliGRAM(s) IV Intermittent every 12 hours  levothyroxine Injectable 18 MICROGram(s) IV Push at bedtime  petrolatum Ophthalmic Ointment 1 Application(s) Both EYES every 12 hours  polyethylene glycol 3350 17 Gram(s) Oral two times a day  predniSONE   Tablet 5 milliGRAM(s) Oral daily  senna Syrup 10 milliLiter(s) Oral two times a day      ROS: 14 point ROS negative unless otherwise state in subjective    PHYSICAL EXAM:   Vital Signs:  Vital Signs Last 24 Hrs  T(C): 36.8 (29 Mar 2023 12:00), Max: 37.9 (29 Mar 2023 08:00)  T(F): 98.3 (29 Mar 2023 12:00), Max: 100.3 (29 Mar 2023 08:00)  HR: 75 (29 Mar 2023 15:30) (75 - 102)  BP: 158/69 (29 Mar 2023 12:00) (111/56 - 160/71)  BP(mean): 91 (29 Mar 2023 12:00) (64 - 118)  RR: 18 (29 Mar 2023 12:00) (14 - 22)  SpO2: 99% (29 Mar 2023 15:30) (98% - 100%)    Parameters below as of 29 Mar 2023 13:16  Patient On (Oxygen Delivery Method): ventilator,14/400/5    O2 Concentration (%): 30  Daily     Daily Weight in k.7 (29 Mar 2023 00:00)    GENERAL:  No acute distress  HEENT:  NCAT, no scleral icterus  CHEST: no resp distress  HEART:  RRR  ABDOMEN:  Soft, non-tender, non-distended   EXTREMITIES:  No cyanosis, clubbing, or edema  SKIN:  No rash/erythema/ecchymoses   NEURO:  Alert and oriented x 0    LABS:                        8.0    7.62  )-----------( 200      ( 29 Mar 2023 01:34 )             25.2     Mean Cell Volume: 83.4 fL (-23 @ 01:34)        136  |  98  |  50<H>  ----------------------------<  166<H>  3.6   |  24  |  2.28<H>    Ca    8.7      29 Mar 2023 01:34  Phos  4.1       Mg     2.20         TPro  5.0<L>  /  Alb  2.0<L>  /  TBili  0.3  /  DBili  x   /  AST  31  /  ALT  26  /  AlkPhos  131<H>  03-29    LIVER FUNCTIONS - ( 29 Mar 2023 01:34 )  Alb: 2.0 g/dL / Pro: 5.0 g/dL / ALK PHOS: 131 U/L / ALT: 26 U/L / AST: 31 U/L / GGT: x           PT/INR - ( 29 Mar 2023 01:34 )   PT: 13.6 sec;   INR: 1.17 ratio         PTT - ( 29 Mar 2023 01:34 )  PTT:23.8 sec          Imaging:    ACC: [id number] EXAM:  CT ABDOMEN AND PELVIS   ORDERED BY: VARGAS TAYLOR     PROCEDURE DATE:  2023      INTERPRETATION:  CLINICAL INFORMATION: 78 years  Female with r/o RP   bleed. Decreasing hemoglobin.    COMPARISON: None.    CONTRAST/COMPLICATIONS:  IV Contrast: None  Oral Contrast: None  Complications: None    PROCEDURE:  CT of the Abdomen and Pelvis was performed.  Sagittal and coronal reformats were performed.    LIMITATIONS: Evaluation of the solid organs, vascular structures andGI   tract is limited without oral and IV contrast.    FINDINGS:  LOWER CHEST: Small bilateral pleural effusions with underlying passive   atelectasis. Coronary atherosclerosis. Prominent ascending aorta   measuring 3.8 cm caliber. Low-attenuation blood pole in the aorta   consistent with anemia.    LIVER: Innumerable cysts measuring up to 2.3 cm. Other hypodensities too   small to characterize. 1.4 cm right lobe calcification.  BILE DUCTS: Normal caliber.  GALLBLADDER: Within normal limits.  SPLEEN: Within normal limits.  PANCREAS: Within normal limits.  ADRENALS: Within normal limits.  KIDNEYS/URETERS: Markedly enlarged polycystic kidneys. The right measures   24.9 cm sagittal and the left 24.0 cm sagittal. Some of the cysts arising   rim calcified. Evaluation for solid mass and hydronephrosis is limited.   1.9 cm right hyperdensity (2:110). 3.1 x 1.8 cm right midpole   hyperdensities (2:94). 1.7 cm hyperdensity posteriorly on the right   (2:81. These may represent hemorrhagic cysts.  Left renal transplant without hydronephrosis. Renal sinus lipomatosis   however. 2.0 cm midpole cyst.    BLADDER: Collapsed around Ponce catheter.  REPRODUCTIVE ORGANS:    BOWEL: No bowel obstruction. Appendix not visualized and cannot be   assessed.. Enteric catheter terminating in the distal stomach. Pancolonic   diverticulosis without obvious diverticulitis.  PERITONEUM: Small pelvic ascites.  VESSELS: Dense proximal SMA calcification, likely stenotic. Markedly   stenotic distal abdominal aorta at the bifurcation. Left femoral Shiley   catheter tip in the left external iliac vein.  RETROPERITONEUM/LYMPH NODES: No lymphadenopathy. No retroperitoneal   hematoma. 4.5 x 1.3 cm rim calcified structure against the left pelvic   sidewall (2:135), possibly postsurgical.  ABDOMINAL WALL: Extensive anasarca.  BONES: Within normal limits.    IMPRESSION:  No retroperitoneal hematoma.    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    Polycystic native kidneys. Scattered hyperdensities may represent   hemorrhagic cysts..    --- End of Report ---

## 2023-03-29 NOTE — PROGRESS NOTE ADULT - SUBJECTIVE AND OBJECTIVE BOX
[facility] - DANNA Cosby / DANNA Rivers / LENCHO Ontiveros/ DANNA Aldana/ DANNA Hussein/ QUYEN Gambino / LUCIAN Mukherjee / GISELE Stapleton  ---------------------------------------------------------------------------------------------------------------  seen and examined today for ESRD  Interval : NAD  VITALS:  T(F): 100.3 (03-29-23 @ 08:00), Max: 100.3 (03-29-23 @ 08:00)  HR: 89 (03-29-23 @ 10:00)  BP: 137/60 (03-29-23 @ 10:00)  RR: 17 (03-29-23 @ 10:00)  SpO2: 100% (03-29-23 @ 10:00)  Wt(kg): --    03-28 @ 07:01  -  03-29 @ 07:00  --------------------------------------------------------  IN: 1620 mL / OUT: 2180 mL / NET: -560 mL    03-29 @ 07:01  -  03-29 @ 10:55  --------------------------------------------------------  IN: 105 mL / OUT: 40 mL / NET: 65 mL      Physical Exam :-  Constitutional: intubated  Neck: Supple.  Respiratory: Bilateral equal breath sounds,  Cardiovascular: S1, S2 normal,  Gastrointestinal: Bowel Sounds present, soft, non tender, distended  Extremities: anasarca  Neurological: poorly responsive  Psychiatric: obtunded  Data:-  Allergies :   apple (Unknown)  Benadryl (Unknown)  penicillin (Hives)  watermelon (Unknown)    Hospital Medications:   MEDICATIONS  (STANDING):  amLODIPine   Tablet 10 milliGRAM(s) Oral every 24 hours  atovaquone  Suspension 1500 milliGRAM(s) Oral daily  cefTRIAXone   IVPB 2000 milliGRAM(s) IV Intermittent every 12 hours  chlorhexidine 0.12% Liquid 15 milliLiter(s) Oral Mucosa two times a day  chlorhexidine 2% Cloths 1 Application(s) Topical <User Schedule>  ciprofloxacin  0.3% Ophthalmic Solution for Otic Use 4 Drop(s) Right Ear two times a day  dextrose 5%. 1000 milliLiter(s) (100 mL/Hr) IV Continuous <Continuous>  dextrose 5%. 1000 milliLiter(s) (50 mL/Hr) IV Continuous <Continuous>  dextrose 50% Injectable 25 Gram(s) IV Push once  dextrose 50% Injectable 12.5 Gram(s) IV Push once  dextrose 50% Injectable 25 Gram(s) IV Push once  glucagon  Injectable 1 milliGRAM(s) IntraMuscular once  heparin   Injectable 5000 Unit(s) SubCutaneous every 8 hours  insulin lispro (ADMELOG) corrective regimen sliding scale   SubCutaneous every 6 hours  lacosamide IVPB 200 milliGRAM(s) IV Intermittent every 12 hours  levETIRAcetam  IVPB 750 milliGRAM(s) IV Intermittent every 12 hours  levothyroxine Injectable 18 MICROGram(s) IV Push at bedtime  petrolatum Ophthalmic Ointment 1 Application(s) Both EYES every 12 hours  polyethylene glycol 3350 17 Gram(s) Oral two times a day  predniSONE   Tablet 5 milliGRAM(s) Oral daily  senna Syrup 10 milliLiter(s) Oral two times a day    03-29    136  |  98  |  50<H>  ----------------------------<  166<H>  3.6   |  24  |  2.28<H>    Ca    8.7      29 Mar 2023 01:34  Phos  4.1     03-29  Mg     2.20     03-29    TPro  5.0<L>  /  Alb  2.0<L>  /  TBili  0.3  /  DBili      /  AST  31  /  ALT  26  /  AlkPhos  131<H>  03-29    Creatinine Trend: 2.28 <--, 2.95 <--, 2.94 <--, 2.60 <--, 2.39 <--, 2.04 <--, 2.35 <--, 1.72 <--                        8.0    7.62  )-----------( 200      ( 29 Mar 2023 01:34 )             25.2

## 2023-03-29 NOTE — PROGRESS NOTE ADULT - SUBJECTIVE AND OBJECTIVE BOX
Inge Zhang PGY1  pager [phone] or check resident tab for coverage    Patient is a 78y old  Female who presents with a chief complaint of AMS/R ear infxn (28 Mar 2023 15:21)      SUBJECTIVE / OVERNIGHT EVENTS:    MEDICATIONS  (STANDING):  amLODIPine   Tablet 10 milliGRAM(s) Oral every 24 hours  atovaquone  Suspension 1500 milliGRAM(s) Oral daily  cefTRIAXone   IVPB 2000 milliGRAM(s) IV Intermittent every 12 hours  chlorhexidine 0.12% Liquid 15 milliLiter(s) Oral Mucosa two times a day  chlorhexidine 2% Cloths 1 Application(s) Topical <User Schedule>  ciprofloxacin  0.3% Ophthalmic Solution for Otic Use 4 Drop(s) Right Ear two times a day  dextrose 5%. 1000 milliLiter(s) (50 mL/Hr) IV Continuous <Continuous>  dextrose 5%. 1000 milliLiter(s) (100 mL/Hr) IV Continuous <Continuous>  dextrose 50% Injectable 25 Gram(s) IV Push once  dextrose 50% Injectable 12.5 Gram(s) IV Push once  dextrose 50% Injectable 25 Gram(s) IV Push once  glucagon  Injectable 1 milliGRAM(s) IntraMuscular once  heparin   Injectable 5000 Unit(s) SubCutaneous every 8 hours  insulin lispro (ADMELOG) corrective regimen sliding scale   SubCutaneous every 6 hours  lacosamide IVPB 200 milliGRAM(s) IV Intermittent every 12 hours  levETIRAcetam  IVPB 750 milliGRAM(s) IV Intermittent every 12 hours  levothyroxine Injectable 18 MICROGram(s) IV Push at bedtime  petrolatum Ophthalmic Ointment 1 Application(s) Both EYES every 12 hours  polyethylene glycol 3350 17 Gram(s) Oral two times a day  predniSONE   Tablet 5 milliGRAM(s) Oral daily  senna Syrup 10 milliLiter(s) Oral two times a day    MEDICATIONS  (PRN):  acetaminophen   Oral Liquid .. 650 milliGRAM(s) Enteral Tube every 6 hours PRN Temp greater or equal to 38C (100.4F), Mild Pain (1 - 3), Moderate Pain (4 - 6)  dextrose Oral Gel 15 Gram(s) Oral once PRN Blood Glucose LESS THAN 70 milliGRAM(s)/deciliter      CAPILLARY BLOOD GLUCOSE      POCT Blood Glucose.: 154 mg/dL (29 Mar 2023 05:47)  POCT Blood Glucose.: 157 mg/dL (28 Mar 2023 23:51)  POCT Blood Glucose.: 125 mg/dL (28 Mar 2023 17:15)  POCT Blood Glucose.: 174 mg/dL (28 Mar 2023 11:03)    I&O's Summary    28 Mar 2023 07:01  -  29 Mar 2023 07:00  --------------------------------------------------------  IN: 1620 mL / OUT: 2180 mL / NET: -560 mL        Vital Signs Last 24 Hrs  T(C): 36.2 (29 Mar 2023 04:00), Max: 37.5 (28 Mar 2023 08:00)  T(F): 97.1 (29 Mar 2023 04:00), Max: 99.5 (28 Mar 2023 08:00)  HR: 93 (29 Mar 2023 06:00) (77 - 102)  BP: 140/74 (29 Mar 2023 06:00) (112/54 - 162/74)  BP(mean): 91 (29 Mar 2023 06:00) (66 - 118)  RR: 18 (29 Mar 2023 06:00) (15 - 22)  SpO2: 100% (29 Mar 2023 06:00) (99% - 100%)    Parameters below as of 29 Mar 2023 06:00  Patient On (Oxygen Delivery Method): ventilator    O2 Concentration (%): 30    PHYSICAL EXAM:  GENERAL: no distress  PSYCH: A&O x3  HEAD: Atraumatic, Normocephalic  NECK: Supple, No JVD  CHEST/LUNG: clear to auscultation bilaterally  HEART: regular rate and rhythm, no murmurs  ABDOMEN: nontender to palpation, no rebound tenderness/guarding  EXTREMITIES: no edema on bilateral LE  NEUROLOGY: no focal neurologic deficit  SKIN: No rashes or lesions    LABS:                        8.0    7.62  )-----------( 200      ( 29 Mar 2023 01:34 )             25.2      03-29    136  |  98  |  50<H>  ----------------------------<  166<H>  3.6   |  24  |  2.28<H>    Ca    8.7      29 Mar 2023 01:34  Phos  4.1     03-29  Mg     2.20     03-29    TPro  5.0<L>  /  Alb  2.0<L>  /  TBili  0.3  /  DBili  x   /  AST  31  /  ALT  26  /  AlkPhos  131<H>  03-29    PT/INR - ( 29 Mar 2023 01:34 )   PT: 13.6 sec;   INR: 1.17 ratio         PTT - ( 29 Mar 2023 01:34 )  PTT:23.8 sec          RADIOLOGY & ADDITIONAL TESTS:    Imaging Personally Reviewed:    Consultant(s) Notes Reviewed:      Care Discussed with Consultants/Other Providers:   Inge Zhang PGY1  pager [phone] or check resident tab for coverage    Patient is a 78y old  Female who presents with a chief complaint of AMS/R ear infxn (28 Mar 2023 15:21)      SUBJECTIVE / OVERNIGHT EVENTS: Patient RR  increased to 14 ON due to acidosis. Patient not on sedation this AM and unresponsive to verbal and painful stimuli.     MEDICATIONS  (STANDING):  amLODIPine   Tablet 10 milliGRAM(s) Oral every 24 hours  atovaquone  Suspension 1500 milliGRAM(s) Oral daily  cefTRIAXone   IVPB 2000 milliGRAM(s) IV Intermittent every 12 hours  chlorhexidine 0.12% Liquid 15 milliLiter(s) Oral Mucosa two times a day  chlorhexidine 2% Cloths 1 Application(s) Topical <User Schedule>  ciprofloxacin  0.3% Ophthalmic Solution for Otic Use 4 Drop(s) Right Ear two times a day  dextrose 5%. 1000 milliLiter(s) (50 mL/Hr) IV Continuous <Continuous>  dextrose 5%. 1000 milliLiter(s) (100 mL/Hr) IV Continuous <Continuous>  dextrose 50% Injectable 25 Gram(s) IV Push once  dextrose 50% Injectable 12.5 Gram(s) IV Push once  dextrose 50% Injectable 25 Gram(s) IV Push once  glucagon  Injectable 1 milliGRAM(s) IntraMuscular once  heparin   Injectable 5000 Unit(s) SubCutaneous every 8 hours  insulin lispro (ADMELOG) corrective regimen sliding scale   SubCutaneous every 6 hours  lacosamide IVPB 200 milliGRAM(s) IV Intermittent every 12 hours  levETIRAcetam  IVPB 750 milliGRAM(s) IV Intermittent every 12 hours  levothyroxine Injectable 18 MICROGram(s) IV Push at bedtime  petrolatum Ophthalmic Ointment 1 Application(s) Both EYES every 12 hours  polyethylene glycol 3350 17 Gram(s) Oral two times a day  predniSONE   Tablet 5 milliGRAM(s) Oral daily  senna Syrup 10 milliLiter(s) Oral two times a day    MEDICATIONS  (PRN):  acetaminophen   Oral Liquid .. 650 milliGRAM(s) Enteral Tube every 6 hours PRN Temp greater or equal to 38C (100.4F), Mild Pain (1 - 3), Moderate Pain (4 - 6)  dextrose Oral Gel 15 Gram(s) Oral once PRN Blood Glucose LESS THAN 70 milliGRAM(s)/deciliter      CAPILLARY BLOOD GLUCOSE      POCT Blood Glucose.: 154 mg/dL (29 Mar 2023 05:47)  POCT Blood Glucose.: 157 mg/dL (28 Mar 2023 23:51)  POCT Blood Glucose.: 125 mg/dL (28 Mar 2023 17:15)  POCT Blood Glucose.: 174 mg/dL (28 Mar 2023 11:03)    I&O's Summary    28 Mar 2023 07:01  -  29 Mar 2023 07:00  --------------------------------------------------------  IN: 1620 mL / OUT: 2180 mL / NET: -560 mL        Vital Signs Last 24 Hrs  T(C): 36.2 (29 Mar 2023 04:00), Max: 37.5 (28 Mar 2023 08:00)  T(F): 97.1 (29 Mar 2023 04:00), Max: 99.5 (28 Mar 2023 08:00)  HR: 93 (29 Mar 2023 06:00) (77 - 102)  BP: 140/74 (29 Mar 2023 06:00) (112/54 - 162/74)  BP(mean): 91 (29 Mar 2023 06:00) (66 - 118)  RR: 18 (29 Mar 2023 06:00) (15 - 22)  SpO2: 100% (29 Mar 2023 06:00) (99% - 100%)    Parameters below as of 29 Mar 2023 06:00  Patient On (Oxygen Delivery Method): ventilator    O2 Concentration (%): 30    PHYSICAL EXAM:  GENERAL: no distress  PSYCH: A&O x0  HEAD: Atraumatic, Normocephalic  NECK: Supple, No JVD  CHEST/LUNG: clear to auscultation bilaterally  HEART: regular rate and rhythm, no murmurs  ABDOMEN: nontender to palpation, no rebound tenderness/guarding  EXTREMITIES: no edema on bilateral LE  NEUROLOGY: unable to assess; good pupillary reflex but encephalopathic  SKIN: No rashes or lesions    LABS:                        8.0    7.62  )-----------( 200      ( 29 Mar 2023 01:34 )             25.2      03-29    136  |  98  |  50<H>  ----------------------------<  166<H>  3.6   |  24  |  2.28<H>    Ca    8.7      29 Mar 2023 01:34  Phos  4.1     03-29  Mg     2.20     03-29    TPro  5.0<L>  /  Alb  2.0<L>  /  TBili  0.3  /  DBili  x   /  AST  31  /  ALT  26  /  AlkPhos  131<H>  03-29    PT/INR - ( 29 Mar 2023 01:34 )   PT: 13.6 sec;   INR: 1.17 ratio         PTT - ( 29 Mar 2023 01:34 )  PTT:23.8 sec          RADIOLOGY & ADDITIONAL TESTS:    Imaging Personally Reviewed:    Consultant(s) Notes Reviewed:      Care Discussed with Consultants/Other Providers:

## 2023-03-29 NOTE — PROGRESS NOTE ADULT - ASSESSMENT
79 y/o F with h/o HTN, HLD, DM2, PCKD previously on HD via LUE AVF then s/p renal transplant, LLE DVT (resolved, nml dopplers 12/2022) on ASA, very small supraclinoid aneurysm (reportedly unchanged on MRA 10/2020), hypothyroidism, PCP 2021 on atovaquone ppx, reportedly hospitalized in 12/2022 for rectal prolapse, had a blood transfusion at that time, also + CMV 1/25/23 who presented with R-sided HA, R ear pain and neck stiffness after visit to ENT earlier in the day, altered on presentation, CT head unremarkable for stroke, however LP with IR and BCx showing +strep pneumoniae. currently on vanc and CTX per ID. Cardiac arrest 3/16 with ROSC, transferred to MICU for further management.       Plan:   Neurological  #Currently Intubated, patient still unresponsive to painful and verbal stimuli  - off sedation  - EEG no seizures now  - f/u pending MRI IAC and brain      #Strep pneumo meningitis #Acute encephalopathy  severe HA, neck stiffness, fever, leukocytosis concerning for meningitis/encephalitis  CT head negative for CVA.   LP and BCx 3/15 both showing gram positive cocci in pairs, and + strep pneumoniae.   likely source of entry from R ear where pt c/o discomfort for several months.  patient is on cyclosporine, mycophenolate mofetil and prednisone due to kidney transplant and is immunocompromised   dental procedure 3/14 but less likely source   - f/u MRI of the IAC and brain    #Seizures  - 24hr EEG initially with highly focal seizures, improved with increased sedation.   - keppra loaded 500mg IVP (3/17) and started on 250mg IV q12h (3/17 - 3/19) increased to 750 mg IV q12h (CRRT dosing (3/19 - 3/20), then increased to 1000mg IV q12 (3/20 - 3/22), then back to 750mg IV q12 (3/22 - [ ])  - vimpat 100 BID increased to 200 BID (3/20 - [ ])  -Off versed; off prop  -now seizure free    Cardiovascular  #Cardiac Arrest  bradycardia to 30, pulseless, code blue was called 3/16   2 rounds of epi, and PEA arrest. on 3rd pulse check pulseless VT, 200J shock given  4th pulse check asystole, 5th ROSC, sinus tachy with HUMA. IO placed and levo started. Due to blood in mouth took several attempts with DL for ETT to be placed but ultimately confirmed with capnometry and bilateral breath sounds.     #Elevated troponin  uptrending troponin to 182 3/15 AM, was likely iso ESRD  s/p cardiac arrest, elevated ST segment   - TTE from 3/17 showed EF of 63% with mild pulm htn, normal left ventricular systolic function, and diastolic LV dysfunction.      #HTN  on amlodipine 5mg, losartan 100mg, torsemide 20mg, and clonidine 0.2 patch weekly  -Back on amlodipine, can uptitrate if needed or resume other oral agents        Respiratory/ENT  #Intubated  - off sedation  - CPAP trial 8/5 on 3/25 and 3/27    # Tympanic membrane rupture  discharge from R ear, evaluated by ENT given dexamethasone and cipro 3/14  - c/w broad spectrum abx   - MRI of IAC   - f/u ENT recs    Gastrointestinal  #Constipation  - Abdominal xray negative for bowel obstruction  - trial on movantik  - c/w bowel regimen  - increase trickle feeds as tolerated and bowel regimen  - now having consistent bowel movements    Renal  #ESRD #s/p Kidney Transplant in 2003 at [place] #PCKD  previously HD through LLE AVF but no HD since transplant  - avoid nephrotoxic agents  - holding cyclosporine and mycophenolate   - c/w home prednisone 5mg qday  - strict I/O and replete electrolytes  - s/p 4mg IV bumex with minimal urine production (3/17)  - started on CRRT (3/17-3/21) net negative 100cc/hr goal  - s/p intermittent HD (3/22 and 3/24)  - Patient with uptrending Scr; repeat CBC after 1u prbc 3/27--stable  - [facility]       Hematological  # h/o LLE DVT  resolved, no DVT on doppler 12/22.  - negative LLE duplex this admission 3/17  - restarted heparin subQ (3/21)  - POCUS showed residual clot in RLE; f/u dopplers      #Anemia  - s/p 1 unit pRBC on 3/16 for Hb drop from 10 to 7.9 and 1 unit on 3/21 for Hb 6.8 2/2 loss from CRRT clotting  - Evaluated by ENT for bleeding and found no signs of active bleeding from nasal cavity, nasopharynx or oral cavity, however, patient biting on tongue, which could be one source of bleeding. Tongue was edematous and lax  - transfuse 1u pRBC 3/27      Infectious Disease  #Strep pneumoniae meningitis #R ear mastoiditis  treatment with IV CTX   - c/w ciprodex drops for R ear  - f/u ID recs   - repeat cx from 3/21 NGTD  - c/w CTX (3/16 - [ ])  - repeat blood and sputum cx on 3/23 2/2 new fever  - f/u UA  - cultures cleared, holding off on SORAYA      #h/o PCP  - atovaquone at home, continue      Endocrinological  #Hypothyroidism  last TSH 12/2022 >10  TSH 3/15 4.73  - c/w levothyroxine     #Diabetes mellitus Type II  - ISS      Ethics  FULL, GOC ongoing. 77 y/o F with h/o HTN, HLD, DM2, PCKD previously on HD via LUE AVF then s/p renal transplant, LLE DVT (resolved, nml dopplers 12/2022) on ASA, very small supraclinoid aneurysm (reportedly unchanged on MRA 10/2020), hypothyroidism, PCP 2021 on atovaquone ppx, reportedly hospitalized in 12/2022 for rectal prolapse, had a blood transfusion at that time, also + CMV 1/25/23 who presented with R-sided HA, R ear pain and neck stiffness after visit to ENT earlier in the day, altered on presentation, CT head unremarkable for stroke, however LP with IR and BCx showing +strep pneumoniae. currently on vanc and CTX per ID. Cardiac arrest 3/16 with ROSC, transferred to MICU for further management.       Plan:   Neurological  #Currently Intubated, patient still unresponsive to painful and verbal stimuli  - off sedation  - EEG no seizures now  - f/u MRI IAC and brain      #Strep pneumo meningitis #Acute encephalopathy  severe HA, neck stiffness, fever, leukocytosis concerning for meningitis/encephalitis  CT head negative for CVA.   LP and BCx 3/15 both showing gram positive cocci in pairs, and + strep pneumoniae.   likely source of entry from R ear where pt c/o discomfort for several months.  patient is on cyclosporine, mycophenolate mofetil and prednisone due to kidney transplant and is immunocompromised   dental procedure 3/14 but less likely source   - f/u MRI of the IAC and brain    #Seizures  - 24hr EEG initially with highly focal seizures, improved with increased sedation.   - keppra loaded 500mg IVP (3/17) and started on 250mg IV q12h (3/17 - 3/19) increased to 750 mg IV q12h (CRRT dosing (3/19 - 3/20), then increased to 1000mg IV q12 (3/20 - 3/22), then back to 750mg IV q12 (3/22 - [ ])  - vimpat 100 BID increased to 200 BID (3/20 - [ ])  -Off versed; off prop  -now seizure free    Cardiovascular  #Cardiac Arrest  bradycardia to 30, pulseless, code blue was called 3/16   2 rounds of epi, and PEA arrest. on 3rd pulse check pulseless VT, 200J shock given  4th pulse check asystole, 5th ROSC, sinus tachy with HUMA. IO placed and levo started. Due to blood in mouth took several attempts with DL for ETT to be placed but ultimately confirmed with capnometry and bilateral breath sounds.     #Elevated troponin  uptrending troponin to 182 3/15 AM, was likely iso ESRD  s/p cardiac arrest, elevated ST segment   - TTE from 3/17 showed EF of 63% with mild pulm htn, normal left ventricular systolic function, and diastolic LV dysfunction.      #HTN  on amlodipine 5mg, losartan 100mg, torsemide 20mg, and clonidine 0.2 patch weekly  -Back on amlodipine, can uptitrate if needed or resume other oral agents        Respiratory/ENT  #Intubated  - off sedation  - CPAP trial 8/5 on 3/25 and 3/27    # Tympanic membrane rupture  discharge from R ear, evaluated by ENT given dexamethasone and cipro 3/14  - c/w broad spectrum abx   - MRI of IAC   - f/u ENT recs    Gastrointestinal  #Constipation  - Abdominal xray negative for bowel obstruction  - trial on movantik  - c/w bowel regimen  - increase trickle feeds as tolerated and bowel regimen  - now having consistent bowel movements    Renal  #ESRD #s/p Kidney Transplant in 2003 at [place] #PCKD  previously HD through LLE AVF but no HD since transplant  - avoid nephrotoxic agents  - holding cyclosporine and mycophenolate   - c/w home prednisone 5mg qday  - strict I/O and replete electrolytes  - s/p 4mg IV bumex with minimal urine production (3/17)  - started on CRRT (3/17-3/21) net negative 100cc/hr goal  - s/p intermittent HD (3/22 and 3/24)  - Patient with uptrending Scr; repeat CBC after 1u prbc 3/27--stable  - Cathflo to unclog femoral shiley 3/28  - f/u blood gasses due to prior acidosis after increasing RR on the vent 3/29       Hematological  # h/o LLE DVT  resolved, no DVT on doppler 12/22.  - negative LLE duplex this admission 3/17  - restarted heparin subQ (3/21)  - POCUS showed residual clot in RLE; f/u dopplers negative      #Anemia  - s/p 1 unit pRBC on 3/16 for Hb drop from 10 to 7.9 and 1 unit on 3/21 for Hb 6.8 2/2 loss from CRRT clotting  - Evaluated by ENT for bleeding and found no signs of active bleeding from nasal cavity, nasopharynx or oral cavity, however, patient biting on tongue, which could be one source of bleeding. Tongue was edematous and lax  - transfuse 1u pRBC 3/27  - f/u GI recs regarding Hgb drops      Infectious Disease  #Strep pneumoniae meningitis #R ear mastoiditis  treatment with IV CTX   - c/w ciprodex drops for R ear  - f/u ID recs   - repeat cx from 3/21 NGTD  - c/w CTX (3/16 - [ ])  - repeat blood and sputum cx on 3/23 2/2 new fever  - f/u UA  - cultures cleared, holding off on SORAYA for now until MRI and potential concern for GIIB      #h/o PCP  - atovaquone at home, continue      Endocrinological  #Hypothyroidism  last TSH 12/2022 >10  TSH 3/15 4.73  - c/w levothyroxine     #Diabetes mellitus Type II  - ISS      Ethics  FULL, GOC ongoing.

## 2023-03-29 NOTE — PROGRESS NOTE ADULT - ASSESSMENT
79 y/o F with h/o HTN, HLD, DM2, PCKD previously on HD via LUE AVF then s/p renal transplant, LLE DVT (resolved, nml dopplers 12/2022) on ASA, very small supraclinoid aneurysm (reportedly unchanged on MRA 10/2020), hypothyroidism, PCP 2021 on atovaquone ppx, reportedly hospitalized in 12/2022 for rectal prolapse, had a blood transfusion at that time, also + CMV 1/25/23 who presented with R-sided HA, R ear pain and neck stiffness after visit to ENT earlier in the day, altered on presentation, CT head unremarkable for stroke, however LP with IR and BCx showing +strep pneumoniae. currently on vanc and CTX per ID. Cardiac arrest 3/16 with ROSC, transferred to MICU for further management. Patient remains intubated, off sedation but not arousable.     #ESRD s/p renal transplant in 2003 at [place]   #Meningitis 2/2 strep pneumoniae  #Strep pneumoniae bacteremia, now cleared  #Seizures  #Anemia. Pt without overt GI bleeding during this hospitalization. Noted to have down-trending Hgb requiring transfusions on 3/16, 3/21, 3/24, and 3/27. Unlikely GI Bleed given no overt GI bleeding despite transfusion requirements  - CT noncon performed, no evidence of RP bleed    Recommendations  - trend CBC, keep active T&S, transfuse for Hgb<8  - recommend iron studies, although may be difficult to interpret after recent transfusions  - consider hematology evaluation given absence of overt GI bleeding    All recommendations are tentative until note is attested by attending.     Estefany Barajas, PGY5  Gastroenterology/Hepatology Fellow  Available on Microsoft Teams  [phone] ([facility] Short Range Pager)  [phone] (Long Range Pager)    After 5pm, please contact the on-call GI fellow. [phone]

## 2023-03-30 NOTE — PROGRESS NOTE ADULT - ASSESSMENT
79 y/o F with h/o HTN, HLD, DM2, PCKD previously on HD via LUE AVF then s/p renal transplant, LLE DVT (resolved, nml dopplers 12/2022) on ASA, very small supraclinoid aneurysm (reportedly unchanged on MRA 10/2020), hypothyroidism, PCP 2021 on atovaquone ppx, reportedly hospitalized in 12/2022 for rectal prolapse, had a blood transfusion at that time, also + CMV 1/25/23 who presented with R-sided HA, R ear pain and neck stiffness after visit to ENT earlier in the day, altered on presentation, CT head unremarkable for stroke, however LP with IR and BCx showing +strep pneumoniae. currently on vanc and CTX per ID. Cardiac arrest 3/16 with ROSC, transferred to MICU for further management. Patient remains intubated, off sedation but not arousable.     #ESRD s/p renal transplant in 2003 at [place]   #Meningitis 2/2 strep pneumoniae  #Strep pneumoniae bacteremia, now cleared  #Seizures  #Anemia. Pt without overt GI bleeding during this hospitalization. Noted to have down-trending Hgb requiring transfusions on 3/16, 3/21, 3/24, and 3/27. Unlikely GI Bleed given no overt GI bleeding despite transfusion requirements  - CT noncon performed, no evidence of RP bleed    Recommendations  - trend CBC, keep active T&S, transfuse for Hgb<8  - recommend iron studies, although may be difficult to interpret after recent transfusions  - consider hematology evaluation given absence of overt GI bleeding  - No role for endoscopic evaluation at this time given no overt GI bleeding on rectal tube    No further work up from our perspective, please reach out to us for any question/concern or change in patient hospital course.     Recommendations preliminary until signed by attending.     Dean Lopez MD  Gastroenterology/Hepatology Fellow  1st option: [phone] (text or call), ONLY available from 7:00 am to 5:00 pm.   **Contact on-call GI fellow via answering service ([phone]) from 5pm-7am AND on weekends/holidays**  2nd option: Available via [employer] Teams  3rd option: Pager: [phone]

## 2023-03-30 NOTE — PROGRESS NOTE ADULT - SUBJECTIVE AND OBJECTIVE BOX
[facility] - S Alea / Tita S /D Rama/ S Jovan/ S Keenan/ Cleve Gambino / DAGMAR Wilsonu/ O Daya  service -[phone], office [phone]  ---------------------------------------------------------------------------------------------------------------    Patient seen and examined bedside    Subjective and Objective: No overnight events, sob resolved. No complaints today. feeling better    Allergies: apple (Unknown)  Benadryl (Unknown)  penicillin (Hives)  watermelon (Unknown)      Hospital Medications:   MEDICATIONS  (STANDING):  amLODIPine   Tablet 10 milliGRAM(s) Oral every 24 hours  atovaquone  Suspension 1500 milliGRAM(s) Oral daily  cefTRIAXone   IVPB 2000 milliGRAM(s) IV Intermittent every 12 hours  chlorhexidine 0.12% Liquid 15 milliLiter(s) Oral Mucosa two times a day  chlorhexidine 2% Cloths 1 Application(s) Topical <User Schedule>  ciprofloxacin  0.3% Ophthalmic Solution for Otic Use 4 Drop(s) Right Ear two times a day  dextrose 5%. 1000 milliLiter(s) (100 mL/Hr) IV Continuous <Continuous>  dextrose 5%. 1000 milliLiter(s) (50 mL/Hr) IV Continuous <Continuous>  dextrose 50% Injectable 25 Gram(s) IV Push once  dextrose 50% Injectable 12.5 Gram(s) IV Push once  dextrose 50% Injectable 25 Gram(s) IV Push once  glucagon  Injectable 1 milliGRAM(s) IntraMuscular once  heparin   Injectable 5000 Unit(s) SubCutaneous every 8 hours  insulin lispro (ADMELOG) corrective regimen sliding scale   SubCutaneous every 6 hours  lacosamide Solution 200 milliGRAM(s) Oral two times a day  levETIRAcetam  Solution 750 milliGRAM(s) Oral two times a day  levothyroxine Injectable 18 MICROGram(s) IV Push at bedtime  petrolatum Ophthalmic Ointment 1 Application(s) Both EYES every 12 hours  polyethylene glycol 3350 17 Gram(s) Oral two times a day  predniSONE   Tablet 5 milliGRAM(s) Oral daily  senna Syrup 10 milliLiter(s) Oral two times a day      REVIEW OF SYSTEMS:  CONSTITUTIONAL: No weakness, fevers or chills  EYES/ENT: No visual changes;  No vertigo or throat pain   NECK: No pain or stiffness  RESPIRATORY: No cough, wheezing, hemoptysis; No shortness of breath  CARDIOVASCULAR: No chest pain or palpitations.  GASTROINTESTINAL: No abdominal or epigastric pain. No nausea, vomiting, or hematemesis; No diarrhea or constipation. No melena or hematochezia.  GENITOURINARY: No dysuria, frequency, foamy urine, urinary urgency, incontinence or hematuria  NEUROLOGICAL: No numbness or weakness  SKIN: No itching, burning, rashes, or lesions   VASCULAR: No bilateral lower extremity edema.   All other review of systems is negative unless indicated above.    VITALS:  T(F): 97.6 (03-30-23 @ 12:00), Max: 98.9 (03-30-23 @ 04:00)  HR: 85 (03-30-23 @ 15:00)  BP: 129/48 (03-30-23 @ 15:00)  RR: 17 (03-30-23 @ 15:00)  SpO2: 100% (03-30-23 @ 15:00)  Wt(kg): --    03-29 @ 07:01  -  03-30 @ 07:00  --------------------------------------------------------  IN: 900 mL / OUT: 515 mL / NET: 385 mL    03-30 @ 07:01  -  03-30 @ 15:26  --------------------------------------------------------  IN: 1180 mL / OUT: 2120 mL / NET: -940 mL          PHYSICAL EXAM:  Constitutional: NAD  HEENT: anicteric sclera, oropharynx clear  Neck: No JVD  Respiratory: CTAB, no wheezes, rales or rhonchi  Cardiovascular: S1, S2, RRR  Gastrointestinal: BS+, soft, NT/ND  Extremities: No cyanosis or clubbing. No peripheral edema  Neurological: A/O x 3, no focal deficits  Psychiatric: Normal mood, normal affect  : No CVA tenderness. No snider.   Skin: No rashes  Vascular Access:    LABS:  03-29    134<L>  |  97<L>  |  59<H>  ----------------------------<  159<H>  3.7   |  22  |  2.73<H>    Ca    8.9      29 Mar 2023 23:53  Phos  4.9     03-29  Mg     2.30     03-29    TPro  5.4<L>  /  Alb  2.1<L>  /  TBili  0.4  /  DBili      /  AST  31  /  ALT  26  /  AlkPhos  126<H>  03-29    Creatinine Trend: 2.73 <--, 2.28 <--, 2.95 <--, 2.94 <--, 2.60 <--, 2.39 <--, 2.04 <--, 2.35 <--                        8.3    6.91  )-----------( 209      ( 29 Mar 2023 23:53 )             26.4     Urine Studies:        RADIOLOGY & ADDITIONAL STUDIES:   [facility] - S Alea / Tita S /D Rama/ S Jovan/ S Keenan/ Cleve Gambino / DAGMAR Wilsonu/ O Daya  service -[phone], office [phone]  ---------------------------------------------------------------------------------------------------------------    [facility]     Subjective and Objective: No overnight events, remains unresponsive, on MV    Allergies: apple (Unknown)  Benadryl (Unknown)  penicillin (Hives)  watermelon (Unknown)      Hospital Medications:   MEDICATIONS  (STANDING):  amLODIPine   Tablet 10 milliGRAM(s) Oral every 24 hours  atovaquone  Suspension 1500 milliGRAM(s) Oral daily  cefTRIAXone   IVPB 2000 milliGRAM(s) IV Intermittent every 12 hours  chlorhexidine 0.12% Liquid 15 milliLiter(s) Oral Mucosa two times a day  chlorhexidine 2% Cloths 1 Application(s) Topical <User Schedule>  ciprofloxacin  0.3% Ophthalmic Solution for Otic Use 4 Drop(s) Right Ear two times a day  dextrose 5%. 1000 milliLiter(s) (100 mL/Hr) IV Continuous <Continuous>  dextrose 5%. 1000 milliLiter(s) (50 mL/Hr) IV Continuous <Continuous>  dextrose 50% Injectable 25 Gram(s) IV Push once  dextrose 50% Injectable 12.5 Gram(s) IV Push once  dextrose 50% Injectable 25 Gram(s) IV Push once  glucagon  Injectable 1 milliGRAM(s) IntraMuscular once  heparin   Injectable 5000 Unit(s) SubCutaneous every 8 hours  insulin lispro (ADMELOG) corrective regimen sliding scale   SubCutaneous every 6 hours  lacosamide Solution 200 milliGRAM(s) Oral two times a day  levETIRAcetam  Solution 750 milliGRAM(s) Oral two times a day  levothyroxine Injectable 18 MICROGram(s) IV Push at bedtime  petrolatum Ophthalmic Ointment 1 Application(s) Both EYES every 12 hours  polyethylene glycol 3350 17 Gram(s) Oral two times a day  predniSONE   Tablet 5 milliGRAM(s) Oral daily  senna Syrup 10 milliLiter(s) Oral two times a da    VITALS:  T(F): 97.6 (03-30-23 @ 12:00), Max: 98.9 (03-30-23 @ 04:00)  HR: 85 (03-30-23 @ 15:00)  BP: 129/48 (03-30-23 @ 15:00)  RR: 17 (03-30-23 @ 15:00)  SpO2: 100% (03-30-23 @ 15:00)  Wt(kg): --    03-29 @ 07:01  -  03-30 @ 07:00  --------------------------------------------------------  IN: 900 mL / OUT: 515 mL / NET: 385 mL    03-30 @ 07:01  -  03-30 @ 15:26  --------------------------------------------------------  IN: 1180 mL / OUT: 2120 mL / NET: -940 mL      PHYSICAL EXAM:  Constitutional: NAD  HEENT: anicteric sclera  Neck: ETT+   Respiratory: CTAB  Cardiovascular: S1, S2, RRR  Gastrointestinal: BS+, soft, NT/ND  Extremities: No peripheral edema  Neurological: unresponsive  : + snider.   Vascular Access: femoral shiley+     LABS:  03-29    134<L>  |  97<L>  |  59<H>  ----------------------------<  159<H>  3.7   |  22  |  2.73<H>    Ca    8.9      29 Mar 2023 23:53  Phos  4.9     03-29  Mg     2.30     03-29    TPro  5.4<L>  /  Alb  2.1<L>  /  TBili  0.4  /  DBili      /  AST  31  /  ALT  26  /  AlkPhos  126<H>  03-29    Creatinine Trend: 2.73 <--, 2.28 <--, 2.95 <--, 2.94 <--, 2.60 <--, 2.39 <--, 2.04 <--, 2.35 <--                        8.3    6.91  )-----------( 209      ( 29 Mar 2023 23:53 )             26.4     Urine Studies:        RADIOLOGY & ADDITIONAL STUDIES:

## 2023-03-30 NOTE — PROGRESS NOTE ADULT - ASSESSMENT
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  Cardiac arrest on 3/16/23  septic shock s/p iv pressors  intubated on MV  Renal following for NAMAN on CKD Mx.    NAMAN on CKD4  w/worsened renal function, oliguria, acidosis, mild hyperkalemia- s/p CVVHD, now on  intermittent HD  Consent for HD obtained, signed by daughter 3/16/23  KTx 2003  B/L creat around 2.8 since Dec 2022  Patient receives Cyclosporine (Gengraf) 75mg PO q12hrs,  BID and Prednisone 5 QD for transplant    plan:  In light of severe sepsis and cardiac arrest: holding MMF and Cyclosporine  c/w predniSONE   Tablet 5 milliGRAM(s) Oral daily  had malfunctioning shiley catheter tolerated HD after TPA infusion on 3/28/23  s/p HD earlier today, Rx sheet reviewed, net UF 1.2kg removed, tolerated well. uneventful.   HTN- bp better now, controlled- c/w amLODIPine   Tablet 10 milliGRAM(s) every 24 hours  keep Ponce  monitor U/O  c/w TF  monitor BMP qdaily now  dose all meds for eGFR<15ml/min.   avoid ACEi/ARB/NSAIDs/Nephrotoxics if able.    Metabolic acidosis -resolved  OM and OE  Abxs per Infectious disease specialist with dose adjustment per GFR  f/u cxs  vent Mx, per ICU  off pressors now.       prognosis guarded  [facility] Nephrologist Dr. Hugo Hernandez [phone]  labs, chart reviewed  For any question, pl call:  Nephrology  Cell -[phone]  Office [phone]  Ans Serv [phone]

## 2023-03-30 NOTE — PROGRESS NOTE ADULT - SUBJECTIVE AND OBJECTIVE BOX
Inge Zhang PGY1  pager [phone] or check resident tab for coverage    Patient is a 78y old  Female who presents with a chief complaint of AMS/R ear infxn (29 Mar 2023 17:14)      SUBJECTIVE / OVERNIGHT EVENTS:    MEDICATIONS  (STANDING):  amLODIPine   Tablet 10 milliGRAM(s) Oral every 24 hours  atovaquone  Suspension 1500 milliGRAM(s) Oral daily  chlorhexidine 0.12% Liquid 15 milliLiter(s) Oral Mucosa two times a day  chlorhexidine 2% Cloths 1 Application(s) Topical <User Schedule>  ciprofloxacin  0.3% Ophthalmic Solution for Otic Use 4 Drop(s) Right Ear two times a day  dextrose 5%. 1000 milliLiter(s) (100 mL/Hr) IV Continuous <Continuous>  dextrose 5%. 1000 milliLiter(s) (50 mL/Hr) IV Continuous <Continuous>  dextrose 50% Injectable 25 Gram(s) IV Push once  dextrose 50% Injectable 12.5 Gram(s) IV Push once  dextrose 50% Injectable 25 Gram(s) IV Push once  glucagon  Injectable 1 milliGRAM(s) IntraMuscular once  heparin   Injectable 5000 Unit(s) SubCutaneous every 8 hours  insulin lispro (ADMELOG) corrective regimen sliding scale   SubCutaneous every 6 hours  lacosamide IVPB 200 milliGRAM(s) IV Intermittent every 12 hours  levETIRAcetam  IVPB 750 milliGRAM(s) IV Intermittent every 12 hours  levothyroxine Injectable 18 MICROGram(s) IV Push at bedtime  petrolatum Ophthalmic Ointment 1 Application(s) Both EYES every 12 hours  polyethylene glycol 3350 17 Gram(s) Oral two times a day  predniSONE   Tablet 5 milliGRAM(s) Oral daily  senna Syrup 10 milliLiter(s) Oral two times a day    MEDICATIONS  (PRN):  acetaminophen   Oral Liquid .. 650 milliGRAM(s) Enteral Tube every 6 hours PRN Temp greater or equal to 38C (100.4F), Mild Pain (1 - 3), Moderate Pain (4 - 6)  dextrose Oral Gel 15 Gram(s) Oral once PRN Blood Glucose LESS THAN 70 milliGRAM(s)/deciliter      CAPILLARY BLOOD GLUCOSE      POCT Blood Glucose.: 180 mg/dL (30 Mar 2023 05:15)  POCT Blood Glucose.: 172 mg/dL (30 Mar 2023 00:37)  POCT Blood Glucose.: 113 mg/dL (29 Mar 2023 18:36)  POCT Blood Glucose.: 199 mg/dL (29 Mar 2023 11:32)    I&O's Summary    29 Mar 2023 07:01  -  30 Mar 2023 07:00  --------------------------------------------------------  IN: 865 mL / OUT: 515 mL / NET: 350 mL        Vital Signs Last 24 Hrs  T(C): 36.6 (30 Mar 2023 07:00), Max: 37.9 (29 Mar 2023 08:00)  T(F): 97.9 (30 Mar 2023 07:00), Max: 100.3 (29 Mar 2023 08:00)  HR: 85 (30 Mar 2023 07:00) (61 - 89)  BP: 198/79 (30 Mar 2023 07:00) (111/56 - 198/79)  BP(mean): 107 (30 Mar 2023 07:00) (64 - 107)  RR: 18 (30 Mar 2023 07:00) (13 - 20)  SpO2: 100% (30 Mar 2023 07:00) (97% - 100%)    Parameters below as of 30 Mar 2023 06:00  Patient On (Oxygen Delivery Method): ventilator    O2 Concentration (%): 30    PHYSICAL EXAM:  GENERAL: no distress  PSYCH: A&O x3  HEAD: Atraumatic, Normocephalic  NECK: Supple, No JVD  CHEST/LUNG: clear to auscultation bilaterally  HEART: regular rate and rhythm, no murmurs  ABDOMEN: nontender to palpation, no rebound tenderness/guarding  EXTREMITIES: no edema on bilateral LE  NEUROLOGY: no focal neurologic deficit  SKIN: No rashes or lesions    LABS:                        8.3    6.91  )-----------( 209      ( 29 Mar 2023 23:53 )             26.4      03-29    134<L>  |  97<L>  |  59<H>  ----------------------------<  159<H>  3.7   |  22  |  2.73<H>    Ca    8.9      29 Mar 2023 23:53  Phos  4.9     03-29  Mg     2.30     03-29    TPro  5.4<L>  /  Alb  2.1<L>  /  TBili  0.4  /  DBili  x   /  AST  31  /  ALT  26  /  AlkPhos  126<H>  03-29    PT/INR - ( 29 Mar 2023 01:34 )   PT: 13.6 sec;   INR: 1.17 ratio         PTT - ( 29 Mar 2023 01:34 )  PTT:23.8 sec          RADIOLOGY & ADDITIONAL TESTS:    Imaging Personally Reviewed:    Consultant(s) Notes Reviewed:      Care Discussed with Consultants/Other Providers:   Inge Zhang PGY1  pager [phone] or check resident tab for coverage    Patient is a 78y old  Female who presents with a chief complaint of AMS/R ear infxn (29 Mar 2023 17:14)      SUBJECTIVE / OVERNIGHT EVENTS: No acute ON events; patient still encephalopathic in the AM. Opens eyes but not responsive to verbal and painful stimuli.    MEDICATIONS  (STANDING):  amLODIPine   Tablet 10 milliGRAM(s) Oral every 24 hours  atovaquone  Suspension 1500 milliGRAM(s) Oral daily  chlorhexidine 0.12% Liquid 15 milliLiter(s) Oral Mucosa two times a day  chlorhexidine 2% Cloths 1 Application(s) Topical <User Schedule>  ciprofloxacin  0.3% Ophthalmic Solution for Otic Use 4 Drop(s) Right Ear two times a day  dextrose 5%. 1000 milliLiter(s) (100 mL/Hr) IV Continuous <Continuous>  dextrose 5%. 1000 milliLiter(s) (50 mL/Hr) IV Continuous <Continuous>  dextrose 50% Injectable 25 Gram(s) IV Push once  dextrose 50% Injectable 12.5 Gram(s) IV Push once  dextrose 50% Injectable 25 Gram(s) IV Push once  glucagon  Injectable 1 milliGRAM(s) IntraMuscular once  heparin   Injectable 5000 Unit(s) SubCutaneous every 8 hours  insulin lispro (ADMELOG) corrective regimen sliding scale   SubCutaneous every 6 hours  lacosamide IVPB 200 milliGRAM(s) IV Intermittent every 12 hours  levETIRAcetam  IVPB 750 milliGRAM(s) IV Intermittent every 12 hours  levothyroxine Injectable 18 MICROGram(s) IV Push at bedtime  petrolatum Ophthalmic Ointment 1 Application(s) Both EYES every 12 hours  polyethylene glycol 3350 17 Gram(s) Oral two times a day  predniSONE   Tablet 5 milliGRAM(s) Oral daily  senna Syrup 10 milliLiter(s) Oral two times a day    MEDICATIONS  (PRN):  acetaminophen   Oral Liquid .. 650 milliGRAM(s) Enteral Tube every 6 hours PRN Temp greater or equal to 38C (100.4F), Mild Pain (1 - 3), Moderate Pain (4 - 6)  dextrose Oral Gel 15 Gram(s) Oral once PRN Blood Glucose LESS THAN 70 milliGRAM(s)/deciliter      CAPILLARY BLOOD GLUCOSE      POCT Blood Glucose.: 180 mg/dL (30 Mar 2023 05:15)  POCT Blood Glucose.: 172 mg/dL (30 Mar 2023 00:37)  POCT Blood Glucose.: 113 mg/dL (29 Mar 2023 18:36)  POCT Blood Glucose.: 199 mg/dL (29 Mar 2023 11:32)    I&O's Summary    29 Mar 2023 07:01  -  30 Mar 2023 07:00  --------------------------------------------------------  IN: 865 mL / OUT: 515 mL / NET: 350 mL        Vital Signs Last 24 Hrs  T(C): 36.6 (30 Mar 2023 07:00), Max: 37.9 (29 Mar 2023 08:00)  T(F): 97.9 (30 Mar 2023 07:00), Max: 100.3 (29 Mar 2023 08:00)  HR: 85 (30 Mar 2023 07:00) (61 - 89)  BP: 198/79 (30 Mar 2023 07:00) (111/56 - 198/79)  BP(mean): 107 (30 Mar 2023 07:00) (64 - 107)  RR: 18 (30 Mar 2023 07:00) (13 - 20)  SpO2: 100% (30 Mar 2023 07:00) (97% - 100%)    Parameters below as of 30 Mar 2023 06:00  Patient On (Oxygen Delivery Method): ventilator    O2 Concentration (%): 30    PHYSICAL EXAM:  GENERAL: no distress  PSYCH: A&O x0  HEAD: Atraumatic, Normocephalic  NECK: Supple, No JVD  CHEST/LUNG: clear to auscultation bilaterally  HEART: regular rate and rhythm, no murmurs  ABDOMEN: nontender to palpation, no rebound tenderness/guarding  EXTREMITIES: no edema on bilateral LE  NEUROLOGY: not following commands; global neurological deficits   SKIN: No rashes or lesions    LABS:                        8.3    6.91  )-----------( 209      ( 29 Mar 2023 23:53 )             26.4      03-29    134<L>  |  97<L>  |  59<H>  ----------------------------<  159<H>  3.7   |  22  |  2.73<H>    Ca    8.9      29 Mar 2023 23:53  Phos  4.9     03-29  Mg     2.30     03-29    TPro  5.4<L>  /  Alb  2.1<L>  /  TBili  0.4  /  DBili  x   /  AST  31  /  ALT  26  /  AlkPhos  126<H>  03-29    PT/INR - ( 29 Mar 2023 01:34 )   PT: 13.6 sec;   INR: 1.17 ratio         PTT - ( 29 Mar 2023 01:34 )  PTT:23.8 sec          RADIOLOGY & ADDITIONAL TESTS:    Imaging Personally Reviewed:    Consultant(s) Notes Reviewed:      Care Discussed with Consultants/Other Providers:   Inge Zhang PGY1  pager [phone] or check resident tab for coverage    Patient is a 78y old  Female who presents with a chief complaint of AMS/R ear infxn (29 Mar 2023 17:14)      SUBJECTIVE / OVERNIGHT EVENTS: No acute ON events; patient still encephalopathic in the AM. Opens eyes but not responsive to verbal and painful stimuli.    MEDICATIONS  (STANDING):  amLODIPine   Tablet 10 milliGRAM(s) Oral every 24 hours  atovaquone  Suspension 1500 milliGRAM(s) Oral daily  chlorhexidine 0.12% Liquid 15 milliLiter(s) Oral Mucosa two times a day  chlorhexidine 2% Cloths 1 Application(s) Topical <User Schedule>  ciprofloxacin  0.3% Ophthalmic Solution for Otic Use 4 Drop(s) Right Ear two times a day  dextrose 5%. 1000 milliLiter(s) (100 mL/Hr) IV Continuous <Continuous>  dextrose 5%. 1000 milliLiter(s) (50 mL/Hr) IV Continuous <Continuous>  dextrose 50% Injectable 25 Gram(s) IV Push once  dextrose 50% Injectable 12.5 Gram(s) IV Push once  dextrose 50% Injectable 25 Gram(s) IV Push once  glucagon  Injectable 1 milliGRAM(s) IntraMuscular once  heparin   Injectable 5000 Unit(s) SubCutaneous every 8 hours  insulin lispro (ADMELOG) corrective regimen sliding scale   SubCutaneous every 6 hours  lacosamide IVPB 200 milliGRAM(s) IV Intermittent every 12 hours  levETIRAcetam  IVPB 750 milliGRAM(s) IV Intermittent every 12 hours  levothyroxine Injectable 18 MICROGram(s) IV Push at bedtime  petrolatum Ophthalmic Ointment 1 Application(s) Both EYES every 12 hours  polyethylene glycol 3350 17 Gram(s) Oral two times a day  predniSONE   Tablet 5 milliGRAM(s) Oral daily  senna Syrup 10 milliLiter(s) Oral two times a day    MEDICATIONS  (PRN):  acetaminophen   Oral Liquid .. 650 milliGRAM(s) Enteral Tube every 6 hours PRN Temp greater or equal to 38C (100.4F), Mild Pain (1 - 3), Moderate Pain (4 - 6)  dextrose Oral Gel 15 Gram(s) Oral once PRN Blood Glucose LESS THAN 70 milliGRAM(s)/deciliter      CAPILLARY BLOOD GLUCOSE      POCT Blood Glucose.: 180 mg/dL (30 Mar 2023 05:15)  POCT Blood Glucose.: 172 mg/dL (30 Mar 2023 00:37)  POCT Blood Glucose.: 113 mg/dL (29 Mar 2023 18:36)  POCT Blood Glucose.: 199 mg/dL (29 Mar 2023 11:32)    I&O's Summary    29 Mar 2023 07:01  -  30 Mar 2023 07:00  --------------------------------------------------------  IN: 865 mL / OUT: 515 mL / NET: 350 mL        Vital Signs Last 24 Hrs  T(C): 36.6 (30 Mar 2023 07:00), Max: 37.9 (29 Mar 2023 08:00)  T(F): 97.9 (30 Mar 2023 07:00), Max: 100.3 (29 Mar 2023 08:00)  HR: 85 (30 Mar 2023 07:00) (61 - 89)  BP: 198/79 (30 Mar 2023 07:00) (111/56 - 198/79)  BP(mean): 107 (30 Mar 2023 07:00) (64 - 107)  RR: 18 (30 Mar 2023 07:00) (13 - 20)  SpO2: 100% (30 Mar 2023 07:00) (97% - 100%)    Parameters below as of 30 Mar 2023 06:00  Patient On (Oxygen Delivery Method): ventilator    O2 Concentration (%): 30    PHYSICAL EXAM:  GENERAL: no distress  PSYCH: A&O x0  HEAD: Atraumatic, Normocephalic  NECK: Supple, No JVD  CHEST/LUNG: clear to auscultation bilaterally  HEART: regular rate and rhythm, no murmurs  ABDOMEN: nontender to palpation, no rebound tenderness/guarding  EXTREMITIES: no edema on bilateral LE  NEUROLOGY: not following commands; global neurological deficits; no nystagmus  SKIN: No rashes or lesions    LABS:                        8.3    6.91  )-----------( 209      ( 29 Mar 2023 23:53 )             26.4      03-29    134<L>  |  97<L>  |  59<H>  ----------------------------<  159<H>  3.7   |  22  |  2.73<H>    Ca    8.9      29 Mar 2023 23:53  Phos  4.9     03-29  Mg     2.30     03-29    TPro  5.4<L>  /  Alb  2.1<L>  /  TBili  0.4  /  DBili  x   /  AST  31  /  ALT  26  /  AlkPhos  126<H>  03-29    PT/INR - ( 29 Mar 2023 01:34 )   PT: 13.6 sec;   INR: 1.17 ratio         PTT - ( 29 Mar 2023 01:34 )  PTT:23.8 sec          RADIOLOGY & ADDITIONAL TESTS:    Imaging Personally Reviewed:    Consultant(s) Notes Reviewed:      Care Discussed with Consultants/Other Providers:

## 2023-03-30 NOTE — PROGRESS NOTE ADULT - SUBJECTIVE AND OBJECTIVE BOX
Neurology Progress Note    SUBJECTIVE/OBJECTIVE/INTERVAL EVENTS: Patient seen and examined at bedside w/ neuro attending and team. Off sedation, dose not rouse to any kind of stimuli (verbal, tactile, noxious). Patient under going dialysis during encounter.    INTERVAL HISTORY: 78 year-old woman with a PMHx of PCKD s/p renal transplant, brain cyst, DM, HLD, HTN, hypothyroidism, glaucoma, cataracts, DVT in leg, on asa who presents to ED for severe headache, right ear pain, neck pain, markedly decreased responsiveness to external stimuli. LP significant for decreased glucose, elevated WBC with neutrophil predominance, elevated protein, PCR (+) for strep. pneumoniae. antibiotics were started. Course complicated by PEA arrest, ROSC achieved after about 10 mins. Patient is now intubated and sedated. EEG shows occasional left epileptiform discharges and rare right frontal epileptiform discharges indicating potential risk of seizures. Last electrographic seizure reported 3/20. MRI brain 3/29 shows scattered infarcts. Vascular neurology consulted.     REVIEW OF SYSTEMS: Otherwise denies fever, chills, headaches, vision changes, blurry vision, double vision, nausea, vomiting, hearing change, focal weakness, focal numbness, parasthesias, bowel/ bladder incontinence.  Few questions of a 10-system ROS was performed and is negative except for those items noted above and/or in the HPI.    MEDICATIONS  (STANDING):  amLODIPine   Tablet 10 milliGRAM(s) Oral every 24 hours  atovaquone  Suspension 1500 milliGRAM(s) Oral daily  cefTRIAXone   IVPB 2000 milliGRAM(s) IV Intermittent every 12 hours  chlorhexidine 0.12% Liquid 15 milliLiter(s) Oral Mucosa two times a day  chlorhexidine 2% Cloths 1 Application(s) Topical <User Schedule>  ciprofloxacin  0.3% Ophthalmic Solution for Otic Use 4 Drop(s) Right Ear two times a day  dextrose 5%. 1000 milliLiter(s) (100 mL/Hr) IV Continuous <Continuous>  dextrose 5%. 1000 milliLiter(s) (50 mL/Hr) IV Continuous <Continuous>  dextrose 50% Injectable 25 Gram(s) IV Push once  dextrose 50% Injectable 12.5 Gram(s) IV Push once  dextrose 50% Injectable 25 Gram(s) IV Push once  glucagon  Injectable 1 milliGRAM(s) IntraMuscular once  heparin   Injectable 5000 Unit(s) SubCutaneous every 8 hours  insulin lispro (ADMELOG) corrective regimen sliding scale   SubCutaneous every 6 hours  lacosamide Solution 200 milliGRAM(s) Oral two times a day  levETIRAcetam  Solution 750 milliGRAM(s) Oral two times a day  levothyroxine Injectable 18 MICROGram(s) IV Push at bedtime  petrolatum Ophthalmic Ointment 1 Application(s) Both EYES every 12 hours  polyethylene glycol 3350 17 Gram(s) Oral two times a day  predniSONE   Tablet 5 milliGRAM(s) Oral daily  senna Syrup 10 milliLiter(s) Oral two times a day    MEDICATIONS  (PRN):  acetaminophen   Oral Liquid .. 650 milliGRAM(s) Enteral Tube every 6 hours PRN Temp greater or equal to 38C (100.4F), Mild Pain (1 - 3), Moderate Pain (4 - 6)  dextrose Oral Gel 15 Gram(s) Oral once PRN Blood Glucose LESS THAN 70 milliGRAM(s)/deciliter      VITALS & EXAMINATION:  Vital Signs Last 24 Hrs  T(C): 36.2 (30 Mar 2023 08:00), Max: 37.2 (30 Mar 2023 04:00)  T(F): 97.1 (30 Mar 2023 08:00), Max: 98.9 (30 Mar 2023 04:00)  HR: 90 (30 Mar 2023 09:00) (61 - 90)  BP: 120/77 (30 Mar 2023 09:00) (112/51 - 198/79)  BP(mean): 88 (30 Mar 2023 09:00) (64 - 107)  RR: 18 (30 Mar 2023 09:00) (13 - 20)  SpO2: 100% (30 Mar 2023 09:00) (96% - 100%)    Parameters below as of 30 Mar 2023 08:00  Patient On (Oxygen Delivery Method): ventilator    O2 Concentration (%): 30    Neurological (>12):  Mental Status: Eyes initially closed. No response to verbal or noxious stimuli.  - Language: Intubated. off sedation  - Cranial Nerves: Pupils equal b/l and reactive, oculocephalic reflex not intact, corneal reflex intact b/l, grimace to gag, rest of CN exam is limited by mental status.   - Motor: No withdrawal to noxious stimulation.  RUE edematous> LUE  - Sensory: No grimace to noxious stimulation.    - Coordination/Gait: Patient unable to participate due to mental status.      LABORATORY:  CBC                       8.3    6.91  )-----------( 209      ( 29 Mar 2023 23:53 )             26.4     Chem 03-29    134<L>  |  97<L>  |  59<H>  ----------------------------<  159<H>  3.7   |  22  |  2.73<H>    Ca    8.9      29 Mar 2023 23:53  Phos  4.9     03-29  Mg     2.30     03-29    TPro  5.4<L>  /  Alb  2.1<L>  /  TBili  0.4  /  DBili  x   /  AST  31  /  ALT  26  /  AlkPhos  126<H>  03-29    LFTs LIVER FUNCTIONS - ( 29 Mar 2023 23:53 )  Alb: 2.1 g/dL / Pro: 5.4 g/dL / ALK PHOS: 126 U/L / ALT: 26 U/L / AST: 31 U/L / GGT: x           Coagulopathy PT/INR - ( 29 Mar 2023 01:34 )   PT: 13.6 sec;   INR: 1.17 ratio      PTT - ( 29 Mar 2023 01:34 )  PTT:23.8 sec  Lipid Panel 03-15 Chol 153 LDL 79 -- HDL 45<L> Trig 146  A1c 5.6    STUDIES & IMAGING: (EEG, CT, MR, U/S, TTE/SORAYA):  MR:  1. RIGHT CPA/IAC: Otitis and mastoiditis. No intracranial abnormality is appreciated in the adjacent middle cranial fossa.  2. LEFT CPA/IAC: Mastoiditis. No intracranial abnormality is appreciated in the adjacent middle cranial fossa.  3. Brain: Left cerebellar acute infarction. Additional left cerebellar lesion is indeterminate, possibly subacute infarction with petechial hemorrhage. Scattered small lesions in the cerebral hemispheres, predominantly within the white matter on the left, suggests small scattered foci of subacute infarction. Left occipital cortical lesion is indeterminant possibly subacute infarction. Ischemic white matter disease and atrophy not atypical for age  4. The gadolinium-enhanced acquisition was somewhat suboptimal in terms of enhancement effect. Consider short-term interval follow-up including post gadolinium volumetric T1-weighted imaging.    CT 3/14/23: Right mastoid air cell and tympanic cavity effusion, correlate for the presence of acute otomastoiditis.  CTA BRAIN: No flow-limiting stenosis or vascular aneurysm. No AVM.  CTA NECK: Calcified plaque at the origin of the L ICA results in ~40% short segment stenosis. Otherwise no flow-limiting stenosis or arterial dissection.    TTE 3/17/23  EF=63%  1.  Moderate aortic regurgitation.  2. Mild concentric left ventricular hypertrophy.  3. Normal left ventricular systolic function. No segmental wall motion abnormalities.  4. Reversal of the E-A  waves of the mitral inflow pattern  is consistent with diastolic LV dysfunction.  5. Normal right ventricular size and function.  6. Normal tricuspid valve. Mild tricuspid regurgitation.

## 2023-03-30 NOTE — PROGRESS NOTE ADULT - SUBJECTIVE AND OBJECTIVE BOX
Patient is a 78y old  Female who presents with a chief complaint of AMS/R ear infxn (15 Mar 2023 10:36)    f/u pneumococcal sepsis, bacteremia and meningitis    Interval History/ROS:  continues to be lethargic,  on ventilator ROS unable    PAST MEDICAL & SURGICAL HISTORY:  Polycystic kidney disease  Glaucoma  Aneurysm  History of deep venous thrombosis (DVT) of distal vein of left lower extremity  Thrombocytopenia  Hypothyroid  Osteoporosis  Arthritis  PCP (pneumocystis carinii pneumonia)   C. difficile colitis  Type 2 diabetes mellitus, without long-term current use of insulin  Essential hypertension  H/O kidney transplant (cadaver , [facility])  H/O:   H/O eye surgery  S/P hysterectomy  H/O umbilical hernia repair    Allergies  apple (Unknown)  Benadryl (Unknown)  penicillin (Hives), tolerate cephalosporins  watermelon (Unknown)    ANTIMICROBIALS:  meropenem  IVPB 1000 every 12 hours (3/15)  vancomycin  IVPB (3/15-3/17)    active:  cefTRIAXone   IVPB 2000 every 12 hours (3/15-)  atovaquone  Suspension 1500 daily    MEDICATIONS  (STANDING):  amLODIPine   Tablet 10 every 24 hours  heparin   Injectable 5000 every 8 hours  insulin lispro (ADMELOG) corrective regimen sliding scale  every 6 hours  lacosamide Solution 200 two times a day  levETIRAcetam  Solution 750 two times a day  levothyroxine Injectable 18 at bedtime  polyethylene glycol 3350 17 two times a day  predniSONE   Tablet 5 daily  senna Syrup 10 two times a day    Vital Signs Last 24 Hrs  T(F): 97.6 (23 @ 12:00), Max: 98.9 (23 @ 04:00)  HR: 85 (23 @ 15:00)  BP: 129/48 (23 @ 15:00)  RR: 17 (23 @ 15:00)  SpO2: 100% (23 @ 15:00) (96% - 100%)    PHYSICAL EXAM:  Constitutional: lethargic off sedation on vent  HEAD/EYES: keeps eyes closed  ENT:  supple  Cardiovascular:   normal S1, S2  Respiratory:  clear BS bilaterally  GI:  soft, non-tender, enlarged   :  snider  Musculoskeletal:  no synovitis  Neurologic: lethargic, sedated, unable to assess  Skin:  no rash, HD catheter  Psychiatric:  not able to assess                                       8.3    6.91  )-----------( 209      ( 29 Mar 2023 23:53 )             26.4     134  |  97  |  59  ----------------------------<  159  3.7   |  22  |  2.73  Ca    8.9      29 Mar 2023 23:53Phos  4.9     -Mg     2.30       TPro  5.4  /  Alb  2.1  /  TBili  0.4  /  DBili  x   /  AST  31  /  ALT  26  /  AlkPhos  126      MICROBIOLOGY:  3/23 BC (-)  3/23 Sputum (-)  3/21 BC (-)  3/19 Trach Asp Tracheal Aspirate (-)  3/17 BC (-)  3/16 Bronchial Lavage (-)  3/16 CSF (+) Streptococcus pneumoniae PCR  3/15 BC (+) Streptococcus pneumoniae    RADIOLOGY:  imaging below personally reviewed and agree with findings    MR Head No Cont (23 @ 15:56) >  IMPRESSION:  1.  RIGHT CPA/IAC:  Otitis and mastoiditis. No intracranial abnormality is appreciated in the adjacent middle cranial fossa.  2. LEFT CPA/IAC:  Mastoiditis. No intracranial abnormality is appreciated in the adjacent middle cranial fossa.  3.  Brain: Left cerebellar acute infarction. Additional left cerebellar lesion is indeterminate, possibly subacute infarction with petechial hemorrhage. Scattered small lesions in the cerebral hemispheres,  predominantly within the white matter on the left, suggests small scattered foci of subacute infarction. Left occipital cortical lesion is indeterminant possibly subacute infarction. Ischemic white matter disease and atrophy not atypical for age  4. The gadolinium-enhanced acquisition was somewhat suboptimal in terms of enhancement effect. Consider short-term interval follow-up including post gadolinium volumetric T1-weighted imaging.      CT Abdomen and Pelvis No Cont (23 @ 20:23) >  IMPRESSION:  No retroperitoneal hematoma.  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  Probable stenosis of the proximal SMA and the distalaorta at the bifurcation.  Polycystic native kidneys. Scattered hyperdensities may represent hemorrhagic cysts..    Xray Chest 1 View- PORTABLE-Urgent (Xray Chest 1 View- PORTABLE-Urgent .) (23 @ 03:05) >  IMPRESSION: No pneumothorax post central line attempt.    Xray Chest 1 View- PORTABLE-Urgent (Xray Chest 1 View- PORTABLE-Urgent .) (23 @ 14:53) >  IMPRESSION:  Support devices as above.  Improving bilateral aeration.    IR Procedure (03.15.23 @ 15:24) >  The opening pressure measured greater than 55 cm water. The closing pressure measured 32 cm water, after removal of 30 cc of cloudy CSF. The needle was removed. The patient tolerated the procedure well without initial complication.  IMPRESSION:  Status post successful lumbar puncture as described.  Markedly abnormal appearing CSF.  Elevated opening pressures.    US Kidney and Bladder (03.15.23 @ 12:10) >  IMPRESSION:  Mild fullness of the left lower quadrant transplant kidney collecting system.  Increased renal cortical echogenicity, possibly renal parenchymal disease.    CT Brain Perfusion Maps Stroke (23 @ 19:46) >  IMPRESSION:  CT PERFUSION:  Limited by late injection of the contrast bolus.  Cerebral blood flow less than 30% = 0 mL. No core infarct is predicted.  Tmax greater than 6 seconds = 0 mL. No brain parenchyma predicted to be at continued ischemicrisk in the presence of neurologic symptoms.  CTA BRAIN:  No flow-limiting stenosis or vascular aneurysm. No AVM.  CTA NECK:  Calcified plaque at the origin of the left internal carotid artery results in approximately 40% short segment stenosis.   Otherwise no flow-limiting stenosis. No evidence for arterial dissection.      ECHO  Transthoracic Echocardiogram (23 @ 11:48) >  CONCLUSIONS:  1.  Moderate aortic regurgitation.  2. Mild concentric left ventricular hypertrophy.  3. Normal left ventricular systolic function. No segmental wall motion abnormalities.  4. Reversal of the E-A  waves of the mitral inflow pattern is consistent with diastolic LV dysfunction.  5. Normal right ventricular size and function.  6. Normal tricuspid valve. Mild tricuspid regurgitation.  *** Compared with echocardiogram of 2018, no significant changes noted.

## 2023-03-30 NOTE — PROGRESS NOTE ADULT - ASSESSMENT
77 y/o F with h/o HTN, HLD, DM2, PCKD previously on HD via LUE AVF then s/p renal transplant, LLE DVT (resolved, nml dopplers 12/2022) on ASA, very small supraclinoid aneurysm (reportedly unchanged on MRA 10/2020), hypothyroidism, PCP 2021 on atovaquone ppx, reportedly hospitalized in 12/2022 for rectal prolapse, had a blood transfusion at that time, also + CMV 1/25/23 who presented with R-sided HA, R ear pain and neck stiffness after visit to ENT earlier in the day, altered on presentation, CT head unremarkable for stroke, however LP with IR and BCx showing +strep pneumoniae. currently on vanc and CTX per ID. Cardiac arrest 3/16 with ROSC, transferred to MICU for further management.       Plan:   Neurological  #Currently Intubated, patient still unresponsive to painful and verbal stimuli  - off sedation  - EEG no seizures now  - f/u MRI IAC and brain      #Strep pneumo meningitis #Acute encephalopathy  severe HA, neck stiffness, fever, leukocytosis concerning for meningitis/encephalitis  CT head negative for CVA.   LP and BCx 3/15 both showing gram positive cocci in pairs, and + strep pneumoniae.   likely source of entry from R ear where pt c/o discomfort for several months.  patient is on cyclosporine, mycophenolate mofetil and prednisone due to kidney transplant and is immunocompromised   dental procedure 3/14 but less likely source   - f/u MRI of the IAC and brain    #Seizures  - 24hr EEG initially with highly focal seizures, improved with increased sedation.   - keppra loaded 500mg IVP (3/17) and started on 250mg IV q12h (3/17 - 3/19) increased to 750 mg IV q12h (CRRT dosing (3/19 - 3/20), then increased to 1000mg IV q12 (3/20 - 3/22), then back to 750mg IV q12 (3/22 - [ ])  - vimpat 100 BID increased to 200 BID (3/20 - [ ])  -Off versed; off prop  -now seizure free    Cardiovascular  #Cardiac Arrest  bradycardia to 30, pulseless, code blue was called 3/16   2 rounds of epi, and PEA arrest. on 3rd pulse check pulseless VT, 200J shock given  4th pulse check asystole, 5th ROSC, sinus tachy with HUMA. IO placed and levo started. Due to blood in mouth took several attempts with DL for ETT to be placed but ultimately confirmed with capnometry and bilateral breath sounds.     #Elevated troponin  uptrending troponin to 182 3/15 AM, was likely iso ESRD  s/p cardiac arrest, elevated ST segment   - TTE from 3/17 showed EF of 63% with mild pulm htn, normal left ventricular systolic function, and diastolic LV dysfunction.      #HTN  on amlodipine 5mg, losartan 100mg, torsemide 20mg, and clonidine 0.2 patch weekly  -Back on amlodipine, can uptitrate if needed or resume other oral agents        Respiratory/ENT  #Intubated  - off sedation  - CPAP trial 8/5 on 3/25 and 3/27    # Tympanic membrane rupture  discharge from R ear, evaluated by ENT given dexamethasone and cipro 3/14  - c/w broad spectrum abx   - MRI of IAC   - f/u ENT recs    Gastrointestinal  #Constipation  - Abdominal xray negative for bowel obstruction  - trial on movantik  - c/w bowel regimen  - increase trickle feeds as tolerated and bowel regimen  - now having consistent bowel movements    Renal  #ESRD #s/p Kidney Transplant in 2003 at [place] #PCKD  previously HD through LLE AVF but no HD since transplant  - avoid nephrotoxic agents  - holding cyclosporine and mycophenolate   - c/w home prednisone 5mg qday  - strict I/O and replete electrolytes  - s/p 4mg IV bumex with minimal urine production (3/17)  - started on CRRT (3/17-3/21) net negative 100cc/hr goal  - s/p intermittent HD (3/22 and 3/24)  - Patient with uptrending Scr; repeat CBC after 1u prbc 3/27--stable  - Cathflo to unclog femoral shiley 3/28  - f/u blood gasses due to prior acidosis after increasing RR on the vent 3/29       Hematological  # h/o LLE DVT  resolved, no DVT on doppler 12/22.  - negative LLE duplex this admission 3/17  - restarted heparin subQ (3/21)  - POCUS showed residual clot in RLE; f/u dopplers negative      #Anemia  - s/p 1 unit pRBC on 3/16 for Hb drop from 10 to 7.9 and 1 unit on 3/21 for Hb 6.8 2/2 loss from CRRT clotting  - Evaluated by ENT for bleeding and found no signs of active bleeding from nasal cavity, nasopharynx or oral cavity, however, patient biting on tongue, which could be one source of bleeding. Tongue was edematous and lax  - transfuse 1u pRBC 3/27  - f/u GI recs regarding Hgb drops      Infectious Disease  #Strep pneumoniae meningitis #R ear mastoiditis  treatment with IV CTX   - c/w ciprodex drops for R ear  - f/u ID recs   - repeat cx from 3/21 NGTD  - c/w CTX (3/16 - [ ])  - repeat blood and sputum cx on 3/23 2/2 new fever  - f/u UA  - cultures cleared, holding off on SORAYA for now until MRI and potential concern for GIIB      #h/o PCP  - atovaquone at home, continue      Endocrinological  #Hypothyroidism  last TSH 12/2022 >10  TSH 3/15 4.73  - c/w levothyroxine     #Diabetes mellitus Type II  - ISS      Ethics  FULL, GOC ongoing. 79 y/o F with h/o HTN, HLD, DM2, PCKD previously on HD via LUE AVF then s/p renal transplant, LLE DVT (resolved, nml dopplers 12/2022) on ASA, very small supraclinoid aneurysm (reportedly unchanged on MRA 10/2020), hypothyroidism, PCP 2021 on atovaquone ppx, reportedly hospitalized in 12/2022 for rectal prolapse, had a blood transfusion at that time, also + CMV 1/25/23 who presented with R-sided HA, R ear pain and neck stiffness after visit to ENT earlier in the day, altered on presentation, CT head unremarkable for stroke, however LP with IR and BCx showing +strep pneumoniae. currently on vanc and CTX per ID. Cardiac arrest 3/16 with ROSC, transferred to MICU for further management.       Plan:   Neurological  #Currently Intubated, patient still unresponsive to painful and verbal stimuli  - off sedation  - EEG no seizures now  - f/u MRI IAC and brain--otitis and mastoiditis       #Strep pneumo meningitis #Acute encephalopathy  severe HA, neck stiffness, fever, leukocytosis concerning for meningitis/encephalitis  CT head negative for CVA.   LP and BCx 3/15 both showing gram positive cocci in pairs, and + strep pneumoniae.   likely source of entry from R ear where pt c/o discomfort for several months.  patient is on cyclosporine, mycophenolate mofetil and prednisone due to kidney transplant and is immunocompromised   dental procedure 3/14 but less likely source   - f/u MRI of the IAC and brain    #Seizures  - 24hr EEG initially with highly focal seizures, improved with increased sedation.   - keppra loaded 500mg IVP (3/17) and started on 250mg IV q12h (3/17 - 3/19) increased to 750 mg IV q12h (CRRT dosing (3/19 - 3/20), then increased to 1000mg IV q12 (3/20 - 3/22), then back to 750mg IV q12 (3/22 - [ ])  - vimpat 100 BID increased to 200 BID (3/20 - [ ])  -Off versed; off prop  -now seizure free    Cardiovascular  #Cardiac Arrest  bradycardia to 30, pulseless, code blue was called 3/16   2 rounds of epi, and PEA arrest. on 3rd pulse check pulseless VT, 200J shock given  4th pulse check asystole, 5th ROSC, sinus tachy with HUMA. IO placed and levo started. Due to blood in mouth took several attempts with DL for ETT to be placed but ultimately confirmed with capnometry and bilateral breath sounds.     #Elevated troponin  uptrending troponin to 182 3/15 AM, was likely iso ESRD  s/p cardiac arrest, elevated ST segment   - TTE from 3/17 showed EF of 63% with mild pulm htn, normal left ventricular systolic function, and diastolic LV dysfunction.      #HTN  on amlodipine 5mg, losartan 100mg, torsemide 20mg, and clonidine 0.2 patch weekly  -Back on amlodipine, can uptitrate if needed or resume other oral agents        Respiratory/ENT  #Intubated  - off sedation  - CPAP trial 8/5 on 3/25 and 3/27    # Tympanic membrane rupture  discharge from R ear, evaluated by ENT given dexamethasone and cipro 3/14  - c/w broad spectrum abx   - MRI of IAC   - f/u ENT recs    Gastrointestinal  #Constipation  - Abdominal xray negative for bowel obstruction  - trial on movantik  - c/w bowel regimen  - increase trickle feeds as tolerated and bowel regimen  - now having consistent bowel movements    Renal  #ESRD #s/p Kidney Transplant in 2003 at [place] #PCKD  previously HD through LLE AVF but no HD since transplant  - avoid nephrotoxic agents  - holding cyclosporine and mycophenolate   - c/w home prednisone 5mg qday  - strict I/O and replete electrolytes  - s/p 4mg IV bumex with minimal urine production (3/17)  - started on CRRT (3/17-3/21) net negative 100cc/hr goal  - s/p intermittent HD (3/22 and 3/24)  - Patient with uptrending Scr; repeat CBC after 1u prbc 3/27--stable  - Cathflo to unclog femoral shiley 3/28  - f/u blood gasses due to prior acidosis after increasing RR on the vent 3/29       Hematological  # h/o LLE DVT  resolved, no DVT on doppler 12/22.  - negative LLE duplex this admission 3/17  - restarted heparin subQ (3/21)  - POCUS showed residual clot in RLE; f/u dopplers negative      #Anemia  - s/p 1 unit pRBC on 3/16 for Hb drop from 10 to 7.9 and 1 unit on 3/21 for Hb 6.8 2/2 loss from CRRT clotting  - Evaluated by ENT for bleeding and found no signs of active bleeding from nasal cavity, nasopharynx or oral cavity, however, patient biting on tongue, which could be one source of bleeding. Tongue was edematous and lax  - transfuse 1u pRBC 3/27  - f/u GI recs regarding Hgb drops      Infectious Disease  #Strep pneumoniae meningitis #R ear mastoiditis  treatment with IV CTX   - c/w ciprodex drops for R ear  - f/u ID recs   - repeat cx from 3/21 NGTD  - c/w CTX (3/16 - [ ])  - repeat blood and sputum cx on 3/23 2/2 new fever  - f/u UA  - cultures cleared, holding off on SORAYA for now until MRI and potential concern for GIIB      #h/o PCP  - atovaquone at home, continue      Endocrinological  #Hypothyroidism  last TSH 12/2022 >10  TSH 3/15 4.73  - c/w levothyroxine     #Diabetes mellitus Type II  - ISS      Ethics  FULL, GOC ongoing. 79 y/o F with h/o HTN, HLD, DM2, PCKD previously on HD via LUE AVF then s/p renal transplant, LLE DVT (resolved, nml dopplers 12/2022) on ASA, very small supraclinoid aneurysm (reportedly unchanged on MRA 10/2020), hypothyroidism, PCP 2021 on atovaquone ppx, reportedly hospitalized in 12/2022 for rectal prolapse, had a blood transfusion at that time, also + CMV 1/25/23 who presented with R-sided HA, R ear pain and neck stiffness after visit to ENT earlier in the day, altered on presentation, CT head unremarkable for stroke, however LP with IR and BCx showing +strep pneumoniae. currently on vanc and CTX per ID. Cardiac arrest 3/16 with ROSC, transferred to MICU for further management.       Plan:   Neurological  #Currently Intubated, patient still unresponsive to painful and verbal stimuli  - off sedation  - EEG no seizures now  - MRI IAC and brain--otitis and mastoiditis with multiple cerebral infarcts and cerebellar infarct. Will need f/u MRI      #Strep pneumo meningitis #Acute encephalopathy  severe HA, neck stiffness, fever, leukocytosis concerning for meningitis/encephalitis  CT head negative for CVA.   LP and BCx 3/15 both showing gram positive cocci in pairs, and + strep pneumoniae.   likely source of entry from R ear where pt c/o discomfort for several months.  patient is on cyclosporine, mycophenolate mofetil and prednisone due to kidney transplant and is immunocompromised   dental procedure 3/14 but less likely source   - f/u MRI of the IAC and brain--otitis and mastoiditis with multiple cerebral infarcts and cerebellar infarct    #Seizures  - 24hr EEG initially with highly focal seizures, improved with increased sedation.   - keppra loaded 500mg IVP (3/17) and started on 250mg IV q12h (3/17 - 3/19) increased to 750 mg IV q12h (CRRT dosing (3/19 - 3/20), then increased to 1000mg IV q12 (3/20 - 3/22), then back to 750mg IV q12 (3/22 - [ ])  - vimpat 100 BID increased to 200 BID (3/20 - [ ])  -Off versed; off prop  -now seizure free    Cardiovascular  #Cardiac Arrest  bradycardia to 30, pulseless, code blue was called 3/16   2 rounds of epi, and PEA arrest. on 3rd pulse check pulseless VT, 200J shock given  4th pulse check asystole, 5th ROSC, sinus tachy with HUMA. IO placed and levo started. Due to blood in mouth took several attempts with DL for ETT to be placed but ultimately confirmed with capnometry and bilateral breath sounds.     #Elevated troponin  uptrending troponin to 182 3/15 AM, was likely iso ESRD  s/p cardiac arrest, elevated ST segment   - TTE from 3/17 showed EF of 63% with mild pulm htn, normal left ventricular systolic function, and diastolic LV dysfunction.      #HTN  on amlodipine 5mg, losartan 100mg, torsemide 20mg, and clonidine 0.2 patch weekly  -Back on amlodipine, can uptitrate if needed or resume other oral agents    Respiratory/ENT  #Intubated  - off sedation  - CPAP trial 8/5 on 3/25 and 3/27  - Patient has been intubated >2 wks; will talk to family regarding what their plans are iso MRI reading; will likely need trach and peg if within their goals    # Tympanic membrane rupture  discharge from R ear, evaluated by ENT given dexamethasone and cipro 3/14  - c/w broad spectrum abx   - MRI of IAC appreciated  - f/u ENT recs    Gastrointestinal  #Constipation  - Abdominal xray negative for bowel obstruction  - trial on movantik  - c/w bowel regimen  - increase trickle feeds as tolerated and bowel regimen  - now having consistent bowel movements    Renal  #ESRD #s/p Kidney Transplant in 2003 at [place] #PCKD  previously HD through LLE AVF but no HD since transplant  - avoid nephrotoxic agents  - holding cyclosporine and mycophenolate   - c/w home prednisone 5mg qday  - strict I/O and replete electrolytes  - s/p 4mg IV bumex with minimal urine production (3/17)  - started on CRRT (3/17-3/21) net negative 100cc/hr goal  - s/p intermittent HD (3/22 and 3/24)  - Patient with uptrending Scr; repeat CBC after 1u prbc 3/27--stable  - Cathflo to unclog femoral shiley 3/28  - f/u blood gasses due to prior acidosis after increasing RR on the vent 3/29       Hematological  # h/o LLE DVT  resolved, no DVT on doppler 12/22.  - negative LLE duplex this admission 3/17  - restarted heparin subQ (3/21)  - POCUS showed residual clot in RLE; f/u dopplers negative      #Anemia  - s/p 1 unit pRBC on 3/16 for Hb drop from 10 to 7.9 and 1 unit on 3/21 for Hb 6.8 2/2 loss from CRRT clotting  - Evaluated by ENT for bleeding and found no signs of active bleeding from nasal cavity, nasopharynx or oral cavity, however, patient biting on tongue, which could be one source of bleeding. Tongue was edematous and lax  - transfuse 1u pRBC 3/27  - f/u GI recs regarding Hgb drops      Infectious Disease  #Strep pneumoniae meningitis #R ear mastoiditis  treatment with IV CTX   - c/w ciprodex drops for R ear  - f/u ID recs   - repeat cx from 3/21 NGTD  - c/w CTX (3/16 - [ ])  - repeat blood and sputum cx on 3/23 2/2 new fever  - f/u UA  - cultures cleared, holding off on SORAYA for now until MRI and potential concern for GIIB      #h/o PCP  - atovaquone at home, continue      Endocrinological  #Hypothyroidism  last TSH 12/2022 >10  TSH 3/15 4.73  - c/w levothyroxine     #Diabetes mellitus Type II  - ISS      Ethics  FULL, GOC ongoing.

## 2023-03-30 NOTE — PROGRESS NOTE ADULT - SUBJECTIVE AND OBJECTIVE BOX
Interval Events:   intubated    Hospital Medications:  acetaminophen   Oral Liquid .. 650 milliGRAM(s) Enteral Tube every 6 hours PRN  amLODIPine   Tablet 10 milliGRAM(s) Oral every 24 hours  atovaquone  Suspension 1500 milliGRAM(s) Oral daily  chlorhexidine 0.12% Liquid 15 milliLiter(s) Oral Mucosa two times a day  chlorhexidine 2% Cloths 1 Application(s) Topical <User Schedule>  ciprofloxacin  0.3% Ophthalmic Solution for Otic Use 4 Drop(s) Right Ear two times a day  dextrose 5%. 1000 milliLiter(s) IV Continuous <Continuous>  dextrose 5%. 1000 milliLiter(s) IV Continuous <Continuous>  dextrose 50% Injectable 25 Gram(s) IV Push once  dextrose 50% Injectable 12.5 Gram(s) IV Push once  dextrose 50% Injectable 25 Gram(s) IV Push once  dextrose Oral Gel 15 Gram(s) Oral once PRN  glucagon  Injectable 1 milliGRAM(s) IntraMuscular once  heparin   Injectable 5000 Unit(s) SubCutaneous every 8 hours  insulin lispro (ADMELOG) corrective regimen sliding scale   SubCutaneous every 6 hours  lacosamide IVPB 200 milliGRAM(s) IV Intermittent every 12 hours  levETIRAcetam  IVPB 750 milliGRAM(s) IV Intermittent every 12 hours  levothyroxine Injectable 18 MICROGram(s) IV Push at bedtime  petrolatum Ophthalmic Ointment 1 Application(s) Both EYES every 12 hours  polyethylene glycol 3350 17 Gram(s) Oral two times a day  predniSONE   Tablet 5 milliGRAM(s) Oral daily  senna Syrup 10 milliLiter(s) Oral two times a day      ROS: limited    PHYSICAL EXAM:   Vital Signs:  Vital Signs Last 24 Hrs  T(C): 36.6 (30 Mar 2023 07:00), Max: 37.2 (30 Mar 2023 04:00)  T(F): 97.9 (30 Mar 2023 07:00), Max: 98.9 (30 Mar 2023 04:00)  HR: 88 (30 Mar 2023 08:10) (61 - 90)  BP: 170/90 (30 Mar 2023 08:00) (111/56 - 198/79)  BP(mean): 102 (30 Mar 2023 08:00) (64 - 107)  RR: 20 (30 Mar 2023 08:00) (13 - 20)  SpO2: 96% (30 Mar 2023 08:10) (96% - 100%)    Parameters below as of 30 Mar 2023 08:00  Patient On (Oxygen Delivery Method): ventilator    O2 Concentration (%): 30  Daily     Daily Weight in k (30 Mar 2023 00:00)    GENERAL: critically ill  NEURO: not alert  HEENT: anicteric sclera, no conjunctival pallor appreciated  CHEST: intubated, mechanical breath sounds  CARDIAC: regular rate, +S1/S2  ABDOMEN: soft, nondistended, nontender, no rebound or guarding  EXTREMITIES: warm, well perfused  SKIN: no lesions noted    LABS:                        8.3    6.91  )-----------(       ( 29 Mar 2023 23:53 )             26.4     Mean Cell Volume: 84.1 fL (23 @ 23:53)        134<L>  |  97<L>  |  59<H>  ----------------------------<  159<H>  3.7   |  22  |  2.73<H>    Ca    8.9      29 Mar 2023 23:53  Phos  4.9       Mg     2.30         TPro  5.4<L>  /  Alb  2.1<L>  /  TBili  0.4  /  DBili  x   /  AST  31  /  ALT  26  /  AlkPhos  126<H>  29    LIVER FUNCTIONS - ( 29 Mar 2023 23:53 )  Alb: 2.1 g/dL / Pro: 5.4 g/dL / ALK PHOS: 126 U/L / ALT: 26 U/L / AST: 31 U/L / GGT: x           PT/INR - ( 29 Mar 2023 01:34 )   PT: 13.6 sec;   INR: 1.17 ratio         PTT - ( 29 Mar 2023 01:34 )  PTT:23.8 sec                            8.3    6.91  )-----------(       ( 29 Mar 2023 23:53 )             26.4                         8.0    7.62  )-----------( 200      ( 29 Mar 2023 01:34 )             25.2                         9.3    11.13 )-----------( 209      ( 28 Mar 2023 00:25 )             29.5                         8.9    10.85 )-----------( 198      ( 27 Mar 2023 14:22 )             27.9       Imaging: Images reviewed.

## 2023-03-30 NOTE — PROGRESS NOTE ADULT - ATTENDING COMMENTS
77 yo F w/ HTN, HLD, T2DM, PCKD previously on HD via LUE AVF then s/p renal transplant, LLE DVT (resolved) hypothyroidism, prior PCP PNA, CMV viremia who initially presented with R-sided HA, R ear pain and neck stiffness found to have Strep Pneumo bacteremia and meningitis in setting of otitis externa. Course c/b cardiac arrest 3/16 and post arrest seizures and NAMAN.    MRI with multiple subacute cerebral infarcts    1) Sepsis 2/2 strep pneumoniae bacteremia/meningitis - Vasopressors weaned off. Continue ceftriaxone. TTE negative for vegetations. Blood cultures cleared since 3/17. ID recommends SORAYA to r/o endocarditis but will need to ensure patient not having GI bleed.    2) PCKD s/p renal transplant c/b NAMAN - Now requiring intermittent HD. Last HD session 3/24. Continue prednisone for renal transplant, holding further immunosuppression due to strep bacteremia and meningitis. tolerated HD after cathflo, plan for hd as per renal    3) Persistent seizures - Continue keppra and vimpat. EEG negative for further seizures, vEEG d/c 3/25. Continue to monitor off sedation. f/u neuro reccs re: MRI findings    4) Acute hypoxic respiratory failure - Continue mechanical ventilation, wean support as tolerated. Daily SBT as tolerated. Mental status precluding extubation at this time.    5) HTN - Continue amlodipine. Well controlled.    6) AMS - unclear etiology, may be related to infarcts seen on MRI. f/u neuro reccs    7) acute blood loss anemia unclear source, fu GI reccs    Critically ill patient requiring frequent bedside visits with therapy changes.

## 2023-03-30 NOTE — PROGRESS NOTE ADULT - ASSESSMENT
78F with PCKD previously on HD via LUE AVF now s/p cadaveric renal transplant 2003 at [facility], LLE DVT (resolved, nml dopplers 12/2022) on ASA, HTN, HLD, DM2, 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 Went to ENT and was diagnosed with R otitis externa secondary to perforated TM. Prescribed cipro/dexamethasone.  Worsening headache and EMS called. Admitted 3/14.  CT head negative.  NAMAN.  Started on vanc/meropenem.  IR obtained LP.  Findings c/w meningitis, culture +pneumococcus.  Antibiotics narrowed to ceftriaxone.  Remains lethargic off sedation with possible seizures.  Intermittent fevers.  MRI with acute and subacute infarcts.  Suspect endocarditis though there is not SORAYA.    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

## 2023-03-30 NOTE — CHART NOTE - NSCHARTNOTEFT_GEN_A_CORE
EEG preliminary read (not final) on the initial recording hour(s) = x 2     No seizures recorded.  Triphasic waves, Moderate slowing noted, nonspecific.  Final report to follow tomorrow morning after completion of study.    [facility] EEG Reading Room Ph#: [phone]  Epilepsy Answering Service after 5PM and before 8:30AM: Ph#: [phone]

## 2023-03-30 NOTE — PROGRESS NOTE ADULT - ASSESSMENT
Assessment: 78 year-old woman with a PMHx of PCKD s/p renal transplant, brain cyst, DM, HLD, HTN, hypothyroidism, glaucoma, cataracts, DVT in leg, on asa who presents to ED for severe headache, right ear pain, neck pain, markedly decreased responsiveness to external stimuli. LP significant for decreased glucose, elevated WBC with neutrophil predominance, elevated protein, PCR (+) for strep. pneumoniae. antibiotics were started. Course complicated by PEA arrest, ROSC achieved after about 10 mins. Patient is now intubated and sedated. EEG shows occasional left epileptiform discharges and rare right frontal epileptiform discharges indicating potential risk of seizures. Last electrographic seizure reported 3/20. MRI brain 3/29 shows scattered infarcts throughout cerebellum & cerebrum (majority on left). Vascular neurology consulted. Neurological exam while patient off sedation for about 5 days. Only brainstem reflexes intact. Likely poor functional prognosis.     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, complicated by anoxic ischemic encephalopathy, electrographic seizures, scattered infarcts possibly due to septic emboli    Plan   [] start aspirin 81mg   [] can consider SORAYA, may not  as patient unlikely to undergo valve replacement, can consider extended course of antibiotics for infectious antiobiotics as an alternative  [] repeat MRI brain with & without contrast in 3-4 weeks  [x] MRI brain  [x] discontinued EEG  [x] S/p IV levetiracetam 500mg load x1 (3/17) and maintenance 250mg IV Q12 (3/17-3/19); then 750mg Q12H (CRRT dosing 3/19-3/20); increased to 1000mg Q12H (3/20 - 3/21)  [x] continue Levetiracetam 750mg Q12H (3/22 - )  [x] Continue lacosamide 200mg Q12H (3/20)  [x] Continue antimicrobials as per primary team/ID  [x] neuron specific enolase (sent 3/19) 15.9mg   [x] levetiracetam level, 51.8 mg  [x] UA (-), Bcx (+GPC in pairs - S. pneumo)  [x] SERUM: A1C 5.6, LDL 79, procal 45.63, CK 1074  [x] S/p LP 3/15: protein (>1181), gluc (<5), TNC (34,389, 88% neut), RBC (1267), AFB (-), CSF gram strain & culture (GPC in pairs), cryptococcal ag (-), EBV PCR (-), CSF PCR (+S. pneumoniae),     Case seen and discussed with Vascular Neurology Attending Dr. Shar Garcia Assessment: 78 year-old woman with a PMHx of PCKD s/p renal transplant, brain cyst, DM, HLD, HTN, hypothyroidism, glaucoma, cataracts, DVT in leg, on asa who presents to ED for severe headache, right ear pain, neck pain, markedly decreased responsiveness to external stimuli. LP significant for decreased glucose, elevated WBC with neutrophil predominance, elevated protein, PCR (+) for strep. pneumoniae. antibiotics were started. Course complicated by PEA arrest, ROSC achieved after about 10 mins. Patient is now intubated and sedated. EEG shows occasional left epileptiform discharges and rare right frontal epileptiform discharges indicating potential risk of seizures. Last electrographic seizure reported 3/20. MRI brain 3/29 shows scattered infarcts throughout cerebellum & cerebrum (majority on left). Vascular neurology consulted. Neurological exam while patient off sedation for about 5 days. Only brainstem reflexes intact. Likely poor functional prognosis.   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, complicated by anoxic ischemic encephalopathy, electrographic seizures, scattered infarcts possibly due to septic emboli    Plan   []  aspirin 81mg and statin therapy if no objection   [] can consider SORAYA, may not  as patient unlikely to undergo valve replacement, can consider extended course of antibiotics for infectious antiobiotics as an alternative  [] repeat MRI brain with & without contrast in 3-4 weeks  [x] MRI brain  [x] discontinued EEG  [x] S/p IV levetiracetam 500mg load x1 (3/17) and maintenance 250mg IV Q12 (3/17-3/19); then 750mg Q12H (CRRT dosing 3/19-3/20); increased to 1000mg Q12H (3/20 - 3/21)  [x] continue Levetiracetam 750mg Q12H (3/22 - )  [x] Continue lacosamide 200mg Q12H (3/20)  [x] Continue antimicrobials as per primary team/ID  [x] neuron specific enolase (sent 3/19) 15.9mg   [x] levetiracetam level, 51.8 mg  [x] UA (-), Bcx (+GPC in pairs - S. pneumo)  [x] SERUM: A1C 5.6, LDL 79, procal 45.63, CK 1074  [x] S/p LP 3/15: protein (>1181), gluc (<5), TNC (34,389, 88% neut), RBC (1267), AFB (-), CSF gram strain & culture (GPC in pairs), cryptococcal ag (-), EBV PCR (-), CSF PCR (+S. pneumoniae),     Case seen and discussed with Vascular Neurology Attending Dr. Shar Garcia

## 2023-03-31 NOTE — PROGRESS NOTE ADULT - SUBJECTIVE AND OBJECTIVE BOX
Neurology Progress Note    SUBJECTIVE/OBJECTIVE/INTERVAL EVENTS: Patient seen and examined at bedside w/ neuro attending and team.     INTERVAL HISTORY: 78 year-old woman with a PMHx of PCKD s/p renal transplant, brain cyst, DM, HLD, HTN, hypothyroidism, glaucoma, cataracts, DVT in leg, on asa who presents to ED for severe headache, right ear pain, neck pain, markedly decreased responsiveness to external stimuli. LP significant for decreased glucose, elevated WBC with neutrophil predominance, elevated protein, PCR (+) for strep. pneumoniae. antibiotics were started. Course complicated by PEA arrest, ROSC achieved after about 10 mins. Patient is now intubated and sedated. EEG shows occasional left epileptiform discharges and rare right frontal epileptiform discharges indicating potential risk of seizures. Last electrographic seizure reported 3/20. MRI brain 3/29 shows scattered infarcts. Vascular neurology consulted and patient started on aspirin. Can consider SORAYA, may not  as patient unlikely to undergo valve replacement, can consider extended course of antibiotics for infectious antiobiotics as an alternative. Ongoing GOC discussion.     REVIEW OF SYSTEMS: unable to obtain    MEDICATIONS  (STANDING):  amLODIPine   Tablet 10 milliGRAM(s) Oral every 24 hours  atovaquone  Suspension 1500 milliGRAM(s) Oral daily  cefTRIAXone   IVPB 2000 milliGRAM(s) IV Intermittent every 12 hours  chlorhexidine 0.12% Liquid 15 milliLiter(s) Oral Mucosa two times a day  chlorhexidine 2% Cloths 1 Application(s) Topical <User Schedule>  ciprofloxacin  0.3% Ophthalmic Solution for Otic Use 4 Drop(s) Right Ear two times a day  dextrose 5%. 1000 milliLiter(s) (100 mL/Hr) IV Continuous <Continuous>  dextrose 5%. 1000 milliLiter(s) (50 mL/Hr) IV Continuous <Continuous>  dextrose 50% Injectable 25 Gram(s) IV Push once  dextrose 50% Injectable 12.5 Gram(s) IV Push once  dextrose 50% Injectable 25 Gram(s) IV Push once  glucagon  Injectable 1 milliGRAM(s) IntraMuscular once  heparin   Injectable 5000 Unit(s) SubCutaneous every 8 hours  insulin lispro (ADMELOG) corrective regimen sliding scale   SubCutaneous every 6 hours  lacosamide Solution 200 milliGRAM(s) Oral two times a day  levETIRAcetam  Solution 750 milliGRAM(s) Oral two times a day  levothyroxine Injectable 18 MICROGram(s) IV Push at bedtime  petrolatum Ophthalmic Ointment 1 Application(s) Both EYES every 12 hours  polyethylene glycol 3350 17 Gram(s) Oral two times a day  potassium chloride  10 mEq/100 mL IVPB 10 milliEquivalent(s) IV Intermittent every 1 hour  predniSONE   Tablet 5 milliGRAM(s) Oral daily  senna Syrup 10 milliLiter(s) Oral two times a day    MEDICATIONS  (PRN):  acetaminophen   Oral Liquid .. 650 milliGRAM(s) Enteral Tube every 6 hours PRN Temp greater or equal to 38C (100.4F), Mild Pain (1 - 3), Moderate Pain (4 - 6)  dextrose Oral Gel 15 Gram(s) Oral once PRN Blood Glucose LESS THAN 70 milliGRAM(s)/deciliter      VITALS & EXAMINATION:  Vital Signs Last 24 Hrs  T(C): 37.4 (31 Mar 2023 08:00), Max: 37.7 (30 Mar 2023 20:00)  T(F): 99.3 (31 Mar 2023 08:00), Max: 99.9 (30 Mar 2023 20:00)  HR: 77 (31 Mar 2023 08:00) (72 - 116)  BP: 119/55 (31 Mar 2023 08:00) (104/55 - 164/98)  BP(mean): 74 (31 Mar 2023 08:00) (62 - 116)  RR: 16 (31 Mar 2023 08:00) (16 - 21)  SpO2: 100% (31 Mar 2023 08:00) (98% - 100%)    Parameters below as of 31 Mar 2023 06:00  Patient On (Oxygen Delivery Method): ventilator    O2 Concentration (%): 30    Neurological (>12):  Mental Status: Eyes initially closed. Slight opening to tactile stimuli  - Language: Intubated. off sedation  - Cranial Nerves: Pupils equal b/l and reactive, oculocephalic reflex intact, corneal reflex intact b/l, grimace to gag, rest of CN exam is limited by mental status.   - Motor: posturing to noxious stimulation in b/l UE.  triple flexion in LLE to noxious stimulation. no response to noxious stimuli in RLE   - Sensory: No grimace to noxious stimulation.    - Coordination/Gait: Patient unable to participate due to mental status.      LABORATORY:  CBC                       7.5    7.10  )-----------( 178      ( 31 Mar 2023 04:05 )             24.2     Chem 03-31    134<L>  |  99  |  47<H>  ----------------------------<  179<H>  3.5   |  25  |  2.36<H>    Ca    8.6      31 Mar 2023 04:05  Phos  4.2     03-31  Mg     2.20     03-31    TPro  5.1<L>  /  Alb  1.9<L>  /  TBili  0.3  /  DBili  x   /  AST  27  /  ALT  24  /  AlkPhos  136<H>  03-31    LFTs LIVER FUNCTIONS - ( 31 Mar 2023 04:05 )  Alb: 1.9 g/dL / Pro: 5.1 g/dL / ALK PHOS: 136 U/L / ALT: 24 U/L / AST: 27 U/L / GGT: x           Coagulopathy PT/INR - ( 31 Mar 2023 04:05 )   PT: 14.3 sec;   INR: 1.23 ratio         PTT - ( 31 Mar 2023 04:05 )  PTT:23.6 sec  Lipid Panel 03-15 Chol 153 LDL -- HDL 45<L> Trig 146    STUDIES & IMAGING: (EEG, CT, MR, U/S, TTE/SORAYA):  EEG 3/30   GPDs with triphasic morphology (up to 1 hz) are typically seen in the context of a relatively moderate to severe metabolic encephalopathic process, but may be associated with an increased risk for seizure, (however, more so typically if frequency >2hz).    Previously described LPDs/spikes/BIRDs not recorde      MR:  1. RIGHT CPA/IAC: Otitis and mastoiditis. No intracranial abnormality is appreciated in the adjacent middle cranial fossa.    2. LEFT CPA/IAC: Mastoiditis. No intracranial abnormality is appreciated in the adjacent middle cranial fossa.    3. Brain: Left cerebellar acute infarction. Additional left cerebellar lesion is indeterminate, possibly subacute infarction with petechial hemorrhage. Scattered small lesions in the cerebral hemispheres, predominantly within the white matter on the left, suggests small scattered foci of subacute infarction. Left occipital cortical lesion is indeterminant possibly subacute infarction. Ischemic white matter disease and atrophy not atypical for age    4. The gadolinium-enhanced acquisition was somewhat suboptimal in terms of enhancement effect. Consider short-term interval follow-up including post gadolinium volumetric T1-weighted imaging.    CTH 3/14/23: Right mastoid air cell and tympanic cavity effusion, correlate for the presence of acute otomastoiditis.  CTA BRAIN: No flow-limiting stenosis or vascular aneurysm. No AVM.  CTA NECK: Calcified plaque at the origin of the L ICA results in ~40% short segment stenosis. Otherwise no flow-limiting stenosis or arterial dissection.    TTE 3/17/23  EF=63%  1.  Moderate aortic regurgitation.  2. Mild concentric left ventricular hypertrophy.  3. Normal left ventricular systolic function. No segmental wall motion abnormalities.  4. Reversal of the E-A  waves of the mitral inflow pattern  is consistent with diastolic LV dysfunction.  5. Normal right ventricular size and function.  6. Normal tricuspid valve. Mild tricuspid regurgitation.

## 2023-03-31 NOTE — PROGRESS NOTE ADULT - ASSESSMENT
79 y/o F with h/o HTN, HLD, DM2, PCKD previously on HD via LUE AVF then s/p renal transplant, LLE DVT (resolved, nml dopplers 12/2022) on ASA, very small supraclinoid aneurysm (reportedly unchanged on MRA 10/2020), hypothyroidism, PCP 2021 on atovaquone ppx, reportedly hospitalized in 12/2022 for rectal prolapse, had a blood transfusion at that time, also + CMV 1/25/23 who presented with R-sided HA, R ear pain and neck stiffness after visit to ENT earlier in the day, altered on presentation, CT head unremarkable for stroke, however LP with IR and BCx showing +strep pneumoniae. currently on vanc and CTX per ID. Cardiac arrest 3/16 with ROSC, transferred to MICU for further management.       Plan:   Neurological  #Currently Intubated, patient still unresponsive to painful and verbal stimuli  - off sedation  - EEG no seizures now  - MRI IAC and brain--otitis and mastoiditis with multiple cerebral infarcts and cerebellar infarct. Will need f/u MRI      #Strep pneumo meningitis #Acute encephalopathy  severe HA, neck stiffness, fever, leukocytosis concerning for meningitis/encephalitis  CT head negative for CVA.   LP and BCx 3/15 both showing gram positive cocci in pairs, and + strep pneumoniae.   likely source of entry from R ear where pt c/o discomfort for several months.  patient is on cyclosporine, mycophenolate mofetil and prednisone due to kidney transplant and is immunocompromised   dental procedure 3/14 but less likely source   - f/u MRI of the IAC and brain--otitis and mastoiditis with multiple cerebral infarcts and cerebellar infarct    #Seizures  - 24hr EEG initially with highly focal seizures, improved with increased sedation.   - keppra loaded 500mg IVP (3/17) and started on 250mg IV q12h (3/17 - 3/19) increased to 750 mg IV q12h (CRRT dosing (3/19 - 3/20), then increased to 1000mg IV q12 (3/20 - 3/22), then back to 750mg IV q12 (3/22 - [ ])  - vimpat 100 BID increased to 200 BID (3/20 - [ ])  -Off versed; off prop  -now seizure free    Cardiovascular  #Cardiac Arrest  bradycardia to 30, pulseless, code blue was called 3/16   2 rounds of epi, and PEA arrest. on 3rd pulse check pulseless VT, 200J shock given  4th pulse check asystole, 5th ROSC, sinus tachy with HUMA. IO placed and levo started. Due to blood in mouth took several attempts with DL for ETT to be placed but ultimately confirmed with capnometry and bilateral breath sounds.     #Elevated troponin  uptrending troponin to 182 3/15 AM, was likely iso ESRD  s/p cardiac arrest, elevated ST segment   - TTE from 3/17 showed EF of 63% with mild pulm htn, normal left ventricular systolic function, and diastolic LV dysfunction.      #HTN  on amlodipine 5mg, losartan 100mg, torsemide 20mg, and clonidine 0.2 patch weekly  -Back on amlodipine, can uptitrate if needed or resume other oral agents    Respiratory/ENT  #Intubated  - off sedation  - CPAP trial 8/5 on 3/25 and 3/27  - Patient has been intubated >2 wks; will talk to family regarding what their plans are iso MRI reading; will likely need trach and peg if within their goals    # Tympanic membrane rupture  discharge from R ear, evaluated by ENT given dexamethasone and cipro 3/14  - c/w broad spectrum abx   - MRI of IAC appreciated  - f/u ENT recs    Gastrointestinal  #Constipation  - Abdominal xray negative for bowel obstruction  - trial on movantik  - c/w bowel regimen  - increase trickle feeds as tolerated and bowel regimen  - now having consistent bowel movements    Renal  #ESRD #s/p Kidney Transplant in 2003 at [place] #PCKD  previously HD through LLE AVF but no HD since transplant  - avoid nephrotoxic agents  - holding cyclosporine and mycophenolate   - c/w home prednisone 5mg qday  - strict I/O and replete electrolytes  - s/p 4mg IV bumex with minimal urine production (3/17)  - started on CRRT (3/17-3/21) net negative 100cc/hr goal  - s/p intermittent HD (3/22 and 3/24)  - Patient with uptrending Scr; repeat CBC after 1u prbc 3/27--stable  - Cathflo to unclog femoral shiley 3/28  - f/u blood gasses due to prior acidosis after increasing RR on the vent 3/29       Hematological  # h/o LLE DVT  resolved, no DVT on doppler 12/22.  - negative LLE duplex this admission 3/17  - restarted heparin subQ (3/21)  - POCUS showed residual clot in RLE; f/u dopplers negative      #Anemia  - s/p 1 unit pRBC on 3/16 for Hb drop from 10 to 7.9 and 1 unit on 3/21 for Hb 6.8 2/2 loss from CRRT clotting  - Evaluated by ENT for bleeding and found no signs of active bleeding from nasal cavity, nasopharynx or oral cavity, however, patient biting on tongue, which could be one source of bleeding. Tongue was edematous and lax  - transfuse 1u pRBC 3/27  - f/u GI recs regarding Hgb drops      Infectious Disease  #Strep pneumoniae meningitis #R ear mastoiditis  treatment with IV CTX   - c/w ciprodex drops for R ear  - f/u ID recs   - repeat cx from 3/21 NGTD  - c/w CTX (3/16 - [ ])  - repeat blood and sputum cx on 3/23 2/2 new fever  - f/u UA  - cultures cleared, holding off on SORAYA for now until MRI and potential concern for GIIB      #h/o PCP  - atovaquone at home, continue      Endocrinological  #Hypothyroidism  last TSH 12/2022 >10  TSH 3/15 4.73  - c/w levothyroxine     #Diabetes mellitus Type II  - ISS      Ethics  FULL, GOC ongoing. 77 y/o F with h/o HTN, HLD, DM2, PCKD previously on HD via LUE AVF then s/p renal transplant, LLE DVT (resolved, nml dopplers 12/2022) on ASA, very small supraclinoid aneurysm (reportedly unchanged on MRA 10/2020), hypothyroidism, PCP 2021 on atovaquone ppx, reportedly hospitalized in 12/2022 for rectal prolapse, had a blood transfusion at that time, also + CMV 1/25/23 who presented with R-sided HA, R ear pain and neck stiffness after visit to ENT earlier in the day, altered on presentation, CT head unremarkable for stroke, however LP with IR and BCx showing +strep pneumoniae. currently on vanc and CTX per ID. Cardiac arrest 3/16 with ROSC, transferred to MICU for further management.       Plan:   Neurological  #Currently Intubated, patient still unresponsive to painful and verbal stimuli  - off sedation  - EEG no seizures now  - MRI IAC and brain--otitis and mastoiditis with multiple cerebral infarcts and cerebellar infarct. Will need f/u MRI  - Endocarditis is a r/o cause for the scattered cerebral infarcts      #Strep pneumo meningitis #Acute encephalopathy  severe HA, neck stiffness, fever, leukocytosis concerning for meningitis/encephalitis  CT head negative for CVA.   LP and BCx 3/15 both showing gram positive cocci in pairs, and + strep pneumoniae.   likely source of entry from R ear where pt c/o discomfort for several months.  patient is on cyclosporine, mycophenolate mofetil and prednisone due to kidney transplant and is immunocompromised   dental procedure 3/14 but less likely source   - f/u MRI of the IAC and brain--otitis and mastoiditis with multiple cerebral infarcts and cerebellar infarct    #Seizures  - 24hr EEG initially with highly focal seizures, improved with increased sedation.   - keppra loaded 500mg IVP (3/17) and started on 250mg IV q12h (3/17 - 3/19) increased to 750 mg IV q12h (CRRT dosing (3/19 - 3/20), then increased to 1000mg IV q12 (3/20 - 3/22), then back to 750mg IV q12 (3/22 - [ ])  - vimpat 100 BID increased to 200 BID (3/20 - [ ])  -Off versed; off prop  -now seizure free    Cardiovascular  #Cardiac Arrest  bradycardia to 30, pulseless, code blue was called 3/16   2 rounds of epi, and PEA arrest. on 3rd pulse check pulseless VT, 200J shock given  4th pulse check asystole, 5th ROSC, sinus tachy with HUMA. IO placed and levo started. Due to blood in mouth took several attempts with DL for ETT to be placed but ultimately confirmed with capnometry and bilateral breath sounds.     #Elevated troponin  uptrending troponin to 182 3/15 AM, was likely iso ESRD  s/p cardiac arrest, elevated ST segment   - TTE from 3/17 showed EF of 63% with mild pulm htn, normal left ventricular systolic function, and diastolic LV dysfunction.      #HTN  on amlodipine 5mg, losartan 100mg, torsemide 20mg, and clonidine 0.2 patch weekly  -Back on amlodipine, can uptitrate if needed or resume other oral agents    Respiratory/ENT  #Intubated  - off sedation  - CPAP trial 8/5 on 3/25 and 3/27  - Patient has been intubated >2 wks; will talk to family regarding what their plans are iso MRI reading; will likely need trach and peg if within their goals    # Tympanic membrane rupture  discharge from R ear, evaluated by ENT given dexamethasone and cipro 3/14  - c/w broad spectrum abx   - MRI of IAC appreciated  - f/u ENT recs    Gastrointestinal  #Constipation  - Abdominal xray negative for bowel obstruction  - trial on movantik  - c/w bowel regimen  - increase trickle feeds as tolerated and bowel regimen  - now having consistent bowel movements    Renal  #ESRD #s/p Kidney Transplant in 2003 at [place] #PCKD  previously HD through LLE AVF but no HD since transplant  - avoid nephrotoxic agents  - holding cyclosporine and mycophenolate   - c/w home prednisone 5mg qday  - strict I/O and replete electrolytes  - s/p 4mg IV bumex with minimal urine production (3/17)  - started on CRRT (3/17-3/21) net negative 100cc/hr goal  - s/p intermittent HD (3/22 and 3/24)  - Patient with uptrending Scr; repeat CBC after 1u prbc 3/27--stable  - Cathflo to unclog femoral shiley 3/28  - f/u blood gasses due to prior acidosis after increasing RR on the vent 3/29       Hematological  # h/o LLE DVT  resolved, no DVT on doppler 12/22.  - negative LLE duplex this admission 3/17  - restarted heparin subQ (3/21)  - POCUS showed residual clot in RLE; f/u dopplers negative      #Anemia  - s/p 1 unit pRBC on 3/16 for Hb drop from 10 to 7.9 and 1 unit on 3/21 for Hb 6.8 2/2 loss from CRRT clotting  - Evaluated by ENT for bleeding and found no signs of active bleeding from nasal cavity, nasopharynx or oral cavity, however, patient biting on tongue, which could be one source of bleeding. Tongue was edematous and lax  - transfuse 1u pRBC 3/27  - f/u GI recs regarding Hgb drops      Infectious Disease  #Strep pneumoniae meningitis #R ear mastoiditis  treatment with IV CTX   - c/w ciprodex drops for R ear  - f/u ID recs   - repeat cx from 3/21 NGTD  - c/w CTX (3/16 - [ ])  - repeat blood and sputum cx on 3/23 2/2 new fever  - f/u UA  - cultures cleared, holding off on SORAYA for now until MRI and potential concern for GIIB      #h/o PCP  - atovaquone at home, continue      Endocrinological  #Hypothyroidism  last TSH 12/2022 >10  TSH 3/15 4.73  - c/w levothyroxine     #Diabetes mellitus Type II  - ISS      Ethics  FULL, GOC ongoing. 77 y/o F with h/o HTN, HLD, DM2, PCKD previously on HD via LUE AVF then s/p renal transplant, LLE DVT (resolved, nml dopplers 12/2022) on ASA, very small supraclinoid aneurysm (reportedly unchanged on MRA 10/2020), hypothyroidism, PCP 2021 on atovaquone ppx, reportedly hospitalized in 12/2022 for rectal prolapse, had a blood transfusion at that time, also + CMV 1/25/23 who presented with R-sided HA, R ear pain and neck stiffness after visit to ENT earlier in the day, altered on presentation, CT head unremarkable for stroke, however LP with IR and BCx showing +strep pneumoniae. currently on vanc and CTX per ID. Cardiac arrest 3/16 with ROSC, transferred to MICU for further management.       Plan:   Neurological  #Currently Intubated, patient still unresponsive to painful and verbal stimuli  - off sedation  - EEG no seizures now  - MRI IAC and brain--otitis and mastoiditis with multiple cerebral infarcts and cerebellar infarct. Will need f/u MRI  - Endocarditis is a r/o cause for the scattered cerebral infarcts      #Strep pneumo meningitis #Acute encephalopathy  severe HA, neck stiffness, fever, leukocytosis concerning for meningitis/encephalitis  CT head negative for CVA.   LP and BCx 3/15 both showing gram positive cocci in pairs, and + strep pneumoniae.   likely source of entry from R ear where pt c/o discomfort for several months.  patient is on cyclosporine, mycophenolate mofetil and prednisone due to kidney transplant and is immunocompromised   dental procedure 3/14 but less likely source   - f/u MRI of the IAC and brain--otitis and mastoiditis with multiple cerebral infarcts and cerebellar infarct    #Seizures  - 24hr EEG initially with highly focal seizures, improved with increased sedation.   - keppra loaded 500mg IVP (3/17) and started on 250mg IV q12h (3/17 - 3/19) increased to 750 mg IV q12h (CRRT dosing (3/19 - 3/20), then increased to 1000mg IV q12 (3/20 - 3/22), then back to 750mg IV q12 (3/22 - [ ])  - vimpat 100 BID increased to 200 BID (3/20 - [ ])  -Off versed; off prop  -now seizure free    Cardiovascular  #Cardiac Arrest  bradycardia to 30, pulseless, code blue was called 3/16   2 rounds of epi, and PEA arrest. on 3rd pulse check pulseless VT, 200J shock given  4th pulse check asystole, 5th ROSC, sinus tachy with HUMA. IO placed and levo started. Due to blood in mouth took several attempts with DL for ETT to be placed but ultimately confirmed with capnometry and bilateral breath sounds.     #Elevated troponin  uptrending troponin to 182 3/15 AM, was likely iso ESRD  s/p cardiac arrest, elevated ST segment   - TTE from 3/17 showed EF of 63% with mild pulm htn, normal left ventricular systolic function, and diastolic LV dysfunction.      #HTN  on amlodipine 5mg, losartan 100mg, torsemide 20mg, and clonidine 0.2 patch weekly  -Back on amlodipine, can uptitrate if needed or resume other oral agents    Respiratory/ENT  #Intubated  - off sedation  - CPAP trial 8/5 on 3/25 and 3/27  - Patient has been intubated >2 wks; will talk to family regarding what their plans are iso MRI reading; will likely need trach and peg if within their goals    # Tympanic membrane rupture  discharge from R ear, evaluated by ENT given dexamethasone and cipro 3/14  - c/w broad spectrum abx   - MRI of IAC appreciated  - f/u ENT recs    Gastrointestinal  #Constipation  - Abdominal xray negative for bowel obstruction previously  - trialed on movantik  - increase trickle feeds as tolerated and bowel regimen  - now having consistent bowel movements--now watery; dc bowel regimen, will f/u KUB  - Tense/distended abdomen today 3/31==will f/u KUB    Renal  #ESRD #s/p Kidney Transplant in 2003 at [place] #PCKD  previously HD through LLE AVF but no HD since transplant  - avoid nephrotoxic agents  - holding cyclosporine and mycophenolate   - c/w home prednisone 5mg qday  - strict I/O and replete electrolytes  - s/p 4mg IV bumex with minimal urine production (3/17)  - started on CRRT (3/17-3/21) net negative 100cc/hr goal  - now s/p intermittent HD (3/22 and 3/24)  - Patient with uptrending Scr; repeat CBC after 1u prbc 3/27--stable  - Cathflo to unclog femoral shiley 3/28         Hematological  # h/o LLE DVT  resolved, no DVT on doppler 12/22.  - negative LLE duplex this admission 3/17  - restarted heparin subQ (3/21)  - POCUS showed residual clot in RLE; f/u dopplers negative      #Anemia  - s/p 1 unit pRBC on 3/16 for Hb drop from 10 to 7.9 and 1 unit on 3/21 for Hb 6.8 2/2 loss from CRRT clotting  - Evaluated by ENT for bleeding and found no signs of active bleeding from nasal cavity, nasopharynx or oral cavity, however, patient biting on tongue, which could be one source of bleeding. Tongue was edematous and lax  - transfuse 1u pRBC 3/27  - f/u GI recs regarding Hgb drops--will need clearance before SORAYA  -f/u Add on Smear and retic count      Infectious Disease  #Strep pneumoniae meningitis #R ear mastoiditis  treatment with IV CTX   - c/w ciprodex drops for R ear  - f/u ID recs   - repeat cx from 3/21 NGTD  - c/w CTX (3/16 - [ ])  - repeat blood and sputum cx on 3/23 2/2 new fever  - f/u UA  - cultures cleared, holding off on SORAYA for now until MRI and potential concern for GIIB      #h/o PCP  - atovaquone at home, continue      Endocrinological  #Hypothyroidism  last TSH 12/2022 >10  TSH 3/15 4.73  - c/w levothyroxine     #Diabetes mellitus Type II  - ISS      Ethics  FULL, GOC ongoing.

## 2023-03-31 NOTE — CHART NOTE - NSCHARTNOTEFT_GEN_A_CORE
Source: Patient [ ]    Family [ ]     other [x ]RN, Chart review    Current Diet : Diet, NPO with Tube Feed:   Tube Feeding Modality: Orogastric  Nepro with Carb Steady (NEPRORTH)  Total Volume for 24 Hours (mL): 840  Continuous  Starting Tube Feed Rate {mL per Hour}: 10  Increase Tube Feed Rate by (mL): 10     Every 6 hours  Until Goal Tube Feed Rate (mL per Hour): 35  Tube Feed Duration (in Hours): 24  Tube Feed Start Time: 09:00  No Carb Prosource TF     Qty per Day:  1 (03-21-23 @ 07:27) [Active]    Height (cm): 158 (16 Mar 2023 14:33)  Weight (kg): 65.9kg (3/31), 69kg (3/24), 62.1 (3/16)  BMI (kg/m2): 26.3 (3/31)    Nutrition Note: 77 y/o F with h/o HTN, HLD, DM2, PCKD previously on HD via LUE AVF then s/p renal transplant, LLE DVT (resolved, nml dopplers 12/2022) on ASA, very small supraclinoid aneurysm (reportedly unchanged on MRA 10/2020), hypothyroidism, PCP 2021 on atovaquone ppx, reportedly hospitalized in 12/2022 for rectal prolapse, had a blood transfusion at that time, also + CMV 1/25/23 who presented with R-sided HA, R ear pain and neck stiffness after visit to ENT earlier in the day, altered on presentation, CT head unremarkable for stroke, however LP with IR and BCx showing +strep pneumoniae. currently on vanc and CTX per ID. Cardiac arrest 3/16 with ROSC, transferred to MICU for further management, per chart.   Patient is receiving tube feeding, Nepro @35ml/hr (at goal rate during visit). RN reports patient is tolerating tube feeding well. No reports of any nausea, vomiting, diarrhea, constipation at this time. Last bowel movement 3/31/2023, per RN flow sheet. Current tube feeding order: Nepro 35ml/hr x 24hrs, 840ml in volume, provides 1512kcal, 68gm protein, 610ml free water from feed. Patient receives Prosource no carb 1x/day (60kcal, 15gm protein). Meeting patient's estimated energy, protein needs. Noted patient is on levothyroxine, recommend adjusting Tube feeding to 22 hrs, Nepro @38ml/hrs x 22hrs (836ml in volume, provides 1504 kcal, 68gm protein, 608ml free water from feed). Free water flushes per MD discretion. Noted patient with weight fluctuation of -3.1kg/-4.5% BW x 1 week, +3.8kg/+6.1% BW x 2 weeks, possibly 2/2 fluid shift. As per RN flow sheet, patient has 2+ generalized edema, 2+ edema to right arm and right hand, stage II pressure injury to sacral spine. As per last RD note dated 3/27/2023, patient noted with visible signs of severe muscle wasting: temples & severe subcutaneous fat loss: orbitals. Patient met the criteria of severe malnutrition in the context of acute illness.     __________________ Pertinent Medications__________________   MEDICATIONS  (STANDING):  amLODIPine   Tablet 10 milliGRAM(s) Oral every 24 hours  atovaquone  Suspension 1500 milliGRAM(s) Oral daily  cefTRIAXone   IVPB 2000 milliGRAM(s) IV Intermittent every 12 hours  chlorhexidine 0.12% Liquid 15 milliLiter(s) Oral Mucosa two times a day  chlorhexidine 2% Cloths 1 Application(s) Topical <User Schedule>  ciprofloxacin  0.3% Ophthalmic Solution for Otic Use 4 Drop(s) Right Ear two times a day  dextrose 5%. 1000 milliLiter(s) (100 mL/Hr) IV Continuous <Continuous>  dextrose 5%. 1000 milliLiter(s) (50 mL/Hr) IV Continuous <Continuous>  dextrose 50% Injectable 25 Gram(s) IV Push once  dextrose 50% Injectable 12.5 Gram(s) IV Push once  dextrose 50% Injectable 25 Gram(s) IV Push once  glucagon  Injectable 1 milliGRAM(s) IntraMuscular once  heparin   Injectable 5000 Unit(s) SubCutaneous every 8 hours  insulin lispro (ADMELOG) corrective regimen sliding scale   SubCutaneous every 6 hours  lacosamide Solution 200 milliGRAM(s) Oral two times a day  levothyroxine Injectable 18 MICROGram(s) IV Push at bedtime  petrolatum Ophthalmic Ointment 1 Application(s) Both EYES every 12 hours  potassium chloride  10 mEq/100 mL IVPB 10 milliEquivalent(s) IV Intermittent every 1 hour  predniSONE   Tablet 5 milliGRAM(s) Oral daily    MEDICATIONS  (PRN):  acetaminophen   Oral Liquid .. 650 milliGRAM(s) Enteral Tube every 6 hours PRN Temp greater or equal to 38C (100.4F), Mild Pain (1 - 3), Moderate Pain (4 - 6)  dextrose Oral Gel 15 Gram(s) Oral once PRN Blood Glucose LESS THAN 70 milliGRAM(s)/deciliter    __________________ Pertinent Labs__________________   03-31 Na134 mmol/L<L> Glu 179 mg/dL<H> K+ 3.5 mmol/L Cr  2.36 mg/dL<H> BUN 47 mg/dL<H> 03-31 Phos 4.2 mg/dL 03-31 Alb 1.9 g/dL<L> 03-15 Chol 153 mg/dL LDL --    HDL 45 mg/dL<L> Trig 146 mg/dL    Estimated Needs:   [x ] no change since previous assessment  20-25 kcals/kg admit weight (62.1kg) = 2853-8977 kcals/d  1.6-1.8g protein/kg Ideal Body Weight = 80-90g protein/d    Previous Nutrition Diagnosis:   [x ] Inadeqate Energy Intake  Inadequate Protein Energy  Nutrition Diagnosis is [x] not applicable     New Nutrition Diagnosis:  [x ] Severe malnutrition in the context of acute illness related to physiological causes, as evidenced by physical signs of moderate fluid accumulation, severe muscle and fat loss.     Interventions:   Recommend  [x ] Recommend adjusting Tube feeding to 22 hrs, adjust goal rate to Nepro @38ml/hrs x 22hrs (836ml in volume, [id number] kcal, 68gm protein, 608ml free water from feed). Free water flushes per MD discretion.  [x ] Continue to provide Prosource no carb 1x/day (60kcal, 15gm protein).  [x ] Consider adding multivitamin and vitamin c to promote wound healing.     Monitoring and Evaluation:   [x ] TF tolerance [x ] weights [x ] follow up per protocol  [x ] other: bowel movement, skin integrity, labs.

## 2023-03-31 NOTE — PROGRESS NOTE ADULT - ASSESSMENT
78-year-old female with PCKD previously on HD via LUE AVF now s/p renal transplant since 2003 at [place], LLE DVT (resolved, nml dopplers 12/2022) on ASA, HTN, HLD, DM2, very small supraclinoid aneurysm on R and very small aneurysm vs infundibulum L supraclinoid artery (reportedly unchanged on MRA 10/2020), glaucoma/cataract, hypothyroid, history of PCP 2021 on atovaquone ppx, reportedly hospitalized in 12/2022 for rectal prolapse, CMV (unclear active or prior infxn), presenting with R-sided HA, R ear pain and neck pain after recent visit to ENT earlier in the day.  Cardiac arrest on 3/16/23  septic shock s/p iv pressors  intubated on MV  Renal following for NAMAN on CKD Mx.  OS Nephrologist Dr. Hugo Hernandez [phone]    NAMAN on CKD4  w/worsened renal function, oliguria, acidosis, mild hyperkalemia- s/p CVVHD, now on intermittent HD  Consent for HD obtained, signed by daughter 3/16/23  KTx 2003  B/L creat around 2.8 since Dec 2022  Patient receives Cyclosporine (Gengraf) 75mg PO q12hrs,  BID and Prednisone 5 QD for transplant    plan:  In light of severe sepsis and cardiac arrest: holding MMF and Cyclosporine  c/w predniSONE   Tablet 5 milliGRAM(s) Oral daily  had malfunctioning shiley catheter tolerated HD after TPA infusion on 3/28/23  s/p HD 3/30, Rx sheet reviewed, net UF 1.2kg removed, tolerated well. uneventful.   plan for next HD tomorrow w/2k bath, uf as tolerated    HTN- bp stable, controlled- c/w amLODIPine   Tablet 10 milliGRAM(s) every 24 hours w/holding parameters  monitor U/O  c/w TF  monitor BMP qdaily now  dose all meds for eGFR<15ml/min.   avoid ACEi/ARB/NSAIDs/Nephrotoxics if able.    Metabolic acidosis -resolved  OM and OE  Abxs per Infectious disease specialist with dose adjustment per GFR  f/u cxs  vent Mx, per ICU  off pressors now.     prognosis guarded  labs, chart reviewed  For any question, pl call:  Nephrology  Cell -[phone]  Office [phone]  Ans Serv [phone]

## 2023-03-31 NOTE — PROGRESS NOTE ADULT - SUBJECTIVE AND OBJECTIVE BOX
Patient is a 78y old  Female who presents with a chief complaint of AMS/R ear infxn (15 Mar 2023 10:36)    f/u pneumococcal sepsis, bacteremia and meningitis    Interval History/ROS:  remains lethargic,  on ventilator ROS unable    PAST MEDICAL & SURGICAL HISTORY:  Polycystic kidney disease  Glaucoma  Aneurysm  History of deep venous thrombosis (DVT) of distal vein of left lower extremity  Thrombocytopenia  Hypothyroid  Osteoporosis  Arthritis  PCP (pneumocystis carinii pneumonia)   C. difficile colitis  Type 2 diabetes mellitus, without long-term current use of insulin  Essential hypertension  H/O kidney transplant (cadaver , [facility])  H/O:   H/O eye surgery  S/P hysterectomy  H/O umbilical hernia repair    Allergies  apple (Unknown)  Benadryl (Unknown)  penicillin (Hives), tolerate cephalosporins  watermelon (Unknown)    ANTIMICROBIALS:  meropenem  IVPB 1000 every 12 hours (3/15)  vancomycin  IVPB (3/15-3/17)    active:  cefTRIAXone   IVPB 2000 every 12 hours (3/15-)  atovaquone  Suspension 1500 daily    MEDICATIONS  (STANDING):  amLODIPine   Tablet 10 every 24 hours  heparin   Injectable 5000 every 8 hours  insulin lispro (ADMELOG) corrective regimen sliding scale  every 6 hours  lacosamide Solution 200 two times a day  levothyroxine Injectable 18 at bedtime  predniSONE   Tablet 5 daily    Vital Signs Last 24 Hrs  T(F): 99.3 (23 @ 12:00), Max: 99.9 (23 @ 20:00)  HR: 78 (23 @ 12:00)  BP: 104/53 (23 @ 12:00)  RR: 20 (23 @ 12:00)  SpO2: 100% (23 @ 12:00) (99% - 100%)    PHYSICAL EXAM:  Constitutional: lethargic off sedation on vent, eeg monitoring  HEAD/EYES: keeps eyes closed  ENT:  supple  Cardiovascular:   normal S1, S2  Respiratory:  clear BS bilaterally  GI:  soft, non-tender, enlarged, palpable transplant kidney RLQ  :  snider  Musculoskeletal:  no synovitis  Neurologic: lethargic, sedated, unable to assess  Skin:  no rash, HD catheter left groin  Psychiatric:  not able to assess                                            7.5    7.10  )-----------( 178      ( 31 Mar 2023 04:05 )             24.2     134  |  99  |  47  ----------------------------<  179  3.5   |  25  |  2.36  Ca    8.6      31 Mar 2023 04:05Phos  4.2     Mg     2.20       TPro  5.1  /  Alb  1.9  /  TBili  0.3  /  DBili  x   /  AST  27  /  ALT  24  /  AlkPhos  136      MICROBIOLOGY:  3/23 BC (-)  3/23 Sputum (-)  3/21 BC (-)  3/19 Trach Asp Tracheal Aspirate (-)  3/17 BC (-)  3/16 Bronchial Lavage (-)  3/16 CSF (+) Streptococcus pneumoniae PCR  3/15 BC (+) Streptococcus pneumoniae    RADIOLOGY:  imaging below personally reviewed and agree with findings    MR Head No Cont (23 @ 15:56) >  IMPRESSION:  1.  RIGHT CPA/IAC:  Otitis and mastoiditis. No intracranial abnormality is appreciated in the adjacent middle cranial fossa.  2. LEFT CPA/IAC:  Mastoiditis. No intracranial abnormality is appreciated in the adjacent middle cranial fossa.  3.  Brain: Left cerebellar acute infarction. Additional left cerebellar lesion is indeterminate, possibly subacute infarction with petechial hemorrhage. Scattered small lesions in the cerebral hemispheres,  predominantly within the white matter on the left, suggests small scattered foci of subacute infarction. Left occipital cortical lesion is indeterminant possibly subacute infarction. Ischemic white matter disease and atrophy not atypical for age  4. The gadolinium-enhanced acquisition was somewhat suboptimal in terms of enhancement effect. Consider short-term interval follow-up including post gadolinium volumetric T1-weighted imaging.      CT Abdomen and Pelvis No Cont (23 @ 20:23) >  IMPRESSION:  No retroperitoneal hematoma.  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  Probable stenosis of the proximal SMA and the distalaorta at the bifurcation.  Polycystic native kidneys. Scattered hyperdensities may represent hemorrhagic cysts..    Xray Chest 1 View- PORTABLE-Urgent (Xray Chest 1 View- PORTABLE-Urgent .) (23 @ 03:05) >  IMPRESSION: No pneumothorax post central line attempt.    Xray Chest 1 View- PORTABLE-Urgent (Xray Chest 1 View- PORTABLE-Urgent .) (23 @ 14:53) >  IMPRESSION:  Support devices as above.  Improving bilateral aeration.    IR Procedure (03.15.23 @ 15:24) >  The opening pressure measured greater than 55 cm water. The closing pressure measured 32 cm water, after removal of 30 cc of cloudy CSF. The needle was removed. The patient tolerated the procedure well without initial complication.  IMPRESSION:  Status post successful lumbar puncture as described.  Markedly abnormal appearing CSF.  Elevated opening pressures.    US Kidney and Bladder (03.15.23 @ 12:10) >  IMPRESSION:  Mild fullness of the left lower quadrant transplant kidney collecting system.  Increased renal cortical echogenicity, possibly renal parenchymal disease.    CT Brain Perfusion Maps Stroke (23 @ 19:46) >  IMPRESSION:  CT PERFUSION:  Limited by late injection of the contrast bolus.  Cerebral blood flow less than 30% = 0 mL. No core infarct is predicted.  Tmax greater than 6 seconds = 0 mL. No brain parenchyma predicted to be at continued ischemicrisk in the presence of neurologic symptoms.  CTA BRAIN:  No flow-limiting stenosis or vascular aneurysm. No AVM.  CTA NECK:  Calcified plaque at the origin of the left internal carotid artery results in approximately 40% short segment stenosis.   Otherwise no flow-limiting stenosis. No evidence for arterial dissection.      ECHO  Transthoracic Echocardiogram (23 @ 11:48) >  CONCLUSIONS:  1.  Moderate aortic regurgitation.  2. Mild concentric left ventricular hypertrophy.  3. Normal left ventricular systolic function. No segmental wall motion abnormalities.  4. Reversal of the E-A  waves of the mitral inflow pattern is consistent with diastolic LV dysfunction.  5. Normal right ventricular size and function.  6. Normal tricuspid valve. Mild tricuspid regurgitation.  *** Compared with echocardiogram of 2018, no significant changes noted.

## 2023-03-31 NOTE — PROGRESS NOTE ADULT - SUBJECTIVE AND OBJECTIVE BOX
[facility] - S Alea / Tita S /D Rama/ S Jovan/ S Keenan/ Cleve Gambino / DAGMAR Wilsonu/ O Daya  service -[phone], office [phone]  ---------------------------------------------------------------------------------------------------------------    Patient seen and examined bedside    Subjective and Objective: No overnight events, sob resolved. No complaints today. feeling better    Allergies: apple (Unknown)  Benadryl (Unknown)  penicillin (Hives)  watermelon (Unknown)      Hospital Medications:   MEDICATIONS  (STANDING):  amLODIPine   Tablet 10 milliGRAM(s) Oral every 24 hours  atovaquone  Suspension 1500 milliGRAM(s) Oral daily  cefTRIAXone   IVPB 2000 milliGRAM(s) IV Intermittent every 12 hours  chlorhexidine 0.12% Liquid 15 milliLiter(s) Oral Mucosa two times a day  chlorhexidine 2% Cloths 1 Application(s) Topical <User Schedule>  ciprofloxacin  0.3% Ophthalmic Solution for Otic Use 4 Drop(s) Right Ear two times a day  dextrose 5%. 1000 milliLiter(s) (100 mL/Hr) IV Continuous <Continuous>  dextrose 5%. 1000 milliLiter(s) (50 mL/Hr) IV Continuous <Continuous>  dextrose 50% Injectable 25 Gram(s) IV Push once  dextrose 50% Injectable 12.5 Gram(s) IV Push once  dextrose 50% Injectable 25 Gram(s) IV Push once  glucagon  Injectable 1 milliGRAM(s) IntraMuscular once  heparin   Injectable 5000 Unit(s) SubCutaneous every 8 hours  insulin lispro (ADMELOG) corrective regimen sliding scale   SubCutaneous every 6 hours  lacosamide Solution 200 milliGRAM(s) Oral two times a day  levothyroxine Injectable 18 MICROGram(s) IV Push at bedtime  petrolatum Ophthalmic Ointment 1 Application(s) Both EYES every 12 hours  potassium chloride  10 mEq/100 mL IVPB 10 milliEquivalent(s) IV Intermittent every 1 hour  predniSONE   Tablet 5 milliGRAM(s) Oral daily      REVIEW OF SYSTEMS:  CONSTITUTIONAL: No weakness, fevers or chills  EYES/ENT: No visual changes;  No vertigo or throat pain   NECK: No pain or stiffness  RESPIRATORY: No cough, wheezing, hemoptysis; No shortness of breath  CARDIOVASCULAR: No chest pain or palpitations.  GASTROINTESTINAL: No abdominal or epigastric pain. No nausea, vomiting, or hematemesis; No diarrhea or constipation. No melena or hematochezia.  GENITOURINARY: No dysuria, frequency, foamy urine, urinary urgency, incontinence or hematuria  NEUROLOGICAL: No numbness or weakness  SKIN: No itching, burning, rashes, or lesions   VASCULAR: No bilateral lower extremity edema.   All other review of systems is negative unless indicated above.    VITALS:  T(F): 99.3 (03-31-23 @ 12:00), Max: 99.9 (03-30-23 @ 20:00)  HR: 78 (03-31-23 @ 12:00)  BP: 104/53 (03-31-23 @ 12:00)  RR: 20 (03-31-23 @ 12:00)  SpO2: 100% (03-31-23 @ 12:00)  Wt(kg): --    03-30 @ 07:01  -  03-31 @ 07:00  --------------------------------------------------------  IN: 1970 mL / OUT: 2445 mL / NET: -475 mL    03-31 @ 07:01  -  03-31 @ 14:04  --------------------------------------------------------  IN: 275 mL / OUT: 15 mL / NET: 260 mL          PHYSICAL EXAM:  Constitutional: NAD  HEENT: anicteric sclera, oropharynx clear  Neck: No JVD  Respiratory: CTAB, no wheezes, rales or rhonchi  Cardiovascular: S1, S2, RRR  Gastrointestinal: BS+, soft, NT/ND  Extremities: No cyanosis or clubbing. No peripheral edema  Neurological: A/O x 3, no focal deficits  Psychiatric: Normal mood, normal affect  : No CVA tenderness. No snider.   Skin: No rashes  Vascular Access:    LABS:  03-31    134<L>  |  99  |  47<H>  ----------------------------<  179<H>  3.5   |  25  |  2.36<H>    Ca    8.6      31 Mar 2023 04:05  Phos  4.2     03-31  Mg     2.20     03-31    TPro  5.1<L>  /  Alb  1.9<L>  /  TBili  0.3  /  DBili      /  AST  27  /  ALT  24  /  AlkPhos  136<H>  03-31    Creatinine Trend: 2.36 <--, 2.73 <--, 2.28 <--, 2.95 <--, 2.94 <--, 2.60 <--, 2.39 <--, 2.04 <--                        7.5    7.10  )-----------( 178      ( 31 Mar 2023 04:05 )             24.2     Urine Studies:        RADIOLOGY & ADDITIONAL STUDIES:   [facility] - S Alea / Tita S /D Rama/ S Jovan/ S Keenan/ Cleve Gambino / DAGMAR Wilsonu/ O Daya  service -[phone], office [phone]  ---------------------------------------------------------------------------------------------------------------    Patient seen and examined bedside    Subjective and Objective: No overnight events, remains on MV, unresponsive    Allergies: apple (Unknown)  Benadryl (Unknown)  penicillin (Hives)  watermelon (Unknown)      Hospital Medications:   MEDICATIONS  (STANDING):  amLODIPine   Tablet 10 milliGRAM(s) Oral every 24 hours  atovaquone  Suspension 1500 milliGRAM(s) Oral daily  cefTRIAXone   IVPB 2000 milliGRAM(s) IV Intermittent every 12 hours  chlorhexidine 0.12% Liquid 15 milliLiter(s) Oral Mucosa two times a day  chlorhexidine 2% Cloths 1 Application(s) Topical <User Schedule>  ciprofloxacin  0.3% Ophthalmic Solution for Otic Use 4 Drop(s) Right Ear two times a day  dextrose 5%. 1000 milliLiter(s) (100 mL/Hr) IV Continuous <Continuous>  dextrose 5%. 1000 milliLiter(s) (50 mL/Hr) IV Continuous <Continuous>  dextrose 50% Injectable 25 Gram(s) IV Push once  dextrose 50% Injectable 12.5 Gram(s) IV Push once  dextrose 50% Injectable 25 Gram(s) IV Push once  glucagon  Injectable 1 milliGRAM(s) IntraMuscular once  heparin   Injectable 5000 Unit(s) SubCutaneous every 8 hours  insulin lispro (ADMELOG) corrective regimen sliding scale   SubCutaneous every 6 hours  lacosamide Solution 200 milliGRAM(s) Oral two times a day  levothyroxine Injectable 18 MICROGram(s) IV Push at bedtime  petrolatum Ophthalmic Ointment 1 Application(s) Both EYES every 12 hours  potassium chloride  10 mEq/100 mL IVPB 10 milliEquivalent(s) IV Intermittent every 1 hour  predniSONE   Tablet 5 milliGRAM(s) Oral daily    VITALS:  T(F): 99.3 (03-31-23 @ 12:00), Max: 99.9 (03-30-23 @ 20:00)  HR: 78 (03-31-23 @ 12:00)  BP: 104/53 (03-31-23 @ 12:00)  RR: 20 (03-31-23 @ 12:00)  SpO2: 100% (03-31-23 @ 12:00)  Wt(kg): --    03-30 @ 07:01  -  03-31 @ 07:00  --------------------------------------------------------  IN: 1970 mL / OUT: 2445 mL / NET: -475 mL    03-31 @ 07:01  -  03-31 @ 14:04  --------------------------------------------------------  IN: 275 mL / OUT: 15 mL / NET: 260 mL      PHYSICAL EXAM:  Constitutional: NAD  HEENT: ETT+   Respiratory: CTAB  Cardiovascular: S1, S2, RRR  Gastrointestinal: BS+, soft, NT/ND  Extremities: No peripheral edema  Neurological: unresponsive  : + snider.   Vascular Access: femoral shiley+     LABS:  03-31    134<L>  |  99  |  47<H>  ----------------------------<  179<H>  3.5   |  25  |  2.36<H>    Ca    8.6      31 Mar 2023 04:05  Phos  4.2     03-31  Mg     2.20     03-31    TPro  5.1<L>  /  Alb  1.9<L>  /  TBili  0.3  /  DBili      /  AST  27  /  ALT  24  /  AlkPhos  136<H>  03-31    Creatinine Trend: 2.36 <--, 2.73 <--, 2.28 <--, 2.95 <--, 2.94 <--, 2.60 <--, 2.39 <--, 2.04 <--                        7.5    7.10  )-----------( 178      ( 31 Mar 2023 04:05 )             24.2     Urine Studies:        RADIOLOGY & ADDITIONAL STUDIES:

## 2023-03-31 NOTE — PROGRESS NOTE ADULT - ASSESSMENT
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 Went to ENT and was diagnosed with R otitis externa secondary to perforated TM. Prescribed cipro/dexamethasone.  Worsening headache and EMS called. Admitted 3/14.  CT head negative.  NAMAN.  Started on vanc/meropenem.  IR obtained LP.  Findings c/w meningitis, culture +pneumococcus.  Antibiotics narrowed to ceftriaxone.  Remains lethargic off sedation with possible seizures.  Intermittent fevers.  MRI with acute and subacute infarcts.  Suspect endocarditis though there is not SORAYA.    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  ID colleague to cover.  Please call the ID service [phone] with questions or concerns over the weekend

## 2023-03-31 NOTE — EEG REPORT - NS EEG TEXT BOX
YARA SEGUNDO [id number]     Study Date: 03/30/2023  15:43- 08:00  03/31/2023  Duration: 15.5hrs  --------------------------------------------------------------------------------------------------  History:  CC/ HPI Patient is a 78y old  Female who presents with a chief complaint of AMS/R ear infxn (24 Mar 2023 07:10)    MEDICATIONS  (STANDING):  amLODIPine   Tablet 10 milliGRAM(s) Oral every 24 hours  atovaquone  Suspension 1500 milliGRAM(s) Oral daily  cefTRIAXone   IVPB 2000 milliGRAM(s) IV Intermittent every 12 hours  ciprofloxacin  0.3% Ophthalmic Solution for Otic Use 4 Drop(s) Right Ear two times a day  heparin   Injectable 5000 Unit(s) SubCutaneous every 8 hours  insulin lispro (ADMELOG) corrective regimen sliding scale   SubCutaneous every 6 hours  lacosamide Solution 200 milliGRAM(s) Oral two times a day  levETIRAcetam  Solution 750 milliGRAM(s) Oral two times a day  levothyroxine Injectable 18 MICROGram(s) IV Push at bedtime  petrolatum Ophthalmic Ointment 1 Application(s) Both EYES every 12 hours  polyethylene glycol 3350 17 Gram(s) Oral two times a day  potassium chloride  10 mEq/100 mL IVPB 10 milliEquivalent(s) IV Intermittent every 1 hour  predniSONE   Tablet 5 milliGRAM(s) Oral daily  senna Syrup 10 milliLiter(s) Oral two times a day    --------------------------------------------------------------------------------------------------  Study Interpretation:    [[[Abbreviation Key:  PDR=alpha rhythm/posterior dominant rhythm. A-P=anterior posterior.  Amplitude: ‘very low’:<20; ‘low’:20-49; ‘medium’:; ‘high’:>150uV.  Persistence for periodic/rhythmic patterns (% of epoch) ‘rare’:<1%; ‘occasional’:1-10%; ‘frequent’:10-50%; ‘abundant’:50-90%; ‘continuous’:>90%.  Persistence for sporadic discharges: ‘rare’:<1/hr; ‘occasional’:1/min-1/hr; ‘frequent’:>1/min; ‘abundant’:>1/10 sec.  RPP=rhythmic and periodic patterns; GRDA=generalized rhythmic delta activity; FIRDA=frontal intermittent GRDA; LRDA=lateralized rhythmic delta activity; TIRDA=temporal intermittent rhythmic delta activity;  LPD=PLED=lateralized periodic discharges; GPD=generalized periodic discharges; BIPDs =bilateral independent periodic discharges; Mf=multifocal; SIRPDs=stimulus induced rhythmic, periodic, or ictal appearing discharges; BIRDs=brief potentially ictal rhythmic discharges >4 Hz, lasting .5-10s; PFA (paroxysmal bursts >13 Hz or =8 Hz <10s).  Modifiers: +F=with fast component; +S=with spike component; +R=with rhythmic component.  S-B=burst suppression pattern.  Max=maximal. N1-drowsy; N2-stage II sleep; N3-slow wave sleep. SSS/BETS=small sharp spikes/benign epileptiform transients of sleep. HV=hyperventilation; PS=photic stimulation]]]    Daily EEG Visual Analysis    FINDINGS:    Background: continuous and symmetric, consisting of diffuse polymorphic delta > theta frequencies.     No normal sleep architecture is captured.    Sporadic Epileptiform Discharges and Rhythmic and Periodic Patterns (RPPs):  GPDs with triphasic morphology up to 1hz, intermittently     Electrographic and Electroclinical seizures:  None    Other Clinical Events:  None    Activation Procedures:   Hyperventilation was not performed.    Photic stimulation was  performed without abnormalities.    Artifacts:  Intermittent myogenic and movement artifacts are present.    EKG:  Single-lead EKG shows regular rhythm at 70-90 bpm baseline.    Impression:  Abnormal EEG  GPDs with triphasic morphology up to 1hz, intermittently  Moderate slowing     Clinical Correlation:  GPDs with triphasic morphology are typically seen in the context of a relatively moderate to severe metabolic encephalopathic process, but may be associated with an increased risk for seizure, (however, more so typically if frequency >2hz).    Previously described LPDs/spikes/BIRDs not recorded.  No electrographic seizures recorded since 3/20/23  In absence of additional clinical concerns, recommend consideration for discontinuation of current EEG study with reconnection in future if warranted.      Meet Martínez MD FAES  Director, Continuous EEG Monitoring Program, [facility]   and Epilepsy Fellowship ,   Department of Neurology, [facility] [employer]    [facility] EEG Reading Room Ph#: [phone]  Epilepsy Answering Service after 5PM and before 8:30AM: Ph#: [phone]   YARA SEGUNDO [id number]     Study Date: 03/30/2023  15:43- 08:00  03/31/2023  Duration: 15.5hrs  --------------------------------------------------------------------------------------------------  History:  CC/ HPI Patient is a 78y old  Female who presents with a chief complaint of AMS/R ear infxn (24 Mar 2023 07:10)    MEDICATIONS  (STANDING):  amLODIPine   Tablet 10 milliGRAM(s) Oral every 24 hours  atovaquone  Suspension 1500 milliGRAM(s) Oral daily  cefTRIAXone   IVPB 2000 milliGRAM(s) IV Intermittent every 12 hours  ciprofloxacin  0.3% Ophthalmic Solution for Otic Use 4 Drop(s) Right Ear two times a day  heparin   Injectable 5000 Unit(s) SubCutaneous every 8 hours  insulin lispro (ADMELOG) corrective regimen sliding scale   SubCutaneous every 6 hours  lacosamide Solution 200 milliGRAM(s) Oral two times a day  levETIRAcetam  Solution 750 milliGRAM(s) Oral two times a day  levothyroxine Injectable 18 MICROGram(s) IV Push at bedtime  petrolatum Ophthalmic Ointment 1 Application(s) Both EYES every 12 hours  polyethylene glycol 3350 17 Gram(s) Oral two times a day  potassium chloride  10 mEq/100 mL IVPB 10 milliEquivalent(s) IV Intermittent every 1 hour  predniSONE   Tablet 5 milliGRAM(s) Oral daily  senna Syrup 10 milliLiter(s) Oral two times a day    --------------------------------------------------------------------------------------------------  Study Interpretation:    [[[Abbreviation Key:  PDR=alpha rhythm/posterior dominant rhythm. A-P=anterior posterior.  Amplitude: ‘very low’:<20; ‘low’:20-49; ‘medium’:; ‘high’:>150uV.  Persistence for periodic/rhythmic patterns (% of epoch) ‘rare’:<1%; ‘occasional’:1-10%; ‘frequent’:10-50%; ‘abundant’:50-90%; ‘continuous’:>90%.  Persistence for sporadic discharges: ‘rare’:<1/hr; ‘occasional’:1/min-1/hr; ‘frequent’:>1/min; ‘abundant’:>1/10 sec.  RPP=rhythmic and periodic patterns; GRDA=generalized rhythmic delta activity; FIRDA=frontal intermittent GRDA; LRDA=lateralized rhythmic delta activity; TIRDA=temporal intermittent rhythmic delta activity;  LPD=PLED=lateralized periodic discharges; GPD=generalized periodic discharges; BIPDs =bilateral independent periodic discharges; Mf=multifocal; SIRPDs=stimulus induced rhythmic, periodic, or ictal appearing discharges; BIRDs=brief potentially ictal rhythmic discharges >4 Hz, lasting .5-10s; PFA (paroxysmal bursts >13 Hz or =8 Hz <10s).  Modifiers: +F=with fast component; +S=with spike component; +R=with rhythmic component.  S-B=burst suppression pattern.  Max=maximal. N1-drowsy; N2-stage II sleep; N3-slow wave sleep. SSS/BETS=small sharp spikes/benign epileptiform transients of sleep. HV=hyperventilation; PS=photic stimulation]]]    Daily EEG Visual Analysis    FINDINGS:    Background: continuous and symmetric, consisting of diffuse polymorphic delta > theta frequencies.     Slepo with rudimentary spindles, increased theta, and decreased TPW noted.    Sporadic Epileptiform Discharges and Rhythmic and Periodic Patterns (RPPs):  GPDs with triphasic morphology up to 1hz, intermittently in wakefulness    Electrographic and Electroclinical seizures:  None    Other Clinical Events:  None    Activation Procedures:   Hyperventilation was not performed.    Photic stimulation was  performed without abnormalities.    Artifacts:  Intermittent myogenic and movement artifacts are present.    EKG:  Single-lead EKG shows regular rhythm at 70-90 bpm baseline.    Impression:  Abnormal EEG  GPDs with triphasic morphology up to 1hz, intermittently  Moderate slowing     Clinical Correlation:  GPDs with triphasic morphology are typically seen in the context of a relatively moderate to severe metabolic encephalopathic process, but may be associated with an increased risk for seizure, (however, more so typically if frequency >2hz).    Previously described LPDs/spikes/BIRDs not recorded.  No electrographic seizures recorded since 3/20/23  In absence of additional clinical concerns, recommend consideration for discontinuation of current EEG study with reconnection in future if warranted.      Meet Martínez MD FAES  Director, Continuous EEG Monitoring Program, [facility]   and Epilepsy Fellowship ,   Department of Neurology, [facility] [employer]    [facility] EEG Reading Room Ph#: [phone]  Epilepsy Answering Service after 5PM and before 8:30AM: Ph#: [phone]   YARA SEGUNDO [id number]     Study Date: 03/30/2023  15:43- 012:41  03/31/2023  Duration: 20:30hrs  --------------------------------------------------------------------------------------------------  History:  CC/ HPI Patient is a 78y old  Female who presents with a chief complaint of AMS/R ear infxn (24 Mar 2023 07:10)    MEDICATIONS  (STANDING):  amLODIPine   Tablet 10 milliGRAM(s) Oral every 24 hours  atovaquone  Suspension 1500 milliGRAM(s) Oral daily  cefTRIAXone   IVPB 2000 milliGRAM(s) IV Intermittent every 12 hours  ciprofloxacin  0.3% Ophthalmic Solution for Otic Use 4 Drop(s) Right Ear two times a day  heparin   Injectable 5000 Unit(s) SubCutaneous every 8 hours  insulin lispro (ADMELOG) corrective regimen sliding scale   SubCutaneous every 6 hours  lacosamide Solution 200 milliGRAM(s) Oral two times a day  levETIRAcetam  Solution 750 milliGRAM(s) Oral two times a day  levothyroxine Injectable 18 MICROGram(s) IV Push at bedtime  petrolatum Ophthalmic Ointment 1 Application(s) Both EYES every 12 hours  polyethylene glycol 3350 17 Gram(s) Oral two times a day  potassium chloride  10 mEq/100 mL IVPB 10 milliEquivalent(s) IV Intermittent every 1 hour  predniSONE   Tablet 5 milliGRAM(s) Oral daily  senna Syrup 10 milliLiter(s) Oral two times a day    --------------------------------------------------------------------------------------------------  Study Interpretation:    [[[Abbreviation Key:  PDR=alpha rhythm/posterior dominant rhythm. A-P=anterior posterior.  Amplitude: ‘very low’:<20; ‘low’:20-49; ‘medium’:; ‘high’:>150uV.  Persistence for periodic/rhythmic patterns (% of epoch) ‘rare’:<1%; ‘occasional’:1-10%; ‘frequent’:10-50%; ‘abundant’:50-90%; ‘continuous’:>90%.  Persistence for sporadic discharges: ‘rare’:<1/hr; ‘occasional’:1/min-1/hr; ‘frequent’:>1/min; ‘abundant’:>1/10 sec.  RPP=rhythmic and periodic patterns; GRDA=generalized rhythmic delta activity; FIRDA=frontal intermittent GRDA; LRDA=lateralized rhythmic delta activity; TIRDA=temporal intermittent rhythmic delta activity;  LPD=PLED=lateralized periodic discharges; GPD=generalized periodic discharges; BIPDs =bilateral independent periodic discharges; Mf=multifocal; SIRPDs=stimulus induced rhythmic, periodic, or ictal appearing discharges; BIRDs=brief potentially ictal rhythmic discharges >4 Hz, lasting .5-10s; PFA (paroxysmal bursts >13 Hz or =8 Hz <10s).  Modifiers: +F=with fast component; +S=with spike component; +R=with rhythmic component.  S-B=burst suppression pattern.  Max=maximal. N1-drowsy; N2-stage II sleep; N3-slow wave sleep. SSS/BETS=small sharp spikes/benign epileptiform transients of sleep. HV=hyperventilation; PS=photic stimulation]]]    Daily EEG Visual Analysis    FINDINGS:    Background: continuous and symmetric, consisting of diffuse polymorphic delta > theta frequencies.     Slepo with rudimentary spindles, increased theta, and decreased TPW noted.    Sporadic Epileptiform Discharges and Rhythmic and Periodic Patterns (RPPs):  GPDs with triphasic morphology up to 1hz, intermittently in wakefulness    Electrographic and Electroclinical seizures:  None    Other Clinical Events:  None    Activation Procedures:   Hyperventilation was not performed.    Photic stimulation was  performed without abnormalities.    Artifacts:  Intermittent myogenic and movement artifacts are present.    EKG:  Single-lead EKG shows regular rhythm at 70-90 bpm baseline.    Impression:  Abnormal EEG  GPDs with triphasic morphology up to 1hz, intermittently  Moderate slowing     Clinical Correlation:  GPDs with triphasic morphology are typically seen in the context of a relatively moderate to severe metabolic encephalopathic process, but may be associated with an increased risk for seizure, (however, more so typically if frequency >2hz).    Previously described LPDs/spikes/BIRDs not recorded.  No electrographic seizures recorded since 3/20/23  In absence of additional clinical concerns, recommend consideration for discontinuation of current EEG study with reconnection in future if warranted.      Meet Martínez MD FAES  Director, Continuous EEG Monitoring Program, [facility]   and Epilepsy Fellowship ,   Department of Neurology, [facility] [employer]    [facility] EEG Reading Room Ph#: [phone]  Epilepsy Answering Service after 5PM and before 8:30AM: Ph#: [phone]

## 2023-03-31 NOTE — PROGRESS NOTE ADULT - ATTENDING SUPERVISION STATEMENT
Resident
Fellow
Resident

## 2023-03-31 NOTE — PROGRESS NOTE ADULT - ATTENDING COMMENTS
77 yo F w/ HTN, HLD, T2DM, PCKD previously on HD via LUE AVF then s/p renal transplant, LLE DVT (resolved) hypothyroidism, prior PCP PNA, CMV viremia who initially presented with R-sided HA, R ear pain and neck stiffness found to have Strep Pneumo bacteremia and meningitis in setting of otitis externa. Course c/b cardiac arrest 3/16 and post arrest seizures and NAMAN.    MRI with multiple subacute cerebral infarcts.  Family considering tracheostomy    1) Sepsis 2/2 strep pneumoniae bacteremia/meningitis - Vasopressors weaned off. Continue ceftriaxone. TTE negative for vegetations. Blood cultures cleared since 3/17. ID recommends SORAYA to r/o endocarditis but will need to ensure patient not having GI bleed especially given drop today, f/u GI reccs    2) PCKD s/p renal transplant c/b NAMAN - Now requiring intermittent HD. Continue prednisone for renal transplant, holding further immunosuppression due to strep bacteremia and meningitis. tolerated HD after cathflo, plan for hd as per renal    3) Persistent seizures - Continue keppra and vimpat. EEG negative for further seizures, vEEG d/c 3/25. Continue to monitor off sedation. f/u neuro reccs re: EEG    4) Acute hypoxic respiratory failure - Continue mechanical ventilation, wean support as tolerated. Daily SBT as tolerated. Mental status precluding extubation at this time.    5) HTN - Continue amlodipine. Well controlled.    6) AMS - unclear etiology, may be related to infarcts seen on MRI. f/u neuro reccs    7) acute blood loss anemia unclear source, fu GI reccs (will discuss again today given drop in h/h and possible SORAYA)    ongoing GOC    Critically ill patient requiring frequent bedside visits with therapy changes.

## 2023-03-31 NOTE — PROGRESS NOTE ADULT - SUBJECTIVE AND OBJECTIVE BOX
Inge Zhang PGY1  pager [phone] or check resident tab for coverage    Patient is a 78y old  Female who presents with a chief complaint of AMS/R ear infxn (30 Mar 2023 16:00)      SUBJECTIVE / OVERNIGHT EVENTS:    MEDICATIONS  (STANDING):  amLODIPine   Tablet 10 milliGRAM(s) Oral every 24 hours  atovaquone  Suspension 1500 milliGRAM(s) Oral daily  cefTRIAXone   IVPB 2000 milliGRAM(s) IV Intermittent every 12 hours  chlorhexidine 0.12% Liquid 15 milliLiter(s) Oral Mucosa two times a day  chlorhexidine 2% Cloths 1 Application(s) Topical <User Schedule>  ciprofloxacin  0.3% Ophthalmic Solution for Otic Use 4 Drop(s) Right Ear two times a day  dextrose 5%. 1000 milliLiter(s) (100 mL/Hr) IV Continuous <Continuous>  dextrose 5%. 1000 milliLiter(s) (50 mL/Hr) IV Continuous <Continuous>  dextrose 50% Injectable 25 Gram(s) IV Push once  dextrose 50% Injectable 12.5 Gram(s) IV Push once  dextrose 50% Injectable 25 Gram(s) IV Push once  glucagon  Injectable 1 milliGRAM(s) IntraMuscular once  heparin   Injectable 5000 Unit(s) SubCutaneous every 8 hours  insulin lispro (ADMELOG) corrective regimen sliding scale   SubCutaneous every 6 hours  lacosamide Solution 200 milliGRAM(s) Oral two times a day  levETIRAcetam  Solution 750 milliGRAM(s) Oral two times a day  levothyroxine Injectable 18 MICROGram(s) IV Push at bedtime  petrolatum Ophthalmic Ointment 1 Application(s) Both EYES every 12 hours  polyethylene glycol 3350 17 Gram(s) Oral two times a day  potassium chloride  10 mEq/100 mL IVPB 10 milliEquivalent(s) IV Intermittent every 1 hour  predniSONE   Tablet 5 milliGRAM(s) Oral daily  senna Syrup 10 milliLiter(s) Oral two times a day    MEDICATIONS  (PRN):  acetaminophen   Oral Liquid .. 650 milliGRAM(s) Enteral Tube every 6 hours PRN Temp greater or equal to 38C (100.4F), Mild Pain (1 - 3), Moderate Pain (4 - 6)  dextrose Oral Gel 15 Gram(s) Oral once PRN Blood Glucose LESS THAN 70 milliGRAM(s)/deciliter      CAPILLARY BLOOD GLUCOSE      POCT Blood Glucose.: 164 mg/dL (31 Mar 2023 05:19)  POCT Blood Glucose.: 140 mg/dL (30 Mar 2023 23:17)  POCT Blood Glucose.: 123 mg/dL (30 Mar 2023 18:21)  POCT Blood Glucose.: 188 mg/dL (30 Mar 2023 12:57)    I&O's Summary    30 Mar 2023 07:01  -  31 Mar 2023 07:00  --------------------------------------------------------  IN: 1970 mL / OUT: 2435 mL / NET: -465 mL        Vital Signs Last 24 Hrs  T(C): 36.9 (31 Mar 2023 04:00), Max: 37.7 (30 Mar 2023 20:00)  T(F): 98.4 (31 Mar 2023 04:00), Max: 99.9 (30 Mar 2023 20:00)  HR: 84 (31 Mar 2023 06:00) (77 - 116)  BP: 136/58 (31 Mar 2023 06:00) (104/55 - 170/90)  BP(mean): 81 (31 Mar 2023 06:00) (62 - 116)  RR: 21 (31 Mar 2023 06:00) (16 - 21)  SpO2: 100% (31 Mar 2023 06:00) (96% - 100%)    Parameters below as of 31 Mar 2023 06:00  Patient On (Oxygen Delivery Method): ventilator    O2 Concentration (%): 30    PHYSICAL EXAM:  GENERAL: no distress  PSYCH: A&O x3  HEAD: Atraumatic, Normocephalic  NECK: Supple, No JVD  CHEST/LUNG: clear to auscultation bilaterally  HEART: regular rate and rhythm, no murmurs  ABDOMEN: nontender to palpation, no rebound tenderness/guarding  EXTREMITIES: no edema on bilateral LE  NEUROLOGY: no focal neurologic deficit  SKIN: No rashes or lesions    LABS:                        7.5    7.10  )-----------( 178      ( 31 Mar 2023 04:05 )             24.2      03-31    134<L>  |  99  |  47<H>  ----------------------------<  179<H>  3.5   |  25  |  2.36<H>    Ca    8.6      31 Mar 2023 04:05  Phos  4.2     03-31  Mg     2.20     03-31    TPro  5.1<L>  /  Alb  1.9<L>  /  TBili  0.3  /  DBili  x   /  AST  27  /  ALT  24  /  AlkPhos  136<H>  03-31    PT/INR - ( 31 Mar 2023 04:05 )   PT: 14.3 sec;   INR: 1.23 ratio         PTT - ( 31 Mar 2023 04:05 )  PTT:23.6 sec          RADIOLOGY & ADDITIONAL TESTS:    Imaging Personally Reviewed:    Consultant(s) Notes Reviewed:      Care Discussed with Consultants/Other Providers:   Inge Zhang PGY1  pager [phone] or check resident tab for coverage    Patient is a 78y old  Female who presents with a chief complaint of AMS/R ear infxn (30 Mar 2023 16:00)      SUBJECTIVE / OVERNIGHT EVENTS: Patient with watery diarrhea and distended abdomen in the AM. No overt seizure like activity, Patient still non responsive to verbal and painful stimuli    MEDICATIONS  (STANDING):  amLODIPine   Tablet 10 milliGRAM(s) Oral every 24 hours  atovaquone  Suspension 1500 milliGRAM(s) Oral daily  cefTRIAXone   IVPB 2000 milliGRAM(s) IV Intermittent every 12 hours  chlorhexidine 0.12% Liquid 15 milliLiter(s) Oral Mucosa two times a day  chlorhexidine 2% Cloths 1 Application(s) Topical <User Schedule>  ciprofloxacin  0.3% Ophthalmic Solution for Otic Use 4 Drop(s) Right Ear two times a day  dextrose 5%. 1000 milliLiter(s) (100 mL/Hr) IV Continuous <Continuous>  dextrose 5%. 1000 milliLiter(s) (50 mL/Hr) IV Continuous <Continuous>  dextrose 50% Injectable 25 Gram(s) IV Push once  dextrose 50% Injectable 12.5 Gram(s) IV Push once  dextrose 50% Injectable 25 Gram(s) IV Push once  glucagon  Injectable 1 milliGRAM(s) IntraMuscular once  heparin   Injectable 5000 Unit(s) SubCutaneous every 8 hours  insulin lispro (ADMELOG) corrective regimen sliding scale   SubCutaneous every 6 hours  lacosamide Solution 200 milliGRAM(s) Oral two times a day  levETIRAcetam  Solution 750 milliGRAM(s) Oral two times a day  levothyroxine Injectable 18 MICROGram(s) IV Push at bedtime  petrolatum Ophthalmic Ointment 1 Application(s) Both EYES every 12 hours  polyethylene glycol 3350 17 Gram(s) Oral two times a day  potassium chloride  10 mEq/100 mL IVPB 10 milliEquivalent(s) IV Intermittent every 1 hour  predniSONE   Tablet 5 milliGRAM(s) Oral daily  senna Syrup 10 milliLiter(s) Oral two times a day    MEDICATIONS  (PRN):  acetaminophen   Oral Liquid .. 650 milliGRAM(s) Enteral Tube every 6 hours PRN Temp greater or equal to 38C (100.4F), Mild Pain (1 - 3), Moderate Pain (4 - 6)  dextrose Oral Gel 15 Gram(s) Oral once PRN Blood Glucose LESS THAN 70 milliGRAM(s)/deciliter      CAPILLARY BLOOD GLUCOSE      POCT Blood Glucose.: 164 mg/dL (31 Mar 2023 05:19)  POCT Blood Glucose.: 140 mg/dL (30 Mar 2023 23:17)  POCT Blood Glucose.: 123 mg/dL (30 Mar 2023 18:21)  POCT Blood Glucose.: 188 mg/dL (30 Mar 2023 12:57)    I&O's Summary    30 Mar 2023 07:01  -  31 Mar 2023 07:00  --------------------------------------------------------  IN: 1970 mL / OUT: 2435 mL / NET: -465 mL        Vital Signs Last 24 Hrs  T(C): 36.9 (31 Mar 2023 04:00), Max: 37.7 (30 Mar 2023 20:00)  T(F): 98.4 (31 Mar 2023 04:00), Max: 99.9 (30 Mar 2023 20:00)  HR: 84 (31 Mar 2023 06:00) (77 - 116)  BP: 136/58 (31 Mar 2023 06:00) (104/55 - 170/90)  BP(mean): 81 (31 Mar 2023 06:00) (62 - 116)  RR: 21 (31 Mar 2023 06:00) (16 - 21)  SpO2: 100% (31 Mar 2023 06:00) (96% - 100%)    Parameters below as of 31 Mar 2023 06:00  Patient On (Oxygen Delivery Method): ventilator    O2 Concentration (%): 30    PHYSICAL EXAM:  GENERAL: no distress  PSYCH: A&O x0  HEAD: Atraumatic, Normocephalic  NECK: Supple, No JVD  CHEST/LUNG: clear to auscultation bilaterally  HEART: regular rate and rhythm, no murmurs  ABDOMEN: nontender to palpation, no rebound tenderness/guarding, +distension  EXTREMITIES: no edema on bilateral LE  NEUROLOGY: no focal neurologic deficit  SKIN: No rashes or lesions    LABS:                        7.5    7.10  )-----------( 178      ( 31 Mar 2023 04:05 )             24.2      03-31    134<L>  |  99  |  47<H>  ----------------------------<  179<H>  3.5   |  25  |  2.36<H>    Ca    8.6      31 Mar 2023 04:05  Phos  4.2     03-31  Mg     2.20     03-31    TPro  5.1<L>  /  Alb  1.9<L>  /  TBili  0.3  /  DBili  x   /  AST  27  /  ALT  24  /  AlkPhos  136<H>  03-31    PT/INR - ( 31 Mar 2023 04:05 )   PT: 14.3 sec;   INR: 1.23 ratio         PTT - ( 31 Mar 2023 04:05 )  PTT:23.6 sec          RADIOLOGY & ADDITIONAL TESTS:    Imaging Personally Reviewed:    Consultant(s) Notes Reviewed:      Care Discussed with Consultants/Other Providers:   Inge Zhang PGY1  pager [phone] or check resident tab for coverage    Patient is a 78y old  Female who presents with a chief complaint of AMS/R ear infxn (30 Mar 2023 16:00)      SUBJECTIVE / OVERNIGHT EVENTS: Patient with watery diarrhea and distended abdomen in the AM. No overt seizure like activity, Patient still non responsive to verbal and painful stimuli.    MEDICATIONS  (STANDING):  amLODIPine   Tablet 10 milliGRAM(s) Oral every 24 hours  atovaquone  Suspension 1500 milliGRAM(s) Oral daily  cefTRIAXone   IVPB 2000 milliGRAM(s) IV Intermittent every 12 hours  chlorhexidine 0.12% Liquid 15 milliLiter(s) Oral Mucosa two times a day  chlorhexidine 2% Cloths 1 Application(s) Topical <User Schedule>  ciprofloxacin  0.3% Ophthalmic Solution for Otic Use 4 Drop(s) Right Ear two times a day  dextrose 5%. 1000 milliLiter(s) (100 mL/Hr) IV Continuous <Continuous>  dextrose 5%. 1000 milliLiter(s) (50 mL/Hr) IV Continuous <Continuous>  dextrose 50% Injectable 25 Gram(s) IV Push once  dextrose 50% Injectable 12.5 Gram(s) IV Push once  dextrose 50% Injectable 25 Gram(s) IV Push once  glucagon  Injectable 1 milliGRAM(s) IntraMuscular once  heparin   Injectable 5000 Unit(s) SubCutaneous every 8 hours  insulin lispro (ADMELOG) corrective regimen sliding scale   SubCutaneous every 6 hours  lacosamide Solution 200 milliGRAM(s) Oral two times a day  levETIRAcetam  Solution 750 milliGRAM(s) Oral two times a day  levothyroxine Injectable 18 MICROGram(s) IV Push at bedtime  petrolatum Ophthalmic Ointment 1 Application(s) Both EYES every 12 hours  polyethylene glycol 3350 17 Gram(s) Oral two times a day  potassium chloride  10 mEq/100 mL IVPB 10 milliEquivalent(s) IV Intermittent every 1 hour  predniSONE   Tablet 5 milliGRAM(s) Oral daily  senna Syrup 10 milliLiter(s) Oral two times a day    MEDICATIONS  (PRN):  acetaminophen   Oral Liquid .. 650 milliGRAM(s) Enteral Tube every 6 hours PRN Temp greater or equal to 38C (100.4F), Mild Pain (1 - 3), Moderate Pain (4 - 6)  dextrose Oral Gel 15 Gram(s) Oral once PRN Blood Glucose LESS THAN 70 milliGRAM(s)/deciliter      CAPILLARY BLOOD GLUCOSE      POCT Blood Glucose.: 164 mg/dL (31 Mar 2023 05:19)  POCT Blood Glucose.: 140 mg/dL (30 Mar 2023 23:17)  POCT Blood Glucose.: 123 mg/dL (30 Mar 2023 18:21)  POCT Blood Glucose.: 188 mg/dL (30 Mar 2023 12:57)    I&O's Summary    30 Mar 2023 07:01  -  31 Mar 2023 07:00  --------------------------------------------------------  IN: 1970 mL / OUT: 2435 mL / NET: -465 mL        Vital Signs Last 24 Hrs  T(C): 36.9 (31 Mar 2023 04:00), Max: 37.7 (30 Mar 2023 20:00)  T(F): 98.4 (31 Mar 2023 04:00), Max: 99.9 (30 Mar 2023 20:00)  HR: 84 (31 Mar 2023 06:00) (77 - 116)  BP: 136/58 (31 Mar 2023 06:00) (104/55 - 170/90)  BP(mean): 81 (31 Mar 2023 06:00) (62 - 116)  RR: 21 (31 Mar 2023 06:00) (16 - 21)  SpO2: 100% (31 Mar 2023 06:00) (96% - 100%)    Parameters below as of 31 Mar 2023 06:00  Patient On (Oxygen Delivery Method): ventilator    O2 Concentration (%): 30    PHYSICAL EXAM:  GENERAL: no distress  PSYCH: A&O x0  HEAD: Atraumatic, Normocephalic  NECK: Supple, No JVD  CHEST/LUNG: clear to auscultation bilaterally  HEART: regular rate and rhythm, no murmurs  ABDOMEN: nontender to palpation, no rebound tenderness/guarding, +distension  EXTREMITIES: no edema on bilateral LE  NEUROLOGY: no focal neurologic deficit  SKIN: No rashes or lesions    LABS:                        7.5    7.10  )-----------( 178      ( 31 Mar 2023 04:05 )             24.2      03-31    134<L>  |  99  |  47<H>  ----------------------------<  179<H>  3.5   |  25  |  2.36<H>    Ca    8.6      31 Mar 2023 04:05  Phos  4.2     03-31  Mg     2.20     03-31    TPro  5.1<L>  /  Alb  1.9<L>  /  TBili  0.3  /  DBili  x   /  AST  27  /  ALT  24  /  AlkPhos  136<H>  03-31    PT/INR - ( 31 Mar 2023 04:05 )   PT: 14.3 sec;   INR: 1.23 ratio         PTT - ( 31 Mar 2023 04:05 )  PTT:23.6 sec          RADIOLOGY & ADDITIONAL TESTS:    Imaging Personally Reviewed:    Consultant(s) Notes Reviewed:      Care Discussed with Consultants/Other Providers:

## 2023-03-31 NOTE — PROGRESS NOTE ADULT - ASSESSMENT
Assessment: 78 year-old woman with a PMHx of PCKD s/p renal transplant, brain cyst, DM, HLD, HTN, hypothyroidism, glaucoma, cataracts, DVT in leg, on asa who presents to ED for severe headache, right ear pain, neck pain, markedly decreased responsiveness to external stimuli. LP significant for decreased glucose, elevated WBC with neutrophil predominance, elevated protein, PCR (+) for strep. pneumoniae. antibiotics were started. Course complicated by PEA arrest, ROSC achieved after about 10 mins. Patient is now intubated and sedated. EEG shows occasional left epileptiform discharges and rare right frontal epileptiform discharges indicating potential risk of seizures. Last electrographic seizure reported 3/20. MRI brain 3/29 shows scattered infarcts throughout cerebellum & cerebrum (majority on left). Vascular neurology consulted and patient started on aspirin. Can consider SORAYA, may not  as patient unlikely to undergo valve replacement, can consider extended course of antibiotics for infectious antiobiotics as an alternative. Neurological exam while patient off sedation for about 6 days. Only brainstem reflexes intact. Likely poor functional prognosis. Ongoing GOC discussion.  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, complicated by anoxic ischemic encephalopathy, electrographic seizures, scattered infarcts possibly due to septic emboli    Plan   [x]  aspirin 81mg and statin therapy if no objection   [] can consider SORAYA, may not  as patient unlikely to undergo valve replacement, can consider extended course of antibiotics for infectious antiobiotics as an alternative  [] repeat MRI brain with & without contrast in 3-4 weeks  [x] MRI brain  [x] discontinued EEG  [x] S/p IV levetiracetam 500mg load x1 (3/17) and maintenance 250mg IV Q12 (3/17-3/19); then 750mg Q12H (CRRT dosing 3/19-3/20); increased to 1000mg Q12H (3/20 - 3/21)  [x] continue Levetiracetam 750mg Q12H (3/22 - )  [x] Continue lacosamide 200mg Q12H (3/20)  [x] Continue antimicrobials as per primary team/ID  [x] neuron specific enolase (sent 3/19) 15.9mg   [x] levetiracetam level, 51.8 mg  [x] UA (-), Bcx (+GPC in pairs - S. pneumo)  [x] SERUM: A1C 5.6, LDL 79, procal 45.63, CK 1074  [x] S/p LP 3/15: protein (>1181), gluc (<5), TNC (34,389, 88% neut), RBC (1267), AFB (-), CSF gram strain & culture (GPC in pairs), cryptococcal ag (-), EBV PCR (-), CSF PCR (+S. pneumoniae),     Case seen and discussed with Vascular Neurology Attending Dr. Shar Garcia

## 2023-04-01 NOTE — PROGRESS NOTE ADULT - SUBJECTIVE AND OBJECTIVE BOX
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 Patient reportedly woke up at 0130 AM on Monday with a R-sided headache. ENT noted R otitis externa, purulent discharge from R TM thought to be secondary to perforation, given ear gtt and prescribed cipro/dexamethasone gtt (she took 1 dose thus far). Daughter called EMS as later in the day patient complained of severe HA as well as neck pain, stated "I'm dying" and "my head is killing me." Patient reportedly without prior history of ear infections, no history of stroke or seizures. She reported to ENT earlier in the day a muffled sensation in the R ear [id number].     Daughter notes patient has had no recent fevers/chills. She had a dental cleaning on Thursday (no abx prior to cleaning).    On arrival, code stroke called.    In the ED VS:  97.2  65-99  162-186/52-91  16-20  96-99%RA, received NS 500cc IVF, lorazepam 2mg IV x1 and morphine 4mg IV x1 (15 Mar 2023 01:17)        SUBJECTIVE / OVERNIGHT EVENTS:       MEDICATIONS  (STANDING):  amLODIPine   Tablet 10 milliGRAM(s) Oral every 24 hours  atovaquone  Suspension 1500 milliGRAM(s) Oral daily  cefTRIAXone   IVPB 2000 milliGRAM(s) IV Intermittent every 12 hours  chlorhexidine 0.12% Liquid 15 milliLiter(s) Oral Mucosa two times a day  chlorhexidine 2% Cloths 1 Application(s) Topical <User Schedule>  ciprofloxacin  0.3% Ophthalmic Solution for Otic Use 4 Drop(s) Right Ear two times a day  dextrose 5%. 1000 milliLiter(s) (100 mL/Hr) IV Continuous <Continuous>  dextrose 5%. 1000 milliLiter(s) (50 mL/Hr) IV Continuous <Continuous>  dextrose 50% Injectable 25 Gram(s) IV Push once  dextrose 50% Injectable 12.5 Gram(s) IV Push once  dextrose 50% Injectable 25 Gram(s) IV Push once  glucagon  Injectable 1 milliGRAM(s) IntraMuscular once  heparin   Injectable 5000 Unit(s) SubCutaneous every 8 hours  insulin lispro (ADMELOG) corrective regimen sliding scale   SubCutaneous every 6 hours  lacosamide Solution 200 milliGRAM(s) Oral two times a day  levETIRAcetam  Solution 750 milliGRAM(s) Oral two times a day  levothyroxine Injectable 18 MICROGram(s) IV Push at bedtime  petrolatum Ophthalmic Ointment 1 Application(s) Both EYES every 12 hours  polyethylene glycol 3350 17 Gram(s) Oral two times a day  potassium chloride  10 mEq/100 mL IVPB 10 milliEquivalent(s) IV Intermittent every 1 hour  predniSONE   Tablet 5 milliGRAM(s) Oral daily  senna Syrup 10 milliLiter(s) Oral two times a day    MEDICATIONS  (PRN):  acetaminophen   Oral Liquid .. 650 milliGRAM(s) Enteral Tube every 6 hours PRN Temp greater or equal to 38C (100.4F), Mild Pain (1 - 3), Moderate Pain (4 - 6)  dextrose Oral Gel 15 Gram(s) Oral once PRN Blood Glucose LESS THAN 70 milliGRAM(s)/deciliter      CAPILLARY BLOOD GLUCOSE      POCT Blood Glucose.: 164 mg/dL (31 Mar 2023 05:19)  POCT Blood Glucose.: 140 mg/dL (30 Mar 2023 23:17)  POCT Blood Glucose.: 123 mg/dL (30 Mar 2023 18:21)  POCT Blood Glucose.: 188 mg/dL (30 Mar 2023 12:57)    I&O's Summary    30 Mar 2023 07:01  -  31 Mar 2023 07:00  --------------------------------------------------------  IN: 1970 mL / OUT: 2435 mL / NET: -465 mL        Vital Signs Last 24 Hrs  T(C): 36.9 (31 Mar 2023 04:00), Max: 37.7 (30 Mar 2023 20:00)  T(F): 98.4 (31 Mar 2023 04:00), Max: 99.9 (30 Mar 2023 20:00)  HR: 84 (31 Mar 2023 06:00) (77 - 116)  BP: 136/58 (31 Mar 2023 06:00) (104/55 - 170/90)  BP(mean): 81 (31 Mar 2023 06:00) (62 - 116)  RR: 21 (31 Mar 2023 06:00) (16 - 21)  SpO2: 100% (31 Mar 2023 06:00) (96% - 100%)    Parameters below as of 31 Mar 2023 06:00  Patient On (Oxygen Delivery Method): ventilator    O2 Concentration (%): 30    PHYSICAL EXAM:  GENERAL: no distress  PSYCH: A&O x0  HEAD: Atraumatic, Normocephalic  NECK: Supple, No JVD  CHEST/LUNG: clear to auscultation bilaterally  HEART: regular rate and rhythm, no murmurs  ABDOMEN: nontender to palpation, no rebound tenderness/guarding, +distension  EXTREMITIES: no edema on bilateral LE  NEUROLOGY: no focal neurologic deficit  SKIN: No rashes or lesions    LABS:                        7.5    7.10  )-----------( 178      ( 31 Mar 2023 04:05 )             24.2      03-31    134<L>  |  99  |  47<H>  ----------------------------<  179<H>  3.5   |  25  |  2.36<H>    Ca    8.6      31 Mar 2023 04:05  Phos  4.2     03-31  Mg     2.20     03-31    TPro  5.1<L>  /  Alb  1.9<L>  /  TBili  0.3  /  DBili  x   /  AST  27  /  ALT  24  /  AlkPhos  136<H>  03-31    PT/INR - ( 31 Mar 2023 04:05 )   PT: 14.3 sec;   INR: 1.23 ratio         PTT - ( 31 Mar 2023 04:05 )  PTT:23.6 sec          RADIOLOGY & ADDITIONAL TESTS:    Imaging Personally Reviewed:    Consultant(s) Notes Reviewed:      Care Discussed with Consultants/Other Providers:     SUBJECTIVE / OVERNIGHT EVENTS: transferred to MICU6N overnight.      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 Patient reportedly woke up at 0130 AM on Monday with a R-sided headache. ENT noted R otitis externa, purulent discharge from R TM thought to be secondary to perforation, given ear gtt and prescribed cipro/dexamethasone gtt (she took 1 dose thus far). Daughter called EMS as later in the day patient complained of severe HA as well as neck pain, stated "I'm dying" and "my head is killing me." Patient reportedly without prior history of ear infections, no history of stroke or seizures. She reported to ENT earlier in the day a muffled sensation in the R ear [id number].     Daughter notes patient has had no recent fevers/chills. She had a dental cleaning on Thursday (no abx prior to cleaning).    On arrival, code stroke called.    In the ED VS:  97.2  65-99  162-186/52-91  16-20  96-99%RA, received NS 500cc IVF, lorazepam 2mg IV x1 and morphine 4mg IV x1 (15 Mar 2023 01:17)      MEDICATIONS  (STANDING):  amLODIPine   Tablet 10 milliGRAM(s) Oral every 24 hours  atovaquone  Suspension 1500 milliGRAM(s) Oral daily  cefTRIAXone   IVPB 2000 milliGRAM(s) IV Intermittent every 12 hours  chlorhexidine 0.12% Liquid 15 milliLiter(s) Oral Mucosa two times a day  chlorhexidine 2% Cloths 1 Application(s) Topical <User Schedule>  ciprofloxacin  0.3% Ophthalmic Solution for Otic Use 4 Drop(s) Right Ear two times a day  dextrose 5%. 1000 milliLiter(s) (100 mL/Hr) IV Continuous <Continuous>  dextrose 5%. 1000 milliLiter(s) (50 mL/Hr) IV Continuous <Continuous>  dextrose 50% Injectable 25 Gram(s) IV Push once  dextrose 50% Injectable 12.5 Gram(s) IV Push once  dextrose 50% Injectable 25 Gram(s) IV Push once  glucagon  Injectable 1 milliGRAM(s) IntraMuscular once  heparin   Injectable 5000 Unit(s) SubCutaneous every 8 hours  insulin lispro (ADMELOG) corrective regimen sliding scale   SubCutaneous every 6 hours  lacosamide Solution 200 milliGRAM(s) Oral two times a day  levETIRAcetam  Solution 750 milliGRAM(s) Oral two times a day  levothyroxine Injectable 18 MICROGram(s) IV Push at bedtime  petrolatum Ophthalmic Ointment 1 Application(s) Both EYES every 12 hours  polyethylene glycol 3350 17 Gram(s) Oral two times a day  potassium chloride  10 mEq/100 mL IVPB 10 milliEquivalent(s) IV Intermittent every 1 hour  predniSONE   Tablet 5 milliGRAM(s) Oral daily  senna Syrup 10 milliLiter(s) Oral two times a day    MEDICATIONS  (PRN):  acetaminophen   Oral Liquid .. 650 milliGRAM(s) Enteral Tube every 6 hours PRN Temp greater or equal to 38C (100.4F), Mild Pain (1 - 3), Moderate Pain (4 - 6)  dextrose Oral Gel 15 Gram(s) Oral once PRN Blood Glucose LESS THAN 70 milliGRAM(s)/deciliter      CAPILLARY BLOOD GLUCOSE      POCT Blood Glucose.: 164 mg/dL (31 Mar 2023 05:19)  POCT Blood Glucose.: 140 mg/dL (30 Mar 2023 23:17)  POCT Blood Glucose.: 123 mg/dL (30 Mar 2023 18:21)  POCT Blood Glucose.: 188 mg/dL (30 Mar 2023 12:57)    I&O's Summary    30 Mar 2023 07:01  -  31 Mar 2023 07:00  --------------------------------------------------------  IN: 1970 mL / OUT: 2435 mL / NET: -465 mL        Vital Signs Last 24 Hrs  T(C): 36.9 (31 Mar 2023 04:00), Max: 37.7 (30 Mar 2023 20:00)  T(F): 98.4 (31 Mar 2023 04:00), Max: 99.9 (30 Mar 2023 20:00)  HR: 84 (31 Mar 2023 06:00) (77 - 116)  BP: 136/58 (31 Mar 2023 06:00) (104/55 - 170/90)  BP(mean): 81 (31 Mar 2023 06:00) (62 - 116)  RR: 21 (31 Mar 2023 06:00) (16 - 21)  SpO2: 100% (31 Mar 2023 06:00) (96% - 100%)    Parameters below as of 31 Mar 2023 06:00  Patient On (Oxygen Delivery Method): ventilator    O2 Concentration (%): 30    PHYSICAL EXAM:  GENERAL: no distress  PSYCH: A&O x0  HEAD: Atraumatic, Normocephalic  NECK: Supple, No JVD  CHEST/LUNG: clear to auscultation bilaterally  HEART: regular rate and rhythm, no murmurs  ABDOMEN: nontender to palpation, no rebound tenderness/guarding, +distension  EXTREMITIES: no edema on bilateral LE  NEUROLOGY: withdaws from pain, no purposeful movements noted.  SKIN: No rashes or lesions    LABS:                        7.5    7.10  )-----------( 178      ( 31 Mar 2023 04:05 )             24.2      03-31    134<L>  |  99  |  47<H>  ----------------------------<  179<H>  3.5   |  25  |  2.36<H>    Ca    8.6      31 Mar 2023 04:05  Phos  4.2     03-31  Mg     2.20     03-31    TPro  5.1<L>  /  Alb  1.9<L>  /  TBili  0.3  /  DBili  x   /  AST  27  /  ALT  24  /  AlkPhos  136<H>  03-31    PT/INR - ( 31 Mar 2023 04:05 )   PT: 14.3 sec;   INR: 1.23 ratio         PTT - ( 31 Mar 2023 04:05 )  PTT:23.6 sec          RADIOLOGY & ADDITIONAL TESTS:    Imaging Personally Reviewed:    Consultant(s) Notes Reviewed:      Care Discussed with Consultants/Other Providers:

## 2023-04-01 NOTE — PROGRESS NOTE ADULT - SUBJECTIVE AND OBJECTIVE BOX
[facility] : Ans Serv [phone], Office [phone]  Dr Ontiveros/Dr Cosby  /Dr Irwin luis /Dr PAUL Aldana/Dr Estuardo Hussein/Dr Cleve Gambino /Dr DAGMAR Mukherjee  _______________________________________________________________________________________________    seen and examined today for   Interval :  VITALS:  T(F): 100.7 (04-01 @ 12:00), Max: 100.7 (04-01 @ 12:00)  HR: 90 (04-01 @ 13:00)  BP: 107/54 (04-01 @ 13:00)  ABP: --  RR: 18 (04-01 @ 13:00)  SpO2: 99% (04-01 @ 13:00)    03-31 @ 07:01  -  04-01 @ 07:00  --------------------------------------------------------  IN: 1040 mL / OUT: 448 mL / NET: 592 mL    04-01 @ 07:01  -  04-01 @ 13:41  --------------------------------------------------------  IN: 540 mL / OUT: 1460 mL / NET: -920 mL      Physical Exam :-  Constitutional: NAD  Respiratory: Bilateral equal breath sounds, no Crackles present.  Cardiovascular: S1, S2 normal, positive Murmur  Gastrointestinal: Bowel Sounds present, soft  Extremities: no Edema Feet  Neurological: Alert and Oriented x 3  Psychiatric: Normal mood, normal affect    Data:-  Allergies :   apple (Unknown)  Benadryl (Unknown)  penicillin (Hives)  watermelon (Unknown)    [facility] Medications:   MEDICATIONS  (STANDING):  amLODIPine   Tablet 10 milliGRAM(s) Oral every 24 hours  atovaquone  Suspension 1500 milliGRAM(s) Oral daily  cefTRIAXone   IVPB 2000 milliGRAM(s) IV Intermittent every 12 hours  chlorhexidine 0.12% Liquid 15 milliLiter(s) Oral Mucosa two times a day  chlorhexidine 2% Cloths 1 Application(s) Topical <User Schedule>  ciprofloxacin  0.3% Ophthalmic Solution for Otic Use 4 Drop(s) Right Ear two times a day  dextrose 5%. 1000 milliLiter(s) (100 mL/Hr) IV Continuous <Continuous>  dextrose 5%. 1000 milliLiter(s) (50 mL/Hr) IV Continuous <Continuous>  dextrose 50% Injectable 25 Gram(s) IV Push once  dextrose 50% Injectable 12.5 Gram(s) IV Push once  dextrose 50% Injectable 25 Gram(s) IV Push once  glucagon  Injectable 1 milliGRAM(s) IntraMuscular once  heparin   Injectable 5000 Unit(s) SubCutaneous every 8 hours  insulin lispro (ADMELOG) corrective regimen sliding scale   SubCutaneous every 6 hours  lacosamide Solution 200 milliGRAM(s) Oral two times a day  levETIRAcetam 500 milliGRAM(s) Oral two times a day  levothyroxine Injectable 18 MICROGram(s) IV Push at bedtime  petrolatum Ophthalmic Ointment 1 Application(s) Both EYES every 12 hours  potassium chloride  10 mEq/100 mL IVPB 10 milliEquivalent(s) IV Intermittent every 1 hour  predniSONE   Tablet 5 milliGRAM(s) Oral daily    04-01    132<L>  |  95<L>  |  58<H>  ----------------------------<  68<L>  4.0   |  22  |  2.90<H>    Ca    9.0      01 Apr 2023 04:50  Phos  4.6     04-01  Mg     2.40     04-01    TPro  5.8<L>  /  Alb  2.1<L>  /  TBili  0.3  /  DBili      /  AST  31  /  ALT  24  /  AlkPhos  144<H>  04-01    Creatinine Trend: 2.90 <--, 2.36 <--, 2.73 <--, 2.28 <--, 2.95 <--, 2.94 <--  egfr trend : 16 <--, 21 <--, 18 <--, 22 <--, 16 <--, 16 <--, 19 <--                        8.2    10.42 )-----------( 209      ( 01 Apr 2023 04:50 )             26.8

## 2023-04-01 NOTE — PROGRESS NOTE ADULT - ASSESSMENT
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  Cardiac arrest on 3/16/23  septic shock s/p iv pressors  intubated on MV  Renal following for NAMAN on CKD Mx.  OS Nephrologist Dr. Hugo Hernandez [phone]    acute kidney injury on Chronic Kidney Disease Stage 4 vs Chronic Kidney Disease Stage 4 progression to End Stage Renal Disease on Dialysis     hx KTx 2003 : In light of severe sepsis and cardiac arrest: off MMF and Cyclosporine  w/worsened renal function, oliguria, acidosis, mild hyperkalemia- s/p CVVHD, now on intermittent HD  Consent for HD obtained, signed by daughter 3/16/23  B/L creat around 2.8 since Dec 2022  Patient receives Cyclosporine (Gengraf) 75mg PO q12hrs,  BID and Prednisone 5 QD for transplant    plan:  c/w predniSONE   Tablet 5 milliGRAM(s) Oral daily  plan for next HDtoday w/2k bath, uf as tolerated   Ultrafiltration on Dialysis as tolerated with blood pressure   monitor electrolytes     HTN- bp stable, controlled-  Blood Pressure- mm of Hg (last 72 hrs)  HR: 90 (61 - 116)  BP: 107/54 (98/46 - 198/79)  ABP: --    Medications :  amLODIPine   Tablet 10 milliGRAM(s) Oral every 24 hours    - Continue current medications  - plan to titrate anti hypertensive medication as needed  - low salt diet if not npo suggested    OM and OE  Abxs per Infectious disease specialist with dose adjustment per GFR  f/u cxs  vent Mx, per ICU

## 2023-04-01 NOTE — PROGRESS NOTE ADULT - ASSESSMENT
79 y/o F with h/o HTN, HLD, DM2, PCKD previously on HD via LUE AVF then s/p renal transplant, LLE DVT (resolved, nml dopplers 12/2022) on ASA, very small supraclinoid aneurysm (reportedly unchanged on MRA 10/2020), hypothyroidism, PCP 2021 on atovaquone ppx, reportedly hospitalized in 12/2022 for rectal prolapse, had a blood transfusion at that time, also + CMV 1/25/23 who presented with R-sided HA, R ear pain and neck stiffness after visit to ENT earlier in the day, altered on presentation, CT head unremarkable for stroke, however LP with IR and BCx showing +strep pneumoniae. currently on vanc and CTX per ID. Cardiac arrest 3/16 with ROSC, transferred to MICU for further management.       Plan:   Neurological  #Currently Intubated, patient still unresponsive to painful and verbal stimuli  - off sedation  - EEG no seizures now  - MRI IAC and brain--otitis and mastoiditis with multiple cerebral infarcts and cerebellar infarct. Will need f/u MRI  - Endocarditis is a r/o cause for the scattered cerebral infarcts      #Strep pneumo meningitis #Acute encephalopathy  severe HA, neck stiffness, fever, leukocytosis concerning for meningitis/encephalitis  CT head negative for CVA.   LP and BCx 3/15 both showing gram positive cocci in pairs, and + strep pneumoniae.   likely source of entry from R ear where pt c/o discomfort for several months.  patient is on cyclosporine, mycophenolate mofetil and prednisone due to kidney transplant and is immunocompromised   dental procedure 3/14 but less likely source   - f/u MRI of the IAC and brain--otitis and mastoiditis with multiple cerebral infarcts and cerebellar infarct    #Seizures  - 24hr EEG initially with highly focal seizures, improved with increased sedation.   - keppra loaded 500mg IVP (3/17) and started on 250mg IV q12h (3/17 - 3/19) increased to 750 mg IV q12h (CRRT dosing (3/19 - 3/20), then increased to 1000mg IV q12 (3/20 - 3/22), then back to 750mg IV q12 (3/22 - [ ])  - vimpat 100 BID increased to 200 BID (3/20 - [ ])  -Off versed; off prop  -now seizure free    Cardiovascular  #Cardiac Arrest  bradycardia to 30, pulseless, code blue was called 3/16   2 rounds of epi, and PEA arrest. on 3rd pulse check pulseless VT, 200J shock given  4th pulse check asystole, 5th ROSC, sinus tachy with HUMA. IO placed and levo started. Due to blood in mouth took several attempts with DL for ETT to be placed but ultimately confirmed with capnometry and bilateral breath sounds.     #Elevated troponin  uptrending troponin to 182 3/15 AM, was likely iso ESRD  s/p cardiac arrest, elevated ST segment   - TTE from 3/17 showed EF of 63% with mild pulm htn, normal left ventricular systolic function, and diastolic LV dysfunction.      #HTN  on amlodipine 5mg, losartan 100mg, torsemide 20mg, and clonidine 0.2 patch weekly  -Back on amlodipine, can uptitrate if needed or resume other oral agents    Respiratory/ENT  #Intubated  - off sedation  - CPAP trial 8/5 on 3/25 and 3/27  - Patient has been intubated >2 wks; will talk to family regarding what their plans are iso MRI reading; will likely need trach and peg if within their goals    # Tympanic membrane rupture  discharge from R ear, evaluated by ENT given dexamethasone and cipro 3/14  - c/w broad spectrum abx   - MRI of IAC appreciated  - f/u ENT recs    Gastrointestinal  #Constipation  - Abdominal xray negative for bowel obstruction previously  - trialed on movantik  - increase trickle feeds as tolerated and bowel regimen  - now having consistent bowel movements--now watery; dc bowel regimen, will f/u KUB  - Tense/distended abdomen today 3/31==will f/u KUB    Renal  #ESRD #s/p Kidney Transplant in 2003 at [place] #PCKD  previously HD through LLE AVF but no HD since transplant  - avoid nephrotoxic agents  - holding cyclosporine and mycophenolate   - c/w home prednisone 5mg qday  - strict I/O and replete electrolytes  - s/p 4mg IV bumex with minimal urine production (3/17)  - started on CRRT (3/17-3/21) net negative 100cc/hr goal  - now s/p intermittent HD (3/22 and 3/24)  - Patient with uptrending Scr; repeat CBC after 1u prbc 3/27--stable  - Cathflo to unclog femoral shiley 3/28         Hematological  # h/o LLE DVT  resolved, no DVT on doppler 12/22.  - negative LLE duplex this admission 3/17  - restarted heparin subQ (3/21)  - POCUS showed residual clot in RLE; f/u dopplers negative      #Anemia  - s/p 1 unit pRBC on 3/16 for Hb drop from 10 to 7.9 and 1 unit on 3/21 for Hb 6.8 2/2 loss from CRRT clotting  - Evaluated by ENT for bleeding and found no signs of active bleeding from nasal cavity, nasopharynx or oral cavity, however, patient biting on tongue, which could be one source of bleeding. Tongue was edematous and lax  - transfuse 1u pRBC 3/27  - f/u GI recs regarding Hgb drops--will need clearance before SORAYA  -f/u Add on Smear and retic count      Infectious Disease  #Strep pneumoniae meningitis #R ear mastoiditis  treatment with IV CTX   - c/w ciprodex drops for R ear  - f/u ID recs   - repeat cx from 3/21 NGTD  - c/w CTX (3/16 - [ ])  - repeat blood and sputum cx on 3/23 2/2 new fever  - f/u UA  - cultures cleared, holding off on SORAYA for now until MRI and potential concern for GIIB      #h/o PCP  - atovaquone at home, continue      Endocrinological  #Hypothyroidism  last TSH 12/2022 >10  TSH 3/15 4.73  - c/w levothyroxine     #Diabetes mellitus Type II  - ISS      Ethics  FULL, GOC ongoing. 79 y/o F with h/o HTN, HLD, DM2, PCKD previously on HD via LUE AVF then s/p renal transplant, LLE DVT (resolved, nml dopplers 12/2022) on ASA, very small supraclinoid aneurysm (reportedly unchanged on MRA 10/2020), hypothyroidism, PCP 2021 on atovaquone ppx, reportedly hospitalized in 12/2022 for rectal prolapse, had a blood transfusion at that time, also + CMV 1/25/23 who presented with R-sided HA, R ear pain and neck stiffness after visit to ENT earlier in the day, altered on presentation, CT head unremarkable for stroke, however LP with IR and BCx showing +strep pneumoniae. currently on vanc and CTX per ID. Cardiac arrest 3/16 with ROSC, transferred to MICU for further management.       Plan:   Neurological  #Currently Intubated, patient still unresponsive to painful and verbal stimuli  - remains off sedation  - EEG no seizures - Dcd 3/31  - MRI IAC and brain 3/29 - otitis and mastoiditis with multiple cerebral infarcts and cerebellar infarct. Will need f/u MRI in 3-4 weeks per neuro  - Endocarditis is possible cause for scattered cerebral infarcts, will consider utility of SORAYA given poor candidate for valve replacement  - mental status remains unimproved. withdraws from pain however does not elicit purposeful movements.    #Strep pneumo meningitis #Acute encephalopathy  p/w severe HA, neck stiffness, fever, leukocytosis concerning for meningitis/encephalitis  - CT head negative for CVA.   - LP and BCx 3/15 both showing gram positive cocci in pairs, and + strep pneumoniae.   - likely source of entry from R ear where pt c/o discomfort for several months.  - patient is on cyclosporine, mycophenolate mofetil and prednisone due to kidney transplant and is immunocompromised . dental procedure 3/14 but less likely source   - MRI of the IAC and brain 3/29 - otitis and mastoiditis with multiple cerebral infarcts and cerebellar infarct  - neuro and ID following    #Seizures  - 24hr EEG initially with highly focal seizures, improved with increased sedation.   - keppra started 3/17- , currently on keppra  500 BID  - vimpat 100 BID increased to 200 BID (3/20 - )  -EEG neg- Dcd 3/31     Cardiovascular  #s/p Cardiac Arrest   - bradycardia to 30, pulseless, code blue was called 3/16 . 2 rounds of epi, and PEA arrest. on 3rd pulse check pulseless VT, 200J shock given 4th pulse check asystole, 5th ROSC, sinus tachy with HUMA. IO placed and levo started. Due to blood in mouth took several attempts with DL for ETT to be placed but ultimately confirmed with capnometry and bilateral breath sounds.     #Elevated troponin  uptrending troponin to 182 3/15 AM, was likely iso ESRD  s/p cardiac arrest, elevated ST segment   - TTE from 3/17 showed EF of 63% with mild pulm htn, normal left ventricular systolic function, and diastolic LV dysfunction.      #HTN  home meds: on amlodipine 5mg, losartan 100mg, torsemide 20mg, and clonidine 0.2 patch weekly  -Back on amlodipine, can uptitrate if needed or resume other oral agents    Respiratory/ENT  #Intubated  - off sedation  - failed PS trial today, becomes apneic  - Patient has been intubated >2 wks; will talk to family regarding what their plans are iso MRI reading; will likely need trach and peg if within their goals    # Tympanic membrane rupture  discharge from R ear, evaluated by ENT given dexamethasone and cipro 3/14  - c/w broad spectrum abx   - MRI of IAC appreciated  - ENT recs- pending results of MRI will consider need for myringotomy, but will defer at this time given overall medical condition    Gastrointestinal  #NPO w/ OGT  #Constipation - resolved,  now w/ loose stools  - Abdominal xray negative for bowel obstruction previously  - s/p movantik  - increase trickle feeds as tolerated   - now having consistent bowel movements-now watery; dc bowel regimen  - Tense/distended abdomen , 3/31- KUB nonobstructive bowel pattern.  - continues to have loose stools - f/up stoold studies - Cdiff, GI pcr    Renal  #ESRD #s/p Kidney Transplant in 2003 at [place] #PCKD  previously HD through LLE AVF but no HD since transplant  [facility] Nephrologist Dr. Hugo Hernandez [phone]  - avoid nephrotoxic agents  - holding cyclosporine and mycophenolate   - c/w home prednisone 5mg qday  - strict I/O and replete electrolytes  - s/p 4mg IV bumex with minimal urine production (3/17)  - started on CRRT (3/17-3/21) net negative 100cc/hr goal  - now s/p intermittent HD . s/p Hd today w/ 1L fluid removal  - SCx stable in 2s ( per renal Cr has been in 2.8 since december 2022)  - s/p Cathflo to unclog femoral shiley 3/28  - will eventually need transplant neprho consult when infection treated for management of kidney transplant         Hematological  # h/o LLE DVT  resolved, no DVT on doppler 12/22.  - negative LLE duplex this admission 3/17  - restarted heparin subQ (3/21)  - POCUS showed residual clot in RLE; f/u dopplers negative      #Anemia  - s/p 4 u prbc total this admission, last transfusion 3/27  - Evaluated by ENT for bleeding and found no signs of active bleeding from nasal cavity, nasopharynx or oral cavity, however, patient biting on tongue, which could be one source of bleeding. Tongue was edematous and lax  - CT a/p neg for RP bleed   - GI consulted regarding Hgb drops- would need clearance before SORAYA. rec no further GI intervention for now.  - retic count normal, check hapto and LDH in am as part of hemolysis labs  - H/H 8.2/26.8  -check occult stool      Infectious Disease  #Strep pneumoniae meningitis   #R ear mastoiditis  #strep bacteremia  - c/w CTX (3/16 - )  - c/w ciprodex drops for R ear  - ID following  - BCx 3/15 + strep, repeat cx from 3/23 NGTD    #h/o PCP  - atovaquone at home, continue      Endocrinological  #Hypothyroidism  last TSH 12/2022 >10  TSH 3/15 4.73  - c/w levothyroxine     #Diabetes mellitus Type II  - ISS      Ethics  FULL, GOC ongoing. 77 y/o F with h/o HTN, HLD, DM2, PCKD previously on HD via LUE AVF then s/p renal transplant, LLE DVT (resolved, nml dopplers 12/2022) on ASA, very small supraclinoid aneurysm (reportedly unchanged on MRA 10/2020), hypothyroidism, PCP 2021 on atovaquone ppx, reportedly hospitalized in 12/2022 for rectal prolapse, had a blood transfusion at that time, also + CMV 1/25/23 who presented with R-sided HA, R ear pain and neck stiffness after visit to ENT earlier in the day, altered on presentation, CT head unremarkable for stroke, however LP with IR and BCx showing +strep pneumoniae. currently on vanc and CTX per ID. Cardiac arrest 3/16 with ROSC, transferred to MICU for further management.       Plan:   Neurological  #Currently Intubated, patient still unresponsive to painful and verbal stimuli  - remains off sedation  - EEG no seizures - Dcd 3/31  - MRI IAC and brain 3/29 - otitis and mastoiditis with multiple cerebral infarcts and cerebellar infarct. Will need f/u MRI in 3-4 weeks per neuro  - Endocarditis is possible cause for scattered cerebral infarcts, will consider utility of SORAYA given poor candidate for valve replacement  - mental status remains unimproved. withdraws from pain however does not elicit purposeful movements.    #Strep pneumo meningitis #Acute encephalopathy  p/w severe HA, neck stiffness, fever, leukocytosis concerning for meningitis/encephalitis  - CT head negative for CVA.   - LP and BCx 3/15 both showing gram positive cocci in pairs, and + strep pneumoniae.   - likely source of entry from R ear where pt c/o discomfort for several months.  - patient is on cyclosporine, mycophenolate mofetil and prednisone due to kidney transplant and is immunocompromised . dental procedure 3/14 but less likely source   - MRI of the IAC and brain 3/29 - otitis and mastoiditis with multiple cerebral infarcts and cerebellar infarct  - neuro and ID following    #Seizures  - 24hr EEG initially with highly focal seizures, improved with increased sedation.   - keppra started 3/17- , currently on keppra  500 BID  - vimpat 100 BID increased to 200 BID (3/20 - )  -EEG neg- Dcd 3/31     Cardiovascular  #s/p Cardiac Arrest   - bradycardia to 30, pulseless, code blue was called 3/16 . 2 rounds of epi, and PEA arrest. on 3rd pulse check pulseless VT, 200J shock given 4th pulse check asystole, 5th ROSC, sinus tachy with HUMA. IO placed and levo started. Due to blood in mouth took several attempts with DL for ETT to be placed but ultimately confirmed with capnometry and bilateral breath sounds.     #Elevated troponin  uptrending troponin to 182 3/15 AM, was likely iso ESRD  s/p cardiac arrest, elevated ST segment   - TTE from 3/17 showed EF of 63% with mild pulm htn, normal left ventricular systolic function, and diastolic LV dysfunction.      #HTN  home meds: on amlodipine 5mg, losartan 100mg, torsemide 20mg, and clonidine 0.2 patch weekly  -Back on amlodipine, can uptitrate if needed or resume other oral agents    Respiratory/ENT  #Intubated  - off sedation  - failed PS trial today, becomes apneic  - Patient has been intubated >2 wks; will talk to family regarding what their plans are iso MRI reading; will likely need trach and peg if within their goals    # Tympanic membrane rupture  discharge from R ear, evaluated by ENT given dexamethasone and cipro 3/14  - c/w broad spectrum abx   - MRI of IAC appreciated  - ENT recs- pending results of MRI will consider need for myringotomy, but will defer at this time given overall medical condition    Gastrointestinal  #NPO w/ OGT  #Constipation - resolved,  now w/ loose stools  - Abdominal xray negative for bowel obstruction previously  - s/p movantik  - increase trickle feeds as tolerated   - now having consistent bowel movements-now watery; dc bowel regimen  - Tense/distended abdomen , 3/31- KUB nonobstructive bowel pattern.  - continues to have loose stools - f/up stoold studies - Cdiff, GI pcr    Renal  #ESRD #s/p Kidney Transplant in 2003 at [place] #PCKD  previously HD through LLE AVF but no HD since transplant  [facility] Nephrologist Dr. Hugo Hernandez [phone]  - avoid nephrotoxic agents  - holding cyclosporine and mycophenolate   - c/w home prednisone 5mg qday  - strict I/O and replete electrolytes  - s/p 4mg IV bumex with minimal urine production (3/17)  - started on CRRT (3/17-3/21) net negative 100cc/hr goal  - now s/p intermittent HD . s/p Hd today w/ 1L fluid removal  - SCx stable in 2s ( per renal Cr has been in 2.8 since december 2022)  - s/p Cathflo to unclog femoral shiley 3/28  - will eventually need transplant neprho consult when infection treated for management of kidney transplant         Hematological  # h/o LLE DVT  resolved, no DVT on doppler 12/22.  - negative LLE duplex this admission 3/17  - restarted heparin subQ (3/21)  - POCUS showed residual clot in RLE; f/u dopplers negative      #Anemia  - s/p 4 u prbc total this admission, last transfusion 3/27  - Evaluated by ENT for bleeding and found no signs of active bleeding from nasal cavity, nasopharynx or oral cavity, however, patient biting on tongue, which could be one source of bleeding. Tongue was edematous and lax  - CT a/p neg for RP bleed   - GI consulted regarding Hgb drops- would need clearance before SORAYA. rec no further GI intervention for now.  - retic count normal, check hapto and LDH in am as part of hemolysis labs  - H/H 8.2/26.8  -check occult stool      Infectious Disease  #Strep pneumoniae meningitis   #R ear mastoiditis  #strep bacteremia  - c/w CTX (3/16 - )  - c/w ciprodex drops for R ear  - ID following  - BCx 3/15 + strep, repeat cx from 3/23 NGTD  - f/up sputum cx    #h/o PCP  - atovaquone at home, continue      Endocrinological  #Hypothyroidism  last TSH 12/2022 >10  TSH 3/15 4.73  - c/w levothyroxine     #Diabetes mellitus Type II  - ISS      Ethics  FULL, GOC ongoing. 79 y/o F with h/o HTN, HLD, DM2, PCKD previously on HD via LUE AVF then s/p renal transplant, LLE DVT (resolved, nml dopplers 12/2022) on ASA, very small supraclinoid aneurysm (reportedly unchanged on MRA 10/2020), hypothyroidism, PCP 2021 on atovaquone ppx, reportedly hospitalized in 12/2022 for rectal prolapse, had a blood transfusion at that time, also + CMV 1/25/23 who presented with R-sided HA, R ear pain and neck stiffness after visit to ENT earlier in the day, altered on presentation, CT head unremarkable for stroke, however LP with IR and BCx showing +strep pneumoniae. currently on vanc and CTX per ID. Cardiac arrest 3/16 with ROSC, transferred to MICU for further management.       Plan:   Neurological  #Currently Intubated, patient still unresponsive to painful and verbal stimuli  - remains off sedation  - EEG no seizures - Dcd 3/31  - MRI IAC and brain 3/29 - otitis and mastoiditis with multiple cerebral infarcts and cerebellar infarct. Will need f/u MRI in 3-4 weeks per neuro  - Endocarditis is possible cause for scattered cerebral infarcts, will consider utility of SORAYA given poor candidate for valve replacement  - mental status remains unimproved. withdraws from pain however does not elicit purposeful movements.    #Strep pneumo meningitis #Acute encephalopathy  p/w severe HA, neck stiffness, fever, leukocytosis concerning for meningitis/encephalitis  - CT head negative for CVA.   - LP and BCx 3/15 both showing gram positive cocci in pairs, and + strep pneumoniae.   - likely source of entry from R ear where pt c/o discomfort for several months.  - patient is on cyclosporine, mycophenolate mofetil and prednisone due to kidney transplant and is immunocompromised . dental procedure 3/14 but less likely source   - MRI of the IAC and brain 3/29 - otitis and mastoiditis with multiple cerebral infarcts and cerebellar infarct  - neuro and ID following    #Seizures  - 24hr EEG initially with highly focal seizures, improved with increased sedation.   - keppra started 3/17- , currently on keppra  500 BID  - vimpat 100 BID increased to 200 BID (3/20 - )  -EEG neg- Dcd 3/31     Cardiovascular  #s/p Cardiac Arrest   - bradycardia to 30, pulseless, code blue was called 3/16 . 2 rounds of epi, and PEA arrest. on 3rd pulse check pulseless VT, 200J shock given 4th pulse check asystole, 5th ROSC, sinus tachy with HUMA. IO placed and levo started. Due to blood in mouth took several attempts with DL for ETT to be placed but ultimately confirmed with capnometry and bilateral breath sounds.     #Elevated troponin  uptrending troponin to 182 3/15 AM, was likely iso ESRD  s/p cardiac arrest, elevated ST segment   - TTE from 3/17 showed EF of 63% with mild pulm htn, normal left ventricular systolic function, and diastolic LV dysfunction.      #HTN  home meds: on amlodipine 5mg, losartan 100mg, torsemide 20mg, and clonidine 0.2 patch weekly  -Back on amlodipine, can uptitrate if needed or resume other oral agents    Respiratory/ENT  #Intubated  - off sedation  - failed PS trial today, becomes apneic  - Patient has been intubated >2 wks; will talk to family regarding what their plans are iso MRI reading; will likely need trach and peg if within their goals  - noted w/ oral and inline secretions, will send sputum Cx    # Tympanic membrane rupture  discharge from R ear, evaluated by ENT given dexamethasone and cipro 3/14  - c/w broad spectrum abx   - MRI of IAC appreciated  - ENT recs- pending results of MRI will consider need for myringotomy, but will defer at this time given overall medical condition    Gastrointestinal  #NPO w/ OGT  #Constipation - resolved,  now w/ loose stools  - Abdominal xray negative for bowel obstruction previously  - s/p movantik  - increase trickle feeds as tolerated   - now having consistent bowel movements-now watery; dc bowel regimen  - Tense/distended abdomen , 3/31- KUB nonobstructive bowel pattern.  - continues to have loose stools - f/up stool studies - Cdiff, GI pcr    Renal  #ESRD #s/p Kidney Transplant in 2003 at [place] #PCKD  previously HD through LLE AVF but no HD since transplant  [facility] Nephrologist Dr. Hugo Hernandez [phone]  - avoid nephrotoxic agents  - holding cyclosporine and mycophenolate   - c/w home prednisone 5mg qday  - strict I/O and replete electrolytes  - s/p 4mg IV bumex with minimal urine production (3/17)  - started on CRRT (3/17-3/21) net negative 100cc/hr goal  - now s/p intermittent HD . s/p Hd today w/ 1L fluid removal  - SCx stable in 2s ( per renal Cr has been in 2.8 since december 2022)  - s/p Cathflo to unclog femoral shiley 3/28  - will eventually need transplant neprho consult when infection treated for management of kidney transplant         Hematological  # h/o LLE DVT  resolved, no DVT on doppler 12/22.  - negative LLE duplex this admission 3/17  - restarted heparin subQ (3/21)  - POCUS showed residual clot in RLE; f/u dopplers negative      #Anemia  - s/p 4 u prbc total this admission, last transfusion 3/27  - Evaluated by ENT for bleeding and found no signs of active bleeding from nasal cavity, nasopharynx or oral cavity, however, patient biting on tongue, which could be one source of bleeding. Tongue was edematous and lax  - CT a/p neg for RP bleed   - GI consulted regarding Hgb drops- would need clearance before SORAYA. rec no further GI intervention for now.  - retic count normal, check hapto and LDH in am as part of hemolysis labs  - H/H 8.2/26.8  -check occult stool      Infectious Disease  #Strep pneumoniae meningitis   #R ear mastoiditis  #strep bacteremia  - c/w CTX (3/16 - )  - c/w ciprodex drops for R ear  - ID following  - BCx 3/15 + strep, repeat cx from 3/23 NGTD  - f/up sputum cx    #h/o PCP  - atovaquone at home, continue      Endocrinological  #Hypothyroidism  last TSH 12/2022 >10  TSH 3/15 4.73  - c/w levothyroxine     #Diabetes mellitus Type II  - ISS      Ethics  FULL, GOC ongoing.

## 2023-04-01 NOTE — PROGRESS NOTE ADULT - CRITICAL CARE ATTENDING COMMENT
79 yo F w/ HTN, HLD, T2DM, PCKD previously on HD via LUE AVF then s/p renal transplant, LLE DVT (resolved) hypothyroidism, prior PCP PNA, CMV viremia who initially presented with R-sided HA, R ear pain and neck stiffness found to have Strep Pneumo bacteremia and meningitis in setting of otitis externa/masoiditis.  Course c/b cardiac arrest 3/16 and post arrest seizures and NAMAN.    Patient had a 5 cycle of CPR during her arrest in 3/16. Intubated brought the MICU was found to be in renal failure now on iHD, also complicated by sz now off eeg, sz now controlled on AED. With persistent encephalopathy with MRI showing multiple subacute cerebral infarcts. Limited improvement in mental status.     # Meningitis  # Acute hypoxemic RF - vent dependent  # Sz  # Persistent encephalopathy  # CVA- multiple  # NAMAN on CKD s/p renal transplant now on iHD  # Strept bacteremia  - Persistent encephalopathy in setting of multiple CVA, cardiac arrest, sz, meningitis and encephalopathy.  - Continue to monitor off sedation,  - C/W current vent setting, adjust vent as needed based on blood gas, PS as tolerated.  - Sz controlled on AEDs, now off EEG  - C/W with HD per renal, will f/u about immunosuppression, c/w steroid. Overloaded will need additional fluid removal.  - C/W abx per ID, can consider SORAYA but not likely to  as patient not a surgical candidate, also possible GIB. Will follow up with ID.  - Repeat cx cleared  - DVT ppx -SQH  - Dispo- full code. Patient will need trach vs compassionate extubation given lack of improved mental status. Will follow up.     D/W MICU team at bedside.

## 2023-04-02 NOTE — PROGRESS NOTE ADULT - SUBJECTIVE AND OBJECTIVE BOX
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 Patient reportedly woke up at 0130 AM on Monday with a R-sided headache. ENT noted R otitis externa, purulent discharge from R TM thought to be secondary to perforation, given ear gtt and prescribed cipro/dexamethasone gtt (she took 1 dose thus far). Daughter called EMS as later in the day patient complained of severe HA as well as neck pain, stated "I'm dying" and "my head is killing me." Patient reportedly without prior history of ear infections, no history of stroke or seizures. She reported to ENT earlier in the day a muffled sensation in the R ear [id number].     Daughter notes patient has had no recent fevers/chills. She had a dental cleaning on Thursday (no abx prior to cleaning).    On arrival, code stroke called.    In the ED VS:  97.2  65-99  162-186/52-91  16-20  96-99%RA, received NS 500cc IVF, lorazepam 2mg IV x1 and morphine 4mg IV x1 (15 Mar 2023 01:17)      REVIEW OF SYSTEMS:  Constitutional: [ ] negative [ ] fevers [ ] chills [ ] weight loss [ ] weight gain  HEENT: [ ] negative [ ] dry eyes [ ] eye irritation [ ] postnasal drip [ ] nasal congestion  CV: [ ] negative  [ ] chest pain [ ] orthopnea [ ] palpitations [ ] murmur  Resp: [ ] negative [ ] cough [ ] shortness of breath [ ] dyspnea [ ] wheezing [ ] sputum [ ] hemoptysis  GI: [ ] negative [ ] nausea [ ] vomiting [ ] diarrhea [ ] constipation [ ] abd pain [ ] dysphagia   : [ ] negative [ ] dysuria [ ] nocturia [ ] hematuria [ ] increased urinary frequency  Musculoskeletal: [ ] negative [ ] back pain [ ] myalgias [ ] arthralgias [ ] fracture  Skin: [ ] negative [ ] rash [ ] itch  Neurological: [ ] negative [ ] headache [ ] dizziness [ ] syncope [ ] weakness [ ] numbness  Psychiatric: [ ] negative [ ] anxiety [ ] depression  Endocrine: [ ] negative [ ] diabetes [ ] thyroid problem  Hematologic/Lymphatic: [ ] negative [ ] anemia [ ] bleeding problem  Allergic/Immunologic: [ ] negative [ ] itchy eyes [ ] nasal discharge [ ] hives [ ] angioedema  [ ] All other systems negative  [ ] Unable to assess ROS because ________    OBJECTIVE:  ICU Vital Signs Last 24 Hrs  T(C): 37.1 (02 Apr 2023 04:00), Max: 38.5 (01 Apr 2023 16:00)  T(F): 98.7 (02 Apr 2023 04:00), Max: 101.3 (01 Apr 2023 16:00)  HR: 94 (02 Apr 2023 05:00) (79 - 104)  BP: 157/63 (02 Apr 2023 05:00) (98/51 - 157/63)  BP(mean): 81 (02 Apr 2023 05:00) (63 - 94)  ABP: --  ABP(mean): --  RR: 19 (02 Apr 2023 05:00) (14 - 23)  SpO2: 100% (02 Apr 2023 05:00) (98% - 100%)    O2 Parameters below as of 02 Apr 2023 05:00  Patient On (Oxygen Delivery Method): ventilator,CPAP 10/5/30    O2 Concentration (%): 30      Mode: AC/ CMV (Assist Control/ Continuous Mandatory Ventilation), RR (machine): 14, TV (machine): 0.32, FiO2: 30, PEEP: 5, MAP: 7, PIP: 13    03-31 @ 07:01  -  04-01 @ 07:00  --------------------------------------------------------  IN: 1040 mL / OUT: 448 mL / NET: 592 mL    04-01 @ 07:01  - 04-02 @ 06:29  --------------------------------------------------------  IN: 1400 mL / OUT: 1627 mL / NET: -227 mL      CAPILLARY BLOOD GLUCOSE      POCT Blood Glucose.: 191 mg/dL (02 Apr 2023 05:49)      Physical Exam:   Constitutional: ill appearing, no acute distress   HEENT: + PERRLA, EOMI, no drainage or redness  Neck: supple,  No JVD  Respiratory: Ventilator assisted breath Sounds equal & clear bilaterally to auscultation, no accessory muscle use noted  Cardiovascular: Regular rate, regular rhythm, normal S1, S2; no murmurs or rub  Gastrointestinal: Soft, non-tender, non distended, no hepatosplenomegaly, normal bowel sounds  Extremities: + 2 peripheral edema, no cyanosis, no clubbing   Vascular: Equal and normal pulses: 2+ peripheral pulses throughout  Neurological: sedated/intubated  Psychiatric: calm, normal mood, normal affect  Musculoskeletal: No joint swelling or deformity; no limitation of movement  Skin: warm, dry, well perfused, no rashes    HOSPITAL MEDICATIONS:  Standing Meds:  amLODIPine   Tablet 10 milliGRAM(s) Oral every 24 hours  atovaquone  Suspension 1500 milliGRAM(s) Oral daily  cefTRIAXone   IVPB 2000 milliGRAM(s) IV Intermittent every 12 hours  chlorhexidine 0.12% Liquid 15 milliLiter(s) Oral Mucosa two times a day  chlorhexidine 2% Cloths 1 Application(s) Topical <User Schedule>  ciprofloxacin  0.3% Ophthalmic Solution for Otic Use 4 Drop(s) Right Ear two times a day  dextrose 5%. 1000 milliLiter(s) IV Continuous <Continuous>  dextrose 5%. 1000 milliLiter(s) IV Continuous <Continuous>  dextrose 50% Injectable 25 Gram(s) IV Push once  dextrose 50% Injectable 12.5 Gram(s) IV Push once  dextrose 50% Injectable 25 Gram(s) IV Push once  glucagon  Injectable 1 milliGRAM(s) IntraMuscular once  heparin   Injectable 5000 Unit(s) SubCutaneous every 8 hours  insulin lispro (ADMELOG) corrective regimen sliding scale   SubCutaneous every 6 hours  lacosamide Solution 200 milliGRAM(s) Oral two times a day  levETIRAcetam 500 milliGRAM(s) Oral two times a day  levothyroxine Injectable 18 MICROGram(s) IV Push at bedtime  petrolatum Ophthalmic Ointment 1 Application(s) Both EYES every 12 hours  potassium chloride  10 mEq/100 mL IVPB 10 milliEquivalent(s) IV Intermittent every 1 hour  predniSONE   Tablet 5 milliGRAM(s) Oral daily      PRN Meds:  acetaminophen   Oral Liquid .. 650 milliGRAM(s) Enteral Tube every 6 hours PRN  dextrose Oral Gel 15 Gram(s) Oral once PRN      LABS:                        7.6    9.01  )-----------( 209      ( 02 Apr 2023 00:15 )             25.1     Hgb Trend: 7.6<--, 8.2<--, 7.5<--, 8.3<--, 8.0<--  04-02    133<L>  |  97<L>  |  39<H>  ----------------------------<  122<H>  3.4<L>   |  24  |  2.41<H>    Ca    8.8      02 Apr 2023 00:15  Phos  3.7     04-02  Mg     2.10     04-02    TPro  5.3<L>  /  Alb  1.9<L>  /  TBili  0.3  /  DBili  x   /  AST  27  /  ALT  20  /  AlkPhos  135<H>  04-02    Creatinine Trend: 2.41<--, 2.90<--, 2.36<--, 2.73<--, 2.28<--, 2.95<--  PT/INR - ( 02 Apr 2023 00:15 )   PT: 14.6 sec;   INR: 1.26 ratio         PTT - ( 02 Apr 2023 00:15 )  PTT:25.3 sec      Venous Blood Gas:  04-02 @ 00:15  7.42/43/48/28/83.0  VBG Lactate: 1.4  Venous Blood Gas:  04-01 @ 04:50  7.38/45/48/27/78.0  VBG Lactate: 1.3      MICROBIOLOGY:     Culture - Sputum (collected 01 Apr 2023 14:00)  Source: ET Tube ET Tube  Gram Stain (01 Apr 2023 21:32):    No polymorphonuclear leukocytes per low power field    No Squamous epithelial cells per low power field    Rare Gram Negative Rods per oil power field      RADIOLOGY:  [ ] Reviewed and interpreted by me         Interval Events:  -placed on CPAP 10/5 this AM     HPI:  78-year-old female with 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 Patient reportedly woke up at 0130 AM on Monday with a R-sided headache. ENT noted R otitis externa, purulent discharge from R TM thought to be secondary to perforation, given ear gtt and prescribed cipro/dexamethasone gtt (she took 1 dose thus far). Daughter called EMS as later in the day patient complained of severe HA as well as neck pain, stated "I'm dying" and "my head is killing me." Patient reportedly without prior history of ear infections, no history of stroke or seizures. She reported to ENT earlier in the day a muffled sensation in the R ear [id number].     Daughter notes patient has had no recent fevers/chills. She had a dental cleaning on Thursday (no abx prior to cleaning).    On arrival, code stroke called.    In the ED VS:  97.2  65-99  162-186/52-91  16-20  96-99%RA, received NS 500cc IVF, lorazepam 2mg IV x1 and morphine 4mg IV x1 (15 Mar 2023 01:17)      REVIEW OF SYSTEMS:  SUBJECTIVE / OVERNIGHT EVENTS:, Patient still non responsive to verbal and painful stimuli.    OBJECTIVE:  ICU Vital Signs Last 24 Hrs  T(C): 37.1 (02 Apr 2023 04:00), Max: 38.5 (01 Apr 2023 16:00)  T(F): 98.7 (02 Apr 2023 04:00), Max: 101.3 (01 Apr 2023 16:00)  HR: 94 (02 Apr 2023 05:00) (79 - 104)  BP: 157/63 (02 Apr 2023 05:00) (98/51 - 157/63)  BP(mean): 81 (02 Apr 2023 05:00) (63 - 94)  ABP: --  ABP(mean): --  RR: 19 (02 Apr 2023 05:00) (14 - 23)  SpO2: 100% (02 Apr 2023 05:00) (98% - 100%)    O2 Parameters below as of 02 Apr 2023 05:00  Patient On (Oxygen Delivery Method): ventilator,CPAP 10/5/30    O2 Concentration (%): 30      Mode: AC/ CMV (Assist Control/ Continuous Mandatory Ventilation), RR (machine): 14, TV (machine): 0.32, FiO2: 30, PEEP: 5, MAP: 7, PIP: 13    03-31 @ 07:01  -  04-01 @ 07:00  --------------------------------------------------------  IN: 1040 mL / OUT: 448 mL / NET: 592 mL    04-01 @ 07:01  -  04-02 @ 06:29  --------------------------------------------------------  IN: 1400 mL / OUT: 1627 mL / NET: -227 mL      CAPILLARY BLOOD GLUCOSE      POCT Blood Glucose.: 191 mg/dL (02 Apr 2023 05:49)    PHYSICAL EXAM:  GENERAL: no distress  PSYCH: A&O x0  HEAD: Atraumatic, Normocephalic  NECK: Supple, No JVD  CHEST/LUNG: clear to auscultation bilaterally  HEART: regular rate and rhythm, no murmurs  ABDOMEN: nontender to palpation, no rebound tenderness/guarding, +distension  EXTREMITIES: no edema on bilateral LE  NEUROLOGY: withdaws from pain, no purposeful movements noted.  SKIN: No rashes or lesions    HOSPITAL MEDICATIONS:  Standing Meds:  amLODIPine   Tablet 10 milliGRAM(s) Oral every 24 hours  atovaquone  Suspension 1500 milliGRAM(s) Oral daily  cefTRIAXone   IVPB 2000 milliGRAM(s) IV Intermittent every 12 hours  chlorhexidine 0.12% Liquid 15 milliLiter(s) Oral Mucosa two times a day  chlorhexidine 2% Cloths 1 Application(s) Topical <User Schedule>  ciprofloxacin  0.3% Ophthalmic Solution for Otic Use 4 Drop(s) Right Ear two times a day  dextrose 5%. 1000 milliLiter(s) IV Continuous <Continuous>  dextrose 5%. 1000 milliLiter(s) IV Continuous <Continuous>  dextrose 50% Injectable 25 Gram(s) IV Push once  dextrose 50% Injectable 12.5 Gram(s) IV Push once  dextrose 50% Injectable 25 Gram(s) IV Push once  glucagon  Injectable 1 milliGRAM(s) IntraMuscular once  heparin   Injectable 5000 Unit(s) SubCutaneous every 8 hours  insulin lispro (ADMELOG) corrective regimen sliding scale   SubCutaneous every 6 hours  lacosamide Solution 200 milliGRAM(s) Oral two times a day  levETIRAcetam 500 milliGRAM(s) Oral two times a day  levothyroxine Injectable 18 MICROGram(s) IV Push at bedtime  petrolatum Ophthalmic Ointment 1 Application(s) Both EYES every 12 hours  potassium chloride  10 mEq/100 mL IVPB 10 milliEquivalent(s) IV Intermittent every 1 hour  predniSONE   Tablet 5 milliGRAM(s) Oral daily      PRN Meds:  acetaminophen   Oral Liquid .. 650 milliGRAM(s) Enteral Tube every 6 hours PRN  dextrose Oral Gel 15 Gram(s) Oral once PRN      LABS:                        7.6    9.01  )-----------( 209      ( 02 Apr 2023 00:15 )             25.1     Hgb Trend: 7.6<--, 8.2<--, 7.5<--, 8.3<--, 8.0<--  04-02    133<L>  |  97<L>  |  39<H>  ----------------------------<  122<H>  3.4<L>   |  24  |  2.41<H>    Ca    8.8      02 Apr 2023 00:15  Phos  3.7     04-02  Mg     2.10     04-02    TPro  5.3<L>  /  Alb  1.9<L>  /  TBili  0.3  /  DBili  x   /  AST  27  /  ALT  20  /  AlkPhos  135<H>  04-02    Creatinine Trend: 2.41<--, 2.90<--, 2.36<--, 2.73<--, 2.28<--, 2.95<--  PT/INR - ( 02 Apr 2023 00:15 )   PT: 14.6 sec;   INR: 1.26 ratio         PTT - ( 02 Apr 2023 00:15 )  PTT:25.3 sec      Venous Blood Gas:  04-02 @ 00:15  7.42/43/48/28/83.0  VBG Lactate: 1.4  Venous Blood Gas:  04-01 @ 04:50  7.38/45/48/27/78.0  VBG Lactate: 1.3      MICROBIOLOGY:     Culture - Sputum (collected 01 Apr 2023 14:00)  Source: ET Tube ET Tube  Gram Stain (01 Apr 2023 21:32):    No polymorphonuclear leukocytes per low power field    No Squamous epithelial cells per low power field    Rare Gram Negative Rods per oil power field      RADIOLOGY:  [ ] Reviewed and interpreted by me         Interval Events:  -placed on CPAP 10/5 this AM     HPI:  78-year-old female with 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 Patient reportedly woke up at 0130 AM on Monday with a R-sided headache. ENT noted R otitis externa, purulent discharge from R TM thought to be secondary to perforation, given ear gtt and prescribed cipro/dexamethasone gtt (she took 1 dose thus far). Daughter called EMS as later in the day patient complained of severe HA as well as neck pain, stated "I'm dying" and "my head is killing me." Patient reportedly without prior history of ear infections, no history of stroke or seizures. She reported to ENT earlier in the day a muffled sensation in the R ear [id number].     Daughter notes patient has had no recent fevers/chills. She had a dental cleaning on Thursday (no abx prior to cleaning).    On arrival, code stroke called.    In the ED VS:  97.2  65-99  162-186/52-91  16-20  96-99%RA, received NS 500cc IVF, lorazepam 2mg IV x1 and morphine 4mg IV x1 (15 Mar 2023 01:17)      REVIEW OF SYSTEMS:  SUBJECTIVE / OVERNIGHT EVENTS:, Patient still non responsive to verbal and painful stimuli.    OBJECTIVE:  ICU Vital Signs Last 24 Hrs  T(C): 37.1 (02 Apr 2023 04:00), Max: 38.5 (01 Apr 2023 16:00)  T(F): 98.7 (02 Apr 2023 04:00), Max: 101.3 (01 Apr 2023 16:00)  HR: 94 (02 Apr 2023 05:00) (79 - 104)  BP: 157/63 (02 Apr 2023 05:00) (98/51 - 157/63)  BP(mean): 81 (02 Apr 2023 05:00) (63 - 94)  ABP: --  ABP(mean): --  RR: 19 (02 Apr 2023 05:00) (14 - 23)  SpO2: 100% (02 Apr 2023 05:00) (98% - 100%)    O2 Parameters below as of 02 Apr 2023 05:00  Patient On (Oxygen Delivery Method): ventilator,CPAP 10/5/30    O2 Concentration (%): 30      Mode: AC/ CMV (Assist Control/ Continuous Mandatory Ventilation), RR (machine): 14, TV (machine): 0.32, FiO2: 30, PEEP: 5, MAP: 7, PIP: 13    03-31 @ 07:01  -  04-01 @ 07:00  --------------------------------------------------------  IN: 1040 mL / OUT: 448 mL / NET: 592 mL    04-01 @ 07:01  -  04-02 @ 06:29  --------------------------------------------------------  IN: 1400 mL / OUT: 1627 mL / NET: -227 mL      CAPILLARY BLOOD GLUCOSE      POCT Blood Glucose.: 191 mg/dL (02 Apr 2023 05:49)    PHYSICAL EXAM:  GENERAL: no distress  PSYCH: A&O x0  HEAD: Atraumatic, Normocephalic  NECK: Supple, No JVD  CHEST/LUNG: clear to auscultation bilaterally  HEART: regular rate and rhythm, no murmurs  ABDOMEN: +distension, palpable abdominal mass upper and lower right quadrants   EXTREMITIES: no edema on bilateral LE  NEUROLOGY: withdraws from pain, no purposeful movements noted.  SKIN: No rashes or lesions    HOSPITAL MEDICATIONS:  Standing Meds:  amLODIPine   Tablet 10 milliGRAM(s) Oral every 24 hours  atovaquone  Suspension 1500 milliGRAM(s) Oral daily  cefTRIAXone   IVPB 2000 milliGRAM(s) IV Intermittent every 12 hours  chlorhexidine 0.12% Liquid 15 milliLiter(s) Oral Mucosa two times a day  chlorhexidine 2% Cloths 1 Application(s) Topical <User Schedule>  ciprofloxacin  0.3% Ophthalmic Solution for Otic Use 4 Drop(s) Right Ear two times a day  dextrose 5%. 1000 milliLiter(s) IV Continuous <Continuous>  dextrose 5%. 1000 milliLiter(s) IV Continuous <Continuous>  dextrose 50% Injectable 25 Gram(s) IV Push once  dextrose 50% Injectable 12.5 Gram(s) IV Push once  dextrose 50% Injectable 25 Gram(s) IV Push once  glucagon  Injectable 1 milliGRAM(s) IntraMuscular once  heparin   Injectable 5000 Unit(s) SubCutaneous every 8 hours  insulin lispro (ADMELOG) corrective regimen sliding scale   SubCutaneous every 6 hours  lacosamide Solution 200 milliGRAM(s) Oral two times a day  levETIRAcetam 500 milliGRAM(s) Oral two times a day  levothyroxine Injectable 18 MICROGram(s) IV Push at bedtime  petrolatum Ophthalmic Ointment 1 Application(s) Both EYES every 12 hours  potassium chloride  10 mEq/100 mL IVPB 10 milliEquivalent(s) IV Intermittent every 1 hour  predniSONE   Tablet 5 milliGRAM(s) Oral daily      PRN Meds:  acetaminophen   Oral Liquid .. 650 milliGRAM(s) Enteral Tube every 6 hours PRN  dextrose Oral Gel 15 Gram(s) Oral once PRN      LABS:                        7.6    9.01  )-----------( 209      ( 02 Apr 2023 00:15 )             25.1     Hgb Trend: 7.6<--, 8.2<--, 7.5<--, 8.3<--, 8.0<--  04-02    133<L>  |  97<L>  |  39<H>  ----------------------------<  122<H>  3.4<L>   |  24  |  2.41<H>    Ca    8.8      02 Apr 2023 00:15  Phos  3.7     04-02  Mg     2.10     04-02    TPro  5.3<L>  /  Alb  1.9<L>  /  TBili  0.3  /  DBili  x   /  AST  27  /  ALT  20  /  AlkPhos  135<H>  04-02    Creatinine Trend: 2.41<--, 2.90<--, 2.36<--, 2.73<--, 2.28<--, 2.95<--  PT/INR - ( 02 Apr 2023 00:15 )   PT: 14.6 sec;   INR: 1.26 ratio         PTT - ( 02 Apr 2023 00:15 )  PTT:25.3 sec      Venous Blood Gas:  04-02 @ 00:15  7.42/43/48/28/83.0  VBG Lactate: 1.4  Venous Blood Gas:  04-01 @ 04:50  7.38/45/48/27/78.0  VBG Lactate: 1.3      MICROBIOLOGY:     Culture - Sputum (collected 01 Apr 2023 14:00)  Source: ET Tube ET Tube  Gram Stain (01 Apr 2023 21:32):    No polymorphonuclear leukocytes per low power field    No Squamous epithelial cells per low power field    Rare Gram Negative Rods per oil power field      RADIOLOGY:  [ ] Reviewed and interpreted by me

## 2023-04-02 NOTE — PROGRESS NOTE ADULT - CRITICAL CARE ATTENDING COMMENT
77 yo F w/ HTN, HLD, T2DM, PCKD previously on HD via LUE AVF then s/p renal transplant, LLE DVT (resolved) hypothyroidism, prior PCP PNA, CMV viremia who initially presented with R-sided HA, R ear pain and neck stiffness found to have Strep Pneumo bacteremia and meningitis in setting of otitis externa/masoiditis.  Course c/b cardiac arrest 3/16 and post arrest seizures and NAMAN.    Patient had a 5 cycle of CPR during her arrest in 3/16. Intubated brought the MICU was found to be in renal failure now on iHD, also complicated by sz now off eeg, sz now controlled on AED. With persistent encephalopathy with MRI showing multiple subacute cerebral infarcts. Limited improvement in mental status.     # Meningitis  # Acute hypoxemic RF - vent dependent  # Sz  # Persistent encephalopathy  # CVA- multiple  # NAMAN on CKD s/p renal transplant now on iHD  # Strept bacteremia  # Tongue swelling  - Persistent encephalopathy in setting of multiple CVA, cardiac arrest, sz, meningitis and encephalopathy.  - Continue to monitor off sedation,  - C/W current vent setting, adjust vent as needed based on blood gas, PS as tolerated.  - Sz controlled on AEDs, now off EEG  - C/W with HD per renal, will f/u about immunosuppression, c/w steroid. Overloaded will need additional fluid removal. Lizzie 3 weeks old. Will give line holiday.   - C/W abx per ID, can consider SORAYA but not likely to  as patient not a surgical candidate, also possible GIB as FOBT positive.  Will follow up with ID.  - Repeat cx cleared  - DVT ppx -SQH  - Dispo- full code. Patient will need trach vs compassionate extubation given lack of improved mental status. Will follow up.    D/W MICU team at bedside.

## 2023-04-02 NOTE — PROGRESS NOTE ADULT - ASSESSMENT
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  Cardiac arrest on 3/16/23  septic shock s/p iv pressors  intubated on MV  Renal following for NAMAN on CKD Mx.  OS Nephrologist Dr. Hugo Hernandez [phone]    acute kidney injury on Chronic Kidney Disease Stage 4 vs Chronic Kidney Disease Stage 4 progression to End Stage Renal Disease on Dialysis     hx KTx 2003 : In light of severe sepsis and cardiac arrest: off MMF and Cyclosporine  w/worsened renal function, oliguria, acidosis, mild hyperkalemia- s/p CVVHD, now on intermittent HD  Consent for HD obtained, signed by daughter 3/16/23  B/L creat around 2.8 since Dec 2022  Patient receives Cyclosporine (Gengraf) 75mg PO q12hrs,  BID and Prednisone 5 QD for transplant  Comorbidities : respiratory failure, hx of kidney transplant, Hypertension     plan:  c/w predniSONE   Tablet 5 milliGRAM(s) Oral daily  plan for next HDtoday w/2k bath, uf as tolerated   Ultrafiltration on Dialysis as tolerated with blood pressure   monitor electrolytes     HTN- bp stable, controlled-  Blood Pressure- mm of Hg (last 72 hrs)  HR: 86 (71 - 104)  BP: 111/62 (98/46 - 164/72)  ABP: --    Medications :  amLODIPine   Tablet 10 milliGRAM(s) Oral every 24 hours    - Continue current medications  - plan to titrate anti hypertensive medication as needed  - low salt diet if not npo suggested    Anemia :  Anemia Labs   Hemoglobin : 7.6 <--, 8.2 <--, 7.5 <--, 8.3 <--, 8.0 <--  Platelets : 209 <--, 209 <--, 178 <--    Current orders :  - plan to add epogen on next hemodialysis   - plan to titrate medication as per responce of hemoglobin  - if Hb less than 7gm/dl consider blood transfusion      OM and OE  Abxs per Infectious disease specialist with dose adjustment per GFR  f/u cxs  vent Mx, per ICU

## 2023-04-02 NOTE — PROGRESS NOTE ADULT - ASSESSMENT
79 y/o F with h/o HTN, HLD, DM2, PCKD previously on HD via LUE AVF then s/p renal transplant, LLE DVT (resolved, nml dopplers 12/2022) on ASA, very small supraclinoid aneurysm (reportedly unchanged on MRA 10/2020), hypothyroidism, PCP 2021 on atovaquone ppx, reportedly hospitalized in 12/2022 for rectal prolapse, had a blood transfusion at that time, also + CMV 1/25/23 who presented with R-sided HA, R ear pain and neck stiffness after visit to ENT earlier in the day, altered on presentation, CT head unremarkable for stroke, however LP with IR and BCx showing +strep pneumoniae. currently on vanc and CTX per ID. Cardiac arrest 3/16 with ROSC, transferred to MICU for further management.       Plan:   Neurological  #Currently Intubated, patient still unresponsive to painful and verbal stimuli  - remains off sedation  - EEG no seizures - Dcd 3/31  - MRI IAC and brain 3/29 - otitis and mastoiditis with multiple cerebral infarcts and cerebellar infarct. Will need f/u MRI in 3-4 weeks per neuro  - Endocarditis is possible cause for scattered cerebral infarcts, will consider utility of SORAYA given poor candidate for valve replacement  - mental status remains unimproved. withdraws from pain however does not elicit purposeful movements.    #Strep pneumo meningitis #Acute encephalopathy  p/w severe HA, neck stiffness, fever, leukocytosis concerning for meningitis/encephalitis  - CT head negative for CVA.   - LP and BCx 3/15 both showing gram positive cocci in pairs, and + strep pneumoniae.   - likely source of entry from R ear where pt c/o discomfort for several months.  - patient is on cyclosporine, mycophenolate mofetil and prednisone due to kidney transplant and is immunocompromised . dental procedure 3/14 but less likely source   - MRI of the IAC and brain 3/29 - otitis and mastoiditis with multiple cerebral infarcts and cerebellar infarct  - neuro and ID following    #Seizures  - 24hr EEG initially with highly focal seizures, improved with increased sedation.   - keppra started 3/17- , currently on keppra  500 BID  - vimpat 100 BID increased to 200 BID (3/20 - )  -EEG neg- Dcd 3/31     Cardiovascular  #s/p Cardiac Arrest   - bradycardia to 30, pulseless, code blue was called 3/16 . 2 rounds of epi, and PEA arrest. on 3rd pulse check pulseless VT, 200J shock given 4th pulse check asystole, 5th ROSC, sinus tachy with HUMA. IO placed and levo started. Due to blood in mouth took several attempts with DL for ETT to be placed but ultimately confirmed with capnometry and bilateral breath sounds.     #Elevated troponin  uptrending troponin to 182 3/15 AM, was likely iso ESRD  s/p cardiac arrest, elevated ST segment   - TTE from 3/17 showed EF of 63% with mild pulm htn, normal left ventricular systolic function, and diastolic LV dysfunction.      #HTN  home meds: on amlodipine 5mg, losartan 100mg, torsemide 20mg, and clonidine 0.2 patch weekly  -Back on amlodipine, can uptitrate if needed or resume other oral agents    Respiratory/ENT  #Intubated  - off sedation  - failed PS trial today, becomes apneic  - Patient has been intubated >2 wks; will talk to family regarding what their plans are iso MRI reading; will likely need trach and peg if within their goals  - noted w/ oral and inline secretions, will send sputum Cx    # Tympanic membrane rupture  discharge from R ear, evaluated by ENT given dexamethasone and cipro 3/14  - c/w broad spectrum abx   - MRI of IAC appreciated  - ENT recs- pending results of MRI will consider need for myringotomy, but will defer at this time given overall medical condition    Gastrointestinal  #NPO w/ OGT  #Constipation - resolved,  now w/ loose stools  - Abdominal xray negative for bowel obstruction previously  - s/p movantik  - increase trickle feeds as tolerated   - now having consistent bowel movements-now watery; dc bowel regimen  - Tense/distended abdomen , 3/31- KUB nonobstructive bowel pattern.  - continues to have loose stools - f/up stool studies - Cdiff, GI pcr    Renal  #ESRD #s/p Kidney Transplant in 2003 at [place] #PCKD  previously HD through LLE AVF but no HD since transplant  [facility] Nephrologist Dr. Hugo Hernandez [phone]  - avoid nephrotoxic agents  - holding cyclosporine and mycophenolate   - c/w home prednisone 5mg qday  - strict I/O and replete electrolytes  - s/p 4mg IV bumex with minimal urine production (3/17)  - started on CRRT (3/17-3/21) net negative 100cc/hr goal  - now s/p intermittent HD . s/p Hd today w/ 1L fluid removal  - SCx stable in 2s ( per renal Cr has been in 2.8 since december 2022)  - s/p Cathflo to unclog femoral shiley 3/28  - will eventually need transplant neprho consult when infection treated for management of kidney transplant         Hematological  # h/o LLE DVT  resolved, no DVT on doppler 12/22.  - negative LLE duplex this admission 3/17  - restarted heparin subQ (3/21)  - POCUS showed residual clot in RLE; f/u dopplers negative      #Anemia  - s/p 4 u prbc total this admission, last transfusion 3/27  - Evaluated by ENT for bleeding and found no signs of active bleeding from nasal cavity, nasopharynx or oral cavity, however, patient biting on tongue, which could be one source of bleeding. Tongue was edematous and lax  - CT a/p neg for RP bleed   - GI consulted regarding Hgb drops- would need clearance before SORAYA. rec no further GI intervention for now.  - retic count normal, check hapto and LDH in am as part of hemolysis labs  - H/H 8.2/26.8  -check occult stool      Infectious Disease  #Strep pneumoniae meningitis   #R ear mastoiditis  #strep bacteremia  - c/w CTX (3/16 - )  - c/w ciprodex drops for R ear  - ID following  - BCx 3/15 + strep, repeat cx from 3/23 NGTD  - f/up sputum cx    #h/o PCP  - atovaquone at home, continue      Endocrinological  #Hypothyroidism  last TSH 12/2022 >10  TSH 3/15 4.73  - c/w levothyroxine     #Diabetes mellitus Type II  - ISS      Ethics  FULL, GOC ongoing.   79 y/o F with h/o HTN, HLD, DM2, PCKD previously on HD via LUE AVF then s/p renal transplant, LLE DVT (resolved, nml dopplers 12/2022) on ASA, very small supraclinoid aneurysm (reportedly unchanged on MRA 10/2020), hypothyroidism, PCP 2021 on atovaquone ppx, reportedly hospitalized in 12/2022 for rectal prolapse, had a blood transfusion at that time, also + CMV 1/25/23 who presented with R-sided HA, R ear pain and neck stiffness after visit to ENT earlier in the day, altered on presentation, CT head unremarkable for stroke, however LP with IR and BCx showing +strep pneumoniae. currently on vanc and CTX per ID. Cardiac arrest 3/16 with ROSC, transferred to MICU for further management.       Plan:   Neurological  #Currently Intubated, patient still unresponsive to painful and verbal stimuli  - remains off sedation  - EEG no seizures - Dcd 3/31  - MRI IAC and brain 3/29 - otitis and mastoiditis with multiple cerebral infarcts and cerebellar infarct. Will need f/u MRI in 3-4 weeks per neuro  - Endocarditis is possible cause for scattered cerebral infarcts, will consider utility of SORAYA given poor candidate for valve replacement  - mental status remains unimproved. withdraws from pain however does not elicit purposeful movements.    #Strep pneumo meningitis #Acute encephalopathy  p/w severe HA, neck stiffness, fever, leukocytosis concerning for meningitis/encephalitis  - CT head negative for CVA.   - LP and BCx 3/15 both showing gram positive cocci in pairs, and + strep pneumoniae.   - likely source of entry from R ear where pt c/o discomfort for several months.  - patient is on cyclosporine, mycophenolate mofetil and prednisone due to kidney transplant and is immunocompromised . dental procedure 3/14 but less likely source   - MRI of the IAC and brain 3/29 - otitis and mastoiditis with multiple cerebral infarcts and cerebellar infarct  - neuro and ID following    #Seizures  - 24hr EEG initially with highly focal seizures, improved with increased sedation.   - keppra started 3/17- , currently on keppra  500 BID  - vimpat 100 BID increased to 200 BID (3/20 - )  - EEG neg- Dcd 3/31     Cardiovascular  #s/p Cardiac Arrest   - bradycardia to 30, pulseless, code blue was called 3/16 . 2 rounds of epi, and PEA arrest. on 3rd pulse check pulseless VT, 200J shock given 4th pulse check asystole, 5th ROSC, sinus tachy with HUMA. IO placed and levo started. Due to blood in mouth took several attempts with DL for ETT to be placed but ultimately confirmed with capnometry and bilateral breath sounds.     #Elevated troponin  uptrending troponin to 182 3/15 AM, was likely iso ESRD  s/p cardiac arrest, elevated ST segment   - TTE from 3/17 showed EF of 63% with mild pulm htn, normal left ventricular systolic function, and diastolic LV dysfunction.      #HTN  home meds: on amlodipine 5mg, losartan 100mg, torsemide 20mg, and clonidine 0.2 patch weekly  -Back on amlodipine, can uptitrate if needed or resume other oral agents    Respiratory/ENT  #Intubated  - off sedation  - failed PS trial today, becomes apneic  - Patient has been intubated >2 wks; will talk to family regarding what their plans are iso MRI reading; will likely need trach and peg if within their goals  - noted w/ oral and inline secretions, will send sputum Cx    # Tympanic membrane rupture  discharge from R ear, evaluated by ENT given dexamethasone and cipro 3/14  - c/w broad spectrum abx   - MRI of IAC appreciated  - ENT recs- pending results of MRI will consider need for myringotomy, but will defer at this time given overall medical condition    Gastrointestinal  #NPO w/ OGT  #Constipation - resolved,  now w/ loose stools  - Abdominal xray negative for bowel obstruction previously  - s/p movantik  - increase trickle feeds as tolerated   - now having consistent bowel movements-now watery; dc bowel regimen  - Tense/distended abdomen , 3/31- KUB nonobstructive bowel pattern.  - continues to have loose stools - f/up stool studies - Cdiff, GI pcr    Renal  #ESRD #s/p Kidney Transplant in 2003 at [place] #PCKD  previously HD through LLE AVF but no HD since transplant  [facility] Nephrologist Dr. Hugo Hernandez [phone]  - avoid nephrotoxic agents  - holding cyclosporine and mycophenolate   - c/w home prednisone 5mg qday  - strict I/O and replete electrolytes  - s/p 4mg IV bumex with minimal urine production (3/17)  - started on CRRT (3/17-3/21) net negative 100cc/hr goal  - now s/p intermittent HD . s/p Hd today w/ 1L fluid removal  - SCx stable in 2s ( per renal Cr has been in 2.8 since december 2022)  - s/p Cathflo to unclog femoral shiley 3/28  - will eventually need transplant neprho consult when infection treated for management of kidney transplant         Hematological  # h/o LLE DVT  resolved, no DVT on doppler 12/22.  - negative LLE duplex this admission 3/17  - restarted heparin subQ (3/21)  - POCUS showed residual clot in RLE; f/u dopplers negative      #Anemia  - s/p 4 u prbc total this admission, last transfusion 3/27  - Evaluated by ENT for bleeding and found no signs of active bleeding from nasal cavity, nasopharynx or oral cavity, however, patient biting on tongue, which could be one source of bleeding. Tongue was edematous and lax  - CT a/p neg for RP bleed   - GI consulted regarding Hgb drops- would need clearance before SORAYA. rec no further GI intervention for now.  - retic count normal, check hapto and LDH in am as part of hemolysis labs  - H/H 8.2/26.8  - check occult stool      Infectious Disease  #Strep pneumoniae meningitis   #R ear mastoiditis  #strep bacteremia  - c/w CTX (3/16 - )  - c/w ciprodex drops for R ear  - ID following  - BCx 3/15 + strep, repeat cx from 3/23 NGTD  - f/up sputum cx    #h/o PCP  - atovaquone at home, continue      Endocrinological  #Hypothyroidism  last TSH 12/2022 >10  TSH 3/15 4.73  - c/w levothyroxine     #Diabetes mellitus Type II  - ISS      Ethics  FULL, GOC ongoing.   79 y/o F with h/o HTN, HLD, DM2, PCKD previously on HD via LUE AVF then s/p renal transplant, LLE DVT (resolved, nml dopplers 12/2022) on ASA, very small supraclinoid aneurysm (reportedly unchanged on MRA 10/2020), hypothyroidism, PCP 2021 on atovaquone ppx, reportedly hospitalized in 12/2022 for rectal prolapse, had a blood transfusion at that time, also + CMV 1/25/23 who presented with R-sided HA, R ear pain and neck stiffness after visit to ENT earlier in the day, altered on presentation, CT head unremarkable for stroke, however LP with IR and BCx showing +strep pneumoniae. currently on vanc and CTX per ID. Cardiac arrest 3/16 with ROSC, transferred to MICU for further management.       Plan:   Neurological  #Currently Intubated, patient still unresponsive to painful and verbal stimuli  - remains off sedation  - EEG no seizures - Dcd 3/31  - MRI IAC and brain 3/29 - otitis and mastoiditis with multiple cerebral infarcts and cerebellar infarct. Will need f/u MRI in 3-4 weeks per neuro  - Endocarditis is possible cause for scattered cerebral infarcts, will consider utility of SORAYA given poor candidate for valve replacement  - mental status remains unimproved. withdraws from pain however does not elicit purposeful movements.    #Strep pneumo meningitis #Acute encephalopathy  p/w severe HA, neck stiffness, fever, leukocytosis concerning for meningitis/encephalitis  - CT head negative for CVA.   - LP and BCx 3/15 both showing gram positive cocci in pairs, and + strep pneumoniae.   - likely source of entry from R ear where pt c/o discomfort for several months.  - taking cyclosporine, mycophenolate mofetil and prednisone at home for kidney transplant and was likely immunocompromised -> dental procedure 3/14 but less likely source   - MRI of the IAC and brain 3/29 - otitis and mastoiditis with multiple cerebral infarcts and cerebellar infarct, f/u MRI 3-4 weeks (as per neuro)  - neuro and ID following    #Seizures  - 24hr EEG initially with highly focal seizures, improved with increased sedation.   - keppra started 3/17- , currently on keppra  500 BID  - vimpat 100 BID increased to 200 BID (3/20 -  )  - EEG neg- Dc' d 3/31     Cardiovascular  #s/p Cardiac Arrest   - bradycardia to 30, pulseless, code blue was called 3/16 . 2 rounds of epi, and PEA arrest. on 3rd pulse check pulseless VT, 200J shock given 4th pulse check asystole, 5th ROSC, sinus tachy with HUMA. IO placed and levo started. Due to blood in mouth took several attempts with DL for ETT to be placed but ultimately confirmed with capnometry and bilateral breath sounds.     #Elevated troponin  uptrending troponin to 182 3/15 AM, was likely iso ESRD  s/p cardiac arrest, elevated ST segment   - TTE from 3/17 showed EF of 63% with mild pulm htn, normal left ventricular systolic function, and diastolic LV dysfunction.      #HTN  home meds: on amlodipine 5mg, losartan 100mg, torsemide 20mg, and clonidine 0.2 patch weekly  -Back on amlodipine, can uptitrate if needed or resume other oral agents    Respiratory/ENT  #Intubated  - restarted low dose propofol for sedation and comfort  - failed PS trial in AM,  becomes apneic, will retry again off sedation   - Patient has been intubated >2 wks; will talk to family regarding what their plans are iso MRI reading; will likely need trach and peg if within their goals  - noted w/ oral and inline secretions, will send sputum Cx    # Tympanic membrane rupture  discharge from R ear, evaluated by ENT given dexamethasone and cipro 3/14  - c/w broad spectrum abx   - MRI of IAC appreciated  - ENT recs- pending results of MRI will consider need for myringotomy, but will defer at this time given overall medical condition    Gastrointestinal  #NPO w/ OGT  #Constipation - resolved,  - continues to have loose stools - f/up stool studies shows positive C-difficile  - bowel regimen discontinued   - 3/31- KUB nonobstructive bowel pattern.  - s/p movantik  - increase trickle feeds as tolerated       Renal  #ESRD #s/p Kidney Transplant in 2003 at [place] #PCKD  previously HD through LLE AVF but no HD since transplant  [facility] Nephrologist Dr. Hugo Hernandez [phone]  - avoid nephrotoxic agents  - holding cyclosporine and mycophenolate   - c/w home prednisone 5mg qday  - strict I/O and replete electrolytes  - s/p 4mg IV bumex with minimal urine production (3/17)  - started on CRRT (3/17-3/21) net negative 100cc/hr goal  - now s/p intermittent HD . s/p Hd today w/ 1L fluid removal  - SCx stable in 2s ( per renal Cr has been in 2.8 since december 2022)  - s/p Cathflo to unclog femoral shiley 3/28  - will eventually need transplant neprho consult when infection treated for management of kidney transplant         Hematological  # h/o LLE DVT  resolved, no DVT on doppler 12/22.  - negative LLE duplex this admission 3/17  - restarted heparin subQ (3/21)  - POCUS showed residual clot in RLE; f/u dopplers negative      #Anemia  - s/p 4 u prbc total this admission, last transfusion 3/27  - Evaluated by ENT for bleeding and found no signs of active bleeding from nasal cavity, nasopharynx or oral cavity, however, patient biting on tongue, which could be one source of bleeding. Tongue was edematous and lax  - CT a/p neg for RP bleed   - GI consulted regarding Hgb drops- would need clearance before SORAYA. rec no further GI intervention for now.  - retic count normal, check hapto and LDH in am as part of hemolysis labs  - H/H 8.2/26.8  - check occult stool      Infectious Disease  #Strep pneumoniae meningitis   #R ear mastoiditis  #strep bacteremia  - c/w CTX (3/16 - )  - c/w ciprodex drops for R ear  - ID following  - BCx 3/15 + strep, repeat cx from 3/23 NGTD  - f/up sputum cx    #h/o PCP  - atovaquone at home, continue      Endocrinological  #Hypothyroidism  last TSH 12/2022 >10  TSH 3/15 4.73  - c/w levothyroxine     #Diabetes mellitus Type II  - ISS      Ethics  FULL, GOC ongoing.   77 y/o F with h/o HTN, HLD, DM2, PCKD previously on HD via LUE AVF then s/p renal transplant, LLE DVT (resolved, nml dopplers 12/2022) on ASA, very small supraclinoid aneurysm (reportedly unchanged on MRA 10/2020), hypothyroidism, PCP 2021 on atovaquone ppx, reportedly hospitalized in 12/2022 for rectal prolapse, had a blood transfusion at that time, also + CMV 1/25/23 who presented with R-sided HA, R ear pain and neck stiffness after visit to ENT earlier in the day, altered on presentation, CT head unremarkable for stroke, however LP with IR and BCx showing +strep pneumoniae. currently on vanc and CTX per ID. Cardiac arrest 3/16 with ROSC, transferred to MICU for further management.       Plan:   Neurological  #Currently Intubated, patient still unresponsive to painful and verbal stimuli  - remains off sedation  - EEG no seizures - Dcd 3/31  - MRI IAC and brain 3/29 - otitis and mastoiditis with multiple cerebral infarcts and cerebellar infarct. Will need f/u MRI in 3-4 weeks per neuro  - Endocarditis is possible cause for scattered cerebral infarcts, will consider utility of SORAYA given poor candidate for valve replacement  - mental status remains unimproved. withdraws from pain however does not elicit purposeful movements.    #Strep pneumo meningitis #Acute encephalopathy  p/w severe HA, neck stiffness, fever, leukocytosis concerning for meningitis/encephalitis  - CT head negative for CVA.   - LP and BCx 3/15 both showing gram positive cocci in pairs, and + strep pneumoniae.   - likely source of entry from R ear where pt c/o discomfort for several months.  - taking cyclosporine, mycophenolate mofetil and prednisone at home for kidney transplant and was likely immunocompromised -> dental procedure 3/14 but less likely source   - MRI of the IAC and brain 3/29 - otitis and mastoiditis with multiple cerebral infarcts and cerebellar infarct, f/u MRI 3-4 weeks (as per neuro)  - neuro and ID following    #Seizures  - 24hr EEG initially with highly focal seizures, improved with increased sedation.   - keppra started 3/17- , currently on keppra  500 BID  - vimpat 100 BID increased to 200 BID (3/20 -  )  - EEG neg- Dc' d 3/31     Cardiovascular  #s/p Cardiac Arrest   - bradycardia to 30, pulseless, code blue was called 3/16 . 2 rounds of epi, and PEA arrest. on 3rd pulse check pulseless VT, 200J shock given 4th pulse check asystole, 5th ROSC, sinus tachy with HUMA. IO placed and levo started. Due to blood in mouth took several attempts with DL for ETT to be placed but ultimately confirmed with capnometry and bilateral breath sounds.     #Elevated troponin  uptrending troponin to 182 3/15 AM, was likely iso ESRD  s/p cardiac arrest, elevated ST segment   - TTE from 3/17 showed EF of 63% with mild pulm htn, normal left ventricular systolic function, and diastolic LV dysfunction.      #HTN  home meds: on amlodipine 5mg, losartan 100mg, torsemide 20mg, and clonidine 0.2 patch weekly  -Back on amlodipine, can uptitrate if needed or resume other oral agents      Respiratory/ENT  #Intubated  - restarted low dose propofol for sedation and comfort  - failed PS trial in AM,  becomes apneic, will retry again off sedation   - Patient has been intubated >2 wks; will talk to family regarding what their plans are iso MRI reading; will likely need trach and peg if within their goals  - noted w/ oral and inline secretions, will send sputum Cx      # Tympanic membrane rupture  discharge from R ear, evaluated by ENT given dexamethasone and cipro 3/14  - c/w broad spectrum abx   - MRI of IAC appreciated  - ENT recs- pending results of MRI will consider need for myringotomy, but will defer at this time given overall medical condition    Gastrointestinal  #NPO w/ OGT  #Constipation - resolved,  - continues to have loose stools - f/up stool studies shows positive C-difficile  - bowel regimen discontinued   - 3/31- KUB nonobstructive bowel pattern.  - s/p movantik  - increase trickle feeds as tolerated       Renal  #ESRD #s/p Kidney Transplant in 2003 at [place] #PCKD  previously HD through LLE AVF but no HD since transplant  [facility] Nephrologist Dr. Hugo Hernandez [phone]  - avoid nephrotoxic agents  - holding cyclosporine and mycophenolate   - c/w home prednisone 5mg qday  - strict I/O and replete electrolytes  - s/p 4mg IV bumex with minimal urine production (3/17)  - started on CRRT (3/17-3/21)   now s/p intermittent HD - last session 4/1 (1L removal)  - SCx stable in 2s ( per renal Cr has been in 2.8 since december 2022)  - s/p Cathflo to unclog femoral shiley 3/28  - will eventually need transplant nephrology consult when infection treated for management of kidney transplant         Hematological  # h/o LLE DVT  resolved, no DVT on doppler 12/22.  - negative LLE duplex this admission 3/17  - POCUS showed residual clot in RLE; f/u dopplers 3/28 negative  - restarted heparin subQ (3/21)        #Anemia  - s/p 4 u prbc total this admission, last transfusion 3/27  - Evaluated by ENT for bleeding and found no signs of active bleeding from nasal cavity, nasopharynx or oral cavity, however, patient biting on tongue, which could be one source of bleeding. Tongue was edematous and lax  - CT a/p neg for RP bleed   - GI consulted regarding Hgb drops- would need clearance before SORAYA. rec no further GI intervention for now.  - retic count normal, check hapto and LDH in am as part of hemolysis labs  - s/p 5 units pRbc's during this admission  - postitive FOBT 2/2 rectal prolapse?  - Hemoglobin w/slow downtrend 8.2/26.8 > 7.6/25.1, monitor closely        Infectious Disease  #Strep pneumoniae meningitis   #R ear mastoiditis  #strep bacteremia  #C. difficile  - c/w CTX (3/16 - )  - c/w ciprodex drops for R ear  - start vanco 125mg PO [id number] (4/2-  ) for C.diff  - ID following  - BCx 3/15 + strep, repeat cx from 3/23 NGTD  - f/up sputum cx    #h/o PCP  - atovaquone at home, continue      Endocrinological  #Hypothyroidism  last TSH 12/2022 >10  TSH 3/15 4.73  - c/w levothyroxine     #Diabetes mellitus Type II  - ISS      Ethics  FULL, GOC ongoing.

## 2023-04-02 NOTE — PROGRESS NOTE ADULT - SUBJECTIVE AND OBJECTIVE BOX
[facility] : Ans Serv [phone], Office [phone]  Dr Ontiveros/Dr Cosby  /Dr Irwin luis /Dr PAUL Aldana/Dr Estuardo Hussein/Dr Cleve Gambino /Dr DAGMAR Mukherjee  _______________________________________________________________________________________________    seen and examined today for End Stage Renal Disease on Dialysis   Interval : on ventilator  VITALS:  T(F): 101.1 (04-02 @ 08:00), Max: 101.3 (04-01 @ 16:00)  HR: 86 (04-02 @ 11:15)  BP: 111/62 (04-02 @ 10:00)  ABP: --  RR: 19 (04-02 @ 10:00)  SpO2: 99% (04-02 @ 11:15)    04-01 @ 07:01  -  04-02 @ 07:00  --------------------------------------------------------  IN: 1485 mL / OUT: 1627 mL / NET: -142 mL    04-02 @ 07:01  -  04-02 @ 13:38  --------------------------------------------------------  IN: 225 mL / OUT: 10 mL / NET: 215 mL    Physical Exam :-  Constitutional: NAD on ventilator fio2 30%  Respiratory: Bilateral equal breath sounds, no Crackles present.  Cardiovascular: S1, S2 normal, positive Murmur  Gastrointestinal: Bowel Sounds present, soft  Extremities: no Edema Feet    Data:-  Allergies :   apple (Unknown)  Benadryl (Unknown)  penicillin (Hives)  watermelon (Unknown)    [facility] Medications:   MEDICATIONS  (STANDING):  amLODIPine   Tablet 10 milliGRAM(s) Oral every 24 hours  atovaquone  Suspension 1500 milliGRAM(s) Oral daily  cefTRIAXone   IVPB 2000 milliGRAM(s) IV Intermittent every 12 hours  chlorhexidine 0.12% Liquid 15 milliLiter(s) Oral Mucosa two times a day  chlorhexidine 2% Cloths 1 Application(s) Topical <User Schedule>  ciprofloxacin  0.3% Ophthalmic Solution for Otic Use 4 Drop(s) Right Ear two times a day  dextrose 5%. 1000 milliLiter(s) (100 mL/Hr) IV Continuous <Continuous>  dextrose 5%. 1000 milliLiter(s) (50 mL/Hr) IV Continuous <Continuous>  dextrose 50% Injectable 25 Gram(s) IV Push once  dextrose 50% Injectable 12.5 Gram(s) IV Push once  dextrose 50% Injectable 25 Gram(s) IV Push once  glucagon  Injectable 1 milliGRAM(s) IntraMuscular once  heparin   Injectable 5000 Unit(s) SubCutaneous every 8 hours  insulin lispro (ADMELOG) corrective regimen sliding scale   SubCutaneous every 6 hours  lacosamide Solution 200 milliGRAM(s) Oral two times a day  levETIRAcetam 500 milliGRAM(s) Oral two times a day  levothyroxine Injectable 18 MICROGram(s) IV Push at bedtime  petrolatum Ophthalmic Ointment 1 Application(s) Both EYES every 12 hours  predniSONE   Tablet 5 milliGRAM(s) Oral daily  vancomycin    Solution 125 milliGRAM(s) Oral every 6 hours    04-02    133<L>  |  97<L>  |  39<H>  ----------------------------<  122<H>  3.4<L>   |  24  |  2.41<H>    Ca    8.8      02 Apr 2023 00:15  Phos  3.7     04-02  Mg     2.10     04-02    TPro  5.3<L>  /  Alb  1.9<L>  /  TBili  0.3  /  DBili      /  AST  27  /  ALT  20  /  AlkPhos  135<H>  04-02    Creatinine Trend: 2.41 <--, 2.90 <--, 2.36 <--, 2.73 <--, 2.28 <--, 2.95 <--  egfr trend : 20 <--, 16 <--, 21 <--, 18 <--, 22 <--, 16 <--, 16 <--                        7.6    9.01  )-----------( 209      ( 02 Apr 2023 00:15 )             25.1

## 2023-04-03 NOTE — PROGRESS NOTE ADULT - SUBJECTIVE AND OBJECTIVE BOX
[facility] - S Alea / Tita S /D Rama/ S Jovan/ S Keenan/ Cleve Gambino / DAGMAR Wilsonu/ O Daya  service -[phone], office [phone]  ---------------------------------------------------------------------------------------------------------------    Patient seen and examined bedside    Subjective and Objective: No overnight events, sob resolved. No complaints today. feeling better    Allergies: apple (Unknown)  Benadryl (Unknown)  penicillin (Hives)  watermelon (Unknown)      Hospital Medications:   MEDICATIONS  (STANDING):  atovaquone  Suspension 1500 milliGRAM(s) Oral daily  cefTRIAXone   IVPB 2000 milliGRAM(s) IV Intermittent every 12 hours  chlorhexidine 0.12% Liquid 15 milliLiter(s) Oral Mucosa two times a day  chlorhexidine 2% Cloths 1 Application(s) Topical <User Schedule>  ciprofloxacin  0.3% Ophthalmic Solution for Otic Use 4 Drop(s) Right Ear two times a day  dextrose 5%. 1000 milliLiter(s) (100 mL/Hr) IV Continuous <Continuous>  dextrose 5%. 1000 milliLiter(s) (50 mL/Hr) IV Continuous <Continuous>  dextrose 50% Injectable 25 Gram(s) IV Push once  dextrose 50% Injectable 12.5 Gram(s) IV Push once  dextrose 50% Injectable 25 Gram(s) IV Push once  furosemide   Injectable 80 milliGRAM(s) IV Push once  glucagon  Injectable 1 milliGRAM(s) IntraMuscular once  heparin   Injectable 5000 Unit(s) SubCutaneous every 8 hours  insulin lispro (ADMELOG) corrective regimen sliding scale   SubCutaneous every 6 hours  lacosamide Solution 200 milliGRAM(s) Oral two times a day  levETIRAcetam 500 milliGRAM(s) Oral two times a day  levothyroxine 25 MICROGram(s) Oral daily  petrolatum Ophthalmic Ointment 1 Application(s) Both EYES every 12 hours  predniSONE   Tablet 5 milliGRAM(s) Oral daily  vancomycin    Solution 125 milliGRAM(s) Oral every 6 hours      REVIEW OF SYSTEMS:  CONSTITUTIONAL: No weakness, fevers or chills  EYES/ENT: No visual changes;  No vertigo or throat pain   NECK: No pain or stiffness  RESPIRATORY: No cough, wheezing, hemoptysis; No shortness of breath  CARDIOVASCULAR: No chest pain or palpitations.  GASTROINTESTINAL: No abdominal or epigastric pain. No nausea, vomiting, or hematemesis; No diarrhea or constipation. No melena or hematochezia.  GENITOURINARY: No dysuria, frequency, foamy urine, urinary urgency, incontinence or hematuria  NEUROLOGICAL: No numbness or weakness  SKIN: No itching, burning, rashes, or lesions   VASCULAR: No bilateral lower extremity edema.   All other review of systems is negative unless indicated above.    VITALS:  T(F): 99.6 (04-03-23 @ 08:00), Max: 100.7 (04-03-23 @ 00:00)  HR: 74 (04-03-23 @ 11:00)  BP: 111/56 (04-03-23 @ 11:00)  RR: 15 (04-03-23 @ 11:00)  SpO2: 100% (04-03-23 @ 11:00)  Wt(kg): --    04-02 @ 07:01  -  04-03 @ 07:00  --------------------------------------------------------  IN: 1280 mL / OUT: 25 mL / NET: 1255 mL    04-03 @ 07:01  -  04-03 @ 11:47  --------------------------------------------------------  IN: 140 mL / OUT: 0 mL / NET: 140 mL          PHYSICAL EXAM:  Constitutional: NAD  HEENT: anicteric sclera, oropharynx clear  Neck: No JVD  Respiratory: CTAB, no wheezes, rales or rhonchi  Cardiovascular: S1, S2, RRR  Gastrointestinal: BS+, soft, NT/ND  Extremities: No cyanosis or clubbing. No peripheral edema  Neurological: A/O x 3, no focal deficits  Psychiatric: Normal mood, normal affect  : No CVA tenderness. No snider.   Skin: No rashes  Vascular Access:    LABS:  04-03    135  |  99  |  55<H>  ----------------------------<  157<H>  3.6   |  22  |  3.17<H>    Ca    8.9      03 Apr 2023 01:20  Phos  4.3     04-03  Mg     2.10     04-03    TPro  5.3<L>  /  Alb  1.9<L>  /  TBili  0.2  /  DBili      /  AST  20  /  ALT  16  /  AlkPhos  124<H>  04-03    Creatinine Trend: 3.17 <--, 2.41 <--, 2.90 <--, 2.36 <--, 2.73 <--, 2.28 <--, 2.95 <--                        7.2    7.03  )-----------( 208      ( 03 Apr 2023 01:20 )             23.2     Urine Studies:        RADIOLOGY & ADDITIONAL STUDIES:   [facility] - S Alea / Tita S /D Rama/ S Jovan/ S Keenan/ Cleve Gambino / DAGMAR Wilsonu/ O Daya  service -[phone], office [phone]  ---------------------------------------------------------------------------------------------------------------    Patient seen and examined bedside in 6 MICU    Subjective and Objective: No overnight events. team bedside  femoral shiley removed 4/2 for line holiday  remains on MV  now on contact iso for C diff+     Allergies: apple (Unknown)  Benadryl (Unknown)  penicillin (Hives)  watermelon (Unknown)      [facility] Medications:   MEDICATIONS  (STANDING):  atovaquone  Suspension 1500 milliGRAM(s) Oral daily  cefTRIAXone   IVPB 2000 milliGRAM(s) IV Intermittent every 12 hours  chlorhexidine 0.12% Liquid 15 milliLiter(s) Oral Mucosa two times a day  chlorhexidine 2% Cloths 1 Application(s) Topical <User Schedule>  ciprofloxacin  0.3% Ophthalmic Solution for Otic Use 4 Drop(s) Right Ear two times a day  dextrose 5%. 1000 milliLiter(s) (100 mL/Hr) IV Continuous <Continuous>  dextrose 5%. 1000 milliLiter(s) (50 mL/Hr) IV Continuous <Continuous>  dextrose 50% Injectable 25 Gram(s) IV Push once  dextrose 50% Injectable 12.5 Gram(s) IV Push once  dextrose 50% Injectable 25 Gram(s) IV Push once  furosemide   Injectable 80 milliGRAM(s) IV Push once  glucagon  Injectable 1 milliGRAM(s) IntraMuscular once  heparin   Injectable 5000 Unit(s) SubCutaneous every 8 hours  insulin lispro (ADMELOG) corrective regimen sliding scale   SubCutaneous every 6 hours  lacosamide Solution 200 milliGRAM(s) Oral two times a day  levETIRAcetam 500 milliGRAM(s) Oral two times a day  levothyroxine 25 MICROGram(s) Oral daily  petrolatum Ophthalmic Ointment 1 Application(s) Both EYES every 12 hours  predniSONE   Tablet 5 milliGRAM(s) Oral daily  vancomycin    Solution 125 milliGRAM(s) Oral every 6 hours    VITALS:  T(F): 99.6 (04-03-23 @ 08:00), Max: 100.7 (04-03-23 @ 00:00)  HR: 74 (04-03-23 @ 11:00)  BP: 111/56 (04-03-23 @ 11:00)  RR: 15 (04-03-23 @ 11:00)  SpO2: 100% (04-03-23 @ 11:00)  Wt(kg): --    04-02 @ 07:01  -  04-03 @ 07:00  --------------------------------------------------------  IN: 1280 mL / OUT: 25 mL / NET: 1255 mL    04-03 @ 07:01  -  04-03 @ 11:47  --------------------------------------------------------  IN: 140 mL / OUT: 0 mL / NET: 140 mL      PHYSICAL EXAM:  Constitutional: NAD  HEENT: ETT+ on MV, +NGT  Neck: No JVD  Respiratory: CTAB, no wheezes, rales or rhonchi  Cardiovascular: S1, S2, RRR  Gastrointestinal: BS+, soft  Extremities: No peripheral edema  Neurological: unresponsive  : No snider.     LABS:  04-03    135  |  99  |  55<H>  ----------------------------<  157<H>  3.6   |  22  |  3.17<H>    Ca    8.9      03 Apr 2023 01:20  Phos  4.3     04-03  Mg     2.10     04-03    TPro  5.3<L>  /  Alb  1.9<L>  /  TBili  0.2  /  DBili      /  AST  20  /  ALT  16  /  AlkPhos  124<H>  04-03    Creatinine Trend: 3.17 <--, 2.41 <--, 2.90 <--, 2.36 <--, 2.73 <--, 2.28 <--, 2.95 <--                        7.2    7.03  )-----------( 208      ( 03 Apr 2023 01:20 )             23.2     Urine Studies:        RADIOLOGY & ADDITIONAL STUDIES:

## 2023-04-03 NOTE — PROGRESS NOTE ADULT - SUBJECTIVE AND OBJECTIVE BOX
SUBJECTIVE / OVERNIGHT EVENTS: No acute events overnight. 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 Patient reportedly woke up at 0130 AM on Monday with a R-sided headache. ENT noted R otitis externa, purulent discharge from R TM thought to be secondary to perforation, given ear gtt and prescribed cipro/dexamethasone gtt (she took 1 dose thus far). Daughter called EMS as later in the day patient complained of severe HA as well as neck pain, stated "I'm dying" and "my head is killing me." Patient reportedly without prior history of ear infections, no history of stroke or seizures. She reported to ENT earlier in the day a muffled sensation in the R ear [id number].     Daughter notes patient has had no recent fevers/chills. She had a dental cleaning on Thursday (no abx prior to cleaning).    On arrival, code stroke called.    In the ED VS:  97.2  65-99  162-186/52-91  16-20  96-99%RA, received NS 500cc IVF, lorazepam 2mg IV x1 and morphine 4mg IV x1 (15 Mar 2023 01:17)        OBJECTIVE:  ICU Vital Signs Last 24 Hrs  T(C): 37.6 (03 Apr 2023 04:00), Max: 38.2 (03 Apr 2023 00:00)  T(F): 99.6 (03 Apr 2023 04:00), Max: 100.7 (03 Apr 2023 00:00)  HR: 83 (03 Apr 2023 07:37) (68 - 103)  BP: 106/54 (03 Apr 2023 07:00) (94/51 - 132/67)  BP(mean): 67 (03 Apr 2023 07:00) (60 - 95)  ABP: --  ABP(mean): --  RR: 14 (03 Apr 2023 07:00) (14 - 28)  SpO2: 100% (03 Apr 2023 07:37) (93% - 100%)    O2 Parameters below as of 03 Apr 2023 07:00  Patient On (Oxygen Delivery Method): ventilator    O2 Concentration (%): 30      Mode: AC/ CMV (Assist Control/ Continuous Mandatory Ventilation), RR (machine): 14, TV (machine): 320, FiO2: 30, PEEP: 5, ITime: 0.6, MAP: 10, PIP: 26    04-02 @ 07:01  -  04-03 @ 07:00  --------------------------------------------------------  IN: 1280 mL / OUT: 25 mL / NET: 1255 mL      CAPILLARY BLOOD GLUCOSE      POCT Blood Glucose.: 164 mg/dL (03 Apr 2023 05:09)    PHYSICAL EXAM:  GENERAL: no distress  PSYCH: A&O x0  HEAD: Atraumatic, Normocephalic  NECK: Supple, No JVD  CHEST/LUNG: clear to auscultation bilaterally  HEART: regular rate and rhythm, no murmurs  ABDOMEN: +distension, palpable abdominal mass upper and lower right quadrants   EXTREMITIES: no edema on bilateral LE  NEUROLOGY: withdraws from pain, no purposeful movements noted.  SKIN: No rashes or lesions      HOSPITAL MEDICATIONS:  Standing Meds:  amLODIPine   Tablet 10 milliGRAM(s) Oral every 24 hours  atovaquone  Suspension 1500 milliGRAM(s) Oral daily  cefTRIAXone   IVPB 2000 milliGRAM(s) IV Intermittent every 12 hours  chlorhexidine 0.12% Liquid 15 milliLiter(s) Oral Mucosa two times a day  chlorhexidine 2% Cloths 1 Application(s) Topical <User Schedule>  ciprofloxacin  0.3% Ophthalmic Solution for Otic Use 4 Drop(s) Right Ear two times a day  dextrose 5%. 1000 milliLiter(s) IV Continuous <Continuous>  dextrose 5%. 1000 milliLiter(s) IV Continuous <Continuous>  dextrose 50% Injectable 25 Gram(s) IV Push once  dextrose 50% Injectable 12.5 Gram(s) IV Push once  dextrose 50% Injectable 25 Gram(s) IV Push once  glucagon  Injectable 1 milliGRAM(s) IntraMuscular once  heparin   Injectable 5000 Unit(s) SubCutaneous every 8 hours  insulin lispro (ADMELOG) corrective regimen sliding scale   SubCutaneous every 6 hours  lacosamide Solution 200 milliGRAM(s) Oral two times a day  levETIRAcetam 500 milliGRAM(s) Oral two times a day  levothyroxine 25 MICROGram(s) Oral daily  petrolatum Ophthalmic Ointment 1 Application(s) Both EYES every 12 hours  predniSONE   Tablet 5 milliGRAM(s) Oral daily  vancomycin    Solution 125 milliGRAM(s) Oral every 6 hours      PRN Meds:  acetaminophen   Oral Liquid .. 650 milliGRAM(s) Enteral Tube every 6 hours PRN  dextrose Oral Gel 15 Gram(s) Oral once PRN      LABS:                        7.2    7.03  )-----------( 208      ( 03 Apr 2023 01:20 )             23.2     Hgb Trend: 7.2<--, 7.6<--, 8.2<--, 7.5<--, 8.3<--  04-03    135  |  99  |  55<H>  ----------------------------<  157<H>  3.6   |  22  |  3.17<H>    Ca    8.9      03 Apr 2023 01:20  Phos  4.3     04-03  Mg     2.10     04-03    TPro  5.3<L>  /  Alb  1.9<L>  /  TBili  0.2  /  DBili  x   /  AST  20  /  ALT  16  /  AlkPhos  124<H>  04-03    Creatinine Trend: 3.17<--, 2.41<--, 2.90<--, 2.36<--, 2.73<--, 2.28<--  PT/INR - ( 03 Apr 2023 01:20 )   PT: 15.2 sec;   INR: 1.31 ratio         PTT - ( 03 Apr 2023 01:20 )  PTT:28.3 sec      Venous Blood Gas:  04-03 @ 02:40  7.41/39/54/25/89.9  VBG Lactate: 1.1  Venous Blood Gas:  04-03 @ 01:20  7.50/29/76/23/98.4  VBG Lactate: 1.2  Venous Blood Gas:  04-02 @ 00:15  7.42/43/48/28/83.0  VBG Lactate: 1.4      MICROBIOLOGY:     Culture - Stool (collected 01 Apr 2023 21:03)  Source: .Stool Feces  Preliminary Report (03 Apr 2023 07:49):    No enteric pathogens to date: Final culture pending    Culture - Sputum (collected 01 Apr 2023 14:00)  Source: ET Tube ET Tube  Gram Stain (01 Apr 2023 21:32):    No polymorphonuclear leukocytes per low power field    No Squamous epithelial cells per low power field    Rare Gram Negative Rods per oil power field  Preliminary Report (02 Apr 2023 17:11):    Normal Respiratory Kizzy present      RADIOLOGY:  [ ] Reviewed and interpreted by me           SUBJECTIVE / OVERNIGHT EVENTS: No acute events overnight. 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 Patient reportedly woke up at 0130 AM on Monday with a R-sided headache. ENT noted R otitis externa, purulent discharge from R TM thought to be secondary to perforation, given ear gtt and prescribed cipro/dexamethasone gtt (she took 1 dose thus far). Daughter called EMS as later in the day patient complained of severe HA as well as neck pain, stated "I'm dying" and "my head is killing me." Patient reportedly without prior history of ear infections, no history of stroke or seizures. She reported to ENT earlier in the day a muffled sensation in the R ear [id number].     Daughter notes patient has had no recent fevers/chills. She had a dental cleaning on Thursday (no abx prior to cleaning).    On arrival, code stroke called.    In the ED VS:  97.2  65-99  162-186/52-91  16-20  96-99%RA, received NS 500cc IVF, lorazepam 2mg IV x1 and morphine 4mg IV x1 (15 Mar 2023 01:17)        OBJECTIVE:  ICU Vital Signs Last 24 Hrs  T(C): 37.6 (03 Apr 2023 04:00), Max: 38.2 (03 Apr 2023 00:00)  T(F): 99.6 (03 Apr 2023 04:00), Max: 100.7 (03 Apr 2023 00:00)  HR: 83 (03 Apr 2023 07:37) (68 - 103)  BP: 106/54 (03 Apr 2023 07:00) (94/51 - 132/67)  BP(mean): 67 (03 Apr 2023 07:00) (60 - 95)  ABP: --  ABP(mean): --  RR: 14 (03 Apr 2023 07:00) (14 - 28)  SpO2: 100% (03 Apr 2023 07:37) (93% - 100%)    O2 Parameters below as of 03 Apr 2023 07:00  Patient On (Oxygen Delivery Method): ventilator    O2 Concentration (%): 30      Mode: AC/ CMV (Assist Control/ Continuous Mandatory Ventilation), RR (machine): 14, TV (machine): 320, FiO2: 30, PEEP: 5, ITime: 0.6, MAP: 10, PIP: 26    04-02 @ 07:01  -  04-03 @ 07:00  --------------------------------------------------------  IN: 1280 mL / OUT: 25 mL / NET: 1255 mL      CAPILLARY BLOOD GLUCOSE      POCT Blood Glucose.: 164 mg/dL (03 Apr 2023 05:09)    PHYSICAL EXAM:  GENERAL: no distress  PSYCH: A&O x0  HEAD: Atraumatic, Normocephalic  NECK: Supple, No JVD  CHEST/LUNG: clear to auscultation bilaterally  HEART: regular rate and rhythm, no murmurs  ABDOMEN: +distension, palpable abdominal mass upper and lower right quadrants   EXTREMITIES: no edema on bilateral LE  NEUROLOGY: withdraws from pain, no purposeful movements noted.  SKIN: No rashes or lesions      HOSPITAL MEDICATIONS:  Standing Meds:  amLODIPine   Tablet 10 milliGRAM(s) Oral every 24 hours  atovaquone  Suspension 1500 milliGRAM(s) Oral daily  cefTRIAXone   IVPB 2000 milliGRAM(s) IV Intermittent every 12 hours  chlorhexidine 0.12% Liquid 15 milliLiter(s) Oral Mucosa two times a day  chlorhexidine 2% Cloths 1 Application(s) Topical <User Schedule>  ciprofloxacin  0.3% Ophthalmic Solution for Otic Use 4 Drop(s) Right Ear two times a day  dextrose 5%. 1000 milliLiter(s) IV Continuous <Continuous>  dextrose 5%. 1000 milliLiter(s) IV Continuous <Continuous>  dextrose 50% Injectable 25 Gram(s) IV Push once  dextrose 50% Injectable 12.5 Gram(s) IV Push once  dextrose 50% Injectable 25 Gram(s) IV Push once  glucagon  Injectable 1 milliGRAM(s) IntraMuscular once  heparin   Injectable 5000 Unit(s) SubCutaneous every 8 hours  insulin lispro (ADMELOG) corrective regimen sliding scale   SubCutaneous every 6 hours  lacosamide Solution 200 milliGRAM(s) Oral two times a day  levETIRAcetam 500 milliGRAM(s) Oral two times a day  levothyroxine 25 MICROGram(s) Oral daily  petrolatum Ophthalmic Ointment 1 Application(s) Both EYES every 12 hours  predniSONE   Tablet 5 milliGRAM(s) Oral daily  vancomycin    Solution 125 milliGRAM(s) Oral every 6 hours      PRN Meds:  acetaminophen   Oral Liquid .. 650 milliGRAM(s) Enteral Tube every 6 hours PRN  dextrose Oral Gel 15 Gram(s) Oral once PRN      LABS:                        7.2    7.03  )-----------( 208      ( 03 Apr 2023 01:20 )             23.2     Hgb Trend: 7.2<--, 7.6<--, 8.2<--, 7.5<--, 8.3<--  04-03    135  |  99  |  55<H>  ----------------------------<  157<H>  3.6   |  22  |  3.17<H>    Ca    8.9      03 Apr 2023 01:20  Phos  4.3     04-03  Mg     2.10     04-03    TPro  5.3<L>  /  Alb  1.9<L>  /  TBili  0.2  /  DBili  x   /  AST  20  /  ALT  16  /  AlkPhos  124<H>  04-03    Creatinine Trend: 3.17<--, 2.41<--, 2.90<--, 2.36<--, 2.73<--, 2.28<--  PT/INR - ( 03 Apr 2023 01:20 )   PT: 15.2 sec;   INR: 1.31 ratio         PTT - ( 03 Apr 2023 01:20 )  PTT:28.3 sec      Venous Blood Gas:  04-03 @ 02:40  7.41/39/54/25/89.9  VBG Lactate: 1.1  Venous Blood Gas:  04-03 @ 01:20  7.50/29/76/23/98.4  VBG Lactate: 1.2  Venous Blood Gas:  04-02 @ 00:15  7.42/43/48/28/83.0  VBG Lactate: 1.4      MICROBIOLOGY:     Culture - Stool (collected 01 Apr 2023 21:03)  Source: .Stool Feces  Preliminary Report (03 Apr 2023 07:49):    No enteric pathogens to date: Final culture pending    Culture - Sputum (collected 01 Apr 2023 14:00)  Source: ET Tube ET Tube  Gram Stain (01 Apr 2023 21:32):    No polymorphonuclear leukocytes per low power field    No Squamous epithelial cells per low power field    Rare Gram Negative Rods per oil power field  Preliminary Report (02 Apr 2023 17:11):    Normal Respiratory Kizzy present      RADIOLOGY:  [ ] Reviewed and interpreted by me

## 2023-04-03 NOTE — PROGRESS NOTE ADULT - ASSESSMENT
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  Cardiac arrest on 3/16/23  septic shock s/p iv pressors  intubated on MV  Renal following for NAMAN on CKD Mx.  [facility] Nephrologist Dr. Hugo Hernandez [phone]    Acute kidney injury on Chronic Kidney Disease Stage 4 vs Chronic Kidney Disease Stage 4 progression to End Stage Renal Disease on Dialysis     hx KTx 2003 : In light of severe sepsis and cardiac arrest: off MMF and Cyclosporine  w/worsened renal function, oliguria, acidosis, mild hyperkalemia- s/p CVVHD, now on intermittent HD  Consent for HD obtained, signed by daughter 3/16/23  B/L creat around 2.8 since Dec 2022  Patient receives Cyclosporine (Gengraf) 75mg PO q12hrs,  BID and Prednisone 5 QD for transplant  Comorbidities : respiratory failure, hx of kidney transplant, Hypertension     plan:  c/w predniSONE   Tablet 5 milliGRAM(s) Oral daily  no indication for HD today  femoral shiley removed 4/2 for line holiday  agree w/trial of diuretic to see u/o response -furosemide   Injectable 80 milliGRAM(s) IV Push once added by team  if remains oligoanuric, will need a new shiley for next HD  Ultrafiltration on Dialysis as tolerated with blood pressure   monitor electrolytes, u/o, BMP      HTN- bp stable, controlled-  Medications :  amLODIPine   Tablet 10 milliGRAM(s) Oral every 24 hours    - Continue current medications w/holding parameters  - plan to titrate anti hypertensive medication as needed  - low salt diet if not npo suggested    Anemia :  Anemia Labs   Hemoglobin : 7.2< 7.6 <--, 8.2 <--, 7.5 <--, 8.3 <--, 8.0 <--  Platelets : 209 <--, 209 <--, 178 <--    Current orders :  - plan to add epogen on next hemodialysis   - plan to titrate medication as per responce of hemoglobin  - if Hb less than 7gm/dl consider blood transfusion    OM and OE  Abxs per Infectious disease specialist with dose adjustment per GFR  f/u cxs  vent Mx, per ICU    now w/C diff on po vanco    labs, chart reviewed  poc d/w ICU team

## 2023-04-03 NOTE — PROGRESS NOTE ADULT - CRITICAL CARE ATTENDING COMMENT
Agree with above  H/O renal transplant in 2003 on immunosuppressants  Patient with Streptococcus pneumoniae meningitis a/w bacteremia (now cleared cultures) c/b PEA arrest requiring intubation  Remains intubated, day 19, 14/430/30%/+5  Imaging shows cerebellar and cerebral infarcts  No significant neurological recovery; breathes over vent and withdraws to pain  C. difficile colitis with diarrhea, now on PO vancomycin  acute-on-chronic renal dysfunction progressed, now requiring IHD.  c/w mepron and steroids  c/w ceftriaxone for meningitis + bacteremia (last positive culture 3/15, all subsequent cultures negative)  Given significant neurological injury, chance for a meaningful recovery is very slim. GOC discussions ongoing.

## 2023-04-03 NOTE — PROGRESS NOTE ADULT - ASSESSMENT
77 y/o F with h/o HTN, HLD, DM2, PCKD previously on HD via LUE AVF then s/p renal transplant, LLE DVT (resolved, nml dopplers 12/2022) on ASA, very small supraclinoid aneurysm (reportedly unchanged on MRA 10/2020), hypothyroidism, PCP 2021 on atovaquone ppx, reportedly hospitalized in 12/2022 for rectal prolapse, had a blood transfusion at that time, also + CMV 1/25/23 who presented with R-sided HA, R ear pain and neck stiffness after visit to ENT earlier in the day, altered on presentation, CT head unremarkable for stroke, however LP with IR and BCx showing +strep pneumoniae. currently on vanc and CTX per ID. Cardiac arrest 3/16 with ROSC, transferred to MICU for further management.       Plan:   Neurological  #Currently Intubated, patient still unresponsive to painful and verbal stimuli  - remains off sedation  - EEG no seizures - Dcd 3/31  - MRI IAC and brain 3/29 - otitis and mastoiditis with multiple cerebral infarcts and cerebellar infarct. Will need f/u MRI in 3-4 weeks per neuro  - Endocarditis is possible cause for scattered cerebral infarcts, will consider utility of SORAYA given poor candidate for valve replacement  - mental status remains unimproved. withdraws from pain however does not elicit purposeful movements.    #Strep pneumo meningitis #Acute encephalopathy  p/w severe HA, neck stiffness, fever, leukocytosis concerning for meningitis/encephalitis  - CT head negative for CVA.   - LP and BCx 3/15 both showing gram positive cocci in pairs, and + strep pneumoniae.   - likely source of entry from R ear where pt c/o discomfort for several months.  - taking cyclosporine, mycophenolate mofetil and prednisone at home for kidney transplant and was likely immunocompromised -> dental procedure 3/14 but less likely source   - MRI of the IAC and brain 3/29 - otitis and mastoiditis with multiple cerebral infarcts and cerebellar infarct, f/u MRI 3-4 weeks (as per neuro)  - neuro and ID following    #Seizures  - 24hr EEG initially with highly focal seizures, improved with increased sedation.   - keppra started 3/17- , currently on keppra  500 BID  - vimpat 100 BID increased to 200 BID (3/20 -  )  - EEG neg- Dc' d 3/31     Cardiovascular  #s/p Cardiac Arrest   - bradycardia to 30, pulseless, code blue was called 3/16 . 2 rounds of epi, and PEA arrest. on 3rd pulse check pulseless VT, 200J shock given 4th pulse check asystole, 5th ROSC, sinus tachy with HUMA. IO placed and levo started. Due to blood in mouth took several attempts with DL for ETT to be placed but ultimately confirmed with capnometry and bilateral breath sounds.     #Elevated troponin  uptrending troponin to 182 3/15 AM, was likely iso ESRD  s/p cardiac arrest, elevated ST segment   - TTE from 3/17 showed EF of 63% with mild pulm htn, normal left ventricular systolic function, and diastolic LV dysfunction.      #HTN  home meds: on amlodipine 5mg, losartan 100mg, torsemide 20mg, and clonidine 0.2 patch weekly  -Back on amlodipine, can uptitrate if needed or resume other oral agents      Respiratory/ENT  #Intubated  - restarted low dose propofol for sedation and comfort  - failed PS trial in AM,  becomes apneic, will retry again off sedation   - Patient has been intubated >2 wks; will talk to family regarding what their plans are iso MRI reading; will likely need trach and peg if within their goals  - noted w/ oral and inline secretions, will send sputum Cx      # Tympanic membrane rupture  discharge from R ear, evaluated by ENT given dexamethasone and cipro 3/14  - c/w broad spectrum abx   - MRI of IAC appreciated  - ENT recs- pending results of MRI will consider need for myringotomy, but will defer at this time given overall medical condition    Gastrointestinal  #NPO w/ OGT  #Constipation - resolved,  - continues to have loose stools - f/up stool studies shows positive C-difficile  - bowel regimen discontinued   - 3/31- KUB nonobstructive bowel pattern.  - s/p movantik  - increase trickle feeds as tolerated       Renal  #ESRD #s/p Kidney Transplant in 2003 at [place] #PCKD  previously HD through LLE AVF but no HD since transplant  [facility] Nephrologist Dr. Hugo Hernandez [phone]  - avoid nephrotoxic agents  - holding cyclosporine and mycophenolate   - c/w home prednisone 5mg qday  - strict I/O and replete electrolytes  - s/p 4mg IV bumex with minimal urine production (3/17)  - started on CRRT (3/17-3/21)   now s/p intermittent HD - last session 4/1 (1L removal)  - SCx stable in 2s ( per renal Cr has been in 2.8 since december 2022)  - s/p Cathflo to unclog femoral shiley 3/28  - left femoral shiley removed for line holiday 4/2 will need access for HD prior to next session, will follow up with nephro  - will eventually need transplant nephrology consult when infection treated for management of kidney transplant         Hematological  # h/o LLE DVT  resolved, no DVT on doppler 12/22.  - negative LLE duplex this admission 3/17  - POCUS showed residual clot in RLE; f/u dopplers 3/28 negative  - restarted heparin subQ (3/21)        #Anemia  - s/p 4 u prbc total this admission, last transfusion 3/27  - Evaluated by ENT for bleeding and found no signs of active bleeding from nasal cavity, nasopharynx or oral cavity, however, patient biting on tongue, which could be one source of bleeding. Tongue was edematous and lax  - CT a/p neg for RP bleed   - GI consulted regarding Hgb drops- would need clearance before SORAYA. rec no further GI intervention for now.  - retic count normal, check hapto and LDH in am as part of hemolysis labs  - s/p 5 units pRbc's during this admission  - postitive FOBT 2/2 rectal prolapse?  - Hemoglobin w/slow downtrend 8.2/26.8 > 7.6/25.1, monitor closely        Infectious Disease  #Strep pneumoniae meningitis   #R ear mastoiditis  #strep bacteremia  #C. difficile  - c/w CTX (3/16 - )  - c/w ciprodex drops for R ear  - start vanco 125mg PO r4hmwpo (4/2-  ) for C.diff  - ID following  - BCx 3/15 + strep, repeat cx from 3/23 NGTD  - f/up sputum cx    #h/o PCP  - atovaquone at home, continue      Endocrinological  #Hypothyroidism  last TSH 12/2022 >10  TSH 3/15 4.73  - c/w levothyroxine     #Diabetes mellitus Type II  - ISS      Ethics  FULL, GOC ongoing.     79 y/o F with h/o HTN, HLD, DM2, PCKD previously on HD via LUE AVF then s/p renal transplant, LLE DVT (resolved, nml dopplers 12/2022) on ASA, very small supraclinoid aneurysm (reportedly unchanged on MRA 10/2020), hypothyroidism, PCP 2021 on atovaquone ppx, reportedly hospitalized in 12/2022 for rectal prolapse, had a blood transfusion at that time, also + CMV 1/25/23 who presented with R-sided HA, R ear pain and neck stiffness after visit to ENT earlier in the day, altered on presentation, CT head unremarkable for stroke, however LP with IR and BCx showing +strep pneumoniae. currently on vanc and CTX per ID. Cardiac arrest 3/16 with ROSC, transferred to MICU for further management.       Plan:   Neurological  #Currently Intubated, patient still unresponsive to painful and verbal stimuli  - remains off sedation  - EEG no seizures - Dcd 3/31  - MRI IAC and brain 3/29 - otitis and mastoiditis with multiple cerebral infarcts and cerebellar infarct. Will need f/u MRI in 3-4 weeks per neuro  - Endocarditis is possible cause for scattered cerebral infarcts, will consider utility of SORAYA given poor candidate for valve replacement  - mental status remains unimproved. withdraws from pain however does not elicit purposeful movements.    #Strep pneumo meningitis #Acute encephalopathy  p/w severe HA, neck stiffness, fever, leukocytosis concerning for meningitis/encephalitis  - CT head negative for CVA.   - LP and BCx 3/15 both showing gram positive cocci in pairs, and + strep pneumoniae.   - likely source of entry from R ear where pt c/o discomfort for several months.  - taking cyclosporine, mycophenolate mofetil and prednisone at home for kidney transplant and was likely immunocompromised -> dental procedure 3/14 but less likely source   - MRI of the IAC and brain 3/29 - otitis and mastoiditis with multiple cerebral infarcts and cerebellar infarct, f/u MRI 3-4 weeks (as per neuro)  - neuro and ID following    #Seizures  - 24hr EEG initially with highly focal seizures, improved with increased sedation.   - currently on keppra  500 BID since 3/17-   - vimpat 100 BID increased to 200 BID (3/20 -  )  - EEG neg- Dc' d 3/31     Cardiovascular  #s/p Cardiac Arrest   - bradycardia to 30, pulseless, code blue was called 3/16 . 2 rounds of epi, and PEA arrest. on 3rd pulse check pulseless VT, 200J shock given 4th pulse check asystole, 5th ROSC, sinus tachy with HUMA. IO placed and levo started. Due to blood in mouth took several attempts with DL for ETT to be placed but ultimately confirmed with capnometry and bilateral breath sounds.     #Elevated troponin  uptrending troponin to 182 3/15 AM, was likely iso ESRD  s/p cardiac arrest, elevated ST segment   - TTE from 3/17 showed EF of 63% with mild pulm htn, normal left ventricular systolic function, and diastolic LV dysfunction.      #HTN  home meds: on amlodipine 5mg, losartan 100mg, torsemide 20mg, and clonidine 0.2 patch weekly  - amlodipine 10mg restarted, but can hold now iso lower systolic blood pressure        Respiratory/ENT  #Intubated  - tolerated PS trial for few hours in AM 8/5  - Patient has been intubated >2 wks; will talk to family regarding what their plans are iso MRI reading; will likely need trach and peg if within their goals  - noted w/ oral and inline secretions, will send sputum Cx      # Tympanic membrane rupture  discharge from R ear, evaluated by ENT given dexamethasone and cipro 3/14  - c/w broad spectrum abx   - MRI of IAC appreciated  - ENT recs- pending results of MRI will consider need for myringotomy, but will defer at this time given overall medical condition    Gastrointestinal  #NPO w/ OGT  #Constipation - resolved,  - continues to have loose stools - f/up stool studies shows positive C-difficile  - bowel regimen discontinued   - 3/31- KUB nonobstructive bowel pattern.  - s/p movantik  - tolerating TF at goal       Renal  #ESRD #s/p Kidney Transplant in 2003 at [place] #PCKD  previously HD through LLE AVF but no HD since transplant  [facility] Nephrologist Dr. Hugo Hernandez [phone]  - avoid nephrotoxic agents  - holding cyclosporine and mycophenolate   - c/w home prednisone 5mg qday  - strict I/O and replete electrolytes  - s/p 4mg IV bumex with minimal urine production (3/17)  - started on CRRT (3/17-3/21)   now s/p intermittent HD - last session 4/1 (1L removal)  - oliguric 0-5ml/hr - indwelling catheter removed 4/2, bladder scan q8  - SCx stable in 2s ( per renal Cr has been in 2.8 since december 2022)  - s/p Cathflo to unclog femoral shiley 3/28  - left femoral shiley removed for line holiday 4/2   - will trial diuretic -furosemide 80mg IVP and monitor u/o response, if remains oligoanuric, will need access for HD session  - will eventually need transplant nephrology consult when infection treated for management of kidney transplant         Hematological  # h/o LLE DVT  resolved, no DVT on doppler 12/22.  - negative LLE duplex this admission 3/17  - POCUS showed residual clot in RLE; f/u dopplers 3/28 negative  - restarted heparin subQ (3/21)        #Anemia  - s/p 4 u prbc total this admission, last transfusion 3/27  - Evaluated by ENT for bleeding and found no signs of active bleeding from nasal cavity, nasopharynx or oral cavity, however, patient biting on tongue, which could be one source of bleeding. Tongue was edematous and lax  - CT a/p neg for RP bleed   - GI consulted regarding Hgb drops- would need clearance before SORAYA. rec no further GI intervention for now.  - retic count normal, check hapto and LDH in am as part of hemolysis labs  - s/p 5 units pRbc's during this admission  - postitive FOBT 2/2 rectal prolapse?  - Hemoglobin w/slow downtrend  7.2< 7.6 <, 8.2.  nephrology plan to add epogen on next hemodialysis session  -consider blood transfusion for Hb less than 7gm/dl          Infectious Disease  #Strep pneumoniae meningitis   #R ear mastoiditis  #strep bacteremia  #C. difficile  - c/w CTX (3/16 - )  - c/w ciprodex drops for R ear  - start vanco 125mg PO e2bhacr (4/2-  ) for C.diff  - ID following  - BCx 3/15 + strep, repeat cx from 3/23 NGTD  - f/up sputum cx    #h/o PCP  - atovaquone at home, continue      Endocrinological  #Hypothyroidism  last TSH 12/2022 >10  TSH 3/15 4.73  - c/w levothyroxine     #Diabetes mellitus Type II  - ISS      Ethics  FULL, GOC ongoing.

## 2023-04-03 NOTE — PROGRESS NOTE ADULT - SUBJECTIVE AND OBJECTIVE BOX
Follow Up:      Inverval History/ROS: Patient is a 78y old  Female who presents with a chief complaint of AMS/R ear infxn (03 Apr 2023 11:47)    + fever    Allergies    apple (Unknown)  Benadryl (Unknown)  penicillin (Hives)  watermelon (Unknown)    Intolerances        ANTIMICROBIALS:  atovaquone  Suspension 1500 daily  cefTRIAXone   IVPB 2000 every 12 hours  vancomycin    Solution 125 every 6 hours      OTHER MEDS:  acetaminophen   Oral Liquid .. 650 milliGRAM(s) Enteral Tube every 6 hours PRN  chlorhexidine 0.12% Liquid 15 milliLiter(s) Oral Mucosa two times a day  chlorhexidine 2% Cloths 1 Application(s) Topical <User Schedule>  ciprofloxacin  0.3% Ophthalmic Solution for Otic Use 4 Drop(s) Right Ear two times a day  dextrose 5%. 1000 milliLiter(s) IV Continuous <Continuous>  dextrose 5%. 1000 milliLiter(s) IV Continuous <Continuous>  dextrose 50% Injectable 25 Gram(s) IV Push once  dextrose 50% Injectable 12.5 Gram(s) IV Push once  dextrose 50% Injectable 25 Gram(s) IV Push once  dextrose Oral Gel 15 Gram(s) Oral once PRN  glucagon  Injectable 1 milliGRAM(s) IntraMuscular once  heparin   Injectable 5000 Unit(s) SubCutaneous every 8 hours  insulin lispro (ADMELOG) corrective regimen sliding scale   SubCutaneous every 6 hours  lacosamide Solution 200 milliGRAM(s) Oral two times a day  levETIRAcetam 500 milliGRAM(s) Oral two times a day  levothyroxine 25 MICROGram(s) Oral daily  petrolatum Ophthalmic Ointment 1 Application(s) Both EYES every 12 hours  predniSONE   Tablet 5 milliGRAM(s) Oral daily      Vital Signs Last 24 Hrs  T(C): 37.8 (03 Apr 2023 12:00), Max: 38.2 (03 Apr 2023 00:00)  T(F): 100.1 (03 Apr 2023 12:00), Max: 100.7 (03 Apr 2023 00:00)  HR: 90 (03 Apr 2023 14:30) (68 - 103)  BP: 110/77 (03 Apr 2023 14:00) (94/51 - 133/65)  BP(mean): 81 (03 Apr 2023 14:00) (60 - 95)  RR: 24 (03 Apr 2023 14:00) (14 - 28)  SpO2: 99% (03 Apr 2023 14:30) (93% - 100%)    Parameters below as of 03 Apr 2023 14:00  Patient On (Oxygen Delivery Method): ventilator,CPAP 8/5    O2 Concentration (%): 30    PHYSICAL EXAM:  General: [ ] non-toxic  HEAD/EYES: [ ] PERRL [x ] white sclera [ ] icterus  ENT:  [ ] normal [ x] supple [ ] thrush [ ] pharyngeal exudate  Cardiovascular:   [ ] murmur [ x] normal [ ] PPM/AICD  Respiratory:  [ x] clear to ausculation bilaterally  GI:  [x ] soft, non-tender, normal bowel sounds  :  [ ] snider [ ] no CVA tenderness   Musculoskeletal:  [ ] no synovitis  Neurologic:  [ ] non-focal exam   Skin:  [ x] no rash  Lymph: [x ] no lymphadenopathy  Psychiatric:  [ ] appropriate affect [ ] alert & oriented  Lines:  [ x] no phlebitis [ ] central line                                7.2    7.03  )-----------( 208      ( 03 Apr 2023 01:20 )             23.2       04-03    135  |  99  |  55<H>  ----------------------------<  157<H>  3.6   |  22  |  3.17<H>    Ca    8.9      03 Apr 2023 01:20  Phos  4.3     04-03  Mg     2.10     04-03    TPro  5.3<L>  /  Alb  1.9<L>  /  TBili  0.2  /  DBili  x   /  AST  20  /  ALT  16  /  AlkPhos  124<H>  04-03          MICROBIOLOGY:Culture Results:   No enteric pathogens to date: Final culture pending (04-01-23 @ 21:03)  Culture Results:   Normal Respiratory Kizzy present (04-01-23 @ 14:00)      RADIOLOGY:

## 2023-04-03 NOTE — PROGRESS NOTE ADULT - ASSESSMENT
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 Went to ENT and was diagnosed with R otitis externa secondary to perforated TM. Prescribed cipro/dexamethasone.  Worsening headache and EMS called. Admitted 3/14.  CT head negative.  NAMAN.  Started on vanc/meropenem.  IR obtained LP.  Findings c/w meningitis, culture +pneumococcus.  Antibiotics narrowed to ceftriaxone.  Remains lethargic off sedation with possible seizures.  Intermittent fevers.  MRI with acute and subacute infarcts.  Suspect endocarditis though there is not SORAYA.    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

## 2023-04-04 NOTE — PROGRESS NOTE ADULT - SUBJECTIVE AND OBJECTIVE BOX
Follow Up:      Inverval History/ROS:Patient is a 78y old  Female who presents with a chief complaint of AMS/R ear infxn (04 Apr 2023 07:12)    + fever      Allergies    apple (Unknown)  Benadryl (Unknown)  penicillin (Hives)  watermelon (Unknown)    Intolerances        ANTIMICROBIALS:  atovaquone  Suspension 1500 daily  cefTRIAXone   IVPB 2000 every 12 hours  vancomycin    Solution 125 every 6 hours      OTHER MEDS:  acetaminophen   Oral Liquid .. 650 milliGRAM(s) Enteral Tube every 6 hours PRN  chlorhexidine 0.12% Liquid 15 milliLiter(s) Oral Mucosa two times a day  chlorhexidine 2% Cloths 1 Application(s) Topical <User Schedule>  ciprofloxacin  0.3% Ophthalmic Solution for Otic Use 4 Drop(s) Right Ear two times a day  dextrose 5%. 1000 milliLiter(s) IV Continuous <Continuous>  dextrose 5%. 1000 milliLiter(s) IV Continuous <Continuous>  dextrose 50% Injectable 25 Gram(s) IV Push once  dextrose 50% Injectable 12.5 Gram(s) IV Push once  dextrose 50% Injectable 25 Gram(s) IV Push once  dextrose Oral Gel 15 Gram(s) Oral once PRN  glucagon  Injectable 1 milliGRAM(s) IntraMuscular once  heparin   Injectable 5000 Unit(s) SubCutaneous every 8 hours  insulin lispro (ADMELOG) corrective regimen sliding scale   SubCutaneous every 6 hours  lacosamide Solution 200 milliGRAM(s) Oral two times a day  levETIRAcetam 500 milliGRAM(s) Oral two times a day  levothyroxine 25 MICROGram(s) Oral daily  midodrine 10 milliGRAM(s) Oral every 8 hours  petrolatum Ophthalmic Ointment 1 Application(s) Both EYES every 12 hours  predniSONE   Tablet 5 milliGRAM(s) Oral daily      Vital Signs Last 24 Hrs  T(C): 35.6 (04 Apr 2023 12:00), Max: 38.6 (04 Apr 2023 06:00)  T(F): 96 (04 Apr 2023 12:00), Max: 101.4 (04 Apr 2023 06:00)  HR: 64 (04 Apr 2023 13:00) (64 - 105)  BP: 119/55 (04 Apr 2023 13:00) (95/46 - 131/56)  BP(mean): 71 (04 Apr 2023 13:00) (54 - 76)  RR: 17 (04 Apr 2023 13:00) (12 - 29)  SpO2: 100% (04 Apr 2023 13:00) (95% - 100%)    Parameters below as of 04 Apr 2023 13:00  Patient On (Oxygen Delivery Method): ventilator,CPAP        PHYSICAL EXAM:    HEAD/EYES: [ ] PERRL [ x] white sclera [ ] icterus  ENT:  [ ] normal [x ] supple [ ] thrush [ ] pharyngeal exudate  Cardiovascular:   [ ] murmur [x ] normal [ ] PPM/AICD  Respiratory:  [ x] clear to ausculation bilaterally  GI:  [ x] soft, non-tender, normal bowel sounds  :  [ ] snider [ ] no CVA tenderness   Musculoskeletal:  [ ] no synovitis  Neurologic:  [ ] non-focal exam   Skin:  [x ] no rash  Lymph: [x ] no lymphadenopathy  Psychiatric:  [ ] appropriate affect [ ] alert & oriented  Lines:  [x ] no phlebitis [ ] central line                                6.9    6.24  )-----------( 233      ( 04 Apr 2023 00:50 )             22.7       04-04    136  |  98  |  62<H>  ----------------------------<  127<H>  4.0   |  22  |  3.74<H>    Ca    8.9      04 Apr 2023 00:50  Phos  4.3     04-03  Mg     2.10     04-03    TPro  5.3<L>  /  Alb  1.9<L>  /  TBili  0.2  /  DBili  x   /  AST  23  /  ALT  17  /  AlkPhos  131<H>  04-04          MICROBIOLOGY:Culture Results:   No enteric pathogens isolated.  (Stool culture examined for Salmonella,  Shigella, Campylobacter, Aeromonas, Plesiomonas,  Vibrio, E.coli O157 and Yersinia) (04-01-23 @ 21:03)  Culture Results:   Normal Respiratory Kizzy present (04-01-23 @ 14:00)      RADIOLOGY:

## 2023-04-04 NOTE — PROGRESS NOTE ADULT - ASSESSMENT
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  Cardiac arrest on 3/16/23  septic shock s/p iv pressors  intubated on MV  Renal following for NAMAN on CKD Mx.  OS Nephrologist Dr. Hugo Hernandez [phone]    Acute kidney injury on Chronic Kidney Disease Stage 4 vs Chronic Kidney Disease Stage 4 progression to End Stage Renal Disease on Dialysis     hx KTx 2003 : In light of severe sepsis and cardiac arrest: off MMF and Cyclosporine  w/worsened renal function, oliguria, acidosis, mild hyperkalemia- s/p CVVHD, then intermittent HD  Consent for HD obtained, signed by daughter 3/16/23  B/L creat around 2.8 since Dec 2022  Patient was on Cyclosporine (Gengraf) 75mg PO q12hrs,  BID and Prednisone 5 QD for transplant    Creatinine Trend: 3.74 <--, 3.17 <--, 2.41 <--, 2.90 <--, 2.36 <--, 2.73 <--, 2.28 <--  femoral shiley removed 4/2 for line holiday  s/p furosemide   Injectable 80 milliGRAM(s) IV Push x14/3- no response  K, vol acceptable  Creatinine Trending up since last HD 4/1/23    plan:  c/w predniSONE   Tablet 5 milliGRAM(s) Oral daily  no indication for HD today  plan for GOC discussion w/daughter tomorrow per ICU team   if remains oligoanuric, will need a new shiley for HD if HD still within GOC  monitor u/o  monitor electrolytes, BMP daily     HTN- bp stable, controlled-  Medications :  amLODIPine   Tablet 10 milliGRAM(s) Oral every 24 hours    - Continue current medications w/holding parameters  - plan to titrate anti hypertensive medication as needed  - low salt diet if not npo suggested    Anemia :  Anemia Labs   Hemoglobin : 7.2< 7.6 <--, 8.2 <--, 7.5 <--, 8.3 <--, 8.0 <--  Platelets : 209 <--, 209 <--, 178 <--    Current orders :  - plan to add epogen on next hemodialysis   - plan to titrate medication as per responce of hemoglobin  - if Hb less than 7gm/dl consider blood transfusion    OM and OE  vent Mx, per ICU  spiking fevers- f/u rpt blood cxs  Abxs per Infectious disease specialist with dose adjustment per GFR    C diff colitis- on po vanco    overall prognosis guarded  labs, chart reviewed  poc d/w ICU team

## 2023-04-04 NOTE — CHART NOTE - NSCHARTNOTEFT_GEN_A_CORE
Source: Patient intubated; A&Ox0, unresponsive. Family [ ]     RN [x ]    Chart [x ]    Reason for Follow-Up Assessment: severe protein calorie malnutrition     79 y/o F with h/o HTN, HLD, DM2, PCKD previously on HD via LUE AVF then s/p renal transplant, LLE DVT (resolved, nml dopplers 12/2022) on ASA, very small supraclinoid aneurysm (reportedly unchanged on MRA 10/2020), hypothyroidism, PCP 2021 on atovaquone ppx, reportedly hospitalized in 12/2022 for rectal prolapse, had a blood transfusion at that time, also + CMV 1/25/23 who presented with R-sided HA, R ear pain and neck stiffness after visit to ENT earlier in the day, altered on presentation, CT head unremarkable for stroke, however LP with IR and BCx showing +strep pneumoniae. currently on vanc and CTX per ID. Cardiac arrest 3/16 with ROSC, transferred to MICU for further management.          Diet, NPO with Tube Feed:   Tube Feeding Modality: Orogastric  Nepro with Carb Steady (NEPRORTH)  Total Volume for 24 Hours (mL): 840  Continuous  Starting Tube Feed Rate {mL per Hour}: 10  Increase Tube Feed Rate by (mL): 10     Every 6 hours  Until Goal Tube Feed Rate (mL per Hour): 35  Tube Feed Duration (in Hours): 24  Tube Feed Start Time: 09:00  No Carb Prosource TF     Qty per Day:  1 (03-21-23 @ 07:27)      GI: WDL. Last BM 4/4        Enteral /Parenteral Nutrition: 840 mL, 1512 jerome, 83 gm pro  Receiving 100% of goal rate for >3 days   Propofol: 0    Anthropometrics:   Height (cm): 158 (03-16)  Weight (kg):  63.2 (04-04)   64.4 (04-01)   63.8 (03-26)   61.4 (03-20)   62.1 (03-16)  Nonsignificant wt fluctuations noted.       Edema: 1+ gen   Pressure Injuries: stage 2 sacrum     __________________ Pertinent Medications__________________   MEDICATIONS  (STANDING):  atovaquone  Suspension 1500 milliGRAM(s) Oral daily  cefTRIAXone   IVPB 2000 milliGRAM(s) IV Intermittent every 12 hours  chlorhexidine 0.12% Liquid 15 milliLiter(s) Oral Mucosa two times a day  chlorhexidine 2% Cloths 1 Application(s) Topical <User Schedule>  ciprofloxacin  0.3% Ophthalmic Solution for Otic Use 4 Drop(s) Right Ear two times a day  dextrose 5%. 1000 milliLiter(s) (50 mL/Hr) IV Continuous <Continuous>  dextrose 5%. 1000 milliLiter(s) (100 mL/Hr) IV Continuous <Continuous>  dextrose 50% Injectable 25 Gram(s) IV Push once  dextrose 50% Injectable 12.5 Gram(s) IV Push once  dextrose 50% Injectable 25 Gram(s) IV Push once  glucagon  Injectable 1 milliGRAM(s) IntraMuscular once  heparin   Injectable 5000 Unit(s) SubCutaneous every 8 hours  insulin lispro (ADMELOG) corrective regimen sliding scale   SubCutaneous every 6 hours  lacosamide Solution 200 milliGRAM(s) Oral two times a day  levETIRAcetam 500 milliGRAM(s) Oral two times a day  levothyroxine 25 MICROGram(s) Oral daily  midodrine 10 milliGRAM(s) Oral every 8 hours  petrolatum Ophthalmic Ointment 1 Application(s) Both EYES every 12 hours  predniSONE   Tablet 5 milliGRAM(s) Oral daily  vancomycin    Solution 125 milliGRAM(s) Oral every 6 hours    MEDICATIONS  (PRN):  acetaminophen   Oral Liquid .. 650 milliGRAM(s) Enteral Tube every 6 hours PRN Temp greater or equal to 38C (100.4F), Mild Pain (1 - 3), Moderate Pain (4 - 6)  dextrose Oral Gel 15 Gram(s) Oral once PRN Blood Glucose LESS THAN 70 milliGRAM(s)/deciliter      __________________ Pertinent Labs__________________   04-04 Na136 mmol/L Glu 127 mg/dL<H> K+ 4.0 mmol/L Cr  3.74 mg/dL<H> BUN 62 mg/dL<H> 04-03 Phos 4.3 mg/dL 04-04 Alb 1.9 g/dL<L> 03-15 Chol 153 mg/dL LDL --    HDL 45 mg/dL<L> Trig 146 mg/dL        POCT Blood Glucose.: 160 mg/dL (04-04-23 @ 06:07)  POCT Blood Glucose.: 120 mg/dL (04-03-23 @ 23:19)  POCT Blood Glucose.: 165 mg/dL (04-03-23 @ 17:50)  POCT Blood Glucose.: 175 mg/dL (04-03-23 @ 11:43)  POCT Blood Glucose.: 164 mg/dL (04-03-23 @ 05:09)  POCT Blood Glucose.: 158 mg/dL (04-03-23 @ 00:35)  POCT Blood Glucose.: 86 mg/dL (04-02-23 @ 17:46)  POCT Blood Glucose.: 171 mg/dL (04-02-23 @ 13:12)  POCT Blood Glucose.: 191 mg/dL (04-02-23 @ 05:49)  POCT Blood Glucose.: 128 mg/dL (04-01-23 @ 23:20)  POCT Blood Glucose.: 185 mg/dL (04-01-23 @ 17:34)  POCT Blood Glucose.: 112 mg/dL (04-01-23 @ 11:20)          Needs Assessment:     Weight Used: 62.1 kg (admit)   Estimated Energy Needs: 25-30 jerome/kg  Estimated Energy Needs: 9564-9976 jerome/d    Weight Used: 62.1 kg (admit)   Estimated Protein Needs: 1.6-1.8 gm/kg   Estimated Protein Needs:  gm/d      Previous Nutrition Diagnosis: severe protein calorie malnutrition     Nutrition Diagnosis is [x ] ongoing        Recommendations:  1. Nepro @ 44 mL/hr x 22 hrs (1056 mL, 1869 jerome, 100 gm pro). Hold TF 1 hr before and after levothyroxine.   2. Free water per MD.         Monitoring and Evaluation:      [ x] Tolerance to diet prescription [x ] weights [x ] follow up per protocol  [ ] other: Source: Patient intubated; A&Ox0, unresponsive. Family [ ]     RN [x ]    Chart [x ]    Reason for Follow-Up Assessment: severe protein calorie malnutrition     79 y/o F with h/o HTN, HLD, DM2, PCKD previously on HD via LUE AVF then s/p renal transplant, LLE DVT (resolved, nml dopplers 12/2022) on ASA, very small supraclinoid aneurysm (reportedly unchanged on MRA 10/2020), hypothyroidism, PCP 2021 on atovaquone ppx, reportedly hospitalized in 12/2022 for rectal prolapse, had a blood transfusion at that time, also + CMV 1/25/23 who presented with R-sided HA, R ear pain and neck stiffness after visit to ENT earlier in the day, altered on presentation, CT head unremarkable for stroke, however LP with IR and BCx showing +strep pneumoniae. currently on vanc and CTX per ID. Cardiac arrest 3/16 with ROSC, transferred to MICU for further management.     Per RN, pt is tolerating feeds. Loose stool in setting of Cdiff. Informed RN of 22 hr feeds to account for levothyroxine. Family at bedside; informed of nutrition plan.       Diet, NPO with Tube Feed:   Tube Feeding Modality: Orogastric  Nepro with Carb Steady (NEPRORTH)  Total Volume for 24 Hours (mL): 840  Continuous  Starting Tube Feed Rate {mL per Hour}: 10  Increase Tube Feed Rate by (mL): 10     Every 6 hours  Until Goal Tube Feed Rate (mL per Hour): 35  Tube Feed Duration (in Hours): 24  Tube Feed Start Time: 09:00  No Carb Prosource TF     Qty per Day:  1 (03-21-23 @ 07:27)      GI: WDL. Last BM 4/4        Enteral /Parenteral Nutrition: 840 mL, 1512 jerome, 83 gm pro  Receiving 100% of goal rate for >3 days   Propofol: 0    Anthropometrics:   Height (cm): 158 (03-16)  Weight (kg):  63.2 (04-04)   64.4 (04-01)   63.8 (03-26)   61.4 (03-20)   62.1 (03-16)  Nonsignificant wt fluctuations noted.       Edema: 1+ gen   Pressure Injuries: stage 2 sacrum     __________________ Pertinent Medications__________________   MEDICATIONS  (STANDING):  atovaquone  Suspension 1500 milliGRAM(s) Oral daily  cefTRIAXone   IVPB 2000 milliGRAM(s) IV Intermittent every 12 hours  chlorhexidine 0.12% Liquid 15 milliLiter(s) Oral Mucosa two times a day  chlorhexidine 2% Cloths 1 Application(s) Topical <User Schedule>  ciprofloxacin  0.3% Ophthalmic Solution for Otic Use 4 Drop(s) Right Ear two times a day  dextrose 5%. 1000 milliLiter(s) (50 mL/Hr) IV Continuous <Continuous>  dextrose 5%. 1000 milliLiter(s) (100 mL/Hr) IV Continuous <Continuous>  dextrose 50% Injectable 25 Gram(s) IV Push once  dextrose 50% Injectable 12.5 Gram(s) IV Push once  dextrose 50% Injectable 25 Gram(s) IV Push once  glucagon  Injectable 1 milliGRAM(s) IntraMuscular once  heparin   Injectable 5000 Unit(s) SubCutaneous every 8 hours  insulin lispro (ADMELOG) corrective regimen sliding scale   SubCutaneous every 6 hours  lacosamide Solution 200 milliGRAM(s) Oral two times a day  levETIRAcetam 500 milliGRAM(s) Oral two times a day  levothyroxine 25 MICROGram(s) Oral daily  midodrine 10 milliGRAM(s) Oral every 8 hours  petrolatum Ophthalmic Ointment 1 Application(s) Both EYES every 12 hours  predniSONE   Tablet 5 milliGRAM(s) Oral daily  vancomycin    Solution 125 milliGRAM(s) Oral every 6 hours    MEDICATIONS  (PRN):  acetaminophen   Oral Liquid .. 650 milliGRAM(s) Enteral Tube every 6 hours PRN Temp greater or equal to 38C (100.4F), Mild Pain (1 - 3), Moderate Pain (4 - 6)  dextrose Oral Gel 15 Gram(s) Oral once PRN Blood Glucose LESS THAN 70 milliGRAM(s)/deciliter      __________________ Pertinent Labs__________________   04-04 Na136 mmol/L Glu 127 mg/dL<H> K+ 4.0 mmol/L Cr  3.74 mg/dL<H> BUN 62 mg/dL<H> 04-03 Phos 4.3 mg/dL 04-04 Alb 1.9 g/dL<L> 03-15 Chol 153 mg/dL LDL --    HDL 45 mg/dL<L> Trig 146 mg/dL        POCT Blood Glucose.: 160 mg/dL (04-04-23 @ 06:07)  POCT Blood Glucose.: 120 mg/dL (04-03-23 @ 23:19)  POCT Blood Glucose.: 165 mg/dL (04-03-23 @ 17:50)  POCT Blood Glucose.: 175 mg/dL (04-03-23 @ 11:43)  POCT Blood Glucose.: 164 mg/dL (04-03-23 @ 05:09)  POCT Blood Glucose.: 158 mg/dL (04-03-23 @ 00:35)  POCT Blood Glucose.: 86 mg/dL (04-02-23 @ 17:46)  POCT Blood Glucose.: 171 mg/dL (04-02-23 @ 13:12)  POCT Blood Glucose.: 191 mg/dL (04-02-23 @ 05:49)  POCT Blood Glucose.: 128 mg/dL (04-01-23 @ 23:20)  POCT Blood Glucose.: 185 mg/dL (04-01-23 @ 17:34)  POCT Blood Glucose.: 112 mg/dL (04-01-23 @ 11:20)          Needs Assessment:     Weight Used: 62.1 kg (admit)   Estimated Energy Needs: 25-30 jerome/kg  Estimated Energy Needs: 6600-6800 jerome/d    Weight Used: 62.1 kg (admit)   Estimated Protein Needs: 1.6-1.8 gm/kg   Estimated Protein Needs:  gm/d      Previous Nutrition Diagnosis: severe protein calorie malnutrition     Nutrition Diagnosis is [x ] ongoing        Recommendations:  1. Nepro @ 44 mL/hr x 22 hrs + No-Carb Prosource 2x daily (968 mL, 1713 jerome, 108 gm pro). Hold TF 1 hr before and after levothyroxine.   2. Free water per MD.   3. Banatrol 2x daily.         Monitoring and Evaluation:      [ x] Tolerance to diet prescription [x ] weights [x ] follow up per protocol  [ ] other:

## 2023-04-04 NOTE — PROGRESS NOTE ADULT - CRITICAL CARE ATTENDING COMMENT
Agree with above  Remains intubated for mental status, which remains very poor off sedation.  Intermittently febrile ?infection vs ?neurological  Currently undergoing line holiday, if requires HD to be continued, will need re-placement of HD catheter  Currently on PO vancomycin for Clostridioides difficile-associated diarrhea  c/w ceftriaxone for Strep pneumoniae bacteremia and meningitis. Multiple surveillance blood cultures are now negative.  GOC with family ongoing, spoke to niece at bedside (an RT by training) who understands the implications of trache / PEG vs palliative wean, but notes patient's sole daughter is HCP  Overall prognosis for a meaningful recovery is very poor. Options are trache / PEG and transfer to long-term vent facility vs palliative wean.

## 2023-04-04 NOTE — PROGRESS NOTE ADULT - SUBJECTIVE AND OBJECTIVE BOX
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 Patient reportedly woke up at 0130 AM on Monday with a R-sided headache. ENT noted R otitis externa, purulent discharge from R TM thought to be secondary to perforation, given ear gtt and prescribed cipro/dexamethasone gtt (she took 1 dose thus far). Daughter called EMS as later in the day patient complained of severe HA as well as neck pain, stated "I'm dying" and "my head is killing me." Patient reportedly without prior history of ear infections, no history of stroke or seizures. She reported to ENT earlier in the day a muffled sensation in the R ear [id number].     Daughter notes patient has had no recent fevers/chills. She had a dental cleaning on Thursday (no abx prior to cleaning).    On arrival, code stroke called.    In the ED VS:  97.2  65-99  162-186/52-91  16-20  96-99%RA, received NS 500cc IVF, lorazepam 2mg IV x1 and morphine 4mg IV x1 (15 Mar 2023 01:17)      REVIEW OF SYSTEMS:      OBJECTIVE:  ICU Vital Signs Last 24 Hrs  T(C): 38.6 (04 Apr 2023 06:00), Max: 38.6 (04 Apr 2023 06:00)  T(F): 101.4 (04 Apr 2023 06:00), Max: 101.4 (04 Apr 2023 06:00)  HR: 89 (04 Apr 2023 07:00) (68 - 105)  BP: 97/42 (04 Apr 2023 07:00) (97/42 - 133/65)  BP(mean): 54 (04 Apr 2023 07:00) (54 - 85)  ABP: --  ABP(mean): --  RR: 25 (04 Apr 2023 07:00) (12 - 25)  SpO2: 97% (04 Apr 2023 07:00) (95% - 100%)    O2 Parameters below as of 04 Apr 2023 03:00      O2 Concentration (%): 30      Mode: AC/ CMV (Assist Control/ Continuous Mandatory Ventilation), RR (machine): 14, TV (machine): 320, FiO2: 30, PEEP: 5, MAP: 8, PIP: 18    04-03 @ 07:01  -  04-04 @ 07:00  --------------------------------------------------------  IN: 970 mL / OUT: 0 mL / NET: 970 mL      CAPILLARY BLOOD GLUCOSE      POCT Blood Glucose.: 160 mg/dL (04 Apr 2023 06:07)    PHYSICAL EXAM:  GENERAL: no distress  PSYCH: A&O x0  HEAD: Atraumatic, Normocephalic  NECK: Supple, No JVD  CHEST/LUNG: clear to auscultation bilaterally  HEART: regular rate and rhythm, no murmurs  ABDOMEN: +distension, palpable abdominal mass upper and lower right quadrants   EXTREMITIES: no edema on bilateral LE  NEUROLOGY: withdraws from pain, no purposeful movements noted.  SKIN: No rashes or lesions      HOSPITAL MEDICATIONS:  Standing Meds:  atovaquone  Suspension 1500 milliGRAM(s) Oral daily  cefTRIAXone   IVPB 2000 milliGRAM(s) IV Intermittent every 12 hours  chlorhexidine 0.12% Liquid 15 milliLiter(s) Oral Mucosa two times a day  chlorhexidine 2% Cloths 1 Application(s) Topical <User Schedule>  ciprofloxacin  0.3% Ophthalmic Solution for Otic Use 4 Drop(s) Right Ear two times a day  dextrose 5%. 1000 milliLiter(s) IV Continuous <Continuous>  dextrose 5%. 1000 milliLiter(s) IV Continuous <Continuous>  dextrose 50% Injectable 25 Gram(s) IV Push once  dextrose 50% Injectable 12.5 Gram(s) IV Push once  dextrose 50% Injectable 25 Gram(s) IV Push once  glucagon  Injectable 1 milliGRAM(s) IntraMuscular once  heparin   Injectable 5000 Unit(s) SubCutaneous every 8 hours  insulin lispro (ADMELOG) corrective regimen sliding scale   SubCutaneous every 6 hours  lacosamide Solution 200 milliGRAM(s) Oral two times a day  levETIRAcetam 500 milliGRAM(s) Oral two times a day  levothyroxine 25 MICROGram(s) Oral daily  midodrine 10 milliGRAM(s) Oral every 8 hours  petrolatum Ophthalmic Ointment 1 Application(s) Both EYES every 12 hours  predniSONE   Tablet 5 milliGRAM(s) Oral daily  vancomycin    Solution 125 milliGRAM(s) Oral every 6 hours      PRN Meds:  acetaminophen   Oral Liquid .. 650 milliGRAM(s) Enteral Tube every 6 hours PRN  dextrose Oral Gel 15 Gram(s) Oral once PRN      LABS:                        6.9    6.24  )-----------( 233      ( 04 Apr 2023 00:50 )             22.7     Hgb Trend: 6.9<--, 7.2<--, 7.6<--, 8.2<--, 7.5<--  04-04    136  |  98  |  62<H>  ----------------------------<  127<H>  4.0   |  22  |  3.74<H>    Ca    8.9      04 Apr 2023 00:50  Phos  4.3     04-03  Mg     2.10     04-03    TPro  5.3<L>  /  Alb  1.9<L>  /  TBili  0.2  /  DBili  x   /  AST  23  /  ALT  17  /  AlkPhos  131<H>  04-04    Creatinine Trend: 3.74<--, 3.17<--, 2.41<--, 2.90<--, 2.36<--, 2.73<--  PT/INR - ( 03 Apr 2023 01:20 )   PT: 15.2 sec;   INR: 1.31 ratio         PTT - ( 03 Apr 2023 01:20 )  PTT:28.3 sec      Venous Blood Gas:  04-03 @ 02:40  7.41/39/54/25/89.9  VBG Lactate: 1.1  Venous Blood Gas:  04-03 @ 01:20  7.50/29/76/23/98.4  VBG Lactate: 1.2      MICROBIOLOGY:     Culture - Stool (collected 01 Apr 2023 21:03)  Source: .Stool Feces  Preliminary Report (03 Apr 2023 07:49):    No enteric pathogens to date: Final culture pending    Culture - Sputum (collected 01 Apr 2023 14:00)  Source: ET Tube ET Tube  Gram Stain (01 Apr 2023 21:32):    No polymorphonuclear leukocytes per low power field    No Squamous epithelial cells per low power field    Rare Gram Negative Rods per oil power field  Final Report (03 Apr 2023 19:04):    Normal Respiratory Kizzy present      RADIOLOGY:  [ ] Reviewed and interpreted by me       Interval Events:  -blood and sputum cultures sent, febrile Tmax 101.4F      HPI:  78-year-old female with 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 Patient reportedly woke up at 0130 AM on Monday with a R-sided headache. ENT noted R otitis externa, purulent discharge from R TM thought to be secondary to perforation, given ear gtt and prescribed cipro/dexamethasone gtt (she took 1 dose thus far). Daughter called EMS as later in the day patient complained of severe HA as well as neck pain, stated "I'm dying" and "my head is killing me." Patient reportedly without prior history of ear infections, no history of stroke or seizures. She reported to ENT earlier in the day a muffled sensation in the R ear [id number].     Daughter notes patient has had no recent fevers/chills. She had a dental cleaning on Thursday (no abx prior to cleaning).    On arrival, code stroke called.    In the ED VS:  97.2  65-99  162-186/52-91  16-20  96-99%RA, received NS 500cc IVF, lorazepam 2mg IV x1 and morphine 4mg IV x1 (15 Mar 2023 01:17)      REVIEW OF SYSTEMS:      OBJECTIVE:  ICU Vital Signs Last 24 Hrs  T(C): 38.6 (04 Apr 2023 06:00), Max: 38.6 (04 Apr 2023 06:00)  T(F): 101.4 (04 Apr 2023 06:00), Max: 101.4 (04 Apr 2023 06:00)  HR: 89 (04 Apr 2023 07:00) (68 - 105)  BP: 97/42 (04 Apr 2023 07:00) (97/42 - 133/65)  BP(mean): 54 (04 Apr 2023 07:00) (54 - 85)  ABP: --  ABP(mean): --  RR: 25 (04 Apr 2023 07:00) (12 - 25)  SpO2: 97% (04 Apr 2023 07:00) (95% - 100%)    O2 Parameters below as of 04 Apr 2023 03:00      O2 Concentration (%): 30      Mode: AC/ CMV (Assist Control/ Continuous Mandatory Ventilation), RR (machine): 14, TV (machine): 320, FiO2: 30, PEEP: 5, MAP: 8, PIP: 18    04-03 @ 07:01  -  04-04 @ 07:00  --------------------------------------------------------  IN: 970 mL / OUT: 0 mL / NET: 970 mL      CAPILLARY BLOOD GLUCOSE      POCT Blood Glucose.: 160 mg/dL (04 Apr 2023 06:07)    PHYSICAL EXAM:  GENERAL: no distress  PSYCH: A&O x0  HEAD: Atraumatic, Normocephalic  NECK: Supple, No JVD  CHEST/LUNG: clear to auscultation bilaterally  HEART: regular rate and rhythm, no murmurs  ABDOMEN: +distension, palpable abdominal mass upper and lower right quadrants   EXTREMITIES: no edema on bilateral LE  NEUROLOGY: withdraws from pain, no purposeful movements noted.  SKIN: No rashes or lesions      HOSPITAL MEDICATIONS:  Standing Meds:  atovaquone  Suspension 1500 milliGRAM(s) Oral daily  cefTRIAXone   IVPB 2000 milliGRAM(s) IV Intermittent every 12 hours  chlorhexidine 0.12% Liquid 15 milliLiter(s) Oral Mucosa two times a day  chlorhexidine 2% Cloths 1 Application(s) Topical <User Schedule>  ciprofloxacin  0.3% Ophthalmic Solution for Otic Use 4 Drop(s) Right Ear two times a day  dextrose 5%. 1000 milliLiter(s) IV Continuous <Continuous>  dextrose 5%. 1000 milliLiter(s) IV Continuous <Continuous>  dextrose 50% Injectable 25 Gram(s) IV Push once  dextrose 50% Injectable 12.5 Gram(s) IV Push once  dextrose 50% Injectable 25 Gram(s) IV Push once  glucagon  Injectable 1 milliGRAM(s) IntraMuscular once  heparin   Injectable 5000 Unit(s) SubCutaneous every 8 hours  insulin lispro (ADMELOG) corrective regimen sliding scale   SubCutaneous every 6 hours  lacosamide Solution 200 milliGRAM(s) Oral two times a day  levETIRAcetam 500 milliGRAM(s) Oral two times a day  levothyroxine 25 MICROGram(s) Oral daily  midodrine 10 milliGRAM(s) Oral every 8 hours  petrolatum Ophthalmic Ointment 1 Application(s) Both EYES every 12 hours  predniSONE   Tablet 5 milliGRAM(s) Oral daily  vancomycin    Solution 125 milliGRAM(s) Oral every 6 hours      PRN Meds:  acetaminophen   Oral Liquid .. 650 milliGRAM(s) Enteral Tube every 6 hours PRN  dextrose Oral Gel 15 Gram(s) Oral once PRN      LABS:                        6.9    6.24  )-----------( 233      ( 04 Apr 2023 00:50 )             22.7     Hgb Trend: 6.9<--, 7.2<--, 7.6<--, 8.2<--, 7.5<--  04-04    136  |  98  |  62<H>  ----------------------------<  127<H>  4.0   |  22  |  3.74<H>    Ca    8.9      04 Apr 2023 00:50  Phos  4.3     04-03  Mg     2.10     04-03    TPro  5.3<L>  /  Alb  1.9<L>  /  TBili  0.2  /  DBili  x   /  AST  23  /  ALT  17  /  AlkPhos  131<H>  04-04    Creatinine Trend: 3.74<--, 3.17<--, 2.41<--, 2.90<--, 2.36<--, 2.73<--  PT/INR - ( 03 Apr 2023 01:20 )   PT: 15.2 sec;   INR: 1.31 ratio         PTT - ( 03 Apr 2023 01:20 )  PTT:28.3 sec      Venous Blood Gas:  04-03 @ 02:40  7.41/39/54/25/89.9  VBG Lactate: 1.1  Venous Blood Gas:  04-03 @ 01:20  7.50/29/76/23/98.4  VBG Lactate: 1.2      MICROBIOLOGY:     Culture - Stool (collected 01 Apr 2023 21:03)  Source: .Stool Feces  Preliminary Report (03 Apr 2023 07:49):    No enteric pathogens to date: Final culture pending    Culture - Sputum (collected 01 Apr 2023 14:00)  Source: ET Tube ET Tube  Gram Stain (01 Apr 2023 21:32):    No polymorphonuclear leukocytes per low power field    No Squamous epithelial cells per low power field    Rare Gram Negative Rods per oil power field  Final Report (03 Apr 2023 19:04):    Normal Respiratory Kizzy present      RADIOLOGY:  [ ] Reviewed and interpreted by me

## 2023-04-04 NOTE — PROGRESS NOTE ADULT - ASSESSMENT
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 Went to ENT and was diagnosed with R otitis externa secondary to perforated TM. Prescribed cipro/dexamethasone.  Worsening headache and EMS called. Admitted 3/14.  CT head negative.  NAMAN.  Started on vanc/meropenem.  IR obtained LP.  Findings c/w meningitis, culture +pneumococcus.  Antibiotics narrowed to ceftriaxone.  Remains lethargic off sedation with possible seizures.  Intermittent fevers.  MRI with acute and subacute infarcts.  Suspect endocarditis though there is not SORAYA.    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

## 2023-04-04 NOTE — PROGRESS NOTE ADULT - ASSESSMENT
79 y/o F with h/o HTN, HLD, DM2, PCKD previously on HD via LUE AVF then s/p renal transplant, LLE DVT (resolved, nml dopplers 12/2022) on ASA, very small supraclinoid aneurysm (reportedly unchanged on MRA 10/2020), hypothyroidism, PCP 2021 on atovaquone ppx, reportedly hospitalized in 12/2022 for rectal prolapse, had a blood transfusion at that time, also + CMV 1/25/23 who presented with R-sided HA, R ear pain and neck stiffness after visit to ENT earlier in the day, altered on presentation, CT head unremarkable for stroke, however LP with IR and BCx showing +strep pneumoniae. currently on vanc and CTX per ID. Cardiac arrest 3/16 with ROSC, transferred to MICU for further management.       Plan:   Neurological  #Currently Intubated, patient still unresponsive to painful and verbal stimuli  - remains off sedation  - EEG no seizures - Dcd 3/31  - MRI IAC and brain 3/29 - otitis and mastoiditis with multiple cerebral infarcts and cerebellar infarct. Will need f/u MRI in 3-4 weeks per neuro  - Endocarditis is possible cause for scattered cerebral infarcts, will consider utility of SORAYA given poor candidate for valve replacement  - mental status remains unimproved. withdraws from pain however does not elicit purposeful movements.    #Strep pneumo meningitis #Acute encephalopathy  p/w severe HA, neck stiffness, fever, leukocytosis concerning for meningitis/encephalitis  - CT head negative for CVA.   - LP and BCx 3/15 both showing gram positive cocci in pairs, and + strep pneumoniae.   - likely source of entry from R ear where pt c/o discomfort for several months.  - taking cyclosporine, mycophenolate mofetil and prednisone at home for kidney transplant and was likely immunocompromised -> dental procedure 3/14 but less likely source   - MRI of the IAC and brain 3/29 - otitis and mastoiditis with multiple cerebral infarcts and cerebellar infarct, f/u MRI 3-4 weeks (as per neuro)  - neuro and ID following    #Seizures  - 24hr EEG initially with highly focal seizures, improved with increased sedation.   - currently on keppra  500 BID since 3/17-   - vimpat 100 BID increased to 200 BID (3/20 -  )  - EEG neg- Dc' d 3/31     Cardiovascular  #s/p Cardiac Arrest   - bradycardia to 30, pulseless, code blue was called 3/16 . 2 rounds of epi, and PEA arrest. on 3rd pulse check pulseless VT, 200J shock given 4th pulse check asystole, 5th ROSC, sinus tachy with HUMA. IO placed and levo started. Due to blood in mouth took several attempts with DL for ETT to be placed but ultimately confirmed with capnometry and bilateral breath sounds.     #Elevated troponin  uptrending troponin to 182 3/15 AM, was likely iso ESRD  s/p cardiac arrest, elevated ST segment   - TTE from 3/17 showed EF of 63% with mild pulm htn, normal left ventricular systolic function, and diastolic LV dysfunction.      #HTN  home meds: on amlodipine 5mg, losartan 100mg, torsemide 20mg, and clonidine 0.2 patch weekly  - amlodipine 10mg restarted, but can hold now iso lower systolic blood pressure        Respiratory/ENT  #Intubated  - tolerated PS trial for few hours in AM 8/5  - Patient has been intubated >2 wks; will talk to family regarding what their plans are iso MRI reading; will likely need trach and peg if within their goals  - noted w/ oral and inline secretions, will send sputum Cx      # Tympanic membrane rupture  discharge from R ear, evaluated by ENT given dexamethasone and cipro 3/14  - c/w broad spectrum abx   - MRI of IAC appreciated  - ENT recs- pending results of MRI will consider need for myringotomy, but will defer at this time given overall medical condition    Gastrointestinal  #NPO w/ OGT  #Constipation - resolved,  - continues to have loose stools - f/up stool studies shows positive C-difficile  - bowel regimen discontinued   - 3/31- KUB nonobstructive bowel pattern.  - s/p movantik  - tolerating TF at goal       Renal  #ESRD #s/p Kidney Transplant in 2003 at [place] #PCKD  previously HD through LLE AVF but no HD since transplant  [facility] Nephrologist Dr. Hugo Hernandez [phone]  - avoid nephrotoxic agents  - holding cyclosporine and mycophenolate   - c/w home prednisone 5mg qday  - strict I/O and replete electrolytes  - s/p 4mg IV bumex with minimal urine production (3/17)  - started on CRRT (3/17-3/21)   now s/p intermittent HD - last session 4/1 (1L removal)  - oliguric 0-5ml/hr - indwelling catheter removed 4/2, bladder scan q8  - SCx stable in 2s ( per renal Cr has been in 2.8 since december 2022)  - s/p Cathflo to unclog femoral shiley 3/28  - left femoral shiley removed for line holiday 4/2   - will trial diuretic -furosemide 80mg IVP and monitor u/o response, if remains oligoanuric, will need access for HD session  - will eventually need transplant nephrology consult when infection treated for management of kidney transplant         Hematological  # h/o LLE DVT  resolved, no DVT on doppler 12/22.  - negative LLE duplex this admission 3/17  - POCUS showed residual clot in RLE; f/u dopplers 3/28 negative  - restarted heparin subQ (3/21)        #Anemia  - s/p 4 u prbc total this admission, last transfusion 3/27  - Evaluated by ENT for bleeding and found no signs of active bleeding from nasal cavity, nasopharynx or oral cavity, however, patient biting on tongue, which could be one source of bleeding. Tongue was edematous and lax  - CT a/p neg for RP bleed   - GI consulted regarding Hgb drops- would need clearance before SORAYA. rec no further GI intervention for now.  - retic count normal, check hapto and LDH in am as part of hemolysis labs  - s/p 5 units pRbc's during this admission  - postitive FOBT 2/2 rectal prolapse?  - Hemoglobin w/slow downtrend  7.2< 7.6 <, 8.2.  nephrology plan to add epogen on next hemodialysis session  -consider blood transfusion for Hb less than 7gm/dl          Infectious Disease  #Strep pneumoniae meningitis   #R ear mastoiditis  #strep bacteremia  #C. difficile  - c/w CTX (3/16 - )  - c/w ciprodex drops for R ear  - start vanco 125mg PO [id number] (4/2-  ) for C.diff  - ID following  - BCx 3/15 + strep, repeat cx from 3/23 NGTD  - f/up sputum cx    #h/o PCP  - atovaquone at home, continue      Endocrinological  #Hypothyroidism  last TSH 12/2022 >10  TSH 3/15 4.73  - c/w levothyroxine     #Diabetes mellitus Type II  - ISS      Ethics  FULL, GOC ongoing.       79 y/o F with h/o HTN, HLD, DM2, PCKD previously on HD via LUE AVF then s/p renal transplant, LLE DVT (resolved, nml dopplers 12/2022) on ASA, very small supraclinoid aneurysm (reportedly unchanged on MRA 10/2020), hypothyroidism, PCP 2021 on atovaquone ppx, reportedly hospitalized in 12/2022 for rectal prolapse, had a blood transfusion at that time, also + CMV 1/25/23 who presented with R-sided HA, R ear pain and neck stiffness after visit to ENT earlier in the day, altered on presentation, CT head unremarkable for stroke, however LP with IR and BCx showing +strep pneumoniae, started on vanc and CTX per ID. Cardiac arrest 3/16 with ROSC, transferred to MICU for further management.       Plan:   Neurological  #Currently Intubated, patient still unresponsive to painful and verbal stimuli  - remains off sedation  - EEG no seizures - Dcd 3/31  - MRI IAC and brain 3/29 - otitis and mastoiditis with multiple cerebral infarcts and cerebellar infarct. Will need f/u MRI in 3-4 weeks per neuro  - Endocarditis is possible cause for scattered cerebral infarcts, will consider utility of SORAYA given poor candidate for valve replacement  - mental status remains unimproved. withdraws from pain however does not elicit purposeful movements.    #Strep pneumo meningitis #Acute encephalopathy  p/w severe HA, neck stiffness, fever, leukocytosis concerning for meningitis/encephalitis  - CT head negative for CVA.   - LP and BCx 3/15 both showing gram positive cocci in pairs, and + strep pneumoniae.   - likely source of entry from R ear where pt c/o discomfort for several months.  - taking cyclosporine, mycophenolate mofetil and prednisone at home for kidney transplant and was likely immunocompromised -> dental procedure 3/14 but less likely source   - MRI of the IAC and brain 3/29 - otitis and mastoiditis with multiple cerebral infarcts and cerebellar infarct, f/u MRI 3-4 weeks (as per neuro)  - neuro and ID following    #Seizures  - 24hr EEG initially with highly focal seizures, improved with increased sedation.   - currently on keppra  500 BID since 3/17-   - vimpat 100 BID increased to 200 BID (3/20 -  )  - EEG neg- Dc' d 3/31     Cardiovascular  #s/p Cardiac Arrest   - bradycardia to 30, pulseless, code blue was called 3/16 . 2 rounds of epi, and PEA arrest. on 3rd pulse check pulseless VT, 200J shock given 4th pulse check asystole, 5th ROSC, sinus tachy with HUMA. IO placed and levo started. Due to blood in mouth took several attempts with DL for ETT to be placed but ultimately confirmed with capnometry and bilateral breath sounds.     #Elevated troponin  uptrending troponin to 182 3/15 AM, was likely iso ESRD  s/p cardiac arrest, elevated ST segment   - TTE from 3/17 showed EF of 63% with mild pulm htn, normal left ventricular systolic function, and diastolic LV dysfunction.      #HTN  home meds: on amlodipine 5mg, losartan 100mg, torsemide 20mg, and clonidine 0.2 patch weekly  - amlodipine 10mg restarted, but can hold now iso lower systolic blood pressure        Respiratory/ENT  #Intubated  - tolerated PS trial for few hours in AM 8/5  - Patient has been intubated >2 wks; will talk to family regarding what their plans are iso MRI reading; will likely need trach and peg if within their goals  - noted w/ oral and inline secretions, will send sputum Cx      # Tympanic membrane rupture  discharge from R ear, evaluated by ENT given dexamethasone and cipro 3/14  - c/w broad spectrum abx   - MRI of IAC appreciated  - ENT recs- pending results of MRI will consider need for myringotomy, but will defer at this time given overall medical condition    Gastrointestinal  #NPO w/ OGT  #Constipation - resolved,  - continues to have loose stools - f/up stool studies shows positive C-difficile  - bowel regimen discontinued   - 3/31- KUB nonobstructive bowel pattern.  - s/p movantik  - tolerating TF at goal       Renal  #ESRD #s/p Kidney Transplant in 2003 at [place] #PCKD  previously HD through LLE AVF but no HD since transplant  [facility] Nephrologist Dr. Hugo Hernandez [phone]  - avoid nephrotoxic agents  - holding cyclosporine and mycophenolate   - c/w home prednisone 5mg qday  - strict I/O and replete electrolytes  - s/p 4mg IV bumex with minimal urine production (3/17)  - started on CRRT (3/17-3/21)   now s/p intermittent HD - last session 4/1 (1L removal)  - oliguric 0-5ml/hr - indwelling catheter removed 4/2, bladder scan q8  - SCx stable in 2s ( per renal Cr has been in 2.8 since december 2022)  - s/p Cathflo to unclog femoral shiley 3/28  - left femoral shiley removed for line holiday 4/2   - will trial diuretic -furosemide 80mg IVP and monitor u/o response, if remains oligoanuric, will need access for HD session  - will eventually need transplant nephrology consult when infection treated for management of kidney transplant         Hematological  # h/o LLE DVT  resolved, no DVT on doppler 12/22.  - negative LLE duplex this admission 3/17  - POCUS showed residual clot in RLE; f/u dopplers 3/28 negative  - restarted heparin subQ (3/21)        #Anemia  - s/p 4 u prbc total this admission, last transfusion 3/27  - Evaluated by ENT for bleeding and found no signs of active bleeding from nasal cavity, nasopharynx or oral cavity, however, patient biting on tongue, which could be one source of bleeding. Tongue was edematous and lax  - CT a/p neg for RP bleed   - GI consulted regarding Hgb drops- would need clearance before SORAYA. rec no further GI intervention for now.  - retic count normal, check hapto and LDH in am as part of hemolysis labs  - s/p 5 units pRbc's during this admission  - postitive FOBT 2/2 rectal prolapse?  - Hemoglobin w/slow downtrend  7.2< 7.6 <, 8.2.  nephrology plan to add epogen on next hemodialysis session  -consider blood transfusion for Hb less than 7gm/dl          Infectious Disease  #Strep pneumoniae meningitis   #R ear mastoiditis  #strep bacteremia  #C. difficile  - c/w CTX (3/16 - )  - c/w ciprodex drops for R ear  - start vanco 125mg PO [id number] (4/2-  ) for C.diff  - ID following  - BCx 3/15 + strep, repeat cx from 3/23 NGTD  - f/up sputum cx    #h/o PCP  - atovaquone at home, continue      Endocrinological  #Hypothyroidism  last TSH 12/2022 >10  TSH 3/15 4.73  - c/w levothyroxine     #Diabetes mellitus Type II  - ISS      Ethics  FULL, GOC ongoing.       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 Cardiac arrest 3/16 with ROSC, transferred to MICU for further management.       Plan:   Neurological  #Currently Intubated, patient still unresponsive to painful and verbal stimuli  - remains off sedation  - EEG no seizures - Dcd 3/31  - MRI IAC and brain 3/29 - otitis and mastoiditis with multiple cerebral infarcts and cerebellar infarct. Will need f/u MRI in 3-4 weeks per neuro  - Endocarditis is possible cause for scattered cerebral infarcts, will consider utility of SORAYA given poor candidate for valve replacement  - mental status remains unimproved, withdraws from pain however does not elicit purposeful movements.      #Strep pneumo meningitis #Acute encephalopathy  p/w severe HA, neck stiffness, fever, leukocytosis concerning for meningitis/encephalitis  - CT head negative for CVA.   - LP and BCx 3/15 both showing gram positive cocci in pairs, and + strep pneumoniae.   - likely source of entry from R ear where pt c/o discomfort for several months.  - taking cyclosporine, mycophenolate mofetil and prednisone at home for kidney transplant and was likely immunocompromised -> dental procedure 3/14 but less likely source   - MRI of the IAC and brain 3/29 - otitis and mastoiditis with multiple cerebral infarcts and cerebellar infarct, f/u MRI 3-4 weeks (as per neuro)   - neuro and ID following    #Seizures  - 24hr EEG initially with highly focal seizures, improved with increased sedation.   - currently on keppra  500 BID since 3/17-   - vimpat 100 BID increased to 200 BID (3/20 -  )  - EEG neg- Dc' d 3/31     Cardiovascular  #s/p Cardiac Arrest   - bradycardia to 30, pulseless, code blue was called 3/16 . 2 rounds of epi, and PEA arrest. on 3rd pulse check pulseless VT, 200J shock given 4th pulse check asystole, 5th ROSC, sinus tachy with HUMA. IO placed and levo started. Due to blood in mouth took several attempts with DL for ETT to be placed but ultimately confirmed with capnometry and bilateral breath sounds.     #Elevated troponin  uptrending troponin to 182 3/15 AM, was likely iso ESRD  s/p cardiac arrest, elevated ST segment   - TTE from 3/17 showed EF of 63% with mild pulm htn, normal left ventricular systolic function, and diastolic LV dysfunction.      #HTN  home meds: on amlodipine 5mg, losartan 100mg, torsemide 20mg, and clonidine 0.2 patch weekly  - amlodipine 10mg restarted, but can hold now iso lower systolic blood pressure        Respiratory/ENT  #Intubated  - tolerated PS trial for few hours in AM 8/5  - Patient has been intubated >2 wks; will talk to family regarding what their plans are iso MRI reading; will likely need trach and peg if within their goals  - noted w/ oral and inline secretions, will send sputum Cx      # Tympanic membrane rupture  discharge from R ear, evaluated by ENT given dexamethasone and cipro 3/14  - c/w broad spectrum abx   - MRI of IAC appreciated  - ENT recs- pending results of MRI will consider need for myringotomy, but will defer at this time given overall medical condition    Gastrointestinal  #NPO w/ OGT  #Constipation - resolved,  - continues to have loose stools - f/up stool studies shows positive C-difficile  - bowel regimen discontinued   - 3/31- KUB nonobstructive bowel pattern.  - s/p movantik  - tolerating TF at goal       Renal  #ESRD #s/p Kidney Transplant in 2003 at [place] #PCKD  previously HD through LLE AVF but no HD since transplant  [facility] Nephrologist Dr. Hugo Hernandez [phone]  - avoid nephrotoxic agents  - holding cyclosporine and mycophenolate   - c/w home prednisone 5mg qday  - strict I/O and replete electrolytes  - s/p 4mg IV bumex with minimal urine production (3/17)  - started on CRRT (3/17-3/21)   now s/p intermittent HD - last session 4/1 (1L removal)  - oliguric 0-5ml/hr - indwelling catheter removed 4/2, bladder scan q8  - SCx stable in 2s ( per renal Cr has been in 2.8 since december 2022)  - s/p Cathflo to unclog femoral shiley 3/28  - left femoral shiley removed for line holiday 4/2   - will trial diuretic -furosemide 80mg IVP and monitor u/o response, if remains oligoanuric, will need access for HD session  - will eventually need transplant nephrology consult when infection treated for management of kidney transplant         Hematological  # h/o LLE DVT  resolved, no DVT on doppler 12/22.  - negative LLE duplex this admission 3/17  - POCUS showed residual clot in RLE; f/u dopplers 3/28 negative  - restarted heparin subQ (3/21)        #Anemia  - s/p 4 u prbc total this admission, last transfusion 3/27  - Evaluated by ENT for bleeding and found no signs of active bleeding from nasal cavity, nasopharynx or oral cavity, however, patient biting on tongue, which could be one source of bleeding. Tongue was edematous and lax  - CT a/p neg for RP bleed   - GI consulted regarding Hgb drops- would need clearance before SORAYA. rec no further GI intervention for now.  - retic count normal, check hapto and LDH in am as part of hemolysis labs  - s/p 5 units pRbc's during this admission  - postitive FOBT 2/2 rectal prolapse?  - Hemoglobin w/slow downtrend  7.2< 7.6 <, 8.2.  nephrology plan to add epogen on next hemodialysis session  -consider blood transfusion for Hb less than 7gm/dl          Infectious Disease  #Strep pneumoniae meningitis   #R ear mastoiditis  #strep bacteremia  #C. difficile  - c/w CTX (3/16 - )  - c/w ciprodex drops for R ear  - start vanco 125mg PO [id number] (4/2-  ) for C.diff  - ID following  - BCx 3/15 + strep, repeat cx from 3/23 NGTD  - f/up sputum cx    #h/o PCP  - atovaquone at home, continue      Endocrinological  #Hypothyroidism  last TSH 12/2022 >10  TSH 3/15 4.73  - c/w levothyroxine     #Diabetes mellitus Type II  - ISS      Ethics  FULL, GOC ongoing.       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 Cardiac arrest 3/16 with ROSC, transferred to MICU for further management.       Plan:   Neurological  #Currently Intubated, opens eyes spontaneously, grimaces to pain  - remains off sedation  - EEG no seizures - Dcd 3/31  - MRI IAC and brain 3/29 - otitis and mastoiditis with multiple cerebral infarcts and cerebellar infarct. Will need f/u MRI in 3-4 weeks per neuro  - Endocarditis is possible cause for scattered cerebral infarcts, will consider utility of SORAYA given poor candidate for valve replacement  - mental status remains unimproved, withdraws from pain however does not elicit purposeful movements.      #Strep pneumo meningitis #Acute encephalopathy  p/w severe HA, neck stiffness, fever, leukocytosis concerning for meningitis/encephalitis  - CT head negative for CVA.   - LP and BCx 3/15 both showing gram positive cocci in pairs, and + strep pneumoniae.   - likely source of entry from R ear where pt c/o discomfort for several months.  - taking cyclosporine, mycophenolate mofetil and prednisone at home for kidney transplant and was likely immunocompromised -> dental procedure 3/14 but less likely source   - MRI of the IAC and brain 3/29 - otitis and mastoiditis with multiple cerebral infarcts and cerebellar infarct, f/u MRI 3-4 weeks (as per neuro)   - neuro and ID following    #Seizures  - 24hr EEG initially with highly focal seizures, improved with increased sedation.   - currently on keppra  500 BID since 3/17-   - vimpat 100 BID increased to 200 BID (3/20 -  )  - EEG neg- Dc' d 3/31     Cardiovascular  #s/p Cardiac Arrest   - bradycardia to 30, pulseless, code blue was called 3/16 . 2 rounds of epi, and PEA arrest. on 3rd pulse check pulseless VT, 200J shock given 4th pulse check asystole, 5th ROSC, sinus tachy with HUMA. IO placed and levo started. Due to blood in mouth took several attempts with DL for ETT to be placed but ultimately confirmed with capnometry and bilateral breath sounds.        #Elevated troponin  uptrending troponin to 182 3/15 AM, was likely iso ESRD  s/p cardiac arrest, elevated ST segment   - TTE from 3/17 showed EF of 63% with mild pulm htn, normal left ventricular systolic function, and diastolic LV dysfunction.      #HTN  home meds: on amlodipine 5mg, losartan 100mg, torsemide 20mg, and clonidine 0.2 patch weekly  - amlodipine 10mg restarted, but can hold now iso lower systolic blood pressure  - can start midodrine 10mg PO TID, can uptitrate as needed for hypotension          Respiratory/ENT  #Intubated  - tolerated PS trial for few hours in AM 8/5  - Patient has been intubated >2 wks; will talk to family regarding what their plans are iso MRI reading; will likely need trach and peg if within their goals  - noted w/ oral and inline secretions, will send sputum Cx      # Tympanic membrane rupture  discharge from R ear, evaluated by ENT given dexamethasone and cipro 3/14  - c/w broad spectrum abx   - MRI of IAC appreciated  - ENT recs- pending results of MRI will consider need for myringotomy, but will defer at this time given overall medical condition    Gastrointestinal  #NPO w/ OGT  #Constipation - resolved,  - continues to have loose stools - f/up stool studies shows positive C-difficile  - bowel regimen discontinued   - 3/31- KUB nonobstructive bowel pattern.  - s/p movantik  - tolerating TF at goal       Renal  #ESRD #s/p Kidney Transplant in 2003 at [place] #PCKD  previously HD through LLE AVF but no HD since transplant  [facility] Nephrologist Dr. Hugo Hernandez [phone]  - avoid nephrotoxic agents  - holding cyclosporine and mycophenolate   - c/w home prednisone 5mg qday  - strict I/O and replete electrolytes  - s/p 4mg IV bumex with minimal urine production (3/17)  - started on CRRT (3/17-3/21)   now s/p intermittent HD - last session 4/1 (1L removal)  - oliguric 0-5ml/hr - indwelling catheter removed 4/2, bladder scan q8  - SCx stable in 2s ( per renal Cr has been in 2.8 since december 2022)  - s/p Cathflo to unclog femoral shiley 3/28  - left femoral shiley removed for line holiday 4/2   - will trial diuretic -furosemide 80mg IVP and monitor u/o response, if remains oligoanuric, will need access for HD session  - will eventually need transplant nephrology consult when infection treated for management of kidney transplant         Hematological  # h/o LLE DVT  resolved, no DVT on doppler 12/22.  - negative LLE duplex this admission 3/17  - POCUS showed residual clot in RLE; f/u dopplers 3/28 negative  - restarted heparin subQ (3/21)        #Anemia  - s/p 4 u prbc total this admission, last transfusion 3/27  - Evaluated by ENT for bleeding and found no signs of active bleeding from nasal cavity, nasopharynx or oral cavity, however, patient biting on tongue, which could be one source of bleeding. Tongue was edematous and lax  - CT a/p neg for RP bleed   - GI consulted regarding Hgb drops- would need clearance before SORAYA. rec no further GI intervention for now.  - retic count normal, check hapto and LDH in am as part of hemolysis labs  - s/p 5 units pRbc's during this admission  - postitive FOBT 2/2 rectal prolapse?  - Hemoglobin w/slow downtrend  6.9 7.2< 7.6 < 8.2.   - consider blood transfusion for Hb less than 7gm/dl -> possible transfusion reaction 4/4-> hypotensive/bradycardia after receiving 100ml pRBC's today, HR and BP normalized once transfusion stopped.   -nephrology plan to add epogen on next hemodialysis session           Infectious Disease  #Strep pneumoniae meningitis   #R ear mastoiditis  #strep bacteremia  #C. difficile  - c/w CTX (3/16 - )  - c/w ciprodex drops for R ear  - start vanco 125mg PO [id number] (4/2-  ) for C.diff  - ID following  - BCx 3/15 + strep, repeat cx from 3/23 NGTD  - f/up sputum cx    #h/o PCP  - atovaquone at home, continue      Endocrinological  #Hypothyroidism  last TSH 12/2022 >10  TSH 3/15 4.73  - c/w levothyroxine     #Diabetes mellitus Type II  - ISS      Ethics  FULL, GOC ongoing.  - Patient's cousin, states that daughter Arely would to come       79 y/o F with h/o HTN, HLD, DM2, PCKD previously on HD via LUE AVF then s/p renal transplant, LLE DVT (resolved, nml dopplers 12/2022) on ASA, very small supraclinoid aneurysm (reportedly unchanged on MRA 10/2020), hypothyroidism, PCP 2021 on atovaquone ppx, reportedly hospitalized in 12/2022 for rectal prolapse, had a blood transfusion at that time, also + CMV 1/25/23 who presented with R-sided HA, R ear pain and neck stiffness after visit to ENT earlier in the day, altered on presentation, CT head unremarkable for stroke, however LP with IR and BCx showing +strep pneumoniae, started on vanc and CTX per ID. Cardiac arrest 3/16 with ROSC, transferred to MICU for further management.       Plan:   Neurological  #Currently Intubated, opens eyes spontaneously, grimaces to pain  - remains off sedation  - EEG no seizures - Dcd 3/31  - MRI IAC and brain 3/29 - otitis and mastoiditis with multiple cerebral infarcts and cerebellar infarct. Will need f/u MRI in 3-4 weeks per neuro  - Endocarditis is possible cause for scattered cerebral infarcts, will consider utility of SORAYA given poor candidate for valve replacement  - mental status remains unimproved, withdraws from pain however does not elicit purposeful movements.      #Strep pneumo meningitis #Acute encephalopathy  p/w severe HA, neck stiffness, fever, leukocytosis concerning for meningitis/encephalitis  - CT head negative for CVA.   - LP and BCx 3/15 both showing gram positive cocci in pairs, and + strep pneumoniae.   - likely source of entry from R ear where pt c/o discomfort for several months.  - taking cyclosporine, mycophenolate mofetil and prednisone at home for kidney transplant and was likely immunocompromised -> dental procedure 3/14 but less likely source   - MRI of the IAC and brain 3/29 - otitis and mastoiditis with multiple cerebral infarcts and cerebellar infarct, f/u MRI 3-4 weeks (as per neuro)   - neuro and ID following    #Seizures  - 24hr EEG initially with highly focal seizures, improved with increased sedation.   - currently on keppra  500 BID since 3/17-   - vimpat 100 BID increased to 200 BID (3/20 -  )  - EEG neg- Dc' d 3/31     Cardiovascular  #s/p Cardiac Arrest   - bradycardia to 30, pulseless, code blue was called 3/16 . 2 rounds of epi, and PEA arrest. on 3rd pulse check pulseless VT, 200J shock given 4th pulse check asystole, 5th ROSC, sinus tachy with HUMA. IO placed and levo started. Due to blood in mouth took several attempts with DL for ETT to be placed but ultimately confirmed with capnometry and bilateral breath sounds.        #Elevated troponin  uptrending troponin to 182 3/15 AM, was likely iso ESRD  s/p cardiac arrest, elevated ST segment   - TTE from 3/17 showed EF of 63% with mild pulm htn, normal left ventricular systolic function, and diastolic LV dysfunction.      #HTN  home meds: on amlodipine 5mg, losartan 100mg, torsemide 20mg, and clonidine 0.2 patch weekly  - amlodipine 10mg restarted, but can hold now iso lower systolic blood pressure  - can start midodrine 10mg PO TID, can uptitrate as needed for hypotension          Respiratory/ENT  #Intubated  - tolerated PS 8/5 trial for few hours TV ~ 250-300  - Patient has been intubated >2 wks; will talk to family regarding what their plans are iso MRI reading; will likely need trach and peg if within their goals  - noted w/ oral and inline secretions, will send sputum Cx      # Tympanic membrane rupture  discharge from R ear, evaluated by ENT given dexamethasone and cipro 3/14  - c/w broad spectrum abx   - MRI of IAC appreciated  - ENT recs- pending results of MRI will consider need for myringotomy, but will defer at this time given overall medical condition    Gastrointestinal  #NPO w/ OGT  #Constipation - resolved,  - continues to have loose stools - f/up stool studies shows positive C-difficile  - bowel regimen discontinued   - 3/31- KUB nonobstructive bowel pattern.  - s/p movantik  - tolerating TF at goal       Renal  #ESRD #s/p Kidney Transplant in 2003 at [place] #PCKD  previously HD through LLE AVF but no HD since transplant  [facility] Nephrologist Dr. Hugo Hernandez [phone]  - avoid nephrotoxic agents  - holding cyclosporine and mycophenolate   - c/w home prednisone 5mg qday  - strict I/O and replete electrolytes  - s/p 4mg IV bumex with minimal urine production (3/17)  - started on CRRT (3/17-3/21)   now s/p intermittent HD - last session 4/1 (1L removal)  - oliguric 0-5ml/hr - indwelling catheter removed 4/2, bladder scan q8  - SCx stable in 2-3's ( per renal Cr has been in 2.8 since december 2022)  - s/p Cathflo to unclog femoral shiley 3/28  - left femoral shiley removed for line holiday 4/2   - Diuretic trial -furosemide 80mg IVP given 4/3 with no u/o response, will need access for HD session   - will eventually need transplant nephrology consult when infection treated for management of kidney transplant         Hematological  # h/o LLE DVT  resolved, no DVT on doppler 12/22.  - negative LLE duplex this admission 3/17  - POCUS showed residual clot in RLE; f/u dopplers 3/28 negative  - restarted heparin subQ (3/21)        #Anemia  - s/p 4 u prbc total this admission, last transfusion 3/27  - Evaluated by ENT for bleeding and found no signs of active bleeding from nasal cavity, nasopharynx or oral cavity, however, patient biting on tongue, which could be one source of bleeding. Tongue was edematous and lax  - CT a/p neg for RP bleed   - GI consulted regarding Hgb drops- would need clearance before SORAYA. rec no further GI intervention for now.  - retic count normal, check hapto and LDH in am as part of hemolysis labs  - s/p 5 units pRbc's during this admission  - postitive FOBT 2/2 rectal prolapse?  - Hemoglobin w/slow downtrend  6.9 <7.2< 7.6 < 8.2.   - consider blood transfusion for Hb less than 7gm/dl   - concern for possible transfusion reaction 4/4-> hypotensive/bradycardia after receiving 100ml pRBC's , HR and BP normalized once transfusion stopped. Blood bank does not consider response a transfusion reaction, and okay with future transfusions if needed  -nephrology plan to add epogen on next hemodialysis session           Infectious Disease  #Strep pneumoniae meningitis   #R ear mastoiditis  #strep bacteremia  #C. difficile  - c/w CTX (3/16 - )  - c/w ciprodex drops for R ear  - start vanco 125mg PO [id number] (4/2-  ) for C.diff  - ID following  - BCx 3/15 + strep, repeat cx from 3/23 NGTD  - f/up sputum cx    #h/o PCP  - atovaquone at home, continue      Endocrinological  #Hypothyroidism  last TSH 12/2022 >10  TSH 3/15 4.73  - c/w levothyroxine     #Diabetes mellitus Type II  - ISS      Ethics  FULL, GOC ongoing.  - Patient's cousin, states that daughter Arely would to come       77 y/o F with h/o HTN, HLD, DM2, PCKD previously on HD via LUE AVF then s/p renal transplant, LLE DVT (resolved, nml dopplers 12/2022) on ASA, very small supraclinoid aneurysm (reportedly unchanged on MRA 10/2020), hypothyroidism, PCP 2021 on atovaquone ppx, reportedly hospitalized in 12/2022 for rectal prolapse, had a blood transfusion at that time, also + CMV 1/25/23 who presented with R-sided HA, R ear pain and neck stiffness after visit to ENT earlier in the day, altered on presentation, CT head unremarkable for stroke, however LP with IR and BCx showing +strep pneumoniae, started on vanc and CTX per ID. Cardiac arrest 3/16 with ROSC, transferred to MICU for further management.       Plan:   Neurological  #Currently Intubated, opens eyes spontaneously, grimaces to pain  - remains off sedation  - EEG no seizures - Dcd 3/31  - MRI IAC and brain 3/29 - otitis and mastoiditis with multiple cerebral infarcts and cerebellar infarct. Will need f/u MRI in 3-4 weeks per neuro  - Endocarditis is possible cause for scattered cerebral infarcts, will consider utility of SORAYA given poor candidate for valve replacement  - mental status remains unimproved, withdraws from pain however does not elicit purposeful movements.      #Strep pneumo meningitis #Acute encephalopathy  p/w severe HA, neck stiffness, fever, leukocytosis concerning for meningitis/encephalitis  - CT head negative for CVA.   - LP and BCx 3/15 both showing gram positive cocci in pairs, and + strep pneumoniae.   - likely source of entry from R ear where pt c/o discomfort for several months.  - taking cyclosporine, mycophenolate mofetil and prednisone at home for kidney transplant and was likely immunocompromised -> dental procedure 3/14 but less likely source   - MRI of the IAC and brain 3/29 - otitis and mastoiditis with multiple cerebral infarcts and cerebellar infarct, f/u MRI 3-4 weeks (as per neuro)   - neuro and ID following    #Seizures  - 24hr EEG initially with highly focal seizures, improved with increased sedation.   - currently on keppra  500 BID since 3/17-   - vimpat 100 BID increased to 200 BID (3/20 -  )  - EEG neg- Dc' d 3/31     Cardiovascular  #s/p Cardiac Arrest   - bradycardia to 30, pulseless, code blue was called 3/16 . 2 rounds of epi, and PEA arrest. on 3rd pulse check pulseless VT, 200J shock given 4th pulse check asystole, 5th ROSC, sinus tachy with HUMA. IO placed and levo started. Due to blood in mouth took several attempts with DL for ETT to be placed but ultimately confirmed with capnometry and bilateral breath sounds.        #Elevated troponin  uptrending troponin to 182 3/15 AM, was likely iso ESRD  s/p cardiac arrest, elevated ST segment   - TTE from 3/17 showed EF of 63% with mild pulm htn, normal left ventricular systolic function, and diastolic LV dysfunction.      #HTN  home meds: on amlodipine 5mg, losartan 100mg, torsemide 20mg, and clonidine 0.2 patch weekly  - amlodipine 10mg restarted, but can hold now iso lower systolic blood pressure  - can start midodrine 10mg PO TID, can uptitrate as needed for hypotension          Respiratory/ENT  #Intubated  - tolerated PS 8/5 trial for few hours TV ~ 250-300  - Patient has been intubated >2 wks; will talk to family regarding what their plans are iso MRI reading; will likely need trach and peg if within their goals  - noted w/ oral and inline secretions, will send sputum Cx      # Tympanic membrane rupture  discharge from R ear, evaluated by ENT given dexamethasone and cipro 3/14  - c/w broad spectrum abx   - MRI of IAC appreciated  - ENT recs- pending results of MRI will consider need for myringotomy, but will defer at this time given overall medical condition    Gastrointestinal  #NPO w/ OGT  #Constipation - resolved,  - continues to have loose stools - f/up stool studies shows positive C-difficile  - bowel regimen discontinued   - 3/31- KUB nonobstructive bowel pattern.  - s/p movantik  - tolerating TF at goal       Renal  #ESRD #s/p Kidney Transplant in 2003 at [place] #PCKD  previously HD through LLE AVF but no HD since transplant  [facility] Nephrologist Dr. Hugo Hernandez [phone]  - avoid nephrotoxic agents  - holding cyclosporine and mycophenolate   - c/w home prednisone 5mg qday  - strict I/O and replete electrolytes  - s/p 4mg IV bumex with minimal urine production (3/17)  - started on CRRT (3/17-3/21)   now s/p intermittent HD - last session 4/1 (1L removal)  - oliguric 0-5ml/hr - indwelling catheter removed 4/2, bladder scan q8  - SCx stable in 2-3's ( per renal Cr has been in 2.8 since december 2022)  - s/p Cathflo to unclog femoral shiley 3/28  - left femoral shiley removed for line holiday 4/2   - Diuretic trial -furosemide 80mg IVP given 4/3 with no u/o response, will need access for HD session   - will eventually need transplant nephrology consult when infection treated for management of kidney transplant         Hematological  # h/o LLE DVT  resolved, no DVT on doppler 12/22.  - negative LLE duplex this admission 3/17  - POCUS showed residual clot in RLE; f/u dopplers 3/28 negative  - restarted heparin subQ (3/21)        #Anemia  - s/p 4 u prbc total this admission, last transfusion 3/27  - Evaluated by ENT for bleeding and found no signs of active bleeding from nasal cavity, nasopharynx or oral cavity, however, patient biting on tongue, which could be one source of bleeding. Tongue was edematous and lax  - CT a/p neg for RP bleed   - GI consulted regarding Hgb drops- would need clearance before SORAYA. rec no further GI intervention for now.  - retic count normal, check hapto and LDH in am as part of hemolysis labs  - s/p 5 units pRbc's during this admission  - postitive FOBT 2/2 rectal prolapse?  - Hemoglobin w/slow downtrend  6.9 <7.2< 7.6 < 8.2.   - consider blood transfusion for Hb less than 7gm/dl   - concern for possible transfusion reaction 4/4-> hypotensive/bradycardia after receiving 100ml pRBC's , HR and BP normalized once transfusion stopped. Blood bank does not consider response a transfusion reaction, and okay with future transfusions if needed  -nephrology plan to add epogen on next hemodialysis session           Infectious Disease  #Strep pneumoniae meningitis   #R ear mastoiditis  #strep bacteremia  #C. difficile  - c/w CTX (3/16 - )  - c/w ciprodex drops for R ear  - start vanco 125mg PO [id number] (4/2-  ) for C.diff  - ID following  - BCx 3/15 + strep, repeat cx from 3/23 NGTD  - f/up sputum cx    #h/o PCP  - atovaquone at home, continue      Endocrinological  #Hypothyroidism  last TSH 12/2022 >10  TSH 3/15 4.73  - c/w levothyroxine     #Diabetes mellitus Type II  - ISS      Ethics  FULL, GOC ongoing.  - Patient's cousin, states that daughter Arely would to come in 4/5 to discuss trach/PEG and transfer to long-term vent facility vs palliative extubation

## 2023-04-04 NOTE — PROGRESS NOTE ADULT - SUBJECTIVE AND OBJECTIVE BOX
[facility] - S Alea / Tita S /D Rama/ S Jovan/ S Keenan/ Cleve Gambino / DAGMAR Wilsonu/ O Daya  service -[phone], office [phone]  ---------------------------------------------------------------------------------------------------------------    Patient seen and examined bedside in MICU    Subjective and Objective: No overnight events  remains on MV, unresponsive    Allergies: apple (Unknown)  Benadryl (Unknown)  penicillin (Hives)  watermelon (Unknown)      Hospital Medications:   MEDICATIONS  (STANDING):  atovaquone  Suspension 1500 milliGRAM(s) Oral daily  cefTRIAXone   IVPB 2000 milliGRAM(s) IV Intermittent every 12 hours  chlorhexidine 0.12% Liquid 15 milliLiter(s) Oral Mucosa two times a day  chlorhexidine 2% Cloths 1 Application(s) Topical <User Schedule>  ciprofloxacin  0.3% Ophthalmic Solution for Otic Use 4 Drop(s) Right Ear two times a day  dextrose 5%. 1000 milliLiter(s) (100 mL/Hr) IV Continuous <Continuous>  dextrose 5%. 1000 milliLiter(s) (50 mL/Hr) IV Continuous <Continuous>  dextrose 50% Injectable 25 Gram(s) IV Push once  dextrose 50% Injectable 12.5 Gram(s) IV Push once  dextrose 50% Injectable 25 Gram(s) IV Push once  glucagon  Injectable 1 milliGRAM(s) IntraMuscular once  heparin   Injectable 5000 Unit(s) SubCutaneous every 8 hours  insulin lispro (ADMELOG) corrective regimen sliding scale   SubCutaneous every 6 hours  lacosamide Solution 200 milliGRAM(s) Oral two times a day  levETIRAcetam 500 milliGRAM(s) Oral two times a day  levothyroxine 25 MICROGram(s) Oral daily  midodrine 10 milliGRAM(s) Oral every 8 hours  petrolatum Ophthalmic Ointment 1 Application(s) Both EYES every 12 hours  predniSONE   Tablet 5 milliGRAM(s) Oral daily  vancomycin    Solution 125 milliGRAM(s) Oral every 6 hours    VITALS:  T(F): 96 (04-04-23 @ 13:10), Max: 101.4 (04-04-23 @ 06:00)  HR: 61 (04-04-23 @ 15:09)  BP: 114/50 (04-04-23 @ 15:00)  RR: 15 (04-04-23 @ 15:00)  SpO2: 100% (04-04-23 @ 15:09)  Wt(kg): --    04-03 @ 07:01  -  04-04 @ 07:00  --------------------------------------------------------  IN: 970 mL / OUT: 0 mL / NET: 970 mL    04-04 @ 07:01  -  04-04 @ 16:15  --------------------------------------------------------  IN: 630 mL / OUT: 0 mL / NET: 630 mL    PHYSICAL EXAM:  Constitutional: NAD  HEENT: ETT+ on MV, +NGT  Neck: No JVD  Respiratory: CTAB, no wheezes, rales or rhonchi  Cardiovascular: S1, S2, RRR  Gastrointestinal: BS+, soft  Extremities: No peripheral edema  Neurological: unresponsive  : + snider.     LABS:  04-04    136  |  98  |  62<H>  ----------------------------<  127<H>  4.0   |  22  |  3.74<H>    Ca    8.9      04 Apr 2023 00:50  Phos  4.3     04-03  Mg     2.10     04-03    TPro  5.3<L>  /  Alb  1.9<L>  /  TBili  0.2  /  DBili      /  AST  23  /  ALT  17  /  AlkPhos  131<H>  04-04    Creatinine Trend: 3.74 <--, 3.17 <--, 2.41 <--, 2.90 <--, 2.36 <--, 2.73 <--, 2.28 <--                        7.3    6.44  )-----------( 226      ( 04 Apr 2023 15:20 )             24.1     Urine Studies:        RADIOLOGY & ADDITIONAL STUDIES:

## 2023-04-05 NOTE — CONSULT NOTE ADULT - PROBLEM SELECTOR RECOMMENDATION 5
Palliative consulted for complex medical decision making in the setting of serious illness.    Family understand decisions that should be made, and did not seem interested in further palliative involvement.  Recommend MICU to f/u on daughter's decision. Palliative signing off.

## 2023-04-05 NOTE — CONSULT NOTE ADULT - CONVERSATION DETAILS
Spoke to patient's daughter Arely and niece Mandi about patient's deteriorating condition. Family is aware patient has poor prognosis and that they should make decision between trach/peg vs palliative extubation. They say "she is dying" but also saying "she wanted to live." Explained patient would not return to her previous baseline, as seen in pictures on the wall family had posted. However family then spoke about their discontent of the care patient has received in the hospital. They say care is "delayed delayed delayed" and it took "100 years for MRI". Daughter says "I wish I took her to [facility]." Daughter repeatedly says "I need time." She shares she has other events happening in life, going to school etc, can only visit at night. Reports extended family will gather on Sunday and they will make a decision.

## 2023-04-05 NOTE — CONSULT NOTE ADULT - ASSESSMENT
Patient came in from home with complaints of a headache and neck pain. Patient reports some blurred vision and that this has been going on for the past week or so. Patient states he has been taking her medicine as she should. Patient states the last couple days her lightheadness got worse and she felt like she was going to pass out but never did. Patient complains of neck pain 7 out of 10. Patient is a 78-year-old female with PCKD previously on HD via LUE AVF now s/p renal transplant, LLE DVT on ASA, HTN, HLD, DM2, presented with R-sided HA, R ear pain and neck pain after recent visit to ENT earlier in the day. Patient reportedly woke up at 0130 AM on Monday with a R-sided headache. ENT noted R otitis externa, purulent discharge from R TM thought to be secondary to perforation, given ear gtt and prescribed cipro/dexamethasone gtt (she took 1 dose thus far). Daughter called EMS as later in the day patient complained of severe HA as well as neck pain, stated "I'm dying" and "my head is killing me." Patient reportedly without prior history of ear infections, no history of stroke or seizures. She reported to ENT earlier in the day a muffled sensation in the R ear [id number]. Daughter notes patient has had no recent fevers/chills. She had a dental cleaning on Thursday (no abx prior to cleaning).On arrival, code stroke called.In the ED VS:  97.2  65-99  162-186/52-91  16-20  96-99%RA, received NS 500cc IVF, lorazepam 2mg IV x1 and morphine 4mg IV x1. Found to have tympanic membrane rupture, otitis media, possible acute otomastoiditis. Patient had poor mental status, no signs or evidence of CVA, suspicion for meningitis. LP showing step pneumo meningitis. on 3/16 patient sustained cardiac arrest ROSC achieved in 10 minutes. Hospital course complicated by acute renal failure on HD/CRRT, Strep pneumo bacteremia, possible seizures, C diff. Patient is anoxic, poor prognosis. Palliative consulted for complex medical decision making in the setting of serious illness.

## 2023-04-05 NOTE — PROGRESS NOTE ADULT - ASSESSMENT
78F with PCKD previously on HD via LUE AVF now s/p cadaveric renal transplant 2003 at [place], LLE DVT (resolved, nml dopplers 12/2022) on ASA, HTN, HLD, DM2, 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 Went to ENT and was diagnosed with R otitis externa secondary to perforated TM. Prescribed cipro/dexamethasone.  Worsening headache and EMS called. Admitted 3/14.  CT head negative.  NAMAN.  Started on vanc/meropenem.  IR obtained LP.  Findings c/w meningitis, culture +pneumococcus.  Antibiotics narrowed to ceftriaxone.  Remains lethargic off sedation with possible seizures.  Intermittent fevers.  MRI with acute and subacute infarcts.  Suspect endocarditis though there is not SORAYA.    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 But await decision pending [facility] discussions    C diff- on po vanco    Fever  - Blood and sputum culture testinf    NAMAN  - on dialysis now      Guarded prognosis

## 2023-04-05 NOTE — CONSULT NOTE ADULT - NS ATTEST RISK PROBLEM GEN_ALL_CORE FT
Patient is seriously ill with cardiac arrest and anoxic brain injury, acute hypoxemic respiratory failure on vent, strep pneumo meningitis and bacteremia, PPS 10%, immunosuppression due to renal transplant, acute renal failure now on HD  High risk of morbidity and mortality

## 2023-04-05 NOTE — CONSULT NOTE ADULT - PROBLEM SELECTOR RECOMMENDATION 4
-See [facility] note. I spent 25 minutes addressing advanced care planning with patient and/or decision maker(s)   -Decision maker: surrogate- daughter Arely, makes decision with extended family   -Daughter acknowledges patient has poor prognosis, however due to her discontent with patient's care and other life stressors, says she needs more time to make decision on trach/peg vs palliative liberation. Family planning to discuss and make decision Sunday. In the meantime, continue with all available medical interventions, full code.

## 2023-04-05 NOTE — CONSULT NOTE ADULT - SUBJECTIVE AND OBJECTIVE BOX
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 Patient reportedly woke up at 0130 AM on Monday with a R-sided headache. ENT noted R otitis externa, purulent discharge from R TM thought to be secondary to perforation, given ear gtt and prescribed cipro/dexamethasone gtt (she took 1 dose thus far). Daughter called EMS as later in the day patient complained of severe HA as well as neck pain, stated "I'm dying" and "my head is killing me." Patient reportedly without prior history of ear infections, no history of stroke or seizures. She reported to ENT earlier in the day a muffled sensation in the R ear [id number].     Daughter notes patient has had no recent fevers/chills. She had a dental cleaning on Thursday (no abx prior to cleaning).    On arrival, code stroke called.    In the ED VS:  97.2  65-99  162-186/52-91  16-20  96-99%RA, received NS 500cc IVF, lorazepam 2mg IV x1 and morphine 4mg IV x1 (15 Mar 2023 01:17)    PERTINENT PM/SXH:   Polycystic kidney disease    HTN (hypertension)    Glaucoma    Aneurysm    History of deep venous thrombosis (DVT) of distal vein of left lower extremity    Thrombocytopenia    Hypothyroid    Osteoporosis    Arthritis    PCP (pneumocystis carinii pneumonia)    C. difficile colitis    Type 2 diabetes mellitus, without long-term current use of insulin    Essential hypertension      H/O kidney transplant    H/O:     H/O eye surgery    S/P hysterectomy    H/O umbilical hernia repair      FAMILY HISTORY:  FH: diabetes mellitus (Sibling)      ------------------------------------------------------------------------------------------------------------  ITEMS NOT CHECKED ARE NOT PRESENT    SOCIAL HISTORY:   Living Situation: [ ]Home  [ ]Long term care  [ ]Rehab [ ]Other  Support:     Substance hx:  [ ]   Tobacco hx:  [ ]   Alcohol hx: [ ]   Family Hx substance abuse [ ]yes [ ]no    Anabaptism/Spirituality:  PCSSQ[Palliative Care Spiritual Screening Question]   Severity (0-10): 0  Score of 4 or > indicate consideration of Chaplaincy referral.  Chaplaincy Referral: [ ] yes [X ] refused [ ] following    Caregiver [place]? : [ ] yes [X ] no [ ] Deferred [ ] Declined               Social work referral [ ] Patient & Family Centered Care Referral [ ]    Anticipatory Grief present?:  [ ] yes [X ] no  [ ] Deferred                    Social work referral [ ] Patient & Family Centered Care Referral [ ]    ------------------------------------------------------------------------------------------------------------    PRESENT SYMPTOMS:  [ ] No     [ ] Unable to self-report      [ ] CPOT (ICU)     [ ] PAINADs     [ ] RDOS    [ ] Yes     Source if other than patient:  [ ]Family   [ ]Team     PAIN:   If blank, patient unable to specify   [ ]yes [ ]no  QOL impact-   Location -                    Aggravating factors -  Quality -  Radiation -  Timing-  Pain at most severe level (0-10 scale):  Pain at minimal acceptable level/Pain Goal (0-10 scale):     SYMPTOMS:   Dyspnea:                           [ ]Mild [ ]Moderate [ ]Severe  Anxiety:                             [ ]Mild [ ]Moderate [ ]Severe  Fatigue:                             [ ]Mild [ ]Moderate [ ]Severe  Nausea/Vomiting:              [ ]Mild [ ]Moderate [ ]Severe  Loss of appetite:                [ ]Mild [ ]Moderate [ ]Severe  Constipation:                     [ ]Mild [ ]Moderate [ ]Severe    Other Symptoms:  [X ]All other review of systems negative     Home Medications for symptoms if any:  I-Stop Reference No:    ------------------------------------------------------------------------------------------------------------    FUNCTIONAL STATUS:     Baseline ADL (prior to admission):  [ ] Independent  [ ] Moderate Assistance [ ] Dependent  Palliative Performance Score:     [ ]PPSV2 < or = to 30%     NUTRITIONAL STATUS:     Protein Calorie Malnutrition Present:   [ ]mild   [ ]moderate   [ ]severe   [ ]poor nutritional intake   [ ]artificial nutrition      Weight:   [ ]underweight (BMI 18.5 or less)   [ ]morbid obesity (BMI 30 or higher)   [ ]anasarca  [ ]significant weight loss     Height (cm): 158 (23 @ 14:33)  Weight (kg): 62.1 (23 @ 05:27)  BMI (kg/m2): 24.9 (23 @ 14:33)    ------------------------------------------------------------------------------------------------------------    PRIOR ADVANCE DIRECTIVES:    [ ] DNR/MOLST    [ ] Living Will    [ ] Health Care Proxy(s)    DECISION MAKER(s):  [ ] Patient    [ ] Surrogate(s)  [ ] HCP   [ ] Guardian             ------------------------------------------------------------------------------------------------------------  PHYSICAL EXAM:  Vital Signs Last 24 Hrs  T(C): 37.5 (2023 08:00), Max: 38.6 (2023 04:00)  T(F): 99.5 (2023 08:00), Max: 101.5 (2023 04:00)  HR: 75 (2023 10:00) (55 - 87)  BP: 116/53 (2023 10:00) (68/46 - 160/72)  BP(mean): 66 (2023 10:) (50 - 94)  RR: 21 (2023 10:) (14 - 24)  SpO2: 100% (2023 10:00) (94% - 100%)    Parameters below as of 2023 10:00  Patient On (Oxygen Delivery Method): ventilator,CPAP 8/5    O2 Concentration (%): 30 I&O's Summary    2023 07:01  -  2023 07:00  --------------------------------------------------------  IN: 710 mL / OUT: 0 mL / NET: 710 mL    2023 07:01  -  2023 10:20  --------------------------------------------------------  IN: 105 mL / OUT: 0 mL / NET: 105 mL        GENERAL:  [ ]Cachexia  [ ] Frail  [ ]Awake  [ ]Oriented x   [ ]Lethargic  [ ]Unarousable  [ ]Verbal  [ ]Non-Verbal    BEHAVIORAL:   [ ] Anxiety  [ ] Delirium [ ] Agitation [ ] Other    HEENT:   [ ]Normal   [ ]Dry mouth   [ ]ET Tube/Trach  [ ]Oral lesions    PULMONARY:   [ ]Clear [ ]Tachypnea  [ ]Audible excessive secretions   [ ]Rhonchi        [ ]Right [ ]Left [ ]Bilateral  [ ]Crackles        [ ]Right [ ]Left [ ]Bilateral  [ ]Wheezing     [ ]Right [ ]Left [ ]Bilateral  [ ]Diminished breath sounds [ ]right [ ]left [ ]bilateral    CARDIOVASCULAR:    [ ]Regular [ ]Irregular [ ]Tachy  [ ]Asaf [ ]Murmur [ ]Other    GASTROINTESTINAL:  [ ]Soft  [ ]Distended   [ ]+BS  [ ]Non tender [ ]Tender  [ ]Other [ ]PEG [ ]OGT/ NGT      GENITOURINARY:  [ ]Normal [ ] Incontinent   [ ]Oliguria/Anuria   [ ]Ponce    MUSCULOSKELETAL:   [ ]Normal   [ ]Weakness  [ ]Bed/Wheelchair bound [ ]Edema    NEUROLOGIC:   [ ]No focal deficits  [ ]Cognitive impairment  [ ]Dysphagia [ ]Dysarthria [ ]Paresis [ ]Other     SKIN:   [ ]Normal  [ ]Rash  [ ]Other  [ ]Pressure ulcer(s)       Present on admission [ ]y [ ]n    ------------------------------------------------------------------------------------------------------------    LABS:                        7.3    7.09  )-----------( 278      ( 2023 04:00 )             23.9   04-05    132<L>  |  95<L>  |  74<H>  ----------------------------<  139<H>  4.1   |  20<L>  |  4.23<H>    Ca    8.5      2023 04:00  Phos  5.7     04-05  Mg     2.20     04-05    TPro  5.4<L>  /  Alb  1.9<L>  /  TBili  <0.2  /  DBili  x   /  AST  31  /  ALT  18  /  AlkPhos  141<H>  04-05    ------------------------------------------------------------------------------------------------------------    RADIOLOGY & ADDITIONAL STUDIES:      ------------------------------------------------------------------------------------------------------------    ALLERGIES:  Allergies    apple (Unknown)  Benadryl (Unknown)  penicillin (Hives)  watermelon (Unknown)    Intolerances      MEDICATIONS  (STANDING):  atovaquone  Suspension 1500 milliGRAM(s) Oral daily  cefTRIAXone   IVPB 2000 milliGRAM(s) IV Intermittent every 12 hours  chlorhexidine 0.12% Liquid 15 milliLiter(s) Oral Mucosa two times a day  chlorhexidine 2% Cloths 1 Application(s) Topical <User Schedule>  ciprofloxacin  0.3% Ophthalmic Solution for Otic Use 4 Drop(s) Right Ear two times a day  dextrose 5%. 1000 milliLiter(s) (100 mL/Hr) IV Continuous <Continuous>  dextrose 5%. 1000 milliLiter(s) (50 mL/Hr) IV Continuous <Continuous>  dextrose 50% Injectable 25 Gram(s) IV Push once  dextrose 50% Injectable 12.5 Gram(s) IV Push once  dextrose 50% Injectable 25 Gram(s) IV Push once  glucagon  Injectable 1 milliGRAM(s) IntraMuscular once  heparin   Injectable 5000 Unit(s) SubCutaneous every 8 hours  insulin lispro (ADMELOG) corrective regimen sliding scale   SubCutaneous every 6 hours  lacosamide Solution 200 milliGRAM(s) Oral two times a day  levETIRAcetam 500 milliGRAM(s) Oral two times a day  levothyroxine 25 MICROGram(s) Oral daily  midodrine 10 milliGRAM(s) Oral every 8 hours  petrolatum Ophthalmic Ointment 1 Application(s) Both EYES every 12 hours  predniSONE   Tablet 5 milliGRAM(s) Oral daily  vancomycin    Solution 125 milliGRAM(s) Oral every 6 hours    MEDICATIONS  (PRN):  acetaminophen   Oral Liquid .. 650 milliGRAM(s) Enteral Tube every 6 hours PRN Temp greater or equal to 38C (100.4F), Mild Pain (1 - 3), Moderate Pain (4 - 6)  dextrose Oral Gel 15 Gram(s) Oral once PRN Blood Glucose LESS THAN 70 milliGRAM(s)/deciliter    ------------------------------------------------------------------------------------------------------------    CRITICAL CARE:  [ ]Shock Present  [ ]Septic [ ]Cardiogenic [ ]Neurologic [ ]Hypovolemic [ ] Undifferentiated/Mixed   [ ]Vasopressors [ ]Inotropes     [ ]Respiratory failure present: [ ]Acute  [ ]Chronic [ ]Hypoxic  [ ]Hypercarbic   [ ]Mechanical ventilation   [ ]Non-invasive ventilatory support   [ ]High flow  Mode: CPAP with PS, FiO2: 30, PEEP: 5, PS: 5, MAP: 7, PIP: 11  [ ]Non-rebreather/Venti     [ ]Other organ failure     Other REFERRALS:  [ ]Hospice  [ ]Child Life  [ ]Social Work  [ ]Case management [ ]Holistic Therapy    HPI:  Patient is a 78-year-old female with PCKD previously on HD via LUE AVF now s/p renal transplant, LLE DVT on ASA, HTN, HLD, DM2, presented with R-sided HA, R ear pain and neck pain after recent visit to ENT earlier in the day. Patient reportedly woke up at 0130 AM on Monday with a R-sided headache. ENT noted R otitis externa, purulent discharge from R TM thought to be secondary to perforation, given ear gtt and prescribed cipro/dexamethasone gtt (she took 1 dose thus far). Daughter called EMS as later in the day patient complained of severe HA as well as neck pain, stated "I'm dying" and "my head is killing me." Patient reportedly without prior history of ear infections, no history of stroke or seizures. She reported to ENT earlier in the day a muffled sensation in the R ear [id number]. Daughter notes patient has had no recent fevers/chills. She had a dental cleaning on Thursday (no abx prior to cleaning).On arrival, code stroke called.In the ED VS:  97.2  65-99  162-186/52-91  16-20  96-99%RA, received NS 500cc IVF, lorazepam 2mg IV x1 and morphine 4mg IV x1. Found to have tympanic membrane rupture, otitis media, possible acute otomastoiditis. Patient had poor mental status, no signs or evidence of CVA, suspicion for meningitis. LP showing step pneumo meningitis. on 3/16 patient sustained cardiac arrest ROSC achieved in 10 minutes. Hospital course complicated by acute renal failure on HD/CRRT, Strep pneumo bacteremia, possible seizures, C diff. Patient is anoxic, poor prognosis. Palliative consulted for complex medical decision making in the setting of serious illness.     PERTINENT PM/SXH:   Polycystic kidney disease    HTN (hypertension)    Glaucoma    Aneurysm    History of deep venous thrombosis (DVT) of distal vein of left lower extremity    Thrombocytopenia    Hypothyroid    Osteoporosis    Arthritis    PCP (pneumocystis carinii pneumonia)    C. difficile colitis    Type 2 diabetes mellitus, without long-term current use of insulin    Essential hypertension      H/O kidney transplant    H/O:     H/O eye surgery    S/P hysterectomy    H/O umbilical hernia repair      FAMILY HISTORY:  FH: diabetes mellitus (Sibling)    ------------------------------------------------------------------------------------------------------------  ITEMS NOT CHECKED ARE NOT PRESENT    SOCIAL HISTORY:   Living Situation: [X ]Home  [ ]Long term care  [ ]Rehab [ ]Other  Support: Daughter Arely + extended family. Worked as CNA     Substance hx:  [ ]   Tobacco hx:  [ ]   Alcohol hx: [ ]   Family Hx substance abuse [ ]yes [ ]no    Rastafarian/Spirituality:  PCSSQ[Palliative Care Spiritual Screening Question]   Severity (0-10): 0  Score of 4 or > indicate consideration of Chaplaincy referral.  Chaplaincy Referral: [ ] yes [X ] refused [ ] following    Caregiver [place]? : [ ] yes [X ] no [ ] Deferred [ ] Declined               Social work referral [ ] Patient & Family Centered Care Referral [ ]    Anticipatory Grief present?:  [ ] yes [X ] no  [ ] Deferred                    Social work referral [ ] Patient & Family Centered Care Referral [ ]    ------------------------------------------------------------------------------------------------------------    PRESENT SYMPTOMS:  [ ] No     [X ] Unable to self-report      [X ] CPOT (ICU) 0      [ ] PAINADs     [X ] RDOS 0     [ ] Yes     Source if other than patient:  [ ]Family   [ ]Team     PAIN:   If blank, patient unable to specify   [ ]yes [ ]no  QOL impact-   Location -                    Aggravating factors -  Quality -  Radiation -  Timing-  Pain at most severe level (0-10 scale):  Pain at minimal acceptable level/Pain Goal (0-10 scale):     SYMPTOMS:   Dyspnea:                           [ ]Mild [ ]Moderate [ ]Severe  Anxiety:                             [ ]Mild [ ]Moderate [ ]Severe  Fatigue:                             [ ]Mild [ ]Moderate [ ]Severe  Nausea/Vomiting:              [ ]Mild [ ]Moderate [ ]Severe  Loss of appetite:                [ ]Mild [ ]Moderate [ ]Severe  Constipation:                     [ ]Mild [ ]Moderate [ ]Severe    Other Symptoms:  [X ]All other review of systems negative     Home Medications for symptoms if any:  I-Stop Reference No:    ------------------------------------------------------------------------------------------------------------    FUNCTIONAL STATUS:     Baseline ADL (prior to admission):  [X ] Independent  [ ] Moderate Assistance [ ] Dependent  Palliative Performance Score: 90%     [ ]PPSV2 < or = to 30%     NUTRITIONAL STATUS:     Protein Calorie Malnutrition Present:   [ ]mild   [ ]moderate   [ ]severe   [ ]poor nutritional intake   [ ]artificial nutrition      Weight:   [ ]underweight (BMI 18.5 or less)   [ ]morbid obesity (BMI 30 or higher)   [ ]anasarca  [ ]significant weight loss     Height (cm): 158 (23 @ 14:33)  Weight (kg): 62.1 (23 @ 05:27)  BMI (kg/m2): 24.9 (23 @ 14:33)    ------------------------------------------------------------------------------------------------------------    PRIOR ADVANCE DIRECTIVES:    [ ] DNR/MOLST    [ ] Living Will    [ ] Health Care Proxy(s)    DECISION MAKER(s):  [ ] Patient    [X ] Surrogate(s)- daughter Arely   [ ] HCP   [ ] Guardian             ------------------------------------------------------------------------------------------------------------  PHYSICAL EXAM:  Vital Signs Last 24 Hrs  T(C): 37.5 (2023 08:00), Max: 38.6 (2023 04:00)  T(F): 99.5 (2023 08:00), Max: 101.5 (2023 04:00)  HR: 75 (2023 10:) (55 - 87)  BP: 116/53 (2023 10:00) (68/46 - 160/72)  BP(mean): 66 (2023 10:) (50 - 94)  RR: 21 (2023 10:) (14 - 24)  SpO2: 100% (2023 10:00) (94% - 100%)    Parameters below as of 2023 10:00  Patient On (Oxygen Delivery Method): ventilator,CPAP 8/5    O2 Concentration (%): 30 I&O's Summary    2023 07:01  -  2023 07:00  --------------------------------------------------------  IN: 710 mL / OUT: 0 mL / NET: 710 mL    2023 07:01  -  2023 10:20  --------------------------------------------------------  IN: 105 mL / OUT: 0 mL / NET: 105 mL    GENERAL:  [X ]Cachexia  [X ] Frail  [ ]Awake  [ ]Oriented x   [ ]Lethargic  [X ]Unarousable  [ ]Verbal  [X ]Non-Verbal    BEHAVIORAL:   [ ] Anxiety  [ ] Delirium [ ] Agitation [ ] Other    HEENT:   [ ]Normal   [ ]Dry mouth   [X ]ET Tube/Trach  [ ]Oral lesions    PULMONARY:   [ ]Clear [ ]Tachypnea  [ ]Audible excessive secretions   [ ]Rhonchi        [ ]Right [ ]Left [ ]Bilateral  [ ]Crackles        [ ]Right [ ]Left [ ]Bilateral  [ ]Wheezing     [ ]Right [ ]Left [ ]Bilateral  [X ]Diminished breath sounds [ ]right [ ]left [X ]bilateral    CARDIOVASCULAR:    [X ]Regular [ ]Irregular [ ]Tachy  [ ]Asaf [ ]Murmur [ ]Other    GASTROINTESTINAL:  [X ]Soft  [ ]Distended   [ ]+BS  [X ]Non tender [ ]Tender  [ ]Other [ ]PEG [ ]OGT/ NGT      GENITOURINARY:  [ ]Normal [ ] Incontinent   [ ]Oliguria/Anuria   [X ]Ponce    MUSCULOSKELETAL:   [ ]Normal   [ ]Weakness  [X ]Bed/Wheelchair bound [ ]Edema    NEUROLOGIC:   [X ]No focal deficits  [ ]Cognitive impairment  [ ]Dysphagia [ ]Dysarthria [ ]Paresis [ ]Other     SKIN:   [ ]Normal  [ ]Rash  [ ]Other  [X ]Pressure ulcer(s)       Present on admission [ ]y [ ]n    ------------------------------------------------------------------------------------------------------------    LABS:                        7.3    7.09  )-----------( 278      ( 2023 04:00 )             23.9   04-05    132<L>  |  95<L>  |  74<H>  ----------------------------<  139<H>  4.1   |  20<L>  |  4.23<H>    Ca    8.5      2023 04:00  Phos  5.7     04-05  Mg     2.20     04-05    TPro  5.4<L>  /  Alb  1.9<L>  /  TBili  <0.2  /  DBili  x   /  AST  31  /  ALT  18  /  AlkPhos  141<H>  0405    ------------------------------------------------------------------------------------------------------------    RADIOLOGY & ADDITIONAL STUDIES:    < from: CT Angio Brain Stroke Protocol  w/ IV Cont (23 @ 19:46) >  IMPRESSION:    CT PERFUSION:  Limited by late injection of the contrast bolus.    Cerebral blood flow less than 30% = 0 mL. No core infarct is predicted.    Tmax greater than 6 seconds = 0 mL. No brain parenchyma predicted to be   at continued ischemicrisk in the presence of neurologic symptoms.    CTA BRAIN:  No flow-limiting stenosis or vascular aneurysm. No AVM.    CTA NECK:  Calcified plaque at the origin of the left internal carotid artery   results in approximately 40% short segment stenosis.    Otherwise no flow-limiting stenosis. No evidence for arterial dissection.    < end of copied text >    < from: US Kidney and Bladder (03.15.23 @ 12:10) >  IMPRESSION:  Mild fullness of the left lower quadrant transplant kidney collecting   system.    Increased renal cortical echogenicity, possibly renal parenchymal disease.    < end of copied text >    < from: CT Abdomen and Pelvis No Cont (23 @ 20:23) >  IMPRESSION:    No retroperitoneal hematoma.    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    Polycystic native kidneys. Scattered hyperdensities may represent   hemorrhagic cysts..      < end of copied text >    < from: MR IAC w/wo IV Cont (23 @ 15:56) >  IMPRESSION:    1.  RIGHT CPA/IAC:  Otitis and mastoiditis. No intracranial abnormality   is appreciated in the adjacent middle cranial fossa.    2. LEFT CPA/IAC:  Mastoiditis. No intracranial abnormality is appreciated   in the adjacent middle cranial fossa.    3.  Brain: Left cerebellar acute infarction. Additional left cerebellar   lesion is indeterminate, possibly subacute infarction with petechial   hemorrhage. Scattered small lesions in the cerebral hemispheres,   predominantly within thewhite matter on the left, suggests small   scattered foci of subacute infarction. Left occipital cortical lesion is   indeterminant possibly subacute infarction. Ischemic white matter disease   and atrophy not atypical for age    4. The gadolinium-enhanced acquisition was somewhat suboptimal in terms   of enhancement effect. Consider short-term interval follow-up including   post gadolinium volumetric T1-weighted imaging.    < end of copied text >      ------------------------------------------------------------------------------------------------------------    ALLERGIES:  Allergies    apple (Unknown)  Benadryl (Unknown)  penicillin (Hives)  watermelon (Unknown)    Intolerances      MEDICATIONS  (STANDING):  atovaquone  Suspension 1500 milliGRAM(s) Oral daily  cefTRIAXone   IVPB 2000 milliGRAM(s) IV Intermittent every 12 hours  chlorhexidine 0.12% Liquid 15 milliLiter(s) Oral Mucosa two times a day  chlorhexidine 2% Cloths 1 Application(s) Topical <User Schedule>  ciprofloxacin  0.3% Ophthalmic Solution for Otic Use 4 Drop(s) Right Ear two times a day  dextrose 5%. 1000 milliLiter(s) (100 mL/Hr) IV Continuous <Continuous>  dextrose 5%. 1000 milliLiter(s) (50 mL/Hr) IV Continuous <Continuous>  dextrose 50% Injectable 25 Gram(s) IV Push once  dextrose 50% Injectable 12.5 Gram(s) IV Push once  dextrose 50% Injectable 25 Gram(s) IV Push once  glucagon  Injectable 1 milliGRAM(s) IntraMuscular once  heparin   Injectable 5000 Unit(s) SubCutaneous every 8 hours  insulin lispro (ADMELOG) corrective regimen sliding scale   SubCutaneous every 6 hours  lacosamide Solution 200 milliGRAM(s) Oral two times a day  levETIRAcetam 500 milliGRAM(s) Oral two times a day  levothyroxine 25 MICROGram(s) Oral daily  midodrine 10 milliGRAM(s) Oral every 8 hours  petrolatum Ophthalmic Ointment 1 Application(s) Both EYES every 12 hours  predniSONE   Tablet 5 milliGRAM(s) Oral daily  vancomycin    Solution 125 milliGRAM(s) Oral every 6 hours    MEDICATIONS  (PRN):  acetaminophen   Oral Liquid .. 650 milliGRAM(s) Enteral Tube every 6 hours PRN Temp greater or equal to 38C (100.4F), Mild Pain (1 - 3), Moderate Pain (4 - 6)  dextrose Oral Gel 15 Gram(s) Oral once PRN Blood Glucose LESS THAN 70 milliGRAM(s)/deciliter    ------------------------------------------------------------------------------------------------------------    CRITICAL CARE:  [ ]Shock Present  [ ]Septic [ ]Cardiogenic [ ]Neurologic [ ]Hypovolemic [ ] Undifferentiated/Mixed   [ ]Vasopressors [ ]Inotropes     [ ]Respiratory failure present: [ ]Acute  [ ]Chronic [ ]Hypoxic  [ ]Hypercarbic   [ ]Mechanical ventilation   [ ]Non-invasive ventilatory support   [ ]High flow  Mode: CPAP with PS, FiO2: 30, PEEP: 5, PS: 5, MAP: 7, PIP: 11  [ ]Non-rebreather/Venti     [ ]Other organ failure     Other REFERRALS:  [ ]Hospice  [ ]Child Life  [ ]Social Work  [ ]Case management [ ]Holistic Therapy    HPI:  Patient is a 78-year-old female with PCKD previously on HD via LUE AVF now s/p renal transplant, LLE DVT on ASA, HTN, HLD, DM2, presented with R-sided HA, R ear pain and neck pain after recent visit to ENT earlier in the day. Patient reportedly woke up at 0130 AM on Monday with a R-sided headache. ENT noted R otitis externa, purulent discharge from R TM thought to be secondary to perforation, given ear gtt and prescribed cipro/dexamethasone gtt (she took 1 dose thus far). Daughter called EMS as later in the day patient complained of severe HA as well as neck pain, stated "I'm dying" and "my head is killing me." Patient reportedly without prior history of ear infections, no history of stroke or seizures. She reported to ENT earlier in the day a muffled sensation in the R ear [id number]. Daughter notes patient has had no recent fevers/chills. She had a dental cleaning on Thursday (no abx prior to cleaning).On arrival, code stroke called.In the ED VS:  97.2  65-99  162-186/52-91  16-20  96-99%RA, received NS 500cc IVF, lorazepam 2mg IV x1 and morphine 4mg IV x1. Found to have tympanic membrane rupture, otitis media, possible acute otomastoiditis. Patient had poor mental status, no signs or evidence of CVA, suspicion for meningitis. LP showing step pneumo meningitis. on 3/16 patient sustained cardiac arrest ROSC achieved in 10 minutes. Hospital course complicated by acute renal failure on HD/CRRT, Strep pneumo bacteremia, possible seizures, C diff. Patient is anoxic, poor prognosis. Palliative consulted for complex medical decision making in the setting of serious illness.     PERTINENT PM/SXH:   Polycystic kidney disease    HTN (hypertension)    Glaucoma    Aneurysm    History of deep venous thrombosis (DVT) of distal vein of left lower extremity    Thrombocytopenia    Hypothyroid    Osteoporosis    Arthritis    PCP (pneumocystis carinii pneumonia)    C. difficile colitis    Type 2 diabetes mellitus, without long-term current use of insulin    Essential hypertension      H/O kidney transplant    H/O:     H/O eye surgery    S/P hysterectomy    H/O umbilical hernia repair      FAMILY HISTORY:  FH: diabetes mellitus (Sibling)    ------------------------------------------------------------------------------------------------------------  ITEMS NOT CHECKED ARE NOT PRESENT    SOCIAL HISTORY:   Living Situation: [X ]Home  [ ]Long term care  [ ]Rehab [ ]Other  Support: Daughter Arely + extended family. Worked as CNA     Substance hx:  [ ]   Tobacco hx:  [ ]   Alcohol hx: [ ]   Family Hx substance abuse [ ]yes [ ]no    Gnosticism/Spirituality:  PCSSQ[Palliative Care Spiritual Screening Question]   Severity (0-10): 0  Score of 4 or > indicate consideration of Chaplaincy referral.  Chaplaincy Referral: [ ] yes [X ] refused [ ] following    Caregiver [place]? : [ ] yes [X ] no [ ] Deferred [ ] Declined               Social work referral [ ] Patient & Family Centered Care Referral [ ]    Anticipatory Grief present?:  [ ] yes [X ] no  [ ] Deferred                    Social work referral [ ] Patient & Family Centered Care Referral [ ]    ------------------------------------------------------------------------------------------------------------    PRESENT SYMPTOMS:  [ ] No     [X ] Unable to self-report      [X ] CPOT (ICU) 0      [ ] PAINADs     [X ] RDOS 0     [ ] Yes     Source if other than patient:  [ ]Family   [ ]Team     PAIN:   If blank, patient unable to specify   [ ]yes [ ]no  QOL impact-   Location -                    Aggravating factors -  Quality -  Radiation -  Timing-  Pain at most severe level (0-10 scale):  Pain at minimal acceptable level/Pain Goal (0-10 scale):     SYMPTOMS:   Dyspnea:                           [ ]Mild [ ]Moderate [ ]Severe  Anxiety:                             [ ]Mild [ ]Moderate [ ]Severe  Fatigue:                             [ ]Mild [ ]Moderate [ ]Severe  Nausea/Vomiting:              [ ]Mild [ ]Moderate [ ]Severe  Loss of appetite:                [ ]Mild [ ]Moderate [ ]Severe  Constipation:                     [ ]Mild [ ]Moderate [ ]Severe    Other Symptoms:  [X ]All other review of systems negative     Home Medications for symptoms if any:  I-Stop Reference No:    ------------------------------------------------------------------------------------------------------------    FUNCTIONAL STATUS:     Baseline ADL (prior to admission):  [X ] Independent  [ ] Moderate Assistance [ ] Dependent  Palliative Performance Score: 90%     [ ]PPSV2 < or = to 30%     NUTRITIONAL STATUS:     Protein Calorie Malnutrition Present:   [ ]mild   [ ]moderate   [ ]severe   [ ]poor nutritional intake   [ ]artificial nutrition      Weight:   [ ]underweight (BMI 18.5 or less)   [ ]morbid obesity (BMI 30 or higher)   [ ]anasarca  [ ]significant weight loss     Height (cm): 158 (23 @ 14:33)  Weight (kg): 62.1 (23 @ 05:27)  BMI (kg/m2): 24.9 (23 @ 14:33)    ------------------------------------------------------------------------------------------------------------    PRIOR ADVANCE DIRECTIVES:    [ ] DNR/MOLST    [ ] Living Will    [ ] Health Care Proxy(s)    DECISION MAKER(s):  [ ] Patient    [X ] Surrogate(s)- daughter Arely   [ ] HCP   [ ] Guardian             ------------------------------------------------------------------------------------------------------------  PHYSICAL EXAM:  Vital Signs Last 24 Hrs  T(C): 37.5 (2023 08:00), Max: 38.6 (2023 04:00)  T(F): 99.5 (2023 08:00), Max: 101.5 (2023 04:00)  HR: 75 (2023 10:) (55 - 87)  BP: 116/53 (2023 10:00) (68/46 - 160/72)  BP(mean): 66 (2023 10:) (50 - 94)  RR: 21 (2023 10:) (14 - 24)  SpO2: 100% (2023 10:00) (94% - 100%)    Parameters below as of 2023 10:00  Patient On (Oxygen Delivery Method): ventilator,CPAP 8/5    O2 Concentration (%): 30 I&O's Summary    2023 07:01  -  2023 07:00  --------------------------------------------------------  IN: 710 mL / OUT: 0 mL / NET: 710 mL    2023 07:01  -  2023 10:20  --------------------------------------------------------  IN: 105 mL / OUT: 0 mL / NET: 105 mL    GENERAL:  [X ]Cachexia  [X ] Frail  [ ]Awake  [ ]Oriented x   [ ]Lethargic  [X ]Unarousable  [ ]Verbal  [X ]Non-Verbal    BEHAVIORAL:   [ ] Anxiety  [ ] Delirium [ ] Agitation [ ] Other    HEENT:   [ ]Normal   [ ]Dry mouth   [X ]ET Tube/Trach  [ ]Oral lesions    PULMONARY:   [ ]Clear [ ]Tachypnea  [ ]Audible excessive secretions   [ ]Rhonchi        [ ]Right [ ]Left [ ]Bilateral  [ ]Crackles        [ ]Right [ ]Left [ ]Bilateral  [ ]Wheezing     [ ]Right [ ]Left [ ]Bilateral  [X ]Diminished breath sounds [ ]right [ ]left [X ]bilateral    CARDIOVASCULAR:    [X ]Regular [ ]Irregular [ ]Tachy  [ ]Asaf [ ]Murmur [ ]Other    GASTROINTESTINAL:  [X ]Soft  [ ]Distended   [ ]+BS  [X ]Non tender [ ]Tender  [ ]Other [ ]PEG [ ]OGT/ NGT      GENITOURINARY:  [ ]Normal [ ] Incontinent   [ ]Oliguria/Anuria   [X ]Ponce    MUSCULOSKELETAL:   [ ]Normal   [ ]Weakness  [X ]Bed/Wheelchair bound [ ]Edema    NEUROLOGIC:   [X ]No focal deficits  [ ]Cognitive impairment  [ ]Dysphagia [ ]Dysarthria [ ]Paresis [ ]Other     SKIN:   [ ]Normal  [ ]Rash  [ ]Other  [X ]Pressure ulcer(s)       Present on admission [ ]y [ ]n    ------------------------------------------------------------------------------------------------------------    LABS:                        7.3    7.09  )-----------( 278      ( 2023 04:00 )             23.9   04-05    132<L>  |  95<L>  |  74<H>  ----------------------------<  139<H>  4.1   |  20<L>  |  4.23<H>    Ca    8.5      2023 04:00  Phos  5.7     04-05  Mg     2.20     04-05    TPro  5.4<L>  /  Alb  1.9<L>  /  TBili  <0.2  /  DBili  x   /  AST  31  /  ALT  18  /  AlkPhos  141<H>  0405    ------------------------------------------------------------------------------------------------------------    RADIOLOGY & ADDITIONAL STUDIES:    < from: CT Angio Brain Stroke Protocol  w/ IV Cont (23 @ 19:46) >  IMPRESSION:    CT PERFUSION:  Limited by late injection of the contrast bolus.    Cerebral blood flow less than 30% = 0 mL. No core infarct is predicted.    Tmax greater than 6 seconds = 0 mL. No brain parenchyma predicted to be   at continued ischemicrisk in the presence of neurologic symptoms.    CTA BRAIN:  No flow-limiting stenosis or vascular aneurysm. No AVM.    CTA NECK:  Calcified plaque at the origin of the left internal carotid artery   results in approximately 40% short segment stenosis.    Otherwise no flow-limiting stenosis. No evidence for arterial dissection.    < end of copied text >    < from: US Kidney and Bladder (03.15.23 @ 12:10) >  IMPRESSION:  Mild fullness of the left lower quadrant transplant kidney collecting   system.    Increased renal cortical echogenicity, possibly renal parenchymal disease.    < end of copied text >    < from: CT Abdomen and Pelvis No Cont (23 @ 20:23) >  IMPRESSION:    No retroperitoneal hematoma.    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    Polycystic native kidneys. Scattered hyperdensities may represent   hemorrhagic cysts..      < end of copied text >    < from: MR IAC w/wo IV Cont (23 @ 15:56) >  IMPRESSION:    1.  RIGHT CPA/IAC:  Otitis and mastoiditis. No intracranial abnormality   is appreciated in the adjacent middle cranial fossa.    2. LEFT CPA/IAC:  Mastoiditis. No intracranial abnormality is appreciated   in the adjacent middle cranial fossa.    3.  Brain: Left cerebellar acute infarction. Additional left cerebellar   lesion is indeterminate, possibly subacute infarction with petechial   hemorrhage. Scattered small lesions in the cerebral hemispheres,   predominantly within thewhite matter on the left, suggests small   scattered foci of subacute infarction. Left occipital cortical lesion is   indeterminant possibly subacute infarction. Ischemic white matter disease   and atrophy not atypical for age    4. The gadolinium-enhanced acquisition was somewhat suboptimal in terms   of enhancement effect. Consider short-term interval follow-up including   post gadolinium volumetric T1-weighted imaging.    < end of copied text >      ------------------------------------------------------------------------------------------------------------    ALLERGIES:  Allergies    apple (Unknown)  Benadryl (Unknown)  penicillin (Hives)  watermelon (Unknown)    Intolerances      MEDICATIONS  (STANDING):  atovaquone  Suspension 1500 milliGRAM(s) Oral daily  cefTRIAXone   IVPB 2000 milliGRAM(s) IV Intermittent every 12 hours  chlorhexidine 0.12% Liquid 15 milliLiter(s) Oral Mucosa two times a day  chlorhexidine 2% Cloths 1 Application(s) Topical <User Schedule>  ciprofloxacin  0.3% Ophthalmic Solution for Otic Use 4 Drop(s) Right Ear two times a day  dextrose 5%. 1000 milliLiter(s) (100 mL/Hr) IV Continuous <Continuous>  dextrose 5%. 1000 milliLiter(s) (50 mL/Hr) IV Continuous <Continuous>  dextrose 50% Injectable 25 Gram(s) IV Push once  dextrose 50% Injectable 12.5 Gram(s) IV Push once  dextrose 50% Injectable 25 Gram(s) IV Push once  glucagon  Injectable 1 milliGRAM(s) IntraMuscular once  heparin   Injectable 5000 Unit(s) SubCutaneous every 8 hours  insulin lispro (ADMELOG) corrective regimen sliding scale   SubCutaneous every 6 hours  lacosamide Solution 200 milliGRAM(s) Oral two times a day  levETIRAcetam 500 milliGRAM(s) Oral two times a day  levothyroxine 25 MICROGram(s) Oral daily  midodrine 10 milliGRAM(s) Oral every 8 hours  petrolatum Ophthalmic Ointment 1 Application(s) Both EYES every 12 hours  predniSONE   Tablet 5 milliGRAM(s) Oral daily  vancomycin    Solution 125 milliGRAM(s) Oral every 6 hours    MEDICATIONS  (PRN):  acetaminophen   Oral Liquid .. 650 milliGRAM(s) Enteral Tube every 6 hours PRN Temp greater or equal to 38C (100.4F), Mild Pain (1 - 3), Moderate Pain (4 - 6)  dextrose Oral Gel 15 Gram(s) Oral once PRN Blood Glucose LESS THAN 70 milliGRAM(s)/deciliter    ------------------------------------------------------------------------------------------------------------    CRITICAL CARE:  [ ]Shock Present  [ ]Septic [ ]Cardiogenic [ ]Neurologic [ ]Hypovolemic [ ] Undifferentiated/Mixed   [ ]Vasopressors [ ]Inotropes     [X ]Respiratory failure present: [X ]Acute  [ ]Chronic [ ]Hypoxic  [ ]Hypercarbic   [X ]Mechanical ventilation   [ ]Non-invasive ventilatory support   [ ]High flow  Mode: CPAP with PS, FiO2: 30, PEEP: 5, PS: 5, MAP: 7, PIP: 11  [ ]Non-rebreather/Venti     [X ]Other organ failure - HD     Other REFERRALS:  [ ]Hospice  [ ]Child Life  [ ]Social Work  [ ]Case management [ ]Holistic Therapy

## 2023-04-05 NOTE — PROGRESS NOTE ADULT - SUBJECTIVE AND OBJECTIVE BOX
Follow Up:      [place] History/ROS:Patient is a 78y old  Female who presents with a chief complaint of AMS/R ear infxn (05 Apr 2023 10:19)    + fever  No events    Allergies    apple (Unknown)  Benadryl (Unknown)  penicillin (Hives)  watermelon (Unknown)    Intolerances        ANTIMICROBIALS:  atovaquone  Suspension 1500 daily  cefTRIAXone   IVPB 2000 every 12 hours  vancomycin    Solution 125 every 6 hours      OTHER MEDS:  acetaminophen   Oral Liquid .. 650 milliGRAM(s) Enteral Tube every 6 hours PRN  chlorhexidine 0.12% Liquid 15 milliLiter(s) Oral Mucosa two times a day  chlorhexidine 2% Cloths 1 Application(s) Topical <User Schedule>  ciprofloxacin  0.3% Ophthalmic Solution for Otic Use 4 Drop(s) Right Ear two times a day  dextrose 5%. 1000 milliLiter(s) IV Continuous <Continuous>  dextrose 5%. 1000 milliLiter(s) IV Continuous <Continuous>  dextrose 50% Injectable 25 Gram(s) IV Push once  dextrose 50% Injectable 12.5 Gram(s) IV Push once  dextrose 50% Injectable 25 Gram(s) IV Push once  dextrose Oral Gel 15 Gram(s) Oral once PRN  glucagon  Injectable 1 milliGRAM(s) IntraMuscular once  heparin   Injectable 5000 Unit(s) SubCutaneous every 8 hours  insulin lispro (ADMELOG) corrective regimen sliding scale   SubCutaneous every 6 hours  lacosamide Solution 200 milliGRAM(s) Oral two times a day  levETIRAcetam 500 milliGRAM(s) Oral two times a day  levothyroxine 25 MICROGram(s) Oral daily  midodrine 10 milliGRAM(s) Oral every 8 hours  petrolatum Ophthalmic Ointment 1 Application(s) Both EYES every 12 hours  predniSONE   Tablet 5 milliGRAM(s) Oral daily      Vital Signs Last 24 Hrs  T(C): 37.1 (05 Apr 2023 12:00), Max: 38.6 (05 Apr 2023 04:00)  T(F): 98.7 (05 Apr 2023 12:00), Max: 101.5 (05 Apr 2023 04:00)  HR: 70 (05 Apr 2023 15:19) (59 - 87)  BP: 128/65 (05 Apr 2023 14:00) (111/55 - 160/72)  BP(mean): 79 (05 Apr 2023 14:00) (66 - 94)  RR: 18 (05 Apr 2023 14:00) (14 - 24)  SpO2: 100% (05 Apr 2023 15:19) (94% - 100%)    Parameters below as of 05 Apr 2023 14:00  Patient On (Oxygen Delivery Method): ventilator,CPAP 8/5    O2 Concentration (%): 30    PHYSICAL EXAM:  General: [ ] non-toxic  HEAD/EYES: [ ] PERRL [x ] white sclera [ ] icterus  ENT:  [ ] normal [x ] supple [ ] thrush [ ] pharyngeal exudate  Cardiovascular:   [ ] murmur [ x] normal [ ] PPM/AICD  Respiratory:  [x ] clear to ausculation bilaterally  GI:  [x ] soft, non-tender, normal bowel sounds  :  [ ] snider [x ] no CVA tenderness   Musculoskeletal:  [ ] no synovitis  Neurologic:  [ ] non-focal exam   Skin:  [ x] no rash  Lymph: [x ] no lymphadenopathy  Psychiatric:  [ ] appropriate affect [ ] alert & oriented  Lines:  x[ ] no phlebitis [ ] central line                                7.3    7.09  )-----------( 278      ( 05 Apr 2023 04:00 )             23.9       04-05    132<L>  |  95<L>  |  74<H>  ----------------------------<  139<H>  4.1   |  20<L>  |  4.23<H>    Ca    8.5      05 Apr 2023 04:00  Phos  5.7     04-05  Mg     2.20     04-05    TPro  5.4<L>  /  Alb  1.9<L>  /  TBili  <0.2  /  DBili  x   /  AST  31  /  ALT  18  /  AlkPhos  141<H>  04-05          MICROBIOLOGY:Culture Results:   No enteric pathogens isolated.  (Stool culture examined for Salmonella,  Shigella, Campylobacter, Aeromonas, Plesiomonas,  Vibrio, E.coli O157 and Yersinia) (04-01-23 @ 21:03)  Culture Results:   Normal Respiratory Kizzy present (04-01-23 @ 14:00)      RADIOLOGY:

## 2023-04-05 NOTE — PROGRESS NOTE ADULT - ASSESSMENT
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 Cardiac arrest 3/16 with ROSC, transferred to MICU for further management.       Plan:   Neurological  #Currently Intubated, opens eyes spontaneously, grimaces to pain  - remains off sedation  - EEG no seizures - Dcd 3/31  - MRI IAC and brain 3/29 - otitis and mastoiditis with multiple cerebral infarcts and cerebellar infarct. Will need f/u MRI in 3-4 weeks per neuro  - Endocarditis is possible cause for scattered cerebral infarcts, will consider utility of SORAYA given poor candidate for valve replacement  - mental status remains unimproved, withdraws from pain however does not elicit purposeful movements.      #Strep pneumo meningitis #Acute encephalopathy  p/w severe HA, neck stiffness, fever, leukocytosis concerning for meningitis/encephalitis  - CT head negative for CVA.   - LP and BCx 3/15 both showing gram positive cocci in pairs, and + strep pneumoniae.   - likely source of entry from R ear where pt c/o discomfort for several months.  - taking cyclosporine, mycophenolate mofetil and prednisone at home for kidney transplant and was likely immunocompromised -> dental procedure 3/14 but less likely source   - MRI of the IAC and brain 3/29 - otitis and mastoiditis with multiple cerebral infarcts and cerebellar infarct, f/u MRI 3-4 weeks (as per neuro)   - neuro and ID following    #Seizures  - 24hr EEG initially with highly focal seizures, improved with increased sedation.   - currently on keppra  500 BID since 3/17-   - vimpat 100 BID increased to 200 BID (3/20 -  )  - EEG neg- Dc' d 3/31     Cardiovascular  #s/p Cardiac Arrest   - bradycardia to 30, pulseless, code blue was called 3/16 . 2 rounds of epi, and PEA arrest. on 3rd pulse check pulseless VT, 200J shock given 4th pulse check asystole, 5th ROSC, sinus tachy with HUMA. IO placed and levo started. Due to blood in mouth took several attempts with DL for ETT to be placed but ultimately confirmed with capnometry and bilateral breath sounds.        #Elevated troponin  uptrending troponin to 182 3/15 AM, was likely iso ESRD  s/p cardiac arrest, elevated ST segment   - TTE from 3/17 showed EF of 63% with mild pulm htn, normal left ventricular systolic function, and diastolic LV dysfunction.      #HTN  home meds: on amlodipine 5mg, losartan 100mg, torsemide 20mg, and clonidine 0.2 patch weekly  - amlodipine 10mg restarted, but can hold now iso lower systolic blood pressure  - can start midodrine 10mg PO TID, can uptitrate as needed for hypotension          Respiratory  #Intubated  - tolerated PS 8/5 trial for few hours TV ~ 250-300  - Patient has been intubated >2 wks; will talk to family regarding what their plans are iso MRI reading; will likely need trach and peg if within their goals  - noted w/ oral and inline secretions, will send sputum Cx    ENT  # Tympanic membrane rupture  discharge from R ear, evaluated by ENT given dexamethasone and cipro 3/14  - c/w broad spectrum abx   - MRI of IAC appreciated  - ENT recs- pending results of MRI will consider need for myringotomy, but will defer at this time given overall medical condition    Gastrointestinal  No issues   -Constipation - resolved s/p movantik  - continues to have loose stools - f/up stool studies shows positive C-difficile  - bowel regimen discontinued   - tolerating TF at goal       Renal  #ESRD s/p Kidney Transplant in 2003 at [place] PCKD previously HD through LLE AVF but no HD since transplant ( per renal Cr has been in 2.8 since december 2022), now with NAMAN requiring HD   -[facility] Nephrologist Dr. Hugo Hernandez [phone]  - holding cyclosporine and mycophenolate but c/w home prednisone 5mg q day  - s/p 4mg IV bumex with minimal urine production (3/17)  - started on CRRT (3/17-3/21) now s/p intermittent HD - last session 4/1 (1L removal)  - Diuretic trial -furosemide 80mg IVP given 4/3 with no u/o response, will need access for HD session   - will eventually need transplant nephrology consult when infection treated for management of kidney transplant    Heme/DVT PPX  Hx of LLE DVT, anemia s/p 5 units pRbc's during this admission  -no DVT on doppler 12/22 and negative LLE duplex this admission 3/17  - POCUS showed residual clot in RLE; f/u dopplers 3/28 negative  - Evaluated by ENT for bleeding and found no signs of active bleeding from nasal cavity, nasopharynx or oral cavity, however, patient biting on tongue, which could be one source of bleeding. Tongue was edematous and lax  - CT a/p neg for RP bleed   - retic count normal, check hapto and LDH in am as part of hemolysis labs  - postitive FOBT 2/2 rectal prolapse?  - Hemoglobin w/slow downtrend  6.9 <7.2< 7.6 < 8.2.   - consider blood transfusion for Hb less than 7gm/dl   - concern for possible transfusion reaction 4/4-> hypotensive/bradycardia after receiving 100ml pRBC's , HR and BP normalized once transfusion stopped. Blood bank does not consider response a transfusion reaction, and okay with future transfusions if needed  -nephrology plan to add epogen on next hemodialysis session       Infectious Disease  Hx of PCP, now with this admission with Strep pneumoniae meningitis, R ear mastoiditis, strep bacteremia, now with C. difficile  - c/w Ceftriaxone (3/16 - )  - c/w ciprodex drops for R ear  -c/w vanco 125mg PO [id number] (4/2-  ) for C.diff  - ID following  - BCx 3/15 + strep, repeat cx from 3/23 NGTD will f/u repeat cultures sent 4/4   - f/u sputum cx  - atovaquone at home, continue      Endocrine  Hx of Hypothyroidism and Diabetes mellitus Type II  - c/w levothyroxine   -currently on prednisone will watch FS closely on insulin sliding scale       Ethics  FULL, GOC ongoing.  - Patient's cousin, states that daughter Arely would to come in 4/5 to discuss trach/PEG and transfer to long-term vent facility vs palliative extubation

## 2023-04-05 NOTE — CONSULT NOTE ADULT - PROBLEM SELECTOR PROBLEM 3
Initial /84   Pulse 99   Temp 99.2  F (37.3  C) (Tympanic)   Resp 14   Ht 1.829 m (6')   Wt 143.9 kg (317 lb 3.2 oz)   SpO2 96%   BMI 43.02 kg/m   Estimated body mass index is 43.02 kg/m  as calculated from the following:    Height as of this encounter: 1.829 m (6').    Weight as of this encounter: 143.9 kg (317 lb 3.2 oz). .       Debility

## 2023-04-05 NOTE — PROGRESS NOTE ADULT - SUBJECTIVE AND OBJECTIVE BOX
[facility] - S Alea / Tita S /D Rama/ S Jovan/ S Keenan/ Cleve Gambino / DAGMAR Wilsonu/ O Daya  service -[phone], office [phone]  ---------------------------------------------------------------------------------------------------------------    Patient seen and examined bedside    Subjective and Objective: No overnight events, sob resolved. No complaints today. feeling better    Allergies: apple (Unknown)  Benadryl (Unknown)  penicillin (Hives)  watermelon (Unknown)      Hospital Medications:   MEDICATIONS  (STANDING):  atovaquone  Suspension 1500 milliGRAM(s) Oral daily  cefTRIAXone   IVPB 2000 milliGRAM(s) IV Intermittent every 12 hours  chlorhexidine 0.12% Liquid 15 milliLiter(s) Oral Mucosa two times a day  chlorhexidine 2% Cloths 1 Application(s) Topical <User Schedule>  ciprofloxacin  0.3% Ophthalmic Solution for Otic Use 4 Drop(s) Right Ear two times a day  dextrose 5%. 1000 milliLiter(s) (100 mL/Hr) IV Continuous <Continuous>  dextrose 5%. 1000 milliLiter(s) (50 mL/Hr) IV Continuous <Continuous>  dextrose 50% Injectable 25 Gram(s) IV Push once  dextrose 50% Injectable 12.5 Gram(s) IV Push once  dextrose 50% Injectable 25 Gram(s) IV Push once  glucagon  Injectable 1 milliGRAM(s) IntraMuscular once  heparin   Injectable 5000 Unit(s) SubCutaneous every 8 hours  insulin lispro (ADMELOG) corrective regimen sliding scale   SubCutaneous every 6 hours  lacosamide Solution 200 milliGRAM(s) Oral two times a day  levETIRAcetam 500 milliGRAM(s) Oral two times a day  levothyroxine 25 MICROGram(s) Oral daily  midodrine 10 milliGRAM(s) Oral every 8 hours  petrolatum Ophthalmic Ointment 1 Application(s) Both EYES every 12 hours  predniSONE   Tablet 5 milliGRAM(s) Oral daily  vancomycin    Solution 125 milliGRAM(s) Oral every 6 hours      REVIEW OF SYSTEMS:  CONSTITUTIONAL: No weakness, fevers or chills  EYES/ENT: No visual changes;  No vertigo or throat pain   NECK: No pain or stiffness  RESPIRATORY: No cough, wheezing, hemoptysis; No shortness of breath  CARDIOVASCULAR: No chest pain or palpitations.  GASTROINTESTINAL: No abdominal or epigastric pain. No nausea, vomiting, or hematemesis; No diarrhea or constipation. No melena or hematochezia.  GENITOURINARY: No dysuria, frequency, foamy urine, urinary urgency, incontinence or hematuria  NEUROLOGICAL: No numbness or weakness  SKIN: No itching, burning, rashes, or lesions   VASCULAR: No bilateral lower extremity edema.   All other review of systems is negative unless indicated above.    VITALS:  T(F): 98.7 (04-05-23 @ 12:00), Max: 101.5 (04-05-23 @ 04:00)  HR: 70 (04-05-23 @ 15:19)  BP: 128/65 (04-05-23 @ 14:00)  RR: 18 (04-05-23 @ 14:00)  SpO2: 100% (04-05-23 @ 15:19)  Wt(kg): --    04-04 @ 07:01  -  04-05 @ 07:00  --------------------------------------------------------  IN: 710 mL / OUT: 0 mL / NET: 710 mL    04-05 @ 07:01  -  04-05 @ 18:23  --------------------------------------------------------  IN: 140 mL / OUT: 0 mL / NET: 140 mL          PHYSICAL EXAM:  Constitutional: NAD  HEENT: anicteric sclera, oropharynx clear  Neck: No JVD  Respiratory: CTAB, no wheezes, rales or rhonchi  Cardiovascular: S1, S2, RRR  Gastrointestinal: BS+, soft, NT/ND  Extremities: No cyanosis or clubbing. No peripheral edema  Neurological: A/O x 3, no focal deficits  Psychiatric: Normal mood, normal affect  : No CVA tenderness. No snider.   Skin: No rashes  Vascular Access:    LABS:  04-05    132<L>  |  95<L>  |  74<H>  ----------------------------<  139<H>  4.1   |  20<L>  |  4.23<H>    Ca    8.5      05 Apr 2023 04:00  Phos  5.7     04-05  Mg     2.20     04-05    TPro  5.4<L>  /  Alb  1.9<L>  /  TBili  <0.2  /  DBili      /  AST  31  /  ALT  18  /  AlkPhos  141<H>  04-05    Creatinine Trend: 4.23 <--, 3.74 <--, 3.17 <--, 2.41 <--, 2.90 <--, 2.36 <--, 2.73 <--                        7.3    7.09  )-----------( 278      ( 05 Apr 2023 04:00 )             23.9     Urine Studies:        RADIOLOGY & ADDITIONAL STUDIES:   [facility] - S Alea / Tita S /D Rama/ S Jovan/ S Keenan/ Cleve Gambino / DAGMAR Wilsonu/ O Daya  service -[phone], office [phone]  ---------------------------------------------------------------------------------------------------------------    Patient seen and examined bedside in MICU    Subjective and Objective: No overnight events, remains unresponsive, on MV  still spiking fevers    Allergies: apple (Unknown)  Benadryl (Unknown)  penicillin (Hives)  watermelon (Unknown)      [facility] Medications:   MEDICATIONS  (STANDING):  atovaquone  Suspension 1500 milliGRAM(s) Oral daily  cefTRIAXone   IVPB 2000 milliGRAM(s) IV Intermittent every 12 hours  chlorhexidine 0.12% Liquid 15 milliLiter(s) Oral Mucosa two times a day  chlorhexidine 2% Cloths 1 Application(s) Topical <User Schedule>  ciprofloxacin  0.3% Ophthalmic Solution for Otic Use 4 Drop(s) Right Ear two times a day  dextrose 5%. 1000 milliLiter(s) (100 mL/Hr) IV Continuous <Continuous>  dextrose 5%. 1000 milliLiter(s) (50 mL/Hr) IV Continuous <Continuous>  dextrose 50% Injectable 25 Gram(s) IV Push once  dextrose 50% Injectable 12.5 Gram(s) IV Push once  dextrose 50% Injectable 25 Gram(s) IV Push once  glucagon  Injectable 1 milliGRAM(s) IntraMuscular once  heparin   Injectable 5000 Unit(s) SubCutaneous every 8 hours  insulin lispro (ADMELOG) corrective regimen sliding scale   SubCutaneous every 6 hours  lacosamide Solution 200 milliGRAM(s) Oral two times a day  levETIRAcetam 500 milliGRAM(s) Oral two times a day  levothyroxine 25 MICROGram(s) Oral daily  midodrine 10 milliGRAM(s) Oral every 8 hours  petrolatum Ophthalmic Ointment 1 Application(s) Both EYES every 12 hours  predniSONE   Tablet 5 milliGRAM(s) Oral daily  vancomycin    Solution 125 milliGRAM(s) Oral every 6 hours    VITALS:  T(F): 98.7 (04-05-23 @ 12:00), Max: 101.5 (04-05-23 @ 04:00)  HR: 70 (04-05-23 @ 15:19)  BP: 128/65 (04-05-23 @ 14:00)  RR: 18 (04-05-23 @ 14:00)  SpO2: 100% (04-05-23 @ 15:19)  Wt(kg): --    04-04 @ 07:01  -  04-05 @ 07:00  --------------------------------------------------------  IN: 710 mL / OUT: 0 mL / NET: 710 mL    04-05 @ 07:01  -  04-05 @ 18:23  --------------------------------------------------------  IN: 140 mL / OUT: 0 mL / NET: 140 mL      PHYSICAL EXAM:  Constitutional: NAD  HEENT: ETT+ on MV, +NGT  Neck: No JVD  Respiratory: CTAB, no wheezes, rales or rhonchi  Cardiovascular: S1, S2, RRR  Gastrointestinal: BS+, soft  Extremities: No peripheral edema  Neurological: unresponsive  : + snider.     LABS:  04-05    132<L>  |  95<L>  |  74<H>  ----------------------------<  139<H>  4.1   |  20<L>  |  4.23<H>    Ca    8.5      05 Apr 2023 04:00  Phos  5.7     04-05  Mg     2.20     04-05    TPro  5.4<L>  /  Alb  1.9<L>  /  TBili  <0.2  /  DBili      /  AST  31  /  ALT  18  /  AlkPhos  141<H>  04-05    Creatinine Trend: 4.23 <--, 3.74 <--, 3.17 <--, 2.41 <--, 2.90 <--, 2.36 <--, 2.73 <--                        7.3    7.09  )-----------( 278      ( 05 Apr 2023 04:00 )             23.9     Urine Studies:        RADIOLOGY & ADDITIONAL STUDIES:

## 2023-04-05 NOTE — PROGRESS NOTE ADULT - SUBJECTIVE AND OBJECTIVE BOX
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 Patient reportedly woke up at 0130 AM on Monday with a R-sided headache. ENT noted R otitis externa, purulent discharge from R TM thought to be secondary to perforation, given ear gtt and prescribed cipro/dexamethasone gtt (she took 1 dose thus far). Daughter called EMS as later in the day patient complained of severe HA as well as neck pain, stated "I'm dying" and "my head is killing me." Patient reportedly without prior history of ear infections, no history of stroke or seizures. She reported to ENT earlier in the day a muffled sensation in the R ear [id number].     Daughter notes patient has had no recent fevers/chills. She had a dental cleaning on Thursday (no abx prior to cleaning).    On arrival, code stroke called.    In the ED VS:  97.2  65-99  162-186/52-91  16-20  96-99%RA, received NS 500cc IVF, lorazepam 2mg IV x1 and morphine 4mg IV x1 (15 Mar 2023 01:17)      REVIEW OF SYSTEMS:    [ x] Unable to assess ROS because patient is intubated/unresponsive     OBJECTIVE:  ICU Vital Signs Last 24 Hrs  T(C): 37.5 (05 Apr 2023 08:00), Max: 38.6 (05 Apr 2023 04:00)  T(F): 99.5 (05 Apr 2023 08:00), Max: 101.5 (05 Apr 2023 04:00)  HR: 72 (05 Apr 2023 10:32) (55 - 87)  BP: 116/53 (05 Apr 2023 10:00) (68/46 - 160/72)  BP(mean): 66 (05 Apr 2023 10:00) (50 - 94)  ABP: --  ABP(mean): --  RR: 21 (05 Apr 2023 10:00) (14 - 24)  SpO2: 100% (05 Apr 2023 10:32) (94% - 100%)    O2 Parameters below as of 05 Apr 2023 10:00  Patient On (Oxygen Delivery Method): ventilator,CPAP 8/5    O2 Concentration (%): 30      Mode: CPAP with PS, FiO2: 30, PEEP: 5, PS: 8, MAP: 7, PIP: 14    04-04 @ 07:01  -  04-05 @ 07:00  --------------------------------------------------------  IN: 710 mL / OUT: 0 mL / NET: 710 mL    04-05 @ 07:01  -  04-05 @ 11:00  --------------------------------------------------------  IN: 105 mL / OUT: 0 mL / NET: 105 mL      CAPILLARY BLOOD GLUCOSE      POCT Blood Glucose.: 166 mg/dL (05 Apr 2023 05:24)      PHYSICAL EXAM:  GENERAL: ill appearing, no distress  PSYCH: A&O x0  HEAD: Atraumatic, Normocephalic  NECK: Supple, No JVD  CHEST/LUNG: clear to auscultation bilaterally  HEART: regular rate and rhythm, no murmurs  ABDOMEN: +distension, palpable abdominal mass upper and lower right quadrants   EXTREMITIES: +3 bilateral LE edema   NEUROLOGY: opens eyes slightly, no purposeful movements noted  SKIN: No rashes or lesions, pressure ulcer to [facility] MEDICATIONS:  Standing Meds:  atovaquone  Suspension 1500 milliGRAM(s) Oral daily  cefTRIAXone   IVPB 2000 milliGRAM(s) IV Intermittent every 12 hours  chlorhexidine 0.12% Liquid 15 milliLiter(s) Oral Mucosa two times a day  chlorhexidine 2% Cloths 1 Application(s) Topical <User Schedule>  ciprofloxacin  0.3% Ophthalmic Solution for Otic Use 4 Drop(s) Right Ear two times a day  dextrose 5%. 1000 milliLiter(s) IV Continuous <Continuous>  dextrose 5%. 1000 milliLiter(s) IV Continuous <Continuous>  dextrose 50% Injectable 25 Gram(s) IV Push once  dextrose 50% Injectable 12.5 Gram(s) IV Push once  dextrose 50% Injectable 25 Gram(s) IV Push once  glucagon  Injectable 1 milliGRAM(s) IntraMuscular once  heparin   Injectable 5000 Unit(s) SubCutaneous every 8 hours  insulin lispro (ADMELOG) corrective regimen sliding scale   SubCutaneous every 6 hours  lacosamide Solution 200 milliGRAM(s) Oral two times a day  levETIRAcetam 500 milliGRAM(s) Oral two times a day  levothyroxine 25 MICROGram(s) Oral daily  midodrine 10 milliGRAM(s) Oral every 8 hours  petrolatum Ophthalmic Ointment 1 Application(s) Both EYES every 12 hours  predniSONE   Tablet 5 milliGRAM(s) Oral daily  vancomycin    Solution 125 milliGRAM(s) Oral every 6 hours      PRN Meds:  acetaminophen   Oral Liquid .. 650 milliGRAM(s) Enteral Tube every 6 hours PRN  dextrose Oral Gel 15 Gram(s) Oral once PRN      LABS:                        7.3    7.09  )-----------( 278      ( 05 Apr 2023 04:00 )             23.9     Hgb Trend: 7.3<--, 7.3<--, 6.9<--, 7.2<--, 7.6<--  04-05    132<L>  |  95<L>  |  74<H>  ----------------------------<  139<H>  4.1   |  20<L>  |  4.23<H>    Ca    8.5      05 Apr 2023 04:00  Phos  5.7     04-05  Mg     2.20     04-05    TPro  5.4<L>  /  Alb  1.9<L>  /  TBili  <0.2  /  DBili  x   /  AST  31  /  ALT  18  /  AlkPhos  141<H>  04-05    Creatinine Trend: 4.23<--, 3.74<--, 3.17<--, 2.41<--, 2.90<--, 2.36<--        Venous Blood Gas:  04-05 @ 04:00  7.42/35/51/23/84.2  VBG Lactate: 0.9      MICROBIOLOGY:     Culture - Sputum (collected 04 Apr 2023 18:11)  Source: .Sputum Sputum  Gram Stain (05 Apr 2023 07:20):    Few polymorphonuclear leukocytes per low power field    No Squamous epithelial cells per low power field    Few Gram Negative Rods seen per oil power field

## 2023-04-05 NOTE — PROGRESS NOTE ADULT - ASSESSMENT
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  Cardiac arrest on 3/16/23  septic shock s/p iv pressors  intubated on MV  Renal following for NAMAN on CKD Mx.  OS Nephrologist Dr. Hugo Hernandez [phone]    Acute kidney injury on Chronic Kidney Disease Stage 4 vs Chronic Kidney Disease Stage 4 progression to End Stage Renal Disease on Dialysis     hx KTx 2003 : In light of severe sepsis and cardiac arrest: off MMF and Cyclosporine  w/worsened renal function, oliguria, acidosis, mild hyperkalemia- s/p CVVHD, then intermittent HD  Consent for HD obtained, signed by daughter 3/16/23  B/L creat around 2.8 since Dec 2022  Patient was on Cyclosporine (Gengraf) 75mg PO q12hrs,  BID and Prednisone 5 QD for transplant    Creatinine Trend: 4.23 <--, 3.74 <--, 3.17 <--, 2.41 <--, 2.90 <--, 2.36 <--, 2.73 <--  femoral shiley removed 4/2 for line holiday  s/p furosemide   Injectable 80 milliGRAM(s) IV Push x14/3- no response  K, vol acceptable  Creatinine Trending up since last HD 4/1/23    plan:  c/w predniSONE   Tablet 5 milliGRAM(s) Oral daily  no indication for urgent HD today  plan for GOC discussion w/daughter tomorrow per ICU team   if remains oligoanuric, will need a new shiley for HD if HD still within GOC  monitor u/o  monitor electrolytes, BMP daily     HTN- bp stable, controlled-  Medications :  amLODIPine   Tablet 10 milliGRAM(s) Oral every 24 hours    - Continue current medications w/holding parameters  - plan to titrate anti hypertensive medication as needed  - low salt diet if not npo suggested    Anemia :  Hemoglobin : 7.3  if Hb less than 7gm/dl consider blood transfusion    OM and OE  vent Mx, per ICU  spiking fevers- f/u rpt blood cxs  Abxs per Infectious disease specialist with dose adjustment per GFR    C diff colitis- on po vanco    on going GOC discussion w/daughter. Palliative care note reviewed. Family to discuss Sunday. Full code for now.     overall prognosis guarded  labs, chart reviewed  poc d/w ICU team

## 2023-04-05 NOTE — PROGRESS NOTE ADULT - CRITICAL CARE ATTENDING COMMENT
Agree with above  Remains intubated day 20 currently 8/5 @35%, but cannot extubate due to lack of mental status, likely consequence of meningitis and imaging showing cerebellar and cerebral areas of ischemia.  No significant neurological recovery off sedation  I/O +650cc/d  Remains on ceftriaxone day 20. Will need to clarify with ID re: duration of regimen. Blood cultures have cleared, but patient was being treated for bacteremia and meningitis with Strep pneumoniae. Given C. diff positive on PO vancomycin, would minimize other antibiotics as much as possible.  GOC discussions remain ongoing. Palliative care consult appreciated. Family apparently thinking of Sunday as goal for final decision. Agree with above  Remains intubated day 20 currently 8/5 @35%, but cannot extubate due to lack of mental status, likely consequence of meningitis and imaging showing cerebellar and cerebral areas of ischemia.  No significant neurological recovery off sedation  I/O +650cc/d  Remains on ceftriaxone day 20. Will need to clarify with ID re: duration of regimen. Blood cultures have cleared, but patient was being treated for bacteremia and meningitis with Strep pneumoniae. Given C. diff positive on PO vancomycin, would minimize other antibiotics as much as possible.  GOC discussions remain ongoing. Palliative care consult appreciated. Family apparently thinking of Sunday as goal for final decision. Family has consented to HD if patient needs it, so will d/w Nephro re: when to resume HD, and will place new HD catheter when necessary.

## 2023-04-05 NOTE — CONSULT NOTE ADULT - PROBLEM SELECTOR RECOMMENDATION 3
- Previously independent, now s/p cardiac arrest anoxic brain injury, strep pneumo meningitis and bacteremia, PPS 10%, C diff, immunosuppressed due to renal transplant, acute renal failure now on HD  - Poor prognosis

## 2023-04-06 NOTE — CONSULT NOTE ADULT - CONSULT REASON
sacral decubitus
R ear pain
complex medical decision making in the setting of serious illness
code stroke
anemia
altered mental status
neurological decompensation
Virgil on KTx

## 2023-04-06 NOTE — PROVIDER CONTACT NOTE (OTHER) - ASSESSMENT
Patient has fever 100.5
Pt is intubated, unresponsive at this time, no changes.
Shaka score since admission was 16, shaka score currently 7. Albumin level on admission was 3.4, current Albumin level is 1.9. Current total protein is 5.4. Most recent weight is 59kg. Nutritional plan for patient includes Neprorth 840ml/day.
Pt is A&O-0 intubated.

## 2023-04-06 NOTE — PROGRESS NOTE ADULT - SUBJECTIVE AND OBJECTIVE BOX
[facility] - DANNA Cosby / DANNA Rivers / LENCHO Ontiveros/ DANNA Aldana/ DANNA Hussein/ QUYEN Gambino / LUCIAN Mukherjee / GISELE Stapleton  ---------------------------------------------------------------------------------------------------------------  seen and examined today for   Interval :  VITALS:  T(F): 97.4 (04-06-23 @ 08:00), Max: 99.6 (04-06-23 @ 04:00)  HR: 66 (04-06-23 @ 10:00)  BP: 135/68 (04-06-23 @ 10:00)  RR: 16 (04-06-23 @ 10:00)  SpO2: 100% (04-06-23 @ 10:00)  Wt(kg): --    04-05 @ 07:01  -  04-06 @ 07:00  --------------------------------------------------------  IN: 1528 mL / OUT: 250 mL / NET: 1278 mL    04-06 @ 07:01  -  04-06 @ 10:32  --------------------------------------------------------  IN: 176 mL / OUT: 0 mL / NET: 176 mL      Physical Exam :-  Constitutional: NAD  Neck: Supple.  Respiratory: Bilateral equal breath sounds,  Cardiovascular: S1, S2 normal,  Gastrointestinal: Bowel Sounds present, soft, non tender.  Extremities: No edema  Neurological: Alert and Oriented x 3, no focal deficits  Psychiatric: Normal mood, normal affect  Data:-  Allergies :   apple (Unknown)  Benadryl (Unknown)  penicillin (Hives)  watermelon (Unknown)    Hospital Medications:   MEDICATIONS  (STANDING):  atovaquone  Suspension 1500 milliGRAM(s) Oral daily  cefTRIAXone   IVPB 2000 milliGRAM(s) IV Intermittent every 12 hours  chlorhexidine 0.12% Liquid 15 milliLiter(s) Oral Mucosa two times a day  chlorhexidine 2% Cloths 1 Application(s) Topical <User Schedule>  ciprofloxacin  0.3% Ophthalmic Solution for Otic Use 4 Drop(s) Right Ear two times a day  dextrose 5%. 1000 milliLiter(s) (100 mL/Hr) IV Continuous <Continuous>  dextrose 5%. 1000 milliLiter(s) (50 mL/Hr) IV Continuous <Continuous>  dextrose 50% Injectable 25 Gram(s) IV Push once  dextrose 50% Injectable 12.5 Gram(s) IV Push once  dextrose 50% Injectable 25 Gram(s) IV Push once  glucagon  Injectable 1 milliGRAM(s) IntraMuscular once  heparin   Injectable 5000 Unit(s) SubCutaneous every 8 hours  insulin lispro (ADMELOG) corrective regimen sliding scale   SubCutaneous every 6 hours  lacosamide Solution 200 milliGRAM(s) Oral two times a day  levETIRAcetam 500 milliGRAM(s) Oral two times a day  levothyroxine 25 MICROGram(s) Oral daily  midodrine 10 milliGRAM(s) Oral every 8 hours  petrolatum Ophthalmic Ointment 1 Application(s) Both EYES every 12 hours  predniSONE   Tablet 5 milliGRAM(s) Oral daily  vancomycin    Solution 125 milliGRAM(s) Oral every 6 hours    04-06    133<L>  |  96<L>  |  81<H>  ----------------------------<  151<H>  4.0   |  21<L>  |  4.44<H>    Ca    8.4      06 Apr 2023 00:35  Phos  6.4     04-06  Mg     2.20     04-06    TPro  4.5<L>  /  Alb  1.8<L>  /  TBili  <0.2  /  DBili      /  AST  32  /  ALT  17  /  AlkPhos  142<H>  04-06    Creatinine Trend: 4.44 <--, 4.23 <--, 3.74 <--, 3.17 <--, 2.41 <--, 2.90 <--, 2.36 <--                        8.8    9.28  )-----------( 283      ( 06 Apr 2023 09:15 )             27.0    [facility] - DANNA Cosby / DANNA Rivers / LENCHO Ontiveros/ DANNA Aldana/ DANNA Hussein/ QUYEN Gambino / LUCIAN Mukherjee / GISELE Stapleton  ---------------------------------------------------------------------------------------------------------------  seen and examined today for ESRD  Interval : family wants to continue HD until [facility] discussion Sunday  VITALS:  T(F): 97.4 (04-06-23 @ 08:00), Max: 99.6 (04-06-23 @ 04:00)  HR: 66 (04-06-23 @ 10:00)  BP: 135/68 (04-06-23 @ 10:00)  RR: 16 (04-06-23 @ 10:00)  SpO2: 100% (04-06-23 @ 10:00)  Wt(kg): --    04-05 @ 07:01  -  04-06 @ 07:00  --------------------------------------------------------  IN: 1528 mL / OUT: 250 mL / NET: 1278 mL    04-06 @ 07:01  -  04-06 @ 10:32  --------------------------------------------------------  IN: 176 mL / OUT: 0 mL / NET: 176 mL      Physical Exam :-  Constitutional: intubated  Neck: Supple.  Respiratory: Bilateral equal breath sounds,  Cardiovascular: S1, S2 normal,  Gastrointestinal: Bowel Sounds present, soft, non tender.  Extremities: anasarca +  Neurological: intubated, not following commands  Psychiatric: not following commands  Data:-  Allergies :   apple (Unknown)  Benadryl (Unknown)  penicillin (Hives)  watermelon (Unknown)    Hospital Medications:   MEDICATIONS  (STANDING):  atovaquone  Suspension 1500 milliGRAM(s) Oral daily  cefTRIAXone   IVPB 2000 milliGRAM(s) IV Intermittent every 12 hours  chlorhexidine 0.12% Liquid 15 milliLiter(s) Oral Mucosa two times a day  chlorhexidine 2% Cloths 1 Application(s) Topical <User Schedule>  ciprofloxacin  0.3% Ophthalmic Solution for Otic Use 4 Drop(s) Right Ear two times a day  dextrose 5%. 1000 milliLiter(s) (100 mL/Hr) IV Continuous <Continuous>  dextrose 5%. 1000 milliLiter(s) (50 mL/Hr) IV Continuous <Continuous>  dextrose 50% Injectable 25 Gram(s) IV Push once  dextrose 50% Injectable 12.5 Gram(s) IV Push once  dextrose 50% Injectable 25 Gram(s) IV Push once  glucagon  Injectable 1 milliGRAM(s) IntraMuscular once  heparin   Injectable 5000 Unit(s) SubCutaneous every 8 hours  insulin lispro (ADMELOG) corrective regimen sliding scale   SubCutaneous every 6 hours  lacosamide Solution 200 milliGRAM(s) Oral two times a day  levETIRAcetam 500 milliGRAM(s) Oral two times a day  levothyroxine 25 MICROGram(s) Oral daily  midodrine 10 milliGRAM(s) Oral every 8 hours  petrolatum Ophthalmic Ointment 1 Application(s) Both EYES every 12 hours  predniSONE   Tablet 5 milliGRAM(s) Oral daily  vancomycin    Solution 125 milliGRAM(s) Oral every 6 hours    04-06    133<L>  |  96<L>  |  81<H>  ----------------------------<  151<H>  4.0   |  21<L>  |  4.44<H>    Ca    8.4      06 Apr 2023 00:35  Phos  6.4     04-06  Mg     2.20     04-06    TPro  4.5<L>  /  Alb  1.8<L>  /  TBili  <0.2  /  DBili      /  AST  32  /  ALT  17  /  AlkPhos  142<H>  04-06    Creatinine Trend: 4.44 <--, 4.23 <--, 3.74 <--, 3.17 <--, 2.41 <--, 2.90 <--, 2.36 <--                        8.8    9.28  )-----------( 283      ( 06 Apr 2023 09:15 )             27.0

## 2023-04-06 NOTE — PROVIDER CONTACT NOTE (OTHER) - SITUATION
A interdisciplinary huddle was called today for impairment of skin integrity. A pressure ulcer was identified on the sacrum-stage 4. Deteriorated from a stage 2 pressure injury.
AV Fistula not working, Palpated and auscultated multiple times all throughout upper arm and lower arm, with a secondary nurse
Patient temp 100.5
MD Viraj Camara aware that this is the third treatment with the patients femoral catheter  when asked what the plan is regarding this shiley, I was told that an attempt at getting and IJ was made

## 2023-04-06 NOTE — PROGRESS NOTE ADULT - SUBJECTIVE AND OBJECTIVE BOX
Follow Up:      Inverval History/ROS: Patient is a 78y old  Female who presents with a chief complaint of AMS/R ear infxn (06 Apr 2023 12:50)    Loose BM today    Allergies    apple (Unknown)  Benadryl (Unknown)  penicillin (Hives)  watermelon (Unknown)    Intolerances        ANTIMICROBIALS:  atovaquone  Suspension 1500 daily  cefTRIAXone   IVPB 2000 every 12 hours  vancomycin    Solution 125 every 6 hours      OTHER MEDS:  acetaminophen   Oral Liquid .. 650 milliGRAM(s) Enteral Tube every 6 hours PRN  chlorhexidine 0.12% Liquid 15 milliLiter(s) Oral Mucosa two times a day  chlorhexidine 2% Cloths 1 Application(s) Topical <User Schedule>  ciprofloxacin  0.3% Ophthalmic Solution for Otic Use 4 Drop(s) Right Ear two times a day  dextrose 5%. 1000 milliLiter(s) IV Continuous <Continuous>  dextrose 5%. 1000 milliLiter(s) IV Continuous <Continuous>  dextrose 50% Injectable 25 Gram(s) IV Push once  dextrose 50% Injectable 12.5 Gram(s) IV Push once  dextrose 50% Injectable 25 Gram(s) IV Push once  dextrose Oral Gel 15 Gram(s) Oral once PRN  glucagon  Injectable 1 milliGRAM(s) IntraMuscular once  heparin   Injectable 5000 Unit(s) SubCutaneous every 8 hours  insulin lispro (ADMELOG) corrective regimen sliding scale   SubCutaneous every 6 hours  lacosamide Solution 200 milliGRAM(s) Oral two times a day  levETIRAcetam 500 milliGRAM(s) Oral two times a day  levothyroxine 25 MICROGram(s) Oral daily  midodrine 10 milliGRAM(s) Oral every 8 hours  petrolatum Ophthalmic Ointment 1 Application(s) Both EYES every 12 hours  predniSONE   Tablet 5 milliGRAM(s) Oral daily      Vital Signs Last 24 Hrs  T(C): 37.4 (06 Apr 2023 12:00), Max: 37.6 (06 Apr 2023 04:00)  T(F): 99.4 (06 Apr 2023 12:00), Max: 99.6 (06 Apr 2023 04:00)  HR: 66 (06 Apr 2023 14:00) (63 - 76)  BP: 105/59 (06 Apr 2023 14:00) (99/48 - 161/77)  BP(mean): 69 (06 Apr 2023 14:00) (58 - 98)  RR: 17 (06 Apr 2023 14:00) (14 - 22)  SpO2: 99% (06 Apr 2023 14:00) (98% - 100%)    Parameters below as of 06 Apr 2023 15:00  Patient On (Oxygen Delivery Method): ventilator    O2 Concentration (%): 30    PHYSICAL EXAM:  General: [x ]critically ill  HEAD/EYES: [ ] PERRL [x ] white sclera [ ] icterus  ENT:  [ ] normal [x ] supple [ ] thrush [ ] pharyngeal exudate  Cardiovascular:   [ ] murmur [ x] normal [ ] PPM/AICD  Respiratory:  [ x] clear to ausculation bilaterally  GI:  [ x] soft, non-tender, normal bowel sounds  :  [ ] snider [ x] no CVA tenderness   Musculoskeletal:  [ ] no synovitis  Neurologic:  [ ] non-focal exam   Skin:  [x ] no rash  Lymph: [ x] no lymphadenopathy  Psychiatric:  [ ] appropriate affect [ ] alert & oriented  Lines:  [ x] no phlebitis [ ] central line                                8.8    9.28  )-----------( 283      ( 06 Apr 2023 09:15 )             27.0       04-06    133<L>  |  96<L>  |  81<H>  ----------------------------<  151<H>  4.0   |  21<L>  |  4.44<H>    Ca    8.4      06 Apr 2023 00:35  Phos  6.4     04-06  Mg     2.20     04-06    TPro  4.5<L>  /  Alb  1.8<L>  /  TBili  <0.2  /  DBili  x   /  AST  32  /  ALT  17  /  AlkPhos  142<H>  04-06          MICROBIOLOGY:Culture Results:   Normal Respiratory Kizzy present (04-04-23 @ 18:11)  Culture Results:   No growth to date. (04-04-23 @ 11:15)  Culture Results:   No enteric pathogens isolated.  (Stool culture examined for Salmonella,  Shigella, Campylobacter, Aeromonas, Plesiomonas,  Vibrio, E.coli O157 and Yersinia) (04-01-23 @ 21:03)  Culture Results:   Normal Respiratory Kizzy present (04-01-23 @ 14:00)      RADIOLOGY:

## 2023-04-06 NOTE — PROGRESS NOTE ADULT - CRITICAL CARE ATTENDING COMMENT
Agree with above  Remains intubated 14/320/30%/+5  Afebrile  Hgb 6.6, given 1U pRBCs overnight, repeat pending  Net I/O positive 1.2L/day  On PO vanco for CDAD  On ceftriaxone for Strep pneumoniae bacteremia and meningitis, day 21. F/U with ID re: duration of therapy  Mental status remains minimal off sedation for many days; opens eyes spontaneously and breathes over vent, but does not withdraw to pain  Daughter consenting to HD if necessary. Currently no acute indication for HD, although BUN is high (K ok, bicarb ok, BUN 81). Fistula appears to have thrill; HD per nephro, and will determine if fistula can be accessed. If not, will have to place HD catheter.  Overall prognosis is very poor, given known ischemic areas on imaging and poor mental status  Family still deciding on trache / PEG vs palliative wean.

## 2023-04-06 NOTE — CONSULT NOTE ADULT - CONSULT REQUESTED DATE/TIME
05-Apr-2023 10:20
14-Mar-2023 19:38
15-Mar-2023 14:46
28-Mar-2023 14:11
06-Apr-2023
15-Mar-2023 02:29
15-Mar-2023 13:42
15-Mar-2023 10:27

## 2023-04-06 NOTE — PROGRESS NOTE ADULT - ASSESSMENT
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 Went to ENT and was diagnosed with R otitis externa secondary to perforated TM. Prescribed cipro/dexamethasone.  Worsening headache and EMS called. Admitted 3/14.  CT head negative.  NAMAN.  Started on vanc/meropenem.  IR obtained LP.  Findings c/w meningitis, culture +pneumococcus.  Antibiotics narrowed to ceftriaxone.  Remains lethargic off sedation with possible seizures.  Intermittent fevers.  MRI with acute and subacute infarcts.  Suspect endocarditis though there is not SORAYA.    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 But await decision pending [facility] discussions    C diff- on po vanco    Fever  - Blood and sputum culture from 4/4 without significant growth    NAMAN  - on dialysis now      Guarded prognosis

## 2023-04-06 NOTE — PROGRESS NOTE ADULT - SUBJECTIVE AND OBJECTIVE BOX
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 Patient reportedly woke up at 0130 AM on Monday with a R-sided headache. ENT noted R otitis externa, purulent discharge from R TM thought to be secondary to perforation, given ear gtt and prescribed cipro/dexamethasone gtt (she took 1 dose thus far). Daughter called EMS as later in the day patient complained of severe HA as well as neck pain, stated "I'm dying" and "my head is killing me." Patient reportedly without prior history of ear infections, no history of stroke or seizures. She reported to ENT earlier in the day a muffled sensation in the R ear [id number].     Daughter notes patient has had no recent fevers/chills. She had a dental cleaning on Thursday (no abx prior to cleaning).    On arrival, code stroke called.    In the ED VS:  97.2  65-99  162-186/52-91  16-20  96-99%RA, received NS 500cc IVF, lorazepam 2mg IV x1 and morphine 4mg IV x1 (15 Mar 2023 01:17)      REVIEW OF SYSTEMS:    [ ] Unable to assess ROS because ________    OBJECTIVE:  ICU Vital Signs Last 24 Hrs  T(C): 37.6 (06 Apr 2023 04:00), Max: 37.6 (06 Apr 2023 04:00)  T(F): 99.6 (06 Apr 2023 04:00), Max: 99.6 (06 Apr 2023 04:00)  HR: 63 (06 Apr 2023 05:00) (63 - 80)  BP: 99/48 (06 Apr 2023 05:00) (99/48 - 152/64)  BP(mean): 58 (06 Apr 2023 05:00) (58 - 87)  ABP: --  ABP(mean): --  RR: 18 (06 Apr 2023 05:00) (15 - 24)  SpO2: 100% (06 Apr 2023 05:00) (100% - 100%)    O2 Parameters below as of 06 Apr 2023 05:00  Patient On (Oxygen Delivery Method): CPAP 8/5    O2 Concentration (%): 30      Mode: CPAP with PS, FiO2: 30, PEEP: 5, PS: 8, MAP: 7, PIP: 14    04-04 @ 07:01  -  04-05 @ 07:00  --------------------------------------------------------  IN: 710 mL / OUT: 0 mL / NET: 710 mL    04-05 @ 07:01  -  04-06 @ 06:26  --------------------------------------------------------  IN: 1440 mL / OUT: 150 mL / NET: 1290 mL      CAPILLARY BLOOD GLUCOSE      POCT Blood Glucose.: 160 mg/dL (06 Apr 2023 05:21)      Physical Exam:                   HOSPITAL MEDICATIONS:  Standing Meds:  atovaquone  Suspension 1500 milliGRAM(s) Oral daily  cefTRIAXone   IVPB 2000 milliGRAM(s) IV Intermittent every 12 hours  chlorhexidine 0.12% Liquid 15 milliLiter(s) Oral Mucosa two times a day  chlorhexidine 2% Cloths 1 Application(s) Topical <User Schedule>  ciprofloxacin  0.3% Ophthalmic Solution for Otic Use 4 Drop(s) Right Ear two times a day  dextrose 5%. 1000 milliLiter(s) IV Continuous <Continuous>  dextrose 5%. 1000 milliLiter(s) IV Continuous <Continuous>  dextrose 50% Injectable 25 Gram(s) IV Push once  dextrose 50% Injectable 12.5 Gram(s) IV Push once  dextrose 50% Injectable 25 Gram(s) IV Push once  glucagon  Injectable 1 milliGRAM(s) IntraMuscular once  heparin   Injectable 5000 Unit(s) SubCutaneous every 8 hours  insulin lispro (ADMELOG) corrective regimen sliding scale   SubCutaneous every 6 hours  lacosamide Solution 200 milliGRAM(s) Oral two times a day  levETIRAcetam 500 milliGRAM(s) Oral two times a day  levothyroxine 25 MICROGram(s) Oral daily  midodrine 10 milliGRAM(s) Oral every 8 hours  petrolatum Ophthalmic Ointment 1 Application(s) Both EYES every 12 hours  predniSONE   Tablet 5 milliGRAM(s) Oral daily  vancomycin    Solution 125 milliGRAM(s) Oral every 6 hours      PRN Meds:  acetaminophen   Oral Liquid .. 650 milliGRAM(s) Enteral Tube every 6 hours PRN  dextrose Oral Gel 15 Gram(s) Oral once PRN      LABS:                        6.6    6.74  )-----------( 271      ( 06 Apr 2023 00:35 )             21.0     Hgb Trend: 6.6<--, 7.3<--, 7.3<--, 6.9<--, 7.2<--  04-06    133<L>  |  96<L>  |  81<H>  ----------------------------<  151<H>  4.0   |  21<L>  |  4.44<H>    Ca    8.4      06 Apr 2023 00:35  Phos  6.4     04-06  Mg     2.20     04-06    TPro  4.5<L>  /  Alb  1.8<L>  /  TBili  <0.2  /  DBili  x   /  AST  32  /  ALT  17  /  AlkPhos  142<H>  04-06    Creatinine Trend: 4.44<--, 4.23<--, 3.74<--, 3.17<--, 2.41<--, 2.90<--        Venous Blood Gas:  04-06 @ 00:35  7.39/38/99/23/99.4  VBG Lactate: 1.6  Venous Blood Gas:  04-05 @ 04:00  7.42/35/51/23/84.2  VBG Lactate: 0.9      MICROBIOLOGY:     Culture - Sputum (collected 04 Apr 2023 18:11)  Source: .Sputum Sputum  Gram Stain (05 Apr 2023 07:20):    Few polymorphonuclear leukocytes per low power field    No Squamous epithelial cells per low power field    Few Gram Negative Rods seen per oil power field  Preliminary Report (05 Apr 2023 21:51):    Normal Respiratory Kizzy present    Culture - Blood (collected 04 Apr 2023 11:15)  Source: .Blood Blood-Peripheral  Preliminary Report (05 Apr 2023 17:02):    No growth to date.      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 Patient reportedly woke up at 0130 AM on Monday with a R-sided headache. ENT noted R otitis externa, purulent discharge from R TM thought to be secondary to perforation, given ear gtt and prescribed cipro/dexamethasone gtt (she took 1 dose thus far). Daughter called EMS as later in the day patient complained of severe HA as well as neck pain, stated "I'm dying" and "my head is killing me." Patient reportedly without prior history of ear infections, no history of stroke or seizures. She reported to ENT earlier in the day a muffled sensation in the R ear [id number].     Daughter notes patient has had no recent fevers/chills. She had a dental cleaning on Thursday (no abx prior to cleaning).    On arrival, code stroke called.    In the ED VS:  97.2  65-99  162-186/52-91  16-20  96-99%RA, received NS 500cc IVF, lorazepam 2mg IV x1 and morphine 4mg IV x1 (15 Mar 2023 01:17)      REVIEW OF SYSTEMS:    [x ] Unable to assess ROS because patient is intubated/unresponsive     OBJECTIVE:  ICU Vital Signs Last 24 Hrs  T(C): 37.6 (06 Apr 2023 04:00), Max: 37.6 (06 Apr 2023 04:00)  T(F): 99.6 (06 Apr 2023 04:00), Max: 99.6 (06 Apr 2023 04:00)  HR: 63 (06 Apr 2023 05:00) (63 - 80)  BP: 99/48 (06 Apr 2023 05:00) (99/48 - 152/64)  BP(mean): 58 (06 Apr 2023 05:00) (58 - 87)  ABP: --  ABP(mean): --  RR: 18 (06 Apr 2023 05:00) (15 - 24)  SpO2: 100% (06 Apr 2023 05:00) (100% - 100%)    O2 Parameters below as of 06 Apr 2023 05:00  Patient On (Oxygen Delivery Method): CPAP 8/5    O2 Concentration (%): 30      Mode: CPAP with PS, FiO2: 30, PEEP: 5, PS: 8, MAP: 7, PIP: 14    04-04 @ 07:01  -  04-05 @ 07:00  --------------------------------------------------------  IN: 710 mL / OUT: 0 mL / NET: 710 mL    04-05 @ 07:01  -  04-06 @ 06:26  --------------------------------------------------------  IN: 1440 mL / OUT: 150 mL / NET: 1290 mL      CAPILLARY BLOOD GLUCOSE      POCT Blood Glucose.: 160 mg/dL (06 Apr 2023 05:21)      PHYSICAL EXAM:  GENERAL: ill appearing, no distress  PSYCH: A&O x0  HEAD: Atraumatic, Normocephalic  NECK: Supple, No JVD  CHEST/LUNG: clear to auscultation bilaterally  HEART: regular rate and rhythm, no murmurs  ABDOMEN: +distension, palpable abdominal mass upper and lower right quadrants   EXTREMITIES: +3 bilateral LE edema   NEUROLOGY: opens eyes slightly, no purposeful movements noted  SKIN: No rashes or lesions, pressure ulcer to [facility] MEDICATIONS:  Standing Meds:  atovaquone  Suspension 1500 milliGRAM(s) Oral daily  cefTRIAXone   IVPB 2000 milliGRAM(s) IV Intermittent every 12 hours  chlorhexidine 0.12% Liquid 15 milliLiter(s) Oral Mucosa two times a day  chlorhexidine 2% Cloths 1 Application(s) Topical <User Schedule>  ciprofloxacin  0.3% Ophthalmic Solution for Otic Use 4 Drop(s) Right Ear two times a day  dextrose 5%. 1000 milliLiter(s) IV Continuous <Continuous>  dextrose 5%. 1000 milliLiter(s) IV Continuous <Continuous>  dextrose 50% Injectable 25 Gram(s) IV Push once  dextrose 50% Injectable 12.5 Gram(s) IV Push once  dextrose 50% Injectable 25 Gram(s) IV Push once  glucagon  Injectable 1 milliGRAM(s) IntraMuscular once  heparin   Injectable 5000 Unit(s) SubCutaneous every 8 hours  insulin lispro (ADMELOG) corrective regimen sliding scale   SubCutaneous every 6 hours  lacosamide Solution 200 milliGRAM(s) Oral two times a day  levETIRAcetam 500 milliGRAM(s) Oral two times a day  levothyroxine 25 MICROGram(s) Oral daily  midodrine 10 milliGRAM(s) Oral every 8 hours  petrolatum Ophthalmic Ointment 1 Application(s) Both EYES every 12 hours  predniSONE   Tablet 5 milliGRAM(s) Oral daily  vancomycin    Solution 125 milliGRAM(s) Oral every 6 hours      PRN Meds:  acetaminophen   Oral Liquid .. 650 milliGRAM(s) Enteral Tube every 6 hours PRN  dextrose Oral Gel 15 Gram(s) Oral once PRN      LABS:                        6.6    6.74  )-----------( 271      ( 06 Apr 2023 00:35 )             21.0     Hgb Trend: 6.6<--, 7.3<--, 7.3<--, 6.9<--, 7.2<--  04-06    133<L>  |  96<L>  |  81<H>  ----------------------------<  151<H>  4.0   |  21<L>  |  4.44<H>    Ca    8.4      06 Apr 2023 00:35  Phos  6.4     04-06  Mg     2.20     04-06    TPro  4.5<L>  /  Alb  1.8<L>  /  TBili  <0.2  /  DBili  x   /  AST  32  /  ALT  17  /  AlkPhos  142<H>  04-06    Creatinine Trend: 4.44<--, 4.23<--, 3.74<--, 3.17<--, 2.41<--, 2.90<--        Venous Blood Gas:  04-06 @ 00:35  7.39/38/99/23/99.4  VBG Lactate: 1.6  Venous Blood Gas:  04-05 @ 04:00  7.42/35/51/23/84.2  VBG Lactate: 0.9      MICROBIOLOGY:     Culture - Sputum (collected 04 Apr 2023 18:11)  Source: .Sputum Sputum  Gram Stain (05 Apr 2023 07:20):    Few polymorphonuclear leukocytes per low power field    No Squamous epithelial cells per low power field    Few Gram Negative Rods seen per oil power field  Preliminary Report (05 Apr 2023 21:51):    Normal Respiratory Kizzy present    Culture - Blood (collected 04 Apr 2023 11:15)  Source: .Blood Blood-Peripheral  Preliminary Report (05 Apr 2023 17:02):    No growth to date.      RADIOLOGY:  [ ] Reviewed and interpreted by me

## 2023-04-06 NOTE — PROVIDER CONTACT NOTE (OTHER) - ACTION/TREATMENT ORDERED:
MD Daya fox
MD Viraj Camara aware
Patient given IV abx. Awaiting tylenol order.
Complete assessment of pressure is documentated in A&I flowsheet. Goal of care is to prevent further skin breakdown. Wound consult ordered for further managing of injury.

## 2023-04-06 NOTE — CONSULT NOTE ADULT - SUBJECTIVE AND OBJECTIVE BOX
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 Patient reportedly woke up at 0130 AM on Monday with a R-sided headache. ENT noted R otitis externa, purulent discharge from R TM thought to be secondary to perforation, given ear gtt and prescribed cipro/dexamethasone gtt (she took 1 dose thus far). Daughter called EMS as later in the day patient complained of severe HA as well as neck pain, stated "I'm dying" and "my head is killing me." Patient reportedly without prior history of ear infections, no history of stroke or seizures. She reported to ENT earlier in the day a muffled sensation in the R ear [id number].     Daughter notes patient has had no recent fevers/chills. She had a dental cleaning on Thursday (no abx prior to cleaning).    On arrival, code stroke called.    In the ED VS:  97.2  65-99  162-186/52-91  16-20  96-99%RA, received NS 500cc IVF, lorazepam 2mg IV x1 and morphine 4mg IV x1 (15 Mar 2023 01:17)      PAST MEDICAL & SURGICAL HISTORY:  Polycystic kidney disease      Glaucoma      Aneurysm      History of deep venous thrombosis (DVT) of distal vein of left lower extremity      Thrombocytopenia      Hypothyroid      Osteoporosis      Arthritis      PCP (pneumocystis carinii pneumonia)      C. difficile colitis      Type 2 diabetes mellitus, without long-term current use of insulin      Essential hypertension      H/O kidney transplant      H/O:       H/O eye surgery      S/P hysterectomy      H/O umbilical hernia repair          REVIEW OF SYSTEMS      General: Intubated, not responsive	     MEDICATIONS  (STANDING):  atovaquone  Suspension 1500 milliGRAM(s) Oral daily  cefTRIAXone   IVPB 2000 milliGRAM(s) IV Intermittent every 12 hours  chlorhexidine 0.12% Liquid 15 milliLiter(s) Oral Mucosa two times a day  chlorhexidine 2% Cloths 1 Application(s) Topical <User Schedule>  ciprofloxacin  0.3% Ophthalmic Solution for Otic Use 4 Drop(s) Right Ear two times a day  dextrose 5%. 1000 milliLiter(s) (100 mL/Hr) IV Continuous <Continuous>  dextrose 5%. 1000 milliLiter(s) (50 mL/Hr) IV Continuous <Continuous>  dextrose 50% Injectable 25 Gram(s) IV Push once  dextrose 50% Injectable 12.5 Gram(s) IV Push once  dextrose 50% Injectable 25 Gram(s) IV Push once  glucagon  Injectable 1 milliGRAM(s) IntraMuscular once  heparin   Injectable 5000 Unit(s) SubCutaneous every 8 hours  insulin lispro (ADMELOG) corrective regimen sliding scale   SubCutaneous every 6 hours  lacosamide Solution 200 milliGRAM(s) Oral two times a day  levETIRAcetam 500 milliGRAM(s) Oral two times a day  levothyroxine 25 MICROGram(s) Oral daily  midodrine 10 milliGRAM(s) Oral every 8 hours  petrolatum Ophthalmic Ointment 1 Application(s) Both EYES every 12 hours  predniSONE   Tablet 5 milliGRAM(s) Oral daily  vancomycin    Solution 125 milliGRAM(s) Oral every 6 hours    MEDICATIONS  (PRN):  acetaminophen   Oral Liquid .. 650 milliGRAM(s) Enteral Tube every 6 hours PRN Temp greater or equal to 38C (100.4F), Mild Pain (1 - 3), Moderate Pain (4 - 6)  dextrose Oral Gel 15 Gram(s) Oral once PRN Blood Glucose LESS THAN 70 milliGRAM(s)/deciliter      Allergies    apple (Unknown)  Benadryl (Unknown)  penicillin (Hives)  watermelon (Unknown)    Intolerances        SOCIAL HISTORY: unable to offer     FAMILY HISTORY:  FH: diabetes mellitus (Sibling)        Vital Signs Last 24 Hrs  T(C): 37.4 (2023 12:00), Max: 37.6 (2023 04:00)  T(F): 99.4 (2023 12:00), Max: 99.6 (2023 04:00)  HR: 70 (2023 12:00) (63 - 76)  BP: 161/77 (2023 12:00) (99/48 - 161/77)  BP(mean): 98 (2023 12:00) (58 - 98)  RR: 20 (2023 12:00) (14 - 22)  SpO2: 99% (2023 12:00) (99% - 100%)    Parameters below as of 2023 13:00  Patient On (Oxygen Delivery Method): ventilator    O2 Concentration (%): 30    PHYSICAL EXAM:      Constitutional: Intubated , not responsive  US sacral decubitus, 9 X 10 cm  No fluctuance  No cellulitis  Not in need of sharp debridement  Advise- paint with Betadine OD   Offload per protocol    Abdominal exam- Poorly defined right sided mass, not pulsatile  potential for deterioration of sacral decubitusexists given non ambulatory status and immunocompromised state     CRF with AV fistula present  H/O rectal prolapse- rectal mucosa present at anal verge but rectal exam is not obstructed , no bleeding    Considering palliative extubation     LABS:                        8.8    9.28  )-----------( 283      ( 2023 09:15 )             27.0     04-    133<L>  |  96<L>  |  81<H>  ----------------------------<  151<H>  4.0   |  21<L>  |  4.44<H>    Ca    8.4      2023 00:35  Phos  6.4       Mg     2.20         TPro  4.5<L>  /  Alb  1.8<L>  /  TBili  <0.2  /  DBili  x   /  AST  32  /  ALT  17  /  AlkPhos  142<H>            Albumin, Serum: 1.8 g/dL ( @ 00:35)  Albumin, Serum: 1.9 g/dL ( @ 04:00)  Albumin, Serum: 1.9 g/dL ( @ 00:50)  Albumin, Serum: 1.9 g/dL ( @ 01:20)  Albumin, Serum: 1.9 g/dL ( @ 00:15)  Albumin, Serum: 2.1 g/dL ( @ 04:50)  Albumin, Serum: 1.9 g/dL ( @ 04:05)             Wound SURGERY CONSULT NOTE    FROM: Team  FOR:   Reason for Consult: Sacral decubitus     HPI:  78-year-old female with PCKD previously on HD via LUE AVF now s/p renal transplant, LLE DVT (resolved, nml dopplers 2022) on ASA, HTN, HLD, DM2, 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 Patient reportedly woke up at 0130 AM on Monday with a R-sided headache. ENT noted R otitis externa, purulent discharge from R TM thought to be secondary to perforation, given ear gtt and prescribed cipro/dexamethasone gtt (she took 1 dose thus far). Daughter called EMS as later in the day patient complained of severe HA as well as neck pain, stated "I'm dying" and "my head is killing me." Patient reportedly without prior history of ear infections, no history of stroke or seizures. She reported to ENT earlier in the day a muffled sensation in the R ear [id number].     Daughter notes patient has had no recent fevers/chills. She had a dental cleaning on Thursday (no abx prior to cleaning).    On arrival, code stroke called.    In the ED VS:  97.2  65-99  162-186/52-91  16-20  96-99%RA, received NS 500cc IVF, lorazepam 2mg IV x1 and morphine 4mg IV x1 (15 Mar 2023 01:17)    Requested to evaluate sacral decubitus  On contact isolation for C Diff      PAST MEDICAL & SURGICAL HISTORY:  Polycystic kidney disease      Glaucoma      Aneurysm      History of deep venous thrombosis (DVT) of distal vein of left lower extremity      Thrombocytopenia      Hypothyroid      Osteoporosis      Arthritis      PCP (pneumocystis carinii pneumonia)      C. difficile colitis      Type 2 diabetes mellitus, without long-term current use of insulin      Essential hypertension      H/O kidney transplant      H/O:       H/O eye surgery      S/P hysterectomy      H/O umbilical hernia repair          REVIEW OF SYSTEMS      General: Intubated, not responsive	     MEDICATIONS  (STANDING):  atovaquone  Suspension 1500 milliGRAM(s) Oral daily  cefTRIAXone   IVPB 2000 milliGRAM(s) IV Intermittent every 12 hours  chlorhexidine 0.12% Liquid 15 milliLiter(s) Oral Mucosa two times a day  chlorhexidine 2% Cloths 1 Application(s) Topical <User Schedule>  ciprofloxacin  0.3% Ophthalmic Solution for Otic Use 4 Drop(s) Right Ear two times a day  dextrose 5%. 1000 milliLiter(s) (100 mL/Hr) IV Continuous <Continuous>  dextrose 5%. 1000 milliLiter(s) (50 mL/Hr) IV Continuous <Continuous>  dextrose 50% Injectable 25 Gram(s) IV Push once  dextrose 50% Injectable 12.5 Gram(s) IV Push once  dextrose 50% Injectable 25 Gram(s) IV Push once  glucagon  Injectable 1 milliGRAM(s) IntraMuscular once  heparin   Injectable 5000 Unit(s) SubCutaneous every 8 hours  insulin lispro (ADMELOG) corrective regimen sliding scale   SubCutaneous every 6 hours  lacosamide Solution 200 milliGRAM(s) Oral two times a day  levETIRAcetam 500 milliGRAM(s) Oral two times a day  levothyroxine 25 MICROGram(s) Oral daily  midodrine 10 milliGRAM(s) Oral every 8 hours  petrolatum Ophthalmic Ointment 1 Application(s) Both EYES every 12 hours  predniSONE   Tablet 5 milliGRAM(s) Oral daily  vancomycin    Solution 125 milliGRAM(s) Oral every 6 hours    MEDICATIONS  (PRN):  acetaminophen   Oral Liquid .. 650 milliGRAM(s) Enteral Tube every 6 hours PRN Temp greater or equal to 38C (100.4F), Mild Pain (1 - 3), Moderate Pain (4 - 6)  dextrose Oral Gel 15 Gram(s) Oral once PRN Blood Glucose LESS THAN 70 milliGRAM(s)/deciliter      Allergies    apple (Unknown)  Benadryl (Unknown)  penicillin (Hives)  watermelon (Unknown)    Intolerances        SOCIAL HISTORY: unable to offer     FAMILY HISTORY:  FH: diabetes mellitus (Sibling)        Vital Signs Last 24 Hrs  T(C): 37.4 (2023 12:00), Max: 37.6 (2023 04:00)  T(F): 99.4 (2023 12:00), Max: 99.6 (2023 04:00)  HR: 70 (2023 12:00) (63 - 76)  BP: 161/77 (2023 12:00) (99/48 - 161/77)  BP(mean): 98 (2023 12:00) (58 - 98)  RR: 20 (2023 12:00) (14 - 22)  SpO2: 99% (2023 12:00) (99% - 100%)    Parameters below as of 2023 13:00  Patient On (Oxygen Delivery Method): ventilator    O2 Concentration (%): 30    PHYSICAL EXAM:      Constitutional: Intubated , not responsive  US sacral decubitus, 9 X 10 cm  No fluctuance  No cellulitis  Not in need of sharp debridement  Advise- paint with Betadine OD   Offload per protocol    Abdominal exam- Poorly defined right sided mass, not pulsatile  potential for deterioration of sacral decubitus exists given non ambulatory status and immunocompromised state 9 on chronic prednisone)    CRF with AV fistula present  H/O rectal prolapse- rectal mucosa present at anal verge but rectal exam is not obstructed , no bleeding    Considering palliative extubation     LABS:                        8.8    9.28  )-----------( 283      ( 2023 09:15 )             27.0     -    133<L>  |  96<L>  |  81<H>  ----------------------------<  151<H>  4.0   |  21<L>  |  4.44<H>    Ca    8.4      2023 00:35  Phos  6.4       Mg     2.20         TPro  4.5<L>  /  Alb  1.8<L>  /  TBili  <0.2  /  DBili  x   /  AST  32  /  ALT  17  /  AlkPhos  142<H>            Albumin, Serum: 1.8 g/dL ( @ 00:35)  Albumin, Serum: 1.9 g/dL ( @ 04:00)  Albumin, Serum: 1.9 g/dL ( @ 00:50)  Albumin, Serum: 1.9 g/dL ( @ 01:20)  Albumin, Serum: 1.9 g/dL ( @ 00:15)  Albumin, Serum: 2.1 g/dL ( @ 04:50)  Albumin, Serum: 1.9 g/dL ( @ 04:05)

## 2023-04-06 NOTE — PROVIDER CONTACT NOTE (OTHER) - RECOMMENDATIONS
Recommendations for care: continue use of low air loss mattress, breathable underpad, turn and position patient Q 2 hours, measures to decrease friction/shear, educate patient and family.
As per MD Stapleton, try to auscultate and palpate underside of upper arm, still no bruit or thrill present on third time assessing patient.
MD Viraj Camara aware

## 2023-04-06 NOTE — PROVIDER CONTACT NOTE (OTHER) - BACKGROUND
..continued  but was unsuccesful, and that the femoral shiley has to stay in for now in order to continue dialyzing the patient    background-- see provider handoff
Patient admitted for altered mental status
The critical events that have had potential to contribute to pressure ulcer development include vasopressors, edema, nutrition, incontinence, sepsis, prolonged bedrest, comorbidities, and diagnosis.
See provider handoff

## 2023-04-06 NOTE — PROGRESS NOTE ADULT - ASSESSMENT
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 Cardiac arrest 3/16 with ROSC, transferred to MICU for further management.     Neurological  #Currently Intubated, opens eyes spontaneously, grimaces to pain  - remains off sedation  - EEG no seizures - Dcd 3/31  - MRI IAC and brain 3/29 - otitis and mastoiditis with multiple cerebral infarcts and cerebellar infarct. Will need f/u MRI in 3-4 weeks per neuro  - Endocarditis is possible cause for scattered cerebral infarcts, will consider utility of SORAYA given poor candidate for valve replacement  - mental status remains unimproved, withdraws from pain however does not elicit purposeful movements.      #Strep pneumo meningitis #Acute encephalopathy  p/w severe HA, neck stiffness, fever, leukocytosis concerning for meningitis/encephalitis  - CT head negative for CVA.   - LP and BCx 3/15 both showing gram positive cocci in pairs, and + strep pneumoniae.   - likely source of entry from R ear where pt c/o discomfort for several months.  - taking cyclosporine, mycophenolate mofetil and prednisone at home for kidney transplant and was likely immunocompromised -> dental procedure 3/14 but less likely source   - MRI of the IAC and brain 3/29 - otitis and mastoiditis with multiple cerebral infarcts and cerebellar infarct, f/u MRI 3-4 weeks (as per neuro)   - neuro and ID following    #Seizures  - 24hr EEG initially with highly focal seizures, improved with increased sedation.   - currently on keppra  500 BID since 3/17-   - vimpat 100 BID increased to 200 BID (3/20 -  )  - EEG neg- Dc' d 3/31     Cardiovascular  #s/p Cardiac Arrest   - bradycardia to 30, pulseless, code blue was called 3/16 . 2 rounds of epi, and PEA arrest. on 3rd pulse check pulseless VT, 200J shock given 4th pulse check asystole, 5th ROSC, sinus tachy with HUMA. IO placed and levo started. Due to blood in mouth took several attempts with DL for ETT to be placed but ultimately confirmed with capnometry and bilateral breath sounds.        #Elevated troponin  uptrending troponin to 182 3/15 AM, was likely iso ESRD  s/p cardiac arrest, elevated ST segment   - TTE from 3/17 showed EF of 63% with mild pulm htn, normal left ventricular systolic function, and diastolic LV dysfunction.      #HTN  home meds: on amlodipine 5mg, losartan 100mg, torsemide 20mg, and clonidine 0.2 patch weekly  - amlodipine 10mg restarted, but can hold now iso lower systolic blood pressure  - c/w midodrine 10mg PO TID, can uptitrate as needed for hypotension      Respiratory  #Intubated  - tolerates PS 8/5 trial  TV ~ 250-300  - Patient has been intubated >2 wks; will talk to family regarding what their plans are iso MRI reading; will likely need trach and peg if within their goals  - noted w/ oral and inline secretions, will send sputum Cx    ENT  # Tympanic membrane rupture  discharge from R ear, evaluated by ENT given dexamethasone and cipro 3/14  - c/w broad spectrum abx   - MRI of IAC appreciated  - ENT recs- pending results of MRI will consider need for myringotomy, but will defer at this time given overall medical condition    Gastrointestinal  No issues   -Constipation - resolved s/p movantik  - continues to have loose stools - f/up stool studies shows positive C-difficile  - bowel regimen discontinued   - tolerating TF at goal       Renal  #ESRD s/p Kidney Transplant in 2003 at [facility] PCKD previously HD through LLE AVF but no HD since transplant ( per renal Cr has been in 2.8 since december 2022), now with NAMAN requiring HD   -[facility] Nephrologist Dr. Hugo Hernandez [phone]  - holding cyclosporine and mycophenolate but c/w home prednisone 5mg q day  - s/p 4mg IV bumex with minimal urine production (3/17)  - started on CRRT (3/17-3/21) now s/p intermittent HD - last session 4/1 (1L removal)  - Diuretic trial -furosemide 80mg IVP given 4/3 with no u/o response, will need access for HD session   -plan for HD today and attempt to use Left AV fistula     Heme/DVT PPX  Hx of LLE DVT, anemia s/p 5 units pRbc's during this admission  -no DVT on doppler 12/22 and negative LLE duplex this admission 3/17  - POCUS showed residual clot in RLE; f/u dopplers 3/28 negative  - Evaluated by ENT for bleeding and found no signs of active bleeding from nasal cavity, nasopharynx or oral cavity, however, patient biting on tongue, which could be one source of bleeding. Tongue was edematous and lax  - CT a/p neg for RP bleed   - concern for possible transfusion reaction 4/4-> hypotensive/bradycardia after receiving 100ml pRBC's , HR and BP normalized once transfusion stopped. Blood bank does not consider response a transfusion reaction, and okay with future transfusions if needed  -nephrology plan to add epogen on next hemodialysis session  -1u PRBC overnight with no s/s of transfusion reaction        Infectious Disease  Hx of PCP, now with this admission with Strep pneumoniae meningitis, R ear mastoiditis, strep bacteremia, now with C. difficile  - c/w Ceftriaxone (3/16 - )  - c/w ciprodex drops for R ear  -c/w vanco 125mg PO [id number] (4/2-  ) for C.diff  - ID following  - BCx 3/15 + strep, repeat cx from 3/23 NGTD will f/u repeat cultures sent 4/4   - f/u sputum cx  - atovaquone at home, continue      Endocrine  Hx of Hypothyroidism and Diabetes mellitus Type II  - c/w levothyroxine   -currently on prednisone will watch FS closely on insulin sliding scale       Ethics  FULL, GOC ongoing.  - Patient's cousin, states that daughter Arely would to come in 4/5 to discuss trach/PEG and transfer to long-term vent facility vs palliative extubation but did not want to discuss yesterday and feels shes being rushed  -plan for family to come together Sunday 4/9 to decide about GOC

## 2023-04-06 NOTE — PROGRESS NOTE ADULT - ASSESSMENT
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  Cardiac arrest on 3/16/23  septic shock s/p iv pressors  intubated on MV  Renal following for NAMAN on CKD Mx.  [facility] Nephrologist Dr. Hugo Hernandez [phone]    Acute kidney injury on Chronic Kidney Disease Stage 4 vs Chronic Kidney Disease Stage 4 progression to End Stage Renal Disease on Dialysis     hx KTx 2003 : In light of severe sepsis and cardiac arrest: off MMF and Cyclosporine  w/worsened renal function, oliguria, acidosis, mild hyperkalemia- s/p CVVHD, then intermittent HD  Consent for HD obtained, signed by daughter 3/16/23  B/L creat around 2.8 since Dec 2022  Patient was on Cyclosporine (Gengraf) 75mg PO q12hrs,  BID and Prednisone 5 QD for transplant  femoral shiley removed 4/2 for line holiday -> patient las LUE AVF with thrill  s/p furosemide   Injectable 80 milliGRAM(s) IV Push x14/3- no response  K, vol acceptable    plan:  c/w predniSONE   Tablet 5 milliGRAM(s) Oral daily  Patient remains oliguric/anuric  family wants to continue HD until [facility] discussion on Sunday  will attempt to use AVF for HD today, if unsuccessful will need a new catheter     HTN- bp stable, controlled-  Medications :  amLODIPine   Tablet 10 milliGRAM(s) Oral every 24 hours    - Continue current medications w/holding parameters  - plan to titrate anti hypertensive medication as needed  - low salt diet if not npo suggested    Anemia :  Hemoglobin : 7.3  if Hb less than 7gm/dl consider blood transfusion    OM and OE  vent Mx, per ICU  spiking fevers- f/u rpt blood cxs  Abxs per Infectious disease specialist with dose adjustment per GFR    C diff colitis- on po vanco    ongoing GOC discussion w/daughter. Palliative care note reviewed. Family to discuss Sunday. Full code for now.       overall prognosis guarded  For any question, call:  Cell # [phone]  Pager # [phone]  Callback # [phone]

## 2023-04-06 NOTE — CONSULT NOTE ADULT - REASON FOR ADMISSION
AMS/R ear infxn

## 2023-04-07 NOTE — PROGRESS NOTE ADULT - SUBJECTIVE AND OBJECTIVE BOX
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 Patient reportedly woke up at 0130 AM on Monday with a R-sided headache. ENT noted R otitis externa, purulent discharge from R TM thought to be secondary to perforation, given ear gtt and prescribed cipro/dexamethasone gtt (she took 1 dose thus far). Daughter called EMS as later in the day patient complained of severe HA as well as neck pain, stated "I'm dying" and "my head is killing me." Patient reportedly without prior history of ear infections, no history of stroke or seizures. She reported to ENT earlier in the day a muffled sensation in the R ear [id number].     Daughter notes patient has had no recent fevers/chills. She had a dental cleaning on Thursday (no abx prior to cleaning).    On arrival, code stroke called.    In the ED VS:  97.2  65-99  162-186/52-91  16-20  96-99%RA, received NS 500cc IVF, lorazepam 2mg IV x1 and morphine 4mg IV x1 (15 Mar 2023 01:17)      REVIEW OF SYSTEMS:    [ ] Unable to assess ROS because ________              OBJECTIVE:  ICU Vital Signs Last 24 Hrs  T(C): 37.6 (07 Apr 2023 04:00), Max: 37.6 (07 Apr 2023 00:00)  T(F): 99.7 (07 Apr 2023 04:00), Max: 99.7 (07 Apr 2023 04:00)  HR: 80 (07 Apr 2023 06:00) (62 - 80)  BP: 141/70 (07 Apr 2023 06:00) (105/59 - 161/77)  BP(mean): 86 (07 Apr 2023 06:00) (69 - 98)  ABP: --  ABP(mean): --  RR: 22 (07 Apr 2023 06:00) (14 - 22)  SpO2: 100% (07 Apr 2023 07:00) (97% - 100%)    O2 Parameters below as of 07 Apr 2023 07:00  Patient On (Oxygen Delivery Method): ventilator    O2 Concentration (%): 30      Mode: AC/ CMV (Assist Control/ Continuous Mandatory Ventilation), RR (machine): 14, TV (machine): 320, FiO2: 30, PEEP: 5, ITime: 0.66, MAP: 8, PIP: 16    04-06 @ 07:01  -  04-07 @ 07:00  --------------------------------------------------------  IN: 1546 mL / OUT: 0 mL / NET: 1546 mL      CAPILLARY BLOOD GLUCOSE      POCT Blood Glucose.: 149 mg/dL (07 Apr 2023 05:58)      Physical Exam:               HOSPITAL MEDICATIONS:  Standing Meds:  atovaquone  Suspension 1500 milliGRAM(s) Oral daily  cefTRIAXone   IVPB 2000 milliGRAM(s) IV Intermittent every 12 hours  chlorhexidine 0.12% Liquid 15 milliLiter(s) Oral Mucosa two times a day  chlorhexidine 2% Cloths 1 Application(s) Topical <User Schedule>  ciprofloxacin  0.3% Ophthalmic Solution for Otic Use 4 Drop(s) Right Ear two times a day  dextrose 5%. 1000 milliLiter(s) IV Continuous <Continuous>  dextrose 5%. 1000 milliLiter(s) IV Continuous <Continuous>  dextrose 50% Injectable 25 Gram(s) IV Push once  dextrose 50% Injectable 12.5 Gram(s) IV Push once  dextrose 50% Injectable 25 Gram(s) IV Push once  glucagon  Injectable 1 milliGRAM(s) IntraMuscular once  heparin   Injectable 5000 Unit(s) SubCutaneous every 8 hours  insulin lispro (ADMELOG) corrective regimen sliding scale   SubCutaneous every 6 hours  lacosamide Solution 200 milliGRAM(s) Oral two times a day  levETIRAcetam 500 milliGRAM(s) Oral two times a day  levothyroxine 25 MICROGram(s) Oral daily  midodrine 10 milliGRAM(s) Oral every 8 hours  petrolatum Ophthalmic Ointment 1 Application(s) Both EYES every 12 hours  predniSONE   Tablet 5 milliGRAM(s) Oral daily  vancomycin    Solution 125 milliGRAM(s) Oral every 6 hours      PRN Meds:  acetaminophen   Oral Liquid .. 650 milliGRAM(s) Enteral Tube every 6 hours PRN  dextrose Oral Gel 15 Gram(s) Oral once PRN      LABS:                        8.7    6.50  )-----------( 280      ( 07 Apr 2023 01:35 )             26.6     Hgb Trend: 8.7<--, 8.8<--, 6.6<--, 7.3<--, 7.3<--  04-07    130<L>  |  93<L>  |  97<H>  ----------------------------<  148<H>  4.1   |  19<L>  |  4.97<H>    Ca    8.4      07 Apr 2023 01:35  Phos  6.2     04-07  Mg     2.30     04-07    TPro  4.9<L>  /  Alb  1.6<L>  /  TBili  <0.2  /  DBili  x   /  AST  39<H>  /  ALT  19  /  AlkPhos  163<H>  04-07    Creatinine Trend: 4.97<--, 4.44<--, 4.23<--, 3.74<--, 3.17<--, 2.41<--        Venous Blood Gas:  04-07 @ 01:35  7.37/37/59/21/92.0  VBG Lactate: 1.3  Venous Blood Gas:  04-06 @ 00:35  7.39/38/99/23/99.4  VBG Lactate: 1.6      MICROBIOLOGY:     Culture - Sputum (collected 04 Apr 2023 18:11)  Source: .Sputum Sputum  Gram Stain (05 Apr 2023 07:20):    Few polymorphonuclear leukocytes per low power field    No Squamous epithelial cells per low power field    Few Gram Negative Rods seen per oil power field  Final Report (06 Apr 2023 17:07):    Normal Respiratory Kizzy present    Culture - Blood (collected 04 Apr 2023 11:15)  Source: .Blood Blood-Peripheral  Preliminary Report (05 Apr 2023 17:02):    No growth to date.      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 Patient reportedly woke up at 0130 AM on Monday with a R-sided headache. ENT noted R otitis externa, purulent discharge from R TM thought to be secondary to perforation, given ear gtt and prescribed cipro/dexamethasone gtt (she took 1 dose thus far). Daughter called EMS as later in the day patient complained of severe HA as well as neck pain, stated "I'm dying" and "my head is killing me." Patient reportedly without prior history of ear infections, no history of stroke or seizures. She reported to ENT earlier in the day a muffled sensation in the R ear [id number].     Daughter notes patient has had no recent fevers/chills. She had a dental cleaning on Thursday (no abx prior to cleaning).    On arrival, code stroke called.    In the ED VS:  97.2  65-99  162-186/52-91  16-20  96-99%RA, received NS 500cc IVF, lorazepam 2mg IV x1 and morphine 4mg IV x1 (15 Mar 2023 01:17)      REVIEW OF SYSTEMS:    [x ] Unable to assess ROS because patient is intubated/unresponsive       OBJECTIVE:  ICU Vital Signs Last 24 Hrs  T(C): 37.6 (07 Apr 2023 04:00), Max: 37.6 (07 Apr 2023 00:00)  T(F): 99.7 (07 Apr 2023 04:00), Max: 99.7 (07 Apr 2023 04:00)  HR: 80 (07 Apr 2023 06:00) (62 - 80)  BP: 141/70 (07 Apr 2023 06:00) (105/59 - 161/77)  BP(mean): 86 (07 Apr 2023 06:00) (69 - 98)  ABP: --  ABP(mean): --  RR: 22 (07 Apr 2023 06:00) (14 - 22)  SpO2: 100% (07 Apr 2023 07:00) (97% - 100%)    O2 Parameters below as of 07 Apr 2023 07:00  Patient On (Oxygen Delivery Method): ventilator    O2 Concentration (%): 30      Mode: AC/ CMV (Assist Control/ Continuous Mandatory Ventilation), RR (machine): 14, TV (machine): 320, FiO2: 30, PEEP: 5, ITime: 0.66, MAP: 8, PIP: 16    04-06 @ 07:01  -  04-07 @ 07:00  --------------------------------------------------------  IN: 1546 mL / OUT: 0 mL / NET: 1546 mL      CAPILLARY BLOOD GLUCOSE      POCT Blood Glucose.: 149 mg/dL (07 Apr 2023 05:58)    PHYSICAL EXAM:  GENERAL: ill appearing, no distress  PSYCH: A&O x0  HEAD: Atraumatic, Normocephalic  NECK: Supple, No JVD  CHEST/LUNG: clear to auscultation bilaterally  HEART: regular rate and rhythm, no murmurs  ABDOMEN: +distension, palpable abdominal mass upper and lower right quadrants   EXTREMITIES: +3 bilateral LE edema   NEUROLOGY: opens eyes slightly, no purposeful movements noted  SKIN: No rashes or lesions, pressure ulcer to [facility] MEDICATIONS:  Standing Meds:  atovaquone  Suspension 1500 milliGRAM(s) Oral daily  cefTRIAXone   IVPB 2000 milliGRAM(s) IV Intermittent every 12 hours  chlorhexidine 0.12% Liquid 15 milliLiter(s) Oral Mucosa two times a day  chlorhexidine 2% Cloths 1 Application(s) Topical <User Schedule>  ciprofloxacin  0.3% Ophthalmic Solution for Otic Use 4 Drop(s) Right Ear two times a day  dextrose 5%. 1000 milliLiter(s) IV Continuous <Continuous>  dextrose 5%. 1000 milliLiter(s) IV Continuous <Continuous>  dextrose 50% Injectable 25 Gram(s) IV Push once  dextrose 50% Injectable 12.5 Gram(s) IV Push once  dextrose 50% Injectable 25 Gram(s) IV Push once  glucagon  Injectable 1 milliGRAM(s) IntraMuscular once  heparin   Injectable 5000 Unit(s) SubCutaneous every 8 hours  insulin lispro (ADMELOG) corrective regimen sliding scale   SubCutaneous every 6 hours  lacosamide Solution 200 milliGRAM(s) Oral two times a day  levETIRAcetam 500 milliGRAM(s) Oral two times a day  levothyroxine 25 MICROGram(s) Oral daily  midodrine 10 milliGRAM(s) Oral every 8 hours  petrolatum Ophthalmic Ointment 1 Application(s) Both EYES every 12 hours  predniSONE   Tablet 5 milliGRAM(s) Oral daily  vancomycin    Solution 125 milliGRAM(s) Oral every 6 hours      PRN Meds:  acetaminophen   Oral Liquid .. 650 milliGRAM(s) Enteral Tube every 6 hours PRN  dextrose Oral Gel 15 Gram(s) Oral once PRN      LABS:                        8.7    6.50  )-----------( 280      ( 07 Apr 2023 01:35 )             26.6     Hgb Trend: 8.7<--, 8.8<--, 6.6<--, 7.3<--, 7.3<--  04-07    130<L>  |  93<L>  |  97<H>  ----------------------------<  148<H>  4.1   |  19<L>  |  4.97<H>    Ca    8.4      07 Apr 2023 01:35  Phos  6.2     04-07  Mg     2.30     04-07    TPro  4.9<L>  /  Alb  1.6<L>  /  TBili  <0.2  /  DBili  x   /  AST  39<H>  /  ALT  19  /  AlkPhos  163<H>  04-07    Creatinine Trend: 4.97<--, 4.44<--, 4.23<--, 3.74<--, 3.17<--, 2.41<--        Venous Blood Gas:  04-07 @ 01:35  7.37/37/59/21/92.0  VBG Lactate: 1.3  Venous Blood Gas:  04-06 @ 00:35  7.39/38/99/23/99.4  VBG Lactate: 1.6      MICROBIOLOGY:     Culture - Sputum (collected 04 Apr 2023 18:11)  Source: .Sputum Sputum  Gram Stain (05 Apr 2023 07:20):    Few polymorphonuclear leukocytes per low power field    No Squamous epithelial cells per low power field    Few Gram Negative Rods seen per oil power field  Final Report (06 Apr 2023 17:07):    Normal Respiratory Kizzy present    Culture - Blood (collected 04 Apr 2023 11:15)  Source: .Blood Blood-Peripheral  Preliminary Report (05 Apr 2023 17:02):    No growth to date.      RADIOLOGY:  [ ] Reviewed and interpreted by me

## 2023-04-07 NOTE — PROCEDURE NOTE - NSPOSTCAREGUIDE_GEN_A_CORE
Care for catheter as per unit/ICU protocols
Verbal/written post procedure instructions were given to patient/caregiver/Care for catheter as per unit/ICU protocols
Care for catheter as per unit/ICU protocols
Verbal/written post procedure instructions were given to patient/caregiver/Care for catheter as per unit/ICU protocols

## 2023-04-07 NOTE — PROGRESS NOTE ADULT - CRITICAL CARE ATTENDING COMMENT
Agree with above  Streptococcal pneumonia meningitis and bacteremia (cultures subsequently cleared) c/b cardiac arrest  Remains intubated due to poor mental status  They do wish to try HD if patient requires it. Nephro considering resumption, placed HD cath, will get today and further courses per Nephro.  Remains on ceftriaxone. ID recommending 6 weeks for disseminated Streptococcal disease  Family trying to decide GOC, re: trache / PEG vs palliative wean  Overall prognosis is very poor, and changes of a meaningful recovery are very slim

## 2023-04-07 NOTE — PROGRESS NOTE ADULT - ASSESSMENT
79 y/o F with h/o HTN, HLD, DM2, PCKD previously on HD via LUE AVF then s/p renal transplant, LLE DVT (resolved, nml dopplers 12/2022) on ASA, very small supraclinoid aneurysm (reportedly unchanged on MRA 10/2020), hypothyroidism, PCP 2021 on atovaquone ppx, reportedly hospitalized in 12/2022 for rectal prolapse, had a blood transfusion at that time, also + CMV 1/25/23 who presented with R-sided HA, R ear pain and neck stiffness after visit to ENT earlier in the day, altered on presentation, CT head unremarkable for stroke, however LP with IR and BCx showing +strep pneumoniae, started on vanc and CTX per ID. Cardiac arrest 3/16 with ROSC, transferred to MICU for further management.     Neurological  #Currently Intubated, opens eyes spontaneously, grimaces to pain  - remains off sedation  - EEG no seizures - Dcd 3/31  - MRI IAC and brain 3/29 - otitis and mastoiditis with multiple cerebral infarcts and cerebellar infarct. Will need f/u MRI in 3-4 weeks per neuro  - Endocarditis is possible cause for scattered cerebral infarcts, will consider utility of SORAYA given poor candidate for valve replacement  - mental status remains unimproved, withdraws from pain however does not elicit purposeful movements.      #Strep pneumo meningitis #Acute encephalopathy  p/w severe HA, neck stiffness, fever, leukocytosis concerning for meningitis/encephalitis  - CT head negative for CVA.   - LP and BCx 3/15 both showing gram positive cocci in pairs, and + strep pneumoniae.   - likely source of entry from R ear where pt c/o discomfort for several months.  - taking cyclosporine, mycophenolate mofetil and prednisone at home for kidney transplant and was likely immunocompromised -> dental procedure 3/14 but less likely source   - MRI of the IAC and brain 3/29 - otitis and mastoiditis with multiple cerebral infarcts and cerebellar infarct, f/u MRI 3-4 weeks (as per neuro)   - neuro and ID following    #Seizures  - 24hr EEG initially with highly focal seizures, improved with increased sedation.   - currently on keppra  500 BID since 3/17-   - vimpat 100 BID increased to 200 BID (3/20 -  )  - EEG neg- Dc' d 3/31     Cardiovascular  #s/p Cardiac Arrest   - bradycardia to 30, pulseless, code blue was called 3/16 . 2 rounds of epi, and PEA arrest. on 3rd pulse check pulseless VT, 200J shock given 4th pulse check asystole, 5th ROSC, sinus tachy with HUMA. IO placed and levo started. Due to blood in mouth took several attempts with DL for ETT to be placed but ultimately confirmed with capnometry and bilateral breath sounds.        #Elevated troponin  uptrending troponin to 182 3/15 AM, was likely iso ESRD  s/p cardiac arrest, elevated ST segment   - TTE from 3/17 showed EF of 63% with mild pulm htn, normal left ventricular systolic function, and diastolic LV dysfunction.      #HTN  home meds: on amlodipine 5mg, losartan 100mg, torsemide 20mg, and clonidine 0.2 patch weekly  - amlodipine 10mg restarted, but can hold now iso lower systolic blood pressure  - c/w midodrine 10mg PO TID, can uptitrate as needed for hypotension      Respiratory  Intubated on 3/16 for cardiac arrest   - tolerates PS 8/5 trial  TV ~ 250-300  - Patient has been intubated >2 wks; will talk to family regarding what their plans are iso MRI reading; will likely need trach and peg if within their goals  - noted w/ oral and inline secretions, will send sputum Cx    ENT  # Tympanic membrane rupture  discharge from R ear, evaluated by ENT given dexamethasone and cipro 3/14  - c/w broad spectrum abx   - MRI of IAC appreciated  - ENT recs- pending results of MRI will consider need for myringotomy, but will defer at this time given overall medical condition    Gastrointestinal  Diarrhea  - continues to have loose stools - positive C-difficile c/w PO vancomycin  - bowel regimen discontinued   - tolerating TF at goal       Renal  #ESRD s/p Kidney Transplant in 2003 at [facility] PCKD previously HD through LLE AVF but no HD since transplant ( per renal Cr has been in 2.8 since december 2022), now with NAMAN requiring HD   -[facility] Nephrologist Dr. Hugo Hernandez [phone]  - holding cyclosporine and mycophenolate but c/w home prednisone 5mg q day  - s/p 4mg IV bumex with minimal urine production (3/17) and furosemide 80mg IVP given 4/3 with no u/o response  - started on CRRT (3/17-3/21) now s/p intermittent HD - last session 4/1 (1L removal)  -bladder noted to have urine on ultrasound, will place snider and trial bumex 4mg IV   -plan for HD on 4/6 but Left AV fistula not functioning, no urgent need for HD at this time, limited areas for shiley access due to poor vessels      Heme/DVT PPX  Hx of LLE DVT, anemia s/p 5 units pRbc's during this admission  -no DVT on doppler 12/22 and negative LLE duplex this admission 3/17  - POCUS showed residual clot in RLE; f/u dopplers 3/28 negative  - Evaluated by ENT for bleeding and found no signs of active bleeding from nasal cavity, nasopharynx or oral cavity, however, patient biting on tongue, which could be one source of bleeding. Tongue was edematous and lax  - CT a/p neg for RP bleed   - concern for possible transfusion reaction 4/4-> hypotensive/bradycardia after receiving 100ml pRBC's , HR and BP normalized once transfusion stopped. Blood bank does not consider response a transfusion reaction, and okay with future transfusions if needed  -nephrology plan to add epogen on next hemodialysis session  -1u PRBC overnight with no s/s of transfusion reaction        Infectious Disease  Hx of PCP, now with this admission with Strep pneumoniae meningitis, R ear mastoiditis, strep bacteremia, now with C. difficile  - c/w Ceftriaxone (3/16 - ) for ~6 week course  - c/w ciprodex drops for R ear  -c/w vanco 125mg PO d9hnizz (4/2-  ) for C.diff  - ID following  - BCx 3/15 + strep, repeat cx from 3/23 NGTD will f/u repeat cultures sent 4/4   - f/u sputum cx  - atovaquone at home, continue      Endocrine  Hx of Hypothyroidism and Diabetes mellitus Type II  - c/w levothyroxine   -currently on prednisone will watch FS closely on insulin sliding scale       Ethics  FULL, GOC ongoing.  - Patient's cousin, states that daughter Arely would to come in 4/5 to discuss trach/PEG and transfer to long-term vent facility vs palliative extubation but did not want to discuss at that time and feels shes being rushed  -plan for family to come together Sunday 4/9 to decide about GOC

## 2023-04-07 NOTE — PROGRESS NOTE ADULT - ASSESSMENT
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 Went to ENT and was diagnosed with R otitis externa secondary to perforated TM. Prescribed cipro/dexamethasone.  Worsening headache and EMS called. Admitted 3/14.  CT head negative.  NAMAN.  Started on vanc/meropenem.  IR obtained LP.  Findings c/w meningitis, culture +pneumococcus.  Antibiotics narrowed to ceftriaxone.  Remains lethargic off sedation with possible seizures.  Intermittent fevers.  MRI with acute and subacute infarcts.  Suspect endocarditis though there is not SORAYA.    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 But await decision pending [facility] discussions    C diff- on po vanco    Fever  - Blood and sputum culture from 4/4 without significant growth    NAMAN  - on dialysis now    Guarded prognosis     Call the ID service with questions or concerns over the weekend.  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 Went to ENT and was diagnosed with R otitis externa secondary to perforated TM. Prescribed cipro/dexamethasone.  Worsening headache and EMS called. Admitted 3/14.  CT head negative.  NAMAN.  Started on vanc/meropenem.  IR obtained LP.  Findings c/w meningitis, culture +pneumococcus.  Antibiotics narrowed to ceftriaxone.  Remains lethargic off sedation with possible seizures.  Intermittent fevers.  MRI with acute and subacute infarcts.  Suspect endocarditis though there is not SORAYA.    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 But await decision pending [facility] discussions    C diff-  -can add iv flagyl for 5 d  -continue po vanco while on ceftriaxone    Fever  - Blood and sputum culture from 4/4 without significant growth    NAMAN  - on dialysis now    Guarded prognosis     Call the ID service with questions or concerns over the weekend.  [phone]

## 2023-04-07 NOTE — PROGRESS NOTE ADULT - SUBJECTIVE AND OBJECTIVE BOX
Follow Up:      Inverval History/ROS :Patient is a 78y old  Female who presents with a chief complaint of AMS/R ear infxn (07 Apr 2023 07:25)    Low grade temperature      Allergies    apple (Unknown)  Benadryl (Unknown)  penicillin (Hives)  watermelon (Unknown)    Intolerances        ANTIMICROBIALS:  atovaquone  Suspension 1500 daily  cefTRIAXone   IVPB 2000 every 12 hours  vancomycin    Solution 125 every 6 hours      OTHER MEDS:  acetaminophen   Oral Liquid .. 650 milliGRAM(s) Enteral Tube every 6 hours PRN  chlorhexidine 0.12% Liquid 15 milliLiter(s) Oral Mucosa two times a day  chlorhexidine 2% Cloths 1 Application(s) Topical <User Schedule>  ciprofloxacin  0.3% Ophthalmic Solution for Otic Use 4 Drop(s) Right Ear two times a day  dextrose 5%. 1000 milliLiter(s) IV Continuous <Continuous>  dextrose 5%. 1000 milliLiter(s) IV Continuous <Continuous>  dextrose 50% Injectable 25 Gram(s) IV Push once  dextrose 50% Injectable 12.5 Gram(s) IV Push once  dextrose 50% Injectable 25 Gram(s) IV Push once  dextrose Oral Gel 15 Gram(s) Oral once PRN  glucagon  Injectable 1 milliGRAM(s) IntraMuscular once  heparin   Injectable 5000 Unit(s) SubCutaneous every 8 hours  insulin lispro (ADMELOG) corrective regimen sliding scale   SubCutaneous every 6 hours  lacosamide Solution 200 milliGRAM(s) Oral two times a day  levETIRAcetam 500 milliGRAM(s) Oral two times a day  levothyroxine 25 MICROGram(s) Oral daily  midodrine 10 milliGRAM(s) Oral every 8 hours  petrolatum Ophthalmic Ointment 1 Application(s) Both EYES every 12 hours  predniSONE   Tablet 5 milliGRAM(s) Oral daily      Vital Signs Last 24 Hrs  T(C): 37.3 (07 Apr 2023 12:00), Max: 38 (07 Apr 2023 08:00)  T(F): 99.2 (07 Apr 2023 12:00), Max: 100.4 (07 Apr 2023 08:00)  HR: 64 (07 Apr 2023 13:00) (62 - 80)  BP: 118/62 (07 Apr 2023 13:00) (105/57 - 153/66)  BP(mean): 75 (07 Apr 2023 13:00) (68 - 88)  RR: 18 (07 Apr 2023 13:00) (17 - 22)  SpO2: 100% (07 Apr 2023 13:00) (97% - 100%)    Parameters below as of 07 Apr 2023 13:00  Patient On (Oxygen Delivery Method): ventilator    O2 Concentration (%): 30    PHYSICAL EXAM:  General: [ ] non-toxic  HEAD/EYES: [ ] PERRL [x ] white sclera [ ] icterus  ENT:  [ ] normal [x ] supple [ ] thrush [ ] pharyngeal exudate  Cardiovascular:   [ ] murmur [x ] normal [ ] PPM/AICD  Respiratory:  [x ] clear to ausculation bilaterally  GI:  [ x] soft, non-tender, normal bowel sounds  :  [ ] snider [ ] no CVA tenderness   Musculoskeletal:  [ ] no synovitis  Neurologic:  [ ] non-focal exam   Skin:  [ x] no rash  Lymph: [x ] no lymphadenopathy  Psychiatric:  [ ] appropriate affect [ ] alert & oriented  Lines:  [x ] no phlebitis [ ] central line                                8.7    6.50  )-----------( 280      ( 07 Apr 2023 01:35 )             26.6       04-07    130<L>  |  93<L>  |  97<H>  ----------------------------<  148<H>  4.1   |  19<L>  |  4.97<H>    Ca    8.4      07 Apr 2023 01:35  Phos  6.2     04-07  Mg     2.30     04-07    TPro  4.9<L>  /  Alb  1.6<L>  /  TBili  <0.2  /  DBili  x   /  AST  39<H>  /  ALT  19  /  AlkPhos  163<H>  04-07          MICROBIOLOGY:Culture Results:   Normal Respiratory Kizzy present (04-04-23 @ 18:11)  Culture Results:   No growth to date. (04-04-23 @ 11:15)  Culture Results:   No enteric pathogens isolated.  (Stool culture examined for Salmonella,  Shigella, Campylobacter, Aeromonas, Plesiomonas,  Vibrio, E.coli O157 and Yersinia) (04-01-23 @ 21:03)  Culture Results:   Normal Respiratory Kizzy present (04-01-23 @ 14:00)      RADIOLOGY:

## 2023-04-07 NOTE — CHART NOTE - NSCHARTNOTEFT_GEN_A_CORE
Notified by MICU primary medical team to write down instructions for wound care to transcribe to provider to RN. Wound care evaluated yesterday for Sacrum Unstageable pressure injury   Sacrum: Clean wound and periwound skin with NS. Pat dry. Apply Betadine wipe over necrotic tissue, allow to dry. If wound drainage then cover with ABD pad and Tegaderm or paper tape. Change daily or PRN if soiled.   Continue to turn and position as per hospital protocol. Continue to turn and position with Z fluidized positioning devices, single breathable pad for moisture management. Continue to offload heels as per hospital protocol.   On the weekends or before 7am or after 4pm M-F, if concerning signs of infection from wound, reach out to general surgery Team A or B.   Team A: [id number]  Team B: [id number]   Will continue to follow up periodically during hospital stay. Please reconsult earlier if needed it.

## 2023-04-07 NOTE — PROGRESS NOTE ADULT - ASSESSMENT
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  Cardiac arrest on 3/16/23  septic shock s/p iv pressors  intubated on MV  Renal following for NAMAN on CKD Mx.  OS Nephrologist Dr. Hugo Hernandez [phone]    Acute kidney injury on Chronic Kidney Disease Stage 4 vs Chronic Kidney Disease Stage 4 progression to End Stage Renal Disease on Dialysis     hx KTx 2003 : In light of severe sepsis and cardiac arrest: off MMF and Cyclosporine  w/worsened renal function, oliguria, acidosis, mild hyperkalemia- s/p CVVHD, then intermittent HD  Consent for HD obtained, signed by daughter 3/16/23  B/L creat around 2.8 since Dec 2022  Patient was on Cyclosporine (Gengraf) 75mg PO q12hrs,  BID and Prednisone 5 QD for transplant    Creatinine Trend: 4.97 <--, 4.44 <--, 4.23 <--, 3.74 <--, 3.17 <--, 2.41 <--, 2.90 <--  L femoral shiley removed 4/2 for line holiday- s/p rt femoral shiley placed today 4/7  s/p furosemide   Injectable 80 milliGRAM(s) IV Push x14/3- no response  K, vol acceptable  Creatinine Trending up since last HD 4/1/23    plan:  arranged for HD today bedside w/2k bath, uf 0.5kg as tolearted  c/w predniSONE   Tablet 5 milliGRAM(s) Oral daily  monitor u/o  monitor electrolytes, BMP daily     HTN- bp stable, controlled-  Medications :  amLODIPine   Tablet 10 milliGRAM(s) Oral every 24 hours    - Continue current medications w/holding parameters  - plan to titrate anti hypertensive medication as needed  - low salt diet if not npo suggested    Anemia :  Hemoglobin : 7.3  if Hb less than 7gm/dl consider blood transfusion  added epo 10k w/hd tiw    OM and OE  vent Mx, per ICU  spiking fevers- f/u rpt blood cxs  Abxs per Infectious disease specialist with dose adjustment per GFR    C diff colitis- on po vanco    on going GOC discussion w/daughter. Palliative care note reviewed. Family to discuss Sunday. Full code for now.     overall prognosis guarded  labs, chart reviewed  poc d/w ICU team, HD RN

## 2023-04-07 NOTE — PROGRESS NOTE ADULT - SUBJECTIVE AND OBJECTIVE BOX
[facility] - S Alea / Tita S /D Rama/ S Jovan/ S Keenan/ Cleve Gambino / DAGMAR Wilsonu/ O Daya  service -[phone], office [phone]  ---------------------------------------------------------------------------------------------------------------    Patient seen and examined bedside    Subjective and Objective: No overnight events, sob resolved. No complaints today. feeling better    Allergies: apple (Unknown)  Benadryl (Unknown)  penicillin (Hives)  watermelon (Unknown)      Hospital Medications:   MEDICATIONS  (STANDING):  atovaquone  Suspension 1500 milliGRAM(s) Oral daily  cefTRIAXone   IVPB 2000 milliGRAM(s) IV Intermittent every 12 hours  chlorhexidine 0.12% Liquid 15 milliLiter(s) Oral Mucosa two times a day  chlorhexidine 2% Cloths 1 Application(s) Topical <User Schedule>  ciprofloxacin  0.3% Ophthalmic Solution for Otic Use 4 Drop(s) Right Ear two times a day  dextrose 5%. 1000 milliLiter(s) (100 mL/Hr) IV Continuous <Continuous>  dextrose 5%. 1000 milliLiter(s) (50 mL/Hr) IV Continuous <Continuous>  dextrose 50% Injectable 25 Gram(s) IV Push once  dextrose 50% Injectable 12.5 Gram(s) IV Push once  dextrose 50% Injectable 25 Gram(s) IV Push once  glucagon  Injectable 1 milliGRAM(s) IntraMuscular once  heparin   Injectable 5000 Unit(s) SubCutaneous every 8 hours  insulin lispro (ADMELOG) corrective regimen sliding scale   SubCutaneous every 6 hours  lacosamide Solution 200 milliGRAM(s) Oral two times a day  levETIRAcetam 500 milliGRAM(s) Oral two times a day  levothyroxine 25 MICROGram(s) Oral daily  midodrine 10 milliGRAM(s) Oral every 8 hours  petrolatum Ophthalmic Ointment 1 Application(s) Both EYES every 12 hours  predniSONE   Tablet 5 milliGRAM(s) Oral daily  vancomycin    Solution 125 milliGRAM(s) Oral every 6 hours    VITALS:  T(F): 99.2 (23 @ 12:00), Max: 100.4 (23 @ 08:00)  HR: 61 (23 @ 14:30)  BP: 113/62 (23 @ 14:00)  RR: 19 (23 @ 14:00)  SpO2: 100% (23 @ 14:30)  Wt(kg): --     @ 07:01  -   @ 07:00  --------------------------------------------------------  IN: 1546 mL / OUT: 0 mL / NET: 1546 mL     @ 07:01  -   @ 15:00  --------------------------------------------------------  IN: 424 mL / OUT: 500 mL / NET: -76 mL      PHYSICAL EXAM:  Constitutional: NAD  HEENT: anicteric sclera, oropharynx clear  Neck: No JVD  Respiratory: CTAB, no wheezes, rales or rhonchi  Cardiovascular: S1, S2, RRR  Gastrointestinal: BS+, soft, NT/ND  Extremities: No cyanosis or clubbing. No peripheral edema  Neurological: A/O x 3, no focal deficits  Psychiatric: Normal mood, normal affect  : No CVA tenderness. No snider.   Skin: No rashes  Vascular Access:    LABS:      130<L>  |  93<L>  |  97<H>  ----------------------------<  148<H>  4.1   |  19<L>  |  4.97<H>    Ca    8.4      2023 01:35  Phos  6.2       Mg     2.30         TPro  4.9<L>  /  Alb  1.6<L>  /  TBili  <0.2  /  DBili      /  AST  39<H>  /  ALT  19  /  AlkPhos  163<H>      Creatinine Trend: 4.97 <--, 4.44 <--, 4.23 <--, 3.74 <--, 3.17 <--, 2.41 <--, 2.90 <--                        8.7    6.50  )-----------( 280      ( 2023 01:35 )             26.6     Urine Studies:  Urinalysis Basic - ( 2023 14:05 )    Color: Yellow / Appearance: Clear / S.023 / pH:   Gluc:  / Ketone: Trace  / Bili: Negative / Urobili: <2 mg/dL   Blood:  / Protein: 300 mg/dL / Nitrite: Negative   Leuk Esterase: Negative / RBC: 3 /HPF / WBC 12 /HPF   Sq Epi:  / Non Sq Epi:  / Bacteria: Negative          RADIOLOGY & ADDITIONAL STUDIES:   [facility] - S Alea / Tita S /D Rama/ PAUL Aldana/ PAUL Hussein/ Cleve Gambino / DAGMAR Wilsonu/ O Daya  service -[phone], office [phone]  ---------------------------------------------------------------------------------------------------------------    Patient seen and examined bedside in MICU    Subjective and Objective: No overnight events  snider inserted for u retention, drained ~400ml urine per RN   s/p rt femoral shiley placed today for HD access    Allergies: apple (Unknown)  Benadryl (Unknown)  penicillin (Hives)  watermelon (Unknown)      [facility] Medications:   MEDICATIONS  (STANDING):  atovaquone  Suspension 1500 milliGRAM(s) Oral daily  cefTRIAXone   IVPB 2000 milliGRAM(s) IV Intermittent every 12 hours  chlorhexidine 0.12% Liquid 15 milliLiter(s) Oral Mucosa two times a day  chlorhexidine 2% Cloths 1 Application(s) Topical <User Schedule>  ciprofloxacin  0.3% Ophthalmic Solution for Otic Use 4 Drop(s) Right Ear two times a day  dextrose 5%. 1000 milliLiter(s) (100 mL/Hr) IV Continuous <Continuous>  dextrose 5%. 1000 milliLiter(s) (50 mL/Hr) IV Continuous <Continuous>  dextrose 50% Injectable 25 Gram(s) IV Push once  dextrose 50% Injectable 12.5 Gram(s) IV Push once  dextrose 50% Injectable 25 Gram(s) IV Push once  glucagon  Injectable 1 milliGRAM(s) IntraMuscular once  heparin   Injectable 5000 Unit(s) SubCutaneous every 8 hours  insulin lispro (ADMELOG) corrective regimen sliding scale   SubCutaneous every 6 hours  lacosamide Solution 200 milliGRAM(s) Oral two times a day  levETIRAcetam 500 milliGRAM(s) Oral two times a day  levothyroxine 25 MICROGram(s) Oral daily  midodrine 10 milliGRAM(s) Oral every 8 hours  petrolatum Ophthalmic Ointment 1 Application(s) Both EYES every 12 hours  predniSONE   Tablet 5 milliGRAM(s) Oral daily  vancomycin    Solution 125 milliGRAM(s) Oral every 6 hours    VITALS:  T(F): 99.2 (23 @ 12:00), Max: 100.4 (23 @ 08:00)  HR: 61 (23 @ 14:30)  BP: 113/62 (23 @ 14:00)  RR: 19 (23 @ 14:00)  SpO2: 100% (23 @ 14:30)  Wt(kg): --     @ 07:01  -   @ 07:00  --------------------------------------------------------  IN: 1546 mL / OUT: 0 mL / NET: 1546 mL     @ 07:01  -   @ 15:00  --------------------------------------------------------  IN: 424 mL / OUT: 500 mL / NET: -76 mL      PHYSICAL EXAM:  Constitutional: NAD  HEENT: ETT+ on MV, +NGT  Neck: No JVD  Respiratory: CTAB, no wheezes, rales or rhonchi  Cardiovascular: S1, S2, RRR  Gastrointestinal: BS+, soft  Extremities: No peripheral edema  Neurological: unresponsive  : + snider.     LABS:      130<L>  |  93<L>  |  97<H>  ----------------------------<  148<H>  4.1   |  19<L>  |  4.97<H>    Ca    8.4      2023 01:35  Phos  6.2       Mg     2.30         TPro  4.9<L>  /  Alb  1.6<L>  /  TBili  <0.2  /  DBili      /  AST  39<H>  /  ALT  19  /  AlkPhos  163<H>      Creatinine Trend: 4.97 <--, 4.44 <--, 4.23 <--, 3.74 <--, 3.17 <--, 2.41 <--, 2.90 <--                        8.7    6.50  )-----------( 280      ( 2023 01:35 )             26.6     Urine Studies:  Urinalysis Basic - ( 2023 14:05 )    Color: Yellow / Appearance: Clear / S.023 / pH:   Gluc:  / Ketone: Trace  / Bili: Negative / Urobili: <2 mg/dL   Blood:  / Protein: 300 mg/dL / Nitrite: Negative   Leuk Esterase: Negative / RBC: 3 /HPF / WBC 12 /HPF   Sq Epi:  / Non Sq Epi:  / Bacteria: Negative          RADIOLOGY & ADDITIONAL STUDIES:

## 2023-04-08 NOTE — PROGRESS NOTE ADULT - ASSESSMENT
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  Cardiac arrest on 3/16/23  septic shock s/p iv pressors  intubated on MV  Renal following for NAMAN on CKD Mx.  OS Nephrologist Dr. Hugo Hernandez [phone]    Acute kidney injury on Chronic Kidney Disease Stage 4 vs Chronic Kidney Disease Stage 4 progression to End Stage Renal Disease on Dialysis     hx KTx 2003 : In light of severe sepsis and cardiac arrest: off MMF and Cyclosporine  w/worsened renal function, oliguria, acidosis, mild hyperkalemia- s/p CVVHD, then intermittent HD  Consent for HD obtained, signed by daughter 3/16/23  B/L creat around 2.8 since Dec 2022  Patient was on Cyclosporine (Gengraf) 75mg PO q12hrs,  BID and Prednisone 5 QD for transplant    Creatinine Trend: 3.74 <--, 4.97 <--, 4.44 <--, 4.23 <--, 3.74 <--, 3.17 <--, 2.41 <--  L femoral shiley removed 4/2 for line holiday- s/p rt femoral shiley placed  4/7  s/p furosemide   Injectable 80 milliGRAM(s) IV Push x14/3- no response  K, vol acceptable  Creatinine Trending up since last HD 4/1/23    plan:  s/p HD yesterday- flowsheet reviewed  Plan for repeat HD today with again 0.5kg uf  c/w predniSONE   Tablet 5 milliGRAM(s) Oral daily  monitor u/o  monitor electrolytes, BMP daily     HTN- bp stable, controlled-  Medications :  amLODIPine   Tablet 10 milliGRAM(s) Oral every 24 hours    - Continue current medications w/holding parameters  - plan to titrate anti hypertensive medication as needed  - low salt diet if not npo suggested    Anemia :  Hemoglobin : 7.3  if Hb less than 7gm/dl consider blood transfusion  added epo 10k w/hd tiw    OM and OE  vent Mx, per ICU  spiking fevers- f/u rpt blood cxs  Abxs per Infectious disease specialist with dose adjustment per GFR    C diff colitis- on po vanco    on going GOC discussion w/daughter. Palliative care note reviewed. Family to discuss Sunday. Full code for now.     Dr Aldana  [phone]

## 2023-04-08 NOTE — PROGRESS NOTE ADULT - SUBJECTIVE AND OBJECTIVE BOX
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 Patient reportedly woke up at 0130 AM on Monday with a R-sided headache. ENT noted R otitis externa, purulent discharge from R TM thought to be secondary to perforation, given ear gtt and prescribed cipro/dexamethasone gtt (she took 1 dose thus far). Daughter called EMS as later in the day patient complained of severe HA as well as neck pain, stated "I'm dying" and "my head is killing me." Patient reportedly without prior history of ear infections, no history of stroke or seizures. She reported to ENT earlier in the day a muffled sensation in the R ear [id number].     Daughter notes patient has had no recent fevers/chills. She had a dental cleaning on Thursday (no abx prior to cleaning).    On arrival, code stroke called.    In the ED VS:  97.2  65-99  162-186/52-91  16-20  96-99%RA, received NS 500cc IVF, lorazepam 2mg IV x1 and morphine 4mg IV x1 (15 Mar 2023 01:17)      REVIEW OF SYSTEMS:    [x ] Unable to assess ROS because patient is intubated/unresponsive       OBJECTIVE:  ICU Vital Signs Last 24 Hrs  T(C): 37.6 (07 Apr 2023 04:00), Max: 37.6 (07 Apr 2023 00:00)  T(F): 99.7 (07 Apr 2023 04:00), Max: 99.7 (07 Apr 2023 04:00)  HR: 80 (07 Apr 2023 06:00) (62 - 80)  BP: 141/70 (07 Apr 2023 06:00) (105/59 - 161/77)  BP(mean): 86 (07 Apr 2023 06:00) (69 - 98)  ABP: --  ABP(mean): --  RR: 22 (07 Apr 2023 06:00) (14 - 22)  SpO2: 100% (07 Apr 2023 07:00) (97% - 100%)    O2 Parameters below as of 07 Apr 2023 07:00  Patient On (Oxygen Delivery Method): ventilator    O2 Concentration (%): 30      Mode: AC/ CMV (Assist Control/ Continuous Mandatory Ventilation), RR (machine): 14, TV (machine): 320, FiO2: 30, PEEP: 5, ITime: 0.66, MAP: 8, PIP: 16    04-06 @ 07:01  -  04-07 @ 07:00  --------------------------------------------------------  IN: 1546 mL / OUT: 0 mL / NET: 1546 mL      CAPILLARY BLOOD GLUCOSE      POCT Blood Glucose.: 149 mg/dL (07 Apr 2023 05:58)    PHYSICAL EXAM:  GENERAL: ill appearing, no distress  PSYCH: A&O x0  HEAD: Atraumatic, Normocephalic  NECK: Supple, No JVD  CHEST/LUNG: clear to auscultation bilaterally  HEART: regular rate and rhythm, no murmurs  ABDOMEN: +distension, palpable abdominal mass upper and lower right quadrants   EXTREMITIES: +3 bilateral LE edema   NEUROLOGY: opens eyes slightly, no purposeful movements noted  SKIN: No rashes or lesions, pressure ulcer to [facility] MEDICATIONS:  Standing Meds:  atovaquone  Suspension 1500 milliGRAM(s) Oral daily  cefTRIAXone   IVPB 2000 milliGRAM(s) IV Intermittent every 12 hours  chlorhexidine 0.12% Liquid 15 milliLiter(s) Oral Mucosa two times a day  chlorhexidine 2% Cloths 1 Application(s) Topical <User Schedule>  ciprofloxacin  0.3% Ophthalmic Solution for Otic Use 4 Drop(s) Right Ear two times a day  dextrose 5%. 1000 milliLiter(s) IV Continuous <Continuous>  dextrose 5%. 1000 milliLiter(s) IV Continuous <Continuous>  dextrose 50% Injectable 25 Gram(s) IV Push once  dextrose 50% Injectable 12.5 Gram(s) IV Push once  dextrose 50% Injectable 25 Gram(s) IV Push once  glucagon  Injectable 1 milliGRAM(s) IntraMuscular once  heparin   Injectable 5000 Unit(s) SubCutaneous every 8 hours  insulin lispro (ADMELOG) corrective regimen sliding scale   SubCutaneous every 6 hours  lacosamide Solution 200 milliGRAM(s) Oral two times a day  levETIRAcetam 500 milliGRAM(s) Oral two times a day  levothyroxine 25 MICROGram(s) Oral daily  midodrine 10 milliGRAM(s) Oral every 8 hours  petrolatum Ophthalmic Ointment 1 Application(s) Both EYES every 12 hours  predniSONE   Tablet 5 milliGRAM(s) Oral daily  vancomycin    Solution 125 milliGRAM(s) Oral every 6 hours      PRN Meds:  acetaminophen   Oral Liquid .. 650 milliGRAM(s) Enteral Tube every 6 hours PRN  dextrose Oral Gel 15 Gram(s) Oral once PRN      LABS:                        8.7    6.50  )-----------( 280      ( 07 Apr 2023 01:35 )             26.6     Hgb Trend: 8.7<--, 8.8<--, 6.6<--, 7.3<--, 7.3<--  04-07    130<L>  |  93<L>  |  97<H>  ----------------------------<  148<H>  4.1   |  19<L>  |  4.97<H>    Ca    8.4      07 Apr 2023 01:35  Phos  6.2     04-07  Mg     2.30     04-07    TPro  4.9<L>  /  Alb  1.6<L>  /  TBili  <0.2  /  DBili  x   /  AST  39<H>  /  ALT  19  /  AlkPhos  163<H>  04-07    Creatinine Trend: 4.97<--, 4.44<--, 4.23<--, 3.74<--, 3.17<--, 2.41<--        Venous Blood Gas:  04-07 @ 01:35  7.37/37/59/21/92.0  VBG Lactate: 1.3  Venous Blood Gas:  04-06 @ 00:35  7.39/38/99/23/99.4  VBG Lactate: 1.6      MICROBIOLOGY:     Culture - Sputum (collected 04 Apr 2023 18:11)  Source: .Sputum Sputum  Gram Stain (05 Apr 2023 07:20):    Few polymorphonuclear leukocytes per low power field    No Squamous epithelial cells per low power field    Few Gram Negative Rods seen per oil power field  Final Report (06 Apr 2023 17:07):    Normal Respiratory Kizzy present    Culture - Blood (collected 04 Apr 2023 11:15)  Source: .Blood Blood-Peripheral  Preliminary Report (05 Apr 2023 17:02):    No growth to date.      RADIOLOGY:  [ ] Reviewed and interpreted by me       Interval Events: Tmax 100.9 overnight. Repeat blood cultures were sent.       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 Patient reportedly woke up at 0130 AM on Monday with a R-sided headache. ENT noted R otitis externa, purulent discharge from R TM thought to be secondary to perforation, given ear gtt and prescribed cipro/dexamethasone gtt (she took 1 dose thus far). Daughter called EMS as later in the day patient complained of severe HA as well as neck pain, stated "I'm dying" and "my head is killing me." Patient reportedly without prior history of ear infections, no history of stroke or seizures. She reported to ENT earlier in the day a muffled sensation in the R ear [id number].     Daughter notes patient has had no recent fevers/chills. She had a dental cleaning on Thursday (no abx prior to cleaning).    On arrival, code stroke called.    In the ED VS:  97.2  65-99  162-186/52-91  16-20  96-99%RA, received NS 500cc IVF, lorazepam 2mg IV x1 and morphine 4mg IV x1 (15 Mar 2023 01:17)      REVIEW OF SYSTEMS:    [x ] Unable to assess ROS because patient is intubated/unresponsive       OBJECTIVE:  ICU Vital Signs Last 24 Hrs  T(C): 37.6 (07 Apr 2023 04:00), Max: 37.6 (07 Apr 2023 00:00)  T(F): 99.7 (07 Apr 2023 04:00), Max: 99.7 (07 Apr 2023 04:00)  HR: 80 (07 Apr 2023 06:00) (62 - 80)  BP: 141/70 (07 Apr 2023 06:00) (105/59 - 161/77)  BP(mean): 86 (07 Apr 2023 06:00) (69 - 98)  ABP: --  ABP(mean): --  RR: 22 (07 Apr 2023 06:00) (14 - 22)  SpO2: 100% (07 Apr 2023 07:00) (97% - 100%)    O2 Parameters below as of 07 Apr 2023 07:00  Patient On (Oxygen Delivery Method): ventilator    O2 Concentration (%): 30      Mode: AC/ CMV (Assist Control/ Continuous Mandatory Ventilation), RR (machine): 14, TV (machine): 320, FiO2: 30, PEEP: 5, ITime: 0.66, MAP: 8, PIP: 16    04-06 @ 07:01  -  04-07 @ 07:00  --------------------------------------------------------  IN: 1546 mL / OUT: 0 mL / NET: 1546 mL      CAPILLARY BLOOD GLUCOSE      POCT Blood Glucose.: 149 mg/dL (07 Apr 2023 05:58)    PHYSICAL EXAM:  GENERAL: ill appearing, no distress  PSYCH: A&O x0  HEAD: Atraumatic, Normocephalic  NECK: Supple, No JVD  CHEST/LUNG: clear to auscultation bilaterally  HEART: regular rate and rhythm, no murmurs  ABDOMEN: +distension, palpable abdominal mass upper and lower right quadrants   EXTREMITIES: +3 bilateral LE edema   NEUROLOGY: opens eyes slightly, no purposeful movements noted  SKIN: No rashes or lesions, pressure ulcer to [facility] MEDICATIONS:  Standing Meds:  atovaquone  Suspension 1500 milliGRAM(s) Oral daily  cefTRIAXone   IVPB 2000 milliGRAM(s) IV Intermittent every 12 hours  chlorhexidine 0.12% Liquid 15 milliLiter(s) Oral Mucosa two times a day  chlorhexidine 2% Cloths 1 Application(s) Topical <User Schedule>  ciprofloxacin  0.3% Ophthalmic Solution for Otic Use 4 Drop(s) Right Ear two times a day  dextrose 5%. 1000 milliLiter(s) IV Continuous <Continuous>  dextrose 5%. 1000 milliLiter(s) IV Continuous <Continuous>  dextrose 50% Injectable 25 Gram(s) IV Push once  dextrose 50% Injectable 12.5 Gram(s) IV Push once  dextrose 50% Injectable 25 Gram(s) IV Push once  glucagon  Injectable 1 milliGRAM(s) IntraMuscular once  heparin   Injectable 5000 Unit(s) SubCutaneous every 8 hours  insulin lispro (ADMELOG) corrective regimen sliding scale   SubCutaneous every 6 hours  lacosamide Solution 200 milliGRAM(s) Oral two times a day  levETIRAcetam 500 milliGRAM(s) Oral two times a day  levothyroxine 25 MICROGram(s) Oral daily  midodrine 10 milliGRAM(s) Oral every 8 hours  petrolatum Ophthalmic Ointment 1 Application(s) Both EYES every 12 hours  predniSONE   Tablet 5 milliGRAM(s) Oral daily  vancomycin    Solution 125 milliGRAM(s) Oral every 6 hours      PRN Meds:  acetaminophen   Oral Liquid .. 650 milliGRAM(s) Enteral Tube every 6 hours PRN  dextrose Oral Gel 15 Gram(s) Oral once PRN      LABS:                        8.7    6.50  )-----------( 280      ( 07 Apr 2023 01:35 )             26.6     Hgb Trend: 8.7<--, 8.8<--, 6.6<--, 7.3<--, 7.3<--  04-07    130<L>  |  93<L>  |  97<H>  ----------------------------<  148<H>  4.1   |  19<L>  |  4.97<H>    Ca    8.4      07 Apr 2023 01:35  Phos  6.2     04-07  Mg     2.30     04-07    TPro  4.9<L>  /  Alb  1.6<L>  /  TBili  <0.2  /  DBili  x   /  AST  39<H>  /  ALT  19  /  AlkPhos  163<H>  04-07    Creatinine Trend: 4.97<--, 4.44<--, 4.23<--, 3.74<--, 3.17<--, 2.41<--        Venous Blood Gas:  04-07 @ 01:35  7.37/37/59/21/92.0  VBG Lactate: 1.3  Venous Blood Gas:  04-06 @ 00:35  7.39/38/99/23/99.4  VBG Lactate: 1.6      MICROBIOLOGY:     Culture - Sputum (collected 04 Apr 2023 18:11)  Source: .Sputum Sputum  Gram Stain (05 Apr 2023 07:20):    Few polymorphonuclear leukocytes per low power field    No Squamous epithelial cells per low power field    Few Gram Negative Rods seen per oil power field  Final Report (06 Apr 2023 17:07):    Normal Respiratory Kizzy present    Culture - Blood (collected 04 Apr 2023 11:15)  Source: .Blood Blood-Peripheral  Preliminary Report (05 Apr 2023 17:02):    No growth to date.      RADIOLOGY:  [ ] Reviewed and interpreted by me

## 2023-04-08 NOTE — PROGRESS NOTE ADULT - SUBJECTIVE AND OBJECTIVE BOX
Patient seen and examined  obtunded    apple (Unknown)  Benadryl (Unknown)  penicillin (Hives)  watermelon (Unknown)    Hospital Medications:   MEDICATIONS  (STANDING):  atovaquone  Suspension 1500 milliGRAM(s) Oral daily  cefTRIAXone   IVPB 2000 milliGRAM(s) IV Intermittent every 12 hours  chlorhexidine 0.12% Liquid 15 milliLiter(s) Oral Mucosa two times a day  chlorhexidine 2% Cloths 1 Application(s) Topical <User Schedule>  ciprofloxacin  0.3% Ophthalmic Solution for Otic Use 4 Drop(s) Right Ear two times a day  dextrose 5%. 1000 milliLiter(s) (100 mL/Hr) IV Continuous <Continuous>  dextrose 5%. 1000 milliLiter(s) (50 mL/Hr) IV Continuous <Continuous>  dextrose 50% Injectable 25 Gram(s) IV Push once  dextrose 50% Injectable 12.5 Gram(s) IV Push once  dextrose 50% Injectable 25 Gram(s) IV Push once  epoetin reza-epbx (RETACRIT) Injectable 59941 Unit(s) IV Push <User Schedule>  glucagon  Injectable 1 milliGRAM(s) IntraMuscular once  heparin   Injectable 5000 Unit(s) SubCutaneous every 8 hours  insulin lispro (ADMELOG) corrective regimen sliding scale   SubCutaneous every 6 hours  lacosamide Solution 200 milliGRAM(s) Oral two times a day  levETIRAcetam 500 milliGRAM(s) Oral two times a day  levothyroxine 25 MICROGram(s) Oral daily  metroNIDAZOLE  IVPB      metroNIDAZOLE  IVPB 500 milliGRAM(s) IV Intermittent once  metroNIDAZOLE  IVPB 500 milliGRAM(s) IV Intermittent every 8 hours  midodrine 10 milliGRAM(s) Oral every 8 hours  petrolatum Ophthalmic Ointment 1 Application(s) Both EYES every 12 hours  predniSONE   Tablet 5 milliGRAM(s) Oral daily  vancomycin    Solution 125 milliGRAM(s) Oral every 6 hours      VITALS:  T(F): 99.8 (23 @ 08:00), Max: 100.9 (23 @ 00:00)  HR: 75 (23 @ 11:00)  BP: 135/66 (23 @ 11:00)  RR: 18 (23 @ 11:00)  SpO2: 100% (23 @ 11:00)  Wt(kg): --     @ 07:  -   @ 07:00  --------------------------------------------------------  IN: 1668 mL / OUT: 1920 mL / NET: -252 mL     @ 07:01  -   @ 12:52  --------------------------------------------------------  IN: 135 mL / OUT: 10 mL / NET: 125 mL        PHYSICAL EXAM:  Constitutional: NAD  HEENT: ETT+ on MV, +NGT  Neck: No JVD  Respiratory: CTAB, no wheezes, rales or rhonchi  Cardiovascular: S1, S2, RRR  Gastrointestinal: BS+, soft  Extremities: No peripheral edema  Neurological: unresponsive  : + snider.     LABS:      133<L>  |  95<L>  |  71<H>  ----------------------------<  147<H>  4.5   |  19<L>  |  3.74<H>    Ca    8.5      2023 00:13  Phos  5.2       Mg     2.30         TPro  5.6<L>  /  Alb  1.9<L>  /  TBili  0.2  /  DBili      /  AST  78<H>  /  ALT  28  /  AlkPhos  160<H>      Creatinine Trend: 3.74 <--, 4.97 <--, 4.44 <--, 4.23 <--, 3.74 <--, 3.17 <--, 2.41 <--                        9.6    7.45  )-----------( 238      ( 2023 00:13 )             30.4     Urine Studies:  Urinalysis Basic - ( 2023 14:05 )    Color: Yellow / Appearance: Clear / S.023 / pH:   Gluc:  / Ketone: Trace  / Bili: Negative / Urobili: <2 mg/dL   Blood:  / Protein: 300 mg/dL / Nitrite: Negative   Leuk Esterase: Negative / RBC: 3 /HPF / WBC 12 /HPF   Sq Epi:  / Non Sq Epi:  / Bacteria: Negative        RADIOLOGY & ADDITIONAL STUDIES:

## 2023-04-08 NOTE — PROGRESS NOTE ADULT - ASSESSMENT
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 Cardiac arrest 3/16 with ROSC, transferred to MICU for further management.     Neurological  #Currently Intubated, opens eyes spontaneously, grimaces to pain  - remains off sedation  - EEG no seizures - Dcd 3/31  - MRI IAC and brain 3/29 - otitis and mastoiditis with multiple cerebral infarcts and cerebellar infarct. Will need f/u MRI in 3-4 weeks per neuro  - Endocarditis is possible cause for scattered cerebral infarcts, will consider utility of SORAYA given poor candidate for valve replacement  - mental status remains unimproved, withdraws from pain however does not elicit purposeful movements.      #Strep pneumo meningitis #Acute encephalopathy  p/w severe HA, neck stiffness, fever, leukocytosis concerning for meningitis/encephalitis  - CT head negative for CVA.   - LP and BCx 3/15 both showing gram positive cocci in pairs, and + strep pneumoniae.   - likely source of entry from R ear where pt c/o discomfort for several months.  - taking cyclosporine, mycophenolate mofetil and prednisone at home for kidney transplant and was likely immunocompromised -> dental procedure 3/14 but less likely source   - MRI of the IAC and brain 3/29 - otitis and mastoiditis with multiple cerebral infarcts and cerebellar infarct, f/u MRI 3-4 weeks (as per neuro)   - neuro and ID following    #Seizures  - 24hr EEG initially with highly focal seizures, improved with increased sedation.   - currently on keppra  500 BID since 3/17-   - vimpat 100 BID increased to 200 BID (3/20 -  )  - EEG neg- Dc' d 3/31     Cardiovascular  #s/p Cardiac Arrest   - bradycardia to 30, pulseless, code blue was called 3/16 . 2 rounds of epi, and PEA arrest. on 3rd pulse check pulseless VT, 200J shock given 4th pulse check asystole, 5th ROSC, sinus tachy with HUMA. IO placed and levo started. Due to blood in mouth took several attempts with DL for ETT to be placed but ultimately confirmed with capnometry and bilateral breath sounds.        #Elevated troponin  uptrending troponin to 182 3/15 AM, was likely iso ESRD  s/p cardiac arrest, elevated ST segment   - TTE from 3/17 showed EF of 63% with mild pulm htn, normal left ventricular systolic function, and diastolic LV dysfunction.      #HTN  home meds: on amlodipine 5mg, losartan 100mg, torsemide 20mg, and clonidine 0.2 patch weekly  - amlodipine 10mg restarted, but can hold now iso lower systolic blood pressure  - c/w midodrine 10mg PO TID, can uptitrate as needed for hypotension      Respiratory  Intubated on 3/16 for cardiac arrest   - tolerates PS 8/5 trial  TV ~ 250-300  - Patient has been intubated >2 wks; will talk to family regarding what their plans are iso MRI reading; will likely need trach and peg if within their goals  - noted w/ oral and inline secretions, will send sputum Cx    ENT  # Tympanic membrane rupture  discharge from R ear, evaluated by ENT given dexamethasone and cipro 3/14  - c/w broad spectrum abx   - MRI of IAC appreciated  - ENT recs- pending results of MRI will consider need for myringotomy, but will defer at this time given overall medical condition    Gastrointestinal  Diarrhea  - continues to have loose stools - positive C-difficile c/w PO vancomycin  - bowel regimen discontinued   - tolerating TF at goal       Renal  #ESRD s/p Kidney Transplant in 2003 at [facility] previously HD through LLE AVF but no HD since transplant ( per renal Cr has been in 2.8 since december 2022), now with [facility]AN requiring HD   -[facility] Nephrologist Dr. Hugo Hernandez [phone]  - holding cyclosporine and mycophenolate but c/w home prednisone 5mg q day  - s/p 4mg IV bumex with minimal urine production (3/17) and furosemide 80mg IVP given 4/3 with no u/o response  - started on CRRT (3/17-3/21) now s/p intermittent HD - last session 4/1 (1L removal)  -bladder noted to have urine on ultrasound, will place snider and trial bumex 4mg IV   -plan for HD on 4/6 but Left AV fistula not functioning, no urgent need for HD at this time, limited areas for shiley access due to poor vessels      Heme/DVT PPX  Hx of LLE DVT, anemia s/p 5 units pRbc's during this admission  -no DVT on doppler 12/22 and negative LLE duplex this admission 3/17  - POCUS showed residual clot in RLE; f/u dopplers 3/28 negative  - Evaluated by ENT for bleeding and found no signs of active bleeding from nasal cavity, nasopharynx or oral cavity, however, patient biting on tongue, which could be one source of bleeding. Tongue was edematous and lax  - CT a/p neg for RP bleed   - concern for possible transfusion reaction 4/4-> hypotensive/bradycardia after receiving 100ml pRBC's , HR and BP normalized once transfusion stopped. Blood bank does not consider response a transfusion reaction, and okay with future transfusions if needed  -nephrology plan to add epogen on next hemodialysis session  -1u PRBC overnight with no s/s of transfusion reaction        Infectious Disease  Hx of PCP, now with this admission with Strep pneumoniae meningitis, R ear mastoiditis, strep bacteremia, now with C. difficile  - c/w Ceftriaxone (3/16 - ) for ~6 week course  - c/w ciprodex drops for R ear  -c/w vanco 125mg PO [id number] (4/2-  ) for C.diff  - ID following  - BCx 3/15 + strep, repeat cx from 3/23 NGTD will f/u repeat cultures sent 4/4   - f/u sputum cx  - atovaquone at home, continue      Endocrine  Hx of Hypothyroidism and Diabetes mellitus Type II  - c/w levothyroxine   -currently on prednisone will watch FS closely on insulin sliding scale       Ethics  FULL, GOC ongoing.  - Patient's cousin, states that daughter Arely would to come in 4/5 to discuss trach/PEG and transfer to long-term vent facility vs palliative extubation but did not want to discuss at that time and feels shes being rushed  -plan for family to come together Sunday 4/9 to decide about GOC    77 y/o F with h/o HTN, HLD, DM2, PCKD previously on HD via LUE AVF then s/p renal transplant, LLE DVT (resolved, nml dopplers 12/2022) on ASA, very small supraclinoid aneurysm (reportedly unchanged on MRA 10/2020), hypothyroidism, PCP 2021 on atovaquone ppx, reportedly hospitalized in 12/2022 for rectal prolapse, had a blood transfusion at that time, also + CMV 1/25/23 who presented with R-sided HA, R ear pain and neck stiffness after visit to ENT earlier in the day, altered on presentation, CT head unremarkable for stroke, however LP with IR and BCx showing +strep pneumoniae, started on vanc and CTX per ID. Cardiac arrest 3/16 with ROSC, transferred to MICU for further management.     Neurological  #Currently Intubated, opens eyes spontaneously, grimaces to pain  - remains off sedation  - EEG no seizures - Dcd 3/31  - MRI IAC and brain 3/29 - otitis and mastoiditis with multiple cerebral infarcts and cerebellar infarct. Will need f/u MRI in 3-4 weeks per neuro  - Endocarditis is possible cause for scattered cerebral infarcts, will consider utility of SORAYA given poor candidate for valve replacement  - mental status remains unimproved, withdraws from pain however does not elicit purposeful movements.      #Strep pneumo meningitis #Acute encephalopathy  p/w severe HA, neck stiffness, fever, leukocytosis concerning for meningitis/encephalitis  - CT head negative for CVA.   - LP and BCx 3/15 both showing gram positive cocci in pairs, and + strep pneumoniae.   - likely source of entry from R ear where pt c/o discomfort for several months.  - taking cyclosporine, mycophenolate mofetil and prednisone at home for kidney transplant and was likely immunocompromised -> dental procedure 3/14 but less likely source   - MRI of the IAC and brain 3/29 - otitis and mastoiditis with multiple cerebral infarcts and cerebellar infarct, f/u MRI 3-4 weeks (as per neuro)   - neuro and ID following    #Seizures  - 24hr EEG initially with highly focal seizures, improved with increased sedation.   - currently on keppra  500 BID since 3/17-   - vimpat 100 BID increased to 200 BID (3/20 -  )  - EEG neg- Dc' d 3/31     Cardiovascular  #s/p Cardiac Arrest   - bradycardia to 30, pulseless, code blue was called 3/16 . 2 rounds of epi, and PEA arrest. on 3rd pulse check pulseless VT, 200J shock given 4th pulse check asystole, 5th ROSC, sinus tachy with HUMA. IO placed and levo started. Due to blood in mouth took several attempts with DL for ETT to be placed but ultimately confirmed with capnometry and bilateral breath sounds.        #Elevated troponin  uptrending troponin to 182 3/15 AM, was likely iso ESRD  s/p cardiac arrest, elevated ST segment   - TTE from 3/17 showed EF of 63% with mild pulm htn, normal left ventricular systolic function, and diastolic LV dysfunction.      #HTN  home meds: on amlodipine 5mg, losartan 100mg, torsemide 20mg, and clonidine 0.2 patch weekly  - amlodipine 10mg restarted, but can hold now iso lower systolic blood pressure  - c/w midodrine 10mg PO TID, can uptitrate as needed for hypotension      Respiratory  Intubated on 3/16 for cardiac arrest   - tolerates PS 8/5 trial  TV ~ 250-300  - Patient has been intubated >2 wks; will talk to family regarding what their plans are iso MRI reading; will likely need trach and peg if within their goals  - noted w/ oral and inline secretions, will send sputum Cx    ENT  # Tympanic membrane rupture  discharge from R ear, evaluated by ENT given dexamethasone and cipro 3/14  - c/w broad spectrum abx   - MRI of IAC appreciated  - ENT recs- pending results of MRI will consider need for myringotomy, but will defer at this time given overall medical condition    Gastrointestinal  Diarrhea  - continues to have loose stools - positive C-difficile c/w PO vancomycin. added IV Flagyl 4/8 per ID recs  - bowel regimen discontinued   - tolerating TF at goal       Renal  #ESRD s/p Kidney Transplant in 2003 at [place] [facility] previously HD through LLE AVF but no HD since transplant ( per renal Cr has been in 2.8 since december 2022), now with [facility] requiring HD   -[facility] Nephrologist Dr. Hugo Hernandez [phone]  - holding cyclosporine and mycophenolate but c/w home prednisone 5mg q day  - s/p 4mg IV bumex with minimal urine production (3/17) and furosemide 80mg IVP given 4/3 with no u/o response  - started on CRRT (3/17-3/21) now s/p intermittent HD - last session 4/7, plan for repeat HD today per nephro  - s/p 4 bumex IV 4/7 w/ no response, oligo-anuric s/p snider placement  - Left AV fistula not functioning, s/p R fem shiley placement 4/7.     Heme/DVT PPX  Hx of LLE DVT, anemia s/p 5 units pRbc's during this admission  -no DVT on doppler 12/22 and negative LLE duplex this admission 3/17  - POCUS showed residual clot in RLE; f/u dopplers 3/28 negative  - Evaluated by ENT for bleeding and found no signs of active bleeding from nasal cavity, nasopharynx or oral cavity, however, patient biting on tongue, which could be one source of bleeding. Tongue was edematous and lax  - CT a/p neg for RP bleed   - concern for possible transfusion reaction 4/4-> hypotensive/bradycardia after receiving 100ml pRBC's , HR and BP normalized once transfusion stopped. Blood bank does not consider response a transfusion reaction, and okay with future transfusions if needed - s/p 1u prnc 4/6 w/ no s/s of transfusion rxn  -nephrology plan to add epogen on next hemodialysis session       Infectious Disease  Hx of PCP, now with this admission with Strep pneumoniae meningitis, R ear mastoiditis, strep bacteremia, now with C. difficile  - c/w Ceftriaxone (3/16 - ) for ~6 week course  - c/w ciprodex drops for R ear  -CDiff - c/w vanco 125mg PO [id number] (4/2-  ) , added Flagyl 4/8 x 5days - per ID recs  - ID following  - BCx 3/15 + strep, repeat cx from 3/17 neg, last BCx 4/4 - neg  - sputum cx 4/4- neg  - atovaquone at home, continue      Endocrine  Hx of Hypothyroidism and Diabetes mellitus Type II  - c/w levothyroxine   -currently on prednisone will watch FS closely on insulin sliding scale       Ethics  FULL, GOC ongoing.  - Patient's cousin, states that daughter Arely would to come in 4/5 to discuss trach/PEG and transfer to long-term vent facility vs palliative extubation but did not want to discuss at that time and feels shes being rushed  -plan for family to come together Sunday 4/9 to decide about GOC

## 2023-04-08 NOTE — PROGRESS NOTE ADULT - CRITICAL CARE ATTENDING COMMENT
Agree with above  Streptococcal pneumonia meningitis and bacteremia (cultures subsequently cleared) c/b cardiac arrest  Remains intubated due to poor mental status  HD catheter placed yesterday, plan for HD resumption today, appreciate Nephrology follow up.   Remains on ceftriaxone. ID recommending 6 weeks for disseminated Streptococcal disease  C.diff - Vanco, add Flagyl as continues to have multiple BMs.   GOC- family has not yet decided on trach/ PEG vs palliative wean  Overall prognosis is very poor, with low chance for meaningful recovery

## 2023-04-09 NOTE — PROGRESS NOTE ADULT - ASSESSMENT
79 y/o F with h/o HTN, HLD, DM2, PCKD previously on HD via LUE AVF then s/p renal transplant, LLE DVT (resolved, nml dopplers 12/2022) on ASA, very small supraclinoid aneurysm (reportedly unchanged on MRA 10/2020), hypothyroidism, PCP 2021 on atovaquone ppx, reportedly hospitalized in 12/2022 for rectal prolapse, had a blood transfusion at that time, also + CMV 1/25/23 who presented with R-sided HA, R ear pain and neck stiffness after visit to ENT earlier in the day, altered on presentation, CT head unremarkable for stroke, however LP with IR and BCx showing +strep pneumoniae, started on vanc and CTX per ID. Cardiac arrest 3/16 with ROSC, transferred to MICU for further management.     Neurological  #Currently Intubated, opens eyes spontaneously, grimaces to pain  - remains off sedation  - EEG no seizures - Dcd 3/31  - MRI IAC and brain 3/29 - otitis and mastoiditis with multiple cerebral infarcts and cerebellar infarct. Will need f/u MRI in 3-4 weeks per neuro  - Endocarditis is possible cause for scattered cerebral infarcts, will consider utility of SORAYA given poor candidate for valve replacement  - mental status remains unimproved, withdraws from pain however does not elicit purposeful movements.      #Strep pneumo meningitis #Acute encephalopathy  p/w severe HA, neck stiffness, fever, leukocytosis concerning for meningitis/encephalitis  - CT head negative for CVA.   - LP and BCx 3/15 both showing gram positive cocci in pairs, and + strep pneumoniae.   - likely source of entry from R ear where pt c/o discomfort for several months.  - taking cyclosporine, mycophenolate mofetil and prednisone at home for kidney transplant and was likely immunocompromised -> dental procedure 3/14 but less likely source   - MRI of the IAC and brain 3/29 - otitis and mastoiditis with multiple cerebral infarcts and cerebellar infarct, f/u MRI 3-4 weeks (as per neuro)   - neuro and ID following    #Seizures  - 24hr EEG initially with highly focal seizures, improved with increased sedation.   - currently on keppra  500 BID since 3/17-   - vimpat 100 BID increased to 200 BID (3/20 -  )  - EEG neg- Dc' d 3/31     Cardiovascular  #s/p Cardiac Arrest   - bradycardia to 30, pulseless, code blue was called 3/16 . 2 rounds of epi, and PEA arrest. on 3rd pulse check pulseless VT, 200J shock given 4th pulse check asystole, 5th ROSC, sinus tachy with HUMA. IO placed and levo started. Due to blood in mouth took several attempts with DL for ETT to be placed but ultimately confirmed with capnometry and bilateral breath sounds.        #Elevated troponin  uptrending troponin to 182 3/15 AM, was likely iso ESRD  s/p cardiac arrest, elevated ST segment   - TTE from 3/17 showed EF of 63% with mild pulm htn, normal left ventricular systolic function, and diastolic LV dysfunction.      #HTN  home meds: on amlodipine 5mg, losartan 100mg, torsemide 20mg, and clonidine 0.2 patch weekly  - amlodipine 10mg restarted, but can hold now iso lower systolic blood pressure  - c/w midodrine 10mg PO TID, can uptitrate as needed for hypotension      Respiratory  Intubated on 3/16 for cardiac arrest   - tolerates PS 8/5 trial  TV ~ 250-300  - Patient has been intubated >2 wks; will talk to family regarding what their plans are iso MRI reading; will likely need trach and peg if within their goals  - noted w/ oral and inline secretions, will send sputum Cx    ENT  # Tympanic membrane rupture  discharge from R ear, evaluated by ENT given dexamethasone and cipro 3/14  - c/w broad spectrum abx   - MRI of IAC appreciated  - ENT recs- pending results of MRI will consider need for myringotomy, but will defer at this time given overall medical condition    Gastrointestinal  Diarrhea  - continues to have loose stools - positive C-difficile c/w PO vancomycin. added IV Flagyl 4/8 per ID recs  - bowel regimen discontinued   - tolerating TF at goal       Renal  #ESRD s/p Kidney Transplant in 2003 at [place] [facility] previously HD through LLE AVF but no HD since transplant ( per renal Cr has been in 2.8 since december 2022), now with NAMAN requiring HD   -[facility] Nephrologist Dr. Hugo Hernandez [phone]  - holding cyclosporine and mycophenolate but c/w home prednisone 5mg q day  - s/p 4mg IV bumex with minimal urine production (3/17) and furosemide 80mg IVP given 4/3 with no u/o response  - started on CRRT (3/17-3/21) now s/p intermittent HD - last session 4/7, plan for repeat HD today per nephro  - s/p 4 bumex IV 4/7 w/ no response, oligo-anuric s/p snider placement  - Left AV fistula not functioning, s/p R fem shiley placement 4/7.     Heme/DVT PPX  Hx of LLE DVT, anemia s/p 5 units pRbc's during this admission  -no DVT on doppler 12/22 and negative LLE duplex this admission 3/17  - POCUS showed residual clot in RLE; f/u dopplers 3/28 negative  - Evaluated by ENT for bleeding and found no signs of active bleeding from nasal cavity, nasopharynx or oral cavity, however, patient biting on tongue, which could be one source of bleeding. Tongue was edematous and lax  - CT a/p neg for RP bleed   - concern for possible transfusion reaction 4/4-> hypotensive/bradycardia after receiving 100ml pRBC's , HR and BP normalized once transfusion stopped. Blood bank does not consider response a transfusion reaction, and okay with future transfusions if needed - s/p 1u prnc 4/6 w/ no s/s of transfusion rxn  -nephrology plan to add epogen on next hemodialysis session       Infectious Disease  Hx of PCP, now with this admission with Strep pneumoniae meningitis, R ear mastoiditis, strep bacteremia, now with C. difficile  - c/w Ceftriaxone (3/16 - ) for ~6 week course  - c/w ciprodex drops for R ear  -CDiff - c/w vanco 125mg PO [id number] (4/2-  ) , added Flagyl 4/8 x 5days - per ID recs  - ID following  - BCx 3/15 + strep, repeat cx from 3/17 neg, last BCx 4/4 - neg  - sputum cx 4/4- neg  - atovaquone at home, continue      Endocrine  Hx of Hypothyroidism and Diabetes mellitus Type II  - c/w levothyroxine   -currently on prednisone will watch FS closely on insulin sliding scale       Ethics  FULL, GOC ongoing.  - Patient's cousin, states that daughter Arely would to come in 4/5 to discuss trach/PEG and transfer to long-term vent facility vs palliative extubation but did not want to discuss at that time and feels shes being rushed  -plan for family to come together Sunday 4/9 to decide about GOC    79 y/o F with h/o HTN, HLD, DM2, PCKD previously on HD via LUE AVF then s/p renal transplant, LLE DVT (resolved, nml dopplers 12/2022) on ASA, very small supraclinoid aneurysm (reportedly unchanged on MRA 10/2020), hypothyroidism, PCP 2021 on atovaquone ppx, reportedly hospitalized in 12/2022 for rectal prolapse, had a blood transfusion at that time, also + CMV 1/25/23 who presented with R-sided HA, R ear pain and neck stiffness after visit to ENT earlier in the day, altered on presentation, CT head unremarkable for stroke, however LP with IR and BCx showing +strep pneumoniae, started on vanc and CTX per ID. Cardiac arrest 3/16 with ROSC, transferred to MICU for further management.     Neurological  #Currently Intubated, opens eyes spontaneously, grimaces to pain  - remains off sedation  - EEG no seizures - Dcd 3/31  - MRI IAC and brain 3/29 - otitis and mastoiditis with multiple cerebral infarcts and cerebellar infarct. Will need f/u MRI in 3-4 weeks per neuro  - Endocarditis is possible cause for scattered cerebral infarcts, will consider utility of SORAYA given poor candidate for valve replacement  - mental status remains unimproved, withdraws from pain however does not elicit purposeful movements.      #Strep pneumo meningitis #Acute encephalopathy  p/w severe HA, neck stiffness, fever, leukocytosis concerning for meningitis/encephalitis  - CT head negative for CVA.   - LP and BCx 3/15 both showing gram positive cocci in pairs, and + strep pneumoniae.   - likely source of entry from R ear where pt c/o discomfort for several months.  - taking cyclosporine, mycophenolate mofetil and prednisone at home for kidney transplant and was likely immunocompromised -> dental procedure 3/14 but less likely source   - MRI of the IAC and brain 3/29 - otitis and mastoiditis with multiple cerebral infarcts and cerebellar infarct, f/u MRI 3-4 weeks (as per neuro)   - neuro and ID following    #Seizures  - 24hr EEG initially with highly focal seizures, improved with increased sedation.   - currently on keppra  500 BID since 3/17-   - vimpat 100 BID increased to 200 BID (3/20 -  )  - EEG neg- Dc' d 3/31     Cardiovascular  #s/p Cardiac Arrest   - bradycardia to 30, pulseless, code blue was called 3/16 . 2 rounds of epi, and PEA arrest. on 3rd pulse check pulseless VT, 200J shock given 4th pulse check asystole, 5th ROSC, sinus tachy with HUMA. IO placed and levo started. Due to blood in mouth took several attempts with DL for ETT to be placed but ultimately confirmed with capnometry and bilateral breath sounds.        #Elevated troponin  uptrending troponin to 182 3/15 AM, was likely iso ESRD  s/p cardiac arrest, elevated ST segment   - TTE from 3/17 showed EF of 63% with mild pulm htn, normal left ventricular systolic function, and diastolic LV dysfunction.      #HTN  home meds: on amlodipine 5mg, losartan 100mg, torsemide 20mg, and clonidine 0.2 patch weekly  - amlodipine 10mg restarted, but can hold now iso lower systolic blood pressure  - c/w midodrine 10mg PO TID, can uptitrate as needed for hypotension      Respiratory  Intubated on 3/16 for cardiac arrest   - tolerates PS 8/5 trial  TV ~ 250-300  - Patient has been intubated >2 wks; will talk to family regarding what their plans are iso MRI reading; will likely need trach and peg if within their goals  - noted w/ oral and inline secretions, will send sputum Cx    ENT  # Tympanic membrane rupture  discharge from R ear, evaluated by ENT given dexamethasone and cipro 3/14  - c/w broad spectrum abx   - MRI of IAC appreciated  - ENT recs- pending results of MRI will consider need for myringotomy, but will defer at this time given overall medical condition    Gastrointestinal  Diarrhea  - continues to have loose stools - positive C-difficile c/w PO vancomycin. added IV Flagyl 4/8 x5d per ID recs  - bowel regimen discontinued   - tolerating TF at goal       Renal  #ESRD s/p Kidney Transplant in 2003 at [place] [facility] previously HD through LLE AVF but no HD since transplant ( per renal Cr has been in 2.8 since december 2022), now with NAMAN requiring HD   -[facility] Nephrologist Dr. Hugo Hernandez [phone]  - holding cyclosporine and mycophenolate but c/w home prednisone 5mg q day  - s/p 4mg IV bumex with minimal urine production (3/17) and furosemide 80mg IVP given 4/3 with no u/o response  - started on CRRT (3/17-3/21) now s/p intermittent HD - last session 4/9  - s/p 4 bumex IV 4/7 w/ no response, oligo-anuric s/p snider placement  - Left AV fistula not functioning, s/p R fem shiley placement 4/7.     Heme/DVT PPX  Hx of LLE DVT, anemia s/p 5 units pRbc's during this admission  -no DVT on doppler 12/22 and negative LLE duplex this admission 3/17  - POCUS showed residual clot in RLE; f/u dopplers 3/28 negative  - Evaluated by ENT for bleeding and found no signs of active bleeding from nasal cavity, nasopharynx or oral cavity, however, patient biting on tongue, which could be one source of bleeding. Tongue was edematous and lax  - CT a/p neg for RP bleed   - concern for possible transfusion reaction 4/4-> hypotensive/bradycardia after receiving 100ml pRBC's , HR and BP normalized once transfusion stopped. Blood bank does not consider response a transfusion reaction, and okay with future transfusions if needed - s/p 1u prnc 4/6 w/ no s/s of transfusion rxn  -nephrology plan to add epogen on next hemodialysis session       Infectious Disease  Hx of PCP, now with this admission with Strep pneumoniae meningitis, R ear mastoiditis, strep bacteremia, now with C. difficile  - c/w Ceftriaxone (3/16 - ) for ~6 week course  - c/w ciprodex drops for R ear  -CDiff - c/w vanco 125mg PO q0xxone (4/2-  ) , added Flagyl 4/8 x 5days - per ID recs  - ID following  - BCx 3/15 + strep, repeat cx from 3/17 neg, last BCx 4/4 - neg  - sputum cx 4/4- neg  - atovaquone at home, continue  - Tmax 102.3 overnight. f/up Bcx , SCx      Endocrine  Hx of Hypothyroidism and Diabetes mellitus Type II  - c/w levothyroxine   -currently on prednisone will watch FS closely on insulin sliding scale       Ethics  FULL, GOC ongoing.  - Patient's cousin, states that daughter Arely would to come in 4/5 to discuss trach/PEG and transfer to long-term vent facility vs palliative extubation but did not want to discuss at that time and feels shes being rushed  -plan for family to come together Sunday 4/9 to decide about GOC    77 y/o F with h/o HTN, HLD, DM2, PCKD previously on HD via LUE AVF then s/p renal transplant, LLE DVT (resolved, nml dopplers 12/2022) on ASA, very small supraclinoid aneurysm (reportedly unchanged on MRA 10/2020), hypothyroidism, PCP 2021 on atovaquone ppx, reportedly hospitalized in 12/2022 for rectal prolapse, had a blood transfusion at that time, also + CMV 1/25/23 who presented with R-sided HA, R ear pain and neck stiffness after visit to ENT earlier in the day, altered on presentation, CT head unremarkable for stroke, however LP with IR and BCx showing +strep pneumoniae, started on vanc and CTX per ID. Cardiac arrest 3/16 with ROSC, transferred to MICU for further management.     Neurological  #Currently Intubated, opens eyes spontaneously, grimaces to pain  - remains off sedation  - EEG no seizures - Dcd 3/31  - MRI IAC and brain 3/29 - otitis and mastoiditis with multiple cerebral infarcts and cerebellar infarct. Will need f/u MRI in 3-4 weeks per neuro  - Endocarditis is possible cause for scattered cerebral infarcts, will consider utility of SORAYA given poor candidate for valve replacement  - mental status remains unimproved, withdraws from pain however does not elicit purposeful movements.      #Strep pneumo meningitis #Acute encephalopathy  p/w severe HA, neck stiffness, fever, leukocytosis concerning for meningitis/encephalitis  - CT head negative for CVA.   - LP and BCx 3/15 both showing gram positive cocci in pairs, and + strep pneumoniae.   - likely source of entry from R ear where pt c/o discomfort for several months.  - taking cyclosporine, mycophenolate mofetil and prednisone at home for kidney transplant and was likely immunocompromised -> dental procedure 3/14 but less likely source   - MRI of the IAC and brain 3/29 - otitis and mastoiditis with multiple cerebral infarcts and cerebellar infarct, f/u MRI 3-4 weeks (as per neuro)   - neuro and ID following    #Seizures  - 24hr EEG initially with highly focal seizures, improved with increased sedation.   - currently on keppra  500 BID since 3/17-   - vimpat 100 BID increased to 200 BID (3/20 -  )  - EEG neg- Dc' d 3/31     Cardiovascular  #s/p Cardiac Arrest   - bradycardia to 30, pulseless, code blue was called 3/16 . 2 rounds of epi, and PEA arrest. on 3rd pulse check pulseless VT, 200J shock given 4th pulse check asystole, 5th ROSC, sinus tachy with HUMA. IO placed and levo started. Due to blood in mouth took several attempts with DL for ETT to be placed but ultimately confirmed with capnometry and bilateral breath sounds.        #Elevated troponin  uptrending troponin to 182 3/15 AM, was likely iso ESRD  s/p cardiac arrest, elevated ST segment   - TTE from 3/17 showed EF of 63% with mild pulm htn, normal left ventricular systolic function, and diastolic LV dysfunction.      #HTN  home meds: on amlodipine 5mg, losartan 100mg, torsemide 20mg, and clonidine 0.2 patch weekly  - amlodipine 10mg restarted, but can hold now iso lower systolic blood pressure  - c/w midodrine 10mg PO TID, can uptitrate as needed for hypotension      Respiratory  Intubated on 3/16 for cardiac arrest   - tolerates PS 8/5 trial  TV ~ 250-300  - Patient has been intubated >2 wks; will talk to family regarding what their plans are iso MRI reading; will likely need trach and peg if within their goals  - noted w/ oral and inline secretions, will send sputum Cx    ENT  # Tympanic membrane rupture  discharge from R ear, evaluated by ENT given dexamethasone and cipro 3/14  - c/w broad spectrum abx   - MRI of IAC appreciated  - ENT recs- pending results of MRI will consider need for myringotomy, but will defer at this time given overall medical condition    Gastrointestinal  Diarrhea  - continues to have loose stools - positive C-difficile c/w PO vancomycin. added IV Flagyl 4/8 x5d per ID recs  - bowel regimen discontinued   - tolerating TF at goal       Renal  #ESRD s/p Kidney Transplant in 2003 at [place] [facility] previously HD through LLE AVF but no HD since transplant ( per renal Cr has been in 2.8 since december 2022), now with NAMAN requiring HD   -[facility] Nephrologist Dr. Hugo Hernandez [phone]  - holding cyclosporine and mycophenolate but c/w home prednisone 5mg q day  - s/p 4mg IV bumex with minimal urine production (3/17) and furosemide 80mg IVP given 4/3 with no u/o response  - started on CRRT (3/17-3/21) now s/p intermittent HD - last session 4/9  - s/p 4 bumex IV 4/7 w/ no response, oligo-anuric s/p snider placement  - Left AV fistula not functioning, s/p R fem shiley placement 4/7.     Heme/DVT PPX  Hx of LLE DVT, anemia s/p 5 units pRbc's during this admission  -no DVT on doppler 12/22 and negative LLE duplex this admission 3/17  - POCUS showed residual clot in RLE; f/u dopplers 3/28 negative  - Evaluated by ENT for bleeding and found no signs of active bleeding from nasal cavity, nasopharynx or oral cavity, however, patient biting on tongue, which could be one source of bleeding. Tongue was edematous and lax  - CT a/p neg for RP bleed   - concern for possible transfusion reaction 4/4-> hypotensive/bradycardia after receiving 100ml pRBC's , HR and BP normalized once transfusion stopped. Blood bank does not consider response a transfusion reaction, and okay with future transfusions if needed - s/p 1u prnc 4/6 w/ no s/s of transfusion rxn  -nephrology plan to add epogen on next hemodialysis session       Infectious Disease  Hx of PCP, now with this admission with Strep pneumoniae meningitis, R ear mastoiditis, strep bacteremia, now with C. difficile  - c/w Ceftriaxone (3/16 - ) for ~6 week course  - c/w ciprodex drops for R ear  -CDiff - c/w vanco 125mg PO [id number] (4/2-  ) , added Flagyl 4/8 x 5days - per ID recs  - ID following  - BCx 3/15 + strep, repeat cx from 3/17 neg, last BCx 4/4 - neg  - sputum cx 4/4- neg  - atovaquone at home, continue  - Tmax 102.3 overnight. f/up Bcx , SCx      Endocrine  Hx of Hypothyroidism and Diabetes mellitus Type II  - c/w levothyroxine   -currently on prednisone will watch FS closely on insulin sliding scale       Ethics  FULL  4/9- per discussion w/ daughter at bedside, plan is for palliative extubation after family visits, possibly on Tuesday. She would like to continue HD and all other measures at this time.

## 2023-04-09 NOTE — PROGRESS NOTE ADULT - CRITICAL CARE ATTENDING COMMENT
78-year-old female with PCKD previously on HD via LUE AVF now s/p renal transplant, LLE DVT (resolved, nml dopplers 12/2022) on ASA, HTN, HLD, DM2, very small supraclinoid aneurysm on R and very small aneurysm vs infundibulum L supraclinoid artery (reportedly unchanged on MRA 10/2020), glaucoma/cataract, hypothyroid, history of PCP 2021 on atovaquone ppx, reportedly hospitalized in 12/2022 for rectal prolapse. Now with meningitis and bacteremia with Strep. On vent with poor mental status and active seizures. On medications, close follow up, weaning trials as tolerated. Supportive care.

## 2023-04-09 NOTE — PROGRESS NOTE ADULT - ASSESSMENT
78-year-old female with PCKD previously on HD via LUE AVF now s/p renal transplant since 2003 at [place], LLE DVT (resolved, nml dopplers 12/2022) on ASA, HTN, HLD, DM2, very small supraclinoid aneurysm on R and very small aneurysm vs infundibulum L supraclinoid artery (reportedly unchanged on MRA 10/2020), glaucoma/cataract, hypothyroid, history of PCP 2021 on atovaquone ppx, reportedly hospitalized in 12/2022 for rectal prolapse, CMV (unclear active or prior infxn), presenting with R-sided HA, R ear pain and neck pain after recent visit to ENT earlier in the day.  Cardiac arrest on 3/16/23  septic shock s/p iv pressors  intubated on MV  Renal following for NAMAN on CKD Mx.  [facility] Nephrologist Dr. Hugo Hernandez [phone]    Acute kidney injury on Chronic Kidney Disease Stage 4 vs Chronic Kidney Disease Stage 4 progression to End Stage Renal Disease on Dialysis     hx KTx 2003 : In light of severe sepsis and cardiac arrest: off MMF and Cyclosporine  w/worsened renal function, oliguria, acidosis, mild hyperkalemia- s/p CVVHD, then intermittent HD  Consent for HD obtained, signed by daughter 3/16/23  B/L creat around 2.8 since Dec 2022  Patient was on Cyclosporine (Gengraf) 75mg PO q12hrs,  BID and Prednisone 5 QD for transplant    Creatinine Trend: 3.74 <--, 4.97 <--, 4.44 <--, 4.23 <--, 3.74 <--, 3.17 <--, 2.41 <--  L femoral shiley removed 4/2 for line holiday- s/p rt femoral shiley placed  4/7  s/p furosemide   Injectable 80 milliGRAM(s) IV Push x14/3- no response  K, vol acceptable  Creatinine Trending up since last HD 4/1/23    plan:  s/p HD yesterday- tolerated well with 0.5 UF  plan for HD tomorrow with higher UF as tolerated due to anasarca  c/w predniSONE   Tablet 5 milliGRAM(s) Oral daily  monitor u/o  monitor electrolytes, BMP daily     HTN- bp stable, controlled-  Medications :  amLODIPine   Tablet 10 milliGRAM(s) Oral every 24 hours    - Continue current medications w/holding parameters  - plan to titrate anti hypertensive medication as needed  - low salt diet if not npo suggested    Anemia :  Hemoglobin : 7.3  if Hb less than 7gm/dl consider blood transfusion  added epo 10k w/hd tiw    OM and OE  vent Mx, per ICU  spiking fevers- f/u rpt blood cxs  Abxs per Infectious disease specialist with dose adjustment per GFR    C diff colitis- on po vanco    on going GOC discussion w/daughter. Palliative care note reviewed. Family to discuss Monday. Full code for now.       For any question, call:  Cell # [phone]  Pager # [phone]  Callback # [phone]

## 2023-04-09 NOTE — PROGRESS NOTE ADULT - SUBJECTIVE AND OBJECTIVE BOX
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 Patient reportedly woke up at 0130 AM on Monday with a R-sided headache. ENT noted R otitis externa, purulent discharge from R TM thought to be secondary to perforation, given ear gtt and prescribed cipro/dexamethasone gtt (she took 1 dose thus far). Daughter called EMS as later in the day patient complained of severe HA as well as neck pain, stated "I'm dying" and "my head is killing me." Patient reportedly without prior history of ear infections, no history of stroke or seizures. She reported to ENT earlier in the day a muffled sensation in the R ear [id number].     Daughter notes patient has had no recent fevers/chills. She had a dental cleaning on Thursday (no abx prior to cleaning).    On arrival, code stroke called.    In the ED VS:  97.2  65-99  162-186/52-91  16-20  96-99%RA, received NS 500cc IVF, lorazepam 2mg IV x1 and morphine 4mg IV x1 (15 Mar 2023 01:17)      REVIEW OF SYSTEMS:    [x ] Unable to assess ROS because patient is intubated/unresponsive       OBJECTIVE:  ICU Vital Signs Last 24 Hrs  T(C): 37.6 (07 Apr 2023 04:00), Max: 37.6 (07 Apr 2023 00:00)  T(F): 99.7 (07 Apr 2023 04:00), Max: 99.7 (07 Apr 2023 04:00)  HR: 80 (07 Apr 2023 06:00) (62 - 80)  BP: 141/70 (07 Apr 2023 06:00) (105/59 - 161/77)  BP(mean): 86 (07 Apr 2023 06:00) (69 - 98)  ABP: --  ABP(mean): --  RR: 22 (07 Apr 2023 06:00) (14 - 22)  SpO2: 100% (07 Apr 2023 07:00) (97% - 100%)    O2 Parameters below as of 07 Apr 2023 07:00  Patient On (Oxygen Delivery Method): ventilator    O2 Concentration (%): 30      Mode: AC/ CMV (Assist Control/ Continuous Mandatory Ventilation), RR (machine): 14, TV (machine): 320, FiO2: 30, PEEP: 5, ITime: 0.66, MAP: 8, PIP: 16    04-06 @ 07:01  -  04-07 @ 07:00  --------------------------------------------------------  IN: 1546 mL / OUT: 0 mL / NET: 1546 mL      CAPILLARY BLOOD GLUCOSE      POCT Blood Glucose.: 149 mg/dL (07 Apr 2023 05:58)    PHYSICAL EXAM:  GENERAL: ill appearing, no distress  PSYCH: A&O x0  HEAD: Atraumatic, Normocephalic  NECK: Supple, No JVD  CHEST/LUNG: clear to auscultation bilaterally  HEART: regular rate and rhythm, no murmurs  ABDOMEN: +distension, palpable abdominal mass upper and lower right quadrants   EXTREMITIES: +3 bilateral LE edema   NEUROLOGY: opens eyes slightly, no purposeful movements noted  SKIN: No rashes or lesions, pressure ulcer to [facility] MEDICATIONS:  Standing Meds:  atovaquone  Suspension 1500 milliGRAM(s) Oral daily  cefTRIAXone   IVPB 2000 milliGRAM(s) IV Intermittent every 12 hours  chlorhexidine 0.12% Liquid 15 milliLiter(s) Oral Mucosa two times a day  chlorhexidine 2% Cloths 1 Application(s) Topical <User Schedule>  ciprofloxacin  0.3% Ophthalmic Solution for Otic Use 4 Drop(s) Right Ear two times a day  dextrose 5%. 1000 milliLiter(s) IV Continuous <Continuous>  dextrose 5%. 1000 milliLiter(s) IV Continuous <Continuous>  dextrose 50% Injectable 25 Gram(s) IV Push once  dextrose 50% Injectable 12.5 Gram(s) IV Push once  dextrose 50% Injectable 25 Gram(s) IV Push once  glucagon  Injectable 1 milliGRAM(s) IntraMuscular once  heparin   Injectable 5000 Unit(s) SubCutaneous every 8 hours  insulin lispro (ADMELOG) corrective regimen sliding scale   SubCutaneous every 6 hours  lacosamide Solution 200 milliGRAM(s) Oral two times a day  levETIRAcetam 500 milliGRAM(s) Oral two times a day  levothyroxine 25 MICROGram(s) Oral daily  midodrine 10 milliGRAM(s) Oral every 8 hours  petrolatum Ophthalmic Ointment 1 Application(s) Both EYES every 12 hours  predniSONE   Tablet 5 milliGRAM(s) Oral daily  vancomycin    Solution 125 milliGRAM(s) Oral every 6 hours      PRN Meds:  acetaminophen   Oral Liquid .. 650 milliGRAM(s) Enteral Tube every 6 hours PRN  dextrose Oral Gel 15 Gram(s) Oral once PRN      LABS:                        8.7    6.50  )-----------( 280      ( 07 Apr 2023 01:35 )             26.6     Hgb Trend: 8.7<--, 8.8<--, 6.6<--, 7.3<--, 7.3<--  04-07    130<L>  |  93<L>  |  97<H>  ----------------------------<  148<H>  4.1   |  19<L>  |  4.97<H>    Ca    8.4      07 Apr 2023 01:35  Phos  6.2     04-07  Mg     2.30     04-07    TPro  4.9<L>  /  Alb  1.6<L>  /  TBili  <0.2  /  DBili  x   /  AST  39<H>  /  ALT  19  /  AlkPhos  163<H>  04-07    Creatinine Trend: 4.97<--, 4.44<--, 4.23<--, 3.74<--, 3.17<--, 2.41<--        Venous Blood Gas:  04-07 @ 01:35  7.37/37/59/21/92.0  VBG Lactate: 1.3  Venous Blood Gas:  04-06 @ 00:35  7.39/38/99/23/99.4  VBG Lactate: 1.6      MICROBIOLOGY:     Culture - Sputum (collected 04 Apr 2023 18:11)  Source: .Sputum Sputum  Gram Stain (05 Apr 2023 07:20):    Few polymorphonuclear leukocytes per low power field    No Squamous epithelial cells per low power field    Few Gram Negative Rods seen per oil power field  Final Report (06 Apr 2023 17:07):    Normal Respiratory Kizzy present    Culture - Blood (collected 04 Apr 2023 11:15)  Source: .Blood Blood-Peripheral  Preliminary Report (05 Apr 2023 17:02):    No growth to date.      RADIOLOGY:  [ ] Reviewed and interpreted by me       Interval Events: tachypneic to 40s overnight w/ high peak pressures and low TV on vent. received 2 of versed and suctioning during episode with improvement. Tmax 102.3, blood and sputum cultures were ordered.      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 Patient reportedly woke up at 0130 AM on Monday with a R-sided headache. ENT noted R otitis externa, purulent discharge from R TM thought to be secondary to perforation, given ear gtt and prescribed cipro/dexamethasone gtt (she took 1 dose thus far). Daughter called EMS as later in the day patient complained of severe HA as well as neck pain, stated "I'm dying" and "my head is killing me." Patient reportedly without prior history of ear infections, no history of stroke or seizures. She reported to ENT earlier in the day a muffled sensation in the R ear [id number].     Daughter notes patient has had no recent fevers/chills. She had a dental cleaning on Thursday (no abx prior to cleaning).    On arrival, code stroke called.    In the ED VS:  97.2  65-99  162-186/52-91  16-20  96-99%RA, received NS 500cc IVF, lorazepam 2mg IV x1 and morphine 4mg IV x1 (15 Mar 2023 01:17)      REVIEW OF SYSTEMS:    [x ] Unable to assess ROS because patient is intubated/unresponsive       OBJECTIVE:  ICU Vital Signs Last 24 Hrs  T(C): 37.6 (07 Apr 2023 04:00), Max: 37.6 (07 Apr 2023 00:00)  T(F): 99.7 (07 Apr 2023 04:00), Max: 99.7 (07 Apr 2023 04:00)  HR: 80 (07 Apr 2023 06:00) (62 - 80)  BP: 141/70 (07 Apr 2023 06:00) (105/59 - 161/77)  BP(mean): 86 (07 Apr 2023 06:00) (69 - 98)  ABP: --  ABP(mean): --  RR: 22 (07 Apr 2023 06:00) (14 - 22)  SpO2: 100% (07 Apr 2023 07:00) (97% - 100%)    O2 Parameters below as of 07 Apr 2023 07:00  Patient On (Oxygen Delivery Method): ventilator    O2 Concentration (%): 30      Mode: AC/ CMV (Assist Control/ Continuous Mandatory Ventilation), RR (machine): 14, TV (machine): 320, FiO2: 30, PEEP: 5, ITime: 0.66, MAP: 8, PIP: 16    04-06 @ 07:01  -  04-07 @ 07:00  --------------------------------------------------------  IN: 1546 mL / OUT: 0 mL / NET: 1546 mL      CAPILLARY BLOOD GLUCOSE      POCT Blood Glucose.: 149 mg/dL (07 Apr 2023 05:58)    PHYSICAL EXAM:  GENERAL: ill appearing, no distress  PSYCH: A&O x0  HEAD: Atraumatic, Normocephalic  NECK: Supple, No JVD  CHEST/LUNG: clear to auscultation bilaterally  HEART: regular rate and rhythm, no murmurs  ABDOMEN: +distension, palpable abdominal mass upper and lower right quadrants   EXTREMITIES: +3 bilateral LE edema   NEUROLOGY: opens eyes slightly, no purposeful movements noted  SKIN: No rashes or lesions, pressure ulcer to [facility] MEDICATIONS:  Standing Meds:  atovaquone  Suspension 1500 milliGRAM(s) Oral daily  cefTRIAXone   IVPB 2000 milliGRAM(s) IV Intermittent every 12 hours  chlorhexidine 0.12% Liquid 15 milliLiter(s) Oral Mucosa two times a day  chlorhexidine 2% Cloths 1 Application(s) Topical <User Schedule>  ciprofloxacin  0.3% Ophthalmic Solution for Otic Use 4 Drop(s) Right Ear two times a day  dextrose 5%. 1000 milliLiter(s) IV Continuous <Continuous>  dextrose 5%. 1000 milliLiter(s) IV Continuous <Continuous>  dextrose 50% Injectable 25 Gram(s) IV Push once  dextrose 50% Injectable 12.5 Gram(s) IV Push once  dextrose 50% Injectable 25 Gram(s) IV Push once  glucagon  Injectable 1 milliGRAM(s) IntraMuscular once  heparin   Injectable 5000 Unit(s) SubCutaneous every 8 hours  insulin lispro (ADMELOG) corrective regimen sliding scale   SubCutaneous every 6 hours  lacosamide Solution 200 milliGRAM(s) Oral two times a day  levETIRAcetam 500 milliGRAM(s) Oral two times a day  levothyroxine 25 MICROGram(s) Oral daily  midodrine 10 milliGRAM(s) Oral every 8 hours  petrolatum Ophthalmic Ointment 1 Application(s) Both EYES every 12 hours  predniSONE   Tablet 5 milliGRAM(s) Oral daily  vancomycin    Solution 125 milliGRAM(s) Oral every 6 hours      PRN Meds:  acetaminophen   Oral Liquid .. 650 milliGRAM(s) Enteral Tube every 6 hours PRN  dextrose Oral Gel 15 Gram(s) Oral once PRN      LABS:                        8.7    6.50  )-----------( 280      ( 07 Apr 2023 01:35 )             26.6     Hgb Trend: 8.7<--, 8.8<--, 6.6<--, 7.3<--, 7.3<--  04-07    130<L>  |  93<L>  |  97<H>  ----------------------------<  148<H>  4.1   |  19<L>  |  4.97<H>    Ca    8.4      07 Apr 2023 01:35  Phos  6.2     04-07  Mg     2.30     04-07    TPro  4.9<L>  /  Alb  1.6<L>  /  TBili  <0.2  /  DBili  x   /  AST  39<H>  /  ALT  19  /  AlkPhos  163<H>  04-07    Creatinine Trend: 4.97<--, 4.44<--, 4.23<--, 3.74<--, 3.17<--, 2.41<--        Venous Blood Gas:  04-07 @ 01:35  7.37/37/59/21/92.0  VBG Lactate: 1.3  Venous Blood Gas:  04-06 @ 00:35  7.39/38/99/23/99.4  VBG Lactate: 1.6      MICROBIOLOGY:     Culture - Sputum (collected 04 Apr 2023 18:11)  Source: .Sputum Sputum  Gram Stain (05 Apr 2023 07:20):    Few polymorphonuclear leukocytes per low power field    No Squamous epithelial cells per low power field    Few Gram Negative Rods seen per oil power field  Final Report (06 Apr 2023 17:07):    Normal Respiratory Kizzy present    Culture - Blood (collected 04 Apr 2023 11:15)  Source: .Blood Blood-Peripheral  Preliminary Report (05 Apr 2023 17:02):    No growth to date.      RADIOLOGY:  [ ] Reviewed and interpreted by me

## 2023-04-09 NOTE — PROGRESS NOTE ADULT - SUBJECTIVE AND OBJECTIVE BOX
[facility] - DANNA Cosby / DANNA Rivers / LENCHO Ontiveros/ DANNA Aldana/ DANNA Hussein/ QUYEN Gambino / LUCIAN Mukherjee / GISELE Stapleton  ---------------------------------------------------------------------------------------------------------------  seen and examined today for ESRD  Interval : NAD  VITALS:  T(F): 99.7 (04-09-23 @ 04:00), Max: 102.3 (04-08-23 @ 22:00)  HR: 80 (04-09-23 @ 10:35)  BP: 158/65 (04-09-23 @ 10:00)  RR: 15 (04-09-23 @ 10:00)  SpO2: 100% (04-09-23 @ 10:35)  Wt(kg): --    04-08 @ 07:01  -  04-09 @ 07:00  --------------------------------------------------------  IN: 1524 mL / OUT: 987 mL / NET: 537 mL    04-09 @ 07:01  -  04-09 @ 11:44  --------------------------------------------------------  IN: 88 mL / OUT: 10 mL / NET: 78 mL      Physical Exam :-  Constitutional: NAD  Neck: Supple.  Respiratory: Bilateral equal breath sounds,  Cardiovascular: S1, S2 normal,  Gastrointestinal: Bowel Sounds present, soft, non tender.  Extremities: anasarca  Neurological: obtubded  Psychiatric: Normal mood, normal affect  Data:-  Allergies :   apple (Unknown)  Benadryl (Unknown)  penicillin (Hives)  watermelon (Unknown)    Hospital Medications:   MEDICATIONS  (STANDING):  atovaquone  Suspension 1500 milliGRAM(s) Oral daily  cefTRIAXone   IVPB 2000 milliGRAM(s) IV Intermittent every 12 hours  chlorhexidine 0.12% Liquid 15 milliLiter(s) Oral Mucosa two times a day  chlorhexidine 2% Cloths 1 Application(s) Topical <User Schedule>  ciprofloxacin  0.3% Ophthalmic Solution for Otic Use 4 Drop(s) Right Ear two times a day  dextrose 5%. 1000 milliLiter(s) (100 mL/Hr) IV Continuous <Continuous>  dextrose 5%. 1000 milliLiter(s) (50 mL/Hr) IV Continuous <Continuous>  dextrose 50% Injectable 25 Gram(s) IV Push once  dextrose 50% Injectable 12.5 Gram(s) IV Push once  dextrose 50% Injectable 25 Gram(s) IV Push once  epoetin reza-epbx (RETACRIT) Injectable 62796 Unit(s) IV Push <User Schedule>  glucagon  Injectable 1 milliGRAM(s) IntraMuscular once  heparin   Injectable 5000 Unit(s) SubCutaneous every 8 hours  insulin lispro (ADMELOG) corrective regimen sliding scale   SubCutaneous every 6 hours  lacosamide Solution 200 milliGRAM(s) Oral two times a day  levETIRAcetam 500 milliGRAM(s) Oral two times a day  levothyroxine 25 MICROGram(s) Oral daily  metroNIDAZOLE  IVPB      metroNIDAZOLE  IVPB 500 milliGRAM(s) IV Intermittent every 8 hours  midodrine 10 milliGRAM(s) Oral every 8 hours  petrolatum Ophthalmic Ointment 1 Application(s) Both EYES every 12 hours  predniSONE   Tablet 5 milliGRAM(s) Oral daily  vancomycin    Solution 125 milliGRAM(s) Oral every 6 hours    04-09    133<L>  |  96<L>  |  64<H>  ----------------------------<  196<H>  3.5   |  21<L>  |  3.38<H>    Ca    8.4      09 Apr 2023 00:06  Phos  4.5     04-09  Mg     2.10     04-09    TPro  5.3<L>  /  Alb  1.9<L>  /  TBili  <0.2  /  DBili      /  AST  67<H>  /  ALT  24  /  AlkPhos  134<H>  04-09    Creatinine Trend: 3.38 <--, 3.74 <--, 4.97 <--, 4.44 <--, 4.23 <--, 3.74 <--, 3.17 <--                        8.5    7.48  )-----------( 239      ( 09 Apr 2023 00:06 )             26.6

## 2023-04-10 NOTE — PROGRESS NOTE ADULT - ASSESSMENT
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  Cardiac arrest on 3/16/23  septic shock s/p iv pressors  intubated on MV  Renal following for NAMAN on CKD Mx.  OS Nephrologist Dr. Hugo Hernandez [phone]    Acute kidney injury on Chronic Kidney Disease Stage 4 vs Chronic Kidney Disease Stage 4 progression to End Stage Renal Disease on Dialysis     hx KTx 2003 : In light of severe sepsis and cardiac arrest: off MMF and Cyclosporine  w/worsened renal function, oliguria, acidosis, mild hyperkalemia- s/p CVVHD, then intermittent HD  Consent for HD obtained, signed by daughter 3/16/23  B/L creat around 2.8 since Dec 2022  Patient was on Cyclosporine (Gengraf) 75mg PO q12hrs,  BID and Prednisone 5 QD for transplant    Creatinine Trend: 3.74 <--, 4.97 <--, 4.44 <--, 4.23 <--, 3.74 <--, 3.17 <--, 2.41 <--  L femoral shiley removed 4/2 for line holiday- s/p rt femoral shiley placed  4/7  s/p furosemide   Injectable 80 milliGRAM(s) IV Push x14/3- no response  K, vol acceptable  Creatinine Trending up since last HD 4/1/23    plan:  Plan for HD today  possible withdrawal per Family/ICU team on Tuesday  c/w predniSONE   Tablet 5 milliGRAM(s) Oral daily  monitor u/o  monitor electrolytes, BMP daily     HTN- bp stable, controlled-  Medications :  amLODIPine   Tablet 10 milliGRAM(s) Oral every 24 hours    - Continue current medications w/holding parameters  - plan to titrate anti hypertensive medication as needed  - low salt diet if not npo suggested    Anemia :  Hemoglobin : 7.3  if Hb less than 7gm/dl consider blood transfusion  added epo 10k w/hd tiw    OM and OE  vent Mx, per ICU  spiking fevers- f/u rpt blood cxs  Abxs per Infectious disease specialist with dose adjustment per GFR    C diff colitis- on po vanco    on going GOC discussion w/daughter. Palliative care note reviewed. Family to discuss Tuesday.      For any question, call:  Cell # [phone]  Pager # [phone]  Callback # [phone]

## 2023-04-10 NOTE — PROGRESS NOTE ADULT - ASSESSMENT
79 y/o F with h/o HTN, HLD, DM2, PCKD previously on HD via LUE AVF then s/p renal transplant, LLE DVT (resolved, nml dopplers 12/2022) on ASA, very small supraclinoid aneurysm (reportedly unchanged on MRA 10/2020), hypothyroidism, PCP 2021 on atovaquone ppx, reportedly hospitalized in 12/2022 for rectal prolapse, had a blood transfusion at that time, also + CMV 1/25/23 who presented with R-sided HA, R ear pain and neck stiffness after visit to ENT earlier in the day, altered on presentation, CT head unremarkable for stroke, however LP with IR and BCx showing +strep pneumoniae, started on vanc and CTX per ID. Cardiac arrest 3/16 with ROSC, transferred to MICU for further management.     Neurological  #Currently Intubated, opens eyes spontaneously, grimaces to pain  - remains off sedation  - EEG no seizures - Dcd 3/31  - MRI IAC and brain 3/29 - otitis and mastoiditis with multiple cerebral infarcts and cerebellar infarct. Will need f/u MRI in 3-4 weeks per neuro  - Endocarditis is possible cause for scattered cerebral infarcts, will consider utility of SORAYA given poor candidate for valve replacement  - mental status remains unimproved, withdraws from pain however does not elicit purposeful movements.      #Strep pneumo meningitis #Acute encephalopathy  p/w severe HA, neck stiffness, fever, leukocytosis concerning for meningitis/encephalitis  - CT head negative for CVA.   - LP and BCx 3/15 both showing gram positive cocci in pairs, and + strep pneumoniae.   - likely source of entry from R ear where pt c/o discomfort for several months.  - taking cyclosporine, mycophenolate mofetil and prednisone at home for kidney transplant and was likely immunocompromised -> dental procedure 3/14 but less likely source   - MRI of the IAC and brain 3/29 - otitis and mastoiditis with multiple cerebral infarcts and cerebellar infarct, f/u MRI 3-4 weeks (as per neuro)   - neuro and ID following    #Seizures  - 24hr EEG initially with highly focal seizures, improved with increased sedation.   - currently on keppra  500 BID since 3/17-   - vimpat 100 BID increased to 200 BID (3/20 -  )  - EEG neg- Dc' d 3/31     Cardiovascular  #s/p Cardiac Arrest   - bradycardia to 30, pulseless, code blue was called 3/16 . 2 rounds of epi, and PEA arrest. on 3rd pulse check pulseless VT, 200J shock given 4th pulse check asystole, 5th ROSC, sinus tachy with HUMA. IO placed and levo started. Due to blood in mouth took several attempts with DL for ETT to be placed but ultimately confirmed with capnometry and bilateral breath sounds.        #Elevated troponin  uptrending troponin to 182 3/15 AM, was likely iso ESRD  s/p cardiac arrest, elevated ST segment   - TTE from 3/17 showed EF of 63% with mild pulm htn, normal left ventricular systolic function, and diastolic LV dysfunction.      #HTN  home meds: on amlodipine 5mg, losartan 100mg, torsemide 20mg, and clonidine 0.2 patch weekly  - amlodipine 10mg restarted, but can hold now iso lower systolic blood pressure  - c/w midodrine 10mg PO TID, can uptitrate as needed for hypotension      Respiratory  Intubated on 3/16 for cardiac arrest   - tolerates PS 8/5 trial  TV ~ 250-300  - Patient has been intubated >2 wks; will talk to family regarding what their plans are iso MRI reading; will likely need trach and peg if within their goals  - noted w/ oral and inline secretions, will send sputum Cx    ENT  # Tympanic membrane rupture  discharge from R ear, evaluated by ENT given dexamethasone and cipro 3/14  - c/w broad spectrum abx   - MRI of IAC appreciated  - ENT recs- pending results of MRI will consider need for myringotomy, but will defer at this time given overall medical condition    Gastrointestinal  Diarrhea  - continues to have loose stools - positive C-difficile c/w PO vancomycin. added IV Flagyl 4/8 x5d per ID recs  - bowel regimen discontinued   - tolerating TF at goal       Renal  #ESRD s/p Kidney Transplant in 2003 at [place] [facility]KD previously HD through LLE AVF but no HD since transplant ( per renal Cr has been in 2.8 since december 2022), now with NAMAN requiring HD   -[facility] Nephrologist Dr. Hugo Hernandez [phone]  - holding cyclosporine and mycophenolate but c/w home prednisone 5mg q day  - s/p 4mg IV bumex with minimal urine production (3/17) and furosemide 80mg IVP given 4/3 with no u/o response  - started on CRRT (3/17-3/21) now s/p intermittent HD - last session 4/9  - s/p 4 bumex IV 4/7 w/ no response, oligo-anuric s/p snider placement  - Left AV fistula not functioning, s/p R fem shiley placement 4/7.     Heme/DVT PPX  Hx of LLE DVT, anemia s/p 5 units pRbc's during this admission  -no DVT on doppler 12/22 and negative LLE duplex this admission 3/17  - POCUS showed residual clot in RLE; f/u dopplers 3/28 negative  - Evaluated by ENT for bleeding and found no signs of active bleeding from nasal cavity, nasopharynx or oral cavity, however, patient biting on tongue, which could be one source of bleeding. Tongue was edematous and lax  - CT a/p neg for RP bleed   - concern for possible transfusion reaction 4/4-> hypotensive/bradycardia after receiving 100ml pRBC's , HR and BP normalized once transfusion stopped. Blood bank does not consider response a transfusion reaction, and okay with future transfusions if needed - s/p 1u prnc 4/6 w/ no s/s of transfusion rxn  -nephrology plan to add epogen on next hemodialysis session       Infectious Disease  Hx of PCP, now with this admission with Strep pneumoniae meningitis, R ear mastoiditis, strep bacteremia, now with C. difficile  - c/w Ceftriaxone (3/16 - ) for ~6 week course  - c/w ciprodex drops for R ear  -CDiff - c/w vanco 125mg PO [id number] (4/2-  ) , added Flagyl 4/8 x 5days - per ID recs  - ID following  - BCx 3/15 + strep, repeat cx from 3/17 neg, last BCx 4/4 - neg  - sputum cx 4/4- neg  - atovaquone at home, continue  - Tmax 102.3 overnight. f/up Bcx , SCx      Endocrine  Hx of Hypothyroidism and Diabetes mellitus Type II  - c/w levothyroxine   -currently on prednisone will watch FS closely on insulin sliding scale     Ethics  FULL  4/9- per discussion w/ daughter at bedside, plan is for palliative extubation after family visits, possibly on Tuesday. She would like to continue HD and all other measures at this time.    79 y/o F with h/o HTN, HLD, DM2, PCKD previously on HD via LUE AVF then s/p renal transplant, LLE DVT (resolved, nml dopplers 12/2022) on ASA, very small supraclinoid aneurysm (reportedly unchanged on MRA 10/2020), hypothyroidism, PCP 2021 on atovaquone ppx, reportedly hospitalized in 12/2022 for rectal prolapse, had a blood transfusion at that time, also + CMV 1/25/23 who presented with R-sided HA, R ear pain and neck stiffness after visit to ENT earlier in the day, altered on presentation, CT head unremarkable for stroke, however LP with IR and BCx showing +strep pneumoniae, started on vanc and CTX per ID. Cardiac arrest 3/16 with ROSC, transferred to MICU for further management.     Neurological  Alert and oriented x4 at baseline, admitted with p/w severe HA, neck stiffness, fever, leukocytosis, CT head negative for CVA, found to have Strep pneumo meningitis, likely source of entry from R ear where pt c/o discomfort for several months. Course complicated by cardiac arrest on 3/16,  requiring intubation, seizures, now with no mental status in the setting of multiple cerebral infarcts and cerebellar infarct on MRI. Remains Intubated, opens eyes spontaneously, grimaces to pain  - remains off sedation  - EEG no seizures - Dcd 3/31  - MRI IAC and brain 3/29 - otitis and mastoiditis with multiple cerebral infarcts and cerebellar infarct. Will need f/u MRI in 3-4 weeks per neuro  - Endocarditis is possible cause for scattered cerebral infarcts, will consider utility of SORAYA given poor candidate for valve replacement  - mental status remains unimproved, withdraws from pain however does not elicit purposeful movements.  - LP and BCx 3/15 both showing gram positive cocci in pairs, and + strep pneumoniae.   - taking cyclosporine, mycophenolate mofetil and prednisone at home for kidney transplant and was likely immunocompromised -> dental procedure 3/14 but less likely source   - MRI of the IAC and brain 3/29 - otitis and mastoiditis with multiple cerebral infarcts and cerebellar infarct, f/u MRI 3-4 weeks (as per neuro)   - neuro and ID following  - 24hr EEG initially with highly focal seizures, improved with increased sedation.   - currently on keppra  500 BID since 3/17-   - vimpat 100 BID increased to 200 BID (3/20 -  )  - EEG neg- Dc' d 3/31     Respiratory  Intubated on 3/16 for cardiac arrest   - tolerates PS 8/5 trial  TV ~ 250-300  - Patient has been intubated >2 wks; will talk to family regarding what their plans are iso MRI reading; will likely need trach and peg if within their goals  - noted w/ oral and inline secretions, will send sputum Cx    ENT  # Tympanic membrane rupture  discharge from R ear, evaluated by ENT given dexamethasone and cipro 3/14  - c/w broad spectrum abx   - ENT recs- considered need for myringotomy, but will defer at this time given overall medical condition  #Enlarged tongue  -will keep soft bite block in place  -keep tongue moist with gauze soaked saline     Cardiovascular  Hx of HTN now s/p Cardiac Arrest on 3/16, hypotensive and shock state post cardiac arrest requiring pressors, now resolved on midodrine  - bradycardia to 30, pulseless, code blue was called 3/16 . 2 rounds of epi, and PEA arrest. on 3rd pulse check pulseless VT, 200J shock given 4th pulse check asystole, 5th ROSC, sinus tachy with HUMA. IO placed and levo started. Due to blood in mouth took several attempts with DL for ETT to be placed but ultimately confirmed with capnometry and bilateral breath sounds.   -uptrending troponin to 182 3/15 AM, was likely iso ESRD  s/p cardiac arrest, elevated ST segment   - TTE from 3/17 showed EF of 63% with mild pulm htn, normal left ventricular systolic function, and diastolic LV dysfunction.  home meds: on amlodipine 5mg, losartan 100mg, torsemide 20mg, and clonidine 0.2 patch weekly  - amlodipine 10mg restarted, but can hold now iso lower systolic blood pressure  - c/w midodrine 10mg PO TID, can uptitrate as needed for hypotension    Gastrointestinal  Diarrhea  - continues to have loose stools - positive C-difficile c/w PO vancomycin. added IV Flagyl 4/8 x5d per ID recs  - bowel regimen discontinued   - tolerating TF at goal       Renal  #ESRD s/p Kidney Transplant in 2003 at [place] [facility] previously HD through LLE AVF but no HD since transplant (per renal Cr has been in 2.8 since december 2022), now with NAMAN requiring HD   -[facility] Nephrologist Dr. Hugo Hernandez [phone]  - holding cyclosporine and mycophenolate but c/w home prednisone 5mg q day  - s/p 4mg IV bumex with minimal urine production (3/17) and furosemide 80mg IVP given 4/3 with no u/o response  - started on CRRT (3/17-3/21) now s/p intermittent HD - last session 4/9  - s/p 4 bumex IV 4/7 w/ no response, oligo-anuric s/p snider placement  - Left AV fistula not functioning, s/p R fem shiley placement 4/7 now with intermittent HD     Heme/DVT PPX  Hx of LLE DVT, anemia s/p 5 units pRbc's during this admission  -no DVT on doppler 12/22 and negative LLE duplex this admission 3/17  - POCUS showed residual clot in RLE; f/u dopplers 3/28 negative  - Evaluated by ENT for bleeding and found no signs of active bleeding from nasal cavity, nasopharynx or oral cavity, however, patient biting on tongue, which could be one source of bleeding. Tongue was edematous and lax  - CT a/p neg for RP bleed   - concern for possible transfusion reaction 4/4-> hypotensive/bradycardia after receiving 100ml pRBC's , HR and BP normalized once transfusion stopped. Blood bank does not consider response a transfusion reaction, and okay with future transfusions if needed - s/p 1u prnc 4/6 w/ no s/s of transfusion rxn  -nephrology plan to add epogen        Infectious Disease  Hx of PCP, now with this admission with Strep pneumoniae meningitis, R ear mastoiditis, strep bacteremia, now with C. difficile  - c/w Ceftriaxone (3/16 - ) for ~6 week course  - c/w ciprodex drops for R ear  -CDiff - c/w vanco 125mg PO x3miimb (4/2-  ) , added Flagyl 4/8 x 5days - per ID recs  - ID following  - BCx 3/15 + strep, repeat cx from 3/17 neg, last BCx 4/4 - neg  - sputum cx 4/4- neg  - atovaquone at home, continue        Endocrine  Hx of Hypothyroidism and Diabetes mellitus Type II  - c/w levothyroxine   -currently on prednisone will watch FS closely on insulin sliding scale     Ethics  FULL code   4/9- per discussion w/ daughter at bedside, plan is for palliative extubation after family visits, possibly on Tuesday. She would like to continue HD and all other measures at this time but will readdress on Tuesday

## 2023-04-10 NOTE — PROGRESS NOTE ADULT - SUBJECTIVE AND OBJECTIVE BOX
[facility] - DANNA Cosby / DANNA Rivers / LENCHO Ontiveros/ DANNA Aldana/ DANNA Hussein/ QUYEN Gambino / LUCIAN Mukherjee / GISELE Stapleton  ---------------------------------------------------------------------------------------------------------------  seen and examined today for ESRD  Interval : NAD  VITALS:  T(F): 100.9 (04-10-23 @ 08:00), Max: 100.9 (04-10-23 @ 08:00)  HR: 77 (04-10-23 @ 10:00)  BP: 120/61 (04-10-23 @ 10:00)  RR: 18 (04-10-23 @ 10:00)  SpO2: 100% (04-10-23 @ 10:00)  Wt(kg): --    04-09 @ 07:01  -  04-10 @ 07:00  --------------------------------------------------------  IN: 1676 mL / OUT: 290 mL / NET: 1386 mL      Physical Exam :-  Constitutional: intubated  Neck: Supple.  Respiratory: Bilateral equal breath sounds,  Cardiovascular: S1, S2 normal,  Gastrointestinal: Bowel Sounds present, soft, non tender.  Extremities: anasarca  Neurological: sedated, intubated  Psychiatric: Normal mood, normal affect  Data:-  Allergies :   apple (Unknown)  Benadryl (Unknown)  penicillin (Hives)  watermelon (Unknown)    Hospital Medications:   MEDICATIONS  (STANDING):  atovaquone  Suspension 1500 milliGRAM(s) Oral daily  cefTRIAXone   IVPB 2000 milliGRAM(s) IV Intermittent every 12 hours  chlorhexidine 0.12% Liquid 15 milliLiter(s) Oral Mucosa two times a day  chlorhexidine 2% Cloths 1 Application(s) Topical <User Schedule>  ciprofloxacin  0.3% Ophthalmic Solution for Otic Use 4 Drop(s) Right Ear two times a day  dextrose 5%. 1000 milliLiter(s) (100 mL/Hr) IV Continuous <Continuous>  dextrose 5%. 1000 milliLiter(s) (50 mL/Hr) IV Continuous <Continuous>  dextrose 50% Injectable 25 Gram(s) IV Push once  dextrose 50% Injectable 12.5 Gram(s) IV Push once  dextrose 50% Injectable 25 Gram(s) IV Push once  epoetin reza-epbx (RETACRIT) Injectable 85750 Unit(s) IV Push <User Schedule>  glucagon  Injectable 1 milliGRAM(s) IntraMuscular once  heparin   Injectable 5000 Unit(s) SubCutaneous every 8 hours  insulin lispro (ADMELOG) corrective regimen sliding scale   SubCutaneous every 6 hours  lacosamide Solution 200 milliGRAM(s) Oral two times a day  levETIRAcetam 500 milliGRAM(s) Oral two times a day  levothyroxine 25 MICROGram(s) Oral daily  metroNIDAZOLE  IVPB      metroNIDAZOLE  IVPB 500 milliGRAM(s) IV Intermittent every 8 hours  midodrine 10 milliGRAM(s) Oral every 8 hours  petrolatum Ophthalmic Ointment 1 Application(s) Both EYES every 12 hours  predniSONE   Tablet 5 milliGRAM(s) Oral daily  vancomycin    Solution 125 milliGRAM(s) Oral every 6 hours    04-10    134<L>  |  95<L>  |  78<H>  ----------------------------<  145<H>  3.9   |  21<L>  |  4.00<H>    Ca    8.7      10 Apr 2023 03:05  Phos  5.4     04-10  Mg     2.20     04-10    TPro  5.2<L>  /  Alb  1.8<L>  /  TBili  <0.2  /  DBili      /  AST  56<H>  /  ALT  23  /  AlkPhos  149<H>  04-10    Creatinine Trend: 4.00 <--, 3.38 <--, 3.74 <--, 4.97 <--, 4.44 <--, 4.23 <--, 3.74 <--                        7.6    9.75  )-----------( 237      ( 10 Apr 2023 03:05 )             23.6

## 2023-04-10 NOTE — PROGRESS NOTE ADULT - ASSESSMENT
78F with PCKD previously on HD via LUE AVF now s/p cadaveric renal transplant 2003 at [place], LLE DVT (resolved, nml dopplers 12/2022) on ASA, HTN, HLD, DM2, 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 Went to ENT and was diagnosed with R otitis externa secondary to perforated TM. Prescribed cipro/dexamethasone.  Worsening headache and EMS called. Admitted 3/14.  CT head negative.  NAMAN.  Started on vanc/meropenem.  IR obtained LP.  Findings c/w meningitis, culture +pneumococcus.  Antibiotics narrowed to ceftriaxone.  Remains lethargic off sedation with possible seizures.  Intermittent fevers.  MRI with acute and subacute infarcts.  Suspect endocarditis though there is not SORAYA.    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 But await decision pending [facility] discussions    C diff-  -continue po vanco and IV flagyl    Fever  - Blood and sputum culture from 4/4 without significant growth    NAMAN  - on dialysis now    Guarded prognosis

## 2023-04-10 NOTE — PROGRESS NOTE ADULT - CRITICAL CARE ATTENDING COMMENT
Patient is a 77 yo F w/ HTN, HLD, T2DM, PCKD previously on HD via LUE AVF then s/p renal transplant, LLE DVT (resolved) hypothyroidism, prior PCP PNA, CMV viremia who initially presented with R-sided HA, R ear pain and neck stiffness found to have Strep Pneumo bacteremia and meningitis in setting of otitis externa. Course c/b cardiac arrest 3/16 and post arrest seizures, persistent encephalopathy in setting of multiple CVAs and ARF and C diff    #Septic Shock  #Strep bactermia  #Strep meningitis/otitis  #C Diff Colitis  - c/w Ceftriaxone  - c/w PO Vanc/IV Flagyl  - f/u ID recs  - c/w PO vasopressors    #ARF  #s/p Renal Transplant  #PCKD  - iHD as per renal    #Encephalopathy post cardiac arrest - CVAs noted on MRI. Still not waking up off sedation  #Seizures - No seizures on EEG overnight off Versed  - c/w Vimpat Keppra  - Monitor off sedation    #Dysphagia  - Tube feeds as tolerated    #Respiratory failure   - c/w mechanical ventilatory support    #GOC - Ongoing, possible elective extubation tomorrow    Nico Cantor MD  Pulmonary & Critical Care . Patient is a 79 yo F w/ HTN, HLD, T2DM, PCKD previously on HD via LUE AVF then s/p renal transplant, LLE DVT (resolved) hypothyroidism, prior PCP PNA, CMV viremia who initially presented with R-sided HA, R ear pain and neck stiffness found to have Strep Pneumo bacteremia and meningitis in setting of otitis externa. Course c/b cardiac arrest 3/16 and post arrest seizures, persistent encephalopathy in setting of multiple CVAs and ARF and C diff    #Septic Shock  #Strep bactermia  #Strep meningitis/otitis  #C Diff Colitis  - c/w Ceftriaxone  - c/w PO Vanc/IV Flagyl  - f/u ID recs  - c/w PO vasopressors    #ARF  #s/p Renal Transplant  #PCKD  - iHD as per renal    #Encephalopathy post cardiac arrest - CVAs noted on MRI. Still not waking up off sedation, clinically hypoxic brain injury  #Seizures - No seizures on EEG overnight off Versed  - c/w Vimpat, Keppra  - Monitor off sedation    #Dysphagia  - Tube feeds as tolerated    #Respiratory failure   - c/w mechanical ventilatory support    #GOC - Ongoing, possible elective extubation tomorrow    Nico Cantor MD  Pulmonary & Critical Care .

## 2023-04-10 NOTE — PROGRESS NOTE ADULT - SUBJECTIVE AND OBJECTIVE BOX
Follow Up:      Inverval History/ROS:Patient is a 78y old  Female who presents with a chief complaint of AMS/R ear infxn (10 Apr 2023 10:38)    + fever  Trach on vent    Allergies    apple (Unknown)  Benadryl (Unknown)  penicillin (Hives)  watermelon (Unknown)    Intolerances        ANTIMICROBIALS:  atovaquone  Suspension 1500 daily  cefTRIAXone   IVPB 2000 every 12 hours  metroNIDAZOLE  IVPB    metroNIDAZOLE  IVPB 500 every 8 hours  vancomycin    Solution 125 every 6 hours      OTHER MEDS:  acetaminophen   Oral Liquid .. 650 milliGRAM(s) Oral every 6 hours PRN  chlorhexidine 0.12% Liquid 15 milliLiter(s) Oral Mucosa two times a day  chlorhexidine 2% Cloths 1 Application(s) Topical <User Schedule>  ciprofloxacin  0.3% Ophthalmic Solution for Otic Use 4 Drop(s) Right Ear two times a day  dextrose 5%. 1000 milliLiter(s) IV Continuous <Continuous>  dextrose 5%. 1000 milliLiter(s) IV Continuous <Continuous>  dextrose 50% Injectable 25 Gram(s) IV Push once  dextrose 50% Injectable 12.5 Gram(s) IV Push once  dextrose 50% Injectable 25 Gram(s) IV Push once  dextrose Oral Gel 15 Gram(s) Oral once PRN  epoetin reza-epbx (RETACRIT) Injectable 77492 Unit(s) IV Push <User Schedule>  glucagon  Injectable 1 milliGRAM(s) IntraMuscular once  heparin   Injectable 5000 Unit(s) SubCutaneous every 8 hours  insulin lispro (ADMELOG) corrective regimen sliding scale   SubCutaneous every 6 hours  lacosamide Solution 200 milliGRAM(s) Oral two times a day  levETIRAcetam 500 milliGRAM(s) Oral two times a day  levothyroxine 25 MICROGram(s) Oral daily  midodrine 10 milliGRAM(s) Oral every 8 hours  petrolatum Ophthalmic Ointment 1 Application(s) Both EYES every 12 hours  predniSONE   Tablet 5 milliGRAM(s) Oral daily  sodium chloride 0.9% Bolus. 100 milliLiter(s) IV Bolus every 5 minutes PRN      Vital Signs Last 24 Hrs  T(C): 36.5 (10 Apr 2023 13:54), Max: 38.3 (10 Apr 2023 08:00)  T(F): 97.7 (10 Apr 2023 13:54), Max: 100.9 (10 Apr 2023 08:00)  HR: 72 (10 Apr 2023 14:00) (65 - 105)  BP: 123/63 (10 Apr 2023 14:00) (95/53 - 157/76)  BP(mean): 79 (10 Apr 2023 14:00) (62 - 97)  RR: 17 (10 Apr 2023 14:00) (14 - 25)  SpO2: 100% (10 Apr 2023 14:00) (97% - 100%)    Parameters below as of 10 Apr 2023 14:00  Patient On (Oxygen Delivery Method): ventilator    O2 Concentration (%): 30    PHYSICAL EXAM:  General: [ ] non-toxic  HEAD/EYES: [ ] PERRL [x ] white sclera [ ] icterus  ENT:  [ ] normal [x ] supple [ ] thrush [ ] pharyngeal exudate  Cardiovascular:   [ ] murmur [x ] normal [ ] PPM/AICD  Respiratory:  [x ] clear to ausculation bilaterally  GI:  [ x] soft, non-tender, normal bowel sounds  :  [ ] snider [ ] no CVA tenderness   Musculoskeletal:  [ ] no synovitis  Neurologic:  [ ] non-focal exam   Skin:  [x ] no rash  Lymph: [ x] no lymphadenopathy  Psychiatric:  [ ] appropriate affect [ ] alert & oriented  Lines:  [ ] no phlebitis [ ] central line                                7.6    9.75  )-----------( 237      ( 10 Apr 2023 03:05 )             23.6       04-10    134<L>  |  95<L>  |  78<H>  ----------------------------<  145<H>  3.9   |  21<L>  |  4.00<H>    Ca    8.7      10 Apr 2023 03:05  Phos  5.4     04-10  Mg     2.20     04-10    TPro  5.2<L>  /  Alb  1.8<L>  /  TBili  <0.2  /  DBili  x   /  AST  56<H>  /  ALT  23  /  AlkPhos  149<H>  04-10          MICROBIOLOGY:Culture Results:   No growth to date. (04-09-23 @ 08:59)  Culture Results:   No growth to date. (04-09-23 @ 08:45)  Culture Results:   No growth to date. (04-08-23 @ 01:48)  Culture Results:   Normal Respiratory Kizzy present (04-04-23 @ 18:11)  Culture Results:   No Growth Final (04-04-23 @ 11:15)      RADIOLOGY:

## 2023-04-10 NOTE — PROGRESS NOTE ADULT - SUBJECTIVE AND OBJECTIVE BOX
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 Patient reportedly woke up at 0130 AM on Monday with a R-sided headache. ENT noted R otitis externa, purulent discharge from R TM thought to be secondary to perforation, given ear gtt and prescribed cipro/dexamethasone gtt (she took 1 dose thus far). Daughter called EMS as later in the day patient complained of severe HA as well as neck pain, stated "I'm dying" and "my head is killing me." Patient reportedly without prior history of ear infections, no history of stroke or seizures. She reported to ENT earlier in the day a muffled sensation in the R ear [id number].     Daughter notes patient has had no recent fevers/chills. She had a dental cleaning on Thursday (no abx prior to cleaning).    On arrival, code stroke called.    In the ED VS:  97.2  65-99  162-186/52-91  16-20  96-99%RA, received NS 500cc IVF, lorazepam 2mg IV x1 and morphine 4mg IV x1 (15 Mar 2023 01:17)      REVIEW OF SYSTEMS:    [ x] Unable to assess ROS because ________    OBJECTIVE:  ICU Vital Signs Last 24 Hrs  T(C): 37.2 (10 Apr 2023 04:00), Max: 37.3 (10 Apr 2023 00:00)  T(F): 98.9 (10 Apr 2023 04:00), Max: 99.2 (10 Apr 2023 00:00)  HR: 105 (10 Apr 2023 07:00) (63 - 105)  BP: 139/74 (10 Apr 2023 07:00) (105/50 - 173/81)  BP(mean): 88 (10 Apr 2023 07:00) (63 - 104)  ABP: --  ABP(mean): --  RR: 21 (10 Apr 2023 07:00) (14 - 23)  SpO2: 100% (10 Apr 2023 07:00) (97% - 100%)    O2 Parameters below as of 10 Apr 2023 07:00  Patient On (Oxygen Delivery Method): ventilator,CPAP          Mode: CPAP with PS, FiO2: 30, PEEP: 5, PS: 8, MAP: 8, PIP: 14    04-09 @ 07:01  -  04-10 @ 07:00  --------------------------------------------------------  IN: 1676 mL / OUT: 290 mL / NET: 1386 mL      CAPILLARY BLOOD GLUCOSE      POCT Blood Glucose.: 176 mg/dL (10 Apr 2023 06:53)      PHYSICAL EXAM:  GENERAL: ill appearing, no distress  PSYCH: A&O x0  HEAD: Atraumatic, Normocephalic  NECK: Supple, No JVD  CHEST/LUNG: clear to auscultation bilaterally  HEART: regular rate and rhythm, no murmurs  ABDOMEN: +distension, palpable abdominal mass upper and lower right quadrants   EXTREMITIES: +3 bilateral LE edema   NEUROLOGY: opens eyes slightly, no purposeful movements noted  SKIN: No rashes or lesions, pressure ulcer to [facility] MEDICATIONS:  Standing Meds:  atovaquone  Suspension 1500 milliGRAM(s) Oral daily  cefTRIAXone   IVPB 2000 milliGRAM(s) IV Intermittent every 12 hours  chlorhexidine 0.12% Liquid 15 milliLiter(s) Oral Mucosa two times a day  chlorhexidine 2% Cloths 1 Application(s) Topical <User Schedule>  ciprofloxacin  0.3% Ophthalmic Solution for Otic Use 4 Drop(s) Right Ear two times a day  dextrose 5%. 1000 milliLiter(s) IV Continuous <Continuous>  dextrose 5%. 1000 milliLiter(s) IV Continuous <Continuous>  dextrose 50% Injectable 25 Gram(s) IV Push once  dextrose 50% Injectable 12.5 Gram(s) IV Push once  dextrose 50% Injectable 25 Gram(s) IV Push once  epoetin reza-epbx (RETACRIT) Injectable 07293 Unit(s) IV Push <User Schedule>  glucagon  Injectable 1 milliGRAM(s) IntraMuscular once  heparin   Injectable 5000 Unit(s) SubCutaneous every 8 hours  insulin lispro (ADMELOG) corrective regimen sliding scale   SubCutaneous every 6 hours  lacosamide Solution 200 milliGRAM(s) Oral two times a day  levETIRAcetam 500 milliGRAM(s) Oral two times a day  levothyroxine 25 MICROGram(s) Oral daily  metroNIDAZOLE  IVPB      metroNIDAZOLE  IVPB 500 milliGRAM(s) IV Intermittent every 8 hours  midodrine 10 milliGRAM(s) Oral every 8 hours  petrolatum Ophthalmic Ointment 1 Application(s) Both EYES every 12 hours  predniSONE   Tablet 5 milliGRAM(s) Oral daily  vancomycin    Solution 125 milliGRAM(s) Oral every 6 hours      PRN Meds:  acetaminophen   Oral Liquid .. 650 milliGRAM(s) Enteral Tube every 6 hours PRN  dextrose Oral Gel 15 Gram(s) Oral once PRN  sodium chloride 0.9% Bolus. 100 milliLiter(s) IV Bolus every 5 minutes PRN      LABS:                        7.6    9.75  )-----------( 237      ( 10 Apr 2023 03:05 )             23.6     Hgb Trend: 7.6<--, 8.5<--, 9.6<--, 8.7<--, 8.8<--  04-10    134<L>  |  95<L>  |  78<H>  ----------------------------<  145<H>  3.9   |  21<L>  |  4.00<H>    Ca    8.7      10 Apr 2023 03:05  Phos  5.4     04-10  Mg     2.20     04-10    TPro  5.2<L>  /  Alb  1.8<L>  /  TBili  <0.2  /  DBili  x   /  AST  56<H>  /  ALT  23  /  AlkPhos  149<H>  04-10    Creatinine Trend: 4.00<--, 3.38<--, 3.74<--, 4.97<--, 4.44<--, 4.23<--        Venous Blood Gas:  04-10 @ 03:05  7.38/39/53/23/87.4  VBG Lactate: 1.6  Venous Blood Gas:  04-09 @ 00:06  7.44/34/173/23/99.2  VBG Lactate: 1.7      MICROBIOLOGY:     Culture - Blood (collected 08 Apr 2023 01:48)  Source: .Blood Blood-Peripheral  Preliminary Report (09 Apr 2023 07:02):    No growth to date.       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 Patient reportedly woke up at 0130 AM on Monday with a R-sided headache. ENT noted R otitis externa, purulent discharge from R TM thought to be secondary to perforation, given ear gtt and prescribed cipro/dexamethasone gtt (she took 1 dose thus far). Daughter called EMS as later in the day patient complained of severe HA as well as neck pain, stated "I'm dying" and "my head is killing me." Patient reportedly without prior history of ear infections, no history of stroke or seizures. She reported to ENT earlier in the day a muffled sensation in the R ear [id number].     Daughter notes patient has had no recent fevers/chills. She had a dental cleaning on Thursday (no abx prior to cleaning).    On arrival, code stroke called.    In the ED VS:  97.2  65-99  162-186/52-91  16-20  96-99%RA, received NS 500cc IVF, lorazepam 2mg IV x1 and morphine 4mg IV x1 (15 Mar 2023 01:17)      REVIEW OF SYSTEMS:    [ x] Unable to assess ROS because patient is intubated and unresponsive    OBJECTIVE:  ICU Vital Signs Last 24 Hrs  T(C): 37.2 (10 Apr 2023 04:00), Max: 37.3 (10 Apr 2023 00:00)  T(F): 98.9 (10 Apr 2023 04:00), Max: 99.2 (10 Apr 2023 00:00)  HR: 105 (10 Apr 2023 07:00) (63 - 105)  BP: 139/74 (10 Apr 2023 07:00) (105/50 - 173/81)  BP(mean): 88 (10 Apr 2023 07:00) (63 - 104)  ABP: --  ABP(mean): --  RR: 21 (10 Apr 2023 07:00) (14 - 23)  SpO2: 100% (10 Apr 2023 07:00) (97% - 100%)    O2 Parameters below as of 10 Apr 2023 07:00  Patient On (Oxygen Delivery Method): ventilator,CPAP          Mode: CPAP with PS, FiO2: 30, PEEP: 5, PS: 8, MAP: 8, PIP: 14    04-09 @ 07:01  -  04-10 @ 07:00  --------------------------------------------------------  IN: 1676 mL / OUT: 290 mL / NET: 1386 mL      CAPILLARY BLOOD GLUCOSE      POCT Blood Glucose.: 176 mg/dL (10 Apr 2023 06:53)      PHYSICAL EXAM:  GENERAL: ill appearing, no distress  PSYCH: A&O x0  HEAD: Atraumatic, Normocephalic  NECK: Supple, No JVD  CHEST/LUNG: clear to auscultation bilaterally  HEART: regular rate and rhythm, no murmurs  ABDOMEN: +distension, palpable abdominal mass upper and lower right quadrants   EXTREMITIES: +3 bilateral LE edema   NEUROLOGY: opens eyes slightly, no purposeful movements noted  SKIN: No rashes or lesions, pressure ulcer to [facility] MEDICATIONS:  Standing Meds:  atovaquone  Suspension 1500 milliGRAM(s) Oral daily  cefTRIAXone   IVPB 2000 milliGRAM(s) IV Intermittent every 12 hours  chlorhexidine 0.12% Liquid 15 milliLiter(s) Oral Mucosa two times a day  chlorhexidine 2% Cloths 1 Application(s) Topical <User Schedule>  ciprofloxacin  0.3% Ophthalmic Solution for Otic Use 4 Drop(s) Right Ear two times a day  dextrose 5%. 1000 milliLiter(s) IV Continuous <Continuous>  dextrose 5%. 1000 milliLiter(s) IV Continuous <Continuous>  dextrose 50% Injectable 25 Gram(s) IV Push once  dextrose 50% Injectable 12.5 Gram(s) IV Push once  dextrose 50% Injectable 25 Gram(s) IV Push once  epoetin reza-epbx (RETACRIT) Injectable 50007 Unit(s) IV Push <User Schedule>  glucagon  Injectable 1 milliGRAM(s) IntraMuscular once  heparin   Injectable 5000 Unit(s) SubCutaneous every 8 hours  insulin lispro (ADMELOG) corrective regimen sliding scale   SubCutaneous every 6 hours  lacosamide Solution 200 milliGRAM(s) Oral two times a day  levETIRAcetam 500 milliGRAM(s) Oral two times a day  levothyroxine 25 MICROGram(s) Oral daily  metroNIDAZOLE  IVPB      metroNIDAZOLE  IVPB 500 milliGRAM(s) IV Intermittent every 8 hours  midodrine 10 milliGRAM(s) Oral every 8 hours  petrolatum Ophthalmic Ointment 1 Application(s) Both EYES every 12 hours  predniSONE   Tablet 5 milliGRAM(s) Oral daily  vancomycin    Solution 125 milliGRAM(s) Oral every 6 hours      PRN Meds:  acetaminophen   Oral Liquid .. 650 milliGRAM(s) Enteral Tube every 6 hours PRN  dextrose Oral Gel 15 Gram(s) Oral once PRN  sodium chloride 0.9% Bolus. 100 milliLiter(s) IV Bolus every 5 minutes PRN      LABS:                        7.6    9.75  )-----------( 237      ( 10 Apr 2023 03:05 )             23.6     Hgb Trend: 7.6<--, 8.5<--, 9.6<--, 8.7<--, 8.8<--  04-10    134<L>  |  95<L>  |  78<H>  ----------------------------<  145<H>  3.9   |  21<L>  |  4.00<H>    Ca    8.7      10 Apr 2023 03:05  Phos  5.4     04-10  Mg     2.20     04-10    TPro  5.2<L>  /  Alb  1.8<L>  /  TBili  <0.2  /  DBili  x   /  AST  56<H>  /  ALT  23  /  AlkPhos  149<H>  04-10    Creatinine Trend: 4.00<--, 3.38<--, 3.74<--, 4.97<--, 4.44<--, 4.23<--        Venous Blood Gas:  04-10 @ 03:05  7.38/39/53/23/87.4  VBG Lactate: 1.6  Venous Blood Gas:  04-09 @ 00:06  7.44/34/173/23/99.2  VBG Lactate: 1.7      MICROBIOLOGY:     Culture - Blood (collected 08 Apr 2023 01:48)  Source: .Blood Blood-Peripheral  Preliminary Report (09 Apr 2023 07:02):    No growth to date.

## 2023-04-11 NOTE — PROGRESS NOTE ADULT - SUBJECTIVE AND OBJECTIVE BOX
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 Patient reportedly woke up at 0130 AM on Monday with a R-sided headache. ENT noted R otitis externa, purulent discharge from R TM thought to be secondary to perforation, given ear gtt and prescribed cipro/dexamethasone gtt (she took 1 dose thus far). Daughter called EMS as later in the day patient complained of severe HA as well as neck pain, stated "I'm dying" and "my head is killing me." Patient reportedly without prior history of ear infections, no history of stroke or seizures. She reported to ENT earlier in the day a muffled sensation in the R ear [id number].     Daughter notes patient has had no recent fevers/chills. She had a dental cleaning on Thursday (no abx prior to cleaning).    On arrival, code stroke called.    In the ED VS:  97.2  65-99  162-186/52-91  16-20  96-99%RA, received NS 500cc IVF, lorazepam 2mg IV x1 and morphine 4mg IV x1 (15 Mar 2023 01:17)      REVIEW OF SYSTEMS:    [ x] Unable to assess ROS because patient is intubated and unresponsive    OBJECTIVE:  ICU Vital Signs Last 24 Hrs  T(C): 37.2 (10 Apr 2023 04:00), Max: 37.3 (10 Apr 2023 00:00)  T(F): 98.9 (10 Apr 2023 04:00), Max: 99.2 (10 Apr 2023 00:00)  HR: 105 (10 Apr 2023 07:00) (63 - 105)  BP: 139/74 (10 Apr 2023 07:00) (105/50 - 173/81)  BP(mean): 88 (10 Apr 2023 07:00) (63 - 104)  ABP: --  ABP(mean): --  RR: 21 (10 Apr 2023 07:00) (14 - 23)  SpO2: 100% (10 Apr 2023 07:00) (97% - 100%)    O2 Parameters below as of 10 Apr 2023 07:00  Patient On (Oxygen Delivery Method): ventilator,CPAP          Mode: CPAP with PS, FiO2: 30, PEEP: 5, PS: 8, MAP: 8, PIP: 14    04-09 @ 07:01  -  04-10 @ 07:00  --------------------------------------------------------  IN: 1676 mL / OUT: 290 mL / NET: 1386 mL      CAPILLARY BLOOD GLUCOSE      POCT Blood Glucose.: 176 mg/dL (10 Apr 2023 06:53)      PHYSICAL EXAM:  GENERAL: ill appearing, no distress  PSYCH: A&O x0  HEAD: Atraumatic, Normocephalic  NECK: Supple, No JVD  CHEST/LUNG: clear to auscultation bilaterally  HEART: regular rate and rhythm, no murmurs  ABDOMEN: +distension, palpable abdominal mass upper and lower right quadrants   EXTREMITIES: +3 bilateral LE edema   NEUROLOGY: opens eyes slightly, no purposeful movements noted  SKIN: No rashes or lesions, [facility] MEDICATIONS:  Standing Meds:  atovaquone  Suspension 1500 milliGRAM(s) Oral daily  cefTRIAXone   IVPB 2000 milliGRAM(s) IV Intermittent every 12 hours  chlorhexidine 0.12% Liquid 15 milliLiter(s) Oral Mucosa two times a day  chlorhexidine 2% Cloths 1 Application(s) Topical <User Schedule>  ciprofloxacin  0.3% Ophthalmic Solution for Otic Use 4 Drop(s) Right Ear two times a day  dextrose 5%. 1000 milliLiter(s) IV Continuous <Continuous>  dextrose 5%. 1000 milliLiter(s) IV Continuous <Continuous>  dextrose 50% Injectable 25 Gram(s) IV Push once  dextrose 50% Injectable 12.5 Gram(s) IV Push once  dextrose 50% Injectable 25 Gram(s) IV Push once  epoetin reza-epbx (RETACRIT) Injectable 71571 Unit(s) IV Push <User Schedule>  glucagon  Injectable 1 milliGRAM(s) IntraMuscular once  heparin   Injectable 5000 Unit(s) SubCutaneous every 8 hours  insulin lispro (ADMELOG) corrective regimen sliding scale   SubCutaneous every 6 hours  lacosamide Solution 200 milliGRAM(s) Oral two times a day  levETIRAcetam 500 milliGRAM(s) Oral two times a day  levothyroxine 25 MICROGram(s) Oral daily  metroNIDAZOLE  IVPB      metroNIDAZOLE  IVPB 500 milliGRAM(s) IV Intermittent every 8 hours  midodrine 10 milliGRAM(s) Oral every 8 hours  petrolatum Ophthalmic Ointment 1 Application(s) Both EYES every 12 hours  predniSONE   Tablet 5 milliGRAM(s) Oral daily  vancomycin    Solution 125 milliGRAM(s) Oral every 6 hours      PRN Meds:  acetaminophen   Oral Liquid .. 650 milliGRAM(s) Enteral Tube every 6 hours PRN  dextrose Oral Gel 15 Gram(s) Oral once PRN  sodium chloride 0.9% Bolus. 100 milliLiter(s) IV Bolus every 5 minutes PRN      LABS:                        7.6    9.75  )-----------( 237      ( 10 Apr 2023 03:05 )             23.6     Hgb Trend: 7.6<--, 8.5<--, 9.6<--, 8.7<--, 8.8<--  04-10    134<L>  |  95<L>  |  78<H>  ----------------------------<  145<H>  3.9   |  21<L>  |  4.00<H>    Ca    8.7      10 Apr 2023 03:05  Phos  5.4     04-10  Mg     2.20     04-10    TPro  5.2<L>  /  Alb  1.8<L>  /  TBili  <0.2  /  DBili  x   /  AST  56<H>  /  ALT  23  /  AlkPhos  149<H>  04-10    Creatinine Trend: 4.00<--, 3.38<--, 3.74<--, 4.97<--, 4.44<--, 4.23<--        Venous Blood Gas:  04-10 @ 03:05  7.38/39/53/23/87.4  VBG Lactate: 1.6  Venous Blood Gas:  04-09 @ 00:06  7.44/34/173/23/99.2  VBG Lactate: 1.7      MICROBIOLOGY:     Culture - Blood (collected 08 Apr 2023 01:48)  Source: .Blood Blood-Peripheral  Preliminary Report (09 Apr 2023 07:02):    No growth to date.       Interval Events:   -no acute events reported overnight. Plan for HD today, last HD 4/9 through R fem shun.    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 Patient reportedly woke up at 0130 AM on Monday with a R-sided headache. ENT noted R otitis externa, purulent discharge from R TM thought to be secondary to perforation, given ear gtt and prescribed cipro/dexamethasone gtt (she took 1 dose thus far). Daughter called EMS as later in the day patient complained of severe HA as well as neck pain, stated "I'm dying" and "my head is killing me." Patient reportedly without prior history of ear infections, no history of stroke or seizures. She reported to ENT earlier in the day a muffled sensation in the R ear [id number].     Daughter notes patient has had no recent fevers/chills. She had a dental cleaning on Thursday (no abx prior to cleaning).    On arrival, code stroke called.    In the ED VS:  97.2  65-99  162-186/52-91  16-20  96-99%RA, received NS 500cc IVF, lorazepam 2mg IV x1 and morphine 4mg IV x1 (15 Mar 2023 01:17)      REVIEW OF SYSTEMS:    [ x] Unable to assess ROS because patient is intubated and unresponsive    OBJECTIVE:  ICU Vital Signs Last 24 Hrs  T(C): 37.2 (10 Apr 2023 04:00), Max: 37.3 (10 Apr 2023 00:00)  T(F): 98.9 (10 Apr 2023 04:00), Max: 99.2 (10 Apr 2023 00:00)  HR: 105 (10 Apr 2023 07:00) (63 - 105)  BP: 139/74 (10 Apr 2023 07:00) (105/50 - 173/81)  BP(mean): 88 (10 Apr 2023 07:00) (63 - 104)  ABP: --  ABP(mean): --  RR: 21 (10 Apr 2023 07:00) (14 - 23)  SpO2: 100% (10 Apr 2023 07:00) (97% - 100%)    O2 Parameters below as of 10 Apr 2023 07:00  Patient On (Oxygen Delivery Method): ventilator,CPAP          Mode: CPAP with PS, FiO2: 30, PEEP: 5, PS: 8, MAP: 8, PIP: 14    04-09 @ 07:01  -  04-10 @ 07:00  --------------------------------------------------------  IN: 1676 mL / OUT: 290 mL / NET: 1386 mL      CAPILLARY BLOOD GLUCOSE      POCT Blood Glucose.: 176 mg/dL (10 Apr 2023 06:53)      PHYSICAL EXAM:  GENERAL: ill appearing, no distress  PSYCH: A&O x0  HEAD: Atraumatic, Normocephalic  NECK: Supple, No JVD  CHEST/LUNG: clear to auscultation bilaterally  HEART: regular rate and rhythm, no murmurs  ABDOMEN: +distension, palpable abdominal mass upper and lower right quadrants   EXTREMITIES: +3 bilateral LE edema   NEUROLOGY: opens eyes slightly, no purposeful movements noted  SKIN: No rashes or lesions, pressure ulcer to [facility] MEDICATIONS:  Standing Meds:  atovaquone  Suspension 1500 milliGRAM(s) Oral daily  cefTRIAXone   IVPB 2000 milliGRAM(s) IV Intermittent every 12 hours  chlorhexidine 0.12% Liquid 15 milliLiter(s) Oral Mucosa two times a day  chlorhexidine 2% Cloths 1 Application(s) Topical <User Schedule>  ciprofloxacin  0.3% Ophthalmic Solution for Otic Use 4 Drop(s) Right Ear two times a day  dextrose 5%. 1000 milliLiter(s) IV Continuous <Continuous>  dextrose 5%. 1000 milliLiter(s) IV Continuous <Continuous>  dextrose 50% Injectable 25 Gram(s) IV Push once  dextrose 50% Injectable 12.5 Gram(s) IV Push once  dextrose 50% Injectable 25 Gram(s) IV Push once  epoetin reza-epbx (RETACRIT) Injectable 52366 Unit(s) IV Push <User Schedule>  glucagon  Injectable 1 milliGRAM(s) IntraMuscular once  heparin   Injectable 5000 Unit(s) SubCutaneous every 8 hours  insulin lispro (ADMELOG) corrective regimen sliding scale   SubCutaneous every 6 hours  lacosamide Solution 200 milliGRAM(s) Oral two times a day  levETIRAcetam 500 milliGRAM(s) Oral two times a day  levothyroxine 25 MICROGram(s) Oral daily  metroNIDAZOLE  IVPB      metroNIDAZOLE  IVPB 500 milliGRAM(s) IV Intermittent every 8 hours  midodrine 10 milliGRAM(s) Oral every 8 hours  petrolatum Ophthalmic Ointment 1 Application(s) Both EYES every 12 hours  predniSONE   Tablet 5 milliGRAM(s) Oral daily  vancomycin    Solution 125 milliGRAM(s) Oral every 6 hours      PRN Meds:  acetaminophen   Oral Liquid .. 650 milliGRAM(s) Enteral Tube every 6 hours PRN  dextrose Oral Gel 15 Gram(s) Oral once PRN  sodium chloride 0.9% Bolus. 100 milliLiter(s) IV Bolus every 5 minutes PRN      LABS:                        7.6    9.75  )-----------( 237      ( 10 Apr 2023 03:05 )             23.6     Hgb Trend: 7.6<--, 8.5<--, 9.6<--, 8.7<--, 8.8<--  04-10    134<L>  |  95<L>  |  78<H>  ----------------------------<  145<H>  3.9   |  21<L>  |  4.00<H>    Ca    8.7      10 Apr 2023 03:05  Phos  5.4     04-10  Mg     2.20     04-10    TPro  5.2<L>  /  Alb  1.8<L>  /  TBili  <0.2  /  DBili  x   /  AST  56<H>  /  ALT  23  /  AlkPhos  149<H>  04-10    Creatinine Trend: 4.00<--, 3.38<--, 3.74<--, 4.97<--, 4.44<--, 4.23<--        Venous Blood Gas:  04-10 @ 03:05  7.38/39/53/23/87.4  VBG Lactate: 1.6  Venous Blood Gas:  04-09 @ 00:06  7.44/34/173/23/99.2  VBG Lactate: 1.7      MICROBIOLOGY:     Culture - Blood (collected 08 Apr 2023 01:48)  Source: .Blood Blood-Peripheral  Preliminary Report (09 Apr 2023 07:02):    No growth to date.       Interval Events:   - hypotension with MAP 57 and sys 93, with fever of Tmax 100.9 rectally overnight, new Afib rate controlled in 70-80s noted at change of shift. 1L LR and midodrine po 10mgx1 administered overnight. Plan for HD today, last HD 4/9 through R fem shun.    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 Patient reportedly woke up at 0130 AM on Monday with a R-sided headache. ENT noted R otitis externa, purulent discharge from R TM thought to be secondary to perforation, given ear gtt and prescribed cipro/dexamethasone gtt (she took 1 dose thus far). Daughter called EMS as later in the day patient complained of severe HA as well as neck pain, stated "I'm dying" and "my head is killing me." Patient reportedly without prior history of ear infections, no history of stroke or seizures. She reported to ENT earlier in the day a muffled sensation in the R ear [id number].     Daughter notes patient has had no recent fevers/chills. She had a dental cleaning on Thursday (no abx prior to cleaning).    On arrival, code stroke called.    In the ED VS:  97.2  65-99  162-186/52-91  16-20  96-99%RA, received NS 500cc IVF, lorazepam 2mg IV x1 and morphine 4mg IV x1 (15 Mar 2023 01:17)      REVIEW OF SYSTEMS:    [ x] Unable to assess ROS because patient is intubated and unresponsive    OBJECTIVE:  ICU Vital Signs Last 24 Hrs  T(C): 37.2 (10 Apr 2023 04:00), Max: 37.3 (10 Apr 2023 00:00)  T(F): 98.9 (10 Apr 2023 04:00), Max: 99.2 (10 Apr 2023 00:00)  HR: 105 (10 Apr 2023 07:00) (63 - 105)  BP: 139/74 (10 Apr 2023 07:00) (105/50 - 173/81)  BP(mean): 88 (10 Apr 2023 07:00) (63 - 104)  ABP: --  ABP(mean): --  RR: 21 (10 Apr 2023 07:00) (14 - 23)  SpO2: 100% (10 Apr 2023 07:00) (97% - 100%)    O2 Parameters below as of 10 Apr 2023 07:00  Patient On (Oxygen Delivery Method): ventilator,CPAP          Mode: CPAP with PS, FiO2: 30, PEEP: 5, PS: 8, MAP: 8, PIP: 14    04-09 @ 07:01  -  04-10 @ 07:00  --------------------------------------------------------  IN: 1676 mL / OUT: 290 mL / NET: 1386 mL      CAPILLARY BLOOD GLUCOSE      POCT Blood Glucose.: 176 mg/dL (10 Apr 2023 06:53)      PHYSICAL EXAM:  GENERAL: ill appearing, no distress  PSYCH: A&O x0  HEAD: Atraumatic, Normocephalic  NECK: Supple, No JVD  CHEST/LUNG: clear to auscultation bilaterally  HEART: regular rate and rhythm, no murmurs  ABDOMEN: +distension, palpable abdominal mass upper and lower right quadrants   EXTREMITIES: +3 bilateral LE edema   NEUROLOGY: opens eyes slightly, no purposeful movements noted  SKIN: No rashes or lesions, pressure ulcer to [facility] MEDICATIONS:  Standing Meds:  atovaquone  Suspension 1500 milliGRAM(s) Oral daily  cefTRIAXone   IVPB 2000 milliGRAM(s) IV Intermittent every 12 hours  chlorhexidine 0.12% Liquid 15 milliLiter(s) Oral Mucosa two times a day  chlorhexidine 2% Cloths 1 Application(s) Topical <User Schedule>  ciprofloxacin  0.3% Ophthalmic Solution for Otic Use 4 Drop(s) Right Ear two times a day  dextrose 5%. 1000 milliLiter(s) IV Continuous <Continuous>  dextrose 5%. 1000 milliLiter(s) IV Continuous <Continuous>  dextrose 50% Injectable 25 Gram(s) IV Push once  dextrose 50% Injectable 12.5 Gram(s) IV Push once  dextrose 50% Injectable 25 Gram(s) IV Push once  epoetin reza-epbx (RETACRIT) Injectable 69216 Unit(s) IV Push <User Schedule>  glucagon  Injectable 1 milliGRAM(s) IntraMuscular once  heparin   Injectable 5000 Unit(s) SubCutaneous every 8 hours  insulin lispro (ADMELOG) corrective regimen sliding scale   SubCutaneous every 6 hours  lacosamide Solution 200 milliGRAM(s) Oral two times a day  levETIRAcetam 500 milliGRAM(s) Oral two times a day  levothyroxine 25 MICROGram(s) Oral daily  metroNIDAZOLE  IVPB      metroNIDAZOLE  IVPB 500 milliGRAM(s) IV Intermittent every 8 hours  midodrine 10 milliGRAM(s) Oral every 8 hours  petrolatum Ophthalmic Ointment 1 Application(s) Both EYES every 12 hours  predniSONE   Tablet 5 milliGRAM(s) Oral daily  vancomycin    Solution 125 milliGRAM(s) Oral every 6 hours      PRN Meds:  acetaminophen   Oral Liquid .. 650 milliGRAM(s) Enteral Tube every 6 hours PRN  dextrose Oral Gel 15 Gram(s) Oral once PRN  sodium chloride 0.9% Bolus. 100 milliLiter(s) IV Bolus every 5 minutes PRN      LABS:                        7.6    9.75  )-----------( 237      ( 10 Apr 2023 03:05 )             23.6     Hgb Trend: 7.6<--, 8.5<--, 9.6<--, 8.7<--, 8.8<--  04-10    134<L>  |  95<L>  |  78<H>  ----------------------------<  145<H>  3.9   |  21<L>  |  4.00<H>    Ca    8.7      10 Apr 2023 03:05  Phos  5.4     04-10  Mg     2.20     04-10    TPro  5.2<L>  /  Alb  1.8<L>  /  TBili  <0.2  /  DBili  x   /  AST  56<H>  /  ALT  23  /  AlkPhos  149<H>  04-10    Creatinine Trend: 4.00<--, 3.38<--, 3.74<--, 4.97<--, 4.44<--, 4.23<--        Venous Blood Gas:  04-10 @ 03:05  7.38/39/53/23/87.4  VBG Lactate: 1.6  Venous Blood Gas:  04-09 @ 00:06  7.44/34/173/23/99.2  VBG Lactate: 1.7      MICROBIOLOGY:     Culture - Blood (collected 08 Apr 2023 01:48)  Source: .Blood Blood-Peripheral  Preliminary Report (09 Apr 2023 07:02):    No growth to date.       Interval Events:   - hypotension with MAP 57 and sys 93, with fever of Tmax 100.9 rectally overnight, new Afib rate controlled in 70-80s noted at change of shift. 1L LR and midodrine po 10mgx1 administered overnight. Patient is placed back on levo gtt for repeat episode of hypotension, sys 60 per RN. Plan for HD today, last HD 4/9 through R fem shun.    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 Patient reportedly woke up at 0130 AM on Monday with a R-sided headache. ENT noted R otitis externa, purulent discharge from R TM thought to be secondary to perforation, given ear gtt and prescribed cipro/dexamethasone gtt (she took 1 dose thus far). Daughter called EMS as later in the day patient complained of severe HA as well as neck pain, stated "I'm dying" and "my head is killing me." Patient reportedly without prior history of ear infections, no history of stroke or seizures. She reported to ENT earlier in the day a muffled sensation in the R ear [id number].     Daughter notes patient has had no recent fevers/chills. She had a dental cleaning on Thursday (no abx prior to cleaning).    On arrival, code stroke called.    In the ED VS:  97.2  65-99  162-186/52-91  16-20  96-99%RA, received NS 500cc IVF, lorazepam 2mg IV x1 and morphine 4mg IV x1 (15 Mar 2023 01:17)      REVIEW OF SYSTEMS:    [ x] Unable to assess ROS because patient is intubated and unresponsive    OBJECTIVE:  ICU Vital Signs Last 24 Hrs  T(C): 37.2 (10 Apr 2023 04:00), Max: 37.3 (10 Apr 2023 00:00)  T(F): 98.9 (10 Apr 2023 04:00), Max: 99.2 (10 Apr 2023 00:00)  HR: 105 (10 Apr 2023 07:00) (63 - 105)  BP: 139/74 (10 Apr 2023 07:00) (105/50 - 173/81)  BP(mean): 88 (10 Apr 2023 07:00) (63 - 104)  ABP: --  ABP(mean): --  RR: 21 (10 Apr 2023 07:00) (14 - 23)  SpO2: 100% (10 Apr 2023 07:00) (97% - 100%)    O2 Parameters below as of 10 Apr 2023 07:00  Patient On (Oxygen Delivery Method): ventilator,CPAP          Mode: CPAP with PS, FiO2: 30, PEEP: 5, PS: 8, MAP: 8, PIP: 14    04-09 @ 07:01  -  04-10 @ 07:00  --------------------------------------------------------  IN: 1676 mL / OUT: 290 mL / NET: 1386 mL      CAPILLARY BLOOD GLUCOSE      POCT Blood Glucose.: 176 mg/dL (10 Apr 2023 06:53)      PHYSICAL EXAM:  GENERAL: ill appearing, no distress  PSYCH: A&O x0  HEAD: Atraumatic, Normocephalic  NECK: Supple, No JVD  CHEST/LUNG: clear to auscultation bilaterally  HEART: regular rate and rhythm, no murmurs  ABDOMEN: +distension, palpable abdominal mass upper and lower right quadrants   EXTREMITIES: +3 bilateral LE edema   NEUROLOGY: opens eyes slightly, no purposeful movements noted  SKIN: No rashes or lesions, pressure ulcer to [facility] MEDICATIONS:  Standing Meds:  atovaquone  Suspension 1500 milliGRAM(s) Oral daily  cefTRIAXone   IVPB 2000 milliGRAM(s) IV Intermittent every 12 hours  chlorhexidine 0.12% Liquid 15 milliLiter(s) Oral Mucosa two times a day  chlorhexidine 2% Cloths 1 Application(s) Topical <User Schedule>  ciprofloxacin  0.3% Ophthalmic Solution for Otic Use 4 Drop(s) Right Ear two times a day  dextrose 5%. 1000 milliLiter(s) IV Continuous <Continuous>  dextrose 5%. 1000 milliLiter(s) IV Continuous <Continuous>  dextrose 50% Injectable 25 Gram(s) IV Push once  dextrose 50% Injectable 12.5 Gram(s) IV Push once  dextrose 50% Injectable 25 Gram(s) IV Push once  epoetin reza-epbx (RETACRIT) Injectable 36573 Unit(s) IV Push <User Schedule>  glucagon  Injectable 1 milliGRAM(s) IntraMuscular once  heparin   Injectable 5000 Unit(s) SubCutaneous every 8 hours  insulin lispro (ADMELOG) corrective regimen sliding scale   SubCutaneous every 6 hours  lacosamide Solution 200 milliGRAM(s) Oral two times a day  levETIRAcetam 500 milliGRAM(s) Oral two times a day  levothyroxine 25 MICROGram(s) Oral daily  metroNIDAZOLE  IVPB      metroNIDAZOLE  IVPB 500 milliGRAM(s) IV Intermittent every 8 hours  midodrine 10 milliGRAM(s) Oral every 8 hours  petrolatum Ophthalmic Ointment 1 Application(s) Both EYES every 12 hours  predniSONE   Tablet 5 milliGRAM(s) Oral daily  vancomycin    Solution 125 milliGRAM(s) Oral every 6 hours      PRN Meds:  acetaminophen   Oral Liquid .. 650 milliGRAM(s) Enteral Tube every 6 hours PRN  dextrose Oral Gel 15 Gram(s) Oral once PRN  sodium chloride 0.9% Bolus. 100 milliLiter(s) IV Bolus every 5 minutes PRN      LABS:                        7.6    9.75  )-----------( 237      ( 10 Apr 2023 03:05 )             23.6     Hgb Trend: 7.6<--, 8.5<--, 9.6<--, 8.7<--, 8.8<--  04-10    134<L>  |  95<L>  |  78<H>  ----------------------------<  145<H>  3.9   |  21<L>  |  4.00<H>    Ca    8.7      10 Apr 2023 03:05  Phos  5.4     04-10  Mg     2.20     04-10    TPro  5.2<L>  /  Alb  1.8<L>  /  TBili  <0.2  /  DBili  x   /  AST  56<H>  /  ALT  23  /  AlkPhos  149<H>  04-10    Creatinine Trend: 4.00<--, 3.38<--, 3.74<--, 4.97<--, 4.44<--, 4.23<--        Venous Blood Gas:  04-10 @ 03:05  7.38/39/53/23/87.4  VBG Lactate: 1.6  Venous Blood Gas:  04-09 @ 00:06  7.44/34/173/23/99.2  VBG Lactate: 1.7      MICROBIOLOGY:     Culture - Blood (collected 08 Apr 2023 01:48)  Source: .Blood Blood-Peripheral  Preliminary Report (09 Apr 2023 07:02):    No growth to date.       Interval Events:   - hypotension with MAP 57 and sys 93, with fever of Tmax 100.9 rectally overnight, new Afib rate controlled in 70-80s noted at change of shift. 500cc LR bolus and midodrine po 10mgx1 administered overnight. Patient is placed back on levo gtt for repeat episode of hypotension with response to another 500cc LR bolus, sys 60 per RN, during AM shift. Plan for HD today, last HD 4/9 through R fem shun.    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 Patient reportedly woke up at 0130 AM on Monday with a R-sided headache. ENT noted R otitis externa, purulent discharge from R TM thought to be secondary to perforation, given ear gtt and prescribed cipro/dexamethasone gtt (she took 1 dose thus far). Daughter called EMS as later in the day patient complained of severe HA as well as neck pain, stated "I'm dying" and "my head is killing me." Patient reportedly without prior history of ear infections, no history of stroke or seizures. She reported to ENT earlier in the day a muffled sensation in the R ear [id number].     Daughter notes patient has had no recent fevers/chills. She had a dental cleaning on Thursday (no abx prior to cleaning).    On arrival, code stroke called.    In the ED VS:  97.2  65-99  162-186/52-91  16-20  96-99%RA, received NS 500cc IVF, lorazepam 2mg IV x1 and morphine 4mg IV x1 (15 Mar 2023 01:17)      REVIEW OF SYSTEMS:    [ x] Unable to assess ROS because patient is intubated and unresponsive    OBJECTIVE:  ICU Vital Signs Last 24 Hrs  T(C): 37.2 (10 Apr 2023 04:00), Max: 37.3 (10 Apr 2023 00:00)  T(F): 98.9 (10 Apr 2023 04:00), Max: 99.2 (10 Apr 2023 00:00)  HR: 105 (10 Apr 2023 07:00) (63 - 105)  BP: 139/74 (10 Apr 2023 07:00) (105/50 - 173/81)  BP(mean): 88 (10 Apr 2023 07:00) (63 - 104)  ABP: --  ABP(mean): --  RR: 21 (10 Apr 2023 07:00) (14 - 23)  SpO2: 100% (10 Apr 2023 07:00) (97% - 100%)    O2 Parameters below as of 10 Apr 2023 07:00  Patient On (Oxygen Delivery Method): ventilator,CPAP          Mode: CPAP with PS, FiO2: 30, PEEP: 5, PS: 8, MAP: 8, PIP: 14    04-09 @ 07:01  -  04-10 @ 07:00  --------------------------------------------------------  IN: 1676 mL / OUT: 290 mL / NET: 1386 mL      CAPILLARY BLOOD GLUCOSE      POCT Blood Glucose.: 176 mg/dL (10 Apr 2023 06:53)      PHYSICAL EXAM:  GENERAL: ill appearing, no distress  PSYCH: A&O x0  HEAD: Atraumatic, Normocephalic  NECK: Supple, No JVD  CHEST/LUNG: clear to auscultation bilaterally  HEART: regular rate and rhythm, no murmurs  ABDOMEN: +distension, palpable abdominal mass upper and lower right quadrants   EXTREMITIES: +3 bilateral LE edema   NEUROLOGY: opens eyes slightly, no purposeful movements noted  SKIN: No rashes or lesions, pressure ulcer to [facility] MEDICATIONS:  Standing Meds:  atovaquone  Suspension 1500 milliGRAM(s) Oral daily  cefTRIAXone   IVPB 2000 milliGRAM(s) IV Intermittent every 12 hours  chlorhexidine 0.12% Liquid 15 milliLiter(s) Oral Mucosa two times a day  chlorhexidine 2% Cloths 1 Application(s) Topical <User Schedule>  ciprofloxacin  0.3% Ophthalmic Solution for Otic Use 4 Drop(s) Right Ear two times a day  dextrose 5%. 1000 milliLiter(s) IV Continuous <Continuous>  dextrose 5%. 1000 milliLiter(s) IV Continuous <Continuous>  dextrose 50% Injectable 25 Gram(s) IV Push once  dextrose 50% Injectable 12.5 Gram(s) IV Push once  dextrose 50% Injectable 25 Gram(s) IV Push once  epoetin reza-epbx (RETACRIT) Injectable 61099 Unit(s) IV Push <User Schedule>  glucagon  Injectable 1 milliGRAM(s) IntraMuscular once  heparin   Injectable 5000 Unit(s) SubCutaneous every 8 hours  insulin lispro (ADMELOG) corrective regimen sliding scale   SubCutaneous every 6 hours  lacosamide Solution 200 milliGRAM(s) Oral two times a day  levETIRAcetam 500 milliGRAM(s) Oral two times a day  levothyroxine 25 MICROGram(s) Oral daily  metroNIDAZOLE  IVPB      metroNIDAZOLE  IVPB 500 milliGRAM(s) IV Intermittent every 8 hours  midodrine 10 milliGRAM(s) Oral every 8 hours  petrolatum Ophthalmic Ointment 1 Application(s) Both EYES every 12 hours  predniSONE   Tablet 5 milliGRAM(s) Oral daily  vancomycin    Solution 125 milliGRAM(s) Oral every 6 hours      PRN Meds:  acetaminophen   Oral Liquid .. 650 milliGRAM(s) Enteral Tube every 6 hours PRN  dextrose Oral Gel 15 Gram(s) Oral once PRN  sodium chloride 0.9% Bolus. 100 milliLiter(s) IV Bolus every 5 minutes PRN      LABS:                        7.6    9.75  )-----------( 237      ( 10 Apr 2023 03:05 )             23.6     Hgb Trend: 7.6<--, 8.5<--, 9.6<--, 8.7<--, 8.8<--  04-10    134<L>  |  95<L>  |  78<H>  ----------------------------<  145<H>  3.9   |  21<L>  |  4.00<H>    Ca    8.7      10 Apr 2023 03:05  Phos  5.4     04-10  Mg     2.20     04-10    TPro  5.2<L>  /  Alb  1.8<L>  /  TBili  <0.2  /  DBili  x   /  AST  56<H>  /  ALT  23  /  AlkPhos  149<H>  04-10    Creatinine Trend: 4.00<--, 3.38<--, 3.74<--, 4.97<--, 4.44<--, 4.23<--        Venous Blood Gas:  04-10 @ 03:05  7.38/39/53/23/87.4  VBG Lactate: 1.6  Venous Blood Gas:  04-09 @ 00:06  7.44/34/173/23/99.2  VBG Lactate: 1.7      MICROBIOLOGY:     Culture - Blood (collected 08 Apr 2023 01:48)  Source: .Blood Blood-Peripheral  Preliminary Report (09 Apr 2023 07:02):    No growth to date.       Interval Events:   - hypotension with MAP 57 and sys 93, with fever of Tmax 100.9 rectally overnight, new Afib rate controlled in 70-80s noted at change of shift. 500cc LR bolus and midodrine po 10mgx1 administered overnight. Patient is placed back on levo gtt for repeat episode of hypotension with response to another 500cc LR bolus, sys 60 per RN, during AM shift. Plan for HD tomorrow per nephro, last HD 4/9 through R fem shun.    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 Patient reportedly woke up at 0130 AM on Monday with a R-sided headache. ENT noted R otitis externa, purulent discharge from R TM thought to be secondary to perforation, given ear gtt and prescribed cipro/dexamethasone gtt (she took 1 dose thus far). Daughter called EMS as later in the day patient complained of severe HA as well as neck pain, stated "I'm dying" and "my head is killing me." Patient reportedly without prior history of ear infections, no history of stroke or seizures. She reported to ENT earlier in the day a muffled sensation in the R ear [id number].     Daughter notes patient has had no recent fevers/chills. She had a dental cleaning on Thursday (no abx prior to cleaning).    On arrival, code stroke called.    In the ED VS:  97.2  65-99  162-186/52-91  16-20  96-99%RA, received NS 500cc IVF, lorazepam 2mg IV x1 and morphine 4mg IV x1 (15 Mar 2023 01:17)      REVIEW OF SYSTEMS:    [ x] Unable to assess ROS because patient is intubated and unresponsive    OBJECTIVE:  ICU Vital Signs Last 24 Hrs  T(C): 37.2 (10 Apr 2023 04:00), Max: 37.3 (10 Apr 2023 00:00)  T(F): 98.9 (10 Apr 2023 04:00), Max: 99.2 (10 Apr 2023 00:00)  HR: 105 (10 Apr 2023 07:00) (63 - 105)  BP: 139/74 (10 Apr 2023 07:00) (105/50 - 173/81)  BP(mean): 88 (10 Apr 2023 07:00) (63 - 104)  ABP: --  ABP(mean): --  RR: 21 (10 Apr 2023 07:00) (14 - 23)  SpO2: 100% (10 Apr 2023 07:00) (97% - 100%)    O2 Parameters below as of 10 Apr 2023 07:00  Patient On (Oxygen Delivery Method): ventilator,CPAP          Mode: CPAP with PS, FiO2: 30, PEEP: 5, PS: 8, MAP: 8, PIP: 14    04-09 @ 07:01  -  04-10 @ 07:00  --------------------------------------------------------  IN: 1676 mL / OUT: 290 mL / NET: 1386 mL      CAPILLARY BLOOD GLUCOSE      POCT Blood Glucose.: 176 mg/dL (10 Apr 2023 06:53)      PHYSICAL EXAM:  GENERAL: ill appearing, no distress  PSYCH: A&O x0  HEAD: Atraumatic, Normocephalic  NECK: Supple, No JVD  CHEST/LUNG: clear to auscultation bilaterally  HEART: regular rate and rhythm, no murmurs  ABDOMEN: +distension, palpable abdominal mass upper and lower right quadrants   EXTREMITIES: +3 bilateral LE edema   NEUROLOGY: opens eyes slightly, no purposeful movements noted  SKIN: No rashes or lesions, pressure ulcer to [facility] MEDICATIONS:  Standing Meds:  atovaquone  Suspension 1500 milliGRAM(s) Oral daily  cefTRIAXone   IVPB 2000 milliGRAM(s) IV Intermittent every 12 hours  chlorhexidine 0.12% Liquid 15 milliLiter(s) Oral Mucosa two times a day  chlorhexidine 2% Cloths 1 Application(s) Topical <User Schedule>  ciprofloxacin  0.3% Ophthalmic Solution for Otic Use 4 Drop(s) Right Ear two times a day  dextrose 5%. 1000 milliLiter(s) IV Continuous <Continuous>  dextrose 5%. 1000 milliLiter(s) IV Continuous <Continuous>  dextrose 50% Injectable 25 Gram(s) IV Push once  dextrose 50% Injectable 12.5 Gram(s) IV Push once  dextrose 50% Injectable 25 Gram(s) IV Push once  epoetin reza-epbx (RETACRIT) Injectable 60500 Unit(s) IV Push <User Schedule>  glucagon  Injectable 1 milliGRAM(s) IntraMuscular once  heparin   Injectable 5000 Unit(s) SubCutaneous every 8 hours  insulin lispro (ADMELOG) corrective regimen sliding scale   SubCutaneous every 6 hours  lacosamide Solution 200 milliGRAM(s) Oral two times a day  levETIRAcetam 500 milliGRAM(s) Oral two times a day  levothyroxine 25 MICROGram(s) Oral daily  metroNIDAZOLE  IVPB      metroNIDAZOLE  IVPB 500 milliGRAM(s) IV Intermittent every 8 hours  midodrine 10 milliGRAM(s) Oral every 8 hours  petrolatum Ophthalmic Ointment 1 Application(s) Both EYES every 12 hours  predniSONE   Tablet 5 milliGRAM(s) Oral daily  vancomycin    Solution 125 milliGRAM(s) Oral every 6 hours      PRN Meds:  acetaminophen   Oral Liquid .. 650 milliGRAM(s) Enteral Tube every 6 hours PRN  dextrose Oral Gel 15 Gram(s) Oral once PRN  sodium chloride 0.9% Bolus. 100 milliLiter(s) IV Bolus every 5 minutes PRN      LABS:                        7.6    9.75  )-----------( 237      ( 10 Apr 2023 03:05 )             23.6     Hgb Trend: 7.6<--, 8.5<--, 9.6<--, 8.7<--, 8.8<--  04-10    134<L>  |  95<L>  |  78<H>  ----------------------------<  145<H>  3.9   |  21<L>  |  4.00<H>    Ca    8.7      10 Apr 2023 03:05  Phos  5.4     04-10  Mg     2.20     04-10    TPro  5.2<L>  /  Alb  1.8<L>  /  TBili  <0.2  /  DBili  x   /  AST  56<H>  /  ALT  23  /  AlkPhos  149<H>  04-10    Creatinine Trend: 4.00<--, 3.38<--, 3.74<--, 4.97<--, 4.44<--, 4.23<--        Venous Blood Gas:  04-10 @ 03:05  7.38/39/53/23/87.4  VBG Lactate: 1.6  Venous Blood Gas:  04-09 @ 00:06  7.44/34/173/23/99.2  VBG Lactate: 1.7      MICROBIOLOGY:     Culture - Blood (collected 08 Apr 2023 01:48)  Source: .Blood Blood-Peripheral  Preliminary Report (09 Apr 2023 07:02):    No growth to date.

## 2023-04-11 NOTE — PROGRESS NOTE ADULT - CRITICAL CARE ATTENDING COMMENT
Patient is a 77 yo F w/ HTN, HLD, T2DM, PCKD previously on HD via LUE AVF then s/p renal transplant, LLE DVT (resolved) hypothyroidism, prior PCP PNA, CMV viremia who initially presented with R-sided HA, R ear pain and neck stiffness found to have Strep Pneumo bacteremia and meningitis in setting of otitis externa. Course c/b cardiac arrest 3/16 and post arrest seizures, persistent encephalopathy in setting of multiple CVAs and ARF and C diff    #Septic Shock  #Strep bactermia  #Strep meningitis/otitis  #C Diff Colitis  - c/w Ceftriaxone  - c/w PO Vanc/IV Flagyl  - f/u ID recs  - c/w PO vasopressors    #ARF  #s/p Renal Transplant  #PCKD  - iHD as per renal    #Encephalopathy post cardiac arrest - CVAs noted on MRI. Still not waking up off sedation, clinically hypoxic brain injury  #Seizures - No seizures on EEG overnight off Versed  - c/w Vimpat, Keppra  - Monitor off sedation    #Dysphagia  - Tube feeds as tolerated    #Respiratory failure   - c/w mechanical ventilatory support    #GOC - Ongoing, possible elective extubation today    Nico Cantor MD  Pulmonary & Critical Care .

## 2023-04-11 NOTE — PROGRESS NOTE ADULT - ASSESSMENT
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 Went to ENT and was diagnosed with R otitis externa secondary to perforated TM. Prescribed cipro/dexamethasone.  Worsening headache and EMS called. Admitted 3/14.  CT head negative.  NAMAN.  Started on vanc/meropenem.  IR obtained LP.  Findings c/w meningitis, culture +pneumococcus.  Antibiotics narrowed to ceftriaxone.  Remains lethargic off sedation with possible seizures.  Intermittent fevers.  MRI with acute and subacute infarcts.  Suspect endocarditis though there is not SORAYA.    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 But await decision pending [facility] discussions    C diff-  -continue po vanco and IV flagyl    Fever  - Blood and sputum culture from 4/4 without significant growth  - If fever persists, may need to reimage and reculture if amenable with GOC    NAMAN  - on dialysis now    At risk for recurrent infection due to functional status.  I doubt antimicrobial treatment will change her clinical course significantly. Poor prognosis

## 2023-04-11 NOTE — PROGRESS NOTE ADULT - SUBJECTIVE AND OBJECTIVE BOX
Follow Up:      Inverval History/ROS:Patient is a 78y old  Female who presents with a chief complaint of AMS/R ear infxn (11 Apr 2023 15:12)    + fever    Allergies    apple (Unknown)  Benadryl (Unknown)  penicillin (Hives)  watermelon (Unknown)    Intolerances        ANTIMICROBIALS:  atovaquone  Suspension 1500 daily  cefTRIAXone   IVPB 2000 every 12 hours  metroNIDAZOLE  IVPB    metroNIDAZOLE  IVPB 500 every 8 hours  vancomycin    Solution 125 every 6 hours      OTHER MEDS:  acetaminophen   Oral Liquid .. 650 milliGRAM(s) Oral every 6 hours PRN  chlorhexidine 0.12% Liquid 15 milliLiter(s) Oral Mucosa two times a day  chlorhexidine 2% Cloths 1 Application(s) Topical <User Schedule>  dextrose 5%. 1000 milliLiter(s) IV Continuous <Continuous>  dextrose 5%. 1000 milliLiter(s) IV Continuous <Continuous>  dextrose 50% Injectable 25 Gram(s) IV Push once  dextrose 50% Injectable 12.5 Gram(s) IV Push once  dextrose 50% Injectable 25 Gram(s) IV Push once  dextrose Oral Gel 15 Gram(s) Oral once PRN  epoetin reza-epbx (RETACRIT) Injectable 54805 Unit(s) IV Push <User Schedule>  glucagon  Injectable 1 milliGRAM(s) IntraMuscular once  heparin   Injectable 5000 Unit(s) SubCutaneous every 8 hours  insulin lispro (ADMELOG) corrective regimen sliding scale   SubCutaneous every 6 hours  lacosamide Solution 200 milliGRAM(s) Oral two times a day  levETIRAcetam 500 milliGRAM(s) Oral two times a day  levothyroxine 25 MICROGram(s) Oral daily  midodrine 10 milliGRAM(s) Oral every 8 hours  norepinephrine Infusion 0.05 MICROgram(s)/kG/Min IV Continuous <Continuous>  petrolatum Ophthalmic Ointment 1 Application(s) Both EYES every 12 hours  predniSONE   Tablet 5 milliGRAM(s) Oral daily  sodium chloride 0.9% Bolus. 100 milliLiter(s) IV Bolus every 5 minutes PRN      Vital Signs Last 24 Hrs  T(C): 36.1 (11 Apr 2023 16:00), Max: 38.3 (11 Apr 2023 04:00)  T(F): 97 (11 Apr 2023 16:00), Max: 101 (11 Apr 2023 04:00)  HR: 69 (11 Apr 2023 18:00) (54 - 81)  BP: 134/70 (11 Apr 2023 18:00) (93/49 - 144/69)  BP(mean): 85 (11 Apr 2023 18:00) (54 - 90)  RR: 19 (11 Apr 2023 18:00) (15 - 21)  SpO2: 100% (11 Apr 2023 18:00) (100% - 100%)    Parameters below as of 11 Apr 2023 18:00  Patient On (Oxygen Delivery Method): ventilator    O2 Concentration (%): 30    PHYSICAL EXAM:  General: [ ] non-toxic  HEAD/EYES: [ ] PERRL [x ] white sclera [ ] icterus  ENT:  [ ] normal [x ] supple [ ] thrush [ ] pharyngeal exudate  Cardiovascular:   [ ] murmur [x ] normal [ ] PPM/AICD  Respiratory:  [x ] clear to ausculation bilaterally  GI:  [ x] soft, non-tender, normal bowel sounds  :  [ ] snider [ ] no CVA tenderness   Musculoskeletal:  [ ] no synovitis  Neurologic:  [ ] non-focal exam   Skin:  [x ] no rash  Lymph: [x ] no lymphadenopathy  Psychiatric:  [ ] appropriate affect [ ] alert & oriented  Lines:  [x ] no phlebitis [ ] central line                                9.0    12.79 )-----------( 253      ( 11 Apr 2023 00:35 )             28.9       04-11    132<L>  |  96<L>  |  63<H>  ----------------------------<  167<H>  3.6   |  20<L>  |  3.19<H>    Ca    8.5      11 Apr 2023 00:35  Phos  4.5     04-11  Mg     2.10     04-11    TPro  5.3<L>  /  Alb  1.7<L>  /  TBili  0.2  /  DBili  x   /  AST  45<H>  /  ALT  22  /  AlkPhos  146<H>  04-11          MICROBIOLOGY:Culture Results:   No growth to date. (04-09-23 @ 08:59)  Culture Results:   No growth to date. (04-09-23 @ 08:45)  Culture Results:   No growth to date. (04-08-23 @ 01:48)      RADIOLOGY:

## 2023-04-11 NOTE — PROCEDURE NOTE - ADDITIONAL PROCEDURE DETAILS
Attempt was unsuccessful.      INR: 1.39  Activated Partial Thromboplastin Time: 25.2  Platelet Count - Automated: 163 K/uL (03.15.23 @ 05:45)
Critically ill pt requiring PIV access for medical management and/or pressor support. Under US guidance identified Rt UE vein, and successfully placed 20g x 6cm arrow extend dwell angiocath into vessel.  Placement confirmed s/p with ultrasound and catheter determined to be in patent lumen of vein. Pt tolerated well w/o complication.
Ultrasound Guided

## 2023-04-11 NOTE — PROGRESS NOTE ADULT - ASSESSMENT
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  Cardiac arrest on 3/16/23  septic shock s/p iv pressors  intubated on MV  Renal following for NAMAN on CKD Mx.  OS Nephrologist Dr. Hugo Hernandez [phone]    Acute kidney injury on Chronic Kidney Disease Stage 4 vs Chronic Kidney Disease Stage 4 progression to End Stage Renal Disease on Dialysis     hx KTx 2003 : In light of severe sepsis and cardiac arrest: off MMF and Cyclosporine  w/worsened renal function, oliguria, acidosis, mild hyperkalemia- s/p CVVHD, then intermittent HD  Consent for HD obtained, signed by daughter 3/16/23  B/L creat around 2.8 since Dec 2022  Patient was on Cyclosporine (Gengraf) 75mg PO q12hrs,  BID and Prednisone 5 QD for transplant    Creatinine Trend: 3.74 <--, 4.97 <--, 4.44 <--, 4.23 <--, 3.74 <--, 3.17 <--, 2.41 <--  L femoral shiley removed 4/2 for line holiday- s/p rt femoral shiley placed  4/7  s/p furosemide   Injectable 80 milliGRAM(s) IV Push x14/3- no response  K, vol acceptable  Creatinine Trending up since last HD 4/1/23    plan:  Plan for HD tomorrow  GOC discussions still ongoing with family  c/w predniSONE   Tablet 5 milliGRAM(s) Oral daily  monitor u/o  monitor electrolytes, BMP daily     HTN- bp stable, controlled-  Medications :  amLODIPine   Tablet 10 milliGRAM(s) Oral every 24 hours    - Continue current medications w/holding parameters  - plan to titrate anti hypertensive medication as needed  - low salt diet if not npo suggested    Anemia :  Hemoglobin : 7.3  if Hb less than 7gm/dl consider blood transfusion  added epo 10k w/hd tiw    OM and OE  vent Mx, per ICU  spiking fevers- f/u rpt blood cxs  Abxs per Infectious disease specialist with dose adjustment per GFR    C diff colitis- on po vanco    on going GOC discussion w/daughter.      For any question, call:  Cell # [phone]  Pager # [phone]  Callback # [phone]

## 2023-04-11 NOTE — PROGRESS NOTE ADULT - ASSESSMENT
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 Cardiac arrest 3/16 with ROSC, transferred to MICU for further management.     Neurological  Alert and oriented x4 at baseline, admitted with p/w severe HA, neck stiffness, fever, leukocytosis, CT head negative for CVA, found to have Strep pneumo meningitis, likely source of entry from R ear where pt c/o discomfort for several months. Course complicated by cardiac arrest on 3/16,  requiring intubation, seizures, now with no mental status in the setting of multiple cerebral infarcts and cerebellar infarct on MRI. Remains Intubated, opens eyes spontaneously, grimaces to pain  - remains off sedation  - EEG no seizures - Dcd 3/31  - MRI IAC and brain 3/29 - otitis and mastoiditis with multiple cerebral infarcts and cerebellar infarct. Will need f/u MRI in 3-4 weeks per neuro  - Endocarditis is possible cause for scattered cerebral infarcts, will consider utility of SORAYA given poor candidate for valve replacement  - mental status remains unimproved, withdraws from pain however does not elicit purposeful movements.  - LP and BCx 3/15 both showing gram positive cocci in pairs, and + strep pneumoniae.   - taking cyclosporine, mycophenolate mofetil and prednisone at home for kidney transplant and was likely immunocompromised -> dental procedure 3/14 but less likely source   - MRI of the IAC and brain 3/29 - otitis and mastoiditis with multiple cerebral infarcts and cerebellar infarct, f/u MRI 3-4 weeks (as per neuro)   - neuro and ID following  - 24hr EEG initially with highly focal seizures, improved with increased sedation.   - currently on keppra  500 BID since 3/17-   - vimpat 100 BID increased to 200 BID (3/20 -  )  - EEG neg- Dc' d 3/31     Respiratory  Intubated on 3/16 for cardiac arrest   - tolerates PS 8/5 trial  TV ~ 250-300  - Patient has been intubated >2 wks; will talk to family regarding what their plans are iso MRI reading; will likely need trach and peg if within their goals  - noted w/ oral and inline secretions, will send sputum Cx    ENT  # Tympanic membrane rupture  discharge from R ear, evaluated by ENT given dexamethasone and cipro 3/14  - c/w broad spectrum abx   - ENT recs- considered need for myringotomy, but will defer at this time given overall medical condition  #Enlarged tongue  -will keep soft bite block in place  -keep tongue moist with gauze soaked saline     Cardiovascular  Hx of HTN now s/p Cardiac Arrest on 3/16, hypotensive and shock state post cardiac arrest requiring pressors, now resolved on midodrine  - bradycardia to 30, pulseless, code blue was called 3/16 . 2 rounds of epi, and PEA arrest. on 3rd pulse check pulseless VT, 200J shock given 4th pulse check asystole, 5th ROSC, sinus tachy with HUMA. IO placed and levo started. Due to blood in mouth took several attempts with DL for ETT to be placed but ultimately confirmed with capnometry and bilateral breath sounds.   -uptrending troponin to 182 3/15 AM, was likely iso ESRD  s/p cardiac arrest, elevated ST segment   - TTE from 3/17 showed EF of 63% with mild pulm htn, normal left ventricular systolic function, and diastolic LV dysfunction.  home meds: on amlodipine 5mg, losartan 100mg, torsemide 20mg, and clonidine 0.2 patch weekly  - amlodipine 10mg restarted, but can hold now iso lower systolic blood pressure  - c/w midodrine 10mg PO TID, can uptitrate as needed for hypotension    Gastrointestinal  Diarrhea  - continues to have loose stools - positive C-difficile c/w PO vancomycin. added IV Flagyl 4/8 x5d per ID recs  - bowel regimen discontinued   - tolerating TF at goal       Renal  #ESRD s/p Kidney Transplant in 2003 at [place] PCKD previously HD through LLE AVF but no HD since transplant (per renal Cr has been in 2.8 since december 2022), now with NAMAN requiring HD   -[facility] Nephrologist Dr. Hugo Hernandez [phone]  - holding cyclosporine and mycophenolate but c/w home prednisone 5mg q day  - s/p 4mg IV bumex with minimal urine production (3/17) and furosemide 80mg IVP given 4/3 with no u/o response  - started on CRRT (3/17-3/21) now s/p intermittent HD - last session 4/9  - s/p 4 bumex IV 4/7 w/ no response, oligo-anuric s/p snider placement  - Left AV fistula not functioning, s/p R fem shiley placement 4/7 now with intermittent HD     Heme/DVT PPX  Hx of LLE DVT, anemia s/p 5 units pRbc's during this admission  -no DVT on doppler 12/22 and negative LLE duplex this admission 3/17  - POCUS showed residual clot in RLE; f/u dopplers 3/28 negative  - Evaluated by ENT for bleeding and found no signs of active bleeding from nasal cavity, nasopharynx or oral cavity, however, patient biting on tongue, which could be one source of bleeding. Tongue was edematous and lax  - CT a/p neg for RP bleed   - concern for possible transfusion reaction 4/4-> hypotensive/bradycardia after receiving 100ml pRBC's , HR and BP normalized once transfusion stopped. Blood bank does not consider response a transfusion reaction, and okay with future transfusions if needed - s/p 1u prnc 4/6 w/ no s/s of transfusion rxn  -nephrology plan to add epogen        Infectious Disease  Hx of PCP, now with this admission with Strep pneumoniae meningitis, R ear mastoiditis, strep bacteremia, now with C. difficile  - c/w Ceftriaxone (3/16 - ) for ~6 week course  - c/w ciprodex drops for R ear  -CDiff - c/w vanco 125mg PO [id number] (4/2-  ) , added Flagyl 4/8 x 5days - per ID recs  - ID following  - BCx 3/15 + strep, repeat cx from 3/17 neg, last BCx 4/4 - neg  - sputum cx 4/4- neg  - atovaquone at home, continue        Endocrine  Hx of Hypothyroidism and Diabetes mellitus Type II  - c/w levothyroxine   -currently on prednisone will watch FS closely on insulin sliding scale     Ethics  FULL code   4/9- per discussion w/ daughter at bedside, plan is for palliative extubation after family visits, possibly on Tuesday. She would like to continue HD and all other measures at this time but will readdress on Tuesday    79 y/o F with h/o HTN, HLD, DM2, PCKD previously on HD via LUE AVF then s/p renal transplant, LLE DVT (resolved, nml dopplers 12/2022) on ASA, very small supraclinoid aneurysm (reportedly unchanged on MRA 10/2020), hypothyroidism, PCP 2021 on atovaquone ppx, reportedly hospitalized in 12/2022 for rectal prolapse, had a blood transfusion at that time, also + CMV 1/25/23 who presented with R-sided HA, R ear pain and neck stiffness after visit to ENT earlier in the day, altered on presentation, CT head unremarkable for stroke, however LP with IR and BCx showing +strep pneumoniae, started on vanc and CTX per ID. Cardiac arrest 3/16 with ROSC, transferred to MICU for further management.     Neurological  Alert and oriented x4 at baseline, admitted with p/w severe HA, neck stiffness, fever, leukocytosis, CT head negative for CVA, found to have Strep pneumo meningitis, likely source of entry from R ear where pt c/o discomfort for several months. Course complicated by cardiac arrest on 3/16,  requiring intubation, seizures, now with no mental status in the setting of multiple cerebral infarcts and cerebellar infarct on MRI. Remains Intubated, opens eyes spontaneously, grimaces and withdraws to pain; mental status overall remains unimproved.  - remains off sedation for > 2 weeks  - EEG no seizures - Dcd 3/31  - AED initiated for seizure ppx in setting of anoxic brain injury and initial 24h EEG showing highly focal seizures  - MRI IAC and brain 3/29 - otitis and mastoiditis with multiple cerebral infarcts and cerebellar infarct. Will need f/u MRI in 3-4 weeks per neuro  - Endocarditis is possible cause for scattered cerebral infarcts, will consider utility of SORAYA given poor candidate for valve replacement  - LP and BCx 3/15 both showing gram positive cocci in pairs, and + strep pneumoniae.   - taking cyclosporine, mycophenolate mofetil and prednisone at home for kidney transplant and was likely immunocompromised -> dental procedure 3/14 but less likely source    - currently on keppra  500 BID since 3/17-   - vimpat 100 BID increased to 200 BID (3/20 -  )  - neuro following    Respiratory  Intubated on 3/16 for cardiac arrest   - tolerates PS 8/5 trial  TV ~ 250-300  - Patient has been intubated >2 wks; will talk to family regarding what their plans are iso MRI reading; will likely need trach and peg if within their goals  - noted w/ oral and inline secretions    ENT  # Tympanic membrane rupture  discharge from R ear, evaluated by ENT given dexamethasone and cipro 3/14  - c/w broad spectrum abx   - ENT recs- considered need for myringotomy, but will defer at this time given overall medical condition  #Enlarged tongue  -will keep soft bite block in place  -keep tongue moist with gauze soaked saline     Cardiovascular  Hx of HTN now s/p Cardiac Arrest on 3/16, hypotensive and shock state post cardiac arrest requiring pressors, now resolved on midodrine  - bradycardia to 30, pulseless, code blue was called 3/16 . 2 rounds of epi, and PEA arrest. on 3rd pulse check pulseless VT, 200J shock given 4th pulse check asystole, 5th ROSC, sinus tachy with HUMA. IO placed and levo started. Due to blood in mouth took several attempts with DL for ETT to be placed but ultimately confirmed with capnometry and bilateral breath sounds.   - TTE from 3/17 showed EF of 63% with mild pulm htn, normal left ventricular systolic function, and diastolic LV dysfunction.  home meds: on amlodipine 5mg, losartan 100mg, torsemide 20mg, and clonidine 0.2 patch weekly  - c/w midodrine 10mg PO TID, can uptitrate as needed for hypotension    Gastrointestinal  Diarrhea  - continues to have loose stools - positive C-difficile c/w PO vancomycin. added IV Flagyl 4/8 x5d per ID recs  - bowel regimen discontinued   - tolerating TF at goal     Renal  #ESRD s/p Kidney Transplant in 2003 at [place] [facility]KD previously HD through LLE AVF but no HD since transplant (per renal Cr has been in 2.8 since december 2022), now with NAMAN requiring HD   -[facility] Nephrologist Dr. Hugo Hernandez [phone]  - holding cyclosporine and mycophenolate but c/w home prednisone 5mg q day  - s/p 4mg IV bumex with minimal urine production (3/17) and furosemide 80mg IVP given 4/3 with no u/o response  - started on CRRT (3/17-3/21) now s/p intermittent HD - last session 4/9  - s/p 4 bumex IV 4/7 w/ no response, oligo-anuric s/p snider placement  - Left AV fistula not functioning, s/p R fem shiley placement 4/7 now with intermittent HD     Heme/DVT PPX  Hx of LLE DVT, anemia s/p 5 units pRbc's during this admission  -no DVT on doppler 12/22 and negative LLE duplex this admission 3/17  - POCUS showed residual clot in RLE; f/u dopplers 3/28 negative  - Evaluated by ENT for bleeding and found no signs of active bleeding from nasal cavity, nasopharynx or oral cavity, however, patient biting on tongue, which could be one source of bleeding. Tongue was edematous and lax  - CT a/p neg for RP bleed   - concern for possible transfusion reaction 4/4-> hypotensive/bradycardia after receiving 100ml pRBC's , HR and BP normalized once transfusion stopped. Blood bank does not consider response a transfusion reaction, and okay with future transfusions if needed - s/p 1u prnc 4/6 w/ no s/s of transfusion rxn  -nephrology plan to add epogen     Infectious Disease  Hx of PCP, now with this admission with Strep pneumoniae meningitis, R ear mastoiditis, strep bacteremia, now with C. difficile  - c/w Ceftriaxone (3/16 - ) for ~6 week course  - c/w ciprodex drops for R ear  -CDiff - c/w vanco 125mg PO [id number] (4/2-  ) , added Flagyl 4/8 x 5days - per ID recs  - ID following  - BCx 3/15 + strep, repeat cx from 3/17 neg, last BCx 4/4 - neg  - sputum cx 4/4- neg  - atovaquone at home, continue    Endocrine  Hx of Hypothyroidism and Diabetes mellitus Type II  - c/w levothyroxine   -currently on prednisone will watch FS closely on insulin sliding scale     Ethics  FULL code   4/9- per discussion w/ daughter at bedside, plan is for palliative extubation after family visits, possibly on Tuesday. She would like to continue HD and all other measures at this time but will readdress on Tuesday    79 y/o F with h/o HTN, HLD, DM2, PCKD previously on HD via LUE AVF then s/p renal transplant, LLE DVT (resolved, nml dopplers 12/2022) on ASA, very small supraclinoid aneurysm (reportedly unchanged on MRA 10/2020), hypothyroidism, PCP 2021 on atovaquone ppx, reportedly hospitalized in 12/2022 for rectal prolapse, had a blood transfusion at that time, also + CMV 1/25/23 who presented with R-sided HA, R ear pain and neck stiffness after visit to ENT earlier in the day, altered on presentation, CT head unremarkable for stroke, however LP with IR and BCx showing +strep pneumoniae, started on vanc and CTX per ID. Cardiac arrest 3/16 with ROSC, transferred to MICU for further management.     Neurological  Alert and oriented x4 at baseline, admitted with p/w severe HA, neck stiffness, fever, leukocytosis, CT head negative for CVA, found to have Strep pneumo meningitis, likely source of entry from R ear where pt c/o discomfort for several months. Course complicated by cardiac arrest on 3/16,  requiring intubation, seizures, now with no mental status in the setting of multiple cerebral infarcts and cerebellar infarct on MRI. Remains Intubated, opens eyes spontaneously, grimaces and withdraws to pain; mental status overall remains unimproved.  - remains off sedation for > 2 weeks  - EEG no seizures - Dcd 3/31  - AED initiated for seizure ppx in setting of anoxic brain injury and initial 24h EEG showing highly focal seizures  - MRI IAC and brain 3/29 - otitis and mastoiditis with multiple cerebral infarcts and cerebellar infarct. Will need f/u MRI in 3-4 weeks per neuro  - Endocarditis is possible cause for scattered cerebral infarcts, will consider utility of SORAYA given poor candidate for valve replacement  - LP and BCx 3/15 both showing gram positive cocci in pairs, and + strep pneumoniae.   - taking cyclosporine, mycophenolate mofetil and prednisone at home for kidney transplant and was likely immunocompromised -> dental procedure 3/14 but less likely source    - currently on keppra  500 BID since 3/17-   - vimpat 100 BID increased to 200 BID (3/20 -  )  - neuro following    Respiratory  Intubated on 3/16 for cardiac arrest   - tolerates PS 8/5 trial  TV ~ 250-300  - Patient has been intubated >2 wks; will talk to family regarding what their plans are iso MRI reading; will likely need trach and peg if within their goals  - noted w/ oral and inline secretions    ENT  # Tympanic membrane rupture  discharge from R ear, evaluated by ENT given dexamethasone and cipro 3/14  - c/w broad spectrum abx   - ENT recs- considered need for myringotomy, but will defer at this time given overall medical condition  #Enlarged tongue  -will keep soft bite block in place  -keep tongue moist with gauze soaked saline     Cardiovascular  Hx of HTN now s/p Cardiac Arrest on 3/16, hypotensive and shock state post cardiac arrest requiring pressors, now resolved on midodrine  - bradycardia to 30, pulseless, code blue was called 3/16 . 2 rounds of epi, and PEA arrest. on 3rd pulse check pulseless VT, 200J shock given 4th pulse check asystole, 5th ROSC, sinus tachy with HUMA. IO placed and levo started. Due to blood in mouth took several attempts with DL for ETT to be placed but ultimately confirmed with capnometry and bilateral breath sounds.   - TTE from 3/17 showed EF of 63% with mild pulm htn, normal left ventricular systolic function, and diastolic LV dysfunction.  home meds: on amlodipine 5mg, losartan 100mg, torsemide 20mg, and clonidine 0.2 patch weekly  - levo gtt restarted on 4/11     Gastrointestinal  Diarrhea  - continues to have loose stools - positive C-difficile c/w PO vancomycin. added IV Flagyl 4/8 x5d per ID recs  - bowel regimen discontinued   - tolerating TF at goal     Renal  #ESRD s/p Kidney Transplant in 2003 at [place] [facility]KD previously HD through LLE AVF but no HD since transplant (per renal Cr has been in 2.8 since december 2022), now with NAMAN requiring HD   -[facility] Nephrologist Dr. Hugo Hernandez [phone]  - holding cyclosporine and mycophenolate but c/w home prednisone 5mg q day  - s/p 4mg IV bumex with minimal urine production (3/17) and furosemide 80mg IVP given 4/3 with no u/o response  - started on CRRT (3/17-3/21) now s/p intermittent HD - last session 4/9  - s/p 4 bumex IV 4/7 w/ no response, oligo-anuric s/p snider placement  - Left AV fistula not functioning, s/p R fem shiley placement 4/7 now with intermittent HD     Heme/DVT PPX  Hx of LLE DVT, anemia s/p 5 units pRbc's during this admission  -no DVT on doppler 12/22 and negative LLE duplex this admission 3/17  - POCUS showed residual clot in RLE; f/u dopplers 3/28 negative  - Evaluated by ENT for bleeding and found no signs of active bleeding from nasal cavity, nasopharynx or oral cavity, however, patient biting on tongue, which could be one source of bleeding. Tongue was edematous and lax  - CT a/p neg for RP bleed   - concern for possible transfusion reaction 4/4-> hypotensive/bradycardia after receiving 100ml pRBC's , HR and BP normalized once transfusion stopped. Blood bank does not consider response a transfusion reaction, and okay with future transfusions if needed - s/p 1u prnc 4/6 w/ no s/s of transfusion rxn  -nephrology plan to add epogen     Infectious Disease  Hx of PCP, now with this admission with Strep pneumoniae meningitis, R ear mastoiditis, strep bacteremia, now with C. difficile  - c/w Ceftriaxone (3/16 - ) for ~6 week course  - c/w ciprodex drops for R ear  -CDiff - c/w vanco 125mg PO [id number] (4/2-  ) , added Flagyl 4/8 x 5days - per ID recs  - ID following  - BCx 3/15 + strep, repeat cx from 3/17 neg, last BCx 4/4 - neg  - sputum cx 4/4- neg  - atovaquone at home, continue    Endocrine  Hx of Hypothyroidism and Diabetes mellitus Type II  - c/w levothyroxine   -currently on prednisone will watch FS closely on insulin sliding scale     Ethics  FULL code   4/9- per discussion w/ daughter at bedside, plan is for palliative extubation after family visits, possibly on Tuesday. She would like to continue HD and all other measures at this time but will readdress on Tuesday

## 2023-04-11 NOTE — PROCEDURE NOTE - NSSITEPREP_SKIN_A_CORE
chlorhexidine/Adherence to aseptic technique: hand hygiene prior to donning barriers (gown, gloves), don cap and mask, sterile drape over patient
povidone iodine (if allergic to chlorhexidine)
chlorhexidine/Adherence to aseptic technique: hand hygiene prior to donning barriers (gown, gloves), don cap and mask, sterile drape over patient
chlorhexidine
chlorhexidine/Adherence to aseptic technique: hand hygiene prior to donning barriers (gown, gloves), don cap and mask, sterile drape over patient
chlorhexidine

## 2023-04-11 NOTE — PROGRESS NOTE ADULT - SUBJECTIVE AND OBJECTIVE BOX
[facility] - DANNA Cosby / DANNA Rivers / LENCHO Ontiveros/ DANNA Aldana/ DANNA Hussein/ QUYEN Gambino / LUCIAN Mukherjee / GISELE Stapleton  ---------------------------------------------------------------------------------------------------------------  seen and examined today for ESRD  Interval : NAD  VITALS:  T(F): 97 (04-11-23 @ 12:00), Max: 101 (04-11-23 @ 04:00)  HR: 68 (04-11-23 @ 15:00)  BP: 131/59 (04-11-23 @ 15:00)  RR: 18 (04-11-23 @ 15:00)  SpO2: 100% (04-11-23 @ 15:00)  Wt(kg): --    04-10 @ 07:01  -  04-11 @ 07:00  --------------------------------------------------------  IN: 1430 mL / OUT: 1915 mL / NET: -485 mL    04-11 @ 07:01  -  04-11 @ 15:12  --------------------------------------------------------  IN: 526.7 mL / OUT: 0 mL / NET: 526.7 mL      Physical Exam :-  Constitutional: NAD  Neck: Supple.  Respiratory: Bilateral equal breath sounds,  Cardiovascular: S1, S2 normal,  Gastrointestinal: Bowel Sounds present, soft, non tender.  Extremities: 2+ edema  Neurological: sedated  Psychiatric: sedated  Data:-  Allergies :   apple (Unknown)  Benadryl (Unknown)  penicillin (Hives)  watermelon (Unknown)    Hospital Medications:   MEDICATIONS  (STANDING):  atovaquone  Suspension 1500 milliGRAM(s) Oral daily  cefTRIAXone   IVPB 2000 milliGRAM(s) IV Intermittent every 12 hours  chlorhexidine 0.12% Liquid 15 milliLiter(s) Oral Mucosa two times a day  chlorhexidine 2% Cloths 1 Application(s) Topical <User Schedule>  dextrose 5%. 1000 milliLiter(s) (100 mL/Hr) IV Continuous <Continuous>  dextrose 5%. 1000 milliLiter(s) (50 mL/Hr) IV Continuous <Continuous>  dextrose 50% Injectable 25 Gram(s) IV Push once  dextrose 50% Injectable 12.5 Gram(s) IV Push once  dextrose 50% Injectable 25 Gram(s) IV Push once  epoetin reza-epbx (RETACRIT) Injectable 17159 Unit(s) IV Push <User Schedule>  glucagon  Injectable 1 milliGRAM(s) IntraMuscular once  heparin   Injectable 5000 Unit(s) SubCutaneous every 8 hours  insulin lispro (ADMELOG) corrective regimen sliding scale   SubCutaneous every 6 hours  lacosamide Solution 200 milliGRAM(s) Oral two times a day  levETIRAcetam 500 milliGRAM(s) Oral two times a day  levothyroxine 25 MICROGram(s) Oral daily  metroNIDAZOLE  IVPB      metroNIDAZOLE  IVPB 500 milliGRAM(s) IV Intermittent every 8 hours  norepinephrine Infusion 0.05 MICROgram(s)/kG/Min (5.82 mL/Hr) IV Continuous <Continuous>  petrolatum Ophthalmic Ointment 1 Application(s) Both EYES every 12 hours  predniSONE   Tablet 5 milliGRAM(s) Oral daily  vancomycin    Solution 125 milliGRAM(s) Oral every 6 hours    04-11    132<L>  |  96<L>  |  63<H>  ----------------------------<  167<H>  3.6   |  20<L>  |  3.19<H>    Ca    8.5      11 Apr 2023 00:35  Phos  4.5     04-11  Mg     2.10     04-11    TPro  5.3<L>  /  Alb  1.7<L>  /  TBili  0.2  /  DBili      /  AST  45<H>  /  ALT  22  /  AlkPhos  146<H>  04-11    Creatinine Trend: 3.19 <--, 4.00 <--, 3.38 <--, 3.74 <--, 4.97 <--, 4.44 <--, 4.23 <--                        9.0    12.79 )-----------( 253      ( 11 Apr 2023 00:35 )             28.9

## 2023-04-12 NOTE — PROGRESS NOTE ADULT - ASSESSMENT
78F with PCKD previously on HD via LUE AVF now s/p cadaveric renal transplant 2003 at [place], LLE DVT (resolved, nml dopplers 12/2022) on ASA, HTN, HLD, DM2, 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 Went to ENT and was diagnosed with R otitis externa secondary to perforated TM. Prescribed cipro/dexamethasone.  Worsening headache and EMS called. Admitted 3/14.  CT head negative.  NAMAN.  Started on vanc/meropenem.  IR obtained LP.  Findings c/w meningitis, culture +pneumococcus.  Antibiotics narrowed to ceftriaxone.  Remains lethargic off sedation with possible seizures.  Intermittent fevers.  MRI with acute and subacute infarcts.  Suspect endocarditis though there is not SORAYA.    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  I agree that palliative approach is appropriate.    Will sign off

## 2023-04-12 NOTE — CHART NOTE - NSCHARTNOTEFT_GEN_A_CORE
Spoke with daughter Arely at bedside.  Plan is for palliative extubation today.  Daughter would like patient to be comfortable okay with ativan/ dialudid as needed for comfort.  Patient made DNR/ DNI with plans to extubate today and would not want ETT reinserted if patient was to fail extubation and would not want chest compressions.  Daughter still asking about dialysis, explained to daughter that femoral catheter is not working- patient unable to receive HD today and would need new catheter inserted if HD was to continue.  Explained even though patient was unable to complete HD today her BP was low and needed pressors just to tolerate HD for 30 mins without removal of fluid.  Patient is poor access canidate unable to place in RIJ due to previous HD access in past, [facility] has multiple collaterals and patient had multiple failed attempts at this site in the past.  L fem previously had shiley but now as visible clot.  R fem is shiley in place but with inadequate flow- also appears to be visible clot in vessel.  If patient were to continue HD would need more permanent HD access placed likely a permacath in SC vein and placing this catheter would cause pain without benefit as patient is not tolerating HD without pressors.  Also unlikely for IR to place catheter when patient has active infection and given patient without mental status patient will likely not survive long term post palliative extubation.  Daughter aware.  Would like for patient to continue to receive antibiotics, suctioning and would want temporary feeding tube placed such as NGT if patient stable post extubation.  Patient palliatively extubated 4/12 to face tent.  DNR/DNI MOST filled out and placed in chart.    Nurys Daugherty PA-C  [facility] [id number]

## 2023-04-12 NOTE — CHART NOTE - NSCHARTNOTEFT_GEN_A_CORE
NUTRITION FOLLOW-UP    79 y/o F with h/o HTN, HLD, DM2, PCKD previously on HD via LUE AVF then s/p renal transplant, LLE DVT (resolved, nml dopplers 12/2022) on ASA, very small supraclinoid aneurysm (reportedly unchanged on MRA 10/2020), hypothyroidism, PCP 2021 on atovaquone ppx, reportedly hospitalized in 12/2022 for rectal prolapse, had a blood transfusion at that time, also + CMV 1/25/23 who presented with R-sided HA, R ear pain and neck stiffness after visit to ENT earlier in the day, altered on presentation, CT head unremarkable for stroke, however LP with IR and BCx showing +strep pneumoniae. currently on vanc and CTX per ID. Cardiac arrest 3/16 with ROSC, transferred to MICU for further management. Pt. off sedation for >2 weeks & now off pressors since (4/11).    CDiff... ordered for Banatrol. BM this morning.  Abdomen distended.  Extremely severe temporal muscle loss & orbital fat loss observed.     Plan is for palliative extubation... hence TF on hold as of this morning (4/12).  Previously was at prescribed goal of 44mL/hr with Nepro.  Spoke w RN.    No further nutritional interventions warranted at this time.      _________________Diet___________________  Diet, NPO with Tube Feed:   Tube Feeding Modality: Orogastric  Nepro with Carb Steady (NEPRORTH)  Total Volume for 24 Hours (mL): 968  Continuous  Until Goal Tube Feed Rate (mL per Hour): 44  Tube Feed Duration (in Hours): 22  Tube Feed Start Time: 14:00  No Carb Prosource (1pkg = 15gms Protein)     Qty per Day:  2  Banatrol TF     Qty per Day:  2 (04-04-23 @ 13:28) [Active]        provide:  1742 kcals  78g protein (+ g additional protein via NoCarb Prosource)= 108g total protein       Weight:                    Height:   62"               Ideal Body Weight: 110lbs /50 kg  74kg (04-12 ?)  68.4kg (04-09)  63.2 (04-04)   64.4 (04-01)   63.8 (03-26)   61.4 (03-20)   62.1 (03-16)        _____Estimated Energy Needs (based on 62.1 kg (admit)  )_____  25-30 jerome/kg =9138-6208 jerome/d  1.6-1.8 g protein/kg =  gm/d      Edema: 3+ tongue, 1+ b/l hands & legs  Skin: Sacrum stage IV pressure injury         ________________________Pertinent Medications____________   MEDICATIONS  (STANDING):  atovaquone  Suspension 1500 milliGRAM(s) Oral daily  cefTRIAXone   IVPB 2000 milliGRAM(s) IV Intermittent every 12 hours  chlorhexidine 0.12% Liquid 15 milliLiter(s) Oral Mucosa two times a day  chlorhexidine 2% Cloths 1 Application(s) Topical <User Schedule>  dextrose 5%. 1000 milliLiter(s) (100 mL/Hr) IV Continuous <Continuous>  dextrose 5%. 1000 milliLiter(s) (50 mL/Hr) IV Continuous <Continuous>  dextrose 50% Injectable 25 Gram(s) IV Push once  dextrose 50% Injectable 12.5 Gram(s) IV Push once  dextrose 50% Injectable 25 Gram(s) IV Push once  epoetin reza-epbx (RETACRIT) Injectable 26161 Unit(s) IV Push <User Schedule>  glucagon  Injectable 1 milliGRAM(s) IntraMuscular once  heparin   Injectable 5000 Unit(s) SubCutaneous every 8 hours  insulin lispro (ADMELOG) corrective regimen sliding scale   SubCutaneous every 6 hours  lacosamide Solution 200 milliGRAM(s) Oral two times a day  levETIRAcetam 500 milliGRAM(s) Oral two times a day  levothyroxine 25 MICROGram(s) Oral daily  metroNIDAZOLE  IVPB      metroNIDAZOLE  IVPB 500 milliGRAM(s) IV Intermittent every 8 hours  midodrine 10 milliGRAM(s) Oral every 8 hours  petrolatum Ophthalmic Ointment 1 Application(s) Both EYES every 12 hours  predniSONE   Tablet 5 milliGRAM(s) Oral daily  vancomycin    Solution 125 milliGRAM(s) Oral every 6 hours    MEDICATIONS  (PRN):  acetaminophen   Oral Liquid .. 650 milliGRAM(s) Oral every 6 hours PRN Temp greater or equal to 38C (100.4F)  dextrose Oral Gel 15 Gram(s) Oral once PRN Blood Glucose LESS THAN 70 milliGRAM(s)/deciliter  sodium chloride 0.9% Bolus. 100 milliLiter(s) IV Bolus every 5 minutes PRN SBP LESS THAN or EQUAL to 90 mmHg          _________________________Pertinent Labs____________________     04-12    134<L>  |  95<L>  |  72<H>  ----------------------------<  117<H>  3.7   |  20<L>  |  3.66<H>    Ca    8.8      12 Apr 2023 06:30  Phos  4.7     04-12  Mg     2.20     04-12    TPro  5.4<L>  /  Alb  1.7<L>  /  TBili  0.3  /  DBili  x   /  AST  41<H>  /  ALT  21  /  AlkPhos  157<H>  04-12                                                                   9.0    12.79 )-----------( 253      ( 11 Apr 2023 00:35 )             28.9         CAPILLARY BLOOD GLUCOSE      POCT Blood Glucose.: 156 mg/dL (12 Apr 2023 05:16)    POCT Blood Glucose.: 156 mg/dL (04-12-23 @ 05:16)  POCT Blood Glucose.: 151 mg/dL (04-12-23 @ 00:24)  POCT Blood Glucose.: 185 mg/dL (04-11-23 @ 17:03)  POCT Blood Glucose.: 206 mg/dL (04-11-23 @ 11:23)      ________NUTRITION Dx_________    Pt. remains severely malnourished         PLAN/RECOMMENDATIONS:    1) Pending possible palliative extubation.... hence medical condition precludes any further acute nutrition intervention(s).     RDN remains available and will f/u PRN.          Ursula Selby, SANIYAN, CDN       pager [phone] or MS Teams

## 2023-04-12 NOTE — PROGRESS NOTE ADULT - ASSESSMENT
78-year-old female with PCKD previously on HD via LUE AVF now s/p renal transplant since 2003 at [place], LLE DVT (resolved, nml dopplers 12/2022) on ASA, HTN, HLD, DM2, very small supraclinoid aneurysm on R and very small aneurysm vs infundibulum L supraclinoid artery (reportedly unchanged on MRA 10/2020), glaucoma/cataract, hypothyroid, history of PCP 2021 on atovaquone ppx, reportedly hospitalized in 12/2022 for rectal prolapse, CMV (unclear active or prior infxn), presenting with R-sided HA, R ear pain and neck pain after recent visit to ENT earlier in the day.  Cardiac arrest on 3/16/23  septic shock s/p iv pressors  intubated on MV  Renal following for NAMAN on CKD Mx.  OS Nephrologist Dr. Hugo Hernandez [phone]    Acute kidney injury on Chronic Kidney Disease Stage 4 vs Chronic Kidney Disease Stage 4 progression to End Stage Renal Disease on Dialysis     hx KTx 2003 : In light of severe sepsis and cardiac arrest: off MMF and Cyclosporine  w/worsened renal function, oliguria, acidosis, mild hyperkalemia- s/p CVVHD, then intermittent HD  Consent for HD obtained, signed by daughter 3/16/23  B/L creat around 2.8 since Dec 2022  Patient was on Cyclosporine (Gengraf) 75mg PO q12hrs,  BID and Prednisone 5 QD for transplant    L femoral shiley removed 4/2 for line holiday- s/p rt femoral shiley placed  4/7  s/p furosemide   Injectable 80 milliGRAM(s) IV Push x14/3- no response  K, vol acceptable    plan:  Plan for HD today  GOC discussions still ongoing with family  c/w predniSONE   Tablet 5 milliGRAM(s) Oral daily  monitor u/o  monitor electrolytes, BMP daily     HTN-   pressors per ICU    Anemia :  continue epo 10k w/hd tiw    OM and OE  vent Mx, per ICU  Abxs per Infectious disease specialist with dose adjustment per GFR    C diff colitis- on po vanco    on going GOC discussion w/daughter.      For any question, call:  Cell # [phone]  Pager # [phone]  Callback # [phone]

## 2023-04-12 NOTE — PROGRESS NOTE ADULT - SUBJECTIVE AND OBJECTIVE BOX
INTERVAL HPI/OVERNIGHT EVENTS: No acute overnight events. Off pressors since 11am 4/11.     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 Patient reportedly woke up at 0130 AM on Monday with a R-sided headache. ENT noted R otitis externa, purulent discharge from R TM thought to be secondary to perforation, given ear gtt and prescribed cipro/dexamethasone gtt (she took 1 dose thus far). Daughter called EMS as later in the day patient complained of severe HA as well as neck pain, stated "I'm dying" and "my head is killing me." Patient reportedly without prior history of ear infections, no history of stroke or seizures. She reported to ENT earlier in the day a muffled sensation in the R ear [id number].     Daughter notes patient has had no recent fevers/chills. She had a dental cleaning on Thursday (no abx prior to cleaning).    On arrival, code stroke called.    In the ED VS:  97.2  65-99  162-186/52-91  16-20  96-99%RA, received NS 500cc IVF, lorazepam 2mg IV x1 and morphine 4mg IV x1 (15 Mar 2023 01:17)      SUBJECTIVE: Patient seen and examined at bedside.     REVIEW OF SYSTEMS:    [ x] Unable to assess ROS because patient is intubated and unresponsive    OBJECTIVE:    VITAL SIGNS:  ICU Vital Signs Last 24 Hrs  T(C): 38.2 (12 Apr 2023 04:00), Max: 38.2 (12 Apr 2023 04:00)  T(F): 100.7 (12 Apr 2023 04:00), Max: 100.7 (12 Apr 2023 04:00)  HR: 73 (12 Apr 2023 06:00) (54 - 92)  BP: 117/63 (12 Apr 2023 06:00) (101/55 - 144/69)  BP(mean): 75 (12 Apr 2023 06:00) (54 - 90)  ABP: --  ABP(mean): --  RR: 20 (12 Apr 2023 06:00) (17 - 26)  SpO2: 100% (12 Apr 2023 06:00) (100% - 100%)    O2 Parameters below as of 12 Apr 2023 06:00  Patient On (Oxygen Delivery Method): ventilator,cpap          Mode: CPAP with PS, FiO2: 30, PEEP: 5, PS: 8, MAP: 9, PIP: 14    04-11 @ 07:01  -  04-12 @ 07:00  --------------------------------------------------------  IN: 1134.7 mL / OUT: 0 mL / NET: 1134.7 mL      CAPILLARY BLOOD GLUCOSE      POCT Blood Glucose.: 156 mg/dL (12 Apr 2023 05:16)    PHYSICAL EXAM:  GENERAL: ill appearing, no distress  PSYCH: A&O x0  HEAD: Atraumatic, Normocephalic  NECK: Supple, No JVD  CHEST/LUNG: clear to auscultation bilaterally  HEART: regular rate and rhythm, no murmurs  ABDOMEN: +distension, palpable abdominal mass upper and lower right quadrants   EXTREMITIES: +3 bilateral LE edema   NEUROLOGY: opens eyes slightly, no purposeful movements noted  SKIN: No rashes or lesions, pressure ulcer to sacrum     MEDICATIONS:  MEDICATIONS  (STANDING):  atovaquone  Suspension 1500 milliGRAM(s) Oral daily  cefTRIAXone   IVPB 2000 milliGRAM(s) IV Intermittent every 12 hours  chlorhexidine 0.12% Liquid 15 milliLiter(s) Oral Mucosa two times a day  chlorhexidine 2% Cloths 1 Application(s) Topical <User Schedule>  dextrose 5%. 1000 milliLiter(s) (100 mL/Hr) IV Continuous <Continuous>  dextrose 5%. 1000 milliLiter(s) (50 mL/Hr) IV Continuous <Continuous>  dextrose 50% Injectable 25 Gram(s) IV Push once  dextrose 50% Injectable 12.5 Gram(s) IV Push once  dextrose 50% Injectable 25 Gram(s) IV Push once  epoetin reza-epbx (RETACRIT) Injectable 42406 Unit(s) IV Push <User Schedule>  glucagon  Injectable 1 milliGRAM(s) IntraMuscular once  heparin   Injectable 5000 Unit(s) SubCutaneous every 8 hours  insulin lispro (ADMELOG) corrective regimen sliding scale   SubCutaneous every 6 hours  lacosamide Solution 200 milliGRAM(s) Oral two times a day  levETIRAcetam 500 milliGRAM(s) Oral two times a day  levothyroxine 25 MICROGram(s) Oral daily  metroNIDAZOLE  IVPB      metroNIDAZOLE  IVPB 500 milliGRAM(s) IV Intermittent every 8 hours  midodrine 10 milliGRAM(s) Oral every 8 hours  petrolatum Ophthalmic Ointment 1 Application(s) Both EYES every 12 hours  predniSONE   Tablet 5 milliGRAM(s) Oral daily  vancomycin    Solution 125 milliGRAM(s) Oral every 6 hours    MEDICATIONS  (PRN):  acetaminophen   Oral Liquid .. 650 milliGRAM(s) Oral every 6 hours PRN Temp greater or equal to 38C (100.4F)  dextrose Oral Gel 15 Gram(s) Oral once PRN Blood Glucose LESS THAN 70 milliGRAM(s)/deciliter  sodium chloride 0.9% Bolus. 100 milliLiter(s) IV Bolus every 5 minutes PRN SBP LESS THAN or EQUAL to 90 mmHg      ALLERGIES:  Allergies    apple (Unknown)  Benadryl (Unknown)  penicillin (Hives)  watermelon (Unknown)    Intolerances        LABS:                        9.0    12.79 )-----------( 253      ( 11 Apr 2023 00:35 )             28.9     04-11    132<L>  |  96<L>  |  63<H>  ----------------------------<  167<H>  3.6   |  20<L>  |  3.19<H>    Ca    8.5      11 Apr 2023 00:35  Phos  4.5     04-11  Mg     2.10     04-11    TPro  5.3<L>  /  Alb  1.7<L>  /  TBili  0.2  /  DBili  x   /  AST  45<H>  /  ALT  22  /  AlkPhos  146<H>  04-11          RADIOLOGY & ADDITIONAL TESTS: Reviewed. INTERVAL HPI/OVERNIGHT EVENTS: No acute overnight events. Off pressors since 11am 4/11. 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 Patient reportedly woke up at 0130 AM on Monday with a R-sided headache. ENT noted R otitis externa, purulent discharge from R TM thought to be secondary to perforation, given ear gtt and prescribed cipro/dexamethasone gtt (she took 1 dose thus far). Daughter called EMS as later in the day patient complained of severe HA as well as neck pain, stated "I'm dying" and "my head is killing me." Patient reportedly without prior history of ear infections, no history of stroke or seizures. She reported to ENT earlier in the day a muffled sensation in the R ear [id number].     Daughter notes patient has had no recent fevers/chills. She had a dental cleaning on Thursday (no abx prior to cleaning).    On arrival, code stroke called.    In the ED VS:  97.2  65-99  162-186/52-91  16-20  96-99%RA, received NS 500cc IVF, lorazepam 2mg IV x1 and morphine 4mg IV x1 (15 Mar 2023 01:17)      SUBJECTIVE: Patient seen and examined at bedside.     REVIEW OF SYSTEMS:    [ x] Unable to assess ROS because patient is intubated and unresponsive    OBJECTIVE:    VITAL SIGNS:  ICU Vital Signs Last 24 Hrs  T(C): 38.2 (12 Apr 2023 04:00), Max: 38.2 (12 Apr 2023 04:00)  T(F): 100.7 (12 Apr 2023 04:00), Max: 100.7 (12 Apr 2023 04:00)  HR: 73 (12 Apr 2023 06:00) (54 - 92)  BP: 117/63 (12 Apr 2023 06:00) (101/55 - 144/69)  BP(mean): 75 (12 Apr 2023 06:00) (54 - 90)  ABP: --  ABP(mean): --  RR: 20 (12 Apr 2023 06:00) (17 - 26)  SpO2: 100% (12 Apr 2023 06:00) (100% - 100%)    O2 Parameters below as of 12 Apr 2023 06:00  Patient On (Oxygen Delivery Method): ventilator,cpap          Mode: CPAP with PS, FiO2: 30, PEEP: 5, PS: 8, MAP: 9, PIP: 14    04-11 @ 07:01  -  04-12 @ 07:00  --------------------------------------------------------  IN: 1134.7 mL / OUT: 0 mL / NET: 1134.7 mL      CAPILLARY BLOOD GLUCOSE      POCT Blood Glucose.: 156 mg/dL (12 Apr 2023 05:16)    PHYSICAL EXAM:  GENERAL: ill appearing, no distress  PSYCH: A&O x0  HEAD: Atraumatic, Normocephalic  NECK: Supple, No JVD  CHEST/LUNG: clear to auscultation bilaterally  HEART: regular rate and rhythm, no murmurs  ABDOMEN: +distension, palpable abdominal mass upper and lower right quadrants   EXTREMITIES: +3 bilateral LE edema   NEUROLOGY: opens eyes slightly, no purposeful movements noted  SKIN: No rashes or lesions, pressure ulcer to sacrum     MEDICATIONS:  MEDICATIONS  (STANDING):  atovaquone  Suspension 1500 milliGRAM(s) Oral daily  cefTRIAXone   IVPB 2000 milliGRAM(s) IV Intermittent every 12 hours  chlorhexidine 0.12% Liquid 15 milliLiter(s) Oral Mucosa two times a day  chlorhexidine 2% Cloths 1 Application(s) Topical <User Schedule>  dextrose 5%. 1000 milliLiter(s) (100 mL/Hr) IV Continuous <Continuous>  dextrose 5%. 1000 milliLiter(s) (50 mL/Hr) IV Continuous <Continuous>  dextrose 50% Injectable 25 Gram(s) IV Push once  dextrose 50% Injectable 12.5 Gram(s) IV Push once  dextrose 50% Injectable 25 Gram(s) IV Push once  epoetin reza-epbx (RETACRIT) Injectable 26073 Unit(s) IV Push <User Schedule>  glucagon  Injectable 1 milliGRAM(s) IntraMuscular once  heparin   Injectable 5000 Unit(s) SubCutaneous every 8 hours  insulin lispro (ADMELOG) corrective regimen sliding scale   SubCutaneous every 6 hours  lacosamide Solution 200 milliGRAM(s) Oral two times a day  levETIRAcetam 500 milliGRAM(s) Oral two times a day  levothyroxine 25 MICROGram(s) Oral daily  metroNIDAZOLE  IVPB      metroNIDAZOLE  IVPB 500 milliGRAM(s) IV Intermittent every 8 hours  midodrine 10 milliGRAM(s) Oral every 8 hours  petrolatum Ophthalmic Ointment 1 Application(s) Both EYES every 12 hours  predniSONE   Tablet 5 milliGRAM(s) Oral daily  vancomycin    Solution 125 milliGRAM(s) Oral every 6 hours    MEDICATIONS  (PRN):  acetaminophen   Oral Liquid .. 650 milliGRAM(s) Oral every 6 hours PRN Temp greater or equal to 38C (100.4F)  dextrose Oral Gel 15 Gram(s) Oral once PRN Blood Glucose LESS THAN 70 milliGRAM(s)/deciliter  sodium chloride 0.9% Bolus. 100 milliLiter(s) IV Bolus every 5 minutes PRN SBP LESS THAN or EQUAL to 90 mmHg      ALLERGIES:  Allergies    apple (Unknown)  Benadryl (Unknown)  penicillin (Hives)  watermelon (Unknown)    Intolerances        LABS:                        9.0    12.79 )-----------( 253      ( 11 Apr 2023 00:35 )             28.9     04-11    132<L>  |  96<L>  |  63<H>  ----------------------------<  167<H>  3.6   |  20<L>  |  3.19<H>    Ca    8.5      11 Apr 2023 00:35  Phos  4.5     04-11  Mg     2.10     04-11    TPro  5.3<L>  /  Alb  1.7<L>  /  TBili  0.2  /  DBili  x   /  AST  45<H>  /  ALT  22  /  AlkPhos  146<H>  04-11          RADIOLOGY & ADDITIONAL TESTS: Reviewed. INTERVAL HPI/OVERNIGHT EVENTS: No acute overnight events. Off pressors since 11am 4/11. Lactate 4.9 this am with VBG 7.23/57/39/24/60.     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 Patient reportedly woke up at 0130 AM on Monday with a R-sided headache. ENT noted R otitis externa, purulent discharge from R TM thought to be secondary to perforation, given ear gtt and prescribed cipro/dexamethasone gtt (she took 1 dose thus far). Daughter called EMS as later in the day patient complained of severe HA as well as neck pain, stated "I'm dying" and "my head is killing me." Patient reportedly without prior history of ear infections, no history of stroke or seizures. She reported to ENT earlier in the day a muffled sensation in the R ear [id number].     Daughter notes patient has had no recent fevers/chills. She had a dental cleaning on Thursday (no abx prior to cleaning).    On arrival, code stroke called.    In the ED VS:  97.2  65-99  162-186/52-91  16-20  96-99%RA, received NS 500cc IVF, lorazepam 2mg IV x1 and morphine 4mg IV x1 (15 Mar 2023 01:17)      SUBJECTIVE: Patient seen and examined at bedside.     REVIEW OF SYSTEMS:    [ x] Unable to assess ROS because patient is intubated and unresponsive    OBJECTIVE:    VITAL SIGNS:  ICU Vital Signs Last 24 Hrs  T(C): 38.2 (12 Apr 2023 04:00), Max: 38.2 (12 Apr 2023 04:00)  T(F): 100.7 (12 Apr 2023 04:00), Max: 100.7 (12 Apr 2023 04:00)  HR: 73 (12 Apr 2023 06:00) (54 - 92)  BP: 117/63 (12 Apr 2023 06:00) (101/55 - 144/69)  BP(mean): 75 (12 Apr 2023 06:00) (54 - 90)  ABP: --  ABP(mean): --  RR: 20 (12 Apr 2023 06:00) (17 - 26)  SpO2: 100% (12 Apr 2023 06:00) (100% - 100%)    O2 Parameters below as of 12 Apr 2023 06:00  Patient On (Oxygen Delivery Method): ventilator,cpap          Mode: CPAP with PS, FiO2: 30, PEEP: 5, PS: 8, MAP: 9, PIP: 14    04-11 @ 07:01  -  04-12 @ 07:00  --------------------------------------------------------  IN: 1134.7 mL / OUT: 0 mL / NET: 1134.7 mL      CAPILLARY BLOOD GLUCOSE      POCT Blood Glucose.: 156 mg/dL (12 Apr 2023 05:16)    PHYSICAL EXAM:  GENERAL: ill appearing, no distress  PSYCH: A&O x0  HEAD: Atraumatic, Normocephalic  NECK: Supple, No JVD  CHEST/LUNG: clear to auscultation bilaterally  HEART: regular rate and rhythm, no murmurs  ABDOMEN: +distension, palpable abdominal mass upper and lower right quadrants   EXTREMITIES: +3 bilateral LE edema   NEUROLOGY: opens eyes slightly, no purposeful movements noted  SKIN: No rashes or lesions, pressure ulcer to sacrum     MEDICATIONS:  MEDICATIONS  (STANDING):  atovaquone  Suspension 1500 milliGRAM(s) Oral daily  cefTRIAXone   IVPB 2000 milliGRAM(s) IV Intermittent every 12 hours  chlorhexidine 0.12% Liquid 15 milliLiter(s) Oral Mucosa two times a day  chlorhexidine 2% Cloths 1 Application(s) Topical <User Schedule>  dextrose 5%. 1000 milliLiter(s) (100 mL/Hr) IV Continuous <Continuous>  dextrose 5%. 1000 milliLiter(s) (50 mL/Hr) IV Continuous <Continuous>  dextrose 50% Injectable 25 Gram(s) IV Push once  dextrose 50% Injectable 12.5 Gram(s) IV Push once  dextrose 50% Injectable 25 Gram(s) IV Push once  epoetin reza-epbx (RETACRIT) Injectable 51661 Unit(s) IV Push <User Schedule>  glucagon  Injectable 1 milliGRAM(s) IntraMuscular once  heparin   Injectable 5000 Unit(s) SubCutaneous every 8 hours  insulin lispro (ADMELOG) corrective regimen sliding scale   SubCutaneous every 6 hours  lacosamide Solution 200 milliGRAM(s) Oral two times a day  levETIRAcetam 500 milliGRAM(s) Oral two times a day  levothyroxine 25 MICROGram(s) Oral daily  metroNIDAZOLE  IVPB      metroNIDAZOLE  IVPB 500 milliGRAM(s) IV Intermittent every 8 hours  midodrine 10 milliGRAM(s) Oral every 8 hours  petrolatum Ophthalmic Ointment 1 Application(s) Both EYES every 12 hours  predniSONE   Tablet 5 milliGRAM(s) Oral daily  vancomycin    Solution 125 milliGRAM(s) Oral every 6 hours    MEDICATIONS  (PRN):  acetaminophen   Oral Liquid .. 650 milliGRAM(s) Oral every 6 hours PRN Temp greater or equal to 38C (100.4F)  dextrose Oral Gel 15 Gram(s) Oral once PRN Blood Glucose LESS THAN 70 milliGRAM(s)/deciliter  sodium chloride 0.9% Bolus. 100 milliLiter(s) IV Bolus every 5 minutes PRN SBP LESS THAN or EQUAL to 90 mmHg      ALLERGIES:  Allergies    apple (Unknown)  Benadryl (Unknown)  penicillin (Hives)  watermelon (Unknown)    Intolerances        LABS:                        9.0    12.79 )-----------( 253      ( 11 Apr 2023 00:35 )             28.9     04-11    132<L>  |  96<L>  |  63<H>  ----------------------------<  167<H>  3.6   |  20<L>  |  3.19<H>    Ca    8.5      11 Apr 2023 00:35  Phos  4.5     04-11  Mg     2.10     04-11    TPro  5.3<L>  /  Alb  1.7<L>  /  TBili  0.2  /  DBili  x   /  AST  45<H>  /  ALT  22  /  AlkPhos  146<H>  04-11          RADIOLOGY & ADDITIONAL TESTS: Reviewed.

## 2023-04-12 NOTE — PROGRESS NOTE ADULT - CRITICAL CARE ATTENDING COMMENT
Patient is a 79 yo F w/ HTN, HLD, T2DM, PCKD previously on HD via LUE AVF then s/p renal transplant, LLE DVT (resolved) hypothyroidism, prior PCP PNA, CMV viremia who initially presented with R-sided HA, R ear pain and neck stiffness found to have Strep Pneumo bacteremia and meningitis in setting of otitis externa. Course c/b cardiac arrest 3/16 and post arrest seizures, persistent encephalopathy in setting of multiple CVAs and ARF and C diff    #Septic Shock  #Strep bacteremia  #Strep meningitis/otitis  #C Diff Colitis  - c/w Ceftriaxone  - c/w PO Vanc/IV Flagyl  - f/u ID recs  - c/w PO vasopressors    #ARF  #s/p Renal Transplant  #PCKD  - iHD as per renal, but now shiley not functioning    #Encephalopathy post cardiac arrest - CVAs noted on MRI. Still not waking up off sedation, clinically hypoxic brain injury  #Seizures  - c/w Vimpat, Keppra  - Monitor off sedation    #Dysphagia  - Tube feeds as tolerated    #Respiratory failure   - c/w mechanical ventilatory support    #GOC - Ongoing, possible elective extubation today    Nico Cantor MD  Pulmonary & Critical Care .

## 2023-04-12 NOTE — PROGRESS NOTE ADULT - SUBJECTIVE AND OBJECTIVE BOX
[facility] - DANNA Cosby / DANNA Rivers / LENCHO Ontiveros/ DANNA Aldana/ DANNA Hussein/ QUYEN Gambino / LUCIAN Mukherjee / GISELE Stapleton  ---------------------------------------------------------------------------------------------------------------  seen and examined today for ESRD  Interval : NAD  VITALS:  T(F): 98 (04-12-23 @ 08:00), Max: 100.7 (04-12-23 @ 04:00)  HR: 75 (04-12-23 @ 09:00)  BP: 110/62 (04-12-23 @ 09:00)  RR: 21 (04-12-23 @ 09:00)  SpO2: 100% (04-12-23 @ 09:00)  Wt(kg): --    04-11 @ 07:01  -  04-12 @ 07:00  --------------------------------------------------------  IN: 1134.7 mL / OUT: 0 mL / NET: 1134.7 mL      Physical Exam :-  Constitutional: NAD  Neck: Supple.  Respiratory: Bilateral equal breath sounds,  Cardiovascular: S1, S2 normal,  Gastrointestinal: Bowel Sounds present, soft, non tender.  Extremities: No edema  Neurological: sedated  Psychiatric: poorly responsive  Data:-  Allergies :   apple (Unknown)  Benadryl (Unknown)  penicillin (Hives)  watermelon (Unknown)    Hospital Medications:   MEDICATIONS  (STANDING):  atovaquone  Suspension 1500 milliGRAM(s) Oral daily  cefTRIAXone   IVPB 2000 milliGRAM(s) IV Intermittent every 12 hours  chlorhexidine 0.12% Liquid 15 milliLiter(s) Oral Mucosa two times a day  chlorhexidine 2% Cloths 1 Application(s) Topical <User Schedule>  dextrose 5%. 1000 milliLiter(s) (100 mL/Hr) IV Continuous <Continuous>  dextrose 5%. 1000 milliLiter(s) (50 mL/Hr) IV Continuous <Continuous>  dextrose 50% Injectable 25 Gram(s) IV Push once  dextrose 50% Injectable 12.5 Gram(s) IV Push once  dextrose 50% Injectable 25 Gram(s) IV Push once  epoetin reza-epbx (RETACRIT) Injectable 63698 Unit(s) IV Push <User Schedule>  glucagon  Injectable 1 milliGRAM(s) IntraMuscular once  heparin   Injectable 5000 Unit(s) SubCutaneous every 8 hours  insulin lispro (ADMELOG) corrective regimen sliding scale   SubCutaneous every 6 hours  lacosamide Solution 200 milliGRAM(s) Oral two times a day  levETIRAcetam 500 milliGRAM(s) Oral two times a day  levothyroxine 25 MICROGram(s) Oral daily  metroNIDAZOLE  IVPB      metroNIDAZOLE  IVPB 500 milliGRAM(s) IV Intermittent every 8 hours  midodrine 10 milliGRAM(s) Oral every 8 hours  petrolatum Ophthalmic Ointment 1 Application(s) Both EYES every 12 hours  predniSONE   Tablet 5 milliGRAM(s) Oral daily  vancomycin    Solution 125 milliGRAM(s) Oral every 6 hours    04-12    134<L>  |  95<L>  |  72<H>  ----------------------------<  117<H>  3.7   |  20<L>  |  3.66<H>    Ca    8.8      12 Apr 2023 06:30  Phos  4.7     04-12  Mg     2.20     04-12    TPro  5.4<L>  /  Alb  1.7<L>  /  TBili  0.3  /  DBili      /  AST  41<H>  /  ALT  21  /  AlkPhos  157<H>  04-12    Creatinine Trend: 3.66 <--, 3.19 <--, 4.00 <--, 3.38 <--, 3.74 <--, 4.97 <--, 4.44 <--                        8.8    13.18 )-----------( 309      ( 12 Apr 2023 06:30 )             28.9

## 2023-04-12 NOTE — PROGRESS NOTE ADULT - SUBJECTIVE AND OBJECTIVE BOX
Follow Up:      Inverval History/ROS:Patient is a 78y old  Female who presents with a chief complaint of AMS/R ear infxn (12 Apr 2023 10:27)    Family meeting today- plan to focus on comfort      Allergies    apple (Unknown)  Benadryl (Unknown)  penicillin (Hives)  watermelon (Unknown)    Intolerances        ANTIMICROBIALS:  atovaquone  Suspension 1500 daily  cefTRIAXone   IVPB 2000 every 12 hours  metroNIDAZOLE  IVPB    metroNIDAZOLE  IVPB 500 every 8 hours      OTHER MEDS:  acetaminophen   Oral Liquid .. 650 milliGRAM(s) Oral every 6 hours PRN  chlorhexidine 0.12% Liquid 15 milliLiter(s) Oral Mucosa two times a day  chlorhexidine 2% Cloths 1 Application(s) Topical <User Schedule>  dextrose 5%. 1000 milliLiter(s) IV Continuous <Continuous>  dextrose 5%. 1000 milliLiter(s) IV Continuous <Continuous>  dextrose 50% Injectable 25 Gram(s) IV Push once  dextrose 50% Injectable 12.5 Gram(s) IV Push once  dextrose 50% Injectable 25 Gram(s) IV Push once  dextrose Oral Gel 15 Gram(s) Oral once PRN  epoetin reza-epbx (RETACRIT) Injectable 40393 Unit(s) IV Push <User Schedule>  glucagon  Injectable 1 milliGRAM(s) IntraMuscular once  glycopyrrolate Injectable 0.4 milliGRAM(s) IV Push every 4 hours PRN  heparin   Injectable 5000 Unit(s) SubCutaneous every 8 hours  HYDROmorphone  Injectable 1 milliGRAM(s) IV Push every 2 hours PRN  insulin lispro (ADMELOG) corrective regimen sliding scale   SubCutaneous every 6 hours  lacosamide Solution 200 milliGRAM(s) Oral two times a day  levETIRAcetam  Solution 500 milliGRAM(s) Oral two times a day  levothyroxine 25 MICROGram(s) Oral daily  LORazepam   Injectable 0.5 milliGRAM(s) IV Push every 2 hours PRN  petrolatum Ophthalmic Ointment 1 Application(s) Both EYES every 12 hours  predniSONE   Tablet 5 milliGRAM(s) Oral daily  sodium chloride 0.9% Bolus. 100 milliLiter(s) IV Bolus every 5 minutes PRN      Vital Signs Last 24 Hrs  T(C): 36.7 (12 Apr 2023 16:00), Max: 38.2 (12 Apr 2023 04:00)  T(F): 98.1 (12 Apr 2023 16:00), Max: 100.7 (12 Apr 2023 04:00)  HR: 84 (12 Apr 2023 17:00) (63 - 92)  BP: 110/60 (12 Apr 2023 17:00) (83/52 - 142/65)  BP(mean): 72 (12 Apr 2023 17:00) (59 - 86)  RR: 14 (12 Apr 2023 17:00) (14 - 26)  SpO2: 100% (12 Apr 2023 17:00) (100% - 100%)    Parameters below as of 12 Apr 2023 17:00  Patient On (Oxygen Delivery Method): face tent    O2 Concentration (%): 40    PHYSICAL EXAM:  General: [ ] non-toxic  HEAD/EYES: [ ] PERRL [ x] white sclera [ ] icterus  ENT:  [ ] normal [x ] supple [ ] thrush [ ] pharyngeal exudate  Cardiovascular:   [ ] murmur [x ] normal [ ] PPM/AICD  Respiratory:  [x ] clear to ausculation bilaterally  GI:  [x ] soft, non-tender, normal bowel sounds  :  [ ] snider [ x] no CVA tenderness   Musculoskeletal:  [ ] no synovitis  Neurologic:  [ ] non-focal exam   Skin:  [x ] no rash  Lymph: [ x] no lymphadenopathy  Psychiatric:  [ ] appropriate affect [ ] alert & oriented  Lines:  [ x] no phlebitis [ ] central line                                8.2    11.51 )-----------( 289      ( 12 Apr 2023 11:03 )             26.6       04-12    134<L>  |  95<L>  |  72<H>  ----------------------------<  117<H>  3.7   |  20<L>  |  3.66<H>    Ca    8.8      12 Apr 2023 06:30  Phos  4.7     04-12  Mg     2.20     04-12    TPro  5.4<L>  /  Alb  1.7<L>  /  TBili  0.3  /  DBili  x   /  AST  41<H>  /  ALT  21  /  AlkPhos  157<H>  04-12          MICROBIOLOGY:Culture Results:   No growth to date. (04-09-23 @ 08:59)  Culture Results:   No growth to date. (04-09-23 @ 08:45)  Culture Results:   No growth to date. (04-08-23 @ 01:48)      RADIOLOGY:

## 2023-04-12 NOTE — PROGRESS NOTE ADULT - ASSESSMENT
79 y/o F with h/o HTN, HLD, DM2, PCKD previously on HD via LUE AVF then s/p renal transplant, LLE DVT (resolved, nml dopplers 12/2022) on ASA, very small supraclinoid aneurysm (reportedly unchanged on MRA 10/2020), hypothyroidism, PCP 2021 on atovaquone ppx, reportedly hospitalized in 12/2022 for rectal prolapse, had a blood transfusion at that time, also + CMV 1/25/23 who presented with R-sided HA, R ear pain and neck stiffness after visit to ENT earlier in the day, altered on presentation, CT head unremarkable for stroke, however LP with IR and BCx showing +strep pneumoniae, started on vanc and CTX per ID. Cardiac arrest 3/16 with ROSC, transferred to MICU for further management.     Neurological  Alert and oriented x4 at baseline, admitted with p/w severe HA, neck stiffness, fever, leukocytosis, CT head negative for CVA, found to have Strep pneumo meningitis, likely source of entry from R ear where pt c/o discomfort for several months. Course complicated by cardiac arrest on 3/16,  requiring intubation, seizures, now with no mental status in the setting of multiple cerebral infarcts and cerebellar infarct on MRI. Remains Intubated, opens eyes spontaneously, grimaces and withdraws to pain; mental status overall remains unimproved.  - remains off sedation for > 2 weeks  - EEG no seizures - Dcd 3/31  - AED initiated for seizure ppx in setting of anoxic brain injury and initial 24h EEG showing highly focal seizures  - MRI IAC and brain 3/29 - otitis and mastoiditis with multiple cerebral infarcts and cerebellar infarct. Will need f/u MRI in 3-4 weeks per neuro  - Endocarditis is possible cause for scattered cerebral infarcts, will consider utility of SORAYA given poor candidate for valve replacement  - LP and BCx 3/15 both showing gram positive cocci in pairs, and + strep pneumoniae.   - taking cyclosporine, mycophenolate mofetil and prednisone at home for kidney transplant and was likely immunocompromised -> dental procedure 3/14 but less likely source    - currently on keppra  500 BID since 3/17-   - vimpat 100 BID increased to 200 BID (3/20 -  )  - neuro following    Respiratory  Intubated on 3/16 for cardiac arrest   - tolerates PS 8/5 trial  TV ~ 250-300  - Patient has been intubated >2 wks; will talk to family regarding what their plans are iso MRI reading; will likely need trach and peg if within their goals  - noted w/ oral and inline secretions    ENT  # Tympanic membrane rupture  discharge from R ear, evaluated by ENT given dexamethasone and cipro 3/14  - c/w broad spectrum abx   - ENT recs- considered need for myringotomy, but will defer at this time given overall medical condition  #Enlarged tongue  -will keep soft bite block in place  -keep tongue moist with gauze soaked saline     Cardiovascular  Hx of HTN now s/p Cardiac Arrest on 3/16, hypotensive and shock state post cardiac arrest requiring pressors, now resolved on midodrine  - bradycardia to 30, pulseless, code blue was called 3/16 . 2 rounds of epi, and PEA arrest. on 3rd pulse check pulseless VT, 200J shock given 4th pulse check asystole, 5th ROSC, sinus tachy with HUMA. IO placed and levo started. Due to blood in mouth took several attempts with DL for ETT to be placed but ultimately confirmed with capnometry and bilateral breath sounds.   - TTE from 3/17 showed EF of 63% with mild pulm htn, normal left ventricular systolic function, and diastolic LV dysfunction.  home meds: on amlodipine 5mg, losartan 100mg, torsemide 20mg, and clonidine 0.2 patch weekly  - levo gtt restarted on 4/11     Gastrointestinal  Diarrhea  - continues to have loose stools - positive C-difficile c/w PO vancomycin. added IV Flagyl 4/8 x5d per ID recs  - bowel regimen discontinued   - tolerating TF at goal     Renal  #ESRD s/p Kidney Transplant in 2003 at [facility]KD previously HD through LLE AVF but no HD since transplant (per renal Cr has been in 2.8 since december 2022), now with NAMAN requiring HD   -[facility] Nephrologist Dr. Hugo Hernandez [phone]  - holding cyclosporine and mycophenolate but c/w home prednisone 5mg q day  - s/p 4mg IV bumex with minimal urine production (3/17) and furosemide 80mg IVP given 4/3 with no u/o response  - started on CRRT (3/17-3/21) now s/p intermittent HD - last session 4/9  - s/p 4 bumex IV 4/7 w/ no response, oligo-anuric s/p snider placement  - Left AV fistula not functioning, s/p R fem shiley placement 4/7 now with intermittent HD     Heme/DVT PPX  Hx of LLE DVT, anemia s/p 5 units pRbc's during this admission  -no DVT on doppler 12/22 and negative LLE duplex this admission 3/17  - POCUS showed residual clot in RLE; f/u dopplers 3/28 negative  - Evaluated by ENT for bleeding and found no signs of active bleeding from nasal cavity, nasopharynx or oral cavity, however, patient biting on tongue, which could be one source of bleeding. Tongue was edematous and lax  - CT a/p neg for RP bleed   - concern for possible transfusion reaction 4/4-> hypotensive/bradycardia after receiving 100ml pRBC's , HR and BP normalized once transfusion stopped. Blood bank does not consider response a transfusion reaction, and okay with future transfusions if needed - s/p 1u prnc 4/6 w/ no s/s of transfusion rxn  -nephrology plan to add epogen     Infectious Disease  Hx of PCP, now with this admission with Strep pneumoniae meningitis, R ear mastoiditis, strep bacteremia, now with C. difficile  - c/w Ceftriaxone (3/16 - ) for ~6 week course  - c/w ciprodex drops for R ear  -CDiff - c/w vanco 125mg PO x1lhxig (4/2-  ) , added Flagyl 4/8 x 5days - per ID recs  - ID following  - BCx 3/15 + strep, repeat cx from 3/17 neg, last BCx 4/4 - neg  - sputum cx 4/4- neg  - atovaquone at home, continue    Endocrine  Hx of Hypothyroidism and Diabetes mellitus Type II  - c/w levothyroxine   -currently on prednisone will watch FS closely on insulin sliding scale     Ethics  FULL code   4/9- per discussion w/ daughter at bedside, plan is for palliative extubation after family visits, possibly on Tuesday. She would like to continue HD and all other measures at this time but will readdress on Tuesday    77 y/o F with h/o HTN, HLD, DM2, PCKD previously on HD via LUE AVF then s/p renal transplant, LLE DVT (resolved, nml dopplers 12/2022) on ASA, very small supraclinoid aneurysm (reportedly unchanged on MRA 10/2020), hypothyroidism, PCP 2021 on atovaquone ppx, reportedly hospitalized in 12/2022 for rectal prolapse, had a blood transfusion at that time, also + CMV 1/25/23 who presented with R-sided HA, R ear pain and neck stiffness after visit to ENT earlier in the day, altered on presentation, CT head unremarkable for stroke, however LP with IR and BCx showing +strep pneumoniae, started on vanc and CTX per ID. Cardiac arrest 3/16 with ROSC, transferred to MICU for further management.     Neurological  Alert and oriented x4 at baseline, admitted with p/w severe HA, neck stiffness, fever, leukocytosis, CT head negative for CVA, found to have Strep pneumo meningitis, likely source of entry from R ear where pt c/o discomfort for several months. Course complicated by cardiac arrest on 3/16,  requiring intubation, seizures, now with no mental status in the setting of multiple cerebral infarcts and cerebellar infarct on MRI. Remains Intubated, opens eyes spontaneously, grimaces and withdraws to pain; mental status overall remains unimproved.  - remains off sedation for > 2 weeks  - EEG no seizures - Dcd 3/31  - AED initiated for seizure ppx in setting of anoxic brain injury and initial 24h EEG showing highly focal seizures  - MRI IAC and brain 3/29 - otitis and mastoiditis with multiple cerebral infarcts and cerebellar infarct. Will need f/u MRI in 3-4 weeks per neuro  - Endocarditis is possible cause for scattered cerebral infarcts, will consider utility of SORAYA given poor candidate for valve replacement  - LP and BCx 3/15 both showing gram positive cocci in pairs, and + strep pneumoniae.   - taking cyclosporine, mycophenolate mofetil and prednisone at home for kidney transplant and was likely immunocompromised -> dental procedure 3/14 but less likely source    - currently on keppra  500 BID since 3/17-   - vimpat 100 BID increased to 200 BID (3/20 -  )  - neuro following    Respiratory  Intubated on 3/16 for cardiac arrest   - tolerates PS 8/5 trial  TV ~ 250-300- VBG worsening today pH 7.23 CO2 57- increased to PS 10/5   - Patient has been intubated >2 wks; will talk to family regarding what their plans are iso MRI reading; will likely need trach and peg if within their goals  - noted w/ oral and inline secretions    ENT  # Tympanic membrane rupture  discharge from R ear, evaluated by ENT given dexamethasone and cipro 3/14  - c/w broad spectrum abx   - ENT recs- considered need for myringotomy, but will defer at this time given overall medical condition  #Enlarged tongue  -will keep soft bite block in place  -keep tongue moist with gauze soaked saline     Cardiovascular  #Hx of HTN now s/p Cardiac Arrest on 3/16, hypotensive and shock state post cardiac arrest requiring pressors  - bradycardia to 30, pulseless, code blue was called 3/16 . 2 rounds of epi, and PEA arrest. on 3rd pulse check pulseless VT, 200J shock given 4th pulse check asystole, 5th ROSC, sinus tachy with HUMA. IO placed and levo started. Due to blood in mouth took several attempts with DL for ETT to be placed but ultimately confirmed with capnometry and bilateral breath sounds.   - TTE from 3/17 showed EF of 63% with mild pulm htn, normal left ventricular systolic function, and diastolic LV dysfunction.  home meds: on amlodipine 5mg, losartan 100mg, torsemide 20mg, and clonidine 0.2 patch weekly  - off levo since 4/11 at 11 am   - continue midodrine 10 q 8     #Rate controlled  - Afib on monitor 4/11 with rates in the 80s  - continue to monitor  - on hep SC    Gastrointestinal  #C diff Positive 4/1  - BM improving only 1 BM overnight    - c/w PO vancomycin- to complete 10 days today   - added IV Flagyl 4/8 x5d per ID recs  - bowel regimen discontinued   - tolerating TF at goal- held this am for possible extubation?    Renal  #ESRD s/p Kidney Transplant in 2003 at [place] PCKD previously HD through LLE AVF but no HD since transplant (per renal Cr has been in 2.8 since december 2022), now with NAMAN requiring HD   -[facility] Nephrologist Dr. Hugo Hernandez [phone]  - holding cyclosporine and mycophenolate but c/w home prednisone 5mg q day  - s/p 4mg IV bumex with minimal urine production (3/17) and furosemide 80mg IVP given 4/3 with no u/o response  - started on CRRT (3/17-3/21) now s/p intermittent HD - last session 4/9  - s/p 4 bumex IV 4/7 w/ no response, oligo-anuric s/p snider placement  - Left AV fistula not functioning, s/p R fem shiley placement 4/7 now with intermittent HD- last session 4/10 with 1300 removed planned for HD today 4/12    Heme/DVT PPX  Hx of LLE DVT, anemia s/p 5 units pRbc's during this admission  -no DVT on doppler 12/22 and negative LLE duplex this admission 3/17  - POCUS showed residual clot in RLE; f/u dopplers 3/28 negative  - Evaluated by ENT for bleeding and found no signs of active bleeding from nasal cavity, nasopharynx or oral cavity, however, patient biting on tongue, which could be one source of bleeding. Tongue was edematous and lax  - CT a/p neg for RP bleed   - concern for possible transfusion reaction 4/4-> hypotensive/bradycardia after receiving 100ml pRBC's , HR and BP normalized once transfusion stopped. Blood bank does not consider response a transfusion reaction, and okay with future transfusions if needed - s/p 1u prnc 4/6 w/ no s/s of transfusion rxn  -nephrology plan to add epogen     Infectious Disease  Hx of PCP, now with this admission with Strep pneumoniae meningitis, R ear mastoiditis, strep bacteremia, now with C. difficile  - c/w Ceftriaxone (3/16 - ) for ~6 week course  - c/w ciprodex drops for R ear  -CDiff - s/p vanco 125mg PO [id number] (4/2-4/12)  and Flagyl 4/8 x 5days - per ID recs  - ID following  - BCx 3/15 + strep, repeat cx from 3/17 neg, last BCx 4/4 - neg  - sputum cx 4/4- neg  - atovaquone at home, continue    Endocrine  Hx of Hypothyroidism and Diabetes mellitus Type II  - c/w levothyroxine   -currently on prednisone will watch FS closely on insulin sliding scale     Ethics  FULL code   4/9- per discussion w/ daughter at bedside, plan is for palliative extubation after family visits, possibly on Tuesday. She would like to continue HD and all other measures at this time but will readdress on Tuesday

## 2023-04-12 NOTE — CHART NOTE - NSCHARTNOTEFT_GEN_A_CORE
Confirmed with Arely will focus on patients comfort now and will not restart levophed.    Nurys Daugherty PA-C  [facility] [id number]

## 2023-04-13 NOTE — PROGRESS NOTE ADULT - CRITICAL CARE ATTENDING COMMENT
Patient is a 77 yo F w/ HTN, HLD, T2DM, PCKD previously on HD via LUE AVF then s/p renal transplant, LLE DVT (resolved) hypothyroidism, prior PCP PNA, CMV viremia who initially presented with R-sided HA, R ear pain and neck stiffness found to have Strep Pneumo bacteremia and meningitis in setting of otitis externa. Course c/b cardiac arrest 3/16 and post arrest seizures, persistent encephalopathy in setting of multiple CVAs and ARF and C diff    #Septic Shock  #Strep bacteremia  #Strep meningitis/otitis  #C Diff Colitis  - c/w Ceftriaxone  - c/w PO Vanc/IV Flagyl  - f/u ID recs  - c/w PO vasopressors    #ARF  #s/p Renal Transplant  #PCKD  - Shiley no longer function. Too HD unstable to tolerate HD, but will discuss with renal    #Encephalopathy post cardiac arrest - CVAs noted on MRI. Still not waking up off sedation, clinically hypoxic brain injury  #Seizures  - c/w Vimpat, Keppra  - Monitor off sedation    #Dysphagia  - Tube feeds as tolerated    #Respiratory failure   - Palliatively extubated 4/12, saturating well on Face tent  - c/w Ativan and Dilaudid PRN respiratory distress    #GOC - DNR/DNI    Nico Cantor MD  Pulmonary & Critical Care .

## 2023-04-13 NOTE — PROCEDURE NOTE - NSINFORMCONSENT_GEN_A_CORE
Benefits, risks, and possible complications of procedure explained to patient/caregiver who verbalized understanding and gave verbal consent.
Pain in left thigh ongoing since fall  XR left femur pending  Continue diclofenac gel for pain  
This was an emergent procedure.
with daughter, in chart./Benefits, risks, and possible complications of procedure explained to patient/caregiver who verbalized understanding and gave written consent.
Benefits, risks, and possible complications of procedure explained to patient/caregiver who verbalized understanding and gave written consent.
Benefits, risks, and possible complications of procedure explained to patient/caregiver who verbalized understanding and gave written consent.

## 2023-04-13 NOTE — CHART NOTE - NSCHARTNOTEFT_GEN_A_CORE
MICU Transfer Note    Transfer from: MICU    Transfer to: (  ) Medicine    (  ) Telemetry     (   ) RCU        (    ) Palliative         (   ) Stroke Unit          (   ) __________________    Accepting Physician:  Signout given to:     MICU COURSE:  77 y/o F with h/o HTN, HLD, DM2, PCKD previously on HD via LUE AVF then s/p renal transplant, LLE DVT (resolved, nml dopplers 12/2022) on ASA, very small supraclinoid aneurysm (reportedly unchanged on MRA 10/2020), hypothyroidism, PCP 2021 on atovaquone ppx, reportedly hospitalized in 12/2022 for rectal prolapse, had a blood transfusion at that time, also + CMV 1/25/23 who presented with R-sided HA, R ear pain and neck stiffness after visit to ENT earlier in the day, altered on presentation, CT head unremarkable for stroke, however LP with IR and BCx showing +strep pneumoniae, started on vanc and CTX per ID. Cardiac arrest 3/16 -2 rounds of epi, and PEA arrest, on 3rd pulse check pulseless VT, 200J shock given 4th pulse check asystole, 5th ROSC, sinus tachy with HUMA. IO placed and levo started. Due to blood in mouth took several attempts with DL for ETT to be placed but ultimately confirmed with capnometry and bilateral breath sounds. Transferred to MICU for further management. MRI IAC and brain 3/29 - otitis and mastoiditis with multiple cerebral infarcts and cerebellar infarct.  Patient started on keppra and vimpat for seizure ppx in setting of anoxic brain injury and initial 24h EEG showing highly focal seizures.  Endocarditis is possible cause for scattered cerebral infarcts, will consider utility of SORAYA given poor candidate for valve replacement.  Patient with poor mental status since arrest- has been off sedation  opens eyes spontaneously, grimaces and withdraws to pain and can spontaneously breath. Course complicated by NAMAN- started on CRRT (3/17-3/21) then required intermittent HD.  Patient has difficult access from prior old access sites- only able to get cather in groin.  Patients course further complicated by c diff infection s/p PO vanco (4/2-4/12)  and Flagyl (4/8- 4/13).  Extensive GOC conversations  with daughter, decision was made for palliative extubation 4/12 however daughter requesting still full care.  Patient extubated to face tent 4/12- was given dilaudid/robinul/ativan for patient comfort as per daughter request.  Confirmed with daughter no pressors and patient was made DNR/DNI however daughter still requesting dialysis.  Explained to daughter dialysis and palliative are contradictory and at this point patient has no access for HD and placing catheter would cause pain and infection risk and patient would likely not be able to tolerate HD.  Nephrology stating patient not an HD candidate at this time.  Patient no longer requiring ICU level of care and is eligible for transfer to medicine.          ASSESSMENT & PLAN:    77 y/o F with h/o HTN, HLD, DM2, PCKD previously on HD via LUE AVF then s/p renal transplant, LLE DVT (resolved, nml dopplers 12/2022) on ASA, very small supraclinoid aneurysm (reportedly unchanged on MRA 10/2020), hypothyroidism, PCP 2021 on atovaquone ppx, reportedly hospitalized in 12/2022 for rectal prolapse, had a blood transfusion at that time, also + CMV 1/25/23 who presented with R-sided HA, R ear pain and neck stiffness after visit to ENT earlier in the day, altered on presentation, CT head unremarkable for stroke, however LP with IR and BCx showing +strep pneumoniae, started on vanc and CTX per ID. Cardiac arrest 3/16 with ROSC, transferred to MICU for further management.     Neurological  Alert and oriented x4 at baseline, admitted with p/w severe HA, neck stiffness, fever, leukocytosis, CT head negative for CVA, found to have Strep pneumo meningitis, likely source of entry from R ear where pt c/o discomfort for several months. Course complicated by cardiac arrest on 3/16,  requiring intubation, seizures, now with no mental status in the setting of multiple cerebral infarcts and cerebellar infarct on MRI. Remains Intubated, opens eyes spontaneously, grimaces and withdraws to pain; mental status overall remains unimproved.  - remains off sedation for > 2 weeks  - EEG no seizures - Dcd 3/31  - AED initiated for seizure ppx in setting of anoxic brain injury and initial 24h EEG showing highly focal seizures  - MRI IAC and brain 3/29 - otitis and mastoiditis with multiple cerebral infarcts and cerebellar infarct. Will need f/u MRI in 3-4 weeks per neuro  - Endocarditis is possible cause for scattered cerebral infarcts, will consider utility of SORAYA given poor candidate for valve replacement  - LP and BCx 3/15 both showing gram positive cocci in pairs, and + strep pneumoniae.   - taking cyclosporine, mycophenolate mofetil and prednisone at home for kidney transplant and was likely immunocompromised -> dental procedure 3/14 but less likely source    - currently on keppra  500 BID since 3/17-   - vimpat 100 BID increased to 200 BID (3/20 -  )    Respiratory  #Intubated on 3/16 for cardiac arrest - palliatively extubated to face tent 4/13  - patient saturating 100% but agonally breathing   - DNR/DNI       ENT  # Tympanic membrane rupture  discharge from R ear, evaluated by ENT given dexamethasone and cipro 3/14  - c/w broad spectrum abx   - ENT recs- considered need for myringotomy, but will defer at this time given overall medical condition  #Enlarged tongue  -will keep soft bite block in place  -keep tongue moist with gauze soaked saline     Cardiovascular  #Hx of HTN now s/p Cardiac Arrest on 3/16, hypotensive and shock state post cardiac arrest requiring pressors  - bradycardia to 30, pulseless, code blue was called 3/16 . 2 rounds of epi, and PEA arrest. on 3rd pulse check pulseless VT, 200J shock given 4th pulse check asystole, 5th ROSC, sinus tachy with HUMA. IO placed and levo started. Due to blood in mouth took several attempts with DL for ETT to be placed but ultimately confirmed with capnometry and bilateral breath sounds.   - TTE from 3/17 showed EF of 63% with mild pulm htn, normal left ventricular systolic function, and diastolic LV dysfunction.  home meds: on amlodipine 5mg, losartan 100mg, torsemide 20mg, and clonidine 0.2 patch weekly  - off levo since 4/11 at 11 am - family agreed to hold pressors    #Rate controlled  - Afib on monitor 4/11 with rates in the 80s  - continue to monitor  - on hep SC    Gastrointestinal  #C diff Positive 4/1  - BM improving only 1 BM overnight    - s/p PO vancomycin- completed 10 day course   - added IV Flagyl 4/8 x5d per ID recs to be completed today   - bowel regimen discontinued   - NGT placed 4/13 for meds and feeds as per family request - resumed tube feeds    Renal  #ESRD s/p Kidney Transplant in 2003 at [place] [facility] previously HD through LLE AVF but no HD since transplant (per renal Cr has been in 2.8 since december 2022), now with NAMAN requiring HD   -[facility] Nephrologist Dr. Hugo Hernandez [phone]  - holding cyclosporine and mycophenolate but c/w home prednisone 5mg q day  - s/p 4mg IV bumex with minimal urine production (3/17) and furosemide 80mg IVP given 4/3 with no u/o response  - started on CRRT (3/17-3/21)  - s/p 4 bumex IV 4/7 w/ no response, oligo-anuric s/p snider placement  - Left AV fistula not functioning, s/p R fem shiley placement 4/7 now with intermittent HD- last session 4/10 with 1300 removed planned for HD 4/12 however F shiley is not functioning- no place for new shiley access and family focusing more on comfort care but still asking about dialysis  - Nephro following patient currently not an HD candidate nonfunctional R fem shiley removed     Heme/DVT PPX  Hx of LLE DVT, anemia s/p 5 units pRbc's during this admission  -no DVT on doppler 12/22 and negative LLE duplex this admission 3/17  - POCUS showed residual clot in RLE; f/u dopplers 3/28 negative  - Evaluated by ENT for bleeding and found no signs of active bleeding from nasal cavity, nasopharynx or oral cavity, however, patient biting on tongue, which could be one source of bleeding. Tongue was edematous and lax  - CT a/p neg for RP bleed   - concern for possible transfusion reaction 4/4-> hypotensive/bradycardia after receiving 100ml pRBC's , HR and BP normalized once transfusion stopped. Blood bank does not consider response a transfusion reaction, and okay with future transfusions if needed - s/p 1u prbc 4/6 w/ no s/s of transfusion rxn     Infectious Disease  Hx of PCP, now with this admission with Strep pneumoniae meningitis, R ear mastoiditis, strep bacteremia, now with C. difficile  - c/w Ceftriaxone (3/16 - ) for ~6 week course  - c/w ciprodex drops for R ear  -CDiff - s/p PO vanco (4/2-4/12)  and Flagyl (4/8- 4/13)   - BCx 3/15 + strep, repeat cx from 3/17 neg, last BCx 4/4 - neg  - sputum cx 4/4- neg  - atovaquone at home, continue  - ID signed off    Endocrine  Hx of Hypothyroidism and Diabetes mellitus Type II  - c/w levothyroxine   -currently on prednisone will watch FS closely on insulin sliding scale     Ethics  DNR/ DNI  - Palliatively extubated 4/12- no pressors but family still wanted antibiotics and tube feeds.  Also would like patient to continue with HD     FOR FOLLOW UP:  -Will need f/u MRI in 3-4 weeks if alined with GOC  - favor SORAYA to r/o IE especially in light of MRI finding as per ID. But await decision pending further GOC  - Nephro following patient currently not an HD candidate nonfunctional R wilmer hoffmann removed   - c/w Ceftriaxone (3/16 - ) for ~6 week course  - ongoing GOC conversation MICU Transfer Note    Transfer from: MICU    Transfer to: ( x ) Medicine    (  ) Telemetry     (   ) RCU        (    ) Palliative         (   ) Stroke Unit          (   ) __________________    Accepting Physician: Dr. So  Signout given to: Dr. So    [facility]  79 y/[facility] F with h/o HTN, HLD, DM2, PCKD previously on HD via LUE AVF then s/p renal transplant, LLE DVT (resolved, nml dopplers 12/2022) on ASA, very small supraclinoid aneurysm (reportedly unchanged on MRA 10/2020), hypothyroidism, PCP 2021 on atovaquone ppx, reportedly hospitalized in 12/2022 for rectal prolapse, had a blood transfusion at that time, also + CMV 1/25/23 who presented with R-sided HA, R ear pain and neck stiffness after visit to ENT earlier in the day, altered on presentation, CT head unremarkable for stroke, however LP with IR and BCx showing +strep pneumoniae, started on vanc and CTX per ID. Cardiac arrest 3/16 -2 rounds of epi, and PEA arrest, on 3rd pulse check pulseless VT, 200J shock given 4th pulse check asystole, 5th ROSC, sinus tachy with HUMA. IO placed and levo started. Due to blood in mouth took several attempts with DL for ETT to be placed but ultimately confirmed with capnometry and bilateral breath sounds. Transferred to MICU for further management. MRI IAC and brain 3/29 - otitis and mastoiditis with multiple cerebral infarcts and cerebellar infarct.  Patient started on keppra and vimpat for seizure ppx in setting of anoxic brain injury and initial 24h EEG showing highly focal seizures.  Endocarditis is possible cause for scattered cerebral infarcts, will consider utility of SORAYA given poor candidate for valve replacement.  Patient with poor mental status since arrest- has been off sedation  opens eyes spontaneously, grimaces and withdraws to pain and can spontaneously breath. Course complicated by NAMAN- started on CRRT (3/17-3/21) then required intermittent HD.  Patient has difficult access from prior old access sites- only able to get cather in groin.  Patients course further complicated by c diff infection s/p PO vanco (4/2-4/12)  and Flagyl (4/8- 4/13).  Extensive GOC conversations  with daughter, decision was made for palliative extubation 4/12 however daughter requesting still full care.  Patient extubated to face tent 4/12- was given dilaudid/robinul/ativan for patient comfort as per daughter request.  Confirmed with daughter no pressors and patient was made DNR/DNI however daughter still requesting dialysis.  Explained to daughter dialysis and palliative are contradictory and at this point patient has no access for HD and placing catheter would cause pain and infection risk and patient would likely not be able to tolerate HD.  Nephrology stating patient not an HD candidate at this time.  Patient no longer requiring ICU level of care and is eligible for transfer to medicine.          ASSESSMENT & PLAN:    79 y/o F with h/o HTN, HLD, DM2, PCKD previously on HD via LUE AVF then s/p renal transplant, LLE DVT (resolved, nml dopplers 12/2022) on ASA, very small supraclinoid aneurysm (reportedly unchanged on MRA 10/2020), hypothyroidism, PCP 2021 on atovaquone ppx, reportedly hospitalized in 12/2022 for rectal prolapse, had a blood transfusion at that time, also + CMV 1/25/23 who presented with R-sided HA, R ear pain and neck stiffness after visit to ENT earlier in the day, altered on presentation, CT head unremarkable for stroke, however LP with IR and BCx showing +strep pneumoniae, started on vanc and CTX per ID. Cardiac arrest 3/16 with ROSC, transferred to MICU for further management.     Neurological  Alert and oriented x4 at baseline, admitted with p/w severe HA, neck stiffness, fever, leukocytosis, CT head negative for CVA, found to have Strep pneumo meningitis, likely source of entry from R ear where pt c/o discomfort for several months. Course complicated by cardiac arrest on 3/16,  requiring intubation, seizures, now with no mental status in the setting of multiple cerebral infarcts and cerebellar infarct on MRI. Remains Intubated, opens eyes spontaneously, grimaces and withdraws to pain; mental status overall remains unimproved.  - remains off sedation for > 2 weeks  - EEG no seizures - Dcd 3/31  - AED initiated for seizure ppx in setting of anoxic brain injury and initial 24h EEG showing highly focal seizures  - MRI IAC and brain 3/29 - otitis and mastoiditis with multiple cerebral infarcts and cerebellar infarct. Will need f/u MRI in 3-4 weeks per neuro  - Endocarditis is possible cause for scattered cerebral infarcts, will consider utility of SORAYA given poor candidate for valve replacement  - LP and BCx 3/15 both showing gram positive cocci in pairs, and + strep pneumoniae.   - taking cyclosporine, mycophenolate mofetil and prednisone at home for kidney transplant and was likely immunocompromised -> dental procedure 3/14 but less likely source    - currently on keppra  500 BID since 3/17-   - vimpat 100 BID increased to 200 BID (3/20 -  )    Respiratory  #Intubated on 3/16 for cardiac arrest - palliatively extubated to face tent 4/13  - patient saturating 100% but agonally breathing   - DNR/DNI       ENT  # Tympanic membrane rupture  discharge from R ear, evaluated by ENT given dexamethasone and cipro 3/14  - c/w broad spectrum abx   - ENT recs- considered need for myringotomy, but will defer at this time given overall medical condition  #Enlarged tongue  -will keep soft bite block in place  -keep tongue moist with gauze soaked saline     Cardiovascular  #Hx of HTN now s/p Cardiac Arrest on 3/16, hypotensive and shock state post cardiac arrest requiring pressors  - bradycardia to 30, pulseless, code blue was called 3/16 . 2 rounds of epi, and PEA arrest. on 3rd pulse check pulseless VT, 200J shock given 4th pulse check asystole, 5th ROSC, sinus tachy with HUMA. IO placed and levo started. Due to blood in mouth took several attempts with DL for ETT to be placed but ultimately confirmed with capnometry and bilateral breath sounds.   - TTE from 3/17 showed EF of 63% with mild pulm htn, normal left ventricular systolic function, and diastolic LV dysfunction.  home meds: on amlodipine 5mg, losartan 100mg, torsemide 20mg, and clonidine 0.2 patch weekly  - off levo since 4/11 at 11 am - family agreed to hold pressors    #Rate controlled  - Afib on monitor 4/11 with rates in the 80s  - continue to monitor  - on hep SC    Gastrointestinal  #C diff Positive 4/1  - BM improving only 1 BM overnight    - s/p PO vancomycin- completed 10 day course   - added IV Flagyl 4/8 x5d per ID recs to be completed today   - bowel regimen discontinued   - NGT placed 4/13 for meds and feeds as per family request - resumed tube feeds    Renal  #ESRD s/p Kidney Transplant in 2003 at [place] [facility] previously HD through LLE AVF but no HD since transplant (per renal Cr has been in 2.8 since december 2022), now with NAMAN requiring HD   -[facility] Nephrologist Dr. Hugo Hernandez [phone]  - holding cyclosporine and mycophenolate but c/w home prednisone 5mg q day  - s/p 4mg IV bumex with minimal urine production (3/17) and furosemide 80mg IVP given 4/3 with no u/o response  - started on CRRT (3/17-3/21)  - s/p 4 bumex IV 4/7 w/ no response, oligo-anuric s/p snider placement  - Left AV fistula not functioning, s/p R fem shiley placement 4/7 now with intermittent HD- last session 4/10 with 1300 removed planned for HD 4/12 however F shiley is not functioning- no place for new shiley access and family focusing more on comfort care but still asking about dialysis  - Nephro following patient currently not an HD candidate nonfunctional R fem shiley removed     Heme/DVT PPX  Hx of LLE DVT, anemia s/p 5 units pRbc's during this admission  -no DVT on doppler 12/22 and negative LLE duplex this admission 3/17  - POCUS showed residual clot in RLE; f/u dopplers 3/28 negative  - Evaluated by ENT for bleeding and found no signs of active bleeding from nasal cavity, nasopharynx or oral cavity, however, patient biting on tongue, which could be one source of bleeding. Tongue was edematous and lax  - CT a/p neg for RP bleed   - concern for possible transfusion reaction 4/4-> hypotensive/bradycardia after receiving 100ml pRBC's , HR and BP normalized once transfusion stopped. Blood bank does not consider response a transfusion reaction, and okay with future transfusions if needed - s/p 1u prbc 4/6 w/ no s/s of transfusion rxn     Infectious Disease  Hx of PCP, now with this admission with Strep pneumoniae meningitis, R ear mastoiditis, strep bacteremia, now with C. difficile  - c/w Ceftriaxone (3/16 - ) for ~6 week course  - c/w ciprodex drops for R ear  -CDiff - s/p PO vanco (4/2-4/12)  and Flagyl (4/8- 4/13)   - BCx 3/15 + strep, repeat cx from 3/17 neg, last BCx 4/4 - neg  - sputum cx 4/4- neg  - atovaquone at home, continue  - ID signed off    Endocrine  Hx of Hypothyroidism and Diabetes mellitus Type II  - c/w levothyroxine   -currently on prednisone will watch FS closely on insulin sliding scale     Ethics  DNR/ DNI  - Palliatively extubated 4/12- no pressors but family still wanted antibiotics and tube feeds.  Also would like patient to continue with HD     FOR FOLLOW UP:  -Will need f/u MRI in 3-4 weeks if alined with GOC  - favor SORAYA to r/o IE especially in light of MRI finding as per ID. But await decision pending further GOC  - Nephro following patient currently not an HD candidate nonfunctional R fem shiley removed   - c/w Ceftriaxone (3/16 - ) for ~6 week course  - ongoing GOC conversation MICU Transfer Note    Transfer from: MICU    Transfer to: ( x ) Medicine    (  ) Telemetry     (   ) RCU        (    ) Palliative         (   ) Stroke Unit          (   ) __________________    Accepting Physician: Dr. So  Signout given to: Dr. So    [facility]  77 y/o F with h/o HTN, HLD, DM2, PCKD previously on HD via LUE AVF then s/p renal transplant, LLE DVT (resolved, nml dopplers 12/2022) on ASA, very small supraclinoid aneurysm (reportedly unchanged on MRA 10/2020), hypothyroidism, PCP 2021 on atovaquone ppx, reportedly hospitalized in 12/2022 for rectal prolapse, had a blood transfusion at that time, also + CMV 1/25/23 who presented with R-sided HA, R ear pain and neck stiffness after visit to ENT earlier in the day, altered on presentation, CT head unremarkable for stroke, however LP with IR and BCx showing +strep pneumoniae, started on vanc and CTX per ID. Cardiac arrest 3/16 -2 rounds of epi, and PEA arrest, on 3rd pulse check pulseless VT, 200J shock given 4th pulse check asystole, 5th ROSC, sinus tachy with HUMA. IO placed and levo started. Due to blood in mouth took several attempts with DL for ETT to be placed but ultimately confirmed with capnometry and bilateral breath sounds. Transferred to MICU for further management. MRI IAC and brain 3/29 - otitis and mastoiditis with multiple cerebral infarcts and cerebellar infarct.  Patient started on keppra and vimpat for seizure ppx in setting of anoxic brain injury and initial 24h EEG showing highly focal seizures.  Endocarditis is possible cause for scattered cerebral infarcts, will consider utility of SORAYA given poor candidate for valve replacement.  Patient with poor mental status since arrest- has been off sedation  opens eyes spontaneously, grimaces and withdraws to pain and can spontaneously breath. Course complicated by NAMAN- started on CRRT (3/17-3/21) then required intermittent HD.  Patient has difficult access from prior old access sites- only able to get cather in groin.  Patients course further complicated by c diff infection s/p PO vanco (4/2-4/12)  and Flagyl (4/8- 4/13).  Extensive GOC conversations  with daughter, decision was made for palliative extubation 4/12 however daughter requesting still full care.  Patient extubated to face tent 4/12- was given dilaudid/robinul/ativan for patient comfort as per daughter request.  Confirmed with daughter no pressors and patient was made DNR/DNI however daughter still requesting dialysis.  Explained to daughter dialysis and palliative are contradictory and at this point patient has no access for HD and placing catheter would cause pain and infection risk and patient would likely not be able to tolerate HD.  Nephrology stating patient not an HD candidate at this time.  Patient no longer requiring ICU level of care and is eligible for transfer to medicine.          ASSESSMENT & PLAN:    77 y/o F with h/o HTN, HLD, DM2, PCKD previously on HD via LUE AVF then s/p renal transplant, LLE DVT (resolved, nml dopplers 12/2022) on ASA, very small supraclinoid aneurysm (reportedly unchanged on MRA 10/2020), hypothyroidism, PCP 2021 on atovaquone ppx, reportedly hospitalized in 12/2022 for rectal prolapse, had a blood transfusion at that time, also + CMV 1/25/23 who presented with R-sided HA, R ear pain and neck stiffness after visit to ENT earlier in the day, altered on presentation, CT head unremarkable for stroke, however LP with IR and BCx showing +strep pneumoniae, started on vanc and CTX per ID. Cardiac arrest 3/16 with ROSC, transferred to MICU for further management.     Neurological  Alert and oriented x4 at baseline, admitted with p/w severe HA, neck stiffness, fever, leukocytosis, CT head negative for CVA, found to have Strep pneumo meningitis, likely source of entry from R ear where pt c/o discomfort for several months. Course complicated by cardiac arrest on 3/16,  requiring intubation, seizures, now with no mental status in the setting of multiple cerebral infarcts and cerebellar infarct on MRI. Remains Intubated, opens eyes spontaneously, grimaces and withdraws to pain; mental status overall remains unimproved.  - remains off sedation for > 2 weeks  - EEG no seizures - Dcd 3/31  - AED initiated for seizure ppx in setting of anoxic brain injury and initial 24h EEG showing highly focal seizures  - MRI IAC and brain 3/29 - otitis and mastoiditis with multiple cerebral infarcts and cerebellar infarct. Will need f/u MRI in 3-4 weeks per neuro  - Endocarditis is possible cause for scattered cerebral infarcts, will consider utility of SORAYA given poor candidate for valve replacement  - LP and BCx 3/15 both showing gram positive cocci in pairs, and + strep pneumoniae.   - taking cyclosporine, mycophenolate mofetil and prednisone at home for kidney transplant and was likely immunocompromised -> dental procedure 3/14 but less likely source    - currently on keppra  500 BID since 3/17-   - vimpat 100 BID increased to 200 BID (3/20 -  )    Respiratory  #Intubated on 3/16 for cardiac arrest - palliatively extubated to face tent 4/13  - patient saturating 100% but agonally breathing   - DNR/DNI       ENT  # Tympanic membrane rupture  discharge from R ear, evaluated by ENT given dexamethasone and cipro 3/14  - c/w broad spectrum abx   - ENT recs- considered need for myringotomy, but will defer at this time given overall medical condition  #Enlarged tongue  -will keep soft bite block in place  -keep tongue moist with gauze soaked saline     Cardiovascular  #Hx of HTN now s/p Cardiac Arrest on 3/16, hypotensive and shock state post cardiac arrest requiring pressors  - bradycardia to 30, pulseless, code blue was called 3/16 . 2 rounds of epi, and PEA arrest. on 3rd pulse check pulseless VT, 200J shock given 4th pulse check asystole, 5th ROSC, sinus tachy with HUMA. IO placed and levo started. Due to blood in mouth took several attempts with DL for ETT to be placed but ultimately confirmed with capnometry and bilateral breath sounds.   - TTE from 3/17 showed EF of 63% with mild pulm htn, normal left ventricular systolic function, and diastolic LV dysfunction.  home meds: on amlodipine 5mg, losartan 100mg, torsemide 20mg, and clonidine 0.2 patch weekly  - off levo since 4/11 at 11 am - family agreed to hold pressors  - midodrine restarted 4/14 for soft BP 10 q 8     #Rate controlled  - Afib on monitor 4/11 with rates in the 80s  - continue to monitor  - on hep SC    Gastrointestinal  #C diff Positive 4/1  - BM improving only 1 BM overnight    - s/p PO vancomycin- completed 10 day course   - added IV Flagyl 4/8 x5d per ID recs to be completed today   - bowel regimen discontinued   - NGT placed 4/13 for meds and feeds as per family request - resumed tube feeds    Renal  #ESRD s/p Kidney Transplant in 2003 at [place] [facility] previously HD through LLE AVF but no HD since transplant (per renal Cr has been in 2.8 since december 2022), now with NAMAN requiring HD   -[facility] Nephrologist Dr. Hugo Hernandez [phone]  - holding cyclosporine and mycophenolate but c/w home prednisone 5mg q day  - s/p 4mg IV bumex with minimal urine production (3/17) and furosemide 80mg IVP given 4/3 with no u/o response  - started on CRRT (3/17-3/21)  - s/p 4 bumex IV 4/7 w/ no response, oligo-anuric s/p snider placement  - Left AV fistula not functioning, s/p R fem shiley placement 4/7 now with intermittent HD- last session 4/10 with 1300 removed planned for HD 4/12 however F shiley is not functioning- no place for new shiley access and family focusing more on comfort care but still asking about dialysis  - Nephro following patient currently not an HD candidate nonfunctional R fem shiley removed     Heme/DVT PPX  Hx of LLE DVT, anemia s/p 5 units pRbc's during this admission  -no DVT on doppler 12/22 and negative LLE duplex this admission 3/17  - POCUS showed residual clot in RLE; f/u dopplers 3/28 negative  - Evaluated by ENT for bleeding and found no signs of active bleeding from nasal cavity, nasopharynx or oral cavity, however, patient biting on tongue, which could be one source of bleeding. Tongue was edematous and lax  - CT a/p neg for RP bleed   - concern for possible transfusion reaction 4/4-> hypotensive/bradycardia after receiving 100ml pRBC's , HR and BP normalized once transfusion stopped. Blood bank does not consider response a transfusion reaction, and okay with future transfusions if needed - s/p 1u prbc 4/6 w/ no s/s of transfusion rxn     Infectious Disease  Hx of PCP, now with this admission with Strep pneumoniae meningitis, R ear mastoiditis, strep bacteremia, now with C. difficile  - Fever 101 this am WBC stable  - c/w Ceftriaxone (3/16 - ) for ~6 week course  - c/w ciprodex drops for R ear  -CDiff - s/p PO vanco (4/2-4/12)  and Flagyl (4/8- 4/13)   - BCx 3/15 + strep, repeat cx from 3/17 neg, last BCx 4/4 - neg  - sputum cx 4/4- neg  - atovaquone at home, continue  - ID signed off  - Blood cx and sputum cx resent 4/14 F/U     Endocrine  Hx of Hypothyroidism and Diabetes mellitus Type II  - c/w levothyroxine   -currently on prednisone will watch FS closely on insulin sliding scale     Ethics  DNR/ DNI  - Palliatively extubated 4/12- no pressors but family still wanted antibiotics and tube feeds.  Also would like patient to continue with HD     FOR FOLLOW UP:  - F/U blood cx and sputum cx 4/14   -Will need f/u MRI in 3-4 weeks if alined with GOC  - favor SORAYA to r/o IE especially in light of MRI finding as per ID. But await decision pending further GOC  - Nephro following patient currently not an HD candidate nonfunctional R fem shiley removed   - c/w Ceftriaxone (3/16 - ) for ~6 week course  - ongoing GOC conversation MICU Transfer Note    Transfer from: MICU    Transfer to: ( x ) Medicine    (  ) Telemetry     (   ) RCU        (    ) Palliative         (   ) Stroke Unit          (   ) __________________    Accepting Physician: Dr. So  Signout given to: Dr. So    [facility]  79 y/o F with h/o HTN, HLD, DM2, PCKD previously on HD via LUE AVF then s/p renal transplant, LLE DVT (resolved, nml dopplers 12/2022) on ASA, very small supraclinoid aneurysm (reportedly unchanged on MRA 10/2020), hypothyroidism, PCP 2021 on atovaquone ppx, reportedly hospitalized in 12/2022 for rectal prolapse, had a blood transfusion at that time, also + CMV 1/25/23 who presented with R-sided HA, R ear pain and neck stiffness after visit to ENT earlier in the day, altered on presentation, CT head unremarkable for stroke, however LP with IR and BCx showing +strep pneumoniae, started on vanc and CTX per ID. Cardiac arrest 3/16 -2 rounds of epi, and PEA arrest, on 3rd pulse check pulseless VT, 200J shock given 4th pulse check asystole, 5th ROSC, sinus tachy with HUMA. IO placed and levo started. Due to blood in mouth took several attempts with DL for ETT to be placed but ultimately confirmed with capnometry and bilateral breath sounds. Transferred to MICU for further management. MRI IAC and brain 3/29 - otitis and mastoiditis with multiple cerebral infarcts and cerebellar infarct.  Patient started on keppra and vimpat for seizure ppx in setting of anoxic brain injury and initial 24h EEG showing highly focal seizures.  Endocarditis is possible cause for scattered cerebral infarcts, will consider utility of SORAYA given poor candidate for valve replacement.  Patient with poor mental status since arrest- has been off sedation  opens eyes spontaneously, grimaces and withdraws to pain and can spontaneously breath. Course complicated by NAMAN- started on CRRT (3/17-3/21) then required intermittent HD.  Patient has difficult access from prior old access sites- only able to get cather in groin.  Patients course further complicated by c diff infection s/p PO vanco (4/2-4/12)  and Flagyl (4/8- 4/13).  Extensive GOC conversations  with daughter, decision was made for palliative extubation 4/12 however daughter requesting still full care.  Patient extubated to face tent 4/12- was given dilaudid/robinul/ativan for patient comfort as per daughter request.  Confirmed with daughter no pressors and patient was made DNR/DNI however daughter still requesting dialysis.  Explained to daughter dialysis and palliative are contradictory and at this point patient has no access for HD and placing catheter would cause pain and infection risk and patient would likely not be able to tolerate HD.  Nephrology stating patient not an HD candidate at this time.  Patient no longer requiring ICU level of care and is eligible for transfer to medicine.          ASSESSMENT & PLAN:    79 y/o F with h/o HTN, HLD, DM2, PCKD previously on HD via LUE AVF then s/p renal transplant, LLE DVT (resolved, nml dopplers 12/2022) on ASA, very small supraclinoid aneurysm (reportedly unchanged on MRA 10/2020), hypothyroidism, PCP 2021 on atovaquone ppx, reportedly hospitalized in 12/2022 for rectal prolapse, had a blood transfusion at that time, also + CMV 1/25/23 who presented with R-sided HA, R ear pain and neck stiffness after visit to ENT earlier in the day, altered on presentation, CT head unremarkable for stroke, however LP with IR and BCx showing +strep pneumoniae, started on vanc and CTX per ID. Cardiac arrest 3/16 with ROSC, transferred to MICU for further management.     Neurological  Alert and oriented x4 at baseline, admitted with p/w severe HA, neck stiffness, fever, leukocytosis, CT head negative for CVA, found to have Strep pneumo meningitis, likely source of entry from R ear where pt c/o discomfort for several months. Course complicated by cardiac arrest on 3/16,  requiring intubation, seizures, now with no mental status in the setting of multiple cerebral infarcts and cerebellar infarct on MRI. Remains Intubated, opens eyes spontaneously, grimaces and withdraws to pain; mental status overall remains unimproved.  - remains off sedation for > 2 weeks  - EEG no seizures - Dcd 3/31  - AED initiated for seizure ppx in setting of anoxic brain injury and initial 24h EEG showing highly focal seizures  - MRI IAC and brain 3/29 - otitis and mastoiditis with multiple cerebral infarcts and cerebellar infarct. Will need f/u MRI in 3-4 weeks per neuro  - Endocarditis is possible cause for scattered cerebral infarcts, will consider utility of SORAYA given poor candidate for valve replacement  - LP and BCx 3/15 both showing gram positive cocci in pairs, and + strep pneumoniae.   - taking cyclosporine, mycophenolate mofetil and prednisone at home for kidney transplant and was likely immunocompromised -> dental procedure 3/14 but less likely source    - currently on keppra  500 BID since 3/17-   - vimpat 100 BID increased to 200 BID (3/20 -  )    Respiratory  #Intubated on 3/16 for cardiac arrest - palliatively extubated to face tent 4/13  - patient saturating 100% but agonally breathing   - DNR/DNI       ENT  # Tympanic membrane rupture  discharge from R ear, evaluated by ENT given dexamethasone and cipro 3/14  - c/w broad spectrum abx   - ENT recs- considered need for myringotomy, but will defer at this time given overall medical condition  #Enlarged tongue  -will keep soft bite block in place  -keep tongue moist with gauze soaked saline     Cardiovascular  #Hx of HTN now s/p Cardiac Arrest on 3/16, hypotensive and shock state post cardiac arrest requiring pressors  - bradycardia to 30, pulseless, code blue was called 3/16 . 2 rounds of epi, and PEA arrest. on 3rd pulse check pulseless VT, 200J shock given 4th pulse check asystole, 5th ROSC, sinus tachy with HUMA. IO placed and levo started. Due to blood in mouth took several attempts with DL for ETT to be placed but ultimately confirmed with capnometry and bilateral breath sounds.   - TTE from 3/17 showed EF of 63% with mild pulm htn, normal left ventricular systolic function, and diastolic LV dysfunction.  home meds: on amlodipine 5mg, losartan 100mg, torsemide 20mg, and clonidine 0.2 patch weekly  - off levo since 4/11 at 11 am - family agreed to hold pressors  - midodrine restarted 4/14 for soft BP 10 q 8     #Rate controlled  - Afib on monitor 4/11 with rates in the 80s  - continue to monitor  - on hep SC    Gastrointestinal  #C diff Positive 4/1  - BM improving only 1 BM overnight    - s/p PO vancomycin- completed 10 day course   - added IV Flagyl 4/8 x5d per ID recs to be completed today   - bowel regimen discontinued   - NGT placed 4/13 for meds and feeds as per family request - resumed tube feeds    Renal  #ESRD s/p Kidney Transplant in 2003 at [place] [facility] previously HD through LLE AVF but no HD since transplant (per renal Cr has been in 2.8 since december 2022), now with NAMAN requiring HD   -[facility] Nephrologist Dr. Hugo Hernandez [phone]  - holding cyclosporine and mycophenolate but c/w home prednisone 5mg q day  - s/p 4mg IV bumex with minimal urine production (3/17) and furosemide 80mg IVP given 4/3 with no u/o response  - started on CRRT (3/17-3/21)  - s/p 4 bumex IV 4/7 w/ no response, oligo-anuric s/p snider placement  - Left AV fistula not functioning, s/p R fem shiley placement 4/7 now with intermittent HD- last session 4/10 with 1300 removed planned for HD 4/12 however F shiley is not functioning- no place for new shiley access and family focusing more on comfort care but still asking about dialysis  - Nephro following patient currently not an HD candidate nonfunctional R fem shiley removed     Heme/DVT PPX  Hx of LLE DVT, anemia s/p 5 units pRbc's during this admission  -no DVT on doppler 12/22 and negative LLE duplex this admission 3/17  - POCUS showed residual clot in RLE; f/u dopplers 3/28 negative  - Evaluated by ENT for bleeding and found no signs of active bleeding from nasal cavity, nasopharynx or oral cavity, however, patient biting on tongue, which could be one source of bleeding. Tongue was edematous and lax  - CT a/p neg for RP bleed   - concern for possible transfusion reaction 4/4-> hypotensive/bradycardia after receiving 100ml pRBC's , HR and BP normalized once transfusion stopped. Blood bank does not consider response a transfusion reaction, and okay with future transfusions if needed - s/p 1u prbc 4/6 w/ no s/s of transfusion rxn     Infectious Disease  Hx of PCP, now with this admission with Strep pneumoniae meningitis, R ear mastoiditis, strep bacteremia, now with C. difficile  - Fever 101 this am WBC stable  - c/w Ceftriaxone (3/16 - ) for ~6 week course  - c/w ciprodex drops for R ear  -CDiff - s/p PO vanco (4/2-4/12)  and Flagyl (4/8- 4/13)   - BCx 3/15 + strep, repeat cx from 3/17 neg, last BCx 4/4 - neg  - sputum cx 4/4- neg  - atovaquone at home, continue  - ID signed off  - Blood cx and sputum cx resent 4/14 F/U     Endocrine  Hx of Hypothyroidism and Diabetes mellitus Type II  - c/w levothyroxine   -currently on prednisone will watch FS closely on insulin sliding scale     Skin  #Sacral decub- DTI  - Wound care reconsulted 4/13 F/U recs     Ethics  DNR/ DNI  - Palliatively extubated 4/12- no pressors but family still wanted antibiotics and tube feeds.  Also would like patient to continue with HD     FOR FOLLOW UP:  - F/U blood cx and sputum cx 4/14   - Wound care recs   -Will need f/u MRI in 3-4 weeks if alined with GOC  - favor SORAYA to r/o IE especially in light of MRI finding as per ID. But await decision pending further GOC  - Nephro following patient currently not an HD candidate nonfunctional R fem shiley removed  - c/w Ceftriaxone (3/16 - ) for ~6 week course  - ongoing GOC conversation

## 2023-04-13 NOTE — PROCEDURE NOTE - PRACTITIONER PERFORMING THE TIME OUT
Martinez BRODY
Martinez BRODY
myself
myself
Nurys Daugherty PA-C
Tosin Bentley
Hollie Schneider PA-C

## 2023-04-13 NOTE — PROCEDURE NOTE - NSPROCDETAILS_GEN_ALL_CORE
guidewire recovered/lumen(s) aspirated and flushed/sterile dressing applied/sterile technique, catheter placed/ultrasound guidance with use of sterile gel and probe cove
guidewire recovered/lumen(s) aspirated and flushed/sterile dressing applied/sterile technique, catheter placed/ultrasound guidance with use of sterile gel and probe cove
location identified, draped/prepped, sterile technique used/blood seen on insertion/dressing applied/flushes easily/secured in place/sterile technique, catheter placed
location identified, draped/prepped, sterile technique used, needle inserted/introduced/positive blood return obtained via catheter/connected to a pressurized flush line/sutured in place/hemostasis with direct pressure, dressing applied/Seldinger technique/all materials/supplies accounted for at end of procedure
guidewire recovered/lumen(s) aspirated and flushed/sterile dressing applied/sterile technique, catheter placed/ultrasound guidance with use of sterile gel and probe cove
nasogastric/audible air bolus/placement confirmed by auscultation
location identified, draped/prepped, sterile technique used, needle inserted/introduced/area cleaned in sterile fashion

## 2023-04-13 NOTE — PROCEDURE NOTE - NSPROCNAME_GEN_A_CORE
Bronchoscopy
Gastric Intubation/Gastric Lavage
Lumbar Puncture
Central Line Insertion
Central Line Insertion
Peripheral Line Insertion
Arterial Puncture/Cannulation
Central Line Insertion

## 2023-04-13 NOTE — PROGRESS NOTE ADULT - ASSESSMENT
78-year-old female with PCKD previously on HD via LUE AVF now s/p renal transplant since 2003 at [place], LLE DVT (resolved, nml dopplers 12/2022) on ASA, HTN, HLD, DM2, very small supraclinoid aneurysm on R and very small aneurysm vs infundibulum L supraclinoid artery (reportedly unchanged on MRA 10/2020), glaucoma/cataract, hypothyroid, history of PCP 2021 on atovaquone ppx, reportedly hospitalized in 12/2022 for rectal prolapse, CMV (unclear active or prior infxn), presenting with R-sided HA, R ear pain and neck pain after recent visit to ENT earlier in the day.  Cardiac arrest on 3/16/23  septic shock s/p iv pressors  intubated on MV  Renal following for NAMAN on CKD Mx.  [facility] Nephrologist Dr. Hugo Hernandez [phone]    Acute kidney injury on Chronic Kidney Disease Stage 4 vs Chronic Kidney Disease Stage 4 progression to End Stage Renal Disease on Dialysis     hx KTx 2003 : In light of severe sepsis and cardiac arrest: off MMF and Cyclosporine  w/worsened renal function, oliguria, acidosis, mild hyperkalemia- s/p CVVHD, then intermittent HD  Consent for HD obtained, signed by daughter 3/16/23  B/L creat around 2.8 since Dec 2022  Patient was on Cyclosporine (Gengraf) 75mg PO q12hrs,  BID and Prednisone 5 QD for transplant    L femoral shiley removed 4/2 for line holiday- s/p rt femoral shiley placed  4/7  s/p furosemide   Injectable 80 milliGRAM(s) IV Push x14/3- no response  K, vol acceptable  Shiley catheter clotted, unable to use for HD scheduled for 4/12/23    plan:  Patient S/P terminal extubation today, off pressors, unstable, agonal breathing, not a candidate for HD at this time.  Family at this time wants terminal extubation + every measure possible to extend her life. I advised family that comfort care and aggressive management are at odds, and currently she has no access, placing any new access would increase discomfort and infection risk. Family confused and expresses conflicting desires. Advised to reach out to primary team.  Presently patient not a candidate for HD.  c/w predniSONE   Tablet 5 milliGRAM(s) Oral daily  monitor u/o  monitor electrolytes, BMP daily     HTN-   pressors per ICU    Anemia :  continue epo 10k w/hd tiw    OM and OE  vent Mx, per ICU  Abxs per Infectious disease specialist with dose adjustment per GFR    C diff colitis- on po vanco    on going GOC discussion w/daughter.      For any question, call:  Cell # [phone]  Pager # [phone]  Callback # [phone]

## 2023-04-13 NOTE — PROGRESS NOTE ADULT - SUBJECTIVE AND OBJECTIVE BOX
[facility] - DANNA Cosby / DANNA Rivers / LENCHO Ontiveros/ DANNA Aldana/ DANNA Hussein/ QUYEN Gambino / LUCIAN Mukherjee / GISELE Stapleton  ---------------------------------------------------------------------------------------------------------------  seen and examined today for ESRD  Interval : S?P terminal extubation  VITALS:  T(F): 98 (04-13-23 @ 08:00), Max: 98.2 (04-13-23 @ 05:00)  HR: 78 (04-13-23 @ 13:00)  BP: 122/59 (04-13-23 @ 13:00)  RR: 26 (04-13-23 @ 13:00)  SpO2: 99% (04-13-23 @ 13:00)  Wt(kg): --    04-12 @ 07:01  -  04-13 @ 07:00  --------------------------------------------------------  IN: 900 mL / OUT: 120 mL / NET: 780 mL    04-13 @ 07:01  -  04-13 @ 14:17  --------------------------------------------------------  IN: 60 mL / OUT: 0 mL / NET: 60 mL      Physical Exam :-  Constitutional: agonal breathing  Neck: Supple.  Respiratory: Bilateral equal breath sounds,  Cardiovascular: S1, S2 normal,  Gastrointestinal: Bowel Sounds present, soft, non tender.  Extremities: anasarca  Neurological: poorly responsive  Psychiatric: poorly responsive  Data:-  Allergies :   apple (Unknown)  Benadryl (Unknown)  penicillin (Hives)  watermelon (Unknown)    Hospital Medications:   MEDICATIONS  (STANDING):  atovaquone  Suspension 1500 milliGRAM(s) Oral daily  cefTRIAXone   IVPB 2000 milliGRAM(s) IV Intermittent every 12 hours  chlorhexidine 0.12% Liquid 15 milliLiter(s) Oral Mucosa two times a day  chlorhexidine 2% Cloths 1 Application(s) Topical <User Schedule>  dextrose 5%. 1000 milliLiter(s) (100 mL/Hr) IV Continuous <Continuous>  dextrose 5%. 1000 milliLiter(s) (50 mL/Hr) IV Continuous <Continuous>  dextrose 50% Injectable 25 Gram(s) IV Push once  dextrose 50% Injectable 25 Gram(s) IV Push once  dextrose 50% Injectable 12.5 Gram(s) IV Push once  epoetin reza-epbx (RETACRIT) Injectable 50283 Unit(s) IV Push <User Schedule>  glucagon  Injectable 1 milliGRAM(s) IntraMuscular once  heparin   Injectable 5000 Unit(s) SubCutaneous every 8 hours  insulin lispro (ADMELOG) corrective regimen sliding scale   SubCutaneous every 6 hours  lacosamide IVPB 200 milliGRAM(s) IV Intermittent every 12 hours  levETIRAcetam  IVPB 500 milliGRAM(s) IV Intermittent every 12 hours  levothyroxine Injectable 18.75 MICROGram(s) IV Push <User Schedule>  metroNIDAZOLE  IVPB 500 milliGRAM(s) IV Intermittent every 8 hours  metroNIDAZOLE  IVPB      petrolatum Ophthalmic Ointment 1 Application(s) Both EYES every 12 hours    04-13    133<L>  |  97<L>  |  76<H>  ----------------------------<  110<H>  3.6   |  20<L>  |  3.73<H>    Ca    8.8      13 Apr 2023 02:00  Phos  5.7     04-13  Mg     2.20     04-13    TPro  5.0<L>  /  Alb  1.9<L>  /  TBili  <0.2  /  DBili      /  AST  35<H>  /  ALT  14  /  AlkPhos  101  04-13    Creatinine Trend: 3.73 <--, 3.66 <--, 3.19 <--, 4.00 <--, 3.38 <--, 3.74 <--, 4.97 <--                        7.7    15.61 )-----------( 281      ( 13 Apr 2023 02:00 )             24.9

## 2023-04-13 NOTE — PROGRESS NOTE ADULT - SUBJECTIVE AND OBJECTIVE BOX
INTERVAL HPI/OVERNIGHT EVENTS: Palliatively extubated to face tent.  Patient made DNR/DNI.     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 Patient reportedly woke up at 0130 AM on Monday with a R-sided headache. ENT noted R otitis externa, purulent discharge from R TM thought to be secondary to perforation, given ear gtt and prescribed cipro/dexamethasone gtt (she took 1 dose thus far). Daughter called EMS as later in the day patient complained of severe HA as well as neck pain, stated "I'm dying" and "my head is killing me." Patient reportedly without prior history of ear infections, no history of stroke or seizures. She reported to ENT earlier in the day a muffled sensation in the R ear [id number].     Daughter notes patient has had no recent fevers/chills. She had a dental cleaning on Thursday (no abx prior to cleaning).    On arrival, code stroke called.    In the ED VS:  97.2  65-99  162-186/52-91  16-20  96-99%RA, received NS 500cc IVF, lorazepam 2mg IV x1 and morphine 4mg IV x1 (15 Mar 2023 01:17)    SUBJECTIVE: Patient seen and examined at bedside.     ROS: Unable to assess as patient encephalopathic     OBJECTIVE:    VITAL SIGNS:  ICU Vital Signs Last 24 Hrs  T(C): 36.8 (13 Apr 2023 05:00), Max: 36.8 (13 Apr 2023 05:00)  T(F): 98.2 (13 Apr 2023 05:00), Max: 98.2 (13 Apr 2023 05:00)  HR: 71 (13 Apr 2023 06:00) (63 - 91)  BP: 120/53 (13 Apr 2023 06:00) (83/52 - 129/62)  BP(mean): 74 (13 Apr 2023 06:00) (59 - 80)  ABP: --  ABP(mean): --  RR: 17 (13 Apr 2023 06:00) (14 - 21)  SpO2: 100% (13 Apr 2023 06:00) (100% - 100%)    O2 Parameters below as of 13 Apr 2023 06:00  Patient On (Oxygen Delivery Method): face tent    O2 Concentration (%): 40      Mode: CPAP with PS, FiO2: 30, PEEP: 5, PS: 10, MAP: 9    04-12 @ 07:01  -  04-13 @ 07:00  --------------------------------------------------------  IN: 900 mL / OUT: 120 mL / NET: 780 mL      CAPILLARY BLOOD GLUCOSE      POCT Blood Glucose.: 113 mg/dL (13 Apr 2023 05:51)      PHYSICAL EXAM:  GENERAL: ill appearing, no distress  PSYCH: A&O x0  HEAD: Atraumatic, Normocephalic  NECK: Supple, No JVD  CHEST/LUNG: rhonchorous breath sounds   HEART: irregularly irregular rhythm   ABDOMEN: +distension, palpable abdominal mass upper and lower right quadrants   EXTREMITIES: +3 bilateral LE edema   NEUROLOGY: opens eyes slightly, no purposeful movements noted  SKIN: No rashes or lesions, pressure ulcer to sacrum     MEDICATIONS:  MEDICATIONS  (STANDING):  atovaquone  Suspension 1500 milliGRAM(s) Oral daily  cefTRIAXone   IVPB 2000 milliGRAM(s) IV Intermittent every 12 hours  chlorhexidine 0.12% Liquid 15 milliLiter(s) Oral Mucosa two times a day  chlorhexidine 2% Cloths 1 Application(s) Topical <User Schedule>  dextrose 5%. 1000 milliLiter(s) (100 mL/Hr) IV Continuous <Continuous>  dextrose 5%. 1000 milliLiter(s) (50 mL/Hr) IV Continuous <Continuous>  dextrose 50% Injectable 25 Gram(s) IV Push once  dextrose 50% Injectable 12.5 Gram(s) IV Push once  dextrose 50% Injectable 25 Gram(s) IV Push once  epoetin reza-epbx (RETACRIT) Injectable 09356 Unit(s) IV Push <User Schedule>  glucagon  Injectable 1 milliGRAM(s) IntraMuscular once  heparin   Injectable 5000 Unit(s) SubCutaneous every 8 hours  insulin lispro (ADMELOG) corrective regimen sliding scale   SubCutaneous every 6 hours  lacosamide IVPB 200 milliGRAM(s) IV Intermittent every 12 hours  levETIRAcetam  IVPB 500 milliGRAM(s) IV Intermittent every 12 hours  metroNIDAZOLE  IVPB      metroNIDAZOLE  IVPB 500 milliGRAM(s) IV Intermittent every 8 hours  petrolatum Ophthalmic Ointment 1 Application(s) Both EYES every 12 hours    MEDICATIONS  (PRN):  dextrose Oral Gel 15 Gram(s) Oral once PRN Blood Glucose LESS THAN 70 milliGRAM(s)/deciliter  glycopyrrolate Injectable 0.4 milliGRAM(s) IV Push every 4 hours PRN secretions  HYDROmorphone  Injectable 1 milliGRAM(s) IV Push every 2 hours PRN comfort  LORazepam   Injectable 0.5 milliGRAM(s) IV Push every 2 hours PRN sob/ work of breathing  sodium chloride 0.9% Bolus. 100 milliLiter(s) IV Bolus every 5 minutes PRN SBP LESS THAN or EQUAL to 90 mmHg      ALLERGIES:  Allergies    apple (Unknown)  Benadryl (Unknown)  penicillin (Hives)  watermelon (Unknown)    Intolerances        LABS:                        7.7    15.61 )-----------( 281      ( 13 Apr 2023 02:00 )             24.9     04-13    133<L>  |  97<L>  |  76<H>  ----------------------------<  110<H>  3.6   |  20<L>  |  3.73<H>    Ca    8.8      13 Apr 2023 02:00  Phos  5.7     04-13  Mg     2.20     04-13    TPro  5.0<L>  /  Alb  1.9<L>  /  TBili  <0.2  /  DBili  x   /  AST  35<H>  /  ALT  14  /  AlkPhos  101  04-13          RADIOLOGY & ADDITIONAL TESTS: Reviewed.

## 2023-04-13 NOTE — PROGRESS NOTE ADULT - ASSESSMENT
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 Cardiac arrest 3/16 with ROSC, transferred to MICU for further management.     Neurological  Alert and oriented x4 at baseline, admitted with p/w severe HA, neck stiffness, fever, leukocytosis, CT head negative for CVA, found to have Strep pneumo meningitis, likely source of entry from R ear where pt c/o discomfort for several months. Course complicated by cardiac arrest on 3/16,  requiring intubation, seizures, now with no mental status in the setting of multiple cerebral infarcts and cerebellar infarct on MRI. Remains Intubated, opens eyes spontaneously, grimaces and withdraws to pain; mental status overall remains unimproved.  - remains off sedation for > 2 weeks  - EEG no seizures - Dcd 3/31  - AED initiated for seizure ppx in setting of anoxic brain injury and initial 24h EEG showing highly focal seizures  - MRI IAC and brain 3/29 - otitis and mastoiditis with multiple cerebral infarcts and cerebellar infarct. Will need f/u MRI in 3-4 weeks per neuro  - Endocarditis is possible cause for scattered cerebral infarcts, will consider utility of SORAYA given poor candidate for valve replacement  - LP and BCx 3/15 both showing gram positive cocci in pairs, and + strep pneumoniae.   - taking cyclosporine, mycophenolate mofetil and prednisone at home for kidney transplant and was likely immunocompromised -> dental procedure 3/14 but less likely source    - currently on keppra  500 BID since 3/17-   - vimpat 100 BID increased to 200 BID (3/20 -  )  - neuro following    Respiratory  Intubated on 3/16 for cardiac arrest   - tolerates PS 8/5 trial  TV ~ 250-300- VBG worsening today pH 7.23 CO2 57- increased to PS 10/5   - Patient has been intubated >2 wks; will talk to family regarding what their plans are iso MRI reading; will likely need trach and peg if within their goals  - noted w/ oral and inline secretions    ENT  # Tympanic membrane rupture  discharge from R ear, evaluated by ENT given dexamethasone and cipro 3/14  - c/w broad spectrum abx   - ENT recs- considered need for myringotomy, but will defer at this time given overall medical condition  #Enlarged tongue  -will keep soft bite block in place  -keep tongue moist with gauze soaked saline     Cardiovascular  #Hx of HTN now s/p Cardiac Arrest on 3/16, hypotensive and shock state post cardiac arrest requiring pressors  - bradycardia to 30, pulseless, code blue was called 3/16 . 2 rounds of epi, and PEA arrest. on 3rd pulse check pulseless VT, 200J shock given 4th pulse check asystole, 5th ROSC, sinus tachy with HUMA. IO placed and levo started. Due to blood in mouth took several attempts with DL for ETT to be placed but ultimately confirmed with capnometry and bilateral breath sounds.   - TTE from 3/17 showed EF of 63% with mild pulm htn, normal left ventricular systolic function, and diastolic LV dysfunction.  home meds: on amlodipine 5mg, losartan 100mg, torsemide 20mg, and clonidine 0.2 patch weekly  - off levo since 4/11 at 11 am   - continue midodrine 10 q 8     #Rate controlled  - Afib on monitor 4/11 with rates in the 80s  - continue to monitor  - on hep SC    Gastrointestinal  #C diff Positive 4/1  - BM improving only 1 BM overnight    - c/w PO vancomycin- to complete 10 days today   - added IV Flagyl 4/8 x5d per ID recs  - bowel regimen discontinued   - tolerating TF at goal- held this am for possible extubation?    Renal  #ESRD s/p Kidney Transplant in 2003 at [facility] previously HD through LLE AVF but no HD since transplant (per renal Cr has been in 2.8 since december 2022), now with NAMAN requiring HD   -[facility] Nephrologist Dr. Hugo Hernandez [phone]  - holding cyclosporine and mycophenolate but c/w home prednisone 5mg q day  - s/p 4mg IV bumex with minimal urine production (3/17) and furosemide 80mg IVP given 4/3 with no u/o response  - started on CRRT (3/17-3/21) now s/p intermittent HD - last session 4/9  - s/p 4 bumex IV 4/7 w/ no response, oligo-anuric s/p snider placement  - Left AV fistula not functioning, s/p R fem shiley placement 4/7 now with intermittent HD- last session 4/10 with 1300 removed planned for HD today 4/12    Heme/DVT PPX  Hx of LLE DVT, anemia s/p 5 units pRbc's during this admission  -no DVT on doppler 12/22 and negative LLE duplex this admission 3/17  - POCUS showed residual clot in RLE; f/u dopplers 3/28 negative  - Evaluated by ENT for bleeding and found no signs of active bleeding from nasal cavity, nasopharynx or oral cavity, however, patient biting on tongue, which could be one source of bleeding. Tongue was edematous and lax  - CT a/p neg for RP bleed   - concern for possible transfusion reaction 4/4-> hypotensive/bradycardia after receiving 100ml pRBC's , HR and BP normalized once transfusion stopped. Blood bank does not consider response a transfusion reaction, and okay with future transfusions if needed - s/p 1u prnc 4/6 w/ no s/s of transfusion rxn  -nephrology plan to add epogen     Infectious Disease  Hx of PCP, now with this admission with Strep pneumoniae meningitis, R ear mastoiditis, strep bacteremia, now with C. difficile  - c/w Ceftriaxone (3/16 - ) for ~6 week course  - c/w ciprodex drops for R ear  -CDiff - s/p vanco 125mg PO s9arycm (4/2-4/12)  and Flagyl 4/8 x 5days - per ID recs  - ID following  - BCx 3/15 + strep, repeat cx from 3/17 neg, last BCx 4/4 - neg  - sputum cx 4/4- neg  - atovaquone at home, continue    Endocrine  Hx of Hypothyroidism and Diabetes mellitus Type II  - c/w levothyroxine   -currently on prednisone will watch FS closely on insulin sliding scale     Ethics  FULL code   4/9- per discussion w/ daughter at bedside, plan is for palliative extubation after family visits, possibly on Tuesday. She would like to continue HD and all other measures at this time but will readdress on Tuesday    79 y/o F with h/o HTN, HLD, DM2, PCKD previously on HD via LUE AVF then s/p renal transplant, LLE DVT (resolved, nml dopplers 12/2022) on ASA, very small supraclinoid aneurysm (reportedly unchanged on MRA 10/2020), hypothyroidism, PCP 2021 on atovaquone ppx, reportedly hospitalized in 12/2022 for rectal prolapse, had a blood transfusion at that time, also + CMV 1/25/23 who presented with R-sided HA, R ear pain and neck stiffness after visit to ENT earlier in the day, altered on presentation, CT head unremarkable for stroke, however LP with IR and BCx showing +strep pneumoniae, started on vanc and CTX per ID. Cardiac arrest 3/16 with ROSC, transferred to MICU for further management.     Neurological  Alert and oriented x4 at baseline, admitted with p/w severe HA, neck stiffness, fever, leukocytosis, CT head negative for CVA, found to have Strep pneumo meningitis, likely source of entry from R ear where pt c/o discomfort for several months. Course complicated by cardiac arrest on 3/16,  requiring intubation, seizures, now with no mental status in the setting of multiple cerebral infarcts and cerebellar infarct on MRI. Remains Intubated, opens eyes spontaneously, grimaces and withdraws to pain; mental status overall remains unimproved.  - remains off sedation for > 2 weeks  - EEG no seizures - Dcd 3/31  - AED initiated for seizure ppx in setting of anoxic brain injury and initial 24h EEG showing highly focal seizures  - MRI IAC and brain 3/29 - otitis and mastoiditis with multiple cerebral infarcts and cerebellar infarct. Will need f/u MRI in 3-4 weeks per neuro  - Endocarditis is possible cause for scattered cerebral infarcts, will consider utility of SORAYA given poor candidate for valve replacement  - LP and BCx 3/15 both showing gram positive cocci in pairs, and + strep pneumoniae.   - taking cyclosporine, mycophenolate mofetil and prednisone at home for kidney transplant and was likely immunocompromised -> dental procedure 3/14 but less likely source    - currently on keppra  500 BID since 3/17-   - vimpat 100 BID increased to 200 BID (3/20 -  )    Respiratory  #Intubated on 3/16 for cardiac arrest - palliatively extubated to face tent 4/13      ENT  # Tympanic membrane rupture  discharge from R ear, evaluated by ENT given dexamethasone and cipro 3/14  - c/w broad spectrum abx   - ENT recs- considered need for myringotomy, but will defer at this time given overall medical condition  #Enlarged tongue  -will keep soft bite block in place  -keep tongue moist with gauze soaked saline     Cardiovascular  #Hx of HTN now s/p Cardiac Arrest on 3/16, hypotensive and shock state post cardiac arrest requiring pressors  - bradycardia to 30, pulseless, code blue was called 3/16 . 2 rounds of epi, and PEA arrest. on 3rd pulse check pulseless VT, 200J shock given 4th pulse check asystole, 5th ROSC, sinus tachy with HUMA. IO placed and levo started. Due to blood in mouth took several attempts with DL for ETT to be placed but ultimately confirmed with capnometry and bilateral breath sounds.   - TTE from 3/17 showed EF of 63% with mild pulm htn, normal left ventricular systolic function, and diastolic LV dysfunction.  home meds: on amlodipine 5mg, losartan 100mg, torsemide 20mg, and clonidine 0.2 patch weekly  - off levo since 4/11 at 11 am - family agreed to hold pressors    #Rate controlled  - Afib on monitor 4/11 with rates in the 80s  - continue to monitor  - on hep SC    Gastrointestinal  #C diff Positive 4/1  - BM improving only 1 BM overnight    - s/p PO vancomycin- completed 10 day course   - added IV Flagyl 4/8 x5d per ID recs to be completed today   - bowel regimen discontinued   - NGT placed 4/13 for meds and feeds as per family request     Renal  #ESRD s/p Kidney Transplant in 2003 at [facility] previously HD through LLE AVF but no HD since transplant (per renal Cr has been in 2.8 since december 2022), now with NAMAN requiring HD   -[facility] Nephrologist Dr. Hugo Hernandez [phone]  - holding cyclosporine and mycophenolate but c/w home prednisone 5mg q day  - s/p 4mg IV bumex with minimal urine production (3/17) and furosemide 80mg IVP given 4/3 with no u/o response  - started on CRRT (3/17-3/21)  - s/p 4 bumex IV 4/7 w/ no response, oligo-anuric s/p snider placement  - Left AV fistula not functioning, s/p R fem shiley placement 4/7 now with intermittent HD- last session 4/10 with 1300 removed planned for HD 4/12 however F shiley is not functioning- no place for new shiley access and family focusing more on comfort care but still asking about dialysis  - explained to family patient would need new access placed if were to continue HD and likely would not change outcome- will clarify with family and Nephro today     Heme/DVT PPX  Hx of LLE DVT, anemia s/p 5 units pRbc's during this admission  -no DVT on doppler 12/22 and negative LLE duplex this admission 3/17  - POCUS showed residual clot in RLE; f/u dopplers 3/28 negative  - Evaluated by ENT for bleeding and found no signs of active bleeding from nasal cavity, nasopharynx or oral cavity, however, patient biting on tongue, which could be one source of bleeding. Tongue was edematous and lax  - CT a/p neg for RP bleed   - concern for possible transfusion reaction 4/4-> hypotensive/bradycardia after receiving 100ml pRBC's , HR and BP normalized once transfusion stopped. Blood bank does not consider response a transfusion reaction, and okay with future transfusions if needed - s/p 1u prnc 4/6 w/ no s/s of transfusion rxn     Infectious Disease  Hx of PCP, now with this admission with Strep pneumoniae meningitis, R ear mastoiditis, strep bacteremia, now with C. difficile  - c/w Ceftriaxone (3/16 - ) for ~6 week course  - c/w ciprodex drops for R ear  -CDiff - s/p PO vanco (4/2-4/12)  and Flagyl (4/8- 4/13)   - BCx 3/15 + strep, repeat cx from 3/17 neg, last BCx 4/4 - neg  - sputum cx 4/4- neg  - atovaquone at home, continue  - ID signed off    Endocrine  Hx of Hypothyroidism and Diabetes mellitus Type II  - c/w levothyroxine   -currently on prednisone will watch FS closely on insulin sliding scale     Ethics  DNR/ DNI  - Palliatively extubated 4/12- no pressors but family still wanted antibiotics and tube feeds.  Also would like patient to continue with HD    77 y/o F with h/o HTN, HLD, DM2, PCKD previously on HD via LUE AVF then s/p renal transplant, LLE DVT (resolved, nml dopplers 12/2022) on ASA, very small supraclinoid aneurysm (reportedly unchanged on MRA 10/2020), hypothyroidism, PCP 2021 on atovaquone ppx, reportedly hospitalized in 12/2022 for rectal prolapse, had a blood transfusion at that time, also + CMV 1/25/23 who presented with R-sided HA, R ear pain and neck stiffness after visit to ENT earlier in the day, altered on presentation, CT head unremarkable for stroke, however LP with IR and BCx showing +strep pneumoniae, started on vanc and CTX per ID. Cardiac arrest 3/16 with ROSC, transferred to MICU for further management.     Neurological  Alert and oriented x4 at baseline, admitted with p/w severe HA, neck stiffness, fever, leukocytosis, CT head negative for CVA, found to have Strep pneumo meningitis, likely source of entry from R ear where pt c/o discomfort for several months. Course complicated by cardiac arrest on 3/16,  requiring intubation, seizures, now with no mental status in the setting of multiple cerebral infarcts and cerebellar infarct on MRI. Remains Intubated, opens eyes spontaneously, grimaces and withdraws to pain; mental status overall remains unimproved.  - remains off sedation for > 2 weeks  - EEG no seizures - Dcd 3/31  - AED initiated for seizure ppx in setting of anoxic brain injury and initial 24h EEG showing highly focal seizures  - MRI IAC and brain 3/29 - otitis and mastoiditis with multiple cerebral infarcts and cerebellar infarct. Will need f/u MRI in 3-4 weeks per neuro  - Endocarditis is possible cause for scattered cerebral infarcts, will consider utility of SORAYA given poor candidate for valve replacement  - LP and BCx 3/15 both showing gram positive cocci in pairs, and + strep pneumoniae.   - taking cyclosporine, mycophenolate mofetil and prednisone at home for kidney transplant and was likely immunocompromised -> dental procedure 3/14 but less likely source    - currently on keppra  500 BID since 3/17-   - vimpat 100 BID increased to 200 BID (3/20 -  )    Respiratory  #Intubated on 3/16 for cardiac arrest - palliatively extubated to face tent 4/13      ENT  # Tympanic membrane rupture  discharge from R ear, evaluated by ENT given dexamethasone and cipro 3/14  - c/w broad spectrum abx   - ENT recs- considered need for myringotomy, but will defer at this time given overall medical condition  #Enlarged tongue  -will keep soft bite block in place  -keep tongue moist with gauze soaked saline     Cardiovascular  #Hx of HTN now s/p Cardiac Arrest on 3/16, hypotensive and shock state post cardiac arrest requiring pressors  - bradycardia to 30, pulseless, code blue was called 3/16 . 2 rounds of epi, and PEA arrest. on 3rd pulse check pulseless VT, 200J shock given 4th pulse check asystole, 5th ROSC, sinus tachy with HUMA. IO placed and levo started. Due to blood in mouth took several attempts with DL for ETT to be placed but ultimately confirmed with capnometry and bilateral breath sounds.   - TTE from 3/17 showed EF of 63% with mild pulm htn, normal left ventricular systolic function, and diastolic LV dysfunction.  home meds: on amlodipine 5mg, losartan 100mg, torsemide 20mg, and clonidine 0.2 patch weekly  - off levo since 4/11 at 11 am - family agreed to hold pressors    #Rate controlled  - Afib on monitor 4/11 with rates in the 80s  - continue to monitor  - on hep SC    Gastrointestinal  #C diff Positive 4/1  - BM improving only 1 BM overnight    - s/p PO vancomycin- completed 10 day course   - added IV Flagyl 4/8 x5d per ID recs to be completed today   - bowel regimen discontinued   - NGT placed 4/13 for meds and feeds as per family request - resumed tube feeds    Renal  #ESRD s/p Kidney Transplant in 2003 at [facility] previously HD through LLE AVF but no HD since transplant (per renal Cr has been in 2.8 since december 2022), now with NAAMN requiring HD   -[facility] Nephrologist Dr. Hugo Hernandez [phone]  - holding cyclosporine and mycophenolate but c/w home prednisone 5mg q day  - s/p 4mg IV bumex with minimal urine production (3/17) and furosemide 80mg IVP given 4/3 with no u/o response  - started on CRRT (3/17-3/21)  - s/p 4 bumex IV 4/7 w/ no response, oligo-anuric s/p snider placement  - Left AV fistula not functioning, s/p R fem shiley placement 4/7 now with intermittent HD- last session 4/10 with 1300 removed planned for HD 4/12 however F shiley is not functioning- no place for new shiley access and family focusing more on comfort care but still asking about dialysis  - Nephro following patient currently not an HD candidate nonfunctional R fem shiley removed     Heme/DVT PPX  Hx of LLE DVT, anemia s/p 5 units pRbc's during this admission  -no DVT on doppler 12/22 and negative LLE duplex this admission 3/17  - POCUS showed residual clot in RLE; f/u dopplers 3/28 negative  - Evaluated by ENT for bleeding and found no signs of active bleeding from nasal cavity, nasopharynx or oral cavity, however, patient biting on tongue, which could be one source of bleeding. Tongue was edematous and lax  - CT a/p neg for RP bleed   - concern for possible transfusion reaction 4/4-> hypotensive/bradycardia after receiving 100ml pRBC's , HR and BP normalized once transfusion stopped. Blood bank does not consider response a transfusion reaction, and okay with future transfusions if needed - s/p 1u prbc 4/6 w/ no s/s of transfusion rxn     Infectious Disease  Hx of PCP, now with this admission with Strep pneumoniae meningitis, R ear mastoiditis, strep bacteremia, now with C. difficile  - c/w Ceftriaxone (3/16 - ) for ~6 week course  - c/w ciprodex drops for R ear  -CDiff - s/p PO vanco (4/2-4/12)  and Flagyl (4/8- 4/13)   - BCx 3/15 + strep, repeat cx from 3/17 neg, last BCx 4/4 - neg  - sputum cx 4/4- neg  - atovaquone at home, continue  - ID signed off    Endocrine  Hx of Hypothyroidism and Diabetes mellitus Type II  - c/w levothyroxine   -currently on prednisone will watch FS closely on insulin sliding scale     Ethics  DNR/ DNI  - Palliatively extubated 4/12- no pressors but family still wanted antibiotics and tube feeds.  Also would like patient to continue with HD

## 2023-04-13 NOTE — PROCEDURE NOTE - NSINDICATIONS_GEN_A_CORE
dialysis/CRRT
emergency venous access
CSF sampling/mental status change/suspected CNS infection
feeds
cannulation purposes/critical patient/monitoring purposes
dialysis/CRRT
emergency venous access

## 2023-04-13 NOTE — PROCEDURE NOTE - PROCEDURE DATE TIME, MLM
16-Mar-2023 07:30
07-Apr-2023
11-Apr-2023
15-Mar-2023 07:30
13-Apr-2023 08:00
16-Mar-2023 07:00
17-Mar-2023 19:00

## 2023-04-14 NOTE — PROGRESS NOTE ADULT - NS ATTEND AMEND GEN_ALL_CORE FT
agree with above  We spent 75minutes face-to-face with this patient of which more than 50% of the time was spent counseling/coordinating care of this patient.

## 2023-04-14 NOTE — PROGRESS NOTE ADULT - ASSESSMENT
79 y/o F with h/o HTN, HLD, DM2, PCKD previously on HD via LUE AVF then s/p renal transplant, LLE DVT (resolved, nml dopplers 12/2022) on ASA, very small supraclinoid aneurysm (reportedly unchanged on MRA 10/2020), hypothyroidism, PCP 2021 on atovaquone ppx, reportedly hospitalized in 12/2022 for rectal prolapse, had a blood transfusion at that time, also + CMV 1/25/23 who presented with R-sided HA, R ear pain and neck stiffness after visit to ENT earlier in the day, altered on presentation, CT head unremarkable for stroke, however LP with IR and BCx showing +strep pneumoniae, started on vanc and CTX per ID. Cardiac arrest 3/16 with ROSC, transferred to MICU for further management.     Neurological  Alert and oriented x4 at baseline, admitted with p/w severe HA, neck stiffness, fever, leukocytosis, CT head negative for CVA, found to have Strep pneumo meningitis, likely source of entry from R ear where pt c/o discomfort for several months. Course complicated by cardiac arrest on 3/16,  requiring intubation, seizures, now with no mental status in the setting of multiple cerebral infarcts and cerebellar infarct on MRI. Remains Intubated, opens eyes spontaneously, grimaces and withdraws to pain; mental status overall remains unimproved.  - remains off sedation for > 2 weeks  - EEG no seizures - Dcd 3/31  - AED initiated for seizure ppx in setting of anoxic brain injury and initial 24h EEG showing highly focal seizures  - MRI IAC and brain 3/29 - otitis and mastoiditis with multiple cerebral infarcts and cerebellar infarct. Will need f/u MRI in 3-4 weeks per neuro  - Endocarditis is possible cause for scattered cerebral infarcts, will consider utility of SORAYA given poor candidate for valve replacement  - LP and BCx 3/15 both showing gram positive cocci in pairs, and + strep pneumoniae.   - taking cyclosporine, mycophenolate mofetil and prednisone at home for kidney transplant and was likely immunocompromised -> dental procedure 3/14 but less likely source    - currently on keppra  500 BID since 3/17-   - vimpat 100 BID increased to 200 BID (3/20 -  )    Respiratory  #Intubated on 3/16 for cardiac arrest - palliatively extubated to face tent 4/13      ENT  # Tympanic membrane rupture  discharge from R ear, evaluated by ENT given dexamethasone and cipro 3/14  - c/w broad spectrum abx   - ENT recs- considered need for myringotomy, but will defer at this time given overall medical condition  #Enlarged tongue  -will keep soft bite block in place  -keep tongue moist with gauze soaked saline     Cardiovascular  #Hx of HTN now s/p Cardiac Arrest on 3/16, hypotensive and shock state post cardiac arrest requiring pressors  - bradycardia to 30, pulseless, code blue was called 3/16 . 2 rounds of epi, and PEA arrest. on 3rd pulse check pulseless VT, 200J shock given 4th pulse check asystole, 5th ROSC, sinus tachy with HUMA. IO placed and levo started. Due to blood in mouth took several attempts with DL for ETT to be placed but ultimately confirmed with capnometry and bilateral breath sounds.   - TTE from 3/17 showed EF of 63% with mild pulm htn, normal left ventricular systolic function, and diastolic LV dysfunction.  home meds: on amlodipine 5mg, losartan 100mg, torsemide 20mg, and clonidine 0.2 patch weekly  - off levo since 4/11 at 11 am - family agreed to hold pressors    #Rate controlled  - Afib on monitor 4/11 with rates in the 80s  - continue to monitor  - on hep SC    Gastrointestinal  #C diff Positive 4/1  - BM improving only 1 BM overnight    - s/p PO vancomycin- completed 10 day course   - added IV Flagyl 4/8 x5d per ID recs to be completed today   - bowel regimen discontinued   - NGT placed 4/13 for meds and feeds as per family request - resumed tube feeds    Renal  #ESRD s/p Kidney Transplant in 2003 at [place] PCKD previously HD through LLE AVF but no HD since transplant (per renal Cr has been in 2.8 since december 2022), now with NAMAN requiring HD   -[facility] Nephrologist Dr. Hugo Hernandez [phone]  - holding cyclosporine and mycophenolate but c/w home prednisone 5mg q day  - s/p 4mg IV bumex with minimal urine production (3/17) and furosemide 80mg IVP given 4/3 with no u/o response  - started on CRRT (3/17-3/21)  - s/p 4 bumex IV 4/7 w/ no response, oligo-anuric s/p snider placement  - Left AV fistula not functioning, s/p R fem shiley placement 4/7 now with intermittent HD- last session 4/10 with 1300 removed planned for HD 4/12 however F shiley is not functioning- no place for new shiley access and family focusing more on comfort care but still asking about dialysis  - Nephro following patient currently not an HD candidate nonfunctional R fem shiley removed     Heme/DVT PPX  Hx of LLE DVT, anemia s/p 5 units pRbc's during this admission  -no DVT on doppler 12/22 and negative LLE duplex this admission 3/17  - POCUS showed residual clot in RLE; f/u dopplers 3/28 negative  - Evaluated by ENT for bleeding and found no signs of active bleeding from nasal cavity, nasopharynx or oral cavity, however, patient biting on tongue, which could be one source of bleeding. Tongue was edematous and lax  - CT a/p neg for RP bleed   - concern for possible transfusion reaction 4/4-> hypotensive/bradycardia after receiving 100ml pRBC's , HR and BP normalized once transfusion stopped. Blood bank does not consider response a transfusion reaction, and okay with future transfusions if needed - s/p 1u prbc 4/6 w/ no s/s of transfusion rxn     Infectious Disease  Hx of PCP, now with this admission with Strep pneumoniae meningitis, R ear mastoiditis, strep bacteremia, now with C. difficile  - c/w Ceftriaxone (3/16 - ) for ~6 week course  - c/w ciprodex drops for R ear  -CDiff - s/p PO vanco (4/2-4/12)  and Flagyl (4/8- 4/13)   - BCx 3/15 + strep, repeat cx from 3/17 neg, last BCx 4/4 - neg  - sputum cx 4/4- neg  - atovaquone at home, continue  - ID signed off    Endocrine  Hx of Hypothyroidism and Diabetes mellitus Type II  - c/w levothyroxine   -currently on prednisone will watch FS closely on insulin sliding scale     Ethics  DNR/ DNI  - Palliatively extubated 4/12- no pressors but family still wanted antibiotics and tube feeds.  Also would like patient to continue with HD     79 y/o F with h/o HTN, HLD, DM2, PCKD previously on HD via LUE AVF then s/p renal transplant, LLE DVT (resolved, nml dopplers 12/2022) on ASA, very small supraclinoid aneurysm (reportedly unchanged on MRA 10/2020), hypothyroidism, PCP 2021 on atovaquone ppx, reportedly hospitalized in 12/2022 for rectal prolapse, had a blood transfusion at that time, also + CMV 1/25/23 who presented with R-sided HA, R ear pain and neck stiffness after visit to ENT earlier in the day, altered on presentation, CT head unremarkable for stroke, however LP with IR and BCx showing +strep pneumoniae, started on vanc and CTX per ID. Cardiac arrest 3/16 with ROSC, transferred to MICU for further management.     Neurological  Alert and oriented x4 at baseline, admitted with p/w severe HA, neck stiffness, fever, leukocytosis, CT head negative for CVA, found to have Strep pneumo meningitis, likely source of entry from R ear where pt c/o discomfort for several months. Course complicated by cardiac arrest on 3/16,  requiring intubation, seizures, now with no mental status in the setting of multiple cerebral infarcts and cerebellar infarct on MRI. Remains Intubated, opens eyes spontaneously, grimaces and withdraws to pain; mental status overall remains unimproved.  - remains off sedation for > 2 weeks  - EEG no seizures - Dcd 3/31  - AED initiated for seizure ppx in setting of anoxic brain injury and initial 24h EEG showing highly focal seizures  - MRI IAC and brain 3/29 - otitis and mastoiditis with multiple cerebral infarcts and cerebellar infarct. Will need f/u MRI in 3-4 weeks per neuro  - Endocarditis is possible cause for scattered cerebral infarcts, will consider utility of SORAYA given poor candidate for valve replacement  - LP and BCx 3/15 both showing gram positive cocci in pairs, and + strep pneumoniae.   - taking cyclosporine, mycophenolate mofetil and prednisone at home for kidney transplant and was likely immunocompromised -> dental procedure 3/14 but less likely source    - currently on keppra  500 BID since 3/17-   - vimpat 100 BID increased to 200 BID (3/20 -  )    Respiratory  #Intubated on 3/16 for cardiac arrest - palliatively extubated to face tent 4/13  - patient saturating 100% but agonally breathing   - DNR/DNI       ENT  # Tympanic membrane rupture  discharge from R ear, evaluated by ENT given dexamethasone and cipro 3/14  - c/w broad spectrum abx   - ENT recs- considered need for myringotomy, but will defer at this time given overall medical condition  #Enlarged tongue  -will keep soft bite block in place  -keep tongue moist with gauze soaked saline     Cardiovascular  #Hx of HTN now s/p Cardiac Arrest on 3/16, hypotensive and shock state post cardiac arrest requiring pressors  - bradycardia to 30, pulseless, code blue was called 3/16 . 2 rounds of epi, and PEA arrest. on 3rd pulse check pulseless VT, 200J shock given 4th pulse check asystole, 5th ROSC, sinus tachy with HUMA. IO placed and levo started. Due to blood in mouth took several attempts with DL for ETT to be placed but ultimately confirmed with capnometry and bilateral breath sounds.   - TTE from 3/17 showed EF of 63% with mild pulm htn, normal left ventricular systolic function, and diastolic LV dysfunction.  home meds: on amlodipine 5mg, losartan 100mg, torsemide 20mg, and clonidine 0.2 patch weekly  - off levo since 4/11 at 11 am - family agreed to hold pressors  - midodrine restarted 4/14 for soft BP 10 q 8     #Rate controlled  - Afib on monitor 4/11 with rates in the 80s  - continue to monitor  - on hep SC    Gastrointestinal  #C diff Positive 4/1  - BM improving only 1 BM overnight    - s/p PO vancomycin- completed 10 day course   - added IV Flagyl 4/8 x5d per ID recs to be completed today   - bowel regimen discontinued   - NGT placed 4/13 for meds and feeds as per family request - resumed tube feeds    Renal  #ESRD s/p Kidney Transplant in 2003 at [place] PCKD previously HD through LLE AVF but no HD since transplant (per renal Cr has been in 2.8 since december 2022), now with NAMAN requiring HD   -[facility] Nephrologist Dr. Hugo Hernandez [phone]  - holding cyclosporine and mycophenolate but c/w home prednisone 5mg q day  - s/p 4mg IV bumex with minimal urine production (3/17) and furosemide 80mg IVP given 4/3 with no u/o response  - started on CRRT (3/17-3/21)  - s/p 4 bumex IV 4/7 w/ no response, oligo-anuric s/p snider placement  - Left AV fistula not functioning, s/p R fem shiley placement 4/7 now with intermittent HD- last session 4/10 with 1300 removed planned for HD 4/12 however F shiley is not functioning- no place for new shiley access and family focusing more on comfort care but still asking about dialysis  - Nephro following patient currently not an HD candidate nonfunctional R fem shiley removed     Heme/DVT PPX  Hx of LLE DVT, anemia s/p 5 units pRbc's during this admission  -no DVT on doppler 12/22 and negative LLE duplex this admission 3/17  - POCUS showed residual clot in RLE; f/u dopplers 3/28 negative  - Evaluated by ENT for bleeding and found no signs of active bleeding from nasal cavity, nasopharynx or oral cavity, however, patient biting on tongue, which could be one source of bleeding. Tongue was edematous and lax  - CT a/p neg for RP bleed   - concern for possible transfusion reaction 4/4-> hypotensive/bradycardia after receiving 100ml pRBC's , HR and BP normalized once transfusion stopped. Blood bank does not consider response a transfusion reaction, and okay with future transfusions if needed - s/p 1u prbc 4/6 w/ no s/s of transfusion rxn     Infectious Disease  Hx of PCP, now with this admission with Strep pneumoniae meningitis, R ear mastoiditis, strep bacteremia, now with C. difficile  - Fever 101 this am WBC stable  - c/w Ceftriaxone (3/16 - ) for ~6 week course  - c/w ciprodex drops for R ear  -CDiff - s/p PO vanco (4/2-4/12)  and Flagyl (4/8- 4/13)   - BCx 3/15 + strep, repeat cx from 3/17 neg, last BCx 4/4 - neg  - sputum cx 4/4- neg  - atovaquone at home, continue  - ID signed off  - Blood cx and sputum cx resent 4/14 F/U     Endocrine  Hx of Hypothyroidism and Diabetes mellitus Type II  - c/w levothyroxine   -currently on prednisone will watch FS closely on insulin sliding scale     Skin  #Sacral decub- DTI  - Wound care reconsulted 4/13 F/U recs     Ethics  DNR/ DNI  - Palliatively extubated 4/12- no pressors but family still wanted antibiotics and tube feeds.  Also would like patient to continue with HD

## 2023-04-14 NOTE — PROGRESS NOTE ADULT - ASSESSMENT
78-year-old female with PCKD previously on HD via LUE AVF now s/p renal transplant since 2003 at [place], LLE DVT (resolved, nml dopplers 12/2022) on ASA, HTN, HLD, DM2, very small supraclinoid aneurysm on R and very small aneurysm vs infundibulum L supraclinoid artery (reportedly unchanged on MRA 10/2020), glaucoma/cataract, hypothyroid, history of PCP 2021 on atovaquone ppx, reportedly hospitalized in 12/2022 for rectal prolapse, CMV (unclear active or prior infxn), presenting with R-sided HA, R ear pain and neck pain after recent visit to ENT earlier in the day.  Cardiac arrest on 3/16/23  septic shock s/p iv pressors  intubated on MV  Renal following for NAMAN on CKD Mx.  [facility] Nephrologist Dr. Hugo Hernandez [phone]    Acute kidney injury on Chronic Kidney Disease Stage 4 vs Chronic Kidney Disease Stage 4 progression to End Stage Renal Disease on Dialysis     hx KTx 2003 : In light of severe sepsis and cardiac arrest: off MMF and Cyclosporine  w/worsened renal function, oliguria, acidosis, mild hyperkalemia- s/p CVVHD, then intermittent HD  Consent for HD was obtained, signed by daughter 3/16/23  B/L creat around 2.8 since Dec 2022  Patient was on Cyclosporine (Gengraf) 75mg PO q12hrs,  BID and Prednisone 5 QD for transplant    L femoral shiley removed 4/2 for line holiday- s/p rt femoral shiley placed  4/7  s/p furosemide   Injectable 80 milliGRAM(s) IV Push x14/3- no response  K, vol acceptable  Shiley catheter clotted, unable to use for HD on 4/12/23, now removed  PT W/NO HD ACCESS NOW    plan:  Patient S/P terminal extubation today, off pressors, unstable, agonal breathing, not a candidate for HD at this time.  4/13 Dr Stapleton- Family at this time wants terminal extubation + every measure possible to extend her life. I advised family that comfort care and aggressive management are at odds, and currently she has no access, placing any new access would increase discomfort and infection risk. Family confused and expresses conflicting desires. Advised to reach out to primary team.    Presently patient is not a candidate for HD/RRT. risks outweigh benefits at this time.   would stop electrolytes, BMP/ lab draw if family agrees.  recommend comfort care. Dilaudid per team    Anemia : d/c epo 10k w/hd tiw    OM and OE  Abxs per Infectious disease specialist with dose adjustment per GFR    C diff colitis- on po [facility] discussion w/daughter.    prognosis very poor  will closely follow up.   poc d/w family bedside, medical team  labs, chart reviewed  For any question, pl call:  Nephrology  Cell -[phone]  Office [phone]  Ans Serv [phone]

## 2023-04-14 NOTE — PROGRESS NOTE ADULT - SUBJECTIVE AND OBJECTIVE BOX
[facility] - S Alea / Tita S /D Rama/ S Jovan/ S Keenan/ Cleve Gambino / DAGMAR Wilsonu/ O Daya  service -[phone], office [phone]  ---------------------------------------------------------------------------------------------------------------    Patient seen and examined bedside    Subjective and Objective: No overnight events, sob resolved. No complaints today. feeling better    Allergies: apple (Unknown)  Benadryl (Unknown)  penicillin (Hives)  watermelon (Unknown)      Hospital Medications:   MEDICATIONS  (STANDING):  atovaquone  Suspension 1500 milliGRAM(s) Oral daily  cefTRIAXone   IVPB 2000 milliGRAM(s) IV Intermittent every 12 hours  dextrose 5%. 1000 milliLiter(s) (100 mL/Hr) IV Continuous <Continuous>  dextrose 5%. 1000 milliLiter(s) (50 mL/Hr) IV Continuous <Continuous>  dextrose 50% Injectable 25 Gram(s) IV Push once  dextrose 50% Injectable 12.5 Gram(s) IV Push once  dextrose 50% Injectable 25 Gram(s) IV Push once  epoetin reza-epbx (RETACRIT) Injectable 95459 Unit(s) IV Push <User Schedule>  glucagon  Injectable 1 milliGRAM(s) IntraMuscular once  heparin   Injectable 5000 Unit(s) SubCutaneous every 8 hours  insulin lispro (ADMELOG) corrective regimen sliding scale   SubCutaneous every 6 hours  lacosamide Solution 200 milliGRAM(s) Oral two times a day  levETIRAcetam  Solution 500 milliGRAM(s) Oral two times a day  levothyroxine 25 MICROGram(s) Oral daily  midodrine 10 milliGRAM(s) Oral every 8 hours  petrolatum Ophthalmic Ointment 1 Application(s) Both EYES every 12 hours      REVIEW OF SYSTEMS:  CONSTITUTIONAL: No weakness, fevers or chills  EYES/ENT: No visual changes;  No vertigo or throat pain   NECK: No pain or stiffness  RESPIRATORY: No cough, wheezing, hemoptysis; No shortness of breath  CARDIOVASCULAR: No chest pain or palpitations.  GASTROINTESTINAL: No abdominal or epigastric pain. No nausea, vomiting, or hematemesis; No diarrhea or constipation. No melena or hematochezia.  GENITOURINARY: No dysuria, frequency, foamy urine, urinary urgency, incontinence or hematuria  NEUROLOGICAL: No numbness or weakness  SKIN: No itching, burning, rashes, or lesions   VASCULAR: No bilateral lower extremity edema.   All other review of systems is negative unless indicated above.    VITALS:  T(F): 98.6 (04-14-23 @ 10:00), Max: 101 (04-14-23 @ 04:00)  HR: 80 (04-14-23 @ 10:00)  BP: 134/68 (04-14-23 @ 10:00)  RR: 19 (04-14-23 @ 10:00)  SpO2: 100% (04-14-23 @ 10:00)  Wt(kg): --    04-13 @ 07:01  -  04-14 @ 07:00  --------------------------------------------------------  IN: 582 mL / OUT: 0 mL / NET: 582 mL    04-14 @ 07:01  -  04-14 @ 14:34  --------------------------------------------------------  IN: 44 mL / OUT: 0 mL / NET: 44 mL          PHYSICAL EXAM:  Constitutional: NAD  HEENT: anicteric sclera, oropharynx clear  Neck: No JVD  Respiratory: CTAB, no wheezes, rales or rhonchi  Cardiovascular: S1, S2, RRR  Gastrointestinal: BS+, soft, NT/ND  Extremities: No cyanosis or clubbing. No peripheral edema  Neurological: A/O x 3, no focal deficits  Psychiatric: Normal mood, normal affect  : No CVA tenderness. No snider.   Skin: No rashes  Vascular Access:    LABS:  04-14    133<L>  |  95<L>  |  82<H>  ----------------------------<  107<H>  3.7   |  19<L>  |  4.07<H>    Ca    8.8      14 Apr 2023 00:55  Phos  6.3     04-14  Mg     2.20     04-14    TPro  4.9<L>  /  Alb  1.7<L>  /  TBili  0.2  /  DBili      /  AST  31  /  ALT  14  /  AlkPhos  97  04-14    Creatinine Trend: 4.07 <--, 3.73 <--, 3.66 <--, 3.19 <--, 4.00 <--, 3.38 <--, 3.74 <--                        7.1    13.44 )-----------( 274      ( 14 Apr 2023 00:55 )             23.1     Urine Studies:        RADIOLOGY & ADDITIONAL STUDIES:   [facility] - S Alea / Tita S /D Rama/ S Jovan/ S Keenan/ Cleve Gambino / DAGMAR Wilsonu/ O Daya  service -[phone], office [phone]  ---------------------------------------------------------------------------------------------------------------    Patient seen and examined bedside    Subjective and Objective: terminally extubated 4/13, transfered out of MICU today  unresponsive, agonal breathing  cousin bedside    Allergies: apple (Unknown)  Benadryl (Unknown)  penicillin (Hives)  watermelon (Unknown)      Hospital Medications:   MEDICATIONS  (STANDING):  atovaquone  Suspension 1500 milliGRAM(s) Oral daily  cefTRIAXone   IVPB 2000 milliGRAM(s) IV Intermittent every 12 hours  dextrose 5%. 1000 milliLiter(s) (100 mL/Hr) IV Continuous <Continuous>  dextrose 5%. 1000 milliLiter(s) (50 mL/Hr) IV Continuous <Continuous>  dextrose 50% Injectable 25 Gram(s) IV Push once  dextrose 50% Injectable 12.5 Gram(s) IV Push once  dextrose 50% Injectable 25 Gram(s) IV Push once  epoetin reza-epbx (RETACRIT) Injectable 02667 Unit(s) IV Push <User Schedule>  glucagon  Injectable 1 milliGRAM(s) IntraMuscular once  heparin   Injectable 5000 Unit(s) SubCutaneous every 8 hours  insulin lispro (ADMELOG) corrective regimen sliding scale   SubCutaneous every 6 hours  lacosamide Solution 200 milliGRAM(s) Oral two times a day  levETIRAcetam  Solution 500 milliGRAM(s) Oral two times a day  levothyroxine 25 MICROGram(s) Oral daily  midodrine 10 milliGRAM(s) Oral every 8 hours  petrolatum Ophthalmic Ointment 1 Application(s) Both EYES every 12 hours    VITALS:  T(F): 98.6 (04-14-23 @ 10:00), Max: 101 (04-14-23 @ 04:00)  HR: 80 (04-14-23 @ 10:00)  BP: 134/68 (04-14-23 @ 10:00)  RR: 19 (04-14-23 @ 10:00)  SpO2: 100% (04-14-23 @ 10:00)  Wt(kg): --    04-13 @ 07:01  -  04-14 @ 07:00  --------------------------------------------------------  IN: 582 mL / OUT: 0 mL / NET: 582 mL    04-14 @ 07:01  -  04-14 @ 14:34  --------------------------------------------------------  IN: 44 mL / OUT: 0 mL / NET: 44 mL    PHYSICAL EXAM:  Constitutional: agonal breathing  Neck: No JVD  Respiratory: CTAB, no wheezes, rales or rhonchi  Cardiovascular: S1, S2, RRR  Gastrointestinal: BS+  Extremities: + pedal edema b/l   Neurological: unresponsive  : No snider.     LABS:  04-14    133<L>  |  95<L>  |  82<H>  ----------------------------<  107<H>  3.7   |  19<L>  |  4.07<H>    Ca    8.8      14 Apr 2023 00:55  Phos  6.3     04-14  Mg     2.20     04-14    TPro  4.9<L>  /  Alb  1.7<L>  /  TBili  0.2  /  DBili      /  AST  31  /  ALT  14  /  AlkPhos  97  04-14    Creatinine Trend: 4.07 <--, 3.73 <--, 3.66 <--, 3.19 <--, 4.00 <--, 3.38 <--, 3.74 <--                        7.1    13.44 )-----------( 274      ( 14 Apr 2023 00:55 )             23.1     Urine Studies:        RADIOLOGY & ADDITIONAL STUDIES:

## 2023-04-14 NOTE — PROGRESS NOTE ADULT - ASSESSMENT
79 yo lady with extensive medical history and hospitalization as noted below- now transferred out of the ICU after being palliatively extubated and HD stopped.     As per ICU - h/o HTN, HLD, DM2, PCKD previously on HD via LUE AVF then s/p renal transplant, LLE DVT (resolved, nml dopplers 12/2022) on ASA, very small supraclinoid aneurysm (reportedly unchanged on MRA 10/2020), hypothyroidism, PCP 2021 on atovaquone ppx, reportedly hospitalized in 12/2022 for rectal prolapse, had a blood transfusion at that time, also + CMV 1/25/23 who presented with R-sided HA, R ear pain and neck stiffness after visit to ENT earlier in the day, altered on presentation, CT head unremarkable for stroke, however LP with IR and BCx showing +strep pneumoniae, started on vanc and CTX per ID. Cardiac arrest 3/16 with ROSC, transferred to MICU for further management. MRI IAC and brain 3/29 - otitis and mastoiditis with multiple cerebral infarcts and cerebellar infarct.  Patient started on keppra and vimpat for seizure ppx in setting of anoxic brain injury and initial 24h EEG showing highly focal seizures. Endocarditis is possible cause for scattered cerebral infarcts, will consider utility of SORAYA given poor candidate for valve replacement.  Patient with poor mental status since arrest- has been off sedation opens eyes spontaneously, grimaces and withdraws to pain and can spontaneously breath. Course complicated by NAMAN- started on CRRT (3/17-3/21) then required intermittent HD.  Patient has difficult access from prior old access sites- only able to get cather in groin.  Patients course further complicated by c diff infection s/p PO vanco (4/2-4/12)  and Flagyl (4/8- 4/13).  Extensive GOC conversations  with daughter, decision was made for palliative extubation 4/12 however daughter requesting still full care.  Patient extubated to face tent 4/12- was given dilaudid/robinul/ativan for patient comfort as per daughter request.  Confirmed with daughter no pressors and patient was made DNR/DNI however daughter still requesting dialysis.  Explained to daughter dialysis and palliative are contradictory and at this point patient has no access for HD and placing catheter would cause pain and infection risk and patient would likely not be able to tolerate HD.  Nephrology stating patient not an HD candidate at this time.  Patient no longer requiring ICU level of care and is eligible for transfer to medicine.

## 2023-04-14 NOTE — PROGRESS NOTE ADULT - SUBJECTIVE AND OBJECTIVE BOX
INTERVAL HPI/OVERNIGHT EVENTS: Fever 101- sputum and blood cx sent.     HPI:    SUBJECTIVE: Patient seen and examined at bedside.     CONSTITUTIONAL: No weakness, fevers or chills  EYES/ENT: No visual changes;  No vertigo or throat pain   NECK: No pain or stiffness  RESPIRATORY: No cough, wheezing, hemoptysis; No shortness of breath  CARDIOVASCULAR: No chest pain or palpitations  GASTROINTESTINAL: No abdominal or epigastric pain. No nausea, vomiting, or hematemesis; No diarrhea or constipation. No melena or hematochezia.  GENITOURINARY: No dysuria, frequency or hematuria  NEUROLOGICAL: No numbness or weakness  SKIN: No itching, rashes    OBJECTIVE:    VITAL SIGNS:  ICU Vital Signs Last 24 Hrs  T(C): 37.7 (14 Apr 2023 08:00), Max: 38.3 (14 Apr 2023 04:00)  T(F): 99.8 (14 Apr 2023 08:00), Max: 101 (14 Apr 2023 04:00)  HR: 82 (14 Apr 2023 08:00) (71 - 89)  BP: 100/50 (14 Apr 2023 08:00) (100/50 - 146/67)  BP(mean): 62 (14 Apr 2023 08:00) (57 - 85)  ABP: --  ABP(mean): --  RR: 15 (14 Apr 2023 08:00) (15 - 26)  SpO2: 100% (14 Apr 2023 08:00) (94% - 100%)    O2 Parameters below as of 14 Apr 2023 08:00  Patient On (Oxygen Delivery Method): face tent  O2 Flow (L/min): 8  O2 Concentration (%): 40          04-13 @ 07:01  -  04-14 @ 07:00  --------------------------------------------------------  IN: 582 mL / OUT: 0 mL / NET: 582 mL    04-14 @ 07:01  -  04-14 @ 08:01  --------------------------------------------------------  IN: 44 mL / OUT: 0 mL / NET: 44 mL      CAPILLARY BLOOD GLUCOSE      POCT Blood Glucose.: 135 mg/dL (14 Apr 2023 05:49)      PHYSICAL EXAM:    General: NAD  HEENT: NC/AT; PERRL, clear conjunctiva  Neck: supple  Respiratory: CTA b/l  Cardiovascular: +S1/S2; RRR  Abdomen: soft, NT/ND; +BS x4  Extremities: WWP, 2+ peripheral pulses b/l; no LE edema  Skin: normal color and turgor; no rash  Neurological:    MEDICATIONS:  MEDICATIONS  (STANDING):  atovaquone  Suspension 1500 milliGRAM(s) Oral daily  cefTRIAXone   IVPB 2000 milliGRAM(s) IV Intermittent every 12 hours  dextrose 5%. 1000 milliLiter(s) (100 mL/Hr) IV Continuous <Continuous>  dextrose 5%. 1000 milliLiter(s) (50 mL/Hr) IV Continuous <Continuous>  dextrose 50% Injectable 25 Gram(s) IV Push once  dextrose 50% Injectable 12.5 Gram(s) IV Push once  dextrose 50% Injectable 25 Gram(s) IV Push once  epoetin reza-epbx (RETACRIT) Injectable 13639 Unit(s) IV Push <User Schedule>  glucagon  Injectable 1 milliGRAM(s) IntraMuscular once  heparin   Injectable 5000 Unit(s) SubCutaneous every 8 hours  insulin lispro (ADMELOG) corrective regimen sliding scale   SubCutaneous every 6 hours  lacosamide Solution 200 milliGRAM(s) Oral two times a day  levETIRAcetam  Solution 500 milliGRAM(s) Oral two times a day  levothyroxine 25 MICROGram(s) Oral daily  midodrine 10 milliGRAM(s) Oral every 8 hours  petrolatum Ophthalmic Ointment 1 Application(s) Both EYES every 12 hours    MEDICATIONS  (PRN):  dextrose Oral Gel 15 Gram(s) Oral once PRN Blood Glucose LESS THAN 70 milliGRAM(s)/deciliter  glycopyrrolate Injectable 0.4 milliGRAM(s) IV Push every 4 hours PRN secretions  HYDROmorphone  Injectable 1 milliGRAM(s) IV Push every 2 hours PRN comfort  sodium chloride 0.9% Bolus. 100 milliLiter(s) IV Bolus every 5 minutes PRN SBP LESS THAN or EQUAL to 90 mmHg      ALLERGIES:  Allergies    apple (Unknown)  Benadryl (Unknown)  penicillin (Hives)  watermelon (Unknown)    Intolerances        LABS:                        7.1    13.44 )-----------( 274      ( 14 Apr 2023 00:55 )             23.1     04-14    133<L>  |  95<L>  |  82<H>  ----------------------------<  107<H>  3.7   |  19<L>  |  4.07<H>    Ca    8.8      14 Apr 2023 00:55  Phos  6.3     04-14  Mg     2.20     04-14    TPro  4.9<L>  /  Alb  1.7<L>  /  TBili  0.2  /  DBili  x   /  AST  31  /  ALT  14  /  AlkPhos  97  04-14          RADIOLOGY & ADDITIONAL TESTS: Reviewed. INTERVAL HPI/OVERNIGHT EVENTS: Fever 101- sputum and blood cx sent.     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 Patient reportedly woke up at 0130 AM on Monday with a R-sided headache. ENT noted R otitis externa, purulent discharge from R TM thought to be secondary to perforation, given ear gtt and prescribed cipro/dexamethasone gtt (she took 1 dose thus far). Daughter called EMS as later in the day patient complained of severe HA as well as neck pain, stated "I'm dying" and "my head is killing me." Patient reportedly without prior history of ear infections, no history of stroke or seizures. She reported to ENT earlier in the day a muffled sensation in the R ear [id number].     Daughter notes patient has had no recent fevers/chills. She had a dental cleaning on Thursday (no abx prior to cleaning).    On arrival, code stroke called.    In the ED VS:  97.2  65-99  162-186/52-91  16-20  96-99%RA, received NS 500cc IVF, lorazepam 2mg IV x1 and morphine 4mg IV x1 (15 Mar 2023 01:17)    SUBJECTIVE: Patient seen and examined at bedside.     ROS: Unable to assess as patient encephalopathic       SUBJECTIVE: Patient seen and examined at bedside.     CONSTITUTIONAL: No weakness, fevers or chills  EYES/ENT: No visual changes;  No vertigo or throat pain   NECK: No pain or stiffness  RESPIRATORY: No cough, wheezing, hemoptysis; No shortness of breath  CARDIOVASCULAR: No chest pain or palpitations  GASTROINTESTINAL: No abdominal or epigastric pain. No nausea, vomiting, or hematemesis; No diarrhea or constipation. No melena or hematochezia.  GENITOURINARY: No dysuria, frequency or hematuria  NEUROLOGICAL: No numbness or weakness  SKIN: No itching, rashes    OBJECTIVE:    VITAL SIGNS:  ICU Vital Signs Last 24 Hrs  T(C): 37.7 (14 Apr 2023 08:00), Max: 38.3 (14 Apr 2023 04:00)  T(F): 99.8 (14 Apr 2023 08:00), Max: 101 (14 Apr 2023 04:00)  HR: 82 (14 Apr 2023 08:00) (71 - 89)  BP: 100/50 (14 Apr 2023 08:00) (100/50 - 146/67)  BP(mean): 62 (14 Apr 2023 08:00) (57 - 85)  ABP: --  ABP(mean): --  RR: 15 (14 Apr 2023 08:00) (15 - 26)  SpO2: 100% (14 Apr 2023 08:00) (94% - 100%)    O2 Parameters below as of 14 Apr 2023 08:00  Patient On (Oxygen Delivery Method): face tent  O2 Flow (L/min): 8  O2 Concentration (%): 40          04-13 @ 07:01  -  04-14 @ 07:00  --------------------------------------------------------  IN: 582 mL / OUT: 0 mL / NET: 582 mL    04-14 @ 07:01  -  04-14 @ 08:01  --------------------------------------------------------  IN: 44 mL / OUT: 0 mL / NET: 44 mL      CAPILLARY BLOOD GLUCOSE      POCT Blood Glucose.: 135 mg/dL (14 Apr 2023 05:49)    PHYSICAL EXAM:  GENERAL: ill appearing, no distress  PSYCH: A&O x0  HEAD: Atraumatic, Normocephalic  NECK: Supple, No JVD  CHEST/LUNG: rhonchorous breath sounds   HEART: irregularly irregular rhythm   ABDOMEN: +distension, palpable abdominal mass upper and lower right quadrants   EXTREMITIES: +3 bilateral LE edema   NEUROLOGY: opens eyes slightly, no purposeful movements noted  SKIN: No rashes or lesions, pressure ulcer to sacrum     MEDICATIONS:  MEDICATIONS  (STANDING):  atovaquone  Suspension 1500 milliGRAM(s) Oral daily  cefTRIAXone   IVPB 2000 milliGRAM(s) IV Intermittent every 12 hours  dextrose 5%. 1000 milliLiter(s) (100 mL/Hr) IV Continuous <Continuous>  dextrose 5%. 1000 milliLiter(s) (50 mL/Hr) IV Continuous <Continuous>  dextrose 50% Injectable 25 Gram(s) IV Push once  dextrose 50% Injectable 12.5 Gram(s) IV Push once  dextrose 50% Injectable 25 Gram(s) IV Push once  epoetin reza-epbx (RETACRIT) Injectable 56952 Unit(s) IV Push <User Schedule>  glucagon  Injectable 1 milliGRAM(s) IntraMuscular once  heparin   Injectable 5000 Unit(s) SubCutaneous every 8 hours  insulin lispro (ADMELOG) corrective regimen sliding scale   SubCutaneous every 6 hours  lacosamide Solution 200 milliGRAM(s) Oral two times a day  levETIRAcetam  Solution 500 milliGRAM(s) Oral two times a day  levothyroxine 25 MICROGram(s) Oral daily  midodrine 10 milliGRAM(s) Oral every 8 hours  petrolatum Ophthalmic Ointment 1 Application(s) Both EYES every 12 hours    MEDICATIONS  (PRN):  dextrose Oral Gel 15 Gram(s) Oral once PRN Blood Glucose LESS THAN 70 milliGRAM(s)/deciliter  glycopyrrolate Injectable 0.4 milliGRAM(s) IV Push every 4 hours PRN secretions  HYDROmorphone  Injectable 1 milliGRAM(s) IV Push every 2 hours PRN comfort  sodium chloride 0.9% Bolus. 100 milliLiter(s) IV Bolus every 5 minutes PRN SBP LESS THAN or EQUAL to 90 mmHg      ALLERGIES:  Allergies    apple (Unknown)  Benadryl (Unknown)  penicillin (Hives)  watermelon (Unknown)    Intolerances        LABS:                        7.1    13.44 )-----------( 274      ( 14 Apr 2023 00:55 )             23.1     04-14    133<L>  |  95<L>  |  82<H>  ----------------------------<  107<H>  3.7   |  19<L>  |  4.07<H>    Ca    8.8      14 Apr 2023 00:55  Phos  6.3     04-14  Mg     2.20     04-14    TPro  4.9<L>  /  Alb  1.7<L>  /  TBili  0.2  /  DBili  x   /  AST  31  /  ALT  14  /  AlkPhos  97  04-14          RADIOLOGY & ADDITIONAL TESTS: Reviewed.

## 2023-04-14 NOTE — CHART NOTE - NSCHARTNOTEFT_GEN_A_CORE
MAR Accept Note  Transfer to:  Medicine  Accepting Attending Physician:  Judah  Assigned Room:  523A    Patient seen and examined.   Labs and data reviewed.   No findings precluding transfer of service.       HPI/MICU COURSE:   Please refer to MICU transfer note for full details. Briefly, this is a 77 y/o F with h/o HTN, HLD, DM2, PCKD previously on HD via LUE AVF then s/p renal transplant, LLE DVT (resolved, nml dopplers 12/2022) on ASA, very small supraclinoid aneurysm (reportedly unchanged on MRA 10/2020), hypothyroidism, PCP 2021 on atovaquone ppx, reportedly hospitalized in 12/2022 for rectal prolapse, had a blood transfusion at that time, also + CMV 1/25/23 who presented with R-sided HA, R ear pain and neck stiffness after visit to ENT earlier in the day, altered on presentation, CT head unremarkable for stroke, however LP with IR and BCx showing +strep pneumoniae, started on vanc and CTX per ID. Cardiac arrest 3/16 with ROSC, transferred to MICU for further management. MRI IAC and brain 3/29 - otitis and mastoiditis with multiple cerebral infarcts and cerebellar infarct.  Patient started on keppra and vimpat for seizure ppx in setting of anoxic brain injury and initial 24h EEG showing highly focal seizures. Endocarditis is possible cause for scattered cerebral infarcts, will consider utility of SORAYA given poor candidate for valve replacement.  Patient with poor mental status since arrest- has been off sedation opens eyes spontaneously, grimaces and withdraws to pain and can spontaneously breath. Course complicated by NAMAN- started on CRRT (3/17-3/21) then required intermittent HD.  Patient has difficult access from prior old access sites- only able to get cather in groin.  Patients course further complicated by c diff infection s/p PO vanco (4/2-4/12)  and Flagyl (4/8- 4/13).  Extensive GOC conversations  with daughter, decision was made for palliative extubation 4/12 however daughter requesting still full care.  Patient extubated to face tent 4/12- was given dilaudid/robinul/ativan for patient comfort as per daughter request.  Confirmed with daughter no pressors and patient was made DNR/DNI however daughter still requesting dialysis.  Explained to daughter dialysis and palliative are contradictory and at this point patient has no access for HD and placing catheter would cause pain and infection risk and patient would likely not be able to tolerate HD.  Nephrology stating patient not an HD candidate at this time.  Patient no longer requiring ICU level of care and is eligible for transfer to medicine.       FOR FOLLOW-UP:  -Will need f/u MRI in 3-4 weeks if alined with GOC  - favor SORAYA to r/o IE especially in light of MRI finding as per ID. But await decision pending further GOC  - Nephro following patient currently not an HD candidate nonfunctional R fem lululey removed   - c/w Ceftriaxone (3/16 - ) for ~6 week course  - ongoing GOC conversation. consider palliative care c/s      Bjorn Daniels  EM/IM PGY-3  [id number]

## 2023-04-14 NOTE — PROGRESS NOTE ADULT - ASSESSMENT
Assessment/Plan: 79 y/o F with h/o HTN, HLD, DM2, PCKD previously on HD via LUE AVF then s/p renal transplant, LLE DVT (resolved, nml dopplers 12/2022) on ASA, very small supraclinoid aneurysm (reportedly unchanged on MRA 10/2020), hypothyroidism, PCP 2021 on atovaquone ppx, reportedly hospitalized in 12/2022 for rectal prolapse, had a blood transfusion at that time, also + CMV 1/25/23 who presented with R-sided HA, R ear pain and neck stiffness after visit to ENT earlier in the day, altered on presentation, CT head unremarkable for stroke, however LP with IR and BCx showing +strep pneumoniae, started on vanc and CTX per ID. Cardiac arrest 3/16 with ROSC. Patient extubated. Patient transfer to .         Anemia   -Management as pe primary team   -    Continue low airloss support surface.  Continue to turn and position every 2 hours with z-elroy fluidized positioning device.  Continue use of Complete Cair pressure offloading boots.  Continue Nutritional management as per RD recommendations.    Upon discharge follow up at outpatient [facility]. [address] [place] [address]. [phone] unless discharge under hospice services.     Will continue to follow while inpatient. Please reconsult earlier if needed it.   Patient seen and examined with wound care attending Jerrica   Thank you.    Disussed findings and plan with primary team.  Cami Mendenhall, [facility]-BC, CW    pager #[phone] or available in MS teams     If after 4PM or before 7:30AM on Mon-Friday or weekend/holiday please contact general surgery for urgent matters.   Team A- [id number]   Team B- [id number]  For non-urgent matters e-mail [email or web address]    We spent 35 minutes face-to-face with this patient of which more than 50% of the time was spent counseling/coordinating care of this patient.       Assessment/Plan: 79 y/o F with h/o HTN, HLD, DM2, PCKD previously on HD via LUE AVF then s/p renal transplant, LLE DVT (resolved, nml dopplers 12/2022) on ASA, very small supraclinoid aneurysm (reportedly unchanged on MRA 10/2020), hypothyroidism, PCP 2021 on atovaquone ppx, reportedly hospitalized in 12/2022 for rectal prolapse, had a blood transfusion at that time, also + CMV 1/25/23 who presented with R-sided HA, R ear pain and neck stiffness after visit to ENT earlier in the day, altered on presentation, CT head unremarkable for stroke, however LP with IR and BCx showing +strep pneumoniae, started on vanc and CTX per ID. Cardiac arrest 3/16 with ROSC. Patient palliative extubated. Patient transfer to .       Wound care f/u for Sacrum Unstageable pressure injury complicated by Incontinence and moisture.     On exam: Patient frail, in a vegetative state. Not responding to stimuli. + C diff. Patient currently NPO. Anemic. NAMAN, anemia, being treated for bacterial meningitis. Given all mentioned comorbidities/intrinsic factors wounds most likely will not heal and patient identify at risk for further skin impairment. Patient received on air loss bed. Offloaded with Z fluidized positioning devices, single breathable pad in place.   GOC conversation on 4/12 reports goals of care are for comfort measures, agree   Recommend head and ears offloading with small Z fluidized pillow.   New wounds in left chin and left clavicle with DDX pressure related, moisture?. No associated cellulitis. No s/s of infection.   Topical recommendations: Clean with NS. Pat dry. Apply liquid barrier film to periwound skin. Cover with silicone foam with border. Change daily.     Sacrum Unstageable pressure injury complicated by moisture/incontinence (DDX may include Skin changes in the critical ill patient secondary to hypoperfusion vs. Skin changes at life's end)  -Focus is palliative care management and wound maintenance   -No s/s of infection  -No crepitus, no fluctuance   -Mixed tissue type purple maroon discoloration and black dry fixed eschar   -Continue with current recommendations: Clean with NS. Pat dry. Apply Betadine wipe to entire area. Allow to dry. Cover with ABD pad and Tegaderm or paper tape.   -Continue to turn and position as per hospital protocol     Anemia   -Management as pe primary team       Continue low airloss support surface.  Continue to turn and position every 2 hours with z-elroy fluidized positioning device.  Continue use of Complete Cair pressure offloading boots.  Patient currently NPO, Nutrition  was following for management and recs in the setting of severe protein malnutrition and recs were made for enteral nutrition via orogastric tube, now patient palliative extubated.     Upon discharge follow up at outpatient [facility]. [address] [place] [address]. [phone] unless discharge under hospice services.     Will continue to follow while inpatient. Please reconsult earlier if needed it.   Patient seen and examined with wound care attending Jerrica   Thank you.    Discussed findings and plan with primary team ACP.  Cami Mendenhall, [facility]-BC, CWOC    pager #[phone] or available in MS teams     If after 4PM or before 7:30AM on Mon-Friday or weekend/holiday please contact general surgery for urgent matters.   Team A- [id number]   Team B- [id number]  For non-urgent matters e-mail [email or web address]    We spent 50 minutes face-to-face with this patient of which more than 50% of the time was spent counseling/coordinating care of this patient.       Assessment/Plan: 79 y/o F with h/o HTN, HLD, DM2, PCKD previously on HD via LUE AVF then s/p renal transplant, LLE DVT (resolved, nml dopplers 12/2022) on ASA, very small supraclinoid aneurysm (reportedly unchanged on MRA 10/2020), hypothyroidism, PCP 2021 on atovaquone ppx, reportedly hospitalized in 12/2022 for rectal prolapse, had a blood transfusion at that time, also + CMV 1/25/23 who presented with R-sided HA, R ear pain and neck stiffness after visit to ENT earlier in the day, altered on presentation, CT head unremarkable for stroke, however LP with IR and BCx showing +strep pneumoniae, started on vanc and CTX per ID. Cardiac arrest 3/16 with ROSC. Patient palliative extubated. Patient transfer to .       Wound care f/u for Sacrum Unstageable pressure injury complicated by Incontinence and moisture.     On exam: Patient frail, in a vegetative state. Not responding to stimuli. + C diff. Patient currently NPO. Anemic. NAMAN, anemia, being treated for bacterial meningitis. Given all mentioned comorbidities/intrinsic factors wounds most likely will not heal and patient identify at risk for further skin impairment. Patient received on air loss bed. Offloaded with Z fluidized positioning devices, single breathable pad in place.   GOC conversation on 4/12 reports goals of care are for comfort measures, agree   Recommend head and ears offloading with small Z fluidized pillow.   New wounds in left chin and left clavicle with DDX pressure related, moisture?. No associated cellulitis. No s/s of infection.   Topical recommendations: Clean with NS. Pat dry. Apply liquid barrier film to periwound skin. Cover with silicone foam with border. Change daily.     Sacrum Unstageable pressure injury complicated by moisture/incontinence (DDX may include Skin changes in the critical ill patient secondary to hypoperfusion vs. Skin changes at life's end)  -Focus is palliative care management and wound maintenance   -No s/s of infection  -No crepitus, no fluctuance   -Mixed tissue type purple maroon discoloration and black dry fixed eschar   -Continue with current recommendations: Clean with NS. Pat dry. Apply Betadine wipe to entire area. Allow to dry. Cover with ABD pad and Tegaderm or paper tape.   -Continue to turn and position as per hospital protocol     Anemia   -Management as pe primary team       Continue low airloss support surface.  Continue to turn and position every 2 hours with z-elroy fluidized positioning device.  Continue use of Complete Cair pressure offloading boots.  Patient currently NPO, Nutrition  was following for management and recs in the setting of severe protein malnutrition and recs were made for enteral nutrition via orogastric tube, now patient palliative extubated.     Upon discharge follow up at outpatient [facility]. [address] [place] [address]. [phone] unless discharge under hospice services.     Will continue to follow while inpatient. Please reconsult earlier if needed it.   Patient seen and examined with wound care attending Jerrica   Thank you.    Discussed findings and plan with primary team ACP.  Cami Mendenhall, [facility]-BC, CWOC    pager #[phone] or available in MS teams     If after 4PM or before 7:30AM on Mon-Friday or weekend/holiday please contact general surgery for urgent matters.   Team A- [id number]   Team B- [id number]  For non-urgent matters e-mail [email or web address]

## 2023-04-14 NOTE — PROGRESS NOTE ADULT - SUBJECTIVE AND OBJECTIVE BOX
[facility]-- WOUND TEAM -- FOLLOW UP NOTE  --------------------------------------------------------------------------------    subjective: Patient seen and examined at bedside. Patient not responsive, unable to provide HPI.     Interval HPI/24 hour events:   Palliative Extubated on 4/12  GOC conversation 4/12, focus of care is comfort care?  HD withdrawn.   C diff + 04/1/23  Creatine 4.07  Patient febrile and leukocytosis- on IV ceftriaxone antibiotics for bacterial meninginitis  Chart reviewed including labs and relevant images    Diet:  Diet, NPO with Tube Feed:   Tube Feeding Modality: Orogastric  Nepro with Carb Steady (NEPRORTH)  Total Volume for 24 Hours (mL): 968  Continuous  Starting Tube Feed Rate mL per Hour: 20  Increase Tube Feed Rate by (mL): 10     Every 4 hours  Until Goal Tube Feed Rate (mL per Hour): 44  Tube Feed Duration (in Hours): 22  Tube Feed Start Time: 12:18  No Carb Prosource (1pkg = 15gms Protein)     Qty per Day:  2  Banatrol TF     Qty per Day:  2 (04-13-23 @ 12:18)      ROS: pt unable to offer    ALLERGIES & MEDICATIONS  --------------------------------------------------------------------------------  Allergies    apple (Unknown)  Benadryl (Unknown)  penicillin (Hives)  watermelon (Unknown)    Intolerances      STANDING INPATIENT MEDICATIONS    atovaquone  Suspension 1500 milliGRAM(s) Oral daily  cefTRIAXone   IVPB 2000 milliGRAM(s) IV Intermittent every 12 hours  dextrose 5%. 1000 milliLiter(s) IV Continuous <Continuous>  dextrose 5%. 1000 milliLiter(s) IV Continuous <Continuous>  dextrose 50% Injectable 25 Gram(s) IV Push once  dextrose 50% Injectable 12.5 Gram(s) IV Push once  dextrose 50% Injectable 25 Gram(s) IV Push once  epoetin reza-epbx (RETACRIT) Injectable 97708 Unit(s) IV Push <User Schedule>  glucagon  Injectable 1 milliGRAM(s) IntraMuscular once  heparin   Injectable 5000 Unit(s) SubCutaneous every 8 hours  insulin lispro (ADMELOG) corrective regimen sliding scale   SubCutaneous every 6 hours  lacosamide Solution 200 milliGRAM(s) Oral two times a day  levETIRAcetam  Solution 500 milliGRAM(s) Oral two times a day  levothyroxine 25 MICROGram(s) Oral daily  midodrine 10 milliGRAM(s) Oral every 8 hours  petrolatum Ophthalmic Ointment 1 Application(s) Both EYES every 12 hours      PRN INPATIENT MEDICATION  dextrose Oral Gel 15 Gram(s) Oral once PRN  glycopyrrolate Injectable 0.4 milliGRAM(s) IV Push every 4 hours PRN  HYDROmorphone  Injectable 1 milliGRAM(s) IV Push every 2 hours PRN  sodium chloride 0.9% Bolus. 100 milliLiter(s) IV Bolus every 5 minutes PRN    Vital signs:  T(C): 37 (04-14-23 @ 10:00), Max: 38.3 (04-14-23 @ 04:00)  HR: 80 (04-14-23 @ 10:00) (76 - 84)  BP: 134/68 (04-14-23 @ 10:00) (100/50 - 135/70)  RR: 19 (04-14-23 @ 10:00) (15 - 26)  SpO2: 100% (04-14-23 @ 10:00) (99% - 100%)    Wt(kg): --161.3 lbs ((04-14-23 04-13-23 @ 07:01  -  04-14-23 @ 07:00  --------------------------------------------------------  IN: 582 mL / OUT: 0 mL / NET: 582 mL    04-14-23 @ 07:01  -  04-14-23 @ 12:54  --------------------------------------------------------  IN: 44 mL / OUT: 0 mL / NET: 44 mL    Physical exam:  General: NAD, obtunded, temporal wasting. Not responding to stimuli. Received on contact precautions for C diff.   HEENT: NC/NT, mucosa dry.   Cardiovascular: Rate regular   Respiratory: WOB, receiving supplemental oxygen via nasal cannula (intact posterior ears).   GI: Soft, NT/ND. Fecal incontinence, no stool during skin assessment. H/O rectal prolapse- rectal mucosa present at anal verge, no bleeding. PInk mucosa.   Vascular: No temperature changes noted. DP/PT pulses no palpable, no overt signs of ischemia.   Right medial hallux area of purple discoloration secondary to? prev on pressors- 1.9ouy8vyh1.3cm.   Right mid tibia with area of hyperpigmentation appears as reabsorbed blister- [id number]. No drainage, no associated cellulitis.   Sacrum Unstageable pressure injury complicated by Incontinence and moisture, also Skin changes in the critical ill patient vs. skin changes at lifes end (given clinical presentation butterfly appearance with deep purple maroon discoloration and black eschar)-Entire affected area measures   Skin: Dry, poor skin turgor. Frail.    Psych: mood and demeanor appropriate      LABS/ CULTURES/ RADIOLOGY:              7.1    13.44 >-----------<  274      [04-14-23 @ 00:55]              23.1     133  |  95  |  82  ----------------------------<  107      [04-14-23 @ 00:55]  3.7   |  19  |  4.07        Ca     8.8     [04-14-23 @ 00:55]      Mg     2.20     [04-14-23 @ 00:55]      Phos  6.3     [04-14-23 @ 00:55]    TPro  4.9  /  Alb  1.7  /  TBili  0.2  /  DBili  x   /  AST  31  /  ALT  14  /  AlkPhos  97  [04-14-23 @ 00:55]        CAPILLARY BLOOD GLUCOSE      POCT Blood Glucose.: 147 mg/dL (14 Apr 2023 12:47)  POCT Blood Glucose.: 135 mg/dL (14 Apr 2023 05:49)  POCT Blood Glucose.: 112 mg/dL (13 Apr 2023 23:30)  POCT Blood Glucose.: 90 mg/dL (13 Apr 2023 17:56)      Culture - Blood (collected 04-09-23 @ 08:59)  Source: .Blood Blood-Venous  Preliminary Report (04-10-23 @ 13:02):    No growth to date.    Culture - Blood (collected 04-09-23 @ 08:45)  Source: .Blood Blood-Peripheral  Preliminary Report (04-10-23 @ 13:02):    No growth to date.    Culture - Blood (collected 04-08-23 @ 01:48)  Source: .Blood Blood-Peripheral  Final Report (04-13-23 @ 07:00):    No Growth Final          A1C with Estimated Average Glucose Result: 5.6 % (03-15-23 @ 05:45)           [facility]-- WOUND TEAM -- FOLLOW UP NOTE  --------------------------------------------------------------------------------    subjective: Patient seen and examined at bedside. Patient not responsive, unable to provide HPI.     Interval HPI/24 hour events:   Palliative Extubated on 4/12  GOC conversation 4/12, focus of care is comfort care?  HD withdrawn.   C diff + 04/1/23  Creatine 4.07  Patient febrile and leukocytosis- on IV ceftriaxone antibiotics for bacterial meninginitis  Chart reviewed including labs and relevant images    Diet:  Diet, NPO with Tube Feed:   Tube Feeding Modality: Orogastric  Nepro with Carb Steady (NEPRORTH)  Total Volume for 24 Hours (mL): 968  Continuous  Starting Tube Feed Rate mL per Hour: 20  Increase Tube Feed Rate by (mL): 10     Every 4 hours  Until Goal Tube Feed Rate (mL per Hour): 44  Tube Feed Duration (in Hours): 22  Tube Feed Start Time: 12:18  No Carb Prosource (1pkg = 15gms Protein)     Qty per Day:  2  Banatrol TF     Qty per Day:  2 (04-13-23 @ 12:18)      ROS: pt unable to offer    ALLERGIES & MEDICATIONS  --------------------------------------------------------------------------------  Allergies    apple (Unknown)  Benadryl (Unknown)  penicillin (Hives)  watermelon (Unknown)    Intolerances      STANDING INPATIENT MEDICATIONS    atovaquone  Suspension 1500 milliGRAM(s) Oral daily  cefTRIAXone   IVPB 2000 milliGRAM(s) IV Intermittent every 12 hours  dextrose 5%. 1000 milliLiter(s) IV Continuous <Continuous>  dextrose 5%. 1000 milliLiter(s) IV Continuous <Continuous>  dextrose 50% Injectable 25 Gram(s) IV Push once  dextrose 50% Injectable 12.5 Gram(s) IV Push once  dextrose 50% Injectable 25 Gram(s) IV Push once  epoetin reza-epbx (RETACRIT) Injectable 53423 Unit(s) IV Push <User Schedule>  glucagon  Injectable 1 milliGRAM(s) IntraMuscular once  heparin   Injectable 5000 Unit(s) SubCutaneous every 8 hours  insulin lispro (ADMELOG) corrective regimen sliding scale   SubCutaneous every 6 hours  lacosamide Solution 200 milliGRAM(s) Oral two times a day  levETIRAcetam  Solution 500 milliGRAM(s) Oral two times a day  levothyroxine 25 MICROGram(s) Oral daily  midodrine 10 milliGRAM(s) Oral every 8 hours  petrolatum Ophthalmic Ointment 1 Application(s) Both EYES every 12 hours      PRN INPATIENT MEDICATION  dextrose Oral Gel 15 Gram(s) Oral once PRN  glycopyrrolate Injectable 0.4 milliGRAM(s) IV Push every 4 hours PRN  HYDROmorphone  Injectable 1 milliGRAM(s) IV Push every 2 hours PRN  sodium chloride 0.9% Bolus. 100 milliLiter(s) IV Bolus every 5 minutes PRN    Vital signs:  T(C): 37 (04-14-23 @ 10:00), Max: 38.3 (04-14-23 @ 04:00)  HR: 80 (04-14-23 @ 10:00) (76 - 84)  BP: 134/68 (04-14-23 @ 10:00) (100/50 - 135/70)  RR: 19 (04-14-23 @ 10:00) (15 - 26)  SpO2: 100% (04-14-23 @ 10:00) (99% - 100%)    Wt(kg): --161.3 lbs ((04-14-23 04-13-23 @ 07:01  -  04-14-23 @ 07:00  --------------------------------------------------------  IN: 582 mL / OUT: 0 mL / NET: 582 mL    04-14-23 @ 07:01  -  04-14-23 @ 12:54  --------------------------------------------------------  IN: 44 mL / OUT: 0 mL / NET: 44 mL    Physical exam:  General: NAD, Vegetative state, temporal wasting. Not responding to stimuli. Received on contact precautions for C diff. Macroglossia. Right tongue with mucosal tear secondary to patient bitting. Head favors left side. Anasarca.   HEENT: NC/NT, mucosa dry.   Cardiovascular: Rate regular   Respiratory: WOB, receiving supplemental oxygen via nasal cannula (intact posterior ears).   GI: Soft, NT/ND. Fecal incontinence, no stool during skin assessment. H/O rectal prolapse- rectal mucosa present at anal verge, no bleeding. Pink mucosa.   Musculoskeletal: Flaccidity of all extremities. Bilateral upper extremities edema.   Neurology: Unable to follow commands, non verbal.   Vascular: No temperature changes noted. DP/PT pulses no palpable, no overt signs of ischemia.   Right medial hallux area of purple discoloration secondary to? prev on pressors- 1.3okn6umk6.3cm. No associated cellulits  Right mid tibia with area of hyperpigmentation appears as reabsorbed blister- [id number]. No drainage, no associated cellulitis.   Skin: Dry, poor skin turgor. Frail.  Sacrum Unstageable pressure injury complicated by Incontinence and moisture, (DDX may include Skin changes in the critical ill patient vs. skin changes at life's end (given clinical presentation butterfly appearance with deep purple maroon discoloration and black eschar and patient overall health status)-Entire affected area measures 13ibt47lgu0.1cm, Area of eschar measuring 08cqc20pc, prev 8dnk67of. Patient turned to left side for measurements. NO crepitus, no fluctuance. Minimally serosanguinous drainage. NO odor. MIxed tissue type large area of black dry eschar, and remaining with deep purple maroon  discoloration. Butterfly appearance.   Left chin with wound secondary to moisture vs. pressure (within small skin fold) (Head favors left side)- 2ndr6kru8.3cm. Tissue type exposing dry scab vs. brown adhere dry eschar no  drainage. No odor. Periwound skin intact. No associated cellulitis Silicone foam with border placed.   Left clavicle healing superficial wound secondary to (DDX include pressure related as head favors left side)- 0.5cmx0.4cmx0.1cm. 80% re-epithelization coming towards wound center and remaining dry pale pink agranular.     LABS/ CULTURES/ RADIOLOGY:              7.1    13.44 >-----------<  274      [04-14-23 @ 00:55]              23.1     133  |  95  |  82  ----------------------------<  107      [04-14-23 @ 00:55]  3.7   |  19  |  4.07        Ca     8.8     [04-14-23 @ 00:55]      Mg     2.20     [04-14-23 @ 00:55]      Phos  6.3     [04-14-23 @ 00:55]    TPro  4.9  /  Alb  1.7  /  TBili  0.2  /  DBili  x   /  AST  31  /  ALT  14  /  AlkPhos  97  [04-14-23 @ 00:55]    CAPILLARY BLOOD GLUCOSE      POCT Blood Glucose.: 147 mg/dL (14 Apr 2023 12:47)  POCT Blood Glucose.: 135 mg/dL (14 Apr 2023 05:49)  POCT Blood Glucose.: 112 mg/dL (13 Apr 2023 23:30)  POCT Blood Glucose.: 90 mg/dL (13 Apr 2023 17:56)      Culture - Blood (collected 04-09-23 @ 08:59)  Source: .Blood Blood-Venous  Preliminary Report (04-10-23 @ 13:02):    No growth to date.    Culture - Blood (collected 04-09-23 @ 08:45)  Source: .Blood Blood-Peripheral  Preliminary Report (04-10-23 @ 13:02):    No growth to date.    Culture - Blood (collected 04-08-23 @ 01:48)  Source: .Blood Blood-Peripheral  Final Report (04-13-23 @ 07:00):    No Growth Final    A1C with Estimated Average Glucose Result: 5.6 % (03-15-23 @ 05:45)      ACC: [id number] EXAM:  CT ABDOMEN AND PELVIS   ORDERED BY: VARGAS TAYLOR     PROCEDURE DATE:  03/28/2023          INTERPRETATION:  CLINICAL INFORMATION: 78 years  Female with r/o RP   bleed. Decreasing hemoglobin.    COMPARISON: None.    CONTRAST/COMPLICATIONS:  IV Contrast: None  Oral Contrast: None  Complications: None    PROCEDURE:  CT of the Abdomen and Pelvis was performed.  Sagittal and coronal reformats were performed.    LIMITATIONS: Evaluation of the solid organs, vascular structures andGI   tract is limited without oral and IV contrast.    FINDINGS:  LOWER CHEST: Small bilateral pleural effusions with underlying passive   atelectasis. Coronary atherosclerosis. Prominent ascending aorta   measuring 3.8 cm caliber. Low-attenuation blood pole in the aorta   consistent with anemia.    LIVER: Innumerable cysts measuring up to 2.3 cm. Other hypodensities too   small to characterize. 1.4 cm right lobe calcification.  BILE DUCTS: Normal caliber.  GALLBLADDER: Within normal limits.  SPLEEN: Within normal limits.  PANCREAS: Within normal limits.  ADRENALS: Within normal limits.  KIDNEYS/URETERS: Markedly enlarged polycystic kidneys. The right measures   24.9 cm sagittal and the left 24.0 cm sagittal. Some of the cysts arising   rim calcified. Evaluation for solid mass and hydronephrosis is limited.   1.9 cm right hyperdensity (2:110). 3.1 x 1.8 cm right midpole   hyperdensities (2:94). 1.7 cm hyperdensity posteriorly on the right   (2:81. These may represent hemorrhagic cysts.  Left renal transplant without hydronephrosis. Renal sinus lipomatosis   however. 2.0 cm midpole cyst.    BLADDER: Collapsed around Ponce catheter.  REPRODUCTIVE ORGANS:    BOWEL: No bowel obstruction. Appendix not visualized and cannot be   assessed.. Enteric catheter terminating in the distal stomach. Pancolonic   diverticulosis without obvious diverticulitis.  PERITONEUM: Small pelvic ascites.  VESSELS: Dense proximal SMA calcification, likely stenotic. Markedly   stenotic distal abdominal aorta at the bifurcation. Left femoral Shiley   catheter tip in the left external iliac vein.  RETROPERITONEUM/LYMPH NODES: No lymphadenopathy. No retroperitoneal   hematoma. 4.5 x 1.3 cm rim calcified structure against the left pelvic   sidewall (2:135), possibly postsurgical.  ABDOMINAL WALL: Extensive anasarca.  BONES: Within normal limits.    IMPRESSION:    No retroperitoneal hematoma.    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    Polycystic native kidneys. Scattered hyperdensities may represent   hemorrhagic cysts..        --- End of Report ---

## 2023-04-14 NOTE — PROGRESS NOTE ADULT - SUBJECTIVE AND OBJECTIVE BOX
Vern Jimenez MD  Academ[employer] Hospitalist  Pager [phone]  Email: [email or web address]  Available on [employer] Teams        PROGRESS NOTE:     Patient is a 78y old  Female who presents with a chief complaint of AMS/R ear infxn (14 Apr 2023 12:49)      SUBJECTIVE / OVERNIGHT EVENTS:  Patient seen and examined this morning. Palliatively extubated and transferred out of the MICU.   ADDITIONAL REVIEW OF SYSTEMS:  Review of system unobtainable secondary to mental status.    MEDICATIONS  (STANDING):  atovaquone  Suspension 1500 milliGRAM(s) Oral daily  cefTRIAXone   IVPB 2000 milliGRAM(s) IV Intermittent every 12 hours  dextrose 5%. 1000 milliLiter(s) (100 mL/Hr) IV Continuous <Continuous>  dextrose 5%. 1000 milliLiter(s) (50 mL/Hr) IV Continuous <Continuous>  dextrose 50% Injectable 25 Gram(s) IV Push once  dextrose 50% Injectable 12.5 Gram(s) IV Push once  dextrose 50% Injectable 25 Gram(s) IV Push once  epoetin reza-epbx (RETACRIT) Injectable 11308 Unit(s) IV Push <User Schedule>  glucagon  Injectable 1 milliGRAM(s) IntraMuscular once  heparin   Injectable 5000 Unit(s) SubCutaneous every 8 hours  insulin lispro (ADMELOG) corrective regimen sliding scale   SubCutaneous every 6 hours  lacosamide Solution 200 milliGRAM(s) Oral two times a day  levETIRAcetam  Solution 500 milliGRAM(s) Oral two times a day  levothyroxine 25 MICROGram(s) Oral daily  midodrine 10 milliGRAM(s) Oral every 8 hours  petrolatum Ophthalmic Ointment 1 Application(s) Both EYES every 12 hours    MEDICATIONS  (PRN):  dextrose Oral Gel 15 Gram(s) Oral once PRN Blood Glucose LESS THAN 70 milliGRAM(s)/deciliter  glycopyrrolate Injectable 0.4 milliGRAM(s) IV Push every 4 hours PRN secretions  HYDROmorphone  Injectable 1 milliGRAM(s) IV Push every 2 hours PRN comfort  sodium chloride 0.9% Bolus. 100 milliLiter(s) IV Bolus every 5 minutes PRN SBP LESS THAN or EQUAL to 90 mmHg      CAPILLARY BLOOD GLUCOSE      POCT Blood Glucose.: 147 mg/dL (14 Apr 2023 12:47)  POCT Blood Glucose.: 135 mg/dL (14 Apr 2023 05:49)  POCT Blood Glucose.: 112 mg/dL (13 Apr 2023 23:30)  POCT Blood Glucose.: 90 mg/dL (13 Apr 2023 17:56)    I&O's Summary    13 Apr 2023 07:01  -  14 Apr 2023 07:00  --------------------------------------------------------  IN: 582 mL / OUT: 0 mL / NET: 582 mL    14 Apr 2023 07:01  -  14 Apr 2023 14:19  --------------------------------------------------------  IN: 44 mL / OUT: 0 mL / NET: 44 mL        PHYSICAL EXAM:  Vital Signs Last 24 Hrs  T(C): 37 (14 Apr 2023 10:00), Max: 38.3 (14 Apr 2023 04:00)  T(F): 98.6 (14 Apr 2023 10:00), Max: 101 (14 Apr 2023 04:00)  HR: 80 (14 Apr 2023 10:00) (76 - 82)  BP: 134/68 (14 Apr 2023 10:00) (100/50 - 135/70)  BP(mean): 62 (14 Apr 2023 08:00) (57 - 82)  RR: 19 (14 Apr 2023 10:00) (15 - 20)  SpO2: 100% (14 Apr 2023 10:00) (100% - 100%)    Parameters below as of 14 Apr 2023 10:00  Patient On (Oxygen Delivery Method): nasal cannula  O2 Flow (L/min): 6      CONSTITUTIONAL: Sickly appearing, agonal breathing with tongue out and NG tube  RESPIRATORY: Decreased breath sounds, agonally breathing  CARDIOVASCULAR: Regular rate and rhythm, normal S1 and S2,   ABDOMEN: normoactive bowel sounds, no rebound/guarding;  PSYCH: not responsive    LABS:                        7.1    13.44 )-----------( 274      ( 14 Apr 2023 00:55 )             23.1     04-14    133<L>  |  95<L>  |  82<H>  ----------------------------<  107<H>  3.7   |  19<L>  |  4.07<H>    Ca    8.8      14 Apr 2023 00:55  Phos  6.3     04-14  Mg     2.20     04-14    TPro  4.9<L>  /  Alb  1.7<L>  /  TBili  0.2  /  DBili  x   /  AST  31  /  ALT  14  /  AlkPhos  97  04-14                RADIOLOGY & ADDITIONAL TESTS:  Results Reviewed:   Imaging Personally Reviewed:  Electrocardiogram Personally Reviewed:    COORDINATION OF CARE:  Care Discussed with Consultants/Other Providers [Y/N]:  Prior or Outpatient Records Reviewed [Y/N]:

## 2023-04-15 NOTE — PROGRESS NOTE ADULT - SUBJECTIVE AND OBJECTIVE BOX
[facility] - S Alea / Tita S /D Rama/ S Jovan/ S Keenan/ Cleve Gambino / DAGMAR Wilsonu/ O Daya  service -[phone], office [phone]  ---------------------------------------------------------------------------------------------------------------    Patient seen and examined bedside    Subjective and Objective: No overnight events, sob resolved. No complaints today. feeling better    Allergies: apple (Unknown)  Benadryl (Unknown)  penicillin (Hives)  watermelon (Unknown)      Hospital Medications:   MEDICATIONS  (STANDING):  atovaquone  Suspension 1500 milliGRAM(s) Oral daily  cefTRIAXone   IVPB 2000 milliGRAM(s) IV Intermittent every 12 hours  dextrose 5%. 1000 milliLiter(s) (100 mL/Hr) IV Continuous <Continuous>  dextrose 5%. 1000 milliLiter(s) (50 mL/Hr) IV Continuous <Continuous>  dextrose 50% Injectable 25 Gram(s) IV Push once  dextrose 50% Injectable 12.5 Gram(s) IV Push once  dextrose 50% Injectable 25 Gram(s) IV Push once  glucagon  Injectable 1 milliGRAM(s) IntraMuscular once  heparin   Injectable 5000 Unit(s) SubCutaneous every 8 hours  insulin lispro (ADMELOG) corrective regimen sliding scale   SubCutaneous every 6 hours  lacosamide Solution 200 milliGRAM(s) Oral two times a day  levETIRAcetam  Solution 500 milliGRAM(s) Oral two times a day  levothyroxine 25 MICROGram(s) Oral daily  midodrine 10 milliGRAM(s) Oral every 8 hours  petrolatum Ophthalmic Ointment 1 Application(s) Both EYES every 12 hours      REVIEW OF SYSTEMS:  CONSTITUTIONAL: No weakness, fevers or chills  EYES/ENT: No visual changes;  No vertigo or throat pain   NECK: No pain or stiffness  RESPIRATORY: No cough, wheezing, hemoptysis; No shortness of breath  CARDIOVASCULAR: No chest pain or palpitations.  GASTROINTESTINAL: No abdominal or epigastric pain. No nausea, vomiting, or hematemesis; No diarrhea or constipation. No melena or hematochezia.  GENITOURINARY: No dysuria, frequency, foamy urine, urinary urgency, incontinence or hematuria  NEUROLOGICAL: No numbness or weakness  SKIN: No itching, burning, rashes, or lesions   VASCULAR: No bilateral lower extremity edema.   All other review of systems is negative unless indicated above.    VITALS:  T(F): 97.4 (04-15-23 @ 10:39), Max: 98.9 (04-14-23 @ 14:00)  HR: 88 (04-15-23 @ 10:39)  BP: 102/66 (04-15-23 @ 10:39)  RR: 19 (04-15-23 @ 10:39)  SpO2: 100% (04-15-23 @ 10:39)  Wt(kg): --    04-14 @ 07:01  -  04-15 @ 07:00  --------------------------------------------------------  IN: 44 mL / OUT: 0 mL / NET: 44 mL          PHYSICAL EXAM:  Constitutional: NAD  HEENT: anicteric sclera, oropharynx clear  Neck: No JVD  Respiratory: CTAB, no wheezes, rales or rhonchi  Cardiovascular: S1, S2, RRR  Gastrointestinal: BS+, soft, NT/ND  Extremities: No cyanosis or clubbing. No peripheral edema  Neurological: A/O x 3, no focal deficits  Psychiatric: Normal mood, normal affect  : No CVA tenderness. No snider.   Skin: No rashes  Vascular Access:    LABS:  04-14    133<L>  |  95<L>  |  82<H>  ----------------------------<  107<H>  3.7   |  19<L>  |  4.07<H>    Ca    8.8      14 Apr 2023 00:55  Phos  6.3     04-14  Mg     2.20     04-14    TPro  4.9<L>  /  Alb  1.7<L>  /  TBili  0.2  /  DBili      /  AST  31  /  ALT  14  /  AlkPhos  97  04-14    Creatinine Trend: 4.07 <--, 3.73 <--, 3.66 <--, 3.19 <--, 4.00 <--, 3.38 <--                        7.1    13.44 )-----------( 274      ( 14 Apr 2023 00:55 )             23.1     Urine Studies:        RADIOLOGY & ADDITIONAL STUDIES:   [facility] - S Alea / Tita S /D Rama/ S Jovan/ S Keenan/ Cleve Gambino / DAGMAR Wilsonu/ O Daya  service -[phone], office [phone]  ---------------------------------------------------------------------------------------------------------------    Patient seen and examined bedside    Subjective and Objective: No overnight events    Allergies: apple (Unknown)  Benadryl (Unknown)  penicillin (Hives)  watermelon (Unknown)      Hospital Medications:   MEDICATIONS  (STANDING):  atovaquone  Suspension 1500 milliGRAM(s) Oral daily  cefTRIAXone   IVPB 2000 milliGRAM(s) IV Intermittent every 12 hours  dextrose 5%. 1000 milliLiter(s) (100 mL/Hr) IV Continuous <Continuous>  dextrose 5%. 1000 milliLiter(s) (50 mL/Hr) IV Continuous <Continuous>  dextrose 50% Injectable 25 Gram(s) IV Push once  dextrose 50% Injectable 12.5 Gram(s) IV Push once  dextrose 50% Injectable 25 Gram(s) IV Push once  glucagon  Injectable 1 milliGRAM(s) IntraMuscular once  heparin   Injectable 5000 Unit(s) SubCutaneous every 8 hours  insulin lispro (ADMELOG) corrective regimen sliding scale   SubCutaneous every 6 hours  lacosamide Solution 200 milliGRAM(s) Oral two times a day  levETIRAcetam  Solution 500 milliGRAM(s) Oral two times a day  levothyroxine 25 MICROGram(s) Oral daily  midodrine 10 milliGRAM(s) Oral every 8 hours  petrolatum Ophthalmic Ointment 1 Application(s) Both EYES every 12 hours    VITALS:  T(F): 97.4 (04-15-23 @ 10:39), Max: 98.9 (04-14-23 @ 14:00)  HR: 88 (04-15-23 @ 10:39)  BP: 102/66 (04-15-23 @ 10:39)  RR: 19 (04-15-23 @ 10:39)  SpO2: 100% (04-15-23 @ 10:39)  Wt(kg): --    04-14 @ 07:01  -  04-15 @ 07:00  --------------------------------------------------------  IN: 44 mL / OUT: 0 mL / NET: 44 mL    PHYSICAL EXAM:  Constitutional: comfortable  Neck: No JVD  Respiratory: CTAB, no wheezes, rales or rhonchi  Cardiovascular: S1, S2, RRR  Gastrointestinal: BS+  Extremities: + pedal edema b/l   Neurological: unresponsive  : No snider.     LABS:  04-14    133<L>  |  95<L>  |  82<H>  ----------------------------<  107<H>  3.7   |  19<L>  |  4.07<H>    Ca    8.8      14 Apr 2023 00:55  Phos  6.3     04-14  Mg     2.20     04-14    TPro  4.9<L>  /  Alb  1.7<L>  /  TBili  0.2  /  DBili      /  AST  31  /  ALT  14  /  AlkPhos  97  04-14    Creatinine Trend: 4.07 <--, 3.73 <--, 3.66 <--, 3.19 <--, 4.00 <--, 3.38 <--                        7.1    13.44 )-----------( 274      ( 14 Apr 2023 00:55 )             23.1     Urine Studies:        RADIOLOGY & ADDITIONAL STUDIES:

## 2023-04-15 NOTE — PROGRESS NOTE ADULT - ASSESSMENT
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  [facility] Nephrologist Dr. Hugo Hernandez [phone]    Acute kidney injury on Chronic Kidney Disease Stage 4 vs Chronic Kidney Disease Stage 4 progression to End Stage Renal Disease on Dialysis     hx KTx 2003 : In light of severe sepsis and cardiac arrest: off MMF and Cyclosporine  w/worsened renal function, oliguria, acidosis, mild hyperkalemia- s/p CVVHD, then intermittent HD  Consent for HD was obtained, signed by daughter 3/16/23  B/L creat around 2.8 since Dec 2022    L femoral shiley removed 4/2 for line holiday- s/p rt femoral shiley placed  4/7  K, vol acceptable  Shiley catheter clotted, unable to use for HD on 4/12/23, now removed  PT W/NO HD ACCESS NOW    plan:  Patient S/P terminal extubation, off pressors, unstable, agonal breathing, not a candidate for HD at this time.  4/13 Dr Stapleton- Family at this time wants terminal extubation + every measure possible to extend her life. I advised family that comfort care and aggressive management are at odds, and currently she has no access, placing any new access would increase discomfort and infection risk. Family confused and expresses conflicting desires. Advised to reach out to primary team.    Presently patient is not a candidate for HD/RRT. risks outweigh benefits at this time.   would recommend stopping BMP/ lab draw if family agrees.  recommend comfort care. Dilaudid per team  c/w midodrine  Anemia : d/queta epo 10k w/hd tiw    OM and OE  Abxs per Infectious disease specialist with dose adjustment per GFR    reconsult palliative acre to GOC discussion w/daughter.  prognosis very poor  will closely follow up.   poc d/w medical team  labs, chart reviewed  For any question, pl call:  Nephrology  Cell -[phone]  Office [phone]  Ans Serv [phone]

## 2023-04-15 NOTE — PROGRESS NOTE ADULT - ASSESSMENT
77 yo lady with extensive medical history and hospitalization as noted below- now transferred out of the ICU after being palliatively extubated and HD stopped.     As per ICU - h/o HTN, HLD, DM2, PCKD previously on HD via LUE AVF then s/p renal transplant, LLE DVT (resolved, nml dopplers 12/2022) on ASA, very small supraclinoid aneurysm (reportedly unchanged on MRA 10/2020), hypothyroidism, PCP 2021 on atovaquone ppx, reportedly hospitalized in 12/2022 for rectal prolapse, had a blood transfusion at that time, also + CMV 1/25/23 who presented with R-sided HA, R ear pain and neck stiffness after visit to ENT earlier in the day, altered on presentation, CT head unremarkable for stroke, however LP with IR and BCx showing +strep pneumoniae, started on vanc and CTX per ID. Cardiac arrest 3/16 with ROSC, transferred to MICU for further management. MRI IAC and brain 3/29 - otitis and mastoiditis with multiple cerebral infarcts and cerebellar infarct.  Patient started on keppra and vimpat for seizure ppx in setting of anoxic brain injury and initial 24h EEG showing highly focal seizures. Endocarditis is possible cause for scattered cerebral infarcts, will consider utility of SORAYA given poor candidate for valve replacement.  Patient with poor mental status since arrest- has been off sedation opens eyes spontaneously, grimaces and withdraws to pain and can spontaneously breath. Course complicated by NAMAN- started on CRRT (3/17-3/21) then required intermittent HD.  Patient has difficult access from prior old access sites- only able to get cather in groin.  Patients course further complicated by c diff infection s/p PO vanco (4/2-4/12)  and Flagyl (4/8- 4/13).  Extensive GOC conversations  with daughter, decision was made for palliative extubation 4/12 however daughter requesting still full care.  Patient extubated to face tent 4/12- was given dilaudid/robinul/ativan for patient comfort as per daughter request.  Confirmed with daughter no pressors and patient was made DNR/DNI however daughter still requesting dialysis.  Explained to daughter dialysis and palliative are contradictory and at this point patient has no access for HD and placing catheter would cause pain and infection risk and patient would likely not be able to tolerate HD.  Nephrology stating patient not an HD candidate at this time.  Patient no longer requiring ICU level of care and is eligible for transfer to medicine.

## 2023-04-15 NOTE — PROGRESS NOTE ADULT - SUBJECTIVE AND OBJECTIVE BOX
Dr. Jasmina Guidry  Pager [phone]    PROGRESS NOTE:     Patient is a 78y old  Female who presents with a chief complaint of AMS/R ear infxn (15 Apr 2023 13:07)      SUBJECTIVE / OVERNIGHT EVENTS: denies chest pain or sob   ADDITIONAL REVIEW OF SYSTEMS: afebrile     MEDICATIONS  (STANDING):  atovaquone  Suspension 1500 milliGRAM(s) Oral daily  cefTRIAXone   IVPB 2000 milliGRAM(s) IV Intermittent every 12 hours  dextrose 5%. 1000 milliLiter(s) (100 mL/Hr) IV Continuous <Continuous>  dextrose 5%. 1000 milliLiter(s) (50 mL/Hr) IV Continuous <Continuous>  dextrose 50% Injectable 25 Gram(s) IV Push once  dextrose 50% Injectable 12.5 Gram(s) IV Push once  dextrose 50% Injectable 25 Gram(s) IV Push once  glucagon  Injectable 1 milliGRAM(s) IntraMuscular once  heparin   Injectable 5000 Unit(s) SubCutaneous every 8 hours  insulin lispro (ADMELOG) corrective regimen sliding scale   SubCutaneous every 6 hours  lacosamide Solution 200 milliGRAM(s) Oral two times a day  levETIRAcetam  Solution 500 milliGRAM(s) Oral two times a day  levothyroxine 25 MICROGram(s) Oral daily  midodrine 10 milliGRAM(s) Oral every 8 hours  petrolatum Ophthalmic Ointment 1 Application(s) Both EYES every 12 hours    MEDICATIONS  (PRN):  dextrose Oral Gel 15 Gram(s) Oral once PRN Blood Glucose LESS THAN 70 milliGRAM(s)/deciliter  glycopyrrolate Injectable 0.4 milliGRAM(s) IV Push every 4 hours PRN secretions  HYDROmorphone  Injectable 1 milliGRAM(s) IV Push every 2 hours PRN comfort  sodium chloride 0.9% Bolus. 100 milliLiter(s) IV Bolus every 5 minutes PRN SBP LESS THAN or EQUAL to 90 mmHg      CAPILLARY BLOOD GLUCOSE      POCT Blood Glucose.: 179 mg/dL (15 Apr 2023 12:46)  POCT Blood Glucose.: 139 mg/dL (15 Apr 2023 08:18)  POCT Blood Glucose.: 141 mg/dL (15 Apr 2023 06:51)  POCT Blood Glucose.: 177 mg/dL (15 Apr 2023 02:05)  POCT Blood Glucose.: 153 mg/dL (14 Apr 2023 22:48)  POCT Blood Glucose.: 154 mg/dL (14 Apr 2023 17:27)    I&O's Summary    14 Apr 2023 07:01  -  15 Apr 2023 07:00  --------------------------------------------------------  IN: 44 mL / OUT: 0 mL / NET: 44 mL        PHYSICAL EXAM:  Vital Signs Last 24 Hrs  T(C): 36.3 (15 Apr 2023 10:39), Max: 37.2 (14 Apr 2023 14:00)  T(F): 97.4 (15 Apr 2023 10:39), Max: 98.9 (14 Apr 2023 14:00)  HR: 88 (15 Apr 2023 10:39) (69 - 92)  BP: 102/66 (15 Apr 2023 10:39) (101/55 - 154/85)  BP(mean): --  RR: 19 (15 Apr 2023 10:39) (18 - 22)  SpO2: 100% (15 Apr 2023 10:39) (100% - 100%)    Parameters below as of 15 Apr 2023 06:37  Patient On (Oxygen Delivery Method): nasal cannula  O2 Flow (L/min): 6    CONSTITUTIONAL: NAD, lethargic, cachectic , on oxygen 6L  Heent: tongue protruding , ngt in place  RESPIRATORY: b/l air entry  CARDIOVASCULAR: regular s1, s2, +leg edema   ABDOMEN: Nontender to palpation, normoactive bowel sounds, no rebound/guarding;   MUSCULOSKELETAL: no clubbing or cyanosis of digits; no joint swelling or tenderness to palpation  PSYCH: lethargic, a/o x0, not responsive to verbal stimuli    LABS:                        7.1    13.44 )-----------( 274      ( 14 Apr 2023 00:55 )             23.1     04-14    133<L>  |  95<L>  |  82<H>  ----------------------------<  107<H>  3.7   |  19<L>  |  4.07<H>    Ca    8.8      14 Apr 2023 00:55  Phos  6.3     04-14  Mg     2.20     04-14    TPro  4.9<L>  /  Alb  1.7<L>  /  TBili  0.2  /  DBili  x   /  AST  31  /  ALT  14  /  AlkPhos  97  04-14              Culture - Sputum (collected 14 Apr 2023 12:31)  Source: .Sputum Sputum  Gram Stain (14 Apr 2023 18:46):    No polymorphonuclear leukocytes per low power field    No Squamous epithelial cells per low power field    Numerous Gram Positive Rods per oil power field    Moderate Gram Negative Rods per oil power field        RADIOLOGY & ADDITIONAL TESTS:  Results Reviewed:   Imaging Personally Reviewed:  Electrocardiogram Personally Reviewed:    COORDINATION OF CARE:  Care Discussed with Consultants/Other Providers [Y/N]:  Prior or Outpatient Records Reviewed [Y/N]:

## 2023-04-16 NOTE — PROGRESS NOTE ADULT - SUBJECTIVE AND OBJECTIVE BOX
Dr. Jasmina Guidry  Pager [phone]    PROGRESS NOTE:     Patient is a 78y old  Female who presents with a chief complaint of AMS/R ear infxn (15 Apr 2023 13:31)      SUBJECTIVE / OVERNIGHT EVENTS: lethargic, not responsive  ADDITIONAL REVIEW OF SYSTEMS: afebrile     MEDICATIONS  (STANDING):  atovaquone  Suspension 1500 milliGRAM(s) Oral daily  cefTRIAXone   IVPB 2000 milliGRAM(s) IV Intermittent every 12 hours  dextrose 5%. 1000 milliLiter(s) (50 mL/Hr) IV Continuous <Continuous>  dextrose 5%. 1000 milliLiter(s) (100 mL/Hr) IV Continuous <Continuous>  dextrose 50% Injectable 25 Gram(s) IV Push once  dextrose 50% Injectable 25 Gram(s) IV Push once  dextrose 50% Injectable 12.5 Gram(s) IV Push once  glucagon  Injectable 1 milliGRAM(s) IntraMuscular once  heparin   Injectable 5000 Unit(s) SubCutaneous every 8 hours  insulin lispro (ADMELOG) corrective regimen sliding scale   SubCutaneous every 6 hours  lacosamide Solution 200 milliGRAM(s) Oral two times a day  levETIRAcetam  Solution 500 milliGRAM(s) Oral two times a day  levothyroxine 25 MICROGram(s) Oral daily  midodrine 10 milliGRAM(s) Oral every 8 hours  petrolatum Ophthalmic Ointment 1 Application(s) Both EYES every 12 hours    MEDICATIONS  (PRN):  dextrose Oral Gel 15 Gram(s) Oral once PRN Blood Glucose LESS THAN 70 milliGRAM(s)/deciliter  glycopyrrolate Injectable 0.4 milliGRAM(s) IV Push every 4 hours PRN secretions  HYDROmorphone  Injectable 1 milliGRAM(s) IV Push every 2 hours PRN comfort  sodium chloride 0.9% Bolus. 100 milliLiter(s) IV Bolus every 5 minutes PRN SBP LESS THAN or EQUAL to 90 mmHg      CAPILLARY BLOOD GLUCOSE      POCT Blood Glucose.: 182 mg/dL (16 Apr 2023 07:40)  POCT Blood Glucose.: 170 mg/dL (16 Apr 2023 00:42)  POCT Blood Glucose.: 152 mg/dL (15 Apr 2023 17:59)  POCT Blood Glucose.: 179 mg/dL (15 Apr 2023 12:46)    I&O's Summary      PHYSICAL EXAM:  Vital Signs Last 24 Hrs  T(C): 36.4 (16 Apr 2023 10:38), Max: 36.5 (16 Apr 2023 05:02)  T(F): 97.5 (16 Apr 2023 10:38), Max: 97.7 (16 Apr 2023 05:02)  HR: 73 (16 Apr 2023 10:38) (73 - 83)  BP: 127/55 (16 Apr 2023 10:38) (98/55 - 145/71)  BP(mean): --  RR: 18 (16 Apr 2023 10:38) (18 - 22)  SpO2: 100% (16 Apr 2023 10:38) (100% - 100%)    Parameters below as of 16 Apr 2023 05:02  Patient On (Oxygen Delivery Method): nasal cannula  O2 Flow (L/min): 3    CONSTITUTIONAL: NAD, lethargic, cachectic , on oxygen 3L  Heent: tongue protruding , ngt in place  RESPIRATORY: b/l air entry  CARDIOVASCULAR: regular s1, s2, +arm/leg edema   ABDOMEN: Nontender to palpation, normoactive bowel sounds, no rebound/guarding;   MUSCULOSKELETAL: no clubbing or cyanosis of digits; no joint swelling or tenderness to palpation  PSYCH: lethargic, a/o x0, not responsive to verbal stimuli    LABS:                    Culture - Sputum (collected 14 Apr 2023 12:31)  Source: .Sputum Sputum  Gram Stain (14 Apr 2023 18:46):    No polymorphonuclear leukocytes per low power field    No Squamous epithelial cells per low power field    Numerous Gram Positive Rods per oil power field    Moderate Gram Negative Rods per oil power field  Preliminary Report (15 Apr 2023 19:05):    Numerous Pseudomonas aeruginosa    Numerous Citrobacter freundii complex    Normal Respiratory Kizzy present    Culture - Blood (collected 14 Apr 2023 07:50)  Source: .Blood Blood-Venous  Preliminary Report (15 Apr 2023 15:08):    No growth to date.    Culture - Blood (collected 14 Apr 2023 07:35)  Source: .Blood Blood-Peripheral  Preliminary Report (15 Apr 2023 15:08):    No growth to date.        RADIOLOGY & ADDITIONAL TESTS:  Results Reviewed:   Imaging Personally Reviewed:  Electrocardiogram Personally Reviewed:    COORDINATION OF CARE:  Care Discussed with Consultants/Other Providers [Y/N]:  Prior or Outpatient Records Reviewed [Y/N]:

## 2023-04-17 NOTE — CHART NOTE - NSCHARTNOTEFT_GEN_A_CORE
Provider brought to bedside for pt not breathing, pt seen and examined, pt unresponsive to pain and verbal stimuli, no visible chest rise seen, pt with no palpable pulse and no heart sounds on auscultation.  DNR/DNI, TOD 1610. Dr. Gage made aware.

## 2023-04-17 NOTE — PROGRESS NOTE ADULT - NSPROGADDITIONALINFOA_GEN_ALL_CORE
Dr Cosby  Nephrology Attending  [facility] [phone]  [facility] [phone]  [facility] [place] [phone]
Dr Ontiveros  Nephrology Attending  [facility]  office [phone]  ans serv- [phone]  ms Team-[facility]olly
Dr Ontiveros  Nephrology Attending  [facility]  office [phone]  ans serv- [phone]  ms Team-[place]
Dr Cosby  Nephrology Attending  [facility] [phone]  [facility] [phone]  [facility] [phone]
Dr Cosby  Nephrology Attending  [facility] [phone]  [facility] [phone]  [facility] [phone]
Dr Cosby  Nephrology Attending  [facility] [phone]  [facility] [phone]  [facility] [phone]
Prognosis remain grim.

## 2023-04-17 NOTE — PROGRESS NOTE ADULT - NUTRITIONAL ASSESSMENT
This patient has been assessed with a concern for Malnutrition and has been determined to have a diagnosis/diagnoses of Severe protein-calorie malnutrition.    This patient is being managed with:   Diet NPO with Tube Feed-  Tube Feeding Modality: Orogastric  Nepro with Carb Steady (NEPRORTH)  Total Volume for 24 Hours (mL): 840  Continuous  Starting Tube Feed Rate {mL per Hour}: 10  Increase Tube Feed Rate by (mL): 10     Every 6 hours  Until Goal Tube Feed Rate (mL per Hour): 35  Tube Feed Duration (in Hours): 24  Tube Feed Start Time: 09:00  No Carb Prosource TF     Qty per Day:  1  Entered: Mar 21 2023  7:27AM  
This patient has been assessed with a concern for Malnutrition and has been determined to have a diagnosis/diagnoses of Severe protein-calorie malnutrition.    This patient is being managed with:   Diet NPO with Tube Feed-  Tube Feeding Modality: Orogastric  Nepro with Carb Steady (NEPRORTH)  Total Volume for 24 Hours (mL): 968  Continuous  Until Goal Tube Feed Rate (mL per Hour): 44  Tube Feed Duration (in Hours): 22  Tube Feed Start Time: 14:00  No Carb Prosource (1pkg = 15gms Protein)     Qty per Day:  2  Banatrol TF     Qty per Day:  2  Entered: Apr 4 2023  1:28PM  
This patient has been assessed with a concern for Malnutrition and has been determined to have a diagnosis/diagnoses of Severe protein-calorie malnutrition.    This patient is being managed with:   Diet NPO with Tube Feed-  Tube Feeding Modality: Orogastric  Nepro with Carb Steady (NEPRORTH)  Total Volume for 24 Hours (mL): 968  Continuous  Until Goal Tube Feed Rate (mL per Hour): 44  Tube Feed Duration (in Hours): 22  Tube Feed Start Time: 14:00  No Carb Prosource (1pkg = 15gms Protein)     Qty per Day:  2  Banatrol TF     Qty per Day:  2  Entered: Apr 4 2023  1:28PM    Diet NPO with Tube Feed-  Tube Feeding Modality: Orogastric  Nepro with Carb Steady (NEPRORTH)  Total Volume for 24 Hours (mL): 840  Continuous  Starting Tube Feed Rate {mL per Hour}: 10  Increase Tube Feed Rate by (mL): 10     Every 6 hours  Until Goal Tube Feed Rate (mL per Hour): 35  Tube Feed Duration (in Hours): 24  Tube Feed Start Time: 09:00  No Carb Prosource TF     Qty per Day:  1  Entered: Mar 21 2023  7:27AM    The following pending diet order is being considered for treatment of Severe protein-calorie malnutrition:null
This patient has been assessed with a concern for Malnutrition and has been determined to have a diagnosis/diagnoses of Severe protein-calorie malnutrition.    This patient is being managed with:   Diet NPO with Tube Feed-  Tube Feeding Modality: Orogastric  Nepro with Carb Steady (NEPRORTH)  Total Volume for 24 Hours (mL): 840  Continuous  Starting Tube Feed Rate {mL per Hour}: 10  Increase Tube Feed Rate by (mL): 10     Every 6 hours  Until Goal Tube Feed Rate (mL per Hour): 35  Tube Feed Duration (in Hours): 24  Tube Feed Start Time: 09:00  No Carb Prosource TF     Qty per Day:  1  Entered: Mar 21 2023  7:27AM  
This patient has been assessed with a concern for Malnutrition and has been determined to have a diagnosis/diagnoses of Severe protein-calorie malnutrition.    This patient is being managed with:   Diet NPO with Tube Feed-  Tube Feeding Modality: Orogastric  Nepro with Carb Steady (NEPRORTH)  Total Volume for 24 Hours (mL): 968  Continuous  Until Goal Tube Feed Rate (mL per Hour): 44  Tube Feed Duration (in Hours): 22  Tube Feed Start Time: 14:00  No Carb Prosource (1pkg = 15gms Protein)     Qty per Day:  2  Banatrol TF     Qty per Day:  2  Entered: Apr 4 2023  1:28PM  
This patient has been assessed with a concern for Malnutrition and has been determined to have a diagnosis/diagnoses of Severe protein-calorie malnutrition.    This patient is being managed with:   Diet NPO with Tube Feed-  Tube Feeding Modality: Orogastric  Nepro with Carb Steady (NEPRORTH)  Total Volume for 24 Hours (mL): 968  Continuous  Until Goal Tube Feed Rate (mL per Hour): 44  Tube Feed Duration (in Hours): 22  Tube Feed Start Time: 14:00  No Carb Prosource (1pkg = 15gms Protein)     Qty per Day:  2  Banatrol TF     Qty per Day:  2  Entered: Apr 4 2023  1:28PM  
This patient has been assessed with a concern for Malnutrition and has been determined to have a diagnosis/diagnoses of Severe protein-calorie malnutrition.    This patient is being managed with:   Diet NPO with Tube Feed-  Tube Feeding Modality: Orogastric  Nepro with Carb Steady (NEPRORTH)  Total Volume for 24 Hours (mL): 840  Continuous  Starting Tube Feed Rate {mL per Hour}: 10  Increase Tube Feed Rate by (mL): 10     Every 6 hours  Until Goal Tube Feed Rate (mL per Hour): 35  Tube Feed Duration (in Hours): 24  Tube Feed Start Time: 09:00  No Carb Prosource TF     Qty per Day:  1  Entered: Mar 21 2023  7:27AM  
This patient has been assessed with a concern for Malnutrition and has been determined to have a diagnosis/diagnoses of Severe protein-calorie malnutrition.    This patient is being managed with:   Diet NPO with Tube Feed-  Tube Feeding Modality: Orogastric  Nepro with Carb Steady (NEPRORTH)  Total Volume for 24 Hours (mL): 840  Continuous  Starting Tube Feed Rate {mL per Hour}: 10  Increase Tube Feed Rate by (mL): 10     Every 6 hours  Until Goal Tube Feed Rate (mL per Hour): 35  Tube Feed Duration (in Hours): 24  Tube Feed Start Time: 09:00  No Carb Prosource TF     Qty per Day:  1  Entered: Mar 21 2023  7:27AM  
This patient has been assessed with a concern for Malnutrition and has been determined to have a diagnosis/diagnoses of Severe protein-calorie malnutrition.    This patient is being managed with:   Diet NPO with Tube Feed-  Tube Feeding Modality: Orogastric  Nepro with Carb Steady (NEPRORTH)  Total Volume for 24 Hours (mL): 968  Continuous  Until Goal Tube Feed Rate (mL per Hour): 44  Tube Feed Duration (in Hours): 22  Tube Feed Start Time: 14:00  No Carb Prosource (1pkg = 15gms Protein)     Qty per Day:  2  Banatrol TF     Qty per Day:  2  Entered: Apr 4 2023  1:28PM  
This patient has been assessed with a concern for Malnutrition and has been determined to have a diagnosis/diagnoses of Severe protein-calorie malnutrition.    This patient is being managed with:   Diet NPO with Tube Feed-  Tube Feeding Modality: Orogastric  Nepro with Carb Steady (NEPRORTH)  Total Volume for 24 Hours (mL): 968  Continuous  Starting Tube Feed Rate {mL per Hour}: 20  Increase Tube Feed Rate by (mL): 10     Every 4 hours  Until Goal Tube Feed Rate (mL per Hour): 44  Tube Feed Duration (in Hours): 22  Tube Feed Start Time: 12:18  No Carb Prosource (1pkg = 15gms Protein)     Qty per Day:  2  Banatrol TF     Qty per Day:  2  Entered: Apr 13 2023 12:18PM  

## 2023-04-17 NOTE — PROGRESS NOTE ADULT - SUBJECTIVE AND OBJECTIVE BOX
Medicine Progress Note    Patient is a 78y old  Female who presents with a chief complaint of AMS/R ear infxn (17 Apr 2023 10:52)      SUBJECTIVE / OVERNIGHT EVENTS: patient is critically ill   No verbal , anasarca         MEDICATIONS  (STANDING):  atovaquone  Suspension 1500 milliGRAM(s) Oral daily  cefTRIAXone   IVPB 2000 milliGRAM(s) IV Intermittent every 12 hours  dextrose 5%. 1000 milliLiter(s) (50 mL/Hr) IV Continuous <Continuous>  dextrose 5%. 1000 milliLiter(s) (100 mL/Hr) IV Continuous <Continuous>  dextrose 50% Injectable 25 Gram(s) IV Push once  dextrose 50% Injectable 25 Gram(s) IV Push once  dextrose 50% Injectable 12.5 Gram(s) IV Push once  glucagon  Injectable 1 milliGRAM(s) IntraMuscular once  heparin   Injectable 5000 Unit(s) SubCutaneous every 8 hours  insulin lispro (ADMELOG) corrective regimen sliding scale   SubCutaneous every 6 hours  lacosamide Solution 200 milliGRAM(s) Oral two times a day  levETIRAcetam  Solution 500 milliGRAM(s) Oral daily  levothyroxine 25 MICROGram(s) Oral daily  midodrine 10 milliGRAM(s) Oral every 8 hours  petrolatum Ophthalmic Ointment 1 Application(s) Both EYES every 12 hours    MEDICATIONS  (PRN):  dextrose Oral Gel 15 Gram(s) Oral once PRN Blood Glucose LESS THAN 70 milliGRAM(s)/deciliter  glycopyrrolate Injectable 0.4 milliGRAM(s) IV Push every 4 hours PRN secretions  HYDROmorphone  Injectable 1 milliGRAM(s) IV Push every 2 hours PRN comfort  sodium chloride 0.9% Bolus. 100 milliLiter(s) IV Bolus every 5 minutes PRN SBP LESS THAN or EQUAL to 90 mmHg    CAPILLARY BLOOD GLUCOSE      POCT Blood Glucose.: 149 mg/dL (17 Apr 2023 12:28)  POCT Blood Glucose.: 149 mg/dL (17 Apr 2023 06:31)  POCT Blood Glucose.: 131 mg/dL (17 Apr 2023 02:08)  POCT Blood Glucose.: 159 mg/dL (16 Apr 2023 18:06)    I&O's Summary    16 Apr 2023 07:01  -  17 Apr 2023 07:00  --------------------------------------------------------  IN: 0 mL / OUT: 500 mL / NET: -500 mL        PHYSICAL EXAM:  Vital Signs Last 24 Hrs  T(C): 36.3 (17 Apr 2023 12:10), Max: 36.3 (17 Apr 2023 12:10)  T(F): 97.4 (17 Apr 2023 12:10), Max: 97.4 (17 Apr 2023 12:10)  HR: 74 (17 Apr 2023 12:10) (64 - 74)  BP: 111/45 (17 Apr 2023 12:10) (104/50 - 120/60)  BP(mean): 63 (17 Apr 2023 02:00) (63 - 75)  RR: 19 (17 Apr 2023 12:10) (19 - 20)  SpO2: 96% (17 Apr 2023 12:10) (96% - 100%)    Parameters below as of 17 Apr 2023 12:10  Patient On (Oxygen Delivery Method): nasal cannula  O2 Flow (L/min): 2    CONSTITUTIONAL:  appears ill   ENMT: Moist oral mucosa, jaw drop  RESPIRATORY: Normal respiratory effort; lungs are diminished, crackles  to auscultation bilaterally  CARDIOVASCULAR: Regular rate and rhythm, normal S1 and S2, anasarca   ABDOMEN: Nontender to palpation, anasarca   PSYCH:  non verbal   NEUROLOGY: not cooperating , GCS 3      LABS:  no new                         RADIOLOGY & ADDITIONAL TESTS:  Imaging from Last 24 Hours:    Electrocardiogram/QTc Interval:    COORDINATION OF CARE:  Care Discussed with Consultants/Other Providers: Nephrology, Palliative care

## 2023-04-17 NOTE — PROGRESS NOTE ADULT - PROBLEM SELECTOR PLAN 5
Now on midodrine  no need for antihypertensives
Baseline Cr ~2, more recently 2.88 outpatient without metabolic acidosis  check UA  avoid nephrotoxins   renally dose meds   Renal consulted  - Has a left arm fistula placed 1998. Was used for HD for 5 years
Now on midodrine  no need for antihypertensives
Now on midodrine  no need for antihypertensives
Now on CVVHD  - Cyclosporine and mycophenolate held  Informed plans to discontinue CVVHD and pursue regular HD
Now on midodrine  no need for antihypertensives  Might need up titration Midodrine vs IV pressors if HD considered
Now on CVVHD  - Cyclosporine and mycophenolate held
Renal function appears stable s/p MI  No mycophenolate due to active infection, cyclosporine  On fludrocortisone, recommend addition glucocorticoid  renally dose meds, avoid nephrotoxins  trend Cr- Baseline creatinine 2.0, last creatinine checked outpatient 12/2022 was 2.88 w/o metabolic acidosis  Had CT contrast in ED stroke protocol

## 2023-04-17 NOTE — CHART NOTE - NSCHARTNOTESELECT_GEN_ALL_CORE
ACP/Event Note
ACP/Event Note
EEG prelim
EEG prelim
Event Note
Follow-up/Nutrition Services
GOC/Event Note
MAR ACCEPT Note/Event Note
NGT/Event Note
Wound care/Off Service Note
EEG prelim
Event Note
Follow Up/Nutrition Services
GOC/Event Note
MICU accept note
NEUROLOGY Resident/Event Note
Neurology
Neurology/Event Note
POCUS/Event Note
POCUS/Event Note
Transfer/Transfer Note

## 2023-04-17 NOTE — PROGRESS NOTE ADULT - PROBLEM SELECTOR PLAN 6
Now on CVVHD thru groin cath
On heparin subQ
Appears stable s/p cardiac arrest, continue to monitor  Baseline Cr ~2, more recently 2.88 outpatient without metabolic acidosis  avoid nephrotoxins   renally dose meds   - Has a left arm fistula placed 1998. Was used for HD for 5 years
On heparin subQ
On heparin subQ
hold linagliptin while inpatient  FS Q6H while NPO w/ISS  check A1c
Now on CVVHD thru groin cath  Plans to transition to regular HD due to clogging of cvvhd machine
On heparin subQ

## 2023-04-17 NOTE — PROGRESS NOTE ADULT - PROBLEM SELECTOR PLAN 1
- CT IAC w/ contrast when medically stable   - c/w IV antibiotics per ID: currently on CTX and on ciprodex drops   - ENT to continue to monitor - no further changes to recommendations at this time
Patient actively dying, very poor prognosis.  Palliatively extubated from MICU, HD stopped  Continue Robinul and dilaudid prn  still on CTX for recent strep bacteremia ,subsequent blood culture cleared
Patient actively dying, very poor prognosis.  Palliatively extubated from MICU, HD stopped  Continue Robinul and dilaudid
Patient actively dying, very poor prognosis.  Palliatively extubated from MICU, HD stopped  Continue Robinul and dilaudid prn  still on CTX for recent strep bacteremia ,subsequent blood culture cleared
LACERATION
Further care per ICU  - Still on vasopressor, levophed  - Was started on cvvhd
Patient actively dying, very poor prognosis.  Palliatively extubated from MICU, HD stopped  Continue Robinul and dilaudid prn  still on CTX for recent strep bacteremia ,subsequent blood culture cleared  Patient transferred from ICU on pressors, Midodrine 10 mg Q8, and atb.  Family states that misunderstood palliative care , " her hand was forced", and wants HD.  Nephrology stated that patient needs to be in ICU to receive HD , will need vascular surgery consult for HD access , daily labs and pressors to receive HD.  I discussed with MICU fellow , will d/w family and call vascular surgery if patient in ICU and goals of care established.  I also called palliative care to discuss with family as ethic issues arise to re-start HD on a patient with anoxic brain injury.
Strong suspicion due to infectious process- procal marked elevated, leukocytosis, fever  Initial concerns for CVA- unlikely, moving extremities b/l spontaneously, mental status still altered. Pending MRI- NO CONTRAST, renal function worsening  NIHSS 17 in the ED  - LP attempted this AM, unsuccessful, pending IR guided LP  - History of CMV + serology  - TTE would be reasonable with or without CVA.  monitor on tele  - Uncontrolled hypothyroidism, last TSH 12/2022 >10. Check TSH now  ENT eval pending for re-eval of R ear  neuro checks/VS Q4H  failed dysphagia screen, S&S eval ordered, aspiration precautions  PT/OT consult, fall precautions when mental status better
-Occurred early this AM, reportedly was PEA on rhythm then became pulseless vtach, shocked then aystole with rosc on 5th pulse check  - Currently appears to be on hypothermia protocol.  - 1 vasopressor levophed @ .53 mcg/hg/hr at time of examination ~2:30 pm  - On fludrocortisone- adrenal insufficiency in the setting of chronic prednisone use. Combination glucocorticoid was discontinued- further care per icu team  - sedated on propofol and fentanyl  - abg with metabolic acidosis pH 7.28. High oxygen requirements [id number]% PEEP 8
Further care per ICU  - Still on vasopressor, levophed  - CVVHD to be discontinued due to clogging of machine, informed may try regular HD

## 2023-04-17 NOTE — PROGRESS NOTE ADULT - PROBLEM SELECTOR PLAN 3
As demonstrated on cultures and lumbar puncture  leukocytosis improved  Conitnue ceftriaxone 2g q12  on atovaquone prophylaxis
CT showing right mastoid air cell and tympanic cavity effusion, possible acute otomastoiditis  - On antibiotics now, appreciate ID and ENT eval
patient was on HD, 2003 had transplant which has now failed  Not HD candidate anymore, actively dying  dc epogen per renal
As demonstrated on cultures and lumbar puncture  leukocytosis improved  Conitnue ceftriaxone 2g q12  on atovaquone prophylaxis  MMF held
patient was on HD, 2003 had transplant which has now failed  called daughter today, advised overall very poor prognosis, however, she still wants dialysis   per nephrology, pt is not HD candidate anymore, actively dying  dc'd epogen per renal  f/u palliative care
patient was on HD, 2003 had transplant which has now failed  Not HD candidate anymore, actively dying
patient was on HD, 2003 had transplant which has now failed  called daughter today, advised overall very poor prognosis, however, she still wants dialysis , please see above my discussion with nephew nephrologist , Dr. Min Rai [phone], called x2  Daughter Cleopatra Mcgee [phone]  F/w nephrology recs as above   dc'd epogen per renal  f/u palliative care , re-called today   Consider calling ethics
As demonstrated on cultures and lumbar puncture  leukocytosis improved  On 2g ceftriaxone and vancomycin

## 2023-04-17 NOTE — DISCHARGE NOTE FOR THE EXPIRED PATIENT - NS DECEASED NEXTOFKIN_PHONE
Pharmacy fax for Synthroid 175 mcg tab.  Pharmacy is set up  
Refill e-prescribed earlier today. Please see other encounter.   
[phone]

## 2023-04-17 NOTE — PROGRESS NOTE ADULT - PROBLEM SELECTOR PROBLEM 2
Sepsis
Acute respiratory failure with hypoxia
Acute encephalopathy
Acute respiratory failure with hypoxia
Acute respiratory failure with hypoxia

## 2023-04-17 NOTE — PROGRESS NOTE ADULT - CONVERSATION DETAILS
I discussed with family in detail goals of care , family told me again that patient is not comfort care, and requests HD.  When transferred from ICU, patient was transferred on Midodrine 10 mg Q8 , which patient is currently on , not off pressors as mentioned in notes( Midodrine is po pressor). Patient also on antibiotics.   Family mentions that was told that patient will die , but for the past 48 hours patient is alive , " saturating well on 3 L oxygen and blood pressure is supporting", again patient on Midodrine.  I explained family that patient has agonal breathing, also  has anasarca, might not be able to place HD cath.  However , daughter wants to hear from nephrology why HD is not offered.  Nephrology discussed with family, I discussed with family in detail goals of care , family told me again that patient is not comfort care, and requests HD.  When transferred from ICU, patient was transferred on Midodrine 10 mg Q8 , which patient is currently on , not off pressors as mentioned in notes( Midodrine is po pressor). Patient also on antibiotics.   Family mentions that was told that patient will die , but for the past 48 hours patient is alive , " saturating well on 3 L oxygen and blood pressure is supporting", again patient on Midodrine.  I explained family that patient has agonal breathing, also  has anasarca, might not be able to place HD cath.  However , daughter wants to hear from nephrology why HD is not offered.  Nephrology discussed with family, and communicated that patient in order to receive HD , needs ICU, Vascular Surgery consult for HD access and Pressors.

## 2023-04-17 NOTE — PROGRESS NOTE ADULT - ASSESSMENT
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  [facility] Nephrologist Dr. Hugo Hernandez [phone]    Acute kidney injury on Chronic Kidney Disease Stage 4 vs Chronic Kidney Disease Stage 4 progression to End Stage Renal Disease on Dialysis     hx KTx 2003 : In light of severe sepsis and cardiac arrest: off MMF and Cyclosporine  w/worsened renal function, oliguria, acidosis, mild hyperkalemia- s/p CVVHD, then intermittent HD  Consent for HD was obtained, signed by daughter 3/16/23  B/L creat around 2.8 since Dec 2022    L femoral shiley removed 4/2 for line holiday- s/p rt femoral shiley placed  4/7  K, vol acceptable  Shiley catheter clotted, unable to use for HD on 4/12/23, now removed  PT W/NO HD ACCESS NOW    plan:  Patient S/P terminal extubation, off pressors, unstable, agonal breathing, not a candidate for HD at this time.  Presently patient is not a candidate for HD/RRT. risks outweigh benefits at this time.   would recommend stopping BMP/ lab draw if family agrees.  recommend comfort care. 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  [facility] Nephrologist Dr. Hugo Hernandez [phone]    Acute kidney injury on Chronic Kidney Disease Stage 4 vs Chronic Kidney Disease Stage 4 progression to End Stage Renal Disease on Dialysis     hx KTx 2003 : In light of severe sepsis and cardiac arrest: off MMF and Cyclosporine  w/worsened renal function, oliguria, acidosis, mild hyperkalemia- s/p CVVHD, then intermittent HD  Consent for HD was obtained, signed by daughter 3/16/23  B/L creat around 2.8 since Dec 2022    L femoral shiley removed 4/2 for line holiday- s/p rt femoral shiley placed  4/7  K, vol acceptable  Shiley catheter clotted, unable to use for HD on 4/12/23, now removed  PT W/NO HD ACCESS NOW    plan:  Patient S/P extubation, off pressors, unstable, agonal breathing, not a candidate for HD at this time.  Presently patient is not a candidate for HD/RRT due to hypotension. risks outweigh benefits at this time.   would recommend stopping BMP/ lab draw if family agrees. Family does not want comfort measures but refusing pressors, blood draws and ventilatory support. Family wants to perform HD despite, informed family today that she is unstable to pursue dialysis in such a situation and would hasten her death.  Advised family to discuss GOC with primary team, since their wishes are in conflict with current GOC.  Spoke at length with family at [phone] and Primary attending Dr. Gage, advised consulting Ethics and Pal care to define GOC.  Dilaudid per team  c/w midodrine  Anemia : d/queta epo 10k w/hd tiw    OM and OE  Abxs per Infectious disease specialist with dose adjustment per GFR      For any question, call:  Cell # [phone]  Pager # [phone]  Callback # [phone]

## 2023-04-17 NOTE — PROGRESS NOTE ADULT - PROBLEM SELECTOR PROBLEM 5
Essential hypertension
H/O kidney transplant
H/O kidney transplant
Essential hypertension
Acute kidney injury superimposed on CKD
Essential hypertension
H/O kidney transplant
Essential hypertension

## 2023-04-17 NOTE — PROGRESS NOTE ADULT - PROBLEM SELECTOR PROBLEM 4
Type 2 diabetes mellitus, without long-term current use of insulin
H/O kidney transplant
Type 2 diabetes mellitus, without long-term current use of insulin
Otitis media with spontaneous rupture of tympanic membrane
Type 2 diabetes mellitus, without long-term current use of insulin
Otitis media with spontaneous rupture of tympanic membrane
Otitis media with spontaneous rupture of tympanic membrane
Type 2 diabetes mellitus, without long-term current use of insulin

## 2023-04-17 NOTE — PROGRESS NOTE ADULT - WHAT MATTERS MOST
Writer met with Katia Méndez to introduce myself, explain the CM role in PHP, and begin discussion of aftercare plans. Katia Méndez agrees to PCP notification, which is completed. She signed ROIs for her PCP, Dr. Kenia Kang of [facility] in [facility]; her outpatient psychiatrist, Dr. Leo Guerrero; and her outpatient therapist, Dr. Thao Gonzalez. Katia Méndez is open to attending [address] [place] [address] after completion of PHP. CM will continue to follow.  Becca Tristan LCSW  1/12/2017
quality of life

## 2023-04-17 NOTE — PROGRESS NOTE ADULT - REASON FOR ADMISSION
AMS/R ear infxn

## 2023-04-17 NOTE — PROGRESS NOTE ADULT - PROBLEM SELECTOR PROBLEM 1
Caring for the dying
Sudden cardiac arrest
Caring for the dying
Otitis media with spontaneous rupture of tympanic membrane
Caring for the dying
Sudden cardiac arrest
Caring for the dying
Sudden cardiac arrest
Acute encephalopathy

## 2023-04-17 NOTE — PROGRESS NOTE ADULT - PROBLEM SELECTOR PLAN 4
on NG tube feeds  continue ISS
on NG tube feeds  continue ISS
With seeding and resulting meningitis, intubated sedated  Continue abx per ID recs
on NG tube feeds  continue ISS
CT showing right mastoid air cell and tympanic cavity effusion, possible acute otomastoiditis  This likely precipitated meningitis given her immunocompromised state  - On antibiotics now, appreciate ID and ENT eval
With seeding and resulting meningitis, intubated sedated  Continue abx per ID recs
c/w mycophenolate, cyclosporine   c/w atovaquone for PCP ppx (hx PCP in 2021)  renally dose meds, avoid nephrotoxins  trend Cr- Baseline creatinine 2.0, last creatinine checked outpatient 12/2022 was 2.88 w/o metabolic acidosis  given recent contrast study, c/w IVF overnight  Renal consulted
on NG tube feeds  continue ISS

## 2023-04-17 NOTE — PROGRESS NOTE ADULT - PROBLEM SELECTOR PROBLEM 6
Need for prophylactic measure
Acute kidney injury superimposed on CKD
Acute kidney injury superimposed on CKD
Need for prophylactic measure
Type 2 diabetes mellitus, without long-term current use of insulin
Need for prophylactic measure
Acute kidney injury superimposed on CKD
Need for prophylactic measure

## 2023-04-17 NOTE — PROGRESS NOTE ADULT - SUBJECTIVE AND OBJECTIVE BOX
[facility] - DANNA Cosby / DANNA Rivers / LENCHO Ontiveros/ DANNA Aldana/ DANNA Hussein/ QUYEN Gambino / LUCIAN Mukherjee / GISELE Stapleton  ---------------------------------------------------------------------------------------------------------------  seen and examined today for ESRD  Interval : comfort care  VITALS:  T(F): 94 (04-17-23 @ 02:00), Max: 94 (04-17-23 @ 02:00)  HR: 64 (04-17-23 @ 02:00)  BP: 104/50 (04-17-23 @ 02:00)  RR: 20 (04-17-23 @ 02:00)  SpO2: 100% (04-17-23 @ 02:00)  Wt(kg): --    04-16 @ 07:01  -  04-17 @ 07:00  --------------------------------------------------------  IN: 0 mL / OUT: 500 mL / NET: -500 mL      Physical Exam :-  Constitutional: NAD  Neck: Supple.  Respiratory: Bilateral breath sounds +  Cardiovascular: S1, S2 normal,  Gastrointestinal: Bowel Sounds present, soft, non tender.  Extremities: anasarca +  Neurological: poorly responsive  Psychiatric: poorly responsive  Data:-  Allergies :   watermelon (Unknown)  Benadryl (Unknown)  penicillin (Hives)  apple (Unknown)    Hospital Medications:   MEDICATIONS  (STANDING):  atovaquone  Suspension 1500 milliGRAM(s) Oral daily  cefTRIAXone   IVPB 2000 milliGRAM(s) IV Intermittent every 12 hours  dextrose 5%. 1000 milliLiter(s) (100 mL/Hr) IV Continuous <Continuous>  dextrose 5%. 1000 milliLiter(s) (50 mL/Hr) IV Continuous <Continuous>  dextrose 50% Injectable 12.5 Gram(s) IV Push once  dextrose 50% Injectable 25 Gram(s) IV Push once  dextrose 50% Injectable 25 Gram(s) IV Push once  glucagon  Injectable 1 milliGRAM(s) IntraMuscular once  heparin   Injectable 5000 Unit(s) SubCutaneous every 8 hours  insulin lispro (ADMELOG) corrective regimen sliding scale   SubCutaneous every 6 hours  lacosamide Solution 200 milliGRAM(s) Oral two times a day  levETIRAcetam  Solution 500 milliGRAM(s) Oral daily  levothyroxine 25 MICROGram(s) Oral daily  midodrine 10 milliGRAM(s) Oral every 8 hours  petrolatum Ophthalmic Ointment 1 Application(s) Both EYES every 12 hours          Creatinine Trend: 4.07 <--, 3.73 <--, 3.66 <--, 3.19 <--

## 2023-04-17 NOTE — PROGRESS NOTE ADULT - PROBLEM SELECTOR PLAN 2
Likely a result of cardiac arrest and ICU stay, suspect hypoxic brain injury, MRI showed otitis, mastoiditis, and multiple cerebral and cerebellar infarcts  Unlikely to improve, patient actively dying
Likely a result of cardiac arrest and ICU stay, suspect hypoxic brain injury, MRI showed otitis, mastoiditis, and multiple cerebral and cerebellar infarcts  Unlikely to improve, patient actively dying
Likely a result of cardiac arrest and ICU stay  Unlikely to improve, patient actively dying
Persistent opacification in lungs, cxr yesterday with new left opacification  - Continue ventilator support- plan per ICU  - Still with moderate oxygen requirement
Persistent opacification in lungs, cxr yesterday with new left opacification  - PEEP of 10 and FiO2 40% at time of examination  - Continue ventilator support- plan per ICU
CXR with opacification of right lung  High oxygen requirements  - Was on meropenem, this was narrowed to 2g ceftriaxone and vanc due to culture findings  - If worsens consider additionl coverage for possible aspiration given the events that transpired  - appreciate micu care
Likely a result of cardiac arrest and ICU stay, suspect hypoxic brain injury, MRI showed otitis, mastoiditis, and multiple cerebral and cerebellar infarcts  Unlikely to improve, if necessary check MRI brain to establish cerebral damage.
Likely source the ear, possible seeding to CNS?  CT showing right mastoid air cell and tympanic cavity effusion, possible acute otomastoiditis  ENT consulted, f/u recs  start on ceftriaxone (tolerated in past per outpt records) for now w/lactobacillus for GI ppx given history of C. diff  - now on meropenem  - Follow up cultures  - hx of PCP, follow up chest x-ray  - Appreciate ID consult, suspect may need to treat for meningitis.

## 2023-04-19 LAB
CULTURE RESULTS: SIGNIFICANT CHANGE UP
CULTURE RESULTS: SIGNIFICANT CHANGE UP
SPECIMEN SOURCE: SIGNIFICANT CHANGE UP
SPECIMEN SOURCE: SIGNIFICANT CHANGE UP

## 2023-05-03 LAB
CULTURE RESULTS: SIGNIFICANT CHANGE UP
SPECIMEN SOURCE: SIGNIFICANT CHANGE UP

## 2023-06-01 NOTE — ED PROVIDER NOTE - MDM ORDERS SUBMITTED SELECTION
no lesions, no deformities, no traumatic injuries, no significant scars are present, chest wall non-tender, no masses present, breathing is unlabored without accessory muscle use,normal breath sounds
Labs/Medications

## 2023-09-07 NOTE — PROCEDURE NOTE - NSCVLACTUALSITE_VASC_A_CORE
right/femoral vein
right/femoral vein
left/femoral vein
Patient/Caregiver provided printed discharge information.

## 2023-09-25 NOTE — ED PROVIDER NOTE - MDM ORDERS SUBMITTED SELECTION
Labs/EKG/Imaging Studies/Medications Patient with perforated appendicitis s/p appendectomy with washout.  Course complicated by peritonitis.  Would recommend treating for 10 days post-op.  Cultures with E. Coli and Pseudomonas aeruginosa.  Unfortunately the pseudomonas aeruginosa is fluoroquinolone resistant meaning there are no oral options.  Recommend continuing Zosyn 4.5 grams IV q8hrs x 10 days (9/20-9/30). I discussed a PICC and home IV infusions with the patient and her mother at length.  They are not sure they want to go this route and might want to remain in the hospital for the duration
